# Patient Record
Sex: MALE | Race: WHITE | NOT HISPANIC OR LATINO | Employment: FULL TIME | URBAN - METROPOLITAN AREA
[De-identification: names, ages, dates, MRNs, and addresses within clinical notes are randomized per-mention and may not be internally consistent; named-entity substitution may affect disease eponyms.]

---

## 2017-01-30 ENCOUNTER — ALLIED HEALTH/NURSE VISIT (OUTPATIENT)
Dept: CARDIOLOGY | Facility: CLINIC | Age: 62
End: 2017-01-30
Attending: INTERNAL MEDICINE
Payer: COMMERCIAL

## 2017-01-30 DIAGNOSIS — I42.2 HYPERTROPHIC CARDIOMYOPATHY (H): Primary | ICD-10-CM

## 2017-01-30 PROCEDURE — 93296 REM INTERROG EVL PM/IDS: CPT | Mod: ZF

## 2017-01-30 PROCEDURE — 93295 DEV INTERROG REMOTE 1/2/MLT: CPT | Performed by: INTERNAL MEDICINE

## 2017-01-31 NOTE — PROGRESS NOTES
Scheduled Latitude remote ICD transmission received and reviewed. Device transmission sent per MD orders. His presenting rhythm is Ap/ VS @ 60 bpm with occasional PVCs. 16 NSVT episodes recorded over the last 3 months. Normal device function. AP= 67% and = 0%. Battery estimates 7.5 years to CARMEN. Pt notified of transmission results via vm. Plan for pt to RTC in 3 months as scheduled.

## 2017-03-02 DIAGNOSIS — F13.20 SEDATIVE, HYPNOTIC OR ANXIOLYTIC DEPENDENCE (H): ICD-10-CM

## 2017-03-02 DIAGNOSIS — G47.00 INSOMNIA, UNSPECIFIED: ICD-10-CM

## 2017-03-02 NOTE — TELEPHONE ENCOUNTER
Controlled Substance Refill Request for zolpidem,ambien 10mg tablet  Problem List Complete:  No     PROVIDER TO CONSIDER COMPLETION OF PROBLEM LIST AND OVERVIEW/CONTROLLED SUBSTANCE AGREEMENT    Last Written Prescription Date:  09/27/16  Last Fill Quantity: 90,   # refills: 0    Last Office Visit with [facility] primary care provider: 11/08/16    Future Office visit: 0    Controlled substance agreement on file: No.     Processing:  Local Print attach to [employer] fax to be faxed back    checked in past 6 months?  No, route to RN

## 2017-03-03 RX ORDER — ZOLPIDEM TARTRATE 10 MG/1
5-10 TABLET ORAL
Qty: 90 TABLET | Refills: 0 | Status: SHIPPED | OUTPATIENT
Start: 2017-03-03 | End: 2017-06-14

## 2017-03-03 NOTE — TELEPHONE ENCOUNTER
I sent RX to printer on the  [employer] station.   I will sign and fax when back in office Monday 3/6/17

## 2017-03-09 ENCOUNTER — MEDICAL CORRESPONDENCE (OUTPATIENT)
Dept: HEALTH INFORMATION MANAGEMENT | Facility: CLINIC | Age: 62
End: 2017-03-09

## 2017-05-02 ENCOUNTER — PRE VISIT (OUTPATIENT)
Dept: CARDIOLOGY | Facility: CLINIC | Age: 62
End: 2017-05-02

## 2017-05-02 DIAGNOSIS — Z51.81 ENCOUNTER FOR MONITORING SOTALOL THERAPY: ICD-10-CM

## 2017-05-02 DIAGNOSIS — Z79.899 ENCOUNTER FOR MONITORING SOTALOL THERAPY: ICD-10-CM

## 2017-05-02 DIAGNOSIS — I47.20 VENTRICULAR TACHYCARDIA (H): Primary | ICD-10-CM

## 2017-05-02 DIAGNOSIS — I48.91 ATRIAL FIBRILLATION, UNSPECIFIED TYPE (H): ICD-10-CM

## 2017-05-03 ENCOUNTER — OFFICE VISIT (OUTPATIENT)
Dept: CARDIOLOGY | Facility: CLINIC | Age: 62
End: 2017-05-03
Attending: INTERNAL MEDICINE
Payer: COMMERCIAL

## 2017-05-03 VITALS
WEIGHT: 215.8 LBS | HEART RATE: 64 BPM | BODY MASS INDEX: 30.21 KG/M2 | DIASTOLIC BLOOD PRESSURE: 84 MMHG | HEIGHT: 71 IN | SYSTOLIC BLOOD PRESSURE: 143 MMHG | OXYGEN SATURATION: 95 %

## 2017-05-03 DIAGNOSIS — I48.0 PAROXYSMAL ATRIAL FIBRILLATION (H): Primary | ICD-10-CM

## 2017-05-03 DIAGNOSIS — Z79.899 ENCOUNTER FOR MONITORING SOTALOL THERAPY: ICD-10-CM

## 2017-05-03 DIAGNOSIS — I42.2 HYPERTROPHIC CARDIOMYOPATHY (H): Primary | ICD-10-CM

## 2017-05-03 DIAGNOSIS — Z51.81 ENCOUNTER FOR MONITORING SOTALOL THERAPY: ICD-10-CM

## 2017-05-03 PROCEDURE — 99212 OFFICE O/P EST SF 10 MIN: CPT | Mod: 25,ZF

## 2017-05-03 PROCEDURE — 93283 PRGRMG EVAL IMPLANTABLE DFB: CPT | Mod: 26 | Performed by: INTERNAL MEDICINE

## 2017-05-03 PROCEDURE — 93283 PRGRMG EVAL IMPLANTABLE DFB: CPT | Mod: ZF

## 2017-05-03 PROCEDURE — 93010 ELECTROCARDIOGRAM REPORT: CPT | Mod: ZP | Performed by: INTERNAL MEDICINE

## 2017-05-03 PROCEDURE — 40000809 ZZH STATISTIC NO DOCUMENTATION TO SUPPORT CHARGE

## 2017-05-03 PROCEDURE — 99214 OFFICE O/P EST MOD 30 MIN: CPT | Mod: ZP | Performed by: INTERNAL MEDICINE

## 2017-05-03 ASSESSMENT — PAIN SCALES - GENERAL: PAINLEVEL: NO PAIN (0)

## 2017-05-03 NOTE — MR AVS SNAPSHOT
After Visit Summary   5/3/2017    Stu Meredith    MRN: [id number]           Patient Information     Date Of Birth          1955        Visit Information        Provider Department      5/3/2017 4:00 PM 1, Uc Cv Device [facility]        Today's Diagnoses     Hypertrophic cardiomyopathy (H)    -  1      Care Instructions    It was a pleasure to see you in clinic today.  Please do not hesitate to call with any questions or concerns.  You are scheduled for a remote transmission on 8/4/17.  We look forward to seeing you in clinic at your next device check in 6 months.    Tamica Courtney, RN, MS, CCRN  Electrophysiology Nurse Clinician  [facility]    During Business Hours Please Call:  [phone]  After Hours Please Call:  [phone] - select option #4 and ask for job code [phone]                      Follow-ups after your visit        Follow-up notes from your care team     Return in about 6 months (around 11/3/2017) for ICD Check.      Your next 10 appointments already scheduled     May 03, 2017  4:30 PM CDT   (Arrive by 4:15 PM)   RETURN ARRHYTHMIA with Erik Cooper MD   [facility] ([facility])    [address] [place] [id number] [phone]              Who to contact     If you have questions or need follow up information about today's clinic visit or your schedule please contact [facility] directly at [phone].  Normal or non-critical lab and imaging results will be communicated to you by MyChart, letter or phone within 4 business days after the clinic has received the results. If you do not hear from us within 7 days, please contact the clinic through MyChart or phone. If you have a critical or abnormal lab result, we will notify you by phone as soon as possible.  Submit refill requests through Zapnip or call your pharmacy and they will forward the refill request to us. Please allow  3 business days for your refill to be completed.          Additional Information About Your Visit        MyChart Information     iKONVERSE gives you secure access to your electronic health record. If you see a primary care provider, you can also send messages to your care team and make appointments. If you have questions, please call your primary care clinic.  If you do not have a primary care provider, please call [phone] and they will assist you.        Care EveryWhere ID     This is your Care EveryWhere ID. This could be used by other organizations to access your [place] medical records  [id number]         Blood Pressure from Last 3 Encounters:   11/08/16 106/64   10/28/16 120/79   08/04/16 110/72    Weight from Last 3 Encounters:   11/08/16 94.8 kg (209 lb)   10/28/16 94.7 kg (208 lb 11.2 oz)   08/04/16 93.1 kg (205 lb 4.8 oz)              We Performed the Following     ICD DEVICE PROGRAMMING EVAL, DUAL LEAD ICD        Primary Care Provider Office Phone # Fax #    Omar Camacho [phone]       [facility] [address] [facility] [address]        Thank you!     Thank you for choosing [facility]  for your care. Our goal is always to provide you with excellent care. Hearing back from our patients is one way we can continue to improve our services. Please take a few minutes to complete the written survey that you may receive in the mail after your visit with us. Thank you!             Your Updated Medication List - Protect others around you: Learn how to safely use, store and throw away your medicines at www.disposemymeds.org.          This list is accurate as of: 5/3/17  4:22 PM.  Always use your most recent med list.                   Brand Name Dispense Instructions for use    acetaminophen 325 MG tablet    TYLENOL     Take 325-650 mg by mouth every 6 hours as needed       albuterol 108 (90 BASE) MCG/ACT Inhaler    albuterol    1 Inhaler    Inhale 2 puffs  into the lungs every 4 hours as needed for shortness of breath / dyspnea or wheezing       * amoxicillin 500 MG tablet    AMOXIL    4 tablet    Take by mouth 4 tabs 1 hour prior to dental appt.       * amoxicillin 500 MG capsule    AMOXIL    4 capsule    TAKE 4 CAPSULES BY MOUTH 1 HOUR BEFORE DENTAL APPT       atorvastatin 20 MG tablet    LIPITOR    90 tablet    Take 1 tablet (20 mg) by mouth daily       Blood Pressure Cuff Misc     1 each    1 Device daily       metoprolol 50 MG 24 hr tablet    TOPROL-XL    90 tablet    TAKE 1 BY MOUTH DAILY       multivitamin, therapeutic Tabs tablet      Take 1 tablet by mouth daily       sotalol 80 MG tablet    BETAPACE    270 tablet    Take 1.5 tablets (120 mg) by mouth 2 times daily       XARELTO 20 MG Tabs tablet   Generic drug:  rivaroxaban ANTICOAGULANT     90 tablet    TAKE 1 BY MOUTH DAILY       zolpidem 10 MG tablet    AMBIEN    90 tablet    Take 0.5-1 tablets (5-10 mg) by mouth nightly as needed for sleep       * Notice:  This list has 2 medication(s) that are the same as other medications prescribed for you. Read the directions carefully, and ask your doctor or other care provider to review them with you.

## 2017-05-03 NOTE — PROGRESS NOTES
"Patient seen in clinic for evaluation and iterative programming of his [employer] dual lead ICD per MD orders.  Patient has an appointment to see Dr. Cooper today.  Normal ICD function.  10 NSVT episodes recorded since patient's last remote transmission on 1/30/17 - 4 beats - 17 sec, 131-185 bpm.  Intrinsic rhythm = SB @ 46 bpm.  AP = 71%.   = <1%.  Estimated battery longevity to CARMEN = 7 years.  Patient reports that he is feeling \"okay\" today.  Plan for patient to send a remote transmission in 3 months and return to clinic in 6 months.    Dual lead ICD    "

## 2017-05-03 NOTE — MR AVS SNAPSHOT
After Visit Summary   5/3/2017    tSu Meredith    MRN: [id number]           Patient Information     Date Of Birth          1955        Visit Information        Provider Department      5/3/2017 4:30 PM Erik Cooper MD [facility]        Today's Diagnoses     Paroxysmal atrial fibrillation (H)    -  1    Encounter for monitoring sotalol therapy          Care Instructions    Cardiology Provider you saw in clinic today: Dr. Cooper    Medication Changes:     1. Finish your Xarelto then switch to Eliquis 5mg twice a day.     Follow-up Visit:  1 year with device check        Please contact us via GrownOut for questions or concerns.    Chetna Ching, ROD   Electrophysiology Nurse Coordinator.  [phone]    If your question concerns the schedule/appointment times, contact:  [facility]   [phone]    Device Clinic (Pacemakers, ICD, Loop Recorders)   [phone]      You will receive all normal lab and testing results via GrownOut or mail if not reviewed in clinic today.   If you need a medication refill please contact your pharmacy.    As always, thank you for trusting us with your health care needs!          Follow-ups after your visit        Follow-up notes from your care team     Return in about 1 year (around 5/3/2018) for VT mgmt, Afib, Dr. Cooper.      Who to contact     If you have questions or need follow up information about today's clinic visit or your schedule please contact [facility] directly at [phone].  Normal or non-critical lab and imaging results will be communicated to you by MyChart, letter or phone within 4 business days after the clinic has received the results. If you do not hear from us within 7 days, please contact the clinic through [employer]art or phone. If you have a critical or abnormal lab result, we will notify you by phone as soon as possible.  Submit refill requests through GrownOut or call your pharmacy and they will  "forward the refill request to us. Please allow 3 business days for your refill to be completed.          Additional Information About Your Visit        Xu[employer] Social Networking Grid Information     [email or web address] gives you secure access to your electronic health record. If you see a primary care provider, you can also send messages to your care team and make appointments. If you have questions, please call your primary care clinic.  If you do not have a primary care provider, please call [phone] and they will assist you.        Care EveryWhere ID     This is your Care EveryWhere ID. This could be used by other organizations to access your [place] medical records  [id number]        Your Vitals Were     Pulse Height Pulse Oximetry BMI (Body Mass Index)          64 1.803 m (5' 11\") 95% 30.1 kg/m2         Blood Pressure from Last 3 Encounters:   05/03/17 143/84   11/08/16 106/64   10/28/16 120/79    Weight from Last 3 Encounters:   05/03/17 97.9 kg (215 lb 12.8 oz)   11/08/16 94.8 kg (209 lb)   10/28/16 94.7 kg (208 lb 11.2 oz)              We Performed the Following     EKG 12-lead, tracing only (Future)          Today's Medication Changes          These changes are accurate as of: 5/3/17  4:53 PM.  If you have any questions, ask your nurse or doctor.               Start taking these medicines.        Dose/Directions    apixaban ANTICOAGULANT 5 MG tablet   Commonly known as:  ELIQUIS   Used for:  Paroxysmal atrial fibrillation (H)   Started by:  Erik Cooper MD        Dose:  5 mg   Take 1 tablet (5 mg) by mouth 2 times daily   Quantity:  180 tablet   Refills:  3         Stop taking these medicines if you haven't already. Please contact your care team if you have questions.     XARELTO 20 MG Tabs tablet   Generic drug:  rivaroxaban ANTICOAGULANT   Stopped by:  Erik Cooper MD                Where to get your medicines      These medications were sent to [facility]/pharmacy #[id number] - [place], [place] - [address], " [facility] [address]     Phone:  [phone]     apixaban ANTICOAGULANT 5 MG tablet                Primary Care Provider Office Phone # Fax #    Omar Camacho [phone]       [facility] [address]  [facility] [address]        Thank you!     Thank you for choosing [facility]  for your care. Our goal is always to provide you with excellent care. Hearing back from our patients is one way we can continue to improve our services. Please take a few minutes to complete the written survey that you may receive in the mail after your visit with us. Thank you!             Your Updated Medication List - Protect others around you: Learn how to safely use, store and throw away your medicines at www.disposemymeds.org.          This list is accurate as of: 5/3/17  4:53 PM.  Always use your most recent med list.                   Brand Name Dispense Instructions for use    acetaminophen 325 MG tablet    TYLENOL     Take 325-650 mg by mouth every 6 hours as needed       albuterol 108 (90 BASE) MCG/ACT Inhaler    albuterol    1 Inhaler    Inhale 2 puffs into the lungs every 4 hours as needed for shortness of breath / dyspnea or wheezing       * amoxicillin 500 MG tablet    AMOXIL    4 tablet    Take by mouth 4 tabs 1 hour prior to dental appt.       * amoxicillin 500 MG capsule    AMOXIL    4 capsule    TAKE 4 CAPSULES BY MOUTH 1 HOUR BEFORE DENTAL APPT       apixaban ANTICOAGULANT 5 MG tablet    ELIQUIS    180 tablet    Take 1 tablet (5 mg) by mouth 2 times daily       atorvastatin 20 MG tablet    LIPITOR    90 tablet    Take 1 tablet (20 mg) by mouth daily       Blood Pressure Cuff Misc     1 each    1 Device daily       metoprolol 50 MG 24 hr tablet    TOPROL-XL    90 tablet    TAKE 1 BY MOUTH DAILY       multivitamin, therapeutic Tabs tablet      Take 1 tablet by mouth daily       sotalol 80 MG tablet    BETAPACE    270 tablet    Take 1.5 tablets (120 mg) by mouth 2 times daily        zolpidem 10 MG tablet    AMBIEN    90 tablet    Take 0.5-1 tablets (5-10 mg) by mouth nightly as needed for sleep       * Notice:  This list has 2 medication(s) that are the same as other medications prescribed for you. Read the directions carefully, and ask your doctor or other care provider to review them with you.

## 2017-05-03 NOTE — PATIENT INSTRUCTIONS
Cardiology Provider you saw in clinic today: Dr. Cooper    Medication Changes:     1. Finish your Xarelto (3 months remaining) then switch to Eliquis 5mg twice a day.     Follow-up Visit:  1 year with device check        Please contact us via MD Insider for questions or concerns.    Chetna Ching RN   Electrophysiology Nurse Coordinator.  [phone]    If your question concerns the schedule/appointment times, contact:  [facility]   [phone]    Device Clinic (Pacemakers, ICD, Loop Recorders)   [phone]      You will receive all normal lab and testing results via MD Insider or mail if not reviewed in clinic today.   If you need a medication refill please contact your pharmacy.    As always, thank you for trusting us with your health care needs!

## 2017-05-03 NOTE — PATIENT INSTRUCTIONS
It was a pleasure to see you in clinic today.  Please do not hesitate to call with any questions or concerns.  You are scheduled for a remote transmission on 8/4/17.  We look forward to seeing you in clinic at your next device check in 6 months.    Tamica Courtney, RN, MS, CCRN  Electrophysiology Nurse Clinician  [facility]    During Business Hours Please Call:  [phone]  After Hours Please Call:  [phone] - select option #4 and ask for job code [phone]

## 2017-05-03 NOTE — NURSING NOTE
Chief Complaint   Patient presents with     Follow Up For     6 mo f/u VT mgmt, AF. ICD, EKG.

## 2017-05-03 NOTE — PROGRESS NOTES
Electrophysiology Clinic Note  HPI:   Mr. Meredith is a 61 year old male who has a past medical history significant for HCM diagnosed 2006, VT on Holter July 2012 s/p ICD 7/2012, troponin leak Oct 2013 (angiogram with non-significant CAD, LV gram showed EF 25%, recovered to 60% on TTE Dec 2013), HTN, AFib (CHADSVASC 2) with DCCVs then s/p PVI 12/2011 and PVI for persistent AF on 7/28/16. He presents for follow-up.  The patient underwent atrial fibrillation ablation by Dr. Gonzalez at [facility] in 12/2011 and implantation of an ICD in 07/2012 because of ventricular tachycardia. As per patient, he has been in sinus rhythm approximately 1-1/2 years. The patient was found to have recurrent atrial fibrillation during a preoperative exam in 10/2013 and underwent electrical cardioversion in 11/2013. The patient underwent hip surgery in 01/2014. The patient noticed that he was in atrial fibrillation at a clinic visit in 01/2014.   He was seen in consult by Dr Analilia spain on 2/14/14 for consideration of ablation. At the time the patient was reported to be generally unaware of whether or not he is in sinus rhythm or in atrial fibrillation. He reports daytime somnolence but denies significant dyspnea on exertion, palpitations, irregular beat sensation, presyncope or syncope. In terms of risk factors for stroke, the patient has coronary heart disease, hypertension, but denies history of diabetes, previous myocardial cardiac infarction or heart failure. The patient is on rivaroxaban for stroke prophylaxis and is on diltiazem and metoprolol. He was told that there was no consideration of ablation.   He came to EP clinic for second opinion in 3/3/2014. The patient mentioned this time he is not sure whether his afib is causing fatigue. However, he also attributed symptoms like headache and head fullness to his afib. He also had a cold around the same time. He still denied chest pain, syncope or presyncope, ICD shocks, MART or  "SOB. He does however indicate this time that he does feel irregular heart beat at times, along with chronic fatigue, although is not sure if fatigue is linked to afib. We agreed at that time to pursue a cardioversion to see whether he would feel better and we started him on sotalol 80 mg twice a day. He underwent cardioversion on 4/24/2014.  EP visit 5/19/14: EKG shows atrially paced rhythm with a heart rate of 70 bpm. His QTC is 436 ms and GA interval is 243 ms. The patient is still on sotalol, metoprolol, and diltiazem. He mentioned that he does feel slightly better from the standpoint. He denies palpitations at night now. He still denies chest pains and shortness of breath. His device interrogation showed the A. Fib episode with the longest being 4 seconds and one 4 beat episode of nonsustained VT, all asymptomatic.  EP visit 8/8/2014: \"The patient had a remote device transmission on 7/29/2014. His presenting rhythm is AF- ventricular rate ~70 bpm. Normal ICD function. AF burden is 100% since 7/22/14 at 1823. Hence, we asked the patient to see us today.  The device interrogation today shows  That the patient broke out of atrial fibrillation about 4 days ago, and was in A. Fib for about 8 days.  The patient does mention fatigue throughout the last several weeks, and that he was under stress working 70-80 hours a week.  He did not feel particularly more fatigued during the episode he was in atrial fibrillation but he was not sure.  There are no nonsustained VT episodes on a device interrogation.  He denies any chest pain or shortness of breath on exertion or at rest, no syncope or presyncope, no orthopnea.\" we decrease his diltiazem from 240 to 120 mg daiy because of fatigue.  The patient presents for follow-up 11/4/2014. The device interrogation revealed he had one episode of 11 seconds of atrial tachycardia on September 11, 2014.  He also had an episode of nonsustained VT on October 31, felt pressure in his head, " with a record of 10 other nonsustained VT episodes between 140 and 174 bpm, 7-10 seconds in length, asymptomatic.  His fatigue has gotten better when we decrease his diltiazem.  His EKG shows an atrially paced rhythm at 70 bpm,  ms.  He continues to deny any other cardiac symptoms.  EP Visit 7/1/15: Device interrogation shows a 9 day episode of A. Fib starting June 19, 2015, during which the patient felt very tired and no energy.  The heart rate was well controlled.  A. Fib burden was 19% since 5/19/2015, but it is the first episode of A. Fib since at least November 2014.  There was also records 16 nonsustained VT episodes, however, after review of the tracings, the real nonsustained VTs are at most 8 beats long an asymptomatic.  Otherwise, the patient is fairly asymptomatic from cardiovascular standpoint. His EKG shows atrial paced beat, heart rate 70 bpm,  ms.  He continues to take sotalol 80 mg twice a day.  EP Visit 1/13/16 He reports feeling well. Device interrogation shows normal ICD function. 2 NSVT episodes, 4 beats 138-165 bpm. AF burden about 1%. Intrinsic rhythm is SB at 48 bpm. Presenting 12 Lead ECG shows A-paced 70 bpm QRS 82 ms, QTc 429 ms. He denies any dizziness, lightheadedness, shortness of breath, palpitations, or syncopal symptoms.  Last Echo showed LVEF 40-45%. Recent stress echo stable with no significant LVOT obstruction. He is undergoing evaluation with his work to possibly reinstate his  privileges. Current cardiac medications include: Diltiazem  mg daily, metoprolol XL 50 mg daily, Xarelto, and Sotalol 80 mg BID. He reports he stopped pravastatin about 1 week ago due to cramps.   EP Visit 5/3/16: He presents today for follow up. His device interrogation shows his intrinsic rhythm is AF with ventricular rate ~ 70 bpm. AF burden= 100% since 2/28/16. AP= 12% and = 30% since 1/2016. He reports some increased fatgiue and decreased stamina since being back in  AF. He denies any chest pain, palpitations, dizziness, lightheadedness, shortness of breath, or syncopal symptoms. At this visit, he elected to pursue repeat PVI ablation for AF that is refractory to Sotalol.     EP Visit 10/28/16: He presents today for follow up after ablation. He underwent repeat PVI ablation for persistent AF refractory to AATs on 7/28/16. He had successful re-isolation of all 4 pulmonary veins. He was re-hospitalized a couple days post ablation for SOB assocaited with hypervolemia which resolved with diuresis. He reports feeling well after ablation. He states his energy levels improved with restoration of sinus. Groin sites well healed. He denies any chest pain, dizziness, lightheadedness, shortness of breath, leg/ankle swelling, palpitations, or syncopal symptoms. Diltiazem was stopped after ablation.  Device interrogation shows His ICD check shows 1 ATR for 1 sec and NSVT episode for 4 beats. He RV paces <1% of the time, his heart rate histograms show good heart rate variation.  Current cardiac medications include: Sotalol, Metoprolol, and Xarelto.     He presents today for follow up. He reports feeling well. He denies any known recurrences of AF. He denies any chest pain, dizziness, lightheadedness, shortness of breath, palpitations, or syncopal symptoms. Device interrogation shows normal ICD function. 10 NSVT episodes recorded since patient's last remote transmission on 1/30/17 - 4 beats - 17 sec, 131-185 bpm.  Intrinsic rhythm = SB @ 46 bpm. AP = 71%.  = <1%. He states that Xarelto has increasing out of pocket costs and he is wondering if there are alternatives. Presenting 12 lead ECG shows AP Vent Rate 60 bpm,  ms, QRS 84 ms, QTc 418 ms. Current cardiac medications include: Sotalol, Metoprolol, and Xarelto.     PAST MEDICAL HISTORY:  Past Medical History:   Diagnosis Date     Atrial fibrillation (H) 12/9/11    failed medication, multiple DC cardioveresions; s/p Left atrial ablation  to eliminate atrial fibrillation 12/9/11     Chest pain      CHF (congestive heart failure) (H) 7/30/2016     Coronary artery disease      DJD (degenerative joint disease)      Hip arthritis 1/15/2014     Hypertension      Hypertrophic cardiomyopathy (H) 10/09     Other abnormal heart sounds      Pacemaker     ICD     Pneumonia, organism unspecified      Prediabetes 7/10/15    A1C 6.5     Status post implantation of automatic cardioverter/defibrillator (AICD)      Ventricular tachycardia (H)        CURRENT MEDICATIONS:  Current Outpatient Prescriptions   Medication Sig Dispense Refill     zolpidem (AMBIEN) 10 MG tablet Take 0.5-1 tablets (5-10 mg) by mouth nightly as needed for sleep 90 tablet 0     XARELTO 20 MG TABS tablet TAKE 1 BY MOUTH DAILY 90 tablet 3     albuterol (ALBUTEROL) 108 (90 BASE) MCG/ACT inhaler Inhale 2 puffs into the lungs every 4 hours as needed for shortness of breath / dyspnea or wheezing 1 Inhaler 2     metoprolol (TOPROL-XL) 50 MG 24 hr tablet TAKE 1 BY MOUTH DAILY 90 tablet 3     atorvastatin (LIPITOR) 20 MG tablet Take 1 tablet (20 mg) by mouth daily 90 tablet 1     multivitamin, therapeutic (THERA-VIT) TABS Take 1 tablet by mouth daily       sotalol (BETAPACE) 80 MG tablet Take 1.5 tablets (120 mg) by mouth 2 times daily 270 tablet 3     acetaminophen (TYLENOL) 325 MG tablet Take 325-650 mg by mouth every 6 hours as needed       Blood Pressure Monitoring (BLOOD PRESSURE CUFF) MISC 1 Device daily 1 each 0     amoxicillin (AMOXIL) 500 MG capsule TAKE 4 CAPSULES BY MOUTH 1 HOUR BEFORE DENTAL APPT (Patient not taking: Reported on 5/3/2017) 4 capsule 4     amoxicillin (AMOXIL) 500 MG tablet Take by mouth 4 tabs 1 hour prior to dental appt. (Patient not taking: Reported on 5/3/2017) 4 tablet 4       PAST SURGICAL HISTORY:  Past Surgical History:   Procedure Laterality Date     ANESTHESIA CARDIOVERSION  4/24/2014    Procedure: ANESTHESIA CARDIOVERSION;  Surgeon: Generic Anesthesia Provider;   "Location: [facility] OR     ANESTHESIA CARDIOVERSION N/A 5/12/2016    Procedure: ANESTHESIA CARDIOVERSION;  Surgeon: GENERIC ANESTHESIA PROVIDER;  Location: [facility] OR     ARTHROPLASTY HIP  1/15/2014    Procedure: ARTHROPLASTY HIP;  Left Total Hip Arthroplasty;  Surgeon: Nelson Gaspar MD;  Location: [facility] OR     CARDIAC SURGERY       H ABLATION FOCAL AFIB  12/9/11    Left atrial ablation to eliminate atrial fibrillation     IMPLANT AUTOMATIC IMPLANTABLE CARDIOVERTER DEFIBRILLATOR  7/27/12    AICD implantation     TONSILLECTOMY  1964       ALLERGIES:     Allergies   Allergen Reactions     Chantix [Varenicline]      Nigthmares, insomnia, patient states that it is not really an allergy, just a side effect       FAMILY HISTORY:  Family History   Problem Relation Age of Onset     HEART DISEASE Mother      unknown     Obesity Mother      GASTROINTESTINAL DISEASE Mother      diverticulitis     DIABETES Maternal Grandmother      DIABETES Paternal Grandmother      CEREBROVASCULAR DISEASE Paternal Grandmother 94     DIABETES Paternal Grandfather      CEREBROVASCULAR DISEASE Paternal Grandfather 78     DIABETES Son      C.A.D. Father      CABG age 78     HEART DISEASE Father      CABG x5     DIABETES Maternal Grandfather      DIABETES Son        SOCIAL HISTORY:  Social History   Substance Use Topics     Smoking status: Former Smoker     Packs/day: 0.25     Years: 40.00     Types: Cigarettes     Start date: 1/1/1975     Quit date: 12/11/2015     Smokeless tobacco: Never Used     Alcohol use No      Comment: 1 drink per week       ROS:   A comprehensive 10 point review of systems negative other than as mentioned in HPI.  Exam:  /84 (BP Location: Left arm, Patient Position: Chair, Cuff Size: Adult Regular)  Pulse 64  Ht 1.803 m (5' 11\")  Wt 97.9 kg (215 lb 12.8 oz)  SpO2 95%  BMI 30.1 kg/m2  GENERAL APPEARANCE: healthy, alert and no distress  HEENT: no icterus, no xanthelasmas, normal pupil size and reaction, normal palate, " mucosa moist, no central cyanosis  NECK: no adenopathy, no asymmetry, masses, or scars, thyroid normal to palpation and no bruits, JVP not elevated  RESPIRATORY: lungs clear to auscultation - no rales, rhonchi or wheezes, no use of accessory muscles, no retractions, respirations are unlabored, normal respiratory rate  CARDIOVASCULAR: regular rhythm, normal S1 with physiologic split S2, no S3 or S4 and no murmur, click or rub, precordium quiet with normal PMI.  ABDOMEN: soft, non tender, without hepatosplenomegaly, no masses palpable, bowel sounds normal, aorta not enlarged by palpation, no abdominal bruits  EXTREMITIES: peripheral pulses normal, no edema, no bruits  NEURO: alert and oriented to person/place/time, normal speech, gait and affect  VASC: Radial, femoral, dorsalis pedis and posterior tibialis pulses are normal in volumes and symmetric bilaterally. No bruits are heard.  SKIN: no ecchymoses, no rashes    Labs:  Reviewed.     Procedures:  12/15/16 ECHOCARDIOGRAM:   Interpretation Summary  Mildly (EF 40-45%) reduced left ventricular function is present. Mild  asymmetric septal hypertrophy is present (14 mm). No dynamic outflow tract  obstruction is present.  Global right ventricular function is normal.  No significant valvular abnormalities are identified.  No pericardial effusion is present.       Assessment and Plan:   Mr. Meredith is a 61 year old male who has a past medical history significant for HCM diagnosed 2006, VT on Holter July 2012 s/p ICD 7/2012, troponin leak Oct 2013 (angiogram with non-significant CAD, LV gram showed EF 25%, recovered to 60% on TTE Dec 2013), HTN, AFib (CHADSVASC 2) with DCCVs then s/p PVI 12/2011 and PVI for persistent AF on 7/28/16. He presents for follow-up. He reports feeling well. He denies any known recurrences of AF. He states that Xarelto has increasing out of pocket costs and he is wondering if there are alternatives. Current cardiac medications include: Sotalol,  Metoprolol, and Xarelto.   We discussed in detail with the patient management/treatment options for Antonino includin. Stroke Prophylaxis:  CHADSVASC= 2, corresponding to a 2.2% annual stroke / systemic emolism event rate. indicating need for long term oral anticoagulation.  He is on Xarelto. No bleeding issues. He is asking for alternatives to Xarelto due to increasing out-of-pocket costs. We discussed alternatives in detail:   Anticoagulants include warfarin (Coumadin) and the noval oral anticoagulant agents: dabigatran (Pradaxa), rivaroxaban (Xarelto), and apixaban (Eliquis). The benefits of warfarin include: low cost, established track record in reduction of stroke and systemic embolism, the ability to reverse the agents, the ability to titrate the agent, and the ability to provide additional protection in subsets of patients who require anticoagulation for an independent process (i.e. prosthetic valve). The disadvantages of warfarin include: frequent phlebotomy for INRs, difficulty in regulation of INR [typically  60-65% of INRs within therapeutic range], and dietary constraints secondary to bioavailability. The benefits of the novel oral anticoagulants, as a class, include:  no phlebotomy, similar or significantly lower risk of stroke or systemic embolism depending on the agent, and similar or significantly lower risk of bleeding, particularly intracranial bleeding. The disadvantages of the novel agents, as a class, include: higher cost, the inability to reverse the agents (except Pradaxa), and  the need to be cautious in patients with CKD.   He has been on warfarin previously and does not want to go back to warfarin given need for frequent phlebotomy. After screening his insurance coverage, he will switch of Eliquis 5 mg BID. He will use the rest of his Xarelto supply and then make the switch to Eliquis.   2. Rate Control: Continue Metoprolol.   3. Rhythm Control: He has had a PVI ablation in  with  several recurrences requiring DCCV and then repeat PVI in July 2016. He had previously failed multaq and is currently on Sotalol. He is doing well after ablation without recurrences. Ablation voltage mapping showed high scar burden in atrium; therefore, he has higher recurrence rate for AF and may be difficult to maintain NSR. Considering this, we will continue Sotalol at current dosing.     4. Risk Factor Management: maintain tight BP control, and GONZALEZ evaluation as indicated.    He will continue to follow with the device clinic per routine. We will have him follow up 1 year.     The patient states understanding and is agreeable with plan.   This patient was seen and evaluated with Dr. Cooper. The above note reflects our joint assessment and plan.   KARSON Richardson CNP  Pager: [phone]    EP STAFF NOTE  I have seen and examined the patient as part of a shared visit with HOLLY Richardson NP.  I agree with the note above. I reviewed today's vital signs and medications. I have reviewed and discussed with the advanced practice provider their physical examination, assessment, and plan   Briefly, s.p afib ablation, no recurrences  My key history/exam findings are: RRR.   The key management decisions made by me: continue sotalol..    Erik Cooper MD [facility]  Cardiology - Electrophysiology    CC  TYRELL NOGUERA

## 2017-05-03 NOTE — LETTER
5/3/2017      RE: Stu Meredith  [address] [facility] [id number]       Dear Colleague,    Thank you for the opportunity to participate in the care of your patient, Stu Meredith, at the [facility] HEART [facility] at [facility]. Please see a copy of my visit note below.    Electrophysiology Clinic Note  HPI:   Mr. Meredith is a 61 year old male who has a past medical history significant for HCM diagnosed 2006, VT on Holter July 2012 s/p ICD 7/2012, troponin leak Oct 2013 (angiogram with non-significant CAD, LV gram showed EF 25%, recovered to 60% on TTE Dec 2013), HTN, AFib (CHADSVASC 2) with DCCVs then s/p PVI 12/2011 and PVI for persistent AF on 7/28/16. He presents for follow-up.  The patient underwent atrial fibrillation ablation by Dr. Gonzalez at [facility] in 12/2011 and implantation of an ICD in 07/2012 because of ventricular tachycardia. As per patient, he has been in sinus rhythm approximately 1-1/2 years. The patient was found to have recurrent atrial fibrillation during a preoperative exam in 10/2013 and underwent electrical cardioversion in 11/2013. The patient underwent hip surgery in 01/2014. The patient noticed that he was in atrial fibrillation at a clinic visit in 01/2014.   He was seen in consult by Dr Analilia spain on 2/14/14 for consideration of ablation. At the time the patient was reported to be generally unaware of whether or not he is in sinus rhythm or in atrial fibrillation. He reports daytime somnolence but denies significant dyspnea on exertion, palpitations, irregular beat sensation, presyncope or syncope. In terms of risk factors for stroke, the patient has coronary heart disease, hypertension, but denies history of diabetes, previous myocardial cardiac infarction or heart failure. The patient is on rivaroxaban for stroke prophylaxis and is on diltiazem and metoprolol. He was told that there was no consideration of ablation.   He came to  "EP clinic for second opinion in 3/3/2014. The patient mentioned this time he is not sure whether his afib is causing fatigue. However, he also attributed symptoms like headache and head fullness to his afib. He also had a cold around the same time. He still denied chest pain, syncope or presyncope, ICD shocks, MART or SOB. He does however indicate this time that he does feel irregular heart beat at times, along with chronic fatigue, although is not sure if fatigue is linked to afib. We agreed at that time to pursue a cardioversion to see whether he would feel better and we started him on sotalol 80 mg twice a day. He underwent cardioversion on 4/24/2014.  EP visit 5/19/14: EKG shows atrially paced rhythm with a heart rate of 70 bpm. His QTC is 436 ms and OR interval is 243 ms. The patient is still on sotalol, metoprolol, and diltiazem. He mentioned that he does feel slightly better from the standpoint. He denies palpitations at night now. He still denies chest pains and shortness of breath. His device interrogation showed the A. Fib episode with the longest being 4 seconds and one 4 beat episode of nonsustained VT, all asymptomatic.  EP visit 8/8/2014: \"The patient had a remote device transmission on 7/29/2014. His presenting rhythm is AF- ventricular rate ~70 bpm. Normal ICD function. AF burden is 100% since 7/22/14 at 1823. Hence, we asked the patient to see us today.  The device interrogation today shows  That the patient broke out of atrial fibrillation about 4 days ago, and was in A. Fib for about 8 days.  The patient does mention fatigue throughout the last several weeks, and that he was under stress working 70-80 hours a week.  He did not feel particularly more fatigued during the episode he was in atrial fibrillation but he was not sure.  There are no nonsustained VT episodes on a device interrogation.  He denies any chest pain or shortness of breath on exertion or at rest, no syncope or presyncope, no " "orthopnea.\" we decrease his diltiazem from 240 to 120 mg daiy because of fatigue.  The patient presents for follow-up 11/4/2014. The device interrogation revealed he had one episode of 11 seconds of atrial tachycardia on September 11, 2014.  He also had an episode of nonsustained VT on October 31, felt pressure in his head, with a record of 10 other nonsustained VT episodes between 140 and 174 bpm, 7-10 seconds in length, asymptomatic.  His fatigue has gotten better when we decrease his diltiazem.  His EKG shows an atrially paced rhythm at 70 bpm,  ms.  He continues to deny any other cardiac symptoms.  EP Visit 7/1/15: Device interrogation shows a 9 day episode of A. Fib starting June 19, 2015, during which the patient felt very tired and no energy.  The heart rate was well controlled.  A. Fib burden was 19% since 5/19/2015, but it is the first episode of A. Fib since at least November 2014.  There was also records 16 nonsustained VT episodes, however, after review of the tracings, the real nonsustained VTs are at most 8 beats long an asymptomatic.  Otherwise, the patient is fairly asymptomatic from cardiovascular standpoint. His EKG shows atrial paced beat, heart rate 70 bpm,  ms.  He continues to take sotalol 80 mg twice a day.  EP Visit 1/13/16 He reports feeling well. Device interrogation shows normal ICD function. 2 NSVT episodes, 4 beats 138-165 bpm. AF burden about 1%. Intrinsic rhythm is SB at 48 bpm. Presenting 12 Lead ECG shows A-paced 70 bpm QRS 82 ms, QTc 429 ms. He denies any dizziness, lightheadedness, shortness of breath, palpitations, or syncopal symptoms.  Last Echo showed LVEF 40-45%. Recent stress echo stable with no significant LVOT obstruction. He is undergoing evaluation with his work to possibly reinstate his  privileges. Current cardiac medications include: Diltiazem  mg daily, metoprolol XL 50 mg daily, Xarelto, and Sotalol 80 mg BID. He reports he stopped " pravastatin about 1 week ago due to cramps.   EP Visit 5/3/16: He presents today for follow up. His device interrogation shows his intrinsic rhythm is AF with ventricular rate ~ 70 bpm. AF burden= 100% since 2/28/16. AP= 12% and = 30% since 1/2016. He reports some increased fatgiue and decreased stamina since being back in AF. He denies any chest pain, palpitations, dizziness, lightheadedness, shortness of breath, or syncopal symptoms. At this visit, he elected to pursue repeat PVI ablation for AF that is refractory to Sotalol.     EP Visit 10/28/16: He presents today for follow up after ablation. He underwent repeat PVI ablation for persistent AF refractory to AATs on 7/28/16. He had successful re-isolation of all 4 pulmonary veins. He was re-hospitalized a couple days post ablation for SOB assocaited with hypervolemia which resolved with diuresis. He reports feeling well after ablation. He states his energy levels improved with restoration of sinus. Groin sites well healed. He denies any chest pain, dizziness, lightheadedness, shortness of breath, leg/ankle swelling, palpitations, or syncopal symptoms. Diltiazem was stopped after ablation.  Device interrogation shows His ICD check shows 1 ATR for 1 sec and NSVT episode for 4 beats. He RV paces <1% of the time, his heart rate histograms show good heart rate variation.  Current cardiac medications include: Sotalol, Metoprolol, and Xarelto.     He presents today for follow up. He reports feeling well. He denies any known recurrences of AF. He denies any chest pain, dizziness, lightheadedness, shortness of breath, palpitations, or syncopal symptoms. Device interrogation shows normal ICD function. 10 NSVT episodes recorded since patient's last remote transmission on 1/30/17 - 4 beats - 17 sec, 131-185 bpm.  Intrinsic rhythm = SB @ 46 bpm. AP = 71%.  = <1%. He states that Xarelto has increasing out of pocket costs and he is wondering if there are alternatives.  Presenting 12 lead ECG shows AP Vent Rate 60 bpm,  ms, QRS 84 ms, QTc 418 ms. Current cardiac medications include: Sotalol, Metoprolol, and Xarelto.     PAST MEDICAL HISTORY:  Past Medical History:   Diagnosis Date     Atrial fibrillation (H) 12/9/11    failed medication, multiple DC cardioveresions; s/p Left atrial ablation to eliminate atrial fibrillation 12/9/11     Chest pain      CHF (congestive heart failure) (H) 7/30/2016     Coronary artery disease      DJD (degenerative joint disease)      Hip arthritis 1/15/2014     Hypertension      Hypertrophic cardiomyopathy (H) 10/09     Other abnormal heart sounds      Pacemaker     ICD     Pneumonia, organism unspecified      Prediabetes 7/10/15    A1C 6.5     Status post implantation of automatic cardioverter/defibrillator (AICD)      Ventricular tachycardia (H)        CURRENT MEDICATIONS:  Current Outpatient Prescriptions   Medication Sig Dispense Refill     zolpidem (AMBIEN) 10 MG tablet Take 0.5-1 tablets (5-10 mg) by mouth nightly as needed for sleep 90 tablet 0     XARELTO 20 MG TABS tablet TAKE 1 BY MOUTH DAILY 90 tablet 3     albuterol (ALBUTEROL) 108 (90 BASE) MCG/ACT inhaler Inhale 2 puffs into the lungs every 4 hours as needed for shortness of breath / dyspnea or wheezing 1 Inhaler 2     metoprolol (TOPROL-XL) 50 MG 24 hr tablet TAKE 1 BY MOUTH DAILY 90 tablet 3     atorvastatin (LIPITOR) 20 MG tablet Take 1 tablet (20 mg) by mouth daily 90 tablet 1     multivitamin, therapeutic (THERA-VIT) TABS Take 1 tablet by mouth daily       sotalol (BETAPACE) 80 MG tablet Take 1.5 tablets (120 mg) by mouth 2 times daily 270 tablet 3     acetaminophen (TYLENOL) 325 MG tablet Take 325-650 mg by mouth every 6 hours as needed       Blood Pressure Monitoring (BLOOD PRESSURE CUFF) MISC 1 Device daily 1 each 0     amoxicillin (AMOXIL) 500 MG capsule TAKE 4 CAPSULES BY MOUTH 1 HOUR BEFORE DENTAL APPT (Patient not taking: Reported on 5/3/2017) 4 capsule 4      amoxicillin (AMOXIL) 500 MG tablet Take by mouth 4 tabs 1 hour prior to dental appt. (Patient not taking: Reported on 5/3/2017) 4 tablet 4       PAST SURGICAL HISTORY:  Past Surgical History:   Procedure Laterality Date     ANESTHESIA CARDIOVERSION  4/24/2014    Procedure: ANESTHESIA CARDIOVERSION;  Surgeon: Generic Anesthesia Provider;  Location: [facility] OR     ANESTHESIA CARDIOVERSION N/A 5/12/2016    Procedure: ANESTHESIA CARDIOVERSION;  Surgeon: GENERIC ANESTHESIA PROVIDER;  Location: [facility] OR     ARTHROPLASTY HIP  1/15/2014    Procedure: ARTHROPLASTY HIP;  Left Total Hip Arthroplasty;  Surgeon: Nelson Gaspar MD;  Location: [facility] OR     CARDIAC SURGERY       H ABLATION FOCAL AFIB  12/9/11    Left atrial ablation to eliminate atrial fibrillation     IMPLANT AUTOMATIC IMPLANTABLE CARDIOVERTER DEFIBRILLATOR  7/27/12    AICD implantation     TONSILLECTOMY  1964       ALLERGIES:     Allergies   Allergen Reactions     Chantix [Varenicline]      Nigthmares, insomnia, patient states that it is not really an allergy, just a side effect       FAMILY HISTORY:  Family History   Problem Relation Age of Onset     HEART DISEASE Mother      unknown     Obesity Mother      GASTROINTESTINAL DISEASE Mother      diverticulitis     DIABETES Maternal Grandmother      DIABETES Paternal Grandmother      CEREBROVASCULAR DISEASE Paternal Grandmother 94     DIABETES Paternal Grandfather      CEREBROVASCULAR DISEASE Paternal Grandfather 78     DIABETES Son      C.A.D. Father      CABG age 78     HEART DISEASE Father      CABG x5     DIABETES Maternal Grandfather      DIABETES Son        SOCIAL HISTORY:  Social History   Substance Use Topics     Smoking status: Former Smoker     Packs/day: 0.25     Years: 40.00     Types: Cigarettes     Start date: 1/1/1975     Quit date: 12/11/2015     Smokeless tobacco: Never Used     Alcohol use No      Comment: 1 drink per week       ROS:   A comprehensive 10 point review of systems negative other than as  "mentioned in HPI.  Exam:  /84 (BP Location: Left arm, Patient Position: Chair, Cuff Size: Adult Regular)  Pulse 64  Ht 1.803 m (5' 11\")  Wt 97.9 kg (215 lb 12.8 oz)  SpO2 95%  BMI 30.1 kg/m2  GENERAL APPEARANCE: healthy, alert and no distress  HEENT: no icterus, no xanthelasmas, normal pupil size and reaction, normal palate, mucosa moist, no central cyanosis  NECK: no adenopathy, no asymmetry, masses, or scars, thyroid normal to palpation and no bruits, JVP not elevated  RESPIRATORY: lungs clear to auscultation - no rales, rhonchi or wheezes, no use of accessory muscles, no retractions, respirations are unlabored, normal respiratory rate  CARDIOVASCULAR: regular rhythm, normal S1 with physiologic split S2, no S3 or S4 and no murmur, click or rub, precordium quiet with normal PMI.  ABDOMEN: soft, non tender, without hepatosplenomegaly, no masses palpable, bowel sounds normal, aorta not enlarged by palpation, no abdominal bruits  EXTREMITIES: peripheral pulses normal, no edema, no bruits  NEURO: alert and oriented to person/place/time, normal speech, gait and affect  VASC: Radial, femoral, dorsalis pedis and posterior tibialis pulses are normal in volumes and symmetric bilaterally. No bruits are heard.  SKIN: no ecchymoses, no rashes    Labs:  Reviewed.     Procedures:  12/15/16 ECHOCARDIOGRAM:   Interpretation Summary  Mildly (EF 40-45%) reduced left ventricular function is present. Mild  asymmetric septal hypertrophy is present (14 mm). No dynamic outflow tract  obstruction is present.  Global right ventricular function is normal.  No significant valvular abnormalities are identified.  No pericardial effusion is present.       Assessment and Plan:   Mr. Meredith is a 61 year old male who has a past medical history significant for HCM diagnosed 2006, VT on Holter July 2012 s/p ICD 7/2012, troponin leak Oct 2013 (angiogram with non-significant CAD, LV gram showed EF 25%, recovered to 60% on TTE Dec 2013), " HTN, AFib (CHADSVASC 2) with DCCVs then s/p PVI 2011 and PVI for persistent AF on 16. He presents for follow-up. He reports feeling well. He denies any known recurrences of AF. He states that Xarelto has increasing out of pocket costs and he is wondering if there are alternatives. Current cardiac medications include: Sotalol, Metoprolol, and Xarelto.   We discussed in detail with the patient management/treatment options for Antonino includin. Stroke Prophylaxis:  CHADSVASC= 2, corresponding to a 2.2% annual stroke / systemic emolism event rate. indicating need for long term oral anticoagulation.  He is on Xarelto. No bleeding issues. He is asking for alternatives to Xarelto due to increasing out-of-pocket costs. We discussed alternatives in detail:   Anticoagulants include warfarin (Coumadin) and the noval oral anticoagulant agents: dabigatran (Pradaxa), rivaroxaban (Xarelto), and apixaban (Eliquis). The benefits of warfarin include: low cost, established track record in reduction of stroke and systemic embolism, the ability to reverse the agents, the ability to titrate the agent, and the ability to provide additional protection in subsets of patients who require anticoagulation for an independent process (i.e. prosthetic valve). The disadvantages of warfarin include: frequent phlebotomy for INRs, difficulty in regulation of INR [typically  60-65% of INRs within therapeutic range], and dietary constraints secondary to bioavailability. The benefits of the novel oral anticoagulants, as a class, include:  no phlebotomy, similar or significantly lower risk of stroke or systemic embolism depending on the agent, and similar or significantly lower risk of bleeding, particularly intracranial bleeding. The disadvantages of the novel agents, as a class, include: higher cost, the inability to reverse the agents (except Pradaxa), and  the need to be cautious in patients with CKD.   He has been on warfarin previously  and does not want to go back to warfarin given need for frequent phlebotomy. After screening his insurance coverage, he will switch of Eliquis 5 mg BID. He will use the rest of his Xarelto supply and then make the switch to Eliquis.   2. Rate Control: Continue Metoprolol.   3. Rhythm Control: He has had a PVI ablation in 2011 with several recurrences requiring DCCV and then repeat PVI in July 2016. He had previously failed multaq and is currently on Sotalol. He is doing well after ablation without recurrences. Ablation voltage mapping showed high scar burden in atrium; therefore, he has higher recurrence rate for AF and may be difficult to maintain NSR. Considering this, we will continue Sotalol at current dosing.     4. Risk Factor Management: maintain tight BP control, and GONZALEZ evaluation as indicated.    He will continue to follow with the device clinic per routine. We will have him follow up 1 year.     The patient states understanding and is agreeable with plan.   This patient was seen and evaluated with Dr. Cooper. The above note reflects our joint assessment and plan.   KARSON Richardson CNP  Pager: [phone]    EP STAFF NOTE  I have seen and examined the patient as part of a shared visit with HOLLY Richardson NP.  I agree with the note above. I reviewed today's vital signs and medications. I have reviewed and discussed with the advanced practice provider their physical examination, assessment, and plan   Briefly, s.p afib ablation, no recurrences  My key history/exam findings are: RRR.   The key management decisions made by me: continue sotalol..    Erik Cooper MD [facility]  Cardiology - Electrophysiology    CC  TYRELL NOGUERA

## 2017-05-04 LAB — INTERPRETATION ECG - MUSE: NORMAL

## 2017-06-14 DIAGNOSIS — G47.00 INSOMNIA, UNSPECIFIED: ICD-10-CM

## 2017-06-14 DIAGNOSIS — F13.20 SEDATIVE, HYPNOTIC OR ANXIOLYTIC DEPENDENCE (H): ICD-10-CM

## 2017-06-14 RX ORDER — ZOLPIDEM TARTRATE 10 MG/1
TABLET ORAL
Qty: 90 TABLET | Refills: 0 | Status: SHIPPED | OUTPATIENT
Start: 2017-06-14 | End: 2017-09-26

## 2017-06-14 NOTE — TELEPHONE ENCOUNTER
ambien      Last Written Prescription Date:  3/3/17  Last Fill Quantity: 90,   # refills: 0  Last Office Visit with [facility] prescribing provider: 11/8/16  Future Office visit:       Routing refill request to provider for review/approval because:  Drug not on the [facility] refill protocol or controlled substance

## 2017-07-26 ENCOUNTER — ALLIED HEALTH/NURSE VISIT (OUTPATIENT)
Dept: CARDIOLOGY | Facility: CLINIC | Age: 62
End: 2017-07-26
Attending: INTERNAL MEDICINE
Payer: COMMERCIAL

## 2017-07-26 DIAGNOSIS — I42.2 HYPERTROPHIC CARDIOMYOPATHY (H): Primary | ICD-10-CM

## 2017-07-26 PROCEDURE — 93295 DEV INTERROG REMOTE 1/2/MLT: CPT | Performed by: INTERNAL MEDICINE

## 2017-07-26 PROCEDURE — 93296 REM INTERROG EVL PM/IDS: CPT | Mod: ZF

## 2017-07-26 NOTE — MR AVS SNAPSHOT
After Visit Summary   7/26/2017    Stu Meredith    MRN: [id number]           Patient Information     Date Of Birth          1955        Visit Information        Provider Department      7/26/2017 6:00 AM UC ICD REMOTE [facility]        Today's Diagnoses     Hypertrophic cardiomyopathy (H)    -  1       Follow-ups after your visit        Your next 10 appointments already scheduled     Aug 07, 2017  9:00 AM CDT   Procedure 4 hour with U2A ROOM 3   Unit [address]A [facility] ([facility])    [address] [facility] [address]               Aug 07, 2017 10:00 AM CDT   Cardioversion with UUETEER1   [facility],  Echocardiography ([facility])    [address] [facility] [id number] [phone]           1) NPO for 6 hours prior to the procedure except for sips of water with medications.  2) Hold insulin or oral diabetic medications morning of procedure. May take   dose long acting insulin. Follow further instructions of PMD.  3) Continue daily Coumadin. ~ If taking Digoxin, hold AM of procedure. ~  required. ~ A responsible adult must stay with you for 24 hours after the test.             Aug 07, 2017   Procedure with GENERIC ANESTHESIA PROVIDER   [facility], Same Day Surgery (--)    [address] [facility] [address]   [phone]            Nov 06, 2017  4:00 PM CST   (Arrive by 3:45 PM)   Implanted Defibulator with Uc Cv Device 1   [facility] ([facility])    [address] [place] [address]  [address] [facility] [id number] [phone]              Future tests that were ordered for you today     Open Future Orders        Priority Expected Expires Ordered    Cardioversion Routine 8/7/2017 7/28/2018 7/28/2017            Who to contact     If you have questions or need follow up information about today's clinic visit or your schedule please contact [facility] directly at [phone].  Normal or non-critical lab and imaging results will be communicated to you by MyChart, letter or phone within 4 business days after the clinic has received the results. If you do not hear from us within 7 days, please contact the clinic through [employer] or phone. If you have a critical or abnormal lab result, we will notify you by phone as soon as possible.  Submit refill requests through [email or web address] or call your pharmacy and they will forward the refill request to us. Please allow 3 business days for your refill to be completed.          Additional Information About Your Visit        Las traper[employer]art Information     [email or web address] gives you secure access to your electronic health record. If you see a primary care provider, you can also send messages to your care team and make appointments. If you have questions, please call your primary care clinic.  If you do not have a primary care provider, please call [phone] and they will assist you.        Care EveryWhere ID     This is your Care EveryWhere ID. This could be used by other organizations to access your [facility] medical records  [id number]         Blood Pressure from Last 3 Encounters:   05/03/17 143/84   11/08/16 106/64   10/28/16 120/79    Weight from Last 3 Encounters:   05/03/17 97.9 kg (215 lb 12.8 oz)   11/08/16 94.8 kg (209 lb)   10/28/16 94.7 kg (208 lb 11.2 oz)              We Performed the Following     INTERROGATION DEVICE EVAL REMOTE, PACER/ICD        Primary Care Provider Office Phone # Fax #    Omar Chris Camacho [phone]       [facility] [address] [facility] [address]        Equal Access to Services     MARLON DE LOS SANTOS : Hadii aad ku hadasho Soomaali, waaxda luqadaha, qaybta kaalmada adeegyada, jorden short. So [facility] [phone].    ATENCIÓN: Si habla español, tiene a drew disposición servicios gratuitos de asistencia lingüística. Llame al [phone].    We  comply with applicable federal civil rights laws and [place] laws. We do not discriminate on the basis of race, color, national origin, age, disability sex, sexual orientation or gender identity.            Thank you!     Thank you for choosing [facility]  for your care. Our goal is always to provide you with excellent care. Hearing back from our patients is one way we can continue to improve our services. Please take a few minutes to complete the written survey that you may receive in the mail after your visit with us. Thank you!             Your Updated Medication List - Protect others around you: Learn how to safely use, store and throw away your medicines at www.disposemymeds.org.          This list is accurate as of: 7/26/17 11:59 PM.  Always use your most recent med list.                   Brand Name Dispense Instructions for use Diagnosis    acetaminophen 325 MG tablet    TYLENOL     Take 325-650 mg by mouth every 6 hours as needed        albuterol 108 (90 BASE) MCG/ACT Inhaler    albuterol    1 Inhaler    Inhale 2 puffs into the lungs every 4 hours as needed for shortness of breath / dyspnea or wheezing    Bronchitis with bronchospasm       * amoxicillin 500 MG tablet    AMOXIL    4 tablet    Take by mouth 4 tabs 1 hour prior to dental appt.    Prophylactic antibiotic       * amoxicillin 500 MG capsule    AMOXIL    4 capsule    TAKE 4 CAPSULES BY MOUTH 1 HOUR BEFORE DENTAL APPT    Status post total hip replacement, left       apixaban ANTICOAGULANT 5 MG tablet    ELIQUIS    180 tablet    Take 1 tablet (5 mg) by mouth 2 times daily    Paroxysmal atrial fibrillation (H)       atorvastatin 20 MG tablet    LIPITOR    90 tablet    Take 1 tablet (20 mg) by mouth daily    CARDIOVASCULAR SCREENING; LDL GOAL LESS THAN 130       Blood Pressure Cuff Misc     1 each    1 Device daily    Atrial fibrillation (H), Hypertrophic cardiomyopathy (H)       metoprolol 50 MG 24 hr tablet    TOPROL-XL    90 tablet     TAKE 1 BY MOUTH DAILY    HOCM (hypertrophic obstructive cardiomyopathy) (H)       multivitamin, therapeutic Tabs tablet      Take 1 tablet by mouth daily        sotalol 80 MG tablet    BETAPACE    270 tablet    Take 1.5 tablets (120 mg) by mouth 2 times daily    Chronic atrial fibrillation (H), Hypertrophic cardiomyopathy (H)       zolpidem 10 MG tablet    AMBIEN    90 tablet    TAKE 1/2 TO 1 BY MOUTH NIGHTLY AS NEEDED FOR SLEEP    Insomnia, unspecified, Sedative, hypnotic or anxiolytic dependence (H)       * Notice:  This list has 2 medication(s) that are the same as other medications prescribed for you. Read the directions carefully, and ask your doctor or other care provider to review them with you.

## 2017-07-28 ENCOUNTER — HOSPITAL ENCOUNTER (OUTPATIENT)
Facility: CLINIC | Age: 62
End: 2017-07-28
Attending: INTERNAL MEDICINE | Admitting: INTERNAL MEDICINE
Payer: COMMERCIAL

## 2017-07-28 DIAGNOSIS — I48.0 PAROXYSMAL ATRIAL FIBRILLATION (H): Primary | ICD-10-CM

## 2017-07-28 NOTE — PROGRESS NOTES
[facility] remote ICD transmission received and reviewed. Device transmission sent per MD orders. Initial transmission received on 7/23/17 as an alert for atrial arrhythmia >24 hour. Follow-up transmission received and reviewed today. Pt has been in atrial flutter (with short bursts of atrial fibrillation) since 7/21/17. Pt states he is taking xeralto. His presenting rhythm is AFL/ VS ~70 bpm. Normal device function. AP= 62% and = 2%. Battery estimates 6.5 years to CARMEN. Pt notified of transmission results. Pt is requesting a DCCV. He will be out of town for the next week. DCCV has been scheduled for 8/7/17 @ 1000 at [facility]. Pt will send a remote transmission that morning before leaving his house to confirm he is still in AFL. Pt verbalized understanding to all instructions.  Remote ICD transmission

## 2017-08-07 ENCOUNTER — ANESTHESIA (OUTPATIENT)
Dept: SURGERY | Facility: CLINIC | Age: 62
End: 2017-08-07
Payer: COMMERCIAL

## 2017-08-07 ENCOUNTER — ANESTHESIA EVENT (OUTPATIENT)
Dept: SURGERY | Facility: CLINIC | Age: 62
End: 2017-08-07
Payer: COMMERCIAL

## 2017-08-07 ENCOUNTER — APPOINTMENT (OUTPATIENT)
Dept: MEDSURG UNIT | Facility: CLINIC | Age: 62
End: 2017-08-07
Attending: INTERNAL MEDICINE
Payer: COMMERCIAL

## 2017-08-07 ENCOUNTER — SURGERY (OUTPATIENT)
Age: 62
End: 2017-08-07

## 2017-08-07 ENCOUNTER — HOSPITAL ENCOUNTER (OUTPATIENT)
Dept: CARDIOLOGY | Facility: CLINIC | Age: 62
End: 2017-08-07
Attending: NURSE PRACTITIONER | Admitting: INTERNAL MEDICINE
Payer: COMMERCIAL

## 2017-08-07 ENCOUNTER — HOSPITAL ENCOUNTER (OUTPATIENT)
Facility: CLINIC | Age: 62
Discharge: HOME | End: 2017-08-07
Attending: INTERNAL MEDICINE | Admitting: INTERNAL MEDICINE
Payer: COMMERCIAL

## 2017-08-07 VITALS
OXYGEN SATURATION: 98 % | RESPIRATION RATE: 18 BRPM | HEART RATE: 68 BPM | SYSTOLIC BLOOD PRESSURE: 130 MMHG | DIASTOLIC BLOOD PRESSURE: 68 MMHG | TEMPERATURE: 98.1 F

## 2017-08-07 VITALS
DIASTOLIC BLOOD PRESSURE: 85 MMHG | OXYGEN SATURATION: 95 % | SYSTOLIC BLOOD PRESSURE: 109 MMHG | RESPIRATION RATE: 14 BRPM | HEART RATE: 60 BPM

## 2017-08-07 DIAGNOSIS — I48.0 PAROXYSMAL ATRIAL FIBRILLATION (H): ICD-10-CM

## 2017-08-07 DIAGNOSIS — I48.92 ATRIAL FLUTTER, UNSPECIFIED TYPE (H): ICD-10-CM

## 2017-08-07 LAB
ANION GAP SERPL CALCULATED.3IONS-SCNC: 9 MMOL/L (ref 3–14)
BUN SERPL-MCNC: 13 MG/DL (ref 7–30)
CALCIUM SERPL-MCNC: 8 MG/DL (ref 8.5–10.1)
CHLORIDE SERPL-SCNC: 113 MMOL/L (ref 94–109)
CO2 SERPL-SCNC: 22 MMOL/L (ref 20–32)
CREAT SERPL-MCNC: 0.85 MG/DL (ref 0.66–1.25)
GFR SERPL CREATININE-BSD FRML MDRD: ABNORMAL ML/MIN/1.7M2
GLUCOSE SERPL-MCNC: 110 MG/DL (ref 70–99)
MAGNESIUM SERPL-MCNC: 2 MG/DL (ref 1.6–2.3)
POTASSIUM SERPL-SCNC: 3.8 MMOL/L (ref 3.4–5.3)
SODIUM SERPL-SCNC: 144 MMOL/L (ref 133–144)

## 2017-08-07 PROCEDURE — 37000008 ZZH ANESTHESIA TECHNICAL FEE, 1ST 30 MIN

## 2017-08-07 PROCEDURE — 40000166 ZZH STATISTIC PP CARE STAGE 1

## 2017-08-07 PROCEDURE — 80048 BASIC METABOLIC PNL TOTAL CA: CPT | Performed by: INTERNAL MEDICINE

## 2017-08-07 PROCEDURE — 92960 CARDIOVERSION ELECTRIC EXT: CPT | Performed by: NURSE PRACTITIONER

## 2017-08-07 PROCEDURE — 93005 ELECTROCARDIOGRAM TRACING: CPT

## 2017-08-07 PROCEDURE — 25000125 ZZHC RX 250: Performed by: NURSE ANESTHETIST, CERTIFIED REGISTERED

## 2017-08-07 PROCEDURE — 83735 ASSAY OF MAGNESIUM: CPT | Performed by: INTERNAL MEDICINE

## 2017-08-07 PROCEDURE — 92960 CARDIOVERSION ELECTRIC EXT: CPT

## 2017-08-07 RX ADMIN — METHOHEXITAL SODIUM 50 MG: 500 INJECTION, POWDER, LYOPHILIZED, FOR SOLUTION INTRAMUSCULAR; INTRAVENOUS; RECTAL at 10:25

## 2017-08-07 ASSESSMENT — ENCOUNTER SYMPTOMS: DYSRHYTHMIAS: 1

## 2017-08-07 ASSESSMENT — LIFESTYLE VARIABLES: TOBACCO_USE: 1

## 2017-08-07 NOTE — ANESTHESIA CARE TRANSFER NOTE
Patient: Stu Meredith    Procedure(s):  Anesthesia Offsite Coverage Cardioversion @1100    Diagnosis: Paroxysmal Atrial Fibrillation  Diagnosis Additional Information: No value filed.    Anesthesia Type:   General     Note:  Airway :Nasal Cannula  Patient transferred to:PACU  Comments: Spont resp, VSS, report to RN      Vitals: (Last set prior to Anesthesia Care Transfer)    CRNA VITALS  8/7/2017 1005 - 8/7/2017 1035      8/7/2017             Resp Rate (observed): 16                Electronically Signed By: KARSON Moore CRNA  August 7, 2017  10:35 AM

## 2017-08-07 NOTE — PROGRESS NOTES
Patient reports to us today from unit 2A for a cardioversion.  Patient is in an atrial flutter rhythm.  VSS.  Patient is consented for the procedure and is appropriately NPO.  A total of 50 mg of Brevitol was given for sedation prior to the cardioversion.  One shock of 150 joules was delivered converting the patient back into a paced sinus rhythm at 60 bpm.  Patient is monitored until fully alert.  He denies any pain.  VSS.  Patient is transported back to unit 2A after report has been called to unit RN.

## 2017-08-07 NOTE — OP NOTE
CARDIOVERSION    PROCEDURE:  direct current cardioversion  PROCEDURE DATE: 8/7/2017     Pre-procedure diagnosis:  Atrial flutter  Post-procedure diagnosis: s/p direct current cardioversion  Complications:  none    BRIEF CLINICAL HISTORY:  Mr. Meredith is a 61 year old male who has a past medical history significant for HCM diagnosed 2006, VT on Holter July 2012 s/p ICD 7/2012, troponin leak Oct 2013 (angiogram with non-significant CAD, LV gram showed EF 25%, recovered to 60% on TTE Dec 2013), HTN, AFib (CHADSVASC 2) s/p multiple DCCVs and PVI 12/2011 and 7/28/16. A recent device interrogation showed his intrinsic rhythm was AFL. He reports some increased fatgiue and headaches since being back in AF. . Current cardiac medications include: Sotalol, Diltiazem, Metoprolol, and Xarelto. He presents today for DCCV. He is taking Xarelto without interruption.      PROCEDURE:  The patient arrived at the Echo Laboratory in a fasting, non-sedated state.  Informed consent was previously obtained from patient, who understood indications, risks, and benefits of the procedure.  With help from Anesthesia, patient was deeply sedated.  150J of synchronized biphasic shock was delivered through the cardiac monitor, and the patient was successfully converted to normal sinus rhythm.  After sedation wore off, patient awoke and remained neurologically intact.  The patient tolerated the procedure well from a hemodynamic and respiratory standpoint without any complications.  He was observed in the Echo Laboratory and then discharged to home after sedation wore off and he was ambulatory.    IMPRESSION:  1.  Successful direct current cardioversion with 150J biphasic shock.    RECOMMENDATIONS/PLANS:  1.   Follow-up with Dr. Cooper/Maci Dominguez  2.   Continue anticoagulation    KARSON Richardson CNP  Electrophysiology Consult Service  Pager: [phone]

## 2017-08-07 NOTE — PROGRESS NOTES
IV discontinued. DC paperwork reviewed with patient. His wife will be here at 1215 to transport home.

## 2017-08-07 NOTE — ANESTHESIA POSTPROCEDURE EVALUATION
Patient: Stu Meredith    Procedure(s):  Anesthesia Offsite Coverage Cardioversion @1100    Diagnosis:Paroxysmal Atrial Fibrillation  Diagnosis Additional Information: No value filed.    Anesthesia Type:  General    Note:  Anesthesia Post Evaluation    Patient location during evaluation: Echo Lab  Patient participation: Able to fully participate in evaluation  Level of consciousness: awake  Pain management: adequate  Airway patency: patent  Cardiovascular status: acceptable  Respiratory status: acceptable  Hydration status: acceptable  PONV: none     Anesthetic complications: None          Last vitals:  Vitals:    08/07/17 0700   BP: 122/80   Pulse: 73   Resp: 20   Temp: 36.7  C (98.1  F)   SpO2: 97%         Electronically Signed By: Leonid Meza MD  August 7, 2017  10:37 AM

## 2017-08-07 NOTE — ANESTHESIA PREPROCEDURE EVALUATION
Anesthesia Evaluation     . Pt has had prior anesthetic. Type: General    History of anesthetic complications    Tongue lacerated by biting      ROS/MED HX    ENT/Pulmonary:     (+)tobacco use, Current use , . .    Neurologic:  - neg neurologic ROS     Cardiovascular: Comment: AICD is OFF and Pacemaker is ON    (+) hypertension--CAD, --. : . CHF etiology: Hypertrophic Cardiomyopathy Last EF: 50% date: 7/2016 . . pacemaker Reason placed: Atrial Fibrillation/ Flutter and episodes of V-Tach:- Patient is dependent on pacemaker . dysrhythmias a-fib, a-flutter and Other, . pulmonary hypertension, Previous cardiac testing Echodate:7/29/2016results:Hypertrophic Disease with diastolic dysfunction, grade 3. EF= 50%. Moderate Pulmonary HTN with Tricuspid Regurgitation.date: results:ECG reviewed date:5/3/2017 results:Atrial-paced with a rate of 60 date: results:          METS/Exercise Tolerance:     Hematologic:  - neg hematologic  ROS       Musculoskeletal: Comment: S/P Left Hip Arthroplasty  (+) arthritis, , , -       GI/Hepatic:  - neg GI/hepatic ROS       Renal/Genitourinary:  - ROS Renal section negative       Endo:     (+) Obesity, Other Endocrine Disorder Pre-Diabetes.      Psychiatric:  - neg psychiatric ROS       Infectious Disease:  - neg infectious disease ROS       Malignancy:      - no malignancy   Other:                     Physical Exam  Normal systems: pulmonary    Airway   Mallampati: I  TM distance: >3 FB  Neck ROM: full    Dental     Cardiovascular   Rhythm and rate: irregular and abnormal      Pulmonary    breath sounds clear to auscultation                    Anesthesia Plan      History & Physical Review  History and physical reviewed and following examination; no interval change.    ASA Status:  3 .    NPO Status:  > 8 hours    Plan for General with Other (Brevital) induction. Maintenance will be Other.           Postoperative Care      Consents  Anesthetic plan, risks, benefits and alternatives discussed  with:  Patient.  Use of blood products discussed: No .   .

## 2017-08-08 DIAGNOSIS — I42.1 HOCM (HYPERTROPHIC OBSTRUCTIVE CARDIOMYOPATHY) (H): ICD-10-CM

## 2017-08-08 DIAGNOSIS — I42.2 HYPERTROPHIC CARDIOMYOPATHY (H): ICD-10-CM

## 2017-08-08 DIAGNOSIS — I48.20 CHRONIC ATRIAL FIBRILLATION (H): ICD-10-CM

## 2017-08-08 LAB
INTERPRETATION ECG - MUSE: NORMAL
INTERPRETATION ECG - MUSE: NORMAL

## 2017-08-08 RX ORDER — METOPROLOL SUCCINATE 50 MG/1
50 TABLET, EXTENDED RELEASE ORAL DAILY
Qty: 90 TABLET | Refills: 3 | Status: SHIPPED | OUTPATIENT
Start: 2017-08-08 | End: 2018-10-10

## 2017-08-08 RX ORDER — SOTALOL HYDROCHLORIDE 80 MG/1
120 TABLET ORAL 2 TIMES DAILY
Qty: 270 TABLET | Refills: 3 | Status: SHIPPED | OUTPATIENT
Start: 2017-08-08 | End: 2017-10-12

## 2017-09-26 DIAGNOSIS — G47.00 INSOMNIA, UNSPECIFIED: ICD-10-CM

## 2017-09-26 DIAGNOSIS — F13.20 SEDATIVE, HYPNOTIC OR ANXIOLYTIC DEPENDENCE (H): ICD-10-CM

## 2017-09-26 RX ORDER — ZOLPIDEM TARTRATE 10 MG/1
5-10 TABLET ORAL
Qty: 90 TABLET | Refills: 0 | Status: SHIPPED | OUTPATIENT
Start: 2017-09-26 | End: 2017-12-26

## 2017-09-26 NOTE — TELEPHONE ENCOUNTER
Controlled Substance Refill Request for zolpidem ambien  Problem List Complete:  Yes    Last Written Prescription Date:  06/14/17  Last Fill Quantity: 90,   # refills: 0    Last Office Visit with [facility] primary care provider: 11/08/16    Clinic visit frequency required: Q 3 months     Future Office visit: 0    Controlled substance agreement on file: No.     Processing:  [facility]     checked in past 6 months?  No, route to RN

## 2017-10-12 DIAGNOSIS — I48.20 CHRONIC ATRIAL FIBRILLATION (H): ICD-10-CM

## 2017-10-12 DIAGNOSIS — I42.2 HYPERTROPHIC CARDIOMYOPATHY (H): ICD-10-CM

## 2017-10-12 RX ORDER — SOTALOL HYDROCHLORIDE 80 MG/1
120 TABLET ORAL 2 TIMES DAILY
Qty: 270 TABLET | Refills: 3 | Status: SHIPPED | OUTPATIENT
Start: 2017-10-12 | End: 2018-06-01

## 2017-10-12 NOTE — TELEPHONE ENCOUNTER
"Pharmacy wrote \"pt reported now talking 1 1/2 po bid. Please send new Rx\"      [address]  "

## 2017-11-03 ENCOUNTER — ALLIED HEALTH/NURSE VISIT (OUTPATIENT)
Dept: CARDIOLOGY | Facility: CLINIC | Age: 62
End: 2017-11-03
Attending: INTERNAL MEDICINE
Payer: COMMERCIAL

## 2017-11-03 DIAGNOSIS — I42.2 HYPERTROPHIC CARDIOMYOPATHY (H): Primary | ICD-10-CM

## 2017-11-03 PROCEDURE — 93296 REM INTERROG EVL PM/IDS: CPT | Mod: ZF

## 2017-11-03 PROCEDURE — 93295 DEV INTERROG REMOTE 1/2/MLT: CPT | Performed by: INTERNAL MEDICINE

## 2017-11-03 NOTE — MR AVS SNAPSHOT
After Visit Summary   11/3/2017    Stu Meredith    MRN: [id number]           Patient Information     Date Of Birth          1955        Visit Information        Provider Department      11/3/2017 6:00 AM  ICD REMOTE [facility]        Today's Diagnoses     Hypertrophic cardiomyopathy (H)    -  1       Follow-ups after your visit        Who to contact     If you have questions or need follow up information about today's clinic visit or your schedule please contact [facility] directly at [phone].  Normal or non-critical lab and imaging results will be communicated to you by Arnic[employer]art, letter or phone within 4 business days after the clinic has received the results. If you do not hear from us within 7 days, please contact the clinic through [employer] or phone. If you have a critical or abnormal lab result, we will notify you by phone as soon as possible.  Submit refill requests through [employer] or call your pharmacy and they will forward the refill request to us. Please allow 3 business days for your refill to be completed.          Additional Information About Your Visit        MyChart Information     [employer] gives you secure access to your electronic health record. If you see a primary care provider, you can also send messages to your care team and make appointments. If you have questions, please call your primary care clinic.  If you do not have a primary care provider, please call [phone] and they will assist you.        Care EveryWhere ID     This is your Care EveryWhere ID. This could be used by other organizations to access your [facility] medical records  [id number]         Blood Pressure from Last 3 Encounters:   08/07/17 130/68   08/07/17 109/85   05/03/17 143/84    Weight from Last 3 Encounters:   05/03/17 97.9 kg (215 lb 12.8 oz)   11/08/16 94.8 kg (209 lb)   10/28/16 94.7 kg (208 lb 11.2 oz)              We Performed the Following     INTERROGATION DEVICE  BRAULIO BANUELOS, PACER/ICD        Primary Care Provider Office Phone # Fax #    Omar Camacho [phone]       [address] [place] [address]        Equal Access to Services     MARLON DE LOS SANTOS : Hadii aad ku roelo Sosaeali, waaxda luqadaha, qaybta kaalmada adeegyada, jorden linaresn thomas meyers laJerodzaria short. So [facility] [phone].    ATENCIÓN: Si habla español, tiene a drew disposición servicios gratuitos de asistencia lingüística. Llame al [phone].    We comply with applicable federal civil rights laws and [place] laws. We do not discriminate on the basis of race, color, national origin, age, disability, sex, sexual orientation, or gender identity.            Thank you!     Thank you for choosing [facility]  for your care. Our goal is always to provide you with excellent care. Hearing back from our patients is one way we can continue to improve our services. Please take a few minutes to complete the written survey that you may receive in the mail after your visit with us. Thank you!             Your Updated Medication List - Protect others around you: Learn how to safely use, store and throw away your medicines at www.disposemymeds.org.          This list is accurate as of: 11/3/17 11:59 PM.  Always use your most recent med list.                   Brand Name Dispense Instructions for use Diagnosis    acetaminophen 325 MG tablet    TYLENOL     Take 325-650 mg by mouth every 6 hours as needed        albuterol 108 (90 BASE) MCG/ACT Inhaler    PROAIR HFA    1 Inhaler    Inhale 2 puffs into the lungs every 4 hours as needed for shortness of breath / dyspnea or wheezing    Bronchitis with bronchospasm       * amoxicillin 500 MG tablet    AMOXIL    4 tablet    Take by mouth 4 tabs 1 hour prior to dental appt.    Prophylactic antibiotic       * amoxicillin 500 MG capsule    AMOXIL    4 capsule    TAKE 4 CAPSULES BY MOUTH 1 HOUR BEFORE DENTAL APPT    Status post total hip  replacement, left       atorvastatin 20 MG tablet    LIPITOR    90 tablet    Take 1 tablet (20 mg) by mouth daily    CARDIOVASCULAR SCREENING; LDL GOAL LESS THAN 130       Blood Pressure Cuff Misc     1 each    1 Device daily    Atrial fibrillation (H), Hypertrophic cardiomyopathy (H)       metoprolol 50 MG 24 hr tablet    TOPROL-XL    90 tablet    Take 1 tablet (50 mg) by mouth daily    HOCM (hypertrophic obstructive cardiomyopathy) (H)       multivitamin, therapeutic Tabs tablet      Take 1 tablet by mouth daily        rivaroxaban ANTICOAGULANT 20 MG Tabs tablet    XARELTO    90 tablet    Take 1 tablet (20 mg) by mouth daily (with dinner)    Chronic atrial fibrillation (H)       sotalol 80 MG tablet    BETAPACE    270 tablet    Take 1.5 tablets (120 mg) by mouth 2 times daily    Chronic atrial fibrillation (H), Hypertrophic cardiomyopathy (H)       zolpidem 10 MG tablet    AMBIEN    90 tablet    Take 0.5-1 tablets (5-10 mg) by mouth nightly as needed for sleep    Insomnia, unspecified, Sedative, hypnotic or anxiolytic dependence (H)       * Notice:  This list has 2 medication(s) that are the same as other medications prescribed for you. Read the directions carefully, and ask your doctor or other care provider to review them with you.

## 2017-11-06 NOTE — PROGRESS NOTES
Remote ICD transmission received and reviewed.  Device transmission sent per MD orders.  Patient has a [employer] dual lead ICD.  Normal ICD function.  27 NSVT episodes recorded - 128-166, 4 beats - 13 sec.  83 ATR episodes recorded - 6 sec - 30 hrs 27 min in duration.  Patient is taking Xarelto.  Presenting EGM = AP-VS @ 60 bpm.  AP = 59%.   = 2%.  Estimated battery longevity to CARMEN = 6.5 years.  Patient notified of interrogation results via voicemail.  Requested that patient call device RN with update.  Plan for patient to send a remote transmission in 3 months as scheduled.    Remote ICD transmission

## 2017-12-04 ENCOUNTER — OFFICE VISIT (OUTPATIENT)
Dept: INTERNAL MEDICINE | Facility: CLINIC | Age: 62
End: 2017-12-04
Payer: COMMERCIAL

## 2017-12-04 ENCOUNTER — RADIANT APPOINTMENT (OUTPATIENT)
Dept: GENERAL RADIOLOGY | Facility: CLINIC | Age: 62
End: 2017-12-04
Attending: PHYSICIAN ASSISTANT
Payer: COMMERCIAL

## 2017-12-04 VITALS
TEMPERATURE: 99 F | OXYGEN SATURATION: 95 % | HEART RATE: 61 BPM | HEIGHT: 71 IN | WEIGHT: 217.4 LBS | BODY MASS INDEX: 30.44 KG/M2 | DIASTOLIC BLOOD PRESSURE: 82 MMHG | SYSTOLIC BLOOD PRESSURE: 130 MMHG

## 2017-12-04 DIAGNOSIS — J20.9 BRONCHITIS WITH BRONCHOSPASM: ICD-10-CM

## 2017-12-04 DIAGNOSIS — J20.9 ACUTE BRONCHITIS WITH COEXISTING CONDITION REQUIRING PROPHYLACTIC TREATMENT: Primary | ICD-10-CM

## 2017-12-04 PROCEDURE — 71020 XR CHEST 2 VW: CPT

## 2017-12-04 PROCEDURE — 99214 OFFICE O/P EST MOD 30 MIN: CPT | Performed by: PHYSICIAN ASSISTANT

## 2017-12-04 RX ORDER — PREDNISONE 20 MG/1
40 TABLET ORAL DAILY
Qty: 10 TABLET | Refills: 0 | Status: SHIPPED | OUTPATIENT
Start: 2017-12-04 | End: 2017-12-09

## 2017-12-04 RX ORDER — ALBUTEROL SULFATE 90 UG/1
2 AEROSOL, METERED RESPIRATORY (INHALATION) EVERY 4 HOURS PRN
Qty: 1 INHALER | Refills: 2 | Status: SHIPPED | OUTPATIENT
Start: 2017-12-04 | End: 2018-06-07

## 2017-12-04 RX ORDER — DOXYCYCLINE 100 MG/1
100 CAPSULE ORAL 2 TIMES DAILY
Qty: 20 CAPSULE | Refills: 0 | Status: SHIPPED | OUTPATIENT
Start: 2017-12-04 | End: 2017-12-14

## 2017-12-04 NOTE — NURSING NOTE
"Chief Complaint   Patient presents with     Cough     x2 days wet        Initial /82 (BP Location: Left arm, Patient Position: Chair, Cuff Size: Adult Regular)  Pulse 61  Temp 99  F (37.2  C) (Oral)  Ht 5' 11\" (1.803 m)  Wt 217 lb 6.4 oz (98.6 kg)  SpO2 95%  BMI 30.32 kg/m2 Estimated body mass index is 30.32 kg/(m^2) as calculated from the following:    Height as of this encounter: 5' 11\" (1.803 m).    Weight as of this encounter: 217 lb 6.4 oz (98.6 kg).  Medication Reconciliation: complete    "

## 2017-12-04 NOTE — MR AVS SNAPSHOT
"              After Visit Summary   12/4/2017    Stu Meredith    MRN: [id number]           Patient Information     Date Of Birth          1955        Visit Information        Provider Department      12/4/2017 2:20 PM Delma Thomas PA-C [facility]        Today's Diagnoses     Acute bronchitis with coexisting condition requiring prophylactic treatment    -  1    Bronchitis with bronchospasm           Follow-ups after your visit        Who to contact     If you have questions or need follow up information about today's clinic visit or your schedule please contact [facility] directly at [phone].  Normal or non-critical lab and imaging results will be communicated to you by [employer], letter or phone within 4 business days after the clinic has received the results. If you do not hear from us within 7 days, please contact the clinic through [employer] or phone. If you have a critical or abnormal lab result, we will notify you by phone as soon as possible.  Submit refill requests through [employer] or call your pharmacy and they will forward the refill request to us. Please allow 3 business days for your refill to be completed.          Additional Information About Your Visit        MyChart Information     [employer] gives you secure access to your electronic health record. If you see a primary care provider, you can also send messages to your care team and make appointments. If you have questions, please call your primary care clinic.  If you do not have a primary care provider, please call [phone] and they will assist you.        Care EveryWhere ID     This is your Care EveryWhere ID. This could be used by other organizations to access your [facility] medical records  [id number]        Your Vitals Were     Pulse Temperature Height Pulse Oximetry BMI (Body Mass Index)       61 99  F (37.2  C) (Oral) 5' 11\" (1.803 m) 95% 30.32 kg/m2        Blood Pressure " from Last 3 Encounters:   12/04/17 130/82   08/07/17 130/68   08/07/17 109/85    Weight from Last 3 Encounters:   12/04/17 217 lb 6.4 oz (98.6 kg)   05/03/17 215 lb 12.8 oz (97.9 kg)   11/08/16 209 lb (94.8 kg)              We Performed the Following     XR Chest 2 Views          Today's Medication Changes          These changes are accurate as of: 12/4/17  2:48 PM.  If you have any questions, ask your nurse or doctor.               Start taking these medicines.        Dose/Directions    doxycycline 100 MG capsule   Commonly known as:  VIBRAMYCIN   Used for:  Bronchitis with bronchospasm   Started by:  Delma Thomas PA-C        Dose:  100 mg   Take 1 capsule (100 mg) by mouth 2 times daily for 10 days   Quantity:  20 capsule   Refills:  0       predniSONE 20 MG tablet   Commonly known as:  DELTASONE   Used for:  Bronchitis with bronchospasm   Started by:  Delma Thomas PA-C        Dose:  40 mg   Take 2 tablets (40 mg) by mouth daily for 5 days   Quantity:  10 tablet   Refills:  0         Stop taking these medicines if you haven't already. Please contact your care team if you have questions.     amoxicillin 500 MG tablet   Commonly known as:  AMOXIL   Stopped by:  Delma Thomas PA-C                Where to get your medicines      These medications were sent to [facility]/pharmacy #[id number] [address] [address]     Phone:  [phone]     albuterol 108 (90 BASE) MCG/ACT Inhaler    doxycycline 100 MG capsule    predniSONE 20 MG tablet                Primary Care Provider Office Phone # Fax #    Omar Camacho [phone]       [address] [place] [id number]        Equal Access to Services     MARLON DE LOS SANTOS AH: Jakob Ward, fabiola bueno, holger cabrera, jorden short. So [facility] [phone].    ATENCIÓN: Si habla español, tiene a drew disposición servicios  wallace de asistencia lingüística. Inga engle [phone].    We comply with applicable federal civil rights laws and [place] laws. We do not discriminate on the basis of race, color, national origin, age, disability, sex, sexual orientation, or gender identity.            Thank you!     Thank you for choosing [facility]  for your care. Our goal is always to provide you with excellent care. Hearing back from our patients is one way we can continue to improve our services. Please take a few minutes to complete the written survey that you may receive in the mail after your visit with us. Thank you!             Your Updated Medication List - Protect others around you: Learn how to safely use, store and throw away your medicines at www.disposemymeds.org.          This list is accurate as of: 12/4/17  2:48 PM.  Always use your most recent med list.                   Brand Name Dispense Instructions for use Diagnosis    acetaminophen 325 MG tablet    TYLENOL     Take 325-650 mg by mouth every 6 hours as needed        albuterol 108 (90 BASE) MCG/ACT Inhaler    PROAIR HFA    1 Inhaler    Inhale 2 puffs into the lungs every 4 hours as needed for shortness of breath / dyspnea or wheezing    Bronchitis with bronchospasm       atorvastatin 20 MG tablet    LIPITOR    90 tablet    Take 1 tablet (20 mg) by mouth daily    CARDIOVASCULAR SCREENING; LDL GOAL LESS THAN 130       Blood Pressure Cuff Misc     1 each    1 Device daily    Atrial fibrillation (H), Hypertrophic cardiomyopathy (H)       doxycycline 100 MG capsule    VIBRAMYCIN    20 capsule    Take 1 capsule (100 mg) by mouth 2 times daily for 10 days    Bronchitis with bronchospasm       metoprolol 50 MG 24 hr tablet    TOPROL-XL    90 tablet    Take 1 tablet (50 mg) by mouth daily    HOCM (hypertrophic obstructive cardiomyopathy) (H)       multivitamin, therapeutic Tabs tablet      Take 1 tablet by mouth daily        predniSONE 20 MG tablet     DELTASONE    10 tablet    Take 2 tablets (40 mg) by mouth daily for 5 days    Bronchitis with bronchospasm       rivaroxaban ANTICOAGULANT 20 MG Tabs tablet    XARELTO    90 tablet    Take 1 tablet (20 mg) by mouth daily (with dinner)    Chronic atrial fibrillation (H)       sotalol 80 MG tablet    BETAPACE    270 tablet    Take 1.5 tablets (120 mg) by mouth 2 times daily    Chronic atrial fibrillation (H), Hypertrophic cardiomyopathy (H)       zolpidem 10 MG tablet    AMBIEN    90 tablet    Take 0.5-1 tablets (5-10 mg) by mouth nightly as needed for sleep    Insomnia, unspecified, Sedative, hypnotic or anxiolytic dependence (H)

## 2017-12-04 NOTE — PROGRESS NOTES
"  SUBJECTIVE:   Stu Meredith is a 61 year old male who presents to clinic today for the following health issues:      Concern - Cough   Onset: x2 days     Description:   Pt states he has had a wet cough x2 days   Slight elevated temp  States ill with coughing x 2 months and thought he was getting better until this weekend.     Intensity: moderate    Progression of Symptoms:  worsening    Accompanying Signs & Symptoms:  Na     Previous history of similar problem:   Yes     Precipitating factors:   Worsened by: nothing     Alleviating factors:  Improved by: dayquil and nyquil     Therapies Tried and outcome: dayquil and nyquil with relief   Concerned as cough is worsening again with slight temp.     -------------------------------------    Problem list and histories reviewed & adjusted, as indicated.  Additional history: as documented    Labs reviewed in EPIC    Reviewed and updated as needed this visit by clinical staffTobacco  Allergies       Reviewed and updated as needed this visit by Provider  Allergies  Meds         ROS:  Constitutional, HEENT, cardiovascular, pulmonary, gi and gu systems are negative, except as otherwise noted.      OBJECTIVE:   /82 (BP Location: Left arm, Patient Position: Chair, Cuff Size: Adult Regular)  Pulse 61  Temp 99  F (37.2  C) (Oral)  Ht 5' 11\" (1.803 m)  Wt 217 lb 6.4 oz (98.6 kg)  SpO2 95%  BMI 30.32 kg/m2  Body mass index is 30.32 kg/(m^2).  GENERAL: healthy, alert and no distress  HENT: normal cephalic/atraumatic, ear canals and TM's normal and nose and mouth without ulcers or lesions  NECK: no adenopathy, no asymmetry, masses, or scars and thyroid normal to palpation  RESP: lungs clear to auscultation - no rales, rhonchi or wheezes  CV: regular rates and rhythm and normal S1 S2, no S3 or S4  SKIN: no suspicious lesions or rashes    Diagnostic Test Results:  Xray- prelim- no infiltrate     ASSESSMENT/PLAN:             1. Acute bronchitis with coexisting " condition requiring prophylactic treatment      2. Bronchitis with bronchospasm    - XR Chest 2 Views  - albuterol (PROAIR HFA) 108 (90 BASE) MCG/ACT Inhaler; Inhale 2 puffs into the lungs every 4 hours as needed for shortness of breath / dyspnea or wheezing  Dispense: 1 Inhaler; Refill: 2  - doxycycline (VIBRAMYCIN) 100 MG capsule; Take 1 capsule (100 mg) by mouth 2 times daily for 10 days  Dispense: 20 capsule; Refill: 0  - predniSONE (DELTASONE) 20 MG tablet; Take 2 tablets (40 mg) by mouth daily for 5 days  Dispense: 10 tablet; Refill: 0    Fluids rest and monitor  If not improving recheck in clinic    25 minutes spent with patient > 50% of time on counseling and plan of care.       Delma Thomas PA-C  [facility]

## 2017-12-06 ENCOUNTER — OFFICE VISIT (OUTPATIENT)
Dept: INTERNAL MEDICINE | Facility: CLINIC | Age: 62
End: 2017-12-06
Payer: COMMERCIAL

## 2017-12-06 VITALS
DIASTOLIC BLOOD PRESSURE: 80 MMHG | BODY MASS INDEX: 29.68 KG/M2 | TEMPERATURE: 98.8 F | WEIGHT: 212.8 LBS | SYSTOLIC BLOOD PRESSURE: 128 MMHG | HEART RATE: 62 BPM | OXYGEN SATURATION: 94 %

## 2017-12-06 DIAGNOSIS — Z11.59 NEED FOR HEPATITIS C SCREENING TEST: ICD-10-CM

## 2017-12-06 DIAGNOSIS — R73.03 PREDIABETES: ICD-10-CM

## 2017-12-06 DIAGNOSIS — I42.2 HYPERTROPHIC CARDIOMYOPATHY (H): ICD-10-CM

## 2017-12-06 DIAGNOSIS — Z13.6 CARDIOVASCULAR SCREENING; LDL GOAL LESS THAN 130: ICD-10-CM

## 2017-12-06 DIAGNOSIS — J98.01 POST-INFECTION BRONCHOSPASM: Primary | ICD-10-CM

## 2017-12-06 DIAGNOSIS — Z12.5 SPECIAL SCREENING FOR MALIGNANT NEOPLASM OF PROSTATE: ICD-10-CM

## 2017-12-06 DIAGNOSIS — I48.0 PAROXYSMAL ATRIAL FIBRILLATION (H): ICD-10-CM

## 2017-12-06 PROCEDURE — 99214 OFFICE O/P EST MOD 30 MIN: CPT | Performed by: INTERNAL MEDICINE

## 2017-12-06 RX ORDER — ALBUTEROL SULFATE 0.83 MG/ML
1 SOLUTION RESPIRATORY (INHALATION) EVERY 6 HOURS PRN
Qty: 25 VIAL | Refills: 2 | Status: SHIPPED | OUTPATIENT
Start: 2017-12-06 | End: 2018-06-07

## 2017-12-06 RX ORDER — FLUTICASONE PROPIONATE AND SALMETEROL 113; 14 UG/1; UG/1
1 POWDER, METERED RESPIRATORY (INHALATION) 2 TIMES DAILY
Qty: 1 EACH | Refills: 5 | Status: SHIPPED | OUTPATIENT
Start: 2017-12-06 | End: 2018-06-07

## 2017-12-06 RX ORDER — CODEINE PHOSPHATE AND GUAIFENESIN 10; 100 MG/5ML; MG/5ML
1 SOLUTION ORAL EVERY 4 HOURS PRN
Qty: 120 ML | Refills: 0 | Status: SHIPPED | OUTPATIENT
Start: 2017-12-06 | End: 2017-12-26

## 2017-12-06 NOTE — PROGRESS NOTES
SUBJECTIVE:   Stu Meredith is a 61 year old male who presents to clinic today for the following health issues:      RESPIRATORY SYMPTOMS      Duration: 3 days recently but had bad cough X 8 weeks    Description  Cough, wheezing and hoarse voice    Severity: moderate    Accompanying signs and symptoms: very dry cough    History (predisposing factors):  none    Precipitating or alleviating factors: pt started taking meds yesterday but has been off work for 3 days and needs forms filled out to go back. He would like something for his cough    Therapies tried and outcome:  Is taking doxycycline and prednisone      2.    Blood presure remains well controlled at home  Readings outside clinic are within normal limits.  Reviewed last 6 BP readings in chart:  BP Readings from Last 6 Encounters:   12/06/17 128/80   12/04/17 130/82   08/07/17 130/68   08/07/17 109/85   05/03/17 143/84   11/08/16 106/64     He has not experienced any significant side effects from medicaiotns for hypertension.    NO active cardiac complaints or symptoms with exercise.     3.  The patient has a history of impaired glucose tolerance with regularly elevated blood sugars.    They have not been diagnosed with type II DM or placed on medications for diabetes before.   They deny polyuria, polydipsia.     The patient is not obese with a BMI of Body mass index is 29.68 kg/(m^2)..    Review of current labs show:    Lab Results   Component Value Date    A1C 6.6 05/12/2016    A1C 6.6 12/10/2015    A1C 6.5 07/06/2015    A1C 6.1 08/05/2014    A1C 5.8 12/04/2009    A1C 5.7 11/10/2008     @bgl@       Problem list and histories reviewed & adjusted, as indicated.  Additional history: as documented        Reviewed and updated as needed this visit by clinical staffTobacco  Allergies       Reviewed and updated as needed this visit by Provider           Past Medical History:  ---------------------------  Past Medical History:   Diagnosis Date     Atrial  fibrillation (H) 12/9/11    failed medication, multiple DC cardioveresions; s/p Left atrial ablation to eliminate atrial fibrillation 12/9/11     Chest pain      CHF (congestive heart failure) (H) 7/30/2016     Coronary artery disease      DJD (degenerative joint disease)      Hip arthritis 1/15/2014     Hypertension      Hypertrophic cardiomyopathy (H) 10/09     Other abnormal heart sounds      Pacemaker     ICD     Pneumonia, organism unspecified(486)      Prediabetes 7/10/15    A1C 6.5     Status post implantation of automatic cardioverter/defibrillator (AICD)      Ventricular tachycardia (H)        Past Surgical History:  ---------------------------  Past Surgical History:   Procedure Laterality Date     ANESTHESIA CARDIOVERSION  4/24/2014    Procedure: ANESTHESIA CARDIOVERSION;  Surgeon: Generic Anesthesia Provider;  Location: [facility] OR     ANESTHESIA CARDIOVERSION N/A 5/12/2016    Procedure: ANESTHESIA CARDIOVERSION;  Surgeon: GENERIC ANESTHESIA PROVIDER;  Location: [facility] OR     ANESTHESIA CARDIOVERSION N/A 8/7/2017    Procedure: ANESTHESIA CARDIOVERSION;  Anesthesia Offsite Coverage Cardioversion @1100;  Surgeon: GENERIC ANESTHESIA PROVIDER;  Location: [facility] OR     ARTHROPLASTY HIP  1/15/2014    Procedure: ARTHROPLASTY HIP;  Left Total Hip Arthroplasty;  Surgeon: Nelson Gaspar MD;  Location: [facility] OR     CARDIAC SURGERY       H ABLATION FOCAL AFIB  12/9/11    Left atrial ablation to eliminate atrial fibrillation     IMPLANT AUTOMATIC IMPLANTABLE CARDIOVERTER DEFIBRILLATOR  7/27/12    AICD implantation     TONSILLECTOMY  1964       Current Medications:  ---------------------------  Current Outpatient Prescriptions   Medication Sig Dispense Refill     albuterol (2.5 MG/3ML) 0.083% neb solution Take 1 vial (2.5 mg) by nebulization every 6 hours as needed for shortness of breath / dyspnea or wheezing 25 vial 2     order for DME Equipment being ordered: Nebulizer 1 each 0     albuterol (PROAIR HFA) 108 (90 BASE) MCG/ACT  Inhaler Inhale 2 puffs into the lungs every 4 hours as needed for shortness of breath / dyspnea or wheezing 1 Inhaler 2     doxycycline (VIBRAMYCIN) 100 MG capsule Take 1 capsule (100 mg) by mouth 2 times daily for 10 days 20 capsule 0     predniSONE (DELTASONE) 20 MG tablet Take 2 tablets (40 mg) by mouth daily for 5 days 10 tablet 0     sotalol (BETAPACE) 80 MG tablet Take 1.5 tablets (120 mg) by mouth 2 times daily 270 tablet 3     zolpidem (AMBIEN) 10 MG tablet Take 0.5-1 tablets (5-10 mg) by mouth nightly as needed for sleep 90 tablet 0     metoprolol (TOPROL-XL) 50 MG 24 hr tablet Take 1 tablet (50 mg) by mouth daily 90 tablet 3     rivaroxaban ANTICOAGULANT (XARELTO) 20 MG TABS tablet Take 1 tablet (20 mg) by mouth daily (with dinner) 90 tablet 5     atorvastatin (LIPITOR) 20 MG tablet Take 1 tablet (20 mg) by mouth daily 90 tablet 1     multivitamin, therapeutic (THERA-VIT) TABS Take 1 tablet by mouth daily       acetaminophen (TYLENOL) 325 MG tablet Take 325-650 mg by mouth every 6 hours as needed       Blood Pressure Monitoring (BLOOD PRESSURE CUFF) MISC 1 Device daily 1 each 0       Allergies:  -------------  Allergies   Allergen Reactions     Chantix [Varenicline]      Nigthmares, insomnia, patient states that it is not really an allergy, just a side effect       Social History:  -------------------  Social History     Social History     Marital status:      Spouse name: N/A     Number of children: N/A     Years of education: N/A     Occupational History      [employer]     Social History Main Topics     Smoking status: Former Smoker     Packs/day: 0.25     Years: 40.00     Types: Cigarettes     Start date: 1/1/1975     Quit date: 12/11/2015     Smokeless tobacco: Never Used     Alcohol use No      Comment: 1 drink per week     Drug use: No     Sexual activity: Yes     Partners: Female     Other Topics Concern     Exercise No     Seat Belt Yes     Parent/Sibling W/ Cabg, Mi  Or Angioplasty Before 65f 55m? No     Social History Narrative       Family Medical History:  ------------------------------  Family History   Problem Relation Age of Onset     HEART DISEASE Mother      unknown     Obesity Mother      GASTROINTESTINAL DISEASE Mother      diverticulitis     DIABETES Maternal Grandmother      DIABETES Paternal Grandmother      CEREBROVASCULAR DISEASE Paternal Grandmother 94     DIABETES Paternal Grandfather      CEREBROVASCULAR DISEASE Paternal Grandfather 78     DIABETES Son      C.A.D. Father      CABG age 78     HEART DISEASE Father      CABG x5     DIABETES Maternal Grandfather          ROS:  REVIEW OF SYSTEMS:    RESP: positive for cough, dyspnea, wheezing; negative for hemoptysis   CV: negative for chest pain, palpitations, PND, orthopnea;  GI: negative for dysphagia, N/V, pain, melena, diarrhea and constipation  NEURO: negative for numbness/tingling, paralysis, incoordination, or focal weakness     OBJECTIVE:                                                    /80  Pulse 62  Temp 98.8  F (37.1  C) (Oral)  Wt 212 lb 12.8 oz (96.5 kg)  SpO2 94%  BMI 29.68 kg/m2     GENERAL alert and no distress  EYES:  Normal sclera,conjunctiva, EOMI  HENT: oral and posterior pharynx without lesions or erythema, facies symmetric  NECK: Neck supple. No LAD, without thyroidmegaly or JVD., Carotids without bruits.  RESP: Clear to ausculation bilaterally without wheezes or crackles. Normal BS in all fields.  CV: RRR normal S1S2 without murmurs, rubs or gallops. PMI normal  LYMPH: no cervical lymph adenopathy appreciated  MS: extremities- no gross deformities of the visible extremities noted, no edema  PSYCH: Alert and oriented times 3; speech- coherent  SKIN:  No obvious significant skin lesions on visible portions of face          ASSESSMENT/PLAN:                                                      (J98.01) Post-infection bronchospasm  (primary encounter diagnosis)  Comment: Pt appears to  "be having bronchospasm. Discussed issues of bronchial disease/bronchospasm.    Recommended symptomatic treatment with bronchodilator (albuterol) as needed.  Already on prednisone.   Finish the antibiotics given few days ago since he has already started them.   Continue to treat any congestion as needed with decongestants, any allergic components with nonsedating antihistamine.  I would strongly recommend sugin Advair or AirDuo every day for the next few weeks given his hsitory of prologed coughin after URIs  Patient was instructed to contact us if there is any worsening, changes in symptoms, or no improvement.     Plan: albuterol (2.5 MG/3ML) 0.083% neb solution,         order for DME, Fluticasone-Salmeterol 113-14         MCG/ACT AEPB, guaiFENesin-codeine (ROBITUSSIN         AC) 100-10 MG/5ML SOLN solution            (I48.0) Paroxysmal atrial fibrillation (H)  Comment: This condition is currently controlled on the current medical regimen.  Continue current therapy.   Plan:     (I42.2) Hypertrophic cardiomyopathy (H)  Comment: This condition is currently controlled on the current medical regimen.  Continue current therapy.   No signs and symptoms of CHF at this time.   Plan:     (R73.03) Prediabetes  Comment: Reviewed the labs showing elevated glucose levels.    Discussed \"pre-diabetes\", impaired glucose tolerance, and its part in the dysmetabolic syndrome.    Discussed the inevitable progression of impaired glucose tolerance toward worsening diabetes mellitus and the need for agressive interventions now to delay and prevent this inevitable progression.  Discussed the overall risks that dysmetabolic syndromes/impaired glucose tolerance/syndrome X pose toward increased risks of vascular disease as the main reason for agressive intervention now.  Will add medications for glucose control (i.e. metformin, glitazones, etc), lipid (e.g. statins), and for blood pressure (preferably ARBs and ACE) as indicated.  Will start " "these as early as needed based other proven ability to delay and modify these risk factors.   Discussed the need for aggressive diet control as the cornerstone of pre-diabetes and diabetes management, emphasizing the reduction of \"simple carbohydrates\" (e.g. Any kind of wheat products (e.g. any bread, any pasta), white rice, noodles, potatoes, snack foods, regular soda, juices (except fresh squeezed), cakes, cookies, candy, etc.) along with regular exercise.       Plan: Lipid panel reflex to direct LDL Fasting, CBC         with platelets differential, Comprehensive         metabolic panel            (Z12.5) Special screening for malignant neoplasm of prostate  Comment: Discussed recent controversies in prostate cancer screening, including the utility and limitations of the PSA blood test (many false positives).  The patient understands this and wishes to have this checked.   Plan: PSA, screen            (Z13.6) CARDIOVASCULAR SCREENING; LDL GOAL LESS THAN 130  Comment: Discussed cardiac disease risk factors and cardiac disease risk factor modification.   Plan: Lipid panel reflex to direct LDL Fasting            (Z11.59) Need for hepatitis C screening test  Comment:   Plan: Hepatitis C antibody                 See Patient Instructions    TYRELL NOGUERA M.D., MD  [facility]   "

## 2017-12-06 NOTE — NURSING NOTE
"Chief Complaint   Patient presents with     URI     X 3 days       Initial /80  Pulse 62  Temp 98.8  F (37.1  C) (Oral)  Wt 212 lb 12.8 oz (96.5 kg)  SpO2 94%  BMI 29.68 kg/m2 Estimated body mass index is 29.68 kg/(m^2) as calculated from the following:    Height as of 12/4/17: 5' 11\" (1.803 m).    Weight as of this encounter: 212 lb 12.8 oz (96.5 kg).  Medication Reconciliation: complete   Cynthia Dominguez CMA      "

## 2017-12-06 NOTE — PATIENT INSTRUCTIONS
"Post infectious Bronchospasm (wheezing):  ===============================================      *  Bronchospasm is irritation of the airways that causes them to spasm and temporarily \"narrow\" which causes coughing (usually minimal sputum production), shortness of breath, dyspnea on exertion, wheezing.  Your airways will be more reactive to things such as temperature changes (too cold, too hot, too humid, etc.), activity, pollutants such as dander, smoke, air pollution, etc.  This will get better with time, but will produce lingering symptoms as described above for a couple weeks to a couple of months.     *  Antibiotics possibly not indicated     --Finish the antibiotics (Doxycycline) until gone.     *  Due to the degree of inflammation inside the airways, complete the Prednisone 40 mg per day until gone in 3 days    *  AIR DUO INHALER:    1 inhalation twice per day, every day to help control and prevent the inflammation in the airways.  AirDuo is not a \"rescue inhaler\", you should not use this inhaler for urgent breathing problem, use the Albuterol inhaler.          *  Albuterol nebulizer 1 vial every 6 hours as needed.      OR     *  Albuterol inhaler, use 2 puffs, 3-5 times per day as needed for any coughing, wheezing, tightness in the breathing, or shortness of breath.  This inhaled medication helps reduce the inflammation and spasm of the airways which will help the breathing become easier.  This is a similar medication we use to treat asthma, but you do not have asthma.      Consider using this inhaler before any known triggers for our coughing or wheezing (usually these include cold or hot temperatures, humidity, dust, air pollutants, smoke).      *  Expect that your airways may remain more easily irritated for a few weeks after upper respiratory infections.     If you require the albuterol rescue inhaler on a regular basis more than a few times per week, you may need additional medications to help control the " breathing better.    These are the available forms of Albuterol, your insurance formulary will determine which one is preferred.  They all work the same.               *  *  For severe coughing, OK to use Rpbitussin with codeine cough syrup, take 1 or 2 teaspoons (5-10 ml) every 4-6 hours as needed for severe coughing.  This is a powerful cough suppressant.  Beware of drowsiness, nausea, vomiting, when taking this medication.  Do not drive, or operate dangerous equipment after taking this.       *  Cough suppressant Delsym, follow directions on bottle    *  Mucinex extended release, one or two tablets twice per day for the next 5-7 days.  This helps loosen the secretions to they can be passed more easily out of the body.     *  Be sure to drink lots of fluids.  If the appetite and intake of food is way down, then at leat try to eat soup.  Dehydration is the thing that causes people to end up in the hospital with viral infections and the flu.     *  For sore throat:  Try lozenges (Cepostat, Cepacol, hard candies, Zinc lozenges, etc)    *  If you have thick secretions:  Mucinex extended release twice per day on a regular basis for the next few days, then twice per day as needed.  OK to take the regular Mucinex, you may just have to take it 2-3 times per day.      *  If you have dry nasal passages or frequent bloody noses during the winter time:  Saline nasal spray as often as needed for dry nose.     *  If you have a lot of congestion and/or runny noses:  OK to try decongestants as needed (e.g. pseudoephedrine or phenylephrine).  Be sure to take the lowest dose needed.  Take decongestants from only ONE source.  It is possible to inadvertantly take more than the recommended amounts if you take decongestants from multiple products (i.e. Do NOT take Mucinex-D and Claritin-D together)    *  If you have been told to NOT take decongestants or if you cannot tolerate decongestants due to effect on blood pressure, sleep or  heart rate:  Try Coricidin HBP or Chlortrimeton (chlorpheniramine).  These can have a similar effect as some decongestants but will not affect your heart rate or blood pressure.      *  If you have SEVERE nasal congestion:  Affrin nasal spray as needed for severe nasal congestion (especially before the airplane trip), but do not use for more than one week.      *  Tylenol as needed for any headaches of low grade temperatures.     *  Ibuprofen as needed for body aches, fevers, headaches    *  Call the clinic if any changes in the symptoms such as worsening fevers, or the amount and quality of the sputum changes, or if you have any major difficulties breathing, or the symptoms fail to clear for a few weeks.    *  Most upper respiratory infection will clear 1-2 weeks, but it is not uncommon for post viral coughs to last for several weeks, even rarely the entire winter.          BRONCHOSPASM (AIRWAY SPASM):

## 2017-12-06 NOTE — MR AVS SNAPSHOT
"              After Visit Summary   12/6/2017    Stu Meredith    MRN: [id number]           Patient Information     Date Of Birth          1955        Visit Information        Provider Department      12/6/2017 11:00 AM Omar Camacho MD [facility]        Today's Diagnoses     Post-infection bronchospasm    -  1    Paroxysmal atrial fibrillation (H)        Hypertrophic cardiomyopathy (H)        Prediabetes        Special screening for malignant neoplasm of prostate        CARDIOVASCULAR SCREENING; LDL GOAL LESS THAN 130        Need for hepatitis C screening test          Care Instructions    Post infectious Bronchospasm (wheezing):  ===============================================      *  Bronchospasm is irritation of the airways that causes them to spasm and temporarily \"narrow\" which causes coughing (usually minimal sputum production), shortness of breath, dyspnea on exertion, wheezing.  Your airways will be more reactive to things such as temperature changes (too cold, too hot, too humid, etc.), activity, pollutants such as dander, smoke, air pollution, etc.  This will get better with time, but will produce lingering symptoms as described above for a couple weeks to a couple of months.     *  Antibiotics possibly not indicated     --Finish the antibiotics (Doxycycline) until gone.     *  Due to the degree of inflammation inside the airways, complete the Prednisone 40 mg per day until gone in 3 days    *  AIR DUO INHALER:    1 inhalation twice per day, every day to help control and prevent the inflammation in the airways.  AirDuo is not a \"rescue inhaler\", you should not use this inhaler for urgent breathing problem, use the Albuterol inhaler.          *  Albuterol nebulizer 1 vial every 6 hours as needed.      OR     *  Albuterol inhaler, use 2 puffs, 3-5 times per day as needed for any coughing, wheezing, tightness in the breathing, or shortness of breath.  This inhaled " medication helps reduce the inflammation and spasm of the airways which will help the breathing become easier.  This is a similar medication we use to treat asthma, but you do not have asthma.      Consider using this inhaler before any known triggers for our coughing or wheezing (usually these include cold or hot temperatures, humidity, dust, air pollutants, smoke).      *  Expect that your airways may remain more easily irritated for a few weeks after upper respiratory infections.     If you require the albuterol rescue inhaler on a regular basis more than a few times per week, you may need additional medications to help control the breathing better.    These are the available forms of Albuterol, your insurance formulary will determine which one is preferred.  They all work the same.               *  *  For severe coughing, OK to use Rpbitussin with codeine cough syrup, take 1 or 2 teaspoons (5-10 ml) every 4-6 hours as needed for severe coughing.  This is a powerful cough suppressant.  Beware of drowsiness, nausea, vomiting, when taking this medication.  Do not drive, or operate dangerous equipment after taking this.       *  Cough suppressant Delsym, follow directions on bottle    *  Mucinex extended release, one or two tablets twice per day for the next 5-7 days.  This helps loosen the secretions to they can be passed more easily out of the body.     *  Be sure to drink lots of fluids.  If the appetite and intake of food is way down, then at leat try to eat soup.  Dehydration is the thing that causes people to end up in the hospital with viral infections and the flu.     *  For sore throat:  Try lozenges (Cepostat, Cepacol, hard candies, Zinc lozenges, etc)    *  If you have thick secretions:  Mucinex extended release twice per day on a regular basis for the next few days, then twice per day as needed.  OK to take the regular Mucinex, you may just have to take it 2-3 times per day.      *  If you have dry nasal  passages or frequent bloody noses during the winter time:  Saline nasal spray as often as needed for dry nose.     *  If you have a lot of congestion and/or runny noses:  OK to try decongestants as needed (e.g. pseudoephedrine or phenylephrine).  Be sure to take the lowest dose needed.  Take decongestants from only ONE source.  It is possible to inadvertantly take more than the recommended amounts if you take decongestants from multiple products (i.e. Do NOT take Mucinex-D and Claritin-D together)    *  If you have been told to NOT take decongestants or if you cannot tolerate decongestants due to effect on blood pressure, sleep or heart rate:  Try Coricidin HBP or Chlortrimeton (chlorpheniramine).  These can have a similar effect as some decongestants but will not affect your heart rate or blood pressure.      *  If you have SEVERE nasal congestion:  Affrin nasal spray as needed for severe nasal congestion (especially before the airplane trip), but do not use for more than one week.      *  Tylenol as needed for any headaches of low grade temperatures.     *  Ibuprofen as needed for body aches, fevers, headaches    *  Call the clinic if any changes in the symptoms such as worsening fevers, or the amount and quality of the sputum changes, or if you have any major difficulties breathing, or the symptoms fail to clear for a few weeks.    *  Most upper respiratory infection will clear 1-2 weeks, but it is not uncommon for post viral coughs to last for several weeks, even rarely the entire winter.          BRONCHOSPASM (AIRWAY SPASM):               Follow-ups after your visit        Future tests that were ordered for you today     Open Future Orders        Priority Expected Expires Ordered    CBC with platelets differential ASAP 12/20/2017 6/6/2018 12/6/2017    Lipid panel reflex to direct LDL Fasting Routine 12/20/2017 6/6/2018 12/6/2017    Comprehensive metabolic panel Routine 12/20/2017 6/6/2018 12/6/2017    PSA,  screen Routine 12/20/2017 6/6/2018 12/6/2017    Hepatitis C antibody Routine 12/20/2017 6/6/2018 12/6/2017            Who to contact     If you have questions or need follow up information about today's clinic visit or your schedule please contact [facility] directly at [phone].  Normal or non-critical lab and imaging results will be communicated to you by MyChart, letter or phone within 4 business days after the clinic has received the results. If you do not hear from us within 7 days, please contact the clinic through [employer] or phone. If you have a critical or abnormal lab result, we will notify you by phone as soon as possible.  Submit refill requests through [employer] or call your pharmacy and they will forward the refill request to us. Please allow 3 business days for your refill to be completed.          Additional Information About Your Visit        [employer] Information     [employer] gives you secure access to your electronic health record. If you see a primary care provider, you can also send messages to your care team and make appointments. If you have questions, please call your primary care clinic.  If you do not have a primary care provider, please call [phone] and they will assist you.        Care EveryWhere ID     This is your Care EveryWhere ID. This could be used by other organizations to access your [facility] medical records  [id number]        Your Vitals Were     Pulse Temperature Pulse Oximetry BMI (Body Mass Index)          62 98.8  F (37.1  C) (Oral) 94% 29.68 kg/m2         Blood Pressure from Last 3 Encounters:   12/06/17 128/80   12/04/17 130/82   08/07/17 130/68    Weight from Last 3 Encounters:   12/06/17 212 lb 12.8 oz (96.5 kg)   12/04/17 217 lb 6.4 oz (98.6 kg)   05/03/17 215 lb 12.8 oz (97.9 kg)                 Today's Medication Changes          These changes are accurate as of: 12/6/17 12:39 PM.  If you have any questions, ask your nurse or doctor.                Start taking these medicines.        Dose/Directions    Fluticasone-Salmeterol 113-14 MCG/ACT Aepb   Used for:  Post-infection bronchospasm   Started by:  Omar Camacho MD        Dose:  1 puff   Inhale 1 puff into the lungs 2 times daily   Quantity:  1 each   Refills:  5       guaiFENesin-codeine 100-10 MG/5ML Soln solution   Commonly known as:  ROBITUSSIN AC   Used for:  Post-infection bronchospasm   Started by:  Omar Camacho MD        Dose:  1 tsp.   Take 5 mLs by mouth every 4 hours as needed for cough   Quantity:  120 mL   Refills:  0       order for DME   Used for:  Post-infection bronchospasm   Started by:  Omar Camacho MD        Equipment being ordered: Nebulizer   Quantity:  1 each   Refills:  0         These medicines have changed or have updated prescriptions.        Dose/Directions    * albuterol 108 (90 BASE) MCG/ACT Inhaler   Commonly known as:  PROAIR HFA   This may have changed:  Another medication with the same name was added. Make sure you understand how and when to take each.   Used for:  Bronchitis with bronchospasm   Changed by:  Delma Thomas PA-C        Dose:  2 puff   Inhale 2 puffs into the lungs every 4 hours as needed for shortness of breath / dyspnea or wheezing   Quantity:  1 Inhaler   Refills:  2       * albuterol (2.5 MG/3ML) 0.083% neb solution   This may have changed:  You were already taking a medication with the same name, and this prescription was added. Make sure you understand how and when to take each.   Used for:  Post-infection bronchospasm   Changed by:  Omar Camacho MD        Dose:  1 vial   Take 1 vial (2.5 mg) by nebulization every 6 hours as needed for shortness of breath / dyspnea or wheezing   Quantity:  25 vial   Refills:  2       * Notice:  This list has 2 medication(s) that are the same as other medications prescribed for you. Read the directions carefully, and ask your doctor or other care provider to  review them with you.         Where to get your medicines      These medications were sent to [id number] - [place], [place] - [address]  [address] [place] [id number]     Phone:  [phone]     albuterol (2.5 MG/3ML) 0.083% neb solution    Fluticasone-Salmeterol 113-14 MCG/ACT Aepb         Some of these will need a paper prescription and others can be bought over the counter.  Ask your nurse if you have questions.     Bring a paper prescription for each of these medications     guaiFENesin-codeine 100-10 MG/5ML Soln solution    order for DME                Primary Care Provider Office Phone # Fax #    Omar Camacho [phone]       [address] [facility] [address]        Equal Access to Services     MARLON DE LOS SANTOS : Hadii reina singh hadasho Soomaali, waaxda luqadaha, qaybta kaalmada aderadhayada, jorden graham . So [facility] [phone].    ATENCIÓN: Si habla español, tiene a drew disposición servicios gratuitos de asistencia lingüística. [facility] [phone].    We comply with applicable federal civil rights laws and [place] laws. We do not discriminate on the basis of race, color, national origin, age, disability, sex, sexual orientation, or gender identity.            Thank you!     Thank you for choosing [facility]  for your care. Our goal is always to provide you with excellent care. Hearing back from our patients is one way we can continue to improve our services. Please take a few minutes to complete the written survey that you may receive in the mail after your visit with us. Thank you!             Your Updated Medication List - Protect others around you: Learn how to safely use, store and throw away your medicines at www.disposemymeds.org.          This list is accurate as of: 12/6/17 12:39 PM.  Always use your most recent med list.                   Brand Name Dispense Instructions for use Diagnosis     acetaminophen 325 MG tablet    TYLENOL     Take 325-650 mg by mouth every 6 hours as needed        * albuterol 108 (90 BASE) MCG/ACT Inhaler    PROAIR HFA    1 Inhaler    Inhale 2 puffs into the lungs every 4 hours as needed for shortness of breath / dyspnea or wheezing    Bronchitis with bronchospasm       * albuterol (2.5 MG/3ML) 0.083% neb solution     25 vial    Take 1 vial (2.5 mg) by nebulization every 6 hours as needed for shortness of breath / dyspnea or wheezing    Post-infection bronchospasm       atorvastatin 20 MG tablet    LIPITOR    90 tablet    Take 1 tablet (20 mg) by mouth daily    CARDIOVASCULAR SCREENING; LDL GOAL LESS THAN 130       Blood Pressure Cuff Misc     1 each    1 Device daily    Atrial fibrillation (H), Hypertrophic cardiomyopathy (H)       doxycycline 100 MG capsule    VIBRAMYCIN    20 capsule    Take 1 capsule (100 mg) by mouth 2 times daily for 10 days    Bronchitis with bronchospasm       Fluticasone-Salmeterol 113-14 MCG/ACT Aepb     1 each    Inhale 1 puff into the lungs 2 times daily    Post-infection bronchospasm       guaiFENesin-codeine 100-10 MG/5ML Soln solution    ROBITUSSIN AC    120 mL    Take 5 mLs by mouth every 4 hours as needed for cough    Post-infection bronchospasm       metoprolol 50 MG 24 hr tablet    TOPROL-XL    90 tablet    Take 1 tablet (50 mg) by mouth daily    HOCM (hypertrophic obstructive cardiomyopathy) (H)       multivitamin, therapeutic Tabs tablet      Take 1 tablet by mouth daily        order for DME     1 each    Equipment being ordered: Nebulizer    Post-infection bronchospasm       predniSONE 20 MG tablet    DELTASONE    10 tablet    Take 2 tablets (40 mg) by mouth daily for 5 days    Bronchitis with bronchospasm       rivaroxaban ANTICOAGULANT 20 MG Tabs tablet    XARELTO    90 tablet    Take 1 tablet (20 mg) by mouth daily (with dinner)    Chronic atrial fibrillation (H)       sotalol 80 MG tablet    BETAPACE    270 tablet    Take 1.5 tablets  (120 mg) by mouth 2 times daily    Chronic atrial fibrillation (H), Hypertrophic cardiomyopathy (H)       zolpidem 10 MG tablet    AMBIEN    90 tablet    Take 0.5-1 tablets (5-10 mg) by mouth nightly as needed for sleep    Insomnia, unspecified, Sedative, hypnotic or anxiolytic dependence (H)       * Notice:  This list has 2 medication(s) that are the same as other medications prescribed for you. Read the directions carefully, and ask your doctor or other care provider to review them with you.

## 2017-12-15 DIAGNOSIS — Z96.642 HISTORY OF TOTAL LEFT HIP ARTHROPLASTY: Primary | ICD-10-CM

## 2017-12-15 RX ORDER — AMOXICILLIN 500 MG/1
TABLET, FILM COATED ORAL
Qty: 4 TABLET | Refills: 3 | Status: SHIPPED | OUTPATIENT
Start: 2017-12-15 | End: 2018-12-26

## 2017-12-15 NOTE — TELEPHONE ENCOUNTER
DOS: 01/15/2014 Left total hip arthroplasty    Patient contacting clinic today to request refill on pre-dental procedure prophylactic antibiotics. Refill sent to patient's pharmacy [facility]. Prescription e-prescribed.

## 2017-12-19 ENCOUNTER — ALLIED HEALTH/NURSE VISIT (OUTPATIENT)
Dept: CARDIOLOGY | Facility: CLINIC | Age: 62
End: 2017-12-19
Attending: INTERNAL MEDICINE
Payer: COMMERCIAL

## 2017-12-19 DIAGNOSIS — I48.91 ATRIAL FIBRILLATION (H): ICD-10-CM

## 2017-12-19 DIAGNOSIS — I42.2 HYPERTROPHIC CARDIOMYOPATHY (H): Primary | ICD-10-CM

## 2017-12-19 PROCEDURE — 93295 DEV INTERROG REMOTE 1/2/MLT: CPT | Performed by: INTERNAL MEDICINE

## 2017-12-19 PROCEDURE — 93296 REM INTERROG EVL PM/IDS: CPT | Mod: ZF

## 2017-12-19 NOTE — PROGRESS NOTES
J[employer] remote ICD transmission received and reviewed. Device transmission sent per MD orders. His presenting rhythm is AF/ VS ~70 bpm. AF has been 100% since 12/10/17. Pt states he is taking xarelto. He also reports having bronchitis last week. Normal device function. AP= 54% and = 3%. Lead trends appear stable. Battery estimates 5.5 years to CARMEN. Transmission results reviewed with Maci Dominguez NP. DCCV ordered, per pt request. Pt notified of transmission results and plan. He states he will not be available for the DCCV until after Jan 1, 2018. He verbalized understanding.  Remote ICD transmission

## 2017-12-19 NOTE — MR AVS SNAPSHOT
After Visit Summary   12/19/2017    Stu Meredith    MRN: [id number]           Patient Information     Date Of Birth          1955        Visit Information        Provider Department      12/19/2017 6:00 AM UC ICD REMOTE [facility]        Today's Diagnoses     Hypertrophic cardiomyopathy (H)    -  1    Atrial fibrillation (H)           Follow-ups after your visit        Your next 10 appointments already scheduled     Dec 26, 2017  1:40 PM CST   PHYSICAL with Omar Camacho MD   [facility]    [address] [address] [phone]              Future tests that were ordered for you today     Open Future Orders        Priority Expected Expires Ordered    Cardioversion Routine  12/19/2018 12/19/2017            Who to contact     If you have questions or need follow up information about today's clinic visit or your schedule please contact [facility] directly at [phone].  Normal or non-critical lab and imaging results will be communicated to you by [email or web address], letter or phone within 4 business days after the clinic has received the results. If you do not hear from us within 7 days, please contact the clinic through [email or web address] or phone. If you have a critical or abnormal lab result, we will notify you by phone as soon as possible.  Submit refill requests through TechForward or call your pharmacy and they will forward the refill request to us. Please allow 3 business days for your refill to be completed.          Additional Information About Your Visit        [email or web address] Information     TechForward gives you secure access to your electronic health record. If you see a primary care provider, you can also send messages to your care team and make appointments. If you have questions, please call your primary care clinic.  If you do not have a primary care provider, please call [phone] and they will  assist you.        Care EveryWhere ID     This is your Care EveryWhere ID. This could be used by other organizations to access your [facility] medical records  [id number]         Blood Pressure from Last 3 Encounters:   12/06/17 128/80   12/04/17 130/82   08/07/17 130/68    Weight from Last 3 Encounters:   12/06/17 96.5 kg (212 lb 12.8 oz)   12/04/17 98.6 kg (217 lb 6.4 oz)   05/03/17 97.9 kg (215 lb 12.8 oz)              We Performed the Following     INTERROGATION DEVICE EVAL REMOTE, PACER/ICD        Primary Care Provider Office Phone # Fax #    Omar Camacho [phone]       [address] [place] [address]        Equal Access to Services     MARLON DE LOS SANTOS : Hadii aad samantha hadasho Soomaali, waaxda luqadaha, qaybta kaalmada adeegyada, waxlencho cornell hayzaria graham . So [facility] [phone].    ATENCIÓN: Si habla español, tiene a drew disposición servicios gratuitos de asistencia lingüística. Llame al [phone].    We comply with applicable federal civil rights laws and [place] laws. We do not discriminate on the basis of race, color, national origin, age, disability, sex, sexual orientation, or gender identity.            Thank you!     Thank you for choosing [facility]  for your care. Our goal is always to provide you with excellent care. Hearing back from our patients is one way we can continue to improve our services. Please take a few minutes to complete the written survey that you may receive in the mail after your visit with us. Thank you!             Your Updated Medication List - Protect others around you: Learn how to safely use, store and throw away your medicines at www.disposemymeds.org.          This list is accurate as of: 12/19/17  2:51 PM.  Always use your most recent med list.                   Brand Name Dispense Instructions for use Diagnosis    acetaminophen 325 MG tablet    TYLENOL     Take 325-650 mg by mouth every 6 hours as needed        *  albuterol 108 (90 BASE) MCG/ACT Inhaler    PROAIR HFA    1 Inhaler    Inhale 2 puffs into the lungs every 4 hours as needed for shortness of breath / dyspnea or wheezing    Bronchitis with bronchospasm       * albuterol (2.5 MG/3ML) 0.083% neb solution     25 vial    Take 1 vial (2.5 mg) by nebulization every 6 hours as needed for shortness of breath / dyspnea or wheezing    Post-infection bronchospasm       amoxicillin 500 MG tablet    AMOXIL    4 tablet    Take 4 tablets (2000 mg) by mouth 1 hour before dental procedures.    History of total left hip arthroplasty       atorvastatin 20 MG tablet    LIPITOR    90 tablet    Take 1 tablet (20 mg) by mouth daily    CARDIOVASCULAR SCREENING; LDL GOAL LESS THAN 130       Blood Pressure Cuff Misc     1 each    1 Device daily    Atrial fibrillation (H), Hypertrophic cardiomyopathy (H)       Fluticasone-Salmeterol 113-14 MCG/ACT Aepb     1 each    Inhale 1 puff into the lungs 2 times daily    Post-infection bronchospasm       guaiFENesin-codeine 100-10 MG/5ML Soln solution    ROBITUSSIN AC    120 mL    Take 5 mLs by mouth every 4 hours as needed for cough    Post-infection bronchospasm       metoprolol 50 MG 24 hr tablet    TOPROL-XL    90 tablet    Take 1 tablet (50 mg) by mouth daily    HOCM (hypertrophic obstructive cardiomyopathy) (H)       multivitamin, therapeutic Tabs tablet      Take 1 tablet by mouth daily        order for DME     1 each    Equipment being ordered: Nebulizer    Post-infection bronchospasm       rivaroxaban ANTICOAGULANT 20 MG Tabs tablet    XARELTO    90 tablet    Take 1 tablet (20 mg) by mouth daily (with dinner)    Chronic atrial fibrillation (H)       sotalol 80 MG tablet    BETAPACE    270 tablet    Take 1.5 tablets (120 mg) by mouth 2 times daily    Chronic atrial fibrillation (H), Hypertrophic cardiomyopathy (H)       zolpidem 10 MG tablet    AMBIEN    90 tablet    Take 0.5-1 tablets (5-10 mg) by mouth nightly as needed for sleep    Insomnia,  unspecified, Sedative, hypnotic or anxiolytic dependence (H)       * Notice:  This list has 2 medication(s) that are the same as other medications prescribed for you. Read the directions carefully, and ask your doctor or other care provider to review them with you.

## 2017-12-26 ENCOUNTER — OFFICE VISIT (OUTPATIENT)
Dept: INTERNAL MEDICINE | Facility: CLINIC | Age: 62
End: 2017-12-26
Payer: COMMERCIAL

## 2017-12-26 VITALS
HEIGHT: 71 IN | TEMPERATURE: 98.2 F | BODY MASS INDEX: 30.21 KG/M2 | SYSTOLIC BLOOD PRESSURE: 129 MMHG | OXYGEN SATURATION: 98 % | HEART RATE: 72 BPM | WEIGHT: 215.8 LBS | DIASTOLIC BLOOD PRESSURE: 89 MMHG

## 2017-12-26 DIAGNOSIS — Z71.89 ADVANCED DIRECTIVES, COUNSELING/DISCUSSION: ICD-10-CM

## 2017-12-26 DIAGNOSIS — Z12.5 SPECIAL SCREENING FOR MALIGNANT NEOPLASM OF PROSTATE: ICD-10-CM

## 2017-12-26 DIAGNOSIS — Z00.00 ROUTINE GENERAL MEDICAL EXAMINATION AT A HEALTH CARE FACILITY: Primary | ICD-10-CM

## 2017-12-26 DIAGNOSIS — R97.20 ELEVATED PROSTATE SPECIFIC ANTIGEN (PSA): ICD-10-CM

## 2017-12-26 DIAGNOSIS — F13.20 SEDATIVE, HYPNOTIC OR ANXIOLYTIC DEPENDENCE (H): ICD-10-CM

## 2017-12-26 DIAGNOSIS — Z13.6 CARDIOVASCULAR SCREENING; LDL GOAL LESS THAN 130: ICD-10-CM

## 2017-12-26 DIAGNOSIS — Z11.59 NEED FOR HEPATITIS C SCREENING TEST: ICD-10-CM

## 2017-12-26 DIAGNOSIS — R73.03 PREDIABETES: ICD-10-CM

## 2017-12-26 LAB
ALBUMIN SERPL-MCNC: 3.5 G/DL (ref 3.4–5)
ALP SERPL-CCNC: 83 U/L (ref 40–150)
ALT SERPL W P-5'-P-CCNC: 38 U/L (ref 0–70)
ANION GAP SERPL CALCULATED.3IONS-SCNC: 7 MMOL/L (ref 3–14)
AST SERPL W P-5'-P-CCNC: 21 U/L (ref 0–45)
BASOPHILS # BLD AUTO: 0.1 10E9/L (ref 0–0.2)
BASOPHILS NFR BLD AUTO: 1.5 %
BILIRUB SERPL-MCNC: 0.6 MG/DL (ref 0.2–1.3)
BUN SERPL-MCNC: 15 MG/DL (ref 7–30)
CALCIUM SERPL-MCNC: 8.9 MG/DL (ref 8.5–10.1)
CHLORIDE SERPL-SCNC: 106 MMOL/L (ref 94–109)
CHOLEST SERPL-MCNC: 154 MG/DL
CO2 SERPL-SCNC: 29 MMOL/L (ref 20–32)
CREAT SERPL-MCNC: 1.01 MG/DL (ref 0.66–1.25)
DIFFERENTIAL METHOD BLD: NORMAL
EOSINOPHIL # BLD AUTO: 0.4 10E9/L (ref 0–0.7)
EOSINOPHIL NFR BLD AUTO: 6 %
ERYTHROCYTE [DISTWIDTH] IN BLOOD BY AUTOMATED COUNT: 13.5 % (ref 10–15)
GFR SERPL CREATININE-BSD FRML MDRD: 75 ML/MIN/1.7M2
GLUCOSE SERPL-MCNC: 131 MG/DL (ref 70–99)
HCT VFR BLD AUTO: 46.1 % (ref 40–53)
HDLC SERPL-MCNC: 60 MG/DL
HGB BLD-MCNC: 15.1 G/DL (ref 13.3–17.7)
LDLC SERPL CALC-MCNC: 67 MG/DL
LYMPHOCYTES # BLD AUTO: 1.9 10E9/L (ref 0.8–5.3)
LYMPHOCYTES NFR BLD AUTO: 28 %
MCH RBC QN AUTO: 30.3 PG (ref 26.5–33)
MCHC RBC AUTO-ENTMCNC: 32.8 G/DL (ref 31.5–36.5)
MCV RBC AUTO: 92 FL (ref 78–100)
MONOCYTES # BLD AUTO: 0.6 10E9/L (ref 0–1.3)
MONOCYTES NFR BLD AUTO: 8.9 %
NEUTROPHILS # BLD AUTO: 3.7 10E9/L (ref 1.6–8.3)
NEUTROPHILS NFR BLD AUTO: 55.6 %
NONHDLC SERPL-MCNC: 94 MG/DL
PLATELET # BLD AUTO: 227 10E9/L (ref 150–450)
POTASSIUM SERPL-SCNC: 4.4 MMOL/L (ref 3.4–5.3)
PROT SERPL-MCNC: 6.9 G/DL (ref 6.8–8.8)
PSA SERPL-ACNC: 6 UG/L (ref 0–4)
RBC # BLD AUTO: 4.99 10E12/L (ref 4.4–5.9)
SODIUM SERPL-SCNC: 142 MMOL/L (ref 133–144)
TRIGL SERPL-MCNC: 137 MG/DL
WBC # BLD AUTO: 6.7 10E9/L (ref 4–11)

## 2017-12-26 PROCEDURE — 86803 HEPATITIS C AB TEST: CPT | Performed by: INTERNAL MEDICINE

## 2017-12-26 PROCEDURE — 36415 COLL VENOUS BLD VENIPUNCTURE: CPT | Performed by: INTERNAL MEDICINE

## 2017-12-26 PROCEDURE — 80053 COMPREHEN METABOLIC PANEL: CPT | Performed by: INTERNAL MEDICINE

## 2017-12-26 PROCEDURE — 85025 COMPLETE CBC W/AUTO DIFF WBC: CPT | Performed by: INTERNAL MEDICINE

## 2017-12-26 PROCEDURE — 99396 PREV VISIT EST AGE 40-64: CPT | Performed by: INTERNAL MEDICINE

## 2017-12-26 PROCEDURE — G0103 PSA SCREENING: HCPCS | Performed by: INTERNAL MEDICINE

## 2017-12-26 PROCEDURE — 80061 LIPID PANEL: CPT | Performed by: INTERNAL MEDICINE

## 2017-12-26 RX ORDER — ZOLPIDEM TARTRATE 10 MG/1
5-10 TABLET ORAL
Qty: 90 TABLET | Refills: 0 | Status: SHIPPED | OUTPATIENT
Start: 2017-12-26 | End: 2018-01-24

## 2017-12-26 RX ORDER — ATORVASTATIN CALCIUM 20 MG/1
20 TABLET, FILM COATED ORAL DAILY
Qty: 90 TABLET | Refills: 3 | Status: SHIPPED | OUTPATIENT
Start: 2017-12-26 | End: 2019-01-18

## 2017-12-26 NOTE — NURSING NOTE
"Chief Complaint   Patient presents with     Physical     currently, not fasting        Initial /89 (BP Location: Left arm, Patient Position: Chair, Cuff Size: Adult Large)  Pulse 72  Temp 98.2  F (36.8  C) (Oral)  Ht 5' 11\" (1.803 m)  Wt 215 lb 12.8 oz (97.9 kg)  SpO2 98%  BMI 30.1 kg/m2 Estimated body mass index is 30.1 kg/(m^2) as calculated from the following:    Height as of this encounter: 5' 11\" (1.803 m).    Weight as of this encounter: 215 lb 12.8 oz (97.9 kg).  Medication Reconciliation: complete     Micki Rodriguez MA     "

## 2017-12-26 NOTE — PROGRESS NOTES
SUBJECTIVE:   CC: Stu Meredith is an 62 year old male who presents for preventative health visit.     Answers for HPI/ROS submitted by the patient on 12/26/2017   Annual Exam:  Getting at least 3 servings of Calcium per day:: Yes  Bi-annual eye exam:: Yes  Dental care twice a year:: Yes  Sleep apnea or symptoms of sleep apnea:: None  Taking medications regularly:: Yes  Medication side effects:: None  Additional concerns today:: No  PHQ-2 Score: 0    1.  resovlig URI with brochospasm, reports he is much better than last week,     2.  Went into atrial fibrillation again vcouple of weeks ago, even before he was ill.   Plans to see Cardiology about this, was told he may need another cardioveresion.       2.  revieed reetn screening PSAs.   Lab Results   Component Value Date    PSA 6.00 12/26/2017    PSA 3.67 12/10/2015    PSA 2.66 07/20/2012    PSA 1.57 11/10/2008           Today's PHQ-2 Score:   PHQ-2 ( 1999 [employer]) 12/26/2017 11/8/2016   Q1: Little interest or pleasure in doing things 0 0   Q2: Feeling down, depressed or hopeless 0 0   PHQ-2 Score 0 0   Q1: Little interest or pleasure in doing things Not at all -   Q2: Feeling down, depressed or hopeless Not at all -   PHQ-2 Score 0 -   Some recent data might be hidden         Abuse: Current or Past(Physical, Sexual or Emotional)- No  Do you feel safe in your environment - Yes  Social History   Substance Use Topics     Smoking status: Former Smoker     Packs/day: 0.25     Years: 40.00     Types: Cigarettes     Start date: 1/1/1975     Quit date: 12/11/2015     Smokeless tobacco: Never Used     Alcohol use No      Comment: 1 drink per week      If you drink alcohol do you typically have >3 drinks per day or >7 drinks per week? No                      Last PSA:   PSA   Date Value Ref Range Status   12/26/2017 6.00 (H) 0 - 4 ug/L Final     Comment:     Assay Method:  Chemiluminescence using Siemens Vista analyzer       Reviewed orders with patient. Reviewed health  maintenance and updated orders accordingly - Yes      Reviewed and updated as needed this visit by clinical staff  Tobacco  Allergies  Meds  Med Hx  Surg Hx  Fam Hx  Soc Hx        Reviewed and updated as needed this visit by Provider          Past Medical History:  ---------------------------  Past Medical History:   Diagnosis Date     Atrial fibrillation (H) 12/9/11    failed medication, multiple DC cardioveresions; s/p Left atrial ablation to eliminate atrial fibrillation 12/9/11     Chest pain      CHF (congestive heart failure) (H) 7/30/2016     Coronary artery disease      DJD (degenerative joint disease)      Hip arthritis 1/15/2014     Hypertension      Hypertrophic cardiomyopathy (H) 10/09     Other abnormal heart sounds      Pacemaker     ICD     Pneumonia, organism unspecified(486)      Prediabetes 7/10/15    A1C 6.5     Status post implantation of automatic cardioverter/defibrillator (AICD)      Ventricular tachycardia (H)        Past Surgical History:  ---------------------------  Past Surgical History:   Procedure Laterality Date     ANESTHESIA CARDIOVERSION  4/24/2014    Procedure: ANESTHESIA CARDIOVERSION;  Surgeon: Generic Anesthesia Provider;  Location: [facility] OR     ANESTHESIA CARDIOVERSION N/A 5/12/2016    Procedure: ANESTHESIA CARDIOVERSION;  Surgeon: GENERIC ANESTHESIA PROVIDER;  Location: [facility] OR     ANESTHESIA CARDIOVERSION N/A 8/7/2017    Procedure: ANESTHESIA CARDIOVERSION;  Anesthesia Offsite Coverage Cardioversion @1100;  Surgeon: GENERIC ANESTHESIA PROVIDER;  Location: [facility] OR     ARTHROPLASTY HIP  1/15/2014    Procedure: ARTHROPLASTY HIP;  Left Total Hip Arthroplasty;  Surgeon: Nelson Gaspar MD;  Location: [facility] OR     CARDIAC SURGERY       H ABLATION FOCAL AFIB  12/9/11    Left atrial ablation to eliminate atrial fibrillation     IMPLANT AUTOMATIC IMPLANTABLE CARDIOVERTER DEFIBRILLATOR  7/27/12    AICD implantation     TONSILLECTOMY  1964       Current  Medications:  ---------------------------  Current Outpatient Prescriptions   Medication Sig Dispense Refill     atorvastatin (LIPITOR) 20 MG tablet Take 1 tablet (20 mg) by mouth daily 90 tablet 3     zolpidem (AMBIEN) 10 MG tablet Take 0.5-1 tablets (5-10 mg) by mouth nightly as needed for sleep 90 tablet 0     sotalol (BETAPACE) 80 MG tablet Take 1.5 tablets (120 mg) by mouth 2 times daily 270 tablet 3     rivaroxaban ANTICOAGULANT (XARELTO) 20 MG TABS tablet Take 1 tablet (20 mg) by mouth daily (with dinner) 90 tablet 5     multivitamin, therapeutic (THERA-VIT) TABS Take 1 tablet by mouth daily       Blood Pressure Monitoring (BLOOD PRESSURE CUFF) MISC 1 Device daily 1 each 0     VENTOLIN  (90 BASE) MCG/ACT Inhaler INHALE 2 PUFFS INTO THE LUNGS EVERY 4 HOURS AS NEEDED FOR SHORTNESS OF BREATH / DYSPNEA OR WHEEZING 1 Inhaler 11     amoxicillin (AMOXIL) 500 MG tablet Take 4 tablets (2000 mg) by mouth 1 hour before dental procedures. 4 tablet 3     albuterol (2.5 MG/3ML) 0.083% neb solution Take 1 vial (2.5 mg) by nebulization every 6 hours as needed for shortness of breath / dyspnea or wheezing 25 vial 2     order for DME Equipment being ordered: Nebulizer 1 each 0     Fluticasone-Salmeterol 113-14 MCG/ACT AEPB Inhale 1 puff into the lungs 2 times daily 1 each 5     albuterol (PROAIR HFA) 108 (90 BASE) MCG/ACT Inhaler Inhale 2 puffs into the lungs every 4 hours as needed for shortness of breath / dyspnea or wheezing 1 Inhaler 2     metoprolol (TOPROL-XL) 50 MG 24 hr tablet Take 1 tablet (50 mg) by mouth daily 90 tablet 3     acetaminophen (TYLENOL) 325 MG tablet Take 325-650 mg by mouth every 6 hours as needed         Allergies:  -------------  Allergies   Allergen Reactions     Chantix [Varenicline]      Nigthmares, insomnia, patient states that it is not really an allergy, just a side effect       Social History:  -------------------  Social History     Social History     Marital status:      Spouse  name: N/A     Number of children: N/A     Years of education: N/A     Occupational History      [employer]     Social History Main Topics     Smoking status: Former Smoker     Packs/day: 0.25     Years: 40.00     Types: Cigarettes     Start date: 1/1/1975     Quit date: 12/11/2015     Smokeless tobacco: Never Used     Alcohol use No      Comment: 1 drink per week     Drug use: No     Sexual activity: Yes     Partners: Female     Other Topics Concern     Exercise No     Seat Belt Yes     Parent/Sibling W/ Cabg, Mi Or Angioplasty Before 65f 55m? No     Social History Narrative       Family Medical History:  ------------------------------  Family History   Problem Relation Age of Onset     HEART DISEASE Mother      unknown     Obesity Mother      GASTROINTESTINAL DISEASE Mother      diverticulitis     DIABETES Maternal Grandmother      DIABETES Paternal Grandmother      CEREBROVASCULAR DISEASE Paternal Grandmother 94     DIABETES Paternal Grandfather      CEREBROVASCULAR DISEASE Paternal Grandfather 78     DIABETES Son      C.A.D. Father      CABG age 78     HEART DISEASE Father      CABG x5     DIABETES Maternal Grandfather         ROS:  C: NEGATIVE for fever, chills, change in weight  I: NEGATIVE for worrisome rashes, moles or lesions  E: NEGATIVE for vision changes or irritation  ENT: NEGATIVE for ear, mouth and throat problems  R: NEGATIVE for significant cough or SOB  CV: NEGATIVE for chest pain, palpitations or peripheral edema  GI: NEGATIVE for nausea, abdominal pain, heartburn, or change in bowel habits   male: negative for dysuria, hematuria, decreased urinary stream, erectile dysfunction, urethral discharge  M: NEGATIVE for significant arthralgias or myalgia  N: NEGATIVE for weakness, dizziness or paresthesias  P: NEGATIVE for changes in mood or affect    OBJECTIVE:   /89 (BP Location: Left arm, Patient Position: Chair, Cuff Size: Adult Large)  Pulse 72  Temp 98.2  F (36.8  C)  "(Oral)  Ht 5' 11\" (1.803 m)  Wt 215 lb 12.8 oz (97.9 kg)  SpO2 98%  BMI 30.1 kg/m2  EXAM:  GENERAL alert and no distress.  EYES conjunctivae/corneas clear. PERRL, EOM's intact  HENT: NCAT,oral and posterior pharynx without lesions or erythema, facies symmetric  NECK: Neck supple. No LAD, without thyroidmegaly or JVD.  RESP: Clear to ausculation bilaterally without wheezes or crackles. Normal BS in all fields.  CV: Irregular rhythm ( consistent with known atrial fibrillation), regular rate; normal S1S2 without murmurs, rubs or gallops   LYMPH: no cervical lymph adenopathy appreciated  GI: NTND, no organomegaly, normal BS in all quadrants, without rebound or guarding  MS: No cyanosis, clubbing or edema noted bilaterally in Upper and/or Lower Extremities  SKIN: no significant ulcers, lesions or rashes on the visualized portions of the skin  NEURO: Alert and Oriented x 3, Gait normal. Reflexes normal and symmetric. Sensation grossly WNL..  PSYCH: Alert and oriented times 3; speech- coherent , normal rate and volume; able to articulate logical thoughts, able to abstract reason, no tangential thoughts, no hallucinations or delusions, affect- normal  RECTAL:  Prostate enlarged (consistent with age), no nodules, nontedner    ASSESSMENT/PLAN:     (Z00.00) Routine general medical examination at a health care facility  (primary encounter diagnosis)  Comment: Discussed cardiac disease risk factor modification including screening for and treating HTN, lipids, DM, and smoking cessation.  Also discussed age appropriate cancer screening recommendations including testicular, prostate, colon and lung cancer as dictated by age group.  Recommended low fat, low salt diet and moderation in any alcohol intake.  Recommended always using seatbelts when in a car.  Recommended never driving after drinking or riding with someone who has been drinking as well.       Plan:     (Z71.89) Advanced directives, counseling/discussion  Comment: " "  Plan:     (Z13.6) CARDIOVASCULAR SCREENING; LDL GOAL LESS THAN 130  Comment: Discussed cardiac disease risk factors and cardiac disease risk factor modification.   Plan: atorvastatin (LIPITOR) 20 MG tablet            (F13.20) Chronic Zolpidem use for insomnia  Comment:   Plan: zolpidem (AMBIEN) 10 MG tablet            (R97.20) Elevated prostate specific antigen (PSA)  Comment: refer to urology   Plan: UROLOGY ADULT REFERRAL                *  Cardiology visit for cardioversion as planned.     *  See Urologist about the elevated PSA     --[facility]: [facility] [place] [phone]   [email or web address]     --[facility] - [place] [phone]   [email or web address]      *  Continue all medications at the same doses.  Contact your usual pharmacy if you need refills.            COUNSELING:  Reviewed preventive health counseling, as reflected in patient instructions       Regular exercise       Healthy diet/nutrition       Vision screening       Hearing screening       Consider Hep C screening for patients born between 1945 and 1965       Colon cancer screening       Prostate cancer screening       reports that he quit smoking about 2 years ago. His smoking use included Cigarettes. He started smoking about 43 years ago. He has a 10.00 pack-year smoking history. He has never used smokeless tobacco.      Estimated body mass index is 30.1 kg/(m^2) as calculated from the following:    Height as of this encounter: 5' 11\" (1.803 m).    Weight as of this encounter: 215 lb 12.8 oz (97.9 kg).         Counseling Resources:  ATP IV Guidelines  Pooled Cohorts Equation Calculator  FRAX Risk Assessment  ICSI Preventive Guidelines  Dietary Guidelines for Americans, 2010  USDA's MyPlate  ASA Prophylaxis  Lung CA Screening    Omar Camacho MD  [facility]  "

## 2017-12-26 NOTE — ASSESSMENT & PLAN NOTE
Advance Care Planning 12/26/2017: ACP Review of Chart / Resources Provided:  Reviewed chart for advance care plan.  Stu JUNI Meredith has an up to date advance care plan on file.  Added by Micki Rodriguez

## 2017-12-26 NOTE — PATIENT INSTRUCTIONS
"  *  Cardiology visit for cardioversion as planned.     *  See Urologist about the elevated PSA     --[facility]: [facility] [place] [phone]   [email or web address]     --[facility] - [place] [phone]   [email or web address]      *  Continue all medications at the same doses.  Contact your usual pharmacy if you need refills.         5 GOALS TO PREVENT VASCULAR DISEASE:     1.  Aggressive blood pressure control, under 130/80 ideally.  Using medications if needed.    Your blood pressure is under good control    BP Readings from Last 4 Encounters:   12/26/17 129/89   12/06/17 128/80   12/04/17 130/82   08/07/17 130/68       2.  Aggressive LDL cholesterol (\"bad cholesterol\") lowering as indicated.    Your goal is an LDL under 130 for sure, preferably under 100.  (If you have diabetes or previous vascular disease, the the LDL goals would be under 100 for sure, preferably under 70.)    New guidelines identify four high-risk groups who could benefit from statins:   *people with pre-existing heart disease, such as those who have had a heart attack;   *people ages 40 to 75 who have diabetes of any type  *patients ages 40 to 75 with at least a 7.5% risk of developing cardiovascular disease over the next decade, according to a formula described in the guidelines  *patients with the sort of super-high cholesterol that sometimes runs in families, as evidenced by an LDL of 190 milligrams per deciliter or higher    Your cholesterol levels are well controlled.    Recent Labs   Lab Test  12/26/17   0852  07/31/16   0655   08/05/14   0719  10/25/13   0525   CHOL  154  110   < >  152  214*   HDL  60  56   < >  54  48   LDL  67  41   < >  75  146*   TRIG  137  67   < >  114  99   CHOLHDLRATIO   --    --    --   2.8  4.4    < > = values in this interval not displayed.       3.  Aggressive diabetic prevention, screening and/or management.      You do not have diabetes as of the most " "recent blood tests.     4.  No smoking    5.  Consider taking low dose aspirin (81 mg) tablet once per day over the age of 50, every day unless there is a specific reason that you cannot take aspirin (such as side effect, allergy, or you are on another \"blood thinner\").        --Based on your current cardiac risk factors, you should take Aspirin 81 mg once per day if you are over 50 years of age.             Preventive Health Recommendations  Male Ages 50 - 64    Yearly exam:             See your health care provider every year in order to  o   Review health changes.   o   Discuss preventive care.    o   Review your medicines if your doctor has prescribed any.     Have a cholesterol test every 5 years, or more frequently if you are at risk for high cholesterol/heart disease.     Have a diabetes test (fasting glucose) every three years. If you are at risk for diabetes, you should have this test more often.     Have a colonoscopy at age 50, or have a yearly FIT test (stool test). These exams will check for colon cancer.      Talk with your health care provider about whether or not a prostate cancer screening test (PSA) is right for you.    You should be tested each year for STDs (sexually transmitted diseases), if you re at risk.     Shots: Get a flu shot each year. Get a tetanus shot every 10 years.     Nutrition:    Eat at least 5 servings of fruits and vegetables daily.     Eat whole-grain bread, whole-wheat pasta and brown rice instead of white grains and rice.     Talk to your provider about Calcium and Vitamin D.        --Good Grains:  Oats, brown rice, Quinoa (these do not raise the blood sugar as much)     --Bad grains:  Anything made from wheat or white rice     (because these raise the blood sugars significantly, and the possible gluten issue from wheat for some people).      --Proteins:  Aim for \"lean proteins\" including chicken, fish, seafood, pork, turkey, and eggs (in moderation); Eat red meat only " occasionally        Lifestyle    Exercise for at least 150 minutes a week (30 minutes a day, 5 days a week). This will help you control your weight and prevent disease.     Limit alcohol to one drink per day.     No smoking.     Wear sunscreen to prevent skin cancer.     See your dentist every six months for an exam and cleaning.     See your eye doctor every 1 to 2 years.

## 2017-12-26 NOTE — MR AVS SNAPSHOT
"              After Visit Summary   12/26/2017    Stu Meredith    MRN: [id number]           Patient Information     Date Of Birth          1955        Visit Information        Provider Department      12/26/2017 1:40 PM Omar Camacho MD [facility]        Today's Diagnoses     Routine general medical examination at a health care facility    -  1    Advanced directives, counseling/discussion        CARDIOVASCULAR SCREENING; LDL GOAL LESS THAN 130        Chronic Zolpidem use for insomnia        Elevated prostate specific antigen (PSA)          Care Instructions      *  Cardiology visit for cardioversion as planned.     *  See Urologist about the elevated PSA     --[facility]: [facility] [place] [phone]   [email or web address]     --[facility] [place] [phone]   [email or web address]      *  Continue all medications at the same doses.  Contact your usual pharmacy if you need refills.         5 GOALS TO PREVENT VASCULAR DISEASE:     1.  Aggressive blood pressure control, under 130/80 ideally.  Using medications if needed.    Your blood pressure is under good control    BP Readings from Last 4 Encounters:   12/26/17 129/89   12/06/17 128/80   12/04/17 130/82   08/07/17 130/68       2.  Aggressive LDL cholesterol (\"bad cholesterol\") lowering as indicated.    Your goal is an LDL under 130 for sure, preferably under 100.  (If you have diabetes or previous vascular disease, the the LDL goals would be under 100 for sure, preferably under 70.)    New guidelines identify four high-risk groups who could benefit from statins:   *people with pre-existing heart disease, such as those who have had a heart attack;   *people ages 40 to 75 who have diabetes of any type  *patients ages 40 to 75 with at least a 7.5% risk of developing cardiovascular disease over the next decade, according to a formula described in the " "guidelines  *patients with the sort of super-high cholesterol that sometimes runs in families, as evidenced by an LDL of 190 milligrams per deciliter or higher    Your cholesterol levels are well controlled.    Recent Labs   Lab Test  12/26/17   0852  07/31/16   0655   08/05/14   0719  10/25/13   0525   CHOL  154  110   < >  152  214*   HDL  60  56   < >  54  48   LDL  67  41   < >  75  146*   TRIG  137  67   < >  114  99   CHOLHDLRATIO   --    --    --   2.8  4.4    < > = values in this interval not displayed.       3.  Aggressive diabetic prevention, screening and/or management.      You do not have diabetes as of the most recent blood tests.     4.  No smoking    5.  Consider taking low dose aspirin (81 mg) tablet once per day over the age of 50, every day unless there is a specific reason that you cannot take aspirin (such as side effect, allergy, or you are on another \"blood thinner\").        --Based on your current cardiac risk factors, you should take Aspirin 81 mg once per day if you are over 50 years of age.             Preventive Health Recommendations  Male Ages 50 - 64    Yearly exam:             See your health care provider every year in order to  o   Review health changes.   o   Discuss preventive care.    o   Review your medicines if your doctor has prescribed any.     Have a cholesterol test every 5 years, or more frequently if you are at risk for high cholesterol/heart disease.     Have a diabetes test (fasting glucose) every three years. If you are at risk for diabetes, you should have this test more often.     Have a colonoscopy at age 50, or have a yearly FIT test (stool test). These exams will check for colon cancer.      Talk with your health care provider about whether or not a prostate cancer screening test (PSA) is right for you.    You should be tested each year for STDs (sexually transmitted diseases), if you re at risk.     Shots: Get a flu shot each year. Get a tetanus shot every 10 " "years.     Nutrition:    Eat at least 5 servings of fruits and vegetables daily.     Eat whole-grain bread, whole-wheat pasta and brown rice instead of white grains and rice.     Talk to your provider about Calcium and Vitamin D.        --Good Grains:  Oats, brown rice, Quinoa (these do not raise the blood sugar as much)     --Bad grains:  Anything made from wheat or white rice     (because these raise the blood sugars significantly, and the possible gluten issue from wheat for some people).      --Proteins:  Aim for \"lean proteins\" including chicken, fish, seafood, pork, turkey, and eggs (in moderation); Eat red meat only occasionally        Lifestyle    Exercise for at least 150 minutes a week (30 minutes a day, 5 days a week). This will help you control your weight and prevent disease.     Limit alcohol to one drink per day.     No smoking.     Wear sunscreen to prevent skin cancer.     See your dentist every six months for an exam and cleaning.     See your eye doctor every 1 to 2 years.            Follow-ups after your visit        Additional Services     UROLOGY ADULT REFERRAL       Your provider has referred you to:     [facility]: [facility] [place] [phone]   [email or web address]    [facility]: [facility] [place] [phone]   [email or web address]    Please be aware that coverage of these services is subject to the terms and limitations of your health insurance plan.  Call member services at your health plan with any benefit or coverage questions.      Please bring the following with you to your appointment:    (1) Any X-Rays, CTs or MRIs which have been performed.  Contact the facility where they were done to arrange for  prior to your scheduled appointment.    (2) List of current medications  (3) This referral request   (4) Any documents/labs given to you for this referral                  Your next 10 appointments already scheduled     Jan 03, 2018  " 6:45 AM CST   LAB with ACUTE CARE LAB UU   [facility], Lab ([facility])    [address] [address]              Please do not eat 10-12 hours before your appointment if you are coming in fasting for labs on lipids, cholesterol, or glucose (sugar). This does not apply to pregnant women. Water, hot tea and black coffee (with nothing added) are okay. Do not drink other fluids, diet soda or chew gum.            Jan 03, 2018  7:30 AM CST   Procedure 4 hour with U2A ROOM 5   [address] [facility] [place] ([facility])    [address] [address]               Jan 03, 2018   Procedure with GENERIC ANESTHESIA PROVIDER   [facility], Same Day Surgery (--)    [address] [address] [phone]            Jan 03, 2018  8:30 AM CST   Cardioversion with UUETEER1   [facility],  Echocardiography ([facility])    [address] [id number] [phone]           1) NPO for 8 hours prior to the procedure except for sips of water with medications. 2) Hold insulin or oral diabetic medications morning of procedure. May take   dose long acting insulin. Follow further instructions of PMD. 3) Continue daily Coumadin. ~ If taking Digoxin, hold AM of procedure. ~ Plan to have a responsible adult take you home after the exam. You may not drive, take a bus or taxi by yourself. ~ A responsible adult must stay with you for 24 hours after the test.            Jan 31, 2018  3:30 PM CST   (Arrive by 3:15 PM)   Implanted Defibulator with Uc Cv Device 1   [facility] ([facility])    [address]  3rd Floor  [facility] [id number] [phone]            Jan 31, 2018  4:00 PM CST   (Arrive by 3:45 PM)   RETURN ARRHYTHMIA with KARSON Presley [facility] ([facility]  "[place])    [address]  [address] [facility] [address] [phone]              Who to contact     If you have questions or need follow up information about today's clinic visit or your schedule please contact [facility] directly at [phone].  Normal or non-critical lab and imaging results will be communicated to you by MyChart, letter or phone within 4 business days after the clinic has received the results. If you do not hear from us within 7 days, please contact the clinic through MyChart or phone. If you have a critical or abnormal lab result, we will notify you by phone as soon as possible.  Submit refill requests through Ed4U or call your pharmacy and they will forward the refill request to us. Please allow 3 business days for your refill to be completed.          Additional Information About Your Visit        MyChart Information     Ed4U gives you secure access to your electronic health record. If you see a primary care provider, you can also send messages to your care team and make appointments. If you have questions, please call your primary care clinic.  If you do not have a primary care provider, please call [phone] and they will assist you.        Care EveryWhere ID     This is your Care EveryWhere ID. This could be used by other organizations to access your [facility] medical records  [id number]        Your Vitals Were     Pulse Temperature Height Pulse Oximetry BMI (Body Mass Index)       72 98.2  F (36.8  C) (Oral) 5' 11\" (1.803 m) 98% 30.1 kg/m2        Blood Pressure from Last 3 Encounters:   12/26/17 129/89   12/06/17 128/80   12/04/17 130/82    Weight from Last 3 Encounters:   12/26/17 215 lb 12.8 oz (97.9 kg)   12/06/17 212 lb 12.8 oz (96.5 kg)   12/04/17 217 lb 6.4 oz (98.6 kg)              We Performed the Following     UROLOGY ADULT REFERRAL          Where to get your medicines      These medications were sent to [facility] - [place], [place] - [address]  [address] [address]     Phone:  [phone]     atorvastatin 20 MG tablet         Some of these will need a paper prescription and others can be bought over the counter.  Ask your nurse if you have questions.     Bring a paper prescription for each of these medications     zolpidem 10 MG tablet          Primary Care Provider Office Phone # Fax #    Omar Camacho [phone]       [address] [facility] [address]        Equal Access to Services     MARLON DE LOS SANTOS : Hadii aad ku hadasho Soomaali, waaxda luqadaha, qaybta kaalmada adeegyada, waxay idiin hayaan adeeg kharaeamon graham . So [facility] [phone].    ATENCIÓN: Si habla español, tiene a drew disposición servicios gratuitos de asistencia lingüística. Llame al [phone].    We comply with applicable federal civil rights laws and [place] laws. We do not discriminate on the basis of race, color, national origin, age, disability, sex, sexual orientation, or gender identity.            Thank you!     Thank you for choosing [facility]  for your care. Our goal is always to provide you with excellent care. Hearing back from our patients is one way we can continue to improve our services. Please take a few minutes to complete the written survey that you may receive in the mail after your visit with us. Thank you!             Your Updated Medication List - Protect others around you: Learn how to safely use, store and throw away your medicines at www.disposemymeds.org.          This list is accurate as of: 12/26/17  2:38 PM.  Always use your most recent med list.                   Brand Name Dispense Instructions for use Diagnosis    acetaminophen 325 MG tablet    TYLENOL     Take 325-650 mg by mouth every 6 hours as needed        * albuterol 108 (90 BASE) MCG/ACT Inhaler    PROAIR HFA    1 Inhaler    Inhale 2 puffs into the lungs every 4 hours as  needed for shortness of breath / dyspnea or wheezing    Bronchitis with bronchospasm       * albuterol (2.5 MG/3ML) 0.083% neb solution     25 vial    Take 1 vial (2.5 mg) by nebulization every 6 hours as needed for shortness of breath / dyspnea or wheezing    Post-infection bronchospasm       amoxicillin 500 MG tablet    AMOXIL    4 tablet    Take 4 tablets (2000 mg) by mouth 1 hour before dental procedures.    History of total left hip arthroplasty       atorvastatin 20 MG tablet    LIPITOR    90 tablet    Take 1 tablet (20 mg) by mouth daily    CARDIOVASCULAR SCREENING; LDL GOAL LESS THAN 130       Blood Pressure Cuff Misc     1 each    1 Device daily    Atrial fibrillation (H), Hypertrophic cardiomyopathy (H)       Fluticasone-Salmeterol 113-14 MCG/ACT Aepb     1 each    Inhale 1 puff into the lungs 2 times daily    Post-infection bronchospasm       metoprolol 50 MG 24 hr tablet    TOPROL-XL    90 tablet    Take 1 tablet (50 mg) by mouth daily    HOCM (hypertrophic obstructive cardiomyopathy) (H)       multivitamin, therapeutic Tabs tablet      Take 1 tablet by mouth daily        order for DME     1 each    Equipment being ordered: Nebulizer    Post-infection bronchospasm       rivaroxaban ANTICOAGULANT 20 MG Tabs tablet    XARELTO    90 tablet    Take 1 tablet (20 mg) by mouth daily (with dinner)    Chronic atrial fibrillation (H)       sotalol 80 MG tablet    BETAPACE    270 tablet    Take 1.5 tablets (120 mg) by mouth 2 times daily    Chronic atrial fibrillation (H), Hypertrophic cardiomyopathy (H)       zolpidem 10 MG tablet    AMBIEN    90 tablet    Take 0.5-1 tablets (5-10 mg) by mouth nightly as needed for sleep    Sedative, hypnotic or anxiolytic dependence (H)       * Notice:  This list has 2 medication(s) that are the same as other medications prescribed for you. Read the directions carefully, and ask your doctor or other care provider to review them with you.

## 2017-12-27 LAB — HCV AB SERPL QL IA: NONREACTIVE

## 2018-01-02 DIAGNOSIS — I48.0 PAROXYSMAL ATRIAL FIBRILLATION (H): Primary | ICD-10-CM

## 2018-01-02 RX ORDER — FLUMAZENIL 0.1 MG/ML
0.2 INJECTION, SOLUTION INTRAVENOUS
Status: DISCONTINUED | OUTPATIENT
Start: 2018-01-02 | End: 2018-01-02 | Stop reason: HOSPADM

## 2018-01-02 RX ORDER — SODIUM CHLORIDE 9 MG/ML
INJECTION, SOLUTION INTRAVENOUS CONTINUOUS
Status: DISCONTINUED | OUTPATIENT
Start: 2018-01-02 | End: 2018-01-02 | Stop reason: HOSPADM

## 2018-01-02 RX ORDER — POTASSIUM CHLORIDE 1500 MG/1
40 TABLET, EXTENDED RELEASE ORAL
Status: DISCONTINUED | OUTPATIENT
Start: 2018-01-02 | End: 2018-01-02 | Stop reason: HOSPADM

## 2018-01-02 RX ORDER — NALOXONE HYDROCHLORIDE 0.4 MG/ML
.1-.4 INJECTION, SOLUTION INTRAMUSCULAR; INTRAVENOUS; SUBCUTANEOUS
Status: DISCONTINUED | OUTPATIENT
Start: 2018-01-02 | End: 2018-01-02 | Stop reason: HOSPADM

## 2018-01-02 RX ORDER — POTASSIUM CHLORIDE 1500 MG/1
20 TABLET, EXTENDED RELEASE ORAL
Status: DISCONTINUED | OUTPATIENT
Start: 2018-01-02 | End: 2018-01-02 | Stop reason: HOSPADM

## 2018-01-02 RX ORDER — ATROPINE SULFATE 0.1 MG/ML
.5-1 INJECTION INTRAVENOUS
Status: DISCONTINUED | OUTPATIENT
Start: 2018-01-02 | End: 2018-01-02 | Stop reason: HOSPADM

## 2018-01-03 ENCOUNTER — ANESTHESIA EVENT (OUTPATIENT)
Dept: SURGERY | Facility: CLINIC | Age: 63
End: 2018-01-03
Payer: COMMERCIAL

## 2018-01-03 ENCOUNTER — APPOINTMENT (OUTPATIENT)
Dept: LAB | Facility: CLINIC | Age: 63
End: 2018-01-03
Attending: INTERNAL MEDICINE
Payer: COMMERCIAL

## 2018-01-03 ENCOUNTER — ALLIED HEALTH/NURSE VISIT (OUTPATIENT)
Dept: CARDIOLOGY | Facility: CLINIC | Age: 63
End: 2018-01-03
Attending: INTERNAL MEDICINE
Payer: COMMERCIAL

## 2018-01-03 ENCOUNTER — SURGERY (OUTPATIENT)
Age: 63
End: 2018-01-03

## 2018-01-03 ENCOUNTER — ANESTHESIA (OUTPATIENT)
Dept: SURGERY | Facility: CLINIC | Age: 63
End: 2018-01-03
Payer: COMMERCIAL

## 2018-01-03 ENCOUNTER — APPOINTMENT (OUTPATIENT)
Dept: MEDSURG UNIT | Facility: CLINIC | Age: 63
End: 2018-01-03
Attending: INTERNAL MEDICINE
Payer: COMMERCIAL

## 2018-01-03 ENCOUNTER — HOSPITAL ENCOUNTER (OUTPATIENT)
Facility: CLINIC | Age: 63
Discharge: HOME | End: 2018-01-03
Attending: INTERNAL MEDICINE | Admitting: INTERNAL MEDICINE
Payer: COMMERCIAL

## 2018-01-03 VITALS
HEIGHT: 71 IN | DIASTOLIC BLOOD PRESSURE: 72 MMHG | WEIGHT: 215 LBS | BODY MASS INDEX: 30.1 KG/M2 | TEMPERATURE: 98.2 F | SYSTOLIC BLOOD PRESSURE: 113 MMHG | RESPIRATION RATE: 16 BRPM | OXYGEN SATURATION: 94 % | HEART RATE: 56 BPM

## 2018-01-03 VITALS
DIASTOLIC BLOOD PRESSURE: 88 MMHG | RESPIRATION RATE: 20 BRPM | OXYGEN SATURATION: 99 % | HEART RATE: 60 BPM | SYSTOLIC BLOOD PRESSURE: 130 MMHG

## 2018-01-03 DIAGNOSIS — I48.0 PAROXYSMAL ATRIAL FIBRILLATION (H): ICD-10-CM

## 2018-01-03 DIAGNOSIS — I48.91 ATRIAL FIBRILLATION (H): ICD-10-CM

## 2018-01-03 DIAGNOSIS — I42.2 HYPERTROPHIC CARDIOMYOPATHY (H): ICD-10-CM

## 2018-01-03 DIAGNOSIS — I42.2 HYPERTROPHIC CARDIOMYOPATHY (H): Primary | ICD-10-CM

## 2018-01-03 LAB
ANION GAP SERPL CALCULATED.3IONS-SCNC: 6 MMOL/L (ref 3–14)
BUN SERPL-MCNC: 16 MG/DL (ref 7–30)
CALCIUM SERPL-MCNC: 9.3 MG/DL (ref 8.5–10.1)
CHLORIDE SERPL-SCNC: 109 MMOL/L (ref 94–109)
CO2 SERPL-SCNC: 28 MMOL/L (ref 20–32)
CREAT SERPL-MCNC: 1.07 MG/DL (ref 0.66–1.25)
GFR SERPL CREATININE-BSD FRML MDRD: 70 ML/MIN/1.7M2
GLUCOSE SERPL-MCNC: 140 MG/DL (ref 70–99)
INTERPRETATION ECG - MUSE: NORMAL
MAGNESIUM SERPL-MCNC: 2.3 MG/DL (ref 1.6–2.3)
POTASSIUM SERPL-SCNC: 4.5 MMOL/L (ref 3.4–5.3)
SODIUM SERPL-SCNC: 142 MMOL/L (ref 133–144)

## 2018-01-03 PROCEDURE — 92960 CARDIOVERSION ELECTRIC EXT: CPT

## 2018-01-03 PROCEDURE — 40000065 ZZH STATISTIC EKG NON-CHARGEABLE

## 2018-01-03 PROCEDURE — 92960 CARDIOVERSION ELECTRIC EXT: CPT | Mod: GC | Performed by: INTERNAL MEDICINE

## 2018-01-03 PROCEDURE — 93287 PERI-PX DEVICE EVAL & PRGR: CPT | Mod: 26 | Performed by: INTERNAL MEDICINE

## 2018-01-03 PROCEDURE — 83735 ASSAY OF MAGNESIUM: CPT | Performed by: NURSE PRACTITIONER

## 2018-01-03 PROCEDURE — 93005 ELECTROCARDIOGRAM TRACING: CPT

## 2018-01-03 PROCEDURE — 25000125 ZZHC RX 250: Performed by: NURSE ANESTHETIST, CERTIFIED REGISTERED

## 2018-01-03 PROCEDURE — 93287 PERI-PX DEVICE EVAL & PRGR: CPT | Mod: ZF

## 2018-01-03 PROCEDURE — 93010 ELECTROCARDIOGRAM REPORT: CPT | Performed by: INTERNAL MEDICINE

## 2018-01-03 PROCEDURE — 36415 COLL VENOUS BLD VENIPUNCTURE: CPT | Performed by: NURSE PRACTITIONER

## 2018-01-03 PROCEDURE — 37000008 ZZH ANESTHESIA TECHNICAL FEE, 1ST 30 MIN

## 2018-01-03 PROCEDURE — 40000166 ZZH STATISTIC PP CARE STAGE 1

## 2018-01-03 PROCEDURE — 80048 BASIC METABOLIC PNL TOTAL CA: CPT | Performed by: NURSE PRACTITIONER

## 2018-01-03 RX ADMIN — METHOHEXITAL SODIUM 40 MG: 500 INJECTION, POWDER, LYOPHILIZED, FOR SOLUTION INTRAMUSCULAR; INTRAVENOUS; RECTAL at 08:33

## 2018-01-03 ASSESSMENT — LIFESTYLE VARIABLES: TOBACCO_USE: 1

## 2018-01-03 ASSESSMENT — ENCOUNTER SYMPTOMS: DYSRHYTHMIAS: 1

## 2018-01-03 NOTE — ANESTHESIA PREPROCEDURE EVALUATION
Anesthesia Evaluation     . Pt has had prior anesthetic. Type: General    History of anesthetic complications    Tongue lacerated by biting      ROS/MED HX    ENT/Pulmonary:     (+)tobacco use, Current use , . .    Neurologic:  - neg neurologic ROS     Cardiovascular: Comment: AICD is OFF and Pacemaker is ON    (+) hypertension--CAD, --. : . CHF etiology: Hypertrophic Cardiomyopathy Last EF: 50% date: 7/2016 . . pacemaker Reason placed: Atrial Fibrillation/ Flutter and episodes of V-Tach:type: ICD for VTach - Patient is dependent on pacemaker . dysrhythmias a-fib, a-flutter and Other, . pulmonary hypertension, Previous cardiac testing Echodate:7/29/2016results:Hypertrophic Disease with diastolic dysfunction, grade 3. EF= 50%. Moderate Pulmonary HTN with Tricuspid Regurgitation.date: results:ECG reviewed date:5/3/2017 results:Atrial-paced with a rate of 60 date: results:          METS/Exercise Tolerance:     Hematologic:  - neg hematologic  ROS       Musculoskeletal: Comment: S/P Left Hip Arthroplasty  (+) arthritis, , , -       GI/Hepatic:  - neg GI/hepatic ROS       Renal/Genitourinary:  - ROS Renal section negative       Endo:     (+) Obesity, Other Endocrine Disorder Pre-Diabetes.      Psychiatric:  - neg psychiatric ROS       Infectious Disease:  - neg infectious disease ROS       Malignancy:      - no malignancy   Other:                     Physical Exam  Normal systems: pulmonary    Airway   Mallampati: II  TM distance: >3 FB  Neck ROM: full    Dental     Cardiovascular   Rhythm and rate: irregular and normal      Pulmonary                     Anesthesia Plan      History & Physical Review  History and physical reviewed and following examination; no interval change.    ASA Status:  3 .        Plan for General with Intravenous and Propofol induction. Maintenance will be TIVA.           Postoperative Care  Postoperative pain management:  IV analgesics.      Consents  Anesthetic plan, risks, benefits and  alternatives discussed with:  Patient.  Use of blood products discussed: No .   .

## 2018-01-03 NOTE — PROGRESS NOTES
Prep and teaching complete for Cardioversion; Wife, Shelly will  after, [phone]. Awaiting lab results and consent.

## 2018-01-03 NOTE — ANESTHESIA POSTPROCEDURE EVALUATION
Patient: Stu Meredith    Procedure(s):  Anesthesia Cardioverion    Diagnosis:Atrial Fibrillation   Diagnosis Additional Information: No value filed.    Anesthesia Type:  MAC    Note:  Anesthesia Post Evaluation    Patient location during evaluation: Phase 2  Patient participation: Able to fully participate in evaluation  Level of consciousness: awake and alert  Pain management: adequate  Airway patency: patent  Cardiovascular status: acceptable  Respiratory status: acceptable  Hydration status: acceptable  PONV: none     Anesthetic complications: None          Last vitals:  Vitals:    01/03/18 0725 01/03/18 0855 01/03/18 0915   BP: 117/73 122/81 125/76   Pulse: 93 60 68   Resp: 20 16 16   Temp: 36.8  C (98.2  F)     SpO2: 96% 98% 95%         Electronically Signed By: Nydia Howard MD  January 3, 2018  9:36 AM

## 2018-01-03 NOTE — MR AVS SNAPSHOT
After Visit Summary   1/3/2018    Stu Meredith    MRN: [id number]           Patient Information     Date Of Birth          1955        Visit Information        Provider Department      1/3/2018 5:30 AM 1, Uc Cv Device [facility]        Today's Diagnoses     Hypertrophic cardiomyopathy (H)    -  1       Follow-ups after your visit        Your next 10 appointments already scheduled     Jan 31, 2018  3:30 PM CST   (Arrive by 3:15 PM)   Implanted Defibulator with Uc Cv Device 1   [facility] ([facility])    [address] [place] [address]  [address] [place] [address] [phone]            Jan 31, 2018  4:00 PM CST   (Arrive by 3:45 PM)   RETURN ARRHYTHMIA with KARSON Presley CNP   [facility] ([facility])    [address] [place] [address]  [address] [place] [address] [phone]              Who to contact     If you have questions or need follow up information about today's clinic visit or your schedule please contact [facility] directly at [phone].  Normal or non-critical lab and imaging results will be communicated to you by Codingpeoplehart, letter or phone within 4 business days after the clinic has received the results. If you do not hear from us within 7 days, please contact the clinic through Codingpeoplehart or phone. If you have a critical or abnormal lab result, we will notify you by phone as soon as possible.  Submit refill requests through AdultSpace or call your pharmacy and they will forward the refill request to us. Please allow 3 business days for your refill to be completed.          Additional Information About Your Visit        Codingpeoplehart Information     AdultSpace gives you secure access to your electronic health record. If you see a primary care provider, you can also send messages to your care team and make appointments. If you have questions, please call your primary care clinic.  If you do not  have a primary care provider, please call [phone] and they will assist you.        Care EveryWhere ID     This is your Care EveryWhere ID. This could be used by other organizations to access your [place] medical records  [id number]         Blood Pressure from Last 3 Encounters:   01/03/18 113/72   01/03/18 130/88   12/26/17 129/89    Weight from Last 3 Encounters:   01/03/18 97.5 kg (215 lb)   12/26/17 97.9 kg (215 lb 12.8 oz)   12/06/17 96.5 kg (212 lb 12.8 oz)              We Performed the Following     (58784) JOSE-PROC DEVICE EVAL/PROGRAMMING, ICD        Primary Care Provider Office Phone # Fax #    Omar Camacho [phone]       [address] [facility] [address]        Equal Access to Services     [facility]: Hadii aad ku hadasho Soomaali, waaxda luqadaha, qaybta kaalmada adeegyada, waxay idiin hayaan thomas graham . So [facility] [phone].    ATENCIÓN: Si habla español, tiene a drew disposición servicios gratuitos de asistencia lingüística. Mollyame al [phone].    We comply with applicable federal civil rights laws and [place] laws. We do not discriminate on the basis of race, color, national origin, age, disability, sex, sexual orientation, or gender identity.            Thank you!     Thank you for choosing [facility]  for your care. Our goal is always to provide you with excellent care. Hearing back from our patients is one way we can continue to improve our services. Please take a few minutes to complete the written survey that you may receive in the mail after your visit with us. Thank you!             Your Updated Medication List - Protect others around you: Learn how to safely use, store and throw away your medicines at www.disposemymeds.org.          This list is accurate as of: 1/3/18  1:25 PM.  Always use your most recent med list.                   Brand Name Dispense Instructions for use Diagnosis    acetaminophen 325 MG tablet    TYLENOL      Take 325-650 mg by mouth every 6 hours as needed        * albuterol 108 (90 BASE) MCG/ACT Inhaler    PROAIR HFA    1 Inhaler    Inhale 2 puffs into the lungs every 4 hours as needed for shortness of breath / dyspnea or wheezing    Bronchitis with bronchospasm       * albuterol (2.5 MG/3ML) 0.083% neb solution     25 vial    Take 1 vial (2.5 mg) by nebulization every 6 hours as needed for shortness of breath / dyspnea or wheezing    Post-infection bronchospasm       amoxicillin 500 MG tablet    AMOXIL    4 tablet    Take 4 tablets (2000 mg) by mouth 1 hour before dental procedures.    History of total left hip arthroplasty       atorvastatin 20 MG tablet    LIPITOR    90 tablet    Take 1 tablet (20 mg) by mouth daily    CARDIOVASCULAR SCREENING; LDL GOAL LESS THAN 130       Blood Pressure Cuff Misc     1 each    1 Device daily    Atrial fibrillation (H), Hypertrophic cardiomyopathy (H)       Fluticasone-Salmeterol 113-14 MCG/ACT Aepb     1 each    Inhale 1 puff into the lungs 2 times daily    Post-infection bronchospasm       metoprolol 50 MG 24 hr tablet    TOPROL-XL    90 tablet    Take 1 tablet (50 mg) by mouth daily    HOCM (hypertrophic obstructive cardiomyopathy) (H)       multivitamin, therapeutic Tabs tablet      Take 1 tablet by mouth daily        order for DME     1 each    Equipment being ordered: Nebulizer    Post-infection bronchospasm       rivaroxaban ANTICOAGULANT 20 MG Tabs tablet    XARELTO    90 tablet    Take 1 tablet (20 mg) by mouth daily (with dinner)    Chronic atrial fibrillation (H)       sotalol 80 MG tablet    BETAPACE    270 tablet    Take 1.5 tablets (120 mg) by mouth 2 times daily    Chronic atrial fibrillation (H), Hypertrophic cardiomyopathy (H)       zolpidem 10 MG tablet    AMBIEN    90 tablet    Take 0.5-1 tablets (5-10 mg) by mouth nightly as needed for sleep    Sedative, hypnotic or anxiolytic dependence (H)       * Notice:  This list has 2 medication(s) that are the same as  other medications prescribed for you. Read the directions carefully, and ask your doctor or other care provider to review them with you.

## 2018-01-03 NOTE — PROGRESS NOTES
Pt seen in the Echo Lab for evaluation and iterative programming of a [employer], dual lead ICD, per MD orders. He is s/p DCCV for atrial fibrillation. His presenting rhythm is AP/ VS @ 60 bpm. Battery estimates 5 years to CARMEN. Plan for pt to RTC in 3 weeks as scheduled.  Dual lead ICD

## 2018-01-03 NOTE — PROCEDURES
CARDIOVERSION     PROCEDURE:  direct current cardioversion  PROCEDURE DATE: 1/3/18     Pre-procedure diagnosis:  Paroxysmal atrial fibrillation  Post-procedure diagnosis: s/p direct current cardioversion  Complications:  none     BRIEF CLINICAL HISTORY:  Mr. Meredith is a 62 year old male who has a past medical history significant for HCM diagnosed 2006, VT on Holter July 2012 s/p ICD 7/2012, troponin leak Oct 2013 (angiogram with non-significant CAD, LV gram showed EF 25%, recovered to 60% on TTE Dec 2013), HTN, AFib (CHADSVASC 2) s/p multiple DCCVs and PVI 12/2011 and 7/28/16. A recent device interrogation showed his intrinsic rhythm was atrial fibrillation. Current cardiac medications include: Sotalol, Metoprolol, and Xarelto. He presents today for DCCV. He is taking Xarelto without interruption.       PROCEDURE:  The patient arrived at the [facility] in a fasting, non-sedated state.  Informed consent was previously obtained from patient, who understood indications, risks, and benefits of the procedure.  With help from Anesthesia, patient was deeply sedated.  200J of synchronized biphasic shock was delivered through the cardiac monitor, and the patient was successfully converted to normal sinus rhythm.  The patient tolerated the procedure well from a hemodynamic and respiratory standpoint without any complications. Patient was sent to recovery 2A for observation prior to discharge.     IMPRESSION:  1.  Successful direct current cardioversion with 200J biphasic shock.     RECOMMENDATIONS/PLANS:  1.   Discharge per protocol  2.   Follow-up with Dr. Cooper/Maci Dominguez  3.   Continue anticoagulation     Markel Hercules  Cardiovascular fellow  Electrophysiology Consult Service  Pager: [phone]    I was present during the entire procedure.    Karly Billingsley MD, MS  EP/Cardiology Staff

## 2018-01-03 NOTE — PROGRESS NOTES
Pt received post cardioversion with RN, pt awake and alert, denies pain. Taking PO. Writer spoke to wife on phone, wife will come meet pt at 0930 and plan for discharge at 1000.

## 2018-01-03 NOTE — PROGRESS NOTES
Pt here for cardioversion. Procedure was explained to the pt and the consent was signed. EKG shows atrial fibrillation. Pt was given Brevital 40 mg IV per anesthesia. Pt was shocked with 200 Joules back to NSR. Pt denies any pain or discomfort post cardioversion. Post EKG was done verifying NSR. Discharge instructions were reviewed and a copy was given to the pt. Report called to 2A RN. Pt transported back to 2A on a stretcher.

## 2018-01-03 NOTE — ANESTHESIA CARE TRANSFER NOTE
Patient: Stu Meredith    Procedure(s):  Anesthesia Cardioverion    Diagnosis: Atrial Fibrillation   Diagnosis Additional Information: No value filed.    Anesthesia Type:   General     Note:  Airway :Nasal Cannula    Comments: Anesthesia Care Transfer Note    Patient: Stu Meredith    Patient vital signs: stable    Airway: none    Monitors on, VSS, pt. Stable, Report given to cardiac RN.     Brain Casper CRNA  1/3/2018 8:44 AM            Vitals: (Last set prior to Anesthesia Care Transfer)    CRNA VITALS  1/3/2018 0814 - 1/3/2018 0844      1/3/2018             NIBP: 121/88    Pulse: 60    SpO2: 100 %    EKG: Sinus rhythm                Electronically Signed By: KARSON Edwards CRNA  January 3, 2018  8:44 AM

## 2018-01-03 NOTE — PROGRESS NOTES
Pt up walking, steady on his feet; Tolerated PO; Denies pain. Declined to use restroom, reports he is comfortable. Wife at bedside, reviewed discharge instructions, stated understanding. DC to home with wife.

## 2018-01-04 LAB — INTERPRETATION ECG - MUSE: NORMAL

## 2018-01-24 DIAGNOSIS — F13.20 SEDATIVE, HYPNOTIC OR ANXIOLYTIC DEPENDENCE (H): ICD-10-CM

## 2018-01-24 RX ORDER — ZOLPIDEM TARTRATE 10 MG/1
5-10 TABLET ORAL
Qty: 90 TABLET | Refills: 0 | Status: SHIPPED | OUTPATIENT
Start: 2018-01-24 | End: 2018-04-03

## 2018-01-30 ENCOUNTER — PRE VISIT (OUTPATIENT)
Dept: CARDIOLOGY | Facility: CLINIC | Age: 63
End: 2018-01-30

## 2018-01-30 DIAGNOSIS — I48.91 ATRIAL FIBRILLATION (H): Primary | ICD-10-CM

## 2018-01-31 ENCOUNTER — ALLIED HEALTH/NURSE VISIT (OUTPATIENT)
Dept: CARDIOLOGY | Facility: CLINIC | Age: 63
End: 2018-01-31
Attending: INTERNAL MEDICINE
Payer: COMMERCIAL

## 2018-01-31 DIAGNOSIS — I42.2 HYPERTROPHIC CARDIOMYOPATHY (H): Primary | ICD-10-CM

## 2018-01-31 PROCEDURE — 93296 REM INTERROG EVL PM/IDS: CPT | Mod: ZF

## 2018-01-31 PROCEDURE — 93295 DEV INTERROG REMOTE 1/2/MLT: CPT | Performed by: INTERNAL MEDICINE

## 2018-01-31 NOTE — MR AVS SNAPSHOT
After Visit Summary   1/31/2018    Stu Meredith    MRN: [id number]           Patient Information     Date Of Birth          1955        Visit Information        Provider Department      1/31/2018 6:00 AM  ICD REMOTE [facility]        Today's Diagnoses     Hypertrophic cardiomyopathy (H)    -  1       Follow-ups after your visit        Who to contact     If you have questions or need follow up information about today's clinic visit or your schedule please contact [facility] directly at [phone].  Normal or non-critical lab and imaging results will be communicated to you by Expensifyhart, letter or phone within 4 business days after the clinic has received the results. If you do not hear from us within 7 days, please contact the clinic through Laura Sapienst or phone. If you have a critical or abnormal lab result, we will notify you by phone as soon as possible.  Submit refill requests through WhoSay or call your pharmacy and they will forward the refill request to us. Please allow 3 business days for your refill to be completed.          Additional Information About Your Visit        MyChart Information     WhoSay gives you secure access to your electronic health record. If you see a primary care provider, you can also send messages to your care team and make appointments. If you have questions, please call your primary care clinic.  If you do not have a primary care provider, please call [phone] and they will assist you.        Care EveryWhere ID     This is your Care EveryWhere ID. This could be used by other organizations to access your [facility] medical records  [id number]         Blood Pressure from Last 3 Encounters:   01/03/18 113/72   01/03/18 130/88   12/26/17 129/89    Weight from Last 3 Encounters:   01/03/18 97.5 kg (215 lb)   12/26/17 97.9 kg (215 lb 12.8 oz)   12/06/17 96.5 kg (212 lb 12.8 oz)              We Performed the Following     (89536) INTERROGATION  DEVICE EVAL REMOTE, PACER/ICD        Primary Care Provider Office Phone # Fax #    Omar Camacho [phone]       [address] [place] [address]        Equal Access to Services     MARLON DE LOS SANTOS : Hadtrixie aad ku roelo Sosaeali, waaxda luqadaha, qaybta kaalmada adeegyada, jorden linaresn thomas murphyzaria short. So [facility] [phone].    ATENCIÓN: Si habla español, tiene a drew disposición servicios gratuitos de asistencia lingüística. Llame al [phone].    We comply with applicable federal civil rights laws and [place] laws. We do not discriminate on the basis of race, color, national origin, age, disability, sex, sexual orientation, or gender identity.            Thank you!     Thank you for choosing [facility]  for your care. Our goal is always to provide you with excellent care. Hearing back from our patients is one way we can continue to improve our services. Please take a few minutes to complete the written survey that you may receive in the mail after your visit with us. Thank you!             Your Updated Medication List - Protect others around you: Learn how to safely use, store and throw away your medicines at www.disposemymeds.org.          This list is accurate as of 1/31/18  9:41 AM.  Always use your most recent med list.                   Brand Name Dispense Instructions for use Diagnosis    acetaminophen 325 MG tablet    TYLENOL     Take 325-650 mg by mouth every 6 hours as needed        * albuterol 108 (90 BASE) MCG/ACT Inhaler    PROAIR HFA    1 Inhaler    Inhale 2 puffs into the lungs every 4 hours as needed for shortness of breath / dyspnea or wheezing    Bronchitis with bronchospasm       * albuterol (2.5 MG/3ML) 0.083% neb solution     25 vial    Take 1 vial (2.5 mg) by nebulization every 6 hours as needed for shortness of breath / dyspnea or wheezing    Post-infection bronchospasm       amoxicillin 500 MG tablet    AMOXIL    4 tablet    Take 4  tablets (2000 mg) by mouth 1 hour before dental procedures.    History of total left hip arthroplasty       atorvastatin 20 MG tablet    LIPITOR    90 tablet    Take 1 tablet (20 mg) by mouth daily    CARDIOVASCULAR SCREENING; LDL GOAL LESS THAN 130       Blood Pressure Cuff Misc     1 each    1 Device daily    Atrial fibrillation (H), Hypertrophic cardiomyopathy (H)       Fluticasone-Salmeterol 113-14 MCG/ACT Aepb     1 each    Inhale 1 puff into the lungs 2 times daily    Post-infection bronchospasm       metoprolol succinate 50 MG 24 hr tablet    TOPROL-XL    90 tablet    Take 1 tablet (50 mg) by mouth daily    HOCM (hypertrophic obstructive cardiomyopathy) (H)       multivitamin, therapeutic Tabs tablet      Take 1 tablet by mouth daily        order for DME     1 each    Equipment being ordered: Nebulizer    Post-infection bronchospasm       rivaroxaban ANTICOAGULANT 20 MG Tabs tablet    XARELTO    90 tablet    Take 1 tablet (20 mg) by mouth daily (with dinner)    Chronic atrial fibrillation (H)       sotalol 80 MG tablet    BETAPACE    270 tablet    Take 1.5 tablets (120 mg) by mouth 2 times daily    Chronic atrial fibrillation (H), Hypertrophic cardiomyopathy (H)       zolpidem 10 MG tablet    AMBIEN    90 tablet    Take 0.5-1 tablets (5-10 mg) by mouth nightly as needed for sleep    Sedative, hypnotic or anxiolytic dependence (H)       * Notice:  This list has 2 medication(s) that are the same as other medications prescribed for you. Read the directions carefully, and ask your doctor or other care provider to review them with you.

## 2018-01-31 NOTE — PROGRESS NOTES
MetaStat remote ICD transmission received and reviewed. Device transmission sent per MD orders. Alert received for atrial arrhythmia >24 hours. His presenting rhythm is AT/ VS ~70 bpm. AT started 1/28/18 at 2252. He is s/p DCCV 1/3/18. 2 NSVT episodes recorded lasting 4-5 beats. Normal device function. AP= 56% and = 2%. Lead trends appear stable. Battery estimates 6 years to CARMEN. Pt notified of transmission results. Pt was scheduled for in clinic today for a device check and with EP NP. He had to cancel the appointments due to his work schedule. He was encouraged to reschedule the appointments to discuss a future plan, such as ablation. He verbalized understanding. Maci Dominguez NP also notified of transmission results.  Remote ICD transmission

## 2018-02-19 ENCOUNTER — PRE VISIT (OUTPATIENT)
Dept: CARDIOLOGY | Facility: CLINIC | Age: 63
End: 2018-02-19

## 2018-02-21 ENCOUNTER — OFFICE VISIT (OUTPATIENT)
Dept: CARDIOLOGY | Facility: CLINIC | Age: 63
End: 2018-02-21
Attending: INTERNAL MEDICINE
Payer: COMMERCIAL

## 2018-02-21 VITALS
BODY MASS INDEX: 29.61 KG/M2 | SYSTOLIC BLOOD PRESSURE: 129 MMHG | WEIGHT: 211.5 LBS | DIASTOLIC BLOOD PRESSURE: 80 MMHG | HEIGHT: 71 IN | HEART RATE: 60 BPM | OXYGEN SATURATION: 95 %

## 2018-02-21 DIAGNOSIS — I47.20 VENTRICULAR TACHYCARDIA (H): Primary | ICD-10-CM

## 2018-02-21 DIAGNOSIS — I48.91 ATRIAL FIBRILLATION, UNSPECIFIED TYPE (H): ICD-10-CM

## 2018-02-21 DIAGNOSIS — I42.2 HYPERTROPHIC CARDIOMYOPATHY (H): Primary | ICD-10-CM

## 2018-02-21 DIAGNOSIS — I42.2 HYPERTROPHIC CARDIOMYOPATHY (H): ICD-10-CM

## 2018-02-21 PROCEDURE — G0463 HOSPITAL OUTPT CLINIC VISIT: HCPCS | Mod: ZF

## 2018-02-21 PROCEDURE — 99214 OFFICE O/P EST MOD 30 MIN: CPT | Mod: 25 | Performed by: INTERNAL MEDICINE

## 2018-02-21 PROCEDURE — 93283 PRGRMG EVAL IMPLANTABLE DFB: CPT | Mod: ZF

## 2018-02-21 PROCEDURE — 93283 PRGRMG EVAL IMPLANTABLE DFB: CPT | Mod: 26 | Performed by: INTERNAL MEDICINE

## 2018-02-21 ASSESSMENT — PAIN SCALES - GENERAL: PAINLEVEL: NO PAIN (0)

## 2018-02-21 NOTE — LETTER
2/21/2018      RE: Stu Meredith  [address] [facility] [address]       Dear Colleague,    Thank you for the opportunity to participate in the care of your patient, Stu Meredith, at the [facility] HEART [facility]. Please see a copy of my visit note below.    Electrophysiology Clinic Note  HPI:   Mr. Meredith is a 62 year old male who has a past medical history significant for HCM diagnosed 2006, VT on Holter July 2012 s/p ICD 7/2012, troponin leak Oct 2013 (angiogram with non-significant CAD, LV gram showed EF 25%, recovered to 60% on TTE Dec 2013), HTN, AFib (CHADSVASC 2) with DCCVs then s/p PVI 12/2011 and PVI for persistent AF on 7/28/16 s/p DCCV 8/7/17, and 1/3/18. He presents for follow-up.  The patient underwent atrial fibrillation ablation by Dr. Gonzalez at [facility] in 12/2011 and implantation of an ICD in 07/2012 because of ventricular tachycardia. As per patient, he has been in sinus rhythm approximately 1-1/2 years after ablation. The patient was found to have recurrent atrial fibrillation during a preoperative exam in 10/2013 and underwent electrical cardioversion in 11/2013. The patient underwent hip surgery in 01/2014. The patient noticed that he was in atrial fibrillation at a clinic visit in 01/2014.   He was seen in consult by Dr Analilia spain on 2/14/14 for consideration of ablation. At the time the patient was reported to be generally unaware of whether or not he is in sinus rhythm or in atrial fibrillation. He reports daytime somnolence but denies significant dyspnea on exertion, palpitations, irregular beat sensation, presyncope or syncope. In terms of risk factors for stroke, the patient has coronary heart disease, hypertension, but denies history of diabetes, previous myocardial cardiac infarction or heart failure. The patient is on rivaroxaban for stroke prophylaxis and is on diltiazem and metoprolol. He was told that there was  no consideration of ablation.   He came to EP clinic for second opinion in 3/3/2014. The patient mentioned this time he is not sure whether his afib is causing fatigue. However, he also attributed symptoms like headache and head fullness to his afib. He also had a cold around the same time. He still denied chest pain, syncope or presyncope, ICD shocks, MART or SOB. He does however indicate this time that he does feel irregular heart beat at times, along with chronic fatigue, although is not sure if fatigue is linked to afib. We agreed at that time to pursue a cardioversion to see whether he would feel better and we started him on sotalol 80 mg twice a day. He underwent cardioversion on 4/24/2014.  He then had a device transmission showed recurrent AF in 7/2014 which spontaneously converted. We decrease his diltiazem from 240 to 120 mg daily because of fatigue. His fatigue did get better when we decrease his diltiazem.  He did have a 9 day episode of A. Fib starting June 19, 2015, during which the patient felt very tired and no energy.  The heart rate was well controlled.  A. Fib burden was 19% since 5/19/2015, but it is the first episode of A. Fib since at least November 2014.  However at follow up in 2016 AF burden 1%. At follow up in 5/2016 his AF burden= 100% since 2/28/16. He reported some increased fatgiue and decreased stamina since being back in AF. At that visit, he elected to pursue repeat PVI ablation for AF that is refractory to Sotalol. He underwent repeat PVI ablation for persistent AF refractory to AATs on 7/28/16. He had successful re-isolation of all 4 pulmonary veins. He was re-hospitalized a couple days post ablation for SOB assocaited with hypervolemia which resolved with diuresis. He reports feeling well after ablation. He states his energy levels improved with restoration of sinus. Diltiazem was stopped after ablation.  He has had two recurrent episodes of AT/AF requiring DCCV on 8/7/17 and  1/3/18.      He presents today for follow up. His last device check showed AT episode that started 1/28/18 and lasted 25 days and self terminated. He reports feeling well today. He denies any chest pain/pressures, dizziness, lightheadedness, worsening shortness of breath, leg/ankle swelling, PND, orthopnea, palpitations, or syncopal symptoms. Current cardiac medications include: Lipitor, Sotalol, Toprol XL, and Xarelto.     PAST MEDICAL HISTORY:  Past Medical History:   Diagnosis Date     Atrial fibrillation (H) 12/9/11    failed medication, multiple DC cardioveresions; s/p Left atrial ablation to eliminate atrial fibrillation 12/9/11     Chest pain      CHF (congestive heart failure) (H) 7/30/2016     Coronary artery disease      DJD (degenerative joint disease)      Hip arthritis 1/15/2014     Hypertension      Hypertrophic cardiomyopathy (H) 10/09     Other abnormal heart sounds      Pacemaker     ICD     Pneumonia, organism unspecified(486)      Prediabetes 7/10/15    A1C 6.5     Status post implantation of automatic cardioverter/defibrillator (AICD)      Ventricular tachycardia (H)        CURRENT MEDICATIONS:  Current Outpatient Prescriptions   Medication Sig Dispense Refill     zolpidem (AMBIEN) 10 MG tablet Take 0.5-1 tablets (5-10 mg) by mouth nightly as needed for sleep 90 tablet 0     atorvastatin (LIPITOR) 20 MG tablet Take 1 tablet (20 mg) by mouth daily 90 tablet 3     amoxicillin (AMOXIL) 500 MG tablet Take 4 tablets (2000 mg) by mouth 1 hour before dental procedures. 4 tablet 3     albuterol (2.5 MG/3ML) 0.083% neb solution Take 1 vial (2.5 mg) by nebulization every 6 hours as needed for shortness of breath / dyspnea or wheezing 25 vial 2     order for DME Equipment being ordered: Nebulizer 1 each 0     Fluticasone-Salmeterol 113-14 MCG/ACT AEPB Inhale 1 puff into the lungs 2 times daily 1 each 5     albuterol (PROAIR HFA) 108 (90 BASE) MCG/ACT Inhaler Inhale 2 puffs into the lungs every 4 hours as  needed for shortness of breath / dyspnea or wheezing 1 Inhaler 2     sotalol (BETAPACE) 80 MG tablet Take 1.5 tablets (120 mg) by mouth 2 times daily 270 tablet 3     metoprolol (TOPROL-XL) 50 MG 24 hr tablet Take 1 tablet (50 mg) by mouth daily 90 tablet 3     rivaroxaban ANTICOAGULANT (XARELTO) 20 MG TABS tablet Take 1 tablet (20 mg) by mouth daily (with dinner) 90 tablet 5     multivitamin, therapeutic (THERA-VIT) TABS Take 1 tablet by mouth daily       acetaminophen (TYLENOL) 325 MG tablet Take 325-650 mg by mouth every 6 hours as needed       Blood Pressure Monitoring (BLOOD PRESSURE CUFF) MISC 1 Device daily 1 each 0       PAST SURGICAL HISTORY:  Past Surgical History:   Procedure Laterality Date     ANESTHESIA CARDIOVERSION  4/24/2014    Procedure: ANESTHESIA CARDIOVERSION;  Surgeon: Generic Anesthesia Provider;  Location: [facility] OR     ANESTHESIA CARDIOVERSION N/A 5/12/2016    Procedure: ANESTHESIA CARDIOVERSION;  Surgeon: GENERIC ANESTHESIA PROVIDER;  Location: [facility] OR     ANESTHESIA CARDIOVERSION N/A 8/7/2017    Procedure: ANESTHESIA CARDIOVERSION;  Anesthesia Offsite Coverage Cardioversion @1100;  Surgeon: GENERIC ANESTHESIA PROVIDER;  Location: [facility] OR     ANESTHESIA CARDIOVERSION N/A 1/3/2018    Procedure: ANESTHESIA CARDIOVERSION;  Anesthesia Cardioverion;  Surgeon: GENERIC ANESTHESIA PROVIDER;  Location: [facility] OR     ARTHROPLASTY HIP  1/15/2014    Procedure: ARTHROPLASTY HIP;  Left Total Hip Arthroplasty;  Surgeon: Nelson Gaspar MD;  Location: [facility] OR     CARDIAC SURGERY       H ABLATION FOCAL AFIB  12/9/11    Left atrial ablation to eliminate atrial fibrillation     IMPLANT AUTOMATIC IMPLANTABLE CARDIOVERTER DEFIBRILLATOR  7/27/12    AICD implantation     TONSILLECTOMY  1964       ALLERGIES:     Allergies   Allergen Reactions     Chantix [Varenicline]      Nigthmares, insomnia, patient states that it is not really an allergy, just a side effect       FAMILY HISTORY:  Family History   Problem Relation Age of  "Onset     HEART DISEASE Mother      unknown     Obesity Mother      GASTROINTESTINAL DISEASE Mother      diverticulitis     DIABETES Maternal Grandmother      DIABETES Paternal Grandmother      CEREBROVASCULAR DISEASE Paternal Grandmother 94     DIABETES Paternal Grandfather      CEREBROVASCULAR DISEASE Paternal Grandfather 78     DIABETES Son      C.A.D. Father      CABG age 78     HEART DISEASE Father      CABG x5     DIABETES Maternal Grandfather        SOCIAL HISTORY:  Social History   Substance Use Topics     Smoking status: Former Smoker     Packs/day: 0.25     Years: 40.00     Types: Cigarettes     Start date: 1/1/1975     Quit date: 12/11/2015     Smokeless tobacco: Never Used     Alcohol use No      Comment: 1 drink per week       ROS:   A comprehensive 10 point review of systems negative other than as mentioned in HPI.  Exam:  /80 (BP Location: Left arm, Cuff Size: Adult Regular)  Pulse 60  Ht 1.803 m (5' 11\")  Wt 95.9 kg (211 lb 8 oz)  SpO2 95%  BMI 29.5 kg/m2  GENERAL APPEARANCE: healthy, alert and no distress  HEENT: no icterus, no xanthelasmas, normal pupil size and reaction, normal palate, mucosa moist, no central cyanosis  NECK: no adenopathy, no asymmetry, masses, or scars, thyroid normal to palpation and no bruits, JVP not elevated  RESPIRATORY: lungs clear to auscultation - no rales, rhonchi or wheezes, no use of accessory muscles, no retractions, respirations are unlabored, normal respiratory rate  CARDIOVASCULAR: regular rhythm, normal S1 with physiologic split S2, no S3 or S4 and no murmur, click or rub, precordium quiet with normal PMI.  ABDOMEN: soft, non tender, without hepatosplenomegaly, no masses palpable, bowel sounds normal, aorta not enlarged by palpation, no abdominal bruits  EXTREMITIES: peripheral pulses normal, no edema, no bruits  NEURO: alert and oriented to person/place/time, normal speech, gait and affect  VASC: Radial, femoral, dorsalis pedis and posterior tibialis " pulses are normal in volumes and symmetric bilaterally. No bruits are heard.  SKIN: no ecchymoses, no rashes    Labs:  Reviewed.     Procedures:  12/15/16 ECHOCARDIOGRAM:   Interpretation Summary  Mildly (EF 40-45%) reduced left ventricular function is present. Mild  asymmetric septal hypertrophy is present (14 mm). No dynamic outflow tract  obstruction is present.  Global right ventricular function is normal.  No significant valvular abnormalities are identified.  No pericardial effusion is present.       Assessment and Plan:   Mr. Meredith is a 62 year old male who has a past medical history significant for HCM diagnosed , VT on Holter 2012 s/p ICD 2012, troponin leak Oct 2013 (angiogram with non-significant CAD, LV gram showed EF 25%, recovered to 60% on TTE Dec 2013), HTN, AFib (CHADSVASC 2) with DCCVs then s/p PVI 2011 and PVI for persistent AF on 16 s/p DCCV 17, and 1/3/18. He presents for follow-up. His last device check showed AT episode that started 18 and lasted 25 days and self terminated. He reports feeling well today.   We discussed in detail with the patient management/treatment options for Antonino includin. Stroke Prophylaxis:  CHADSVASC= 2, corresponding to a 2.2% annual stroke / systemic emolism event rate. indicating need for long term oral anticoagulation.  He is on Xarelto. No bleeding issues.   2. Rate Control: Continue Metoprolol.   3. Rhythm Control: He has had a PVI ablation in  with several recurrences requiring DCCV and then repeat PVI in 2016. He had previously failed multaq and is currently on Sotalol. He is doing well after ablation and has had a couple recurrences with DCCV on 17, 1/3/18 and another 28 day episode in 2018 that self termianted. Ablation voltage mapping showed high scar burden in atrium; therefore, he has higher recurrence rate for AF and may be difficult to maintain NSR longerterm. Considering this, we will continue Sotalol at  current dosing.     4. Risk Factor Management: maintain tight BP control, and GONZALEZ evaluation as indicated.    He will continue to follow with the device clinic per routine. He will follow up with Dr. Ware for HCM in 6 months. We will have him follow up 1 year.     The patient states understanding and is agreeable with plan.   This patient was seen and evaluated with Dr. Cooper. The above note reflects our joint assessment and plan.   KARSON Richardson CNP  Pager: [phone]      EP STAFF NOTE  I have seen and examined the patient as part of a shared visit with Maci Dominguez, HO[facility] NP.  I agree with the note above. I reviewed today's vital signs and medications. I have reviewed and discussed with the advanced practice provider their physical examination, assessment, and plan   Briefly, still doing well after ablation in 2016, very low burden, feeling well.  My key history/exam findings are: RRR.   The key management decisions made by me: f/u yearly, Dr Ware f/u for HCM..    Erik Cooper MD [facility]  Cardiology - Electrophysiology    CC  TYRELL NOGUERA

## 2018-02-21 NOTE — PATIENT INSTRUCTIONS
Cardiology Provider you saw in clinic today: Dr. Cooper      Follow-up Visit:  In Congenital/Genetics clinic: 1 year with Dr. Cooper, 6 months with Dr. Ware with device check prior        Please contact us via Mirimus for questions or concerns.    Chetna Ching RN   Electrophysiology Nurse Coordinator.  [phone]    If your question concerns the schedule/appointment times, contact:  [facility]   [phone]    Device Clinic (Pacemakers, ICD, Loop Recorders)   [phone]      You will receive all normal lab and testing results via Mirimus or mail if not reviewed in clinic today.   If you need a medication refill please contact your pharmacy.    As always, thank you for trusting us with your health care needs!

## 2018-02-21 NOTE — PROGRESS NOTES
Electrophysiology Clinic Note  HPI:   Mr. Meredith is a 62 year old male who has a past medical history significant for HCM diagnosed 2006, VT on Holter July 2012 s/p ICD 7/2012, troponin leak Oct 2013 (angiogram with non-significant CAD, LV gram showed EF 25%, recovered to 60% on TTE Dec 2013), HTN, AFib (CHADSVASC 2) with DCCVs then s/p PVI 12/2011 and PVI for persistent AF on 7/28/16 s/p DCCV 8/7/17, and 1/3/18. He presents for follow-up.  The patient underwent atrial fibrillation ablation by Dr. Gonzalez at [facility] in 12/2011 and implantation of an ICD in 07/2012 because of ventricular tachycardia. As per patient, he has been in sinus rhythm approximately 1-1/2 years after ablation. The patient was found to have recurrent atrial fibrillation during a preoperative exam in 10/2013 and underwent electrical cardioversion in 11/2013. The patient underwent hip surgery in 01/2014. The patient noticed that he was in atrial fibrillation at a clinic visit in 01/2014.   He was seen in consult by Dr Analilia spain on 2/14/14 for consideration of ablation. At the time the patient was reported to be generally unaware of whether or not he is in sinus rhythm or in atrial fibrillation. He reports daytime somnolence but denies significant dyspnea on exertion, palpitations, irregular beat sensation, presyncope or syncope. In terms of risk factors for stroke, the patient has coronary heart disease, hypertension, but denies history of diabetes, previous myocardial cardiac infarction or heart failure. The patient is on rivaroxaban for stroke prophylaxis and is on diltiazem and metoprolol. He was told that there was no consideration of ablation.   He came to EP clinic for second opinion in 3/3/2014. The patient mentioned this time he is not sure whether his afib is causing fatigue. However, he also attributed symptoms like headache and head fullness to his afib. He also had a cold around the same time. He still denied chest pain,  syncope or presyncope, ICD shocks, MART or SOB. He does however indicate this time that he does feel irregular heart beat at times, along with chronic fatigue, although is not sure if fatigue is linked to afib. We agreed at that time to pursue a cardioversion to see whether he would feel better and we started him on sotalol 80 mg twice a day. He underwent cardioversion on 4/24/2014.  He then had a device transmission showed recurrent AF in 7/2014 which spontaneously converted. We decrease his diltiazem from 240 to 120 mg daily because of fatigue. His fatigue did get better when we decrease his diltiazem.  He did have a 9 day episode of A. Fib starting June 19, 2015, during which the patient felt very tired and no energy.  The heart rate was well controlled.  A. Fib burden was 19% since 5/19/2015, but it is the first episode of A. Fib since at least November 2014.  However at follow up in 2016 AF burden 1%. At follow up in 5/2016 his AF burden= 100% since 2/28/16. He reported some increased fatgiue and decreased stamina since being back in AF. At that visit, he elected to pursue repeat PVI ablation for AF that is refractory to Sotalol. He underwent repeat PVI ablation for persistent AF refractory to AATs on 7/28/16. He had successful re-isolation of all 4 pulmonary veins. He was re-hospitalized a couple days post ablation for SOB assocaited with hypervolemia which resolved with diuresis. He reports feeling well after ablation. He states his energy levels improved with restoration of sinus. Diltiazem was stopped after ablation.  He has had two recurrent episodes of AT/AF requiring DCCV on 8/7/17 and 1/3/18.      He presents today for follow up. His last device check showed AT episode that started 1/28/18 and lasted 25 days and self terminated. He reports feeling well today. He denies any chest pain/pressures, dizziness, lightheadedness, worsening shortness of breath, leg/ankle swelling, PND, orthopnea, palpitations, or  syncopal symptoms. Current cardiac medications include: Lipitor, Sotalol, Toprol XL, and Xarelto.     PAST MEDICAL HISTORY:  Past Medical History:   Diagnosis Date     Atrial fibrillation (H) 12/9/11    failed medication, multiple DC cardioveresions; s/p Left atrial ablation to eliminate atrial fibrillation 12/9/11     Chest pain      CHF (congestive heart failure) (H) 7/30/2016     Coronary artery disease      DJD (degenerative joint disease)      Hip arthritis 1/15/2014     Hypertension      Hypertrophic cardiomyopathy (H) 10/09     Other abnormal heart sounds      Pacemaker     ICD     Pneumonia, organism unspecified(486)      Prediabetes 7/10/15    A1C 6.5     Status post implantation of automatic cardioverter/defibrillator (AICD)      Ventricular tachycardia (H)        CURRENT MEDICATIONS:  Current Outpatient Prescriptions   Medication Sig Dispense Refill     zolpidem (AMBIEN) 10 MG tablet Take 0.5-1 tablets (5-10 mg) by mouth nightly as needed for sleep 90 tablet 0     atorvastatin (LIPITOR) 20 MG tablet Take 1 tablet (20 mg) by mouth daily 90 tablet 3     amoxicillin (AMOXIL) 500 MG tablet Take 4 tablets (2000 mg) by mouth 1 hour before dental procedures. 4 tablet 3     albuterol (2.5 MG/3ML) 0.083% neb solution Take 1 vial (2.5 mg) by nebulization every 6 hours as needed for shortness of breath / dyspnea or wheezing 25 vial 2     order for DME Equipment being ordered: Nebulizer 1 each 0     Fluticasone-Salmeterol 113-14 MCG/ACT AEPB Inhale 1 puff into the lungs 2 times daily 1 each 5     albuterol (PROAIR HFA) 108 (90 BASE) MCG/ACT Inhaler Inhale 2 puffs into the lungs every 4 hours as needed for shortness of breath / dyspnea or wheezing 1 Inhaler 2     sotalol (BETAPACE) 80 MG tablet Take 1.5 tablets (120 mg) by mouth 2 times daily 270 tablet 3     metoprolol (TOPROL-XL) 50 MG 24 hr tablet Take 1 tablet (50 mg) by mouth daily 90 tablet 3     rivaroxaban ANTICOAGULANT (XARELTO) 20 MG TABS tablet Take 1  tablet (20 mg) by mouth daily (with dinner) 90 tablet 5     multivitamin, therapeutic (THERA-VIT) TABS Take 1 tablet by mouth daily       acetaminophen (TYLENOL) 325 MG tablet Take 325-650 mg by mouth every 6 hours as needed       Blood Pressure Monitoring (BLOOD PRESSURE CUFF) MISC 1 Device daily 1 each 0       PAST SURGICAL HISTORY:  Past Surgical History:   Procedure Laterality Date     ANESTHESIA CARDIOVERSION  4/24/2014    Procedure: ANESTHESIA CARDIOVERSION;  Surgeon: Generic Anesthesia Provider;  Location: [facility] OR     ANESTHESIA CARDIOVERSION N/A 5/12/2016    Procedure: ANESTHESIA CARDIOVERSION;  Surgeon: GENERIC ANESTHESIA PROVIDER;  Location: [facility] OR     ANESTHESIA CARDIOVERSION N/A 8/7/2017    Procedure: ANESTHESIA CARDIOVERSION;  Anesthesia Offsite Coverage Cardioversion @1100;  Surgeon: GENERIC ANESTHESIA PROVIDER;  Location: [facility] OR     ANESTHESIA CARDIOVERSION N/A 1/3/2018    Procedure: ANESTHESIA CARDIOVERSION;  Anesthesia Cardioverion;  Surgeon: GENERIC ANESTHESIA PROVIDER;  Location: [facility] OR     ARTHROPLASTY HIP  1/15/2014    Procedure: ARTHROPLASTY HIP;  Left Total Hip Arthroplasty;  Surgeon: Nelson Gaspar MD;  Location: [facility] OR     CARDIAC SURGERY       H ABLATION FOCAL AFIB  12/9/11    Left atrial ablation to eliminate atrial fibrillation     IMPLANT AUTOMATIC IMPLANTABLE CARDIOVERTER DEFIBRILLATOR  7/27/12    AICD implantation     TONSILLECTOMY  1964       ALLERGIES:     Allergies   Allergen Reactions     Chantix [Varenicline]      Nigthmares, insomnia, patient states that it is not really an allergy, just a side effect       FAMILY HISTORY:  Family History   Problem Relation Age of Onset     HEART DISEASE Mother      unknown     Obesity Mother      GASTROINTESTINAL DISEASE Mother      diverticulitis     DIABETES Maternal Grandmother      DIABETES Paternal Grandmother      CEREBROVASCULAR DISEASE Paternal Grandmother 94     DIABETES Paternal Grandfather      CEREBROVASCULAR DISEASE Paternal  "Grandfather 78     DIABETES Son      KWAN. Father      CABG age 78     HEART DISEASE Father      CABG x5     DIABETES Maternal Grandfather        SOCIAL HISTORY:  Social History   Substance Use Topics     Smoking status: Former Smoker     Packs/day: 0.25     Years: 40.00     Types: Cigarettes     Start date: 1/1/1975     Quit date: 12/11/2015     Smokeless tobacco: Never Used     Alcohol use No      Comment: 1 drink per week       ROS:   A comprehensive 10 point review of systems negative other than as mentioned in HPI.  Exam:  /80 (BP Location: Left arm, Cuff Size: Adult Regular)  Pulse 60  Ht 1.803 m (5' 11\")  Wt 95.9 kg (211 lb 8 oz)  SpO2 95%  BMI 29.5 kg/m2  GENERAL APPEARANCE: healthy, alert and no distress  HEENT: no icterus, no xanthelasmas, normal pupil size and reaction, normal palate, mucosa moist, no central cyanosis  NECK: no adenopathy, no asymmetry, masses, or scars, thyroid normal to palpation and no bruits, JVP not elevated  RESPIRATORY: lungs clear to auscultation - no rales, rhonchi or wheezes, no use of accessory muscles, no retractions, respirations are unlabored, normal respiratory rate  CARDIOVASCULAR: regular rhythm, normal S1 with physiologic split S2, no S3 or S4 and no murmur, click or rub, precordium quiet with normal PMI.  ABDOMEN: soft, non tender, without hepatosplenomegaly, no masses palpable, bowel sounds normal, aorta not enlarged by palpation, no abdominal bruits  EXTREMITIES: peripheral pulses normal, no edema, no bruits  NEURO: alert and oriented to person/place/time, normal speech, gait and affect  VASC: Radial, femoral, dorsalis pedis and posterior tibialis pulses are normal in volumes and symmetric bilaterally. No bruits are heard.  SKIN: no ecchymoses, no rashes    Labs:  Reviewed.     Procedures:  12/15/16 ECHOCARDIOGRAM:   Interpretation Summary  Mildly (EF 40-45%) reduced left ventricular function is present. Mild  asymmetric septal hypertrophy is present (14 " mm). No dynamic outflow tract  obstruction is present.  Global right ventricular function is normal.  No significant valvular abnormalities are identified.  No pericardial effusion is present.       Assessment and Plan:   Mr. Meredith is a 62 year old male who has a past medical history significant for HCM diagnosed , VT on Holter 2012 s/p ICD 2012, troponin leak Oct 2013 (angiogram with non-significant CAD, LV gram showed EF 25%, recovered to 60% on TTE Dec 2013), HTN, AFib (CHADSVASC 2) with DCCVs then s/p PVI 2011 and PVI for persistent AF on 16 s/p DCCV 17, and 1/3/18. He presents for follow-up. His last device check showed AT episode that started 18 and lasted 25 days and self terminated. He reports feeling well today.   We discussed in detail with the patient management/treatment options for Antonino includin. Stroke Prophylaxis:  CHADSVASC= 2, corresponding to a 2.2% annual stroke / systemic emolism event rate. indicating need for long term oral anticoagulation.  He is on Xarelto. No bleeding issues.   2. Rate Control: Continue Metoprolol.   3. Rhythm Control: He has had a PVI ablation in  with several recurrences requiring DCCV and then repeat PVI in 2016. He had previously failed multaq and is currently on Sotalol. He is doing well after ablation and has had a couple recurrences with DCCV on 17, 1/3/18 and another 28 day episode in 2018 that self termianted. Ablation voltage mapping showed high scar burden in atrium; therefore, he has higher recurrence rate for AF and may be difficult to maintain NSR longerterm. Considering this, we will continue Sotalol at current dosing.     4. Risk Factor Management: maintain tight BP control, and GONZALEZ evaluation as indicated.    He will continue to follow with the device clinic per routine. He will follow up with Dr. Ware for HCM in 6 months. We will have him follow up 1 year.     The patient states understanding and is  agreeable with plan.   This patient was seen and evaluated with Dr. Cooper. The above note reflects our joint assessment and plan.   KARSON Richardson CNP  Pager: [phone]      EP STAFF NOTE  I have seen and examined the patient as part of a shared visit with HOLLY Richardson NP.  I agree with the note above. I reviewed today's vital signs and medications. I have reviewed and discussed with the advanced practice provider their physical examination, assessment, and plan   Briefly, still doing well after ablation in 2016, very low burden, feeling well.  My key history/exam findings are: RRR.   The key management decisions made by me: f/u yearly, Dr Ware f/u for HCM..    Erik Cooper MD [facility]  Cardiology - Electrophysiology    CC  TYRELL NOGUERA

## 2018-02-21 NOTE — PATIENT INSTRUCTIONS
It was a pleasure to see you in clinic today.  Please do not hesitate to call us if you have any questions or concerns.  Please return to clinic in 6 mos for a ICD check.    Next remote 5/24/18    Maci Urena R.N.  Electrophysiology nurse specialist  [facility]  [address] [place] [id number]     Louann Lemons  [phone] business hours    After business hours please call [phone], option #4, and ask for Job code [id number].

## 2018-02-21 NOTE — MR AVS SNAPSHOT
After Visit Summary   2/21/2018    Stu Meredith    MRN: [id number]           Patient Information     Date Of Birth          1955        Visit Information        Provider Department      2/21/2018 4:00 PM 1,  Cv Device [facility]        Today's Diagnoses     Ventricular tachycardia (H)    -  1    Hypertrophic cardiomyopathy (H)          Care Instructions    It was a pleasure to see you in clinic today.  Please do not hesitate to call us if you have any questions or concerns.  Please return to clinic in 6 mos for a ICD check.    Next remote 5/24/18    Maci Urena R.N.  Electrophysiology nurse specialist  [facility]  [address] [facility] [address]     Louann Lemons  [phone] business hours    After business hours please call [phone], option #4, and ask for Job code [id number].                  Follow-ups after your visit        Follow-up notes from your care team     Discussed this visit Return in about 6 months (around 8/21/2018).      Who to contact     If you have questions or need follow up information about today's clinic visit or your schedule please contact [facility] directly at [phone].  Normal or non-critical lab and imaging results will be communicated to you by [employer], letter or phone within 4 business days after the clinic has received the results. If you do not hear from us within 7 days, please contact the clinic through [employer] or phone. If you have a critical or abnormal lab result, we will notify you by phone as soon as possible.  Submit refill requests through [employer] or call your pharmacy and they will forward the refill request to us. Please allow 3 business days for your refill to be completed.          Additional Information About Your Visit        MyChart Information     [employer] gives you secure access to your electronic health record. If you see a primary care provider, you can also send  messages to your care team and make appointments. If you have questions, please call your primary care clinic.  If you do not have a primary care provider, please call [phone] and they will assist you.        Care EveryWhere ID     This is your Care EveryWhere ID. This could be used by other organizations to access your [place] medical records  [id number]         Blood Pressure from Last 3 Encounters:   02/21/18 129/80   01/03/18 113/72   01/03/18 130/88    Weight from Last 3 Encounters:   02/21/18 95.9 kg (211 lb 8 oz)   01/03/18 97.5 kg (215 lb)   12/26/17 97.9 kg (215 lb 12.8 oz)              We Performed the Following     ICD DEVICE PROGRAMMING EVAL, DUAL LEAD ICD        Primary Care Provider Office Phone # Fax #    Omar Camacho [phone]       [address] [facility] [address]        Equal Access to Services     MARLON DE LOS SANTOS : Hadii aad ku hadasho Soomaali, waaxda luqadaha, qaybta kaalmada adeegyada, waxay liin haydarryln thomas graham . So [facility] [phone].    ATENCIÓN: Si habla español, tiene a drew disposición servicios gratuitos de asistencia lingüística. Llame al [phone].    We comply with applicable federal civil rights laws and [place] laws. We do not discriminate on the basis of race, color, national origin, age, disability, sex, sexual orientation, or gender identity.            Thank you!     Thank you for choosing [facility]  for your care. Our goal is always to provide you with excellent care. Hearing back from our patients is one way we can continue to improve our services. Please take a few minutes to complete the written survey that you may receive in the mail after your visit with us. Thank you!             Your Updated Medication List - Protect others around you: Learn how to safely use, store and throw away your medicines at www.disposemymeds.org.          This list is accurate as of 2/21/18  4:49 PM.  Always use your most recent med  list.                   Brand Name Dispense Instructions for use Diagnosis    acetaminophen 325 MG tablet    TYLENOL     Take 325-650 mg by mouth every 6 hours as needed        * albuterol 108 (90 BASE) MCG/ACT Inhaler    PROAIR HFA    1 Inhaler    Inhale 2 puffs into the lungs every 4 hours as needed for shortness of breath / dyspnea or wheezing    Bronchitis with bronchospasm       * albuterol (2.5 MG/3ML) 0.083% neb solution     25 vial    Take 1 vial (2.5 mg) by nebulization every 6 hours as needed for shortness of breath / dyspnea or wheezing    Post-infection bronchospasm       amoxicillin 500 MG tablet    AMOXIL    4 tablet    Take 4 tablets (2000 mg) by mouth 1 hour before dental procedures.    History of total left hip arthroplasty       atorvastatin 20 MG tablet    LIPITOR    90 tablet    Take 1 tablet (20 mg) by mouth daily    CARDIOVASCULAR SCREENING; LDL GOAL LESS THAN 130       Blood Pressure Cuff Misc     1 each    1 Device daily    Atrial fibrillation (H), Hypertrophic cardiomyopathy (H)       Fluticasone-Salmeterol 113-14 MCG/ACT Aepb inhaler    AIRDUO RESPICLICK    1 each    Inhale 1 puff into the lungs 2 times daily    Post-infection bronchospasm       metoprolol succinate 50 MG 24 hr tablet    TOPROL-XL    90 tablet    Take 1 tablet (50 mg) by mouth daily    HOCM (hypertrophic obstructive cardiomyopathy) (H)       multivitamin, therapeutic Tabs tablet      Take 1 tablet by mouth daily        order for DME     1 each    Equipment being ordered: Nebulizer    Post-infection bronchospasm       rivaroxaban ANTICOAGULANT 20 MG Tabs tablet    XARELTO    90 tablet    Take 1 tablet (20 mg) by mouth daily (with dinner)    Chronic atrial fibrillation (H)       sotalol 80 MG tablet    BETAPACE    270 tablet    Take 1.5 tablets (120 mg) by mouth 2 times daily    Chronic atrial fibrillation (H), Hypertrophic cardiomyopathy (H)       zolpidem 10 MG tablet    AMBIEN    90 tablet    Take 0.5-1 tablets (5-10 mg)  by mouth nightly as needed for sleep    Sedative, hypnotic or anxiolytic dependence (H)       * Notice:  This list has 2 medication(s) that are the same as other medications prescribed for you. Read the directions carefully, and ask your doctor or other care provider to review them with you.

## 2018-02-21 NOTE — NURSING NOTE
Chief Complaint   Patient presents with     Follow Up For     AF, possible ablation consult. ICD check.     Vitals were taken and medications were reconciled.     Blank Godoy, CELIA  4:35 PM

## 2018-02-21 NOTE — MR AVS SNAPSHOT
After Visit Summary   2/21/2018    Stu Meredith    MRN: [id number]           Patient Information     Date Of Birth          1955        Visit Information        Provider Department      2/21/2018 4:30 PM Erik Cooper MD [facility]        Today's Diagnoses     Hypertrophic cardiomyopathy (H)    -  1    Atrial fibrillation, unspecified type (H)          Care Instructions    Cardiology Provider you saw in clinic today: Dr. Cooper      Follow-up Visit:  In Congenital/Genetics clinic: 1 year with Dr. Cooper, 6 months with Dr. Ware with device check prior        Please contact us via MedaNext for questions or concerns.    Chetna Ching, ROD   Electrophysiology Nurse Coordinator.  [phone]    If your question concerns the schedule/appointment times, contact:  [facility]   [phone]    Device Clinic (Pacemakers, ICD, Loop Recorders)   [phone]      You will receive all normal lab and testing results via MedaNext or mail if not reviewed in clinic today.   If you need a medication refill please contact your pharmacy.    As always, thank you for trusting us with your health care needs!            Follow-ups after your visit        Additional Services     Follow-Up with Congenital Heart Clinic       Mona Ware  Device check prior            Follow-Up with Electrophysiologist - 1 Year       In Congenital clinic - Kenneth  Device check prior                  Your next 10 appointments already scheduled     Aug 24, 2018  1:30 PM CDT   (Arrive by 1:15 PM)   Implanted Defibulator with Uc Cv Device 1   [facility])    [address] [place] [address]  [address] [place] [address] [phone]            Aug 24, 2018  2:00 PM CDT   (Arrive by 1:45 PM)   RETURN ARRHYTHMIA with Erik Cooper MD   [facility])    [address] [place] [address]  [address] [place] [address]  "  [phone]              Who to contact     If you have questions or need follow up information about today's clinic visit or your schedule please contact [facility] directly at [phone].  Normal or non-critical lab and imaging results will be communicated to you by MyChart, letter or phone within 4 business days after the clinic has received the results. If you do not hear from us within 7 days, please contact the clinic through MyChart or phone. If you have a critical or abnormal lab result, we will notify you by phone as soon as possible.  Submit refill requests through [employer] or call your pharmacy and they will forward the refill request to us. Please allow 3 business days for your refill to be completed.          Additional Information About Your Visit        [employer] Information     [employer] gives you secure access to your electronic health record. If you see a primary care provider, you can also send messages to your care team and make appointments. If you have questions, please call your primary care clinic.  If you do not have a primary care provider, please call [phone] and they will assist you.        Care EveryWhere ID     This is your Care EveryWhere ID. This could be used by other organizations to access your [place] medical records  [id number]        Your Vitals Were     Pulse Height Pulse Oximetry BMI (Body Mass Index)          60 1.803 m (5' 11\") 95% 29.5 kg/m2         Blood Pressure from Last 3 Encounters:   02/21/18 129/80   01/03/18 113/72   01/03/18 130/88    Weight from Last 3 Encounters:   02/21/18 95.9 kg (211 lb 8 oz)   01/03/18 97.5 kg (215 lb)   12/26/17 97.9 kg (215 lb 12.8 oz)               Primary Care Provider Office Phone # Fax #    Omar Camacho [phone]       [address] [place] [address]        Equal Access to Services     MARLON DE LOS SANTOS AH: Jakob Ward, fabiola bueno, qaybta kajorden canales " jose kimmieradha lorettacoleen graham ah. So [facility] [phone].    ATENCIÓN: Si habla micah, tiene a drew disposición servicios gratuitos de asistencia lingüística. Inga al [phone].    We comply with applicable federal civil rights laws and [place] laws. We do not discriminate on the basis of race, color, national origin, age, disability, sex, sexual orientation, or gender identity.            Thank you!     Thank you for choosing [facility]  for your care. Our goal is always to provide you with excellent care. Hearing back from our patients is one way we can continue to improve our services. Please take a few minutes to complete the written survey that you may receive in the mail after your visit with us. Thank you!             Your Updated Medication List - Protect others around you: Learn how to safely use, store and throw away your medicines at www.disposemymeds.org.          This list is accurate as of 2/21/18 11:59 PM.  Always use your most recent med list.                   Brand Name Dispense Instructions for use Diagnosis    acetaminophen 325 MG tablet    TYLENOL     Take 325-650 mg by mouth every 6 hours as needed        * albuterol 108 (90 BASE) MCG/ACT Inhaler    PROAIR HFA    1 Inhaler    Inhale 2 puffs into the lungs every 4 hours as needed for shortness of breath / dyspnea or wheezing    Bronchitis with bronchospasm       * albuterol (2.5 MG/3ML) 0.083% neb solution     25 vial    Take 1 vial (2.5 mg) by nebulization every 6 hours as needed for shortness of breath / dyspnea or wheezing    Post-infection bronchospasm       amoxicillin 500 MG tablet    AMOXIL    4 tablet    Take 4 tablets (2000 mg) by mouth 1 hour before dental procedures.    History of total left hip arthroplasty       atorvastatin 20 MG tablet    LIPITOR    90 tablet    Take 1 tablet (20 mg) by mouth daily    CARDIOVASCULAR SCREENING; LDL GOAL LESS THAN 130       Blood Pressure Cuff Misc     1 each    1 Device daily    Atrial  fibrillation (H), Hypertrophic cardiomyopathy (H)       Fluticasone-Salmeterol 113-14 MCG/ACT Aepb inhaler    AIRDUO RESPICLICK    1 each    Inhale 1 puff into the lungs 2 times daily    Post-infection bronchospasm       metoprolol succinate 50 MG 24 hr tablet    TOPROL-XL    90 tablet    Take 1 tablet (50 mg) by mouth daily    HOCM (hypertrophic obstructive cardiomyopathy) (H)       multivitamin, therapeutic Tabs tablet      Take 1 tablet by mouth daily        order for DME     1 each    Equipment being ordered: Nebulizer    Post-infection bronchospasm       rivaroxaban ANTICOAGULANT 20 MG Tabs tablet    XARELTO    90 tablet    Take 1 tablet (20 mg) by mouth daily (with dinner)    Chronic atrial fibrillation (H)       sotalol 80 MG tablet    BETAPACE    270 tablet    Take 1.5 tablets (120 mg) by mouth 2 times daily    Chronic atrial fibrillation (H), Hypertrophic cardiomyopathy (H)       zolpidem 10 MG tablet    AMBIEN    90 tablet    Take 0.5-1 tablets (5-10 mg) by mouth nightly as needed for sleep    Sedative, hypnotic or anxiolytic dependence (H)       * Notice:  This list has 2 medication(s) that are the same as other medications prescribed for you. Read the directions carefully, and ask your doctor or other care provider to review them with you.

## 2018-02-21 NOTE — PROGRESS NOTES
Preliminary Device Interrogation Results.  Final physician signed paceart report to be scanned and attached.    Pt seen in clinic for evaluation and iterative programming of his [employer] dual chamber ICD per MD order.  He looks well and he states that he is feeling well and he does not offer any complaints.  He has a follow up with Wan today.  He states that he had a cardioversion on Jan 3rd.  Since then he had 2.5 days worth of AFlutter on 1/28/18 and 1 30 sec episode on Feb 18th.  He also had 5 NSVT episodes 1-2 sec each.  He is RV pacing 2 % of the time, his heart rate histograms show good heart rate variation and the ICD battery estimates 6 years left.  We will plan for a remote check in 3 mos and RTC in 6 mos.      Dual ICD

## 2018-03-29 DIAGNOSIS — F13.20 SEDATIVE, HYPNOTIC OR ANXIOLYTIC DEPENDENCE (H): ICD-10-CM

## 2018-03-29 RX ORDER — ZOLPIDEM TARTRATE 10 MG/1
5-10 TABLET ORAL
Qty: 90 TABLET | Refills: 0 | OUTPATIENT
Start: 2018-03-29

## 2018-03-29 NOTE — TELEPHONE ENCOUNTER
ambien      Last Written Prescription Date:  1/24/18  Last Fill Quantity: 90,   # refills: 0  Last Office Visit: 12/26/17  Future Office visit:       Routing refill request to provider for review/approval because:  Drug not on the [facility], P or [facility] refill protocol or controlled substance

## 2018-04-03 RX ORDER — ZOLPIDEM TARTRATE 10 MG/1
5-10 TABLET ORAL
Qty: 90 TABLET | Refills: 0 | Status: SHIPPED | OUTPATIENT
Start: 2018-04-03 | End: 2018-07-09

## 2018-04-03 NOTE — TELEPHONE ENCOUNTER
Controlled Substance Refill Request for ambien 10mg  Problem List Complete:  Yes    Last Written Prescription Date:  01/24/18  Last Fill Quantity: 90,   # refills: 0    [facility] Office Visit with [facility] [place] provider: 12/26/17    Clinic visit frequency required: Q 3 months     Future Office visit: 0    Controlled substance agreement on file: No.     Processing:  Fax Rx to [facility] [address] [facility] pharmacy   checked in past 6 months?  No, route to RN

## 2018-04-23 ENCOUNTER — ALLIED HEALTH/NURSE VISIT (OUTPATIENT)
Dept: CARDIOLOGY | Facility: CLINIC | Age: 63
End: 2018-04-23
Attending: INTERNAL MEDICINE
Payer: COMMERCIAL

## 2018-04-23 DIAGNOSIS — I42.2 HYPERTROPHIC CARDIOMYOPATHY (H): Primary | ICD-10-CM

## 2018-04-23 PROCEDURE — 93295 DEV INTERROG REMOTE 1/2/MLT: CPT | Performed by: INTERNAL MEDICINE

## 2018-04-23 PROCEDURE — 93296 REM INTERROG EVL PM/IDS: CPT | Mod: ZF

## 2018-04-23 NOTE — MR AVS SNAPSHOT
After Visit Summary   4/23/2018    Stu Meredith    MRN: [id number]           Patient Information     Date Of Birth          1955        Visit Information        Provider Department      4/23/2018 6:00 AM [facility]        Today's Diagnoses     Hypertrophic cardiomyopathy (H)    -  1       Follow-ups after your visit        Your next 10 appointments already scheduled     Jun 01, 2018  1:00 PM CDT   Card Cardpul Stress Tst Adult with UUEKGS   [facility] ([facility])    [address] [address]               Jun 01, 2018  2:00 PM CDT   Ech Stress Test with [id number]   [facility] [place],  Echocardiography ([facility])    [address] [address]   [phone]           1. Please bring or wear a comfortable two-piece outfit and walking shoes. 2. Stop eating 3 hours before the test. You may drink water or juice. 3. Stop all caffeine 12 hours before the test. This includes coffee, tea, soda pop, chocolate and certain medicines (such as Anacin and Excederin). Also avoid decaf coffee and tea, as these contain small amounts of caffeine. 4. No alcohol, smoking or use of other tobacco products for 12 hours before the test. 5. Refer to your provider instructions to see if you need to stop any medications (such as beta-blockers or nitrates) for this test. 6. For patients with diabetes: - If you take insulin, call your diabetes care team. Ask if you should take a   dose the morning of your test. - If you take diabetes medicine by mouth, dont take it on the morning of your test. Bring it with you to take after the test. (If you have questions, call your diabetes care team) 7. When you arrive, please tell us if: - You have diabetes. - You have taken Viagra, Cialis or Levitra in the past 48 hours. 8. For any questions that cannot be answered, please contact the  ordering physician            Jun 01, 2018  3:30 PM CDT   (Arrive by 3:15 PM)   Implanted Defibulator with Uc Cv Device 1   [facility]    [address]  [address] [place] [address] [phone]            Jun 01, 2018  4:00 PM CDT   (Arrive by 3:45 PM)   Return Genetic with Mona Ware MD   [facility] ([facility])    [address] [place] [address]  [address] [place] [address] [phone]              Who to contact     If you have questions or need follow up information about today's clinic visit or your schedule please contact [facility] directly at [phone].  Normal or non-critical lab and imaging results will be communicated to you by MyChart, letter or phone within 4 business days after the clinic has received the results. If you do not hear from us within 7 days, please contact the clinic through [employer] or phone. If you have a critical or abnormal lab result, we will notify you by phone as soon as possible.  Submit refill requests through [employer] or call your pharmacy and they will forward the refill request to us. Please allow 3 business days for your refill to be completed.          Additional Information About Your Visit        [employer] Information     [employer] gives you secure access to your electronic health record. If you see a primary care provider, you can also send messages to your care team and make appointments. If you have questions, please call your primary care clinic.  If you do not have a primary care provider, please call [phone] and they will assist you.        Care EveryWhere ID     This is your Care EveryWhere ID. This could be used by other organizations to access your [place] medical records  [id number]         Blood Pressure from Last 3 Encounters:   02/21/18 129/80   01/03/18 113/72   01/03/18 130/88    Weight from Last 3 Encounters:   02/21/18 95.9 kg (211 lb  8 oz)   01/03/18 97.5 kg (215 lb)   12/26/17 97.9 kg (215 lb 12.8 oz)              We Performed the Following     INTERROGATION DEVICE EVAL REMOTE, PACER/ICD        Primary Care Provider Office Phone # Fax #    Omar Camacho [phone]       [address]  [facility] [address]        Equal Access to Services     [facility] : Hadii aad ku hadasho Soomaali, waaxda luqadaha, qaybta kaalmada adeegyada, waxay idiin hayaan adeeg kharash la'aan ah. So [facility] [phone].    ATENCIÓN: Si habla esplennox, tiene a drew disposición servicios gratuitos de asistencia lingüística. Llame al [phone].    We comply with applicable federal civil rights laws and [place] laws. We do not discriminate on the basis of race, color, national origin, age, disability, sex, sexual orientation, or gender identity.            Thank you!     Thank you for choosing [facility]  for your care. Our goal is always to provide you with excellent care. Hearing back from our patients is one way we can continue to improve our services. Please take a few minutes to complete the written survey that you may receive in the mail after your visit with us. Thank you!             Your Updated Medication List - Protect others around you: Learn how to safely use, store and throw away your medicines at www.disposemymeds.org.          This list is accurate as of 4/23/18 11:59 PM.  Always use your most recent med list.                   Brand Name Dispense Instructions for use Diagnosis    acetaminophen 325 MG tablet    TYLENOL     Take 325-650 mg by mouth every 6 hours as needed        * albuterol 108 (90 Base) MCG/ACT Inhaler    PROAIR HFA    1 Inhaler    Inhale 2 puffs into the lungs every 4 hours as needed for shortness of breath / dyspnea or wheezing    Bronchitis with bronchospasm       * albuterol (2.5 MG/3ML) 0.083% neb solution     25 vial    Take 1 vial (2.5 mg) by nebulization every 6 hours as needed for shortness of  breath / dyspnea or wheezing    Post-infection bronchospasm       amoxicillin 500 MG tablet    AMOXIL    4 tablet    Take 4 tablets (2000 mg) by mouth 1 hour before dental procedures.    History of total left hip arthroplasty       atorvastatin 20 MG tablet    LIPITOR    90 tablet    Take 1 tablet (20 mg) by mouth daily    CARDIOVASCULAR SCREENING; LDL GOAL LESS THAN 130       Blood Pressure Cuff Misc     1 each    1 Device daily    Atrial fibrillation (H), Hypertrophic cardiomyopathy (H)       Fluticasone-Salmeterol 113-14 MCG/ACT Aepb inhaler    AIRDUO RESPICLICK    1 each    Inhale 1 puff into the lungs 2 times daily    Post-infection bronchospasm       metoprolol succinate 50 MG 24 hr tablet    TOPROL-XL    90 tablet    Take 1 tablet (50 mg) by mouth daily    HOCM (hypertrophic obstructive cardiomyopathy) (H)       multivitamin, therapeutic Tabs tablet      Take 1 tablet by mouth daily        order for DME     1 each    Equipment being ordered: Nebulizer    Post-infection bronchospasm       rivaroxaban ANTICOAGULANT 20 MG Tabs tablet    XARELTO    90 tablet    Take 1 tablet (20 mg) by mouth daily (with dinner)    Chronic atrial fibrillation (H)       sotalol 80 MG tablet    BETAPACE    270 tablet    Take 1.5 tablets (120 mg) by mouth 2 times daily    Chronic atrial fibrillation (H), Hypertrophic cardiomyopathy (H)       zolpidem 10 MG tablet    AMBIEN    90 tablet    Take 0.5-1 tablets (5-10 mg) by mouth nightly as needed for sleep    Sedative, hypnotic or anxiolytic dependence (H)       * Notice:  This list has 2 medication(s) that are the same as other medications prescribed for you. Read the directions carefully, and ask your doctor or other care provider to review them with you.

## 2018-04-26 DIAGNOSIS — I48.0 PAROXYSMAL ATRIAL FIBRILLATION (H): Primary | ICD-10-CM

## 2018-04-26 RX ORDER — ATROPINE SULFATE 0.1 MG/ML
.5-1 INJECTION INTRAVENOUS
Status: DISCONTINUED | OUTPATIENT
Start: 2018-04-26 | End: 2018-04-26 | Stop reason: HOSPADM

## 2018-04-26 RX ORDER — POTASSIUM CHLORIDE 1500 MG/1
40 TABLET, EXTENDED RELEASE ORAL
Status: DISCONTINUED | OUTPATIENT
Start: 2018-04-26 | End: 2018-04-26 | Stop reason: HOSPADM

## 2018-04-26 RX ORDER — SODIUM CHLORIDE 9 MG/ML
INJECTION, SOLUTION INTRAVENOUS CONTINUOUS
Status: DISCONTINUED | OUTPATIENT
Start: 2018-04-26 | End: 2018-04-26 | Stop reason: HOSPADM

## 2018-04-26 RX ORDER — FLUMAZENIL 0.1 MG/ML
0.2 INJECTION, SOLUTION INTRAVENOUS
Status: DISCONTINUED | OUTPATIENT
Start: 2018-04-26 | End: 2018-04-26 | Stop reason: HOSPADM

## 2018-04-26 RX ORDER — NALOXONE HYDROCHLORIDE 0.4 MG/ML
.1-.4 INJECTION, SOLUTION INTRAMUSCULAR; INTRAVENOUS; SUBCUTANEOUS
Status: DISCONTINUED | OUTPATIENT
Start: 2018-04-26 | End: 2018-04-26 | Stop reason: HOSPADM

## 2018-04-26 RX ORDER — POTASSIUM CHLORIDE 1500 MG/1
20 TABLET, EXTENDED RELEASE ORAL
Status: DISCONTINUED | OUTPATIENT
Start: 2018-04-26 | End: 2018-04-26 | Stop reason: HOSPADM

## 2018-04-29 NOTE — PROGRESS NOTES
Preliminary Device Interrogation Results.  Final physician signed paceart report to be scanned and attached.    Remote ICD transmission received and reviewed.  Device transmission sent per MD orders.  Patient has a [employer] dual lead ICD.  Normal ICD function.  27 episodes recorded as NSVT which appear to be AF with RVR - 149-168 bpm, 5-24 sec.  4 ATR episodes recorded - 50 min - > 19 days.  It appears patient has been in AFlutter since 3/31/18.  Patient called at home.  Patient reports that he is not feeling very well.  Patient states he is more fatigued and SOB.  Patient is taking Xarelto.  Maci Dominguez NP notified of interrogation results.  Orders received to have patient scheduled for a cardioversion.  Per VINAY Richardson EP  will call patient to schedule cardioversion.  Presenting EGM = AFlutter with ventricular rate @ ~ 80's bpm.  AP = 21%.   = 12%.  Estimated battery longevity to CARMEN = 5.5 years.  Patient notified of interrogation results and plan of care.  Patient is agreeable to cardioversion.      Remote ICD transmission

## 2018-04-30 ENCOUNTER — HOSPITAL ENCOUNTER (OUTPATIENT)
Facility: CLINIC | Age: 63
End: 2018-04-30
Payer: COMMERCIAL

## 2018-04-30 DIAGNOSIS — I48.0 PAF (PAROXYSMAL ATRIAL FIBRILLATION) (H): Primary | ICD-10-CM

## 2018-05-04 ENCOUNTER — APPOINTMENT (OUTPATIENT)
Dept: LAB | Facility: CLINIC | Age: 63
End: 2018-05-04
Attending: INTERNAL MEDICINE
Payer: COMMERCIAL

## 2018-05-04 ENCOUNTER — HOSPITAL ENCOUNTER (OUTPATIENT)
Facility: CLINIC | Age: 63
Discharge: HOME | End: 2018-05-04
Attending: INTERNAL MEDICINE | Admitting: INTERNAL MEDICINE
Payer: COMMERCIAL

## 2018-05-04 ENCOUNTER — HOSPITAL ENCOUNTER (OUTPATIENT)
Dept: CARDIOLOGY | Facility: CLINIC | Age: 63
End: 2018-05-04
Attending: NURSE PRACTITIONER
Payer: COMMERCIAL

## 2018-05-04 ENCOUNTER — ANESTHESIA (OUTPATIENT)
Dept: SURGERY | Facility: CLINIC | Age: 63
End: 2018-05-04
Payer: COMMERCIAL

## 2018-05-04 ENCOUNTER — ANESTHESIA EVENT (OUTPATIENT)
Dept: SURGERY | Facility: CLINIC | Age: 63
End: 2018-05-04
Payer: COMMERCIAL

## 2018-05-04 ENCOUNTER — APPOINTMENT (OUTPATIENT)
Dept: MEDSURG UNIT | Facility: CLINIC | Age: 63
End: 2018-05-04
Payer: COMMERCIAL

## 2018-05-04 ENCOUNTER — ALLIED HEALTH/NURSE VISIT (OUTPATIENT)
Dept: CARDIOLOGY | Facility: CLINIC | Age: 63
End: 2018-05-04
Attending: INTERNAL MEDICINE
Payer: COMMERCIAL

## 2018-05-04 VITALS
DIASTOLIC BLOOD PRESSURE: 84 MMHG | OXYGEN SATURATION: 97 % | RESPIRATION RATE: 16 BRPM | HEART RATE: 57 BPM | TEMPERATURE: 98.3 F | SYSTOLIC BLOOD PRESSURE: 132 MMHG

## 2018-05-04 VITALS
RESPIRATION RATE: 16 BRPM | OXYGEN SATURATION: 100 % | DIASTOLIC BLOOD PRESSURE: 72 MMHG | SYSTOLIC BLOOD PRESSURE: 114 MMHG | HEART RATE: 75 BPM

## 2018-05-04 DIAGNOSIS — I48.0 PAROXYSMAL ATRIAL FIBRILLATION (H): ICD-10-CM

## 2018-05-04 DIAGNOSIS — I48.0 PAF (PAROXYSMAL ATRIAL FIBRILLATION) (H): Primary | ICD-10-CM

## 2018-05-04 DIAGNOSIS — I47.20 VENTRICULAR TACHYCARDIA (H): Primary | ICD-10-CM

## 2018-05-04 DIAGNOSIS — I48.19 PERSISTENT ATRIAL FIBRILLATION (H): ICD-10-CM

## 2018-05-04 LAB
MAGNESIUM SERPL-MCNC: 2 MG/DL (ref 1.6–2.3)
POTASSIUM SERPL-SCNC: 4.8 MMOL/L (ref 3.4–5.3)

## 2018-05-04 PROCEDURE — 93005 ELECTROCARDIOGRAM TRACING: CPT

## 2018-05-04 PROCEDURE — 40000166 ZZH STATISTIC PP CARE STAGE 1

## 2018-05-04 PROCEDURE — 93296 REM INTERROG EVL PM/IDS: CPT | Mod: ZF

## 2018-05-04 PROCEDURE — 25000125 ZZHC RX 250: Performed by: NURSE ANESTHETIST, CERTIFIED REGISTERED

## 2018-05-04 PROCEDURE — 83735 ASSAY OF MAGNESIUM: CPT | Performed by: NURSE PRACTITIONER

## 2018-05-04 PROCEDURE — 92960 CARDIOVERSION ELECTRIC EXT: CPT

## 2018-05-04 PROCEDURE — 92960 CARDIOVERSION ELECTRIC EXT: CPT | Performed by: NURSE PRACTITIONER

## 2018-05-04 PROCEDURE — 36415 COLL VENOUS BLD VENIPUNCTURE: CPT | Performed by: NURSE PRACTITIONER

## 2018-05-04 PROCEDURE — 37000008 ZZH ANESTHESIA TECHNICAL FEE, 1ST 30 MIN

## 2018-05-04 PROCEDURE — 93010 ELECTROCARDIOGRAM REPORT: CPT | Performed by: INTERNAL MEDICINE

## 2018-05-04 PROCEDURE — 93295 DEV INTERROG REMOTE 1/2/MLT: CPT | Performed by: INTERNAL MEDICINE

## 2018-05-04 PROCEDURE — 84132 ASSAY OF SERUM POTASSIUM: CPT | Performed by: NURSE PRACTITIONER

## 2018-05-04 RX ORDER — LIDOCAINE 40 MG/G
CREAM TOPICAL
Status: DISCONTINUED | OUTPATIENT
Start: 2018-05-04 | End: 2018-05-04 | Stop reason: HOSPADM

## 2018-05-04 RX ORDER — POTASSIUM CHLORIDE 1500 MG/1
40 TABLET, EXTENDED RELEASE ORAL
Status: CANCELLED | OUTPATIENT
Start: 2018-05-04

## 2018-05-04 RX ORDER — POTASSIUM CHLORIDE 1500 MG/1
20 TABLET, EXTENDED RELEASE ORAL
Status: CANCELLED | OUTPATIENT
Start: 2018-05-04

## 2018-05-04 RX ORDER — POTASSIUM CHLORIDE 750 MG/1
20 TABLET, EXTENDED RELEASE ORAL
Status: DISCONTINUED | OUTPATIENT
Start: 2018-05-04 | End: 2018-05-04 | Stop reason: HOSPADM

## 2018-05-04 RX ORDER — POTASSIUM CHLORIDE 750 MG/1
40 TABLET, EXTENDED RELEASE ORAL
Status: DISCONTINUED | OUTPATIENT
Start: 2018-05-04 | End: 2018-05-04 | Stop reason: HOSPADM

## 2018-05-04 RX ORDER — LIDOCAINE 40 MG/G
CREAM TOPICAL
Status: CANCELLED | OUTPATIENT
Start: 2018-05-04

## 2018-05-04 RX ADMIN — METHOHEXITAL SODIUM 50 MG: 500 INJECTION, POWDER, LYOPHILIZED, FOR SOLUTION INTRAMUSCULAR; INTRAVENOUS; RECTAL at 13:47

## 2018-05-04 ASSESSMENT — ENCOUNTER SYMPTOMS: DYSRHYTHMIAS: 1

## 2018-05-04 ASSESSMENT — LIFESTYLE VARIABLES: TOBACCO_USE: 1

## 2018-05-04 NOTE — PROGRESS NOTES
Pt tolerated PO, voided and ambulated without difficulty. VSS, pt denies pain. Pt verbalized understanding of discharge instructions. IV removed. Pt discharged to home with wife

## 2018-05-04 NOTE — ANESTHESIA POSTPROCEDURE EVALUATION
Patient: Stu Meredith    Procedure(s):  Anesthesia Coverage Cardioversion @1400    Diagnosis:Atrial Fibrillation   Diagnosis Additional Information: No value filed.    Anesthesia Type:  No value filed.    Note:  Anesthesia Post Evaluation    Patient location during evaluation: PACU  Patient participation: Able to fully participate in evaluation  Level of consciousness: awake and alert  Pain management: satisfactory to patient  Airway patency: patent  Cardiovascular status: acceptable and stable  Respiratory status: acceptable and spontaneous ventilation  Hydration status: euvolemic  PONV: controlled     Anesthetic complications: None          Last vitals:  Vitals:    05/04/18 1240   BP: 127/90   Pulse: 123   Resp: 16   Temp: 36.8  C (98.3  F)   SpO2: 96%         Electronically Signed By: Steffen Bowden MD  May 4, 2018  2:06 PM

## 2018-05-04 NOTE — ANESTHESIA PREPROCEDURE EVALUATION
Anesthesia Evaluation     . Pt has had prior anesthetic. Type: General    History of anesthetic complications    Tongue lacerated by biting      ROS/MED HX    ENT/Pulmonary:     (+)tobacco use, Current use , . .    Neurologic:  - neg neurologic ROS     Cardiovascular: Comment: AICD is OFF and Pacemaker is ON    (+) hypertension--CAD, --. : . CHF etiology: Hypertrophic Cardiomyopathy Last EF: 50% date: 7/2016 . . pacemaker Reason placed: Atrial Fibrillation/ Flutter and episodes of V-Tach:type: ICD for VTach - Patient is dependent on pacemaker . dysrhythmias a-fib, a-flutter and Other, . pulmonary hypertension, Previous cardiac testing Echodate:7/29/2016results:Hypertrophic Disease with diastolic dysfunction, grade 3. EF= 50%. Moderate Pulmonary HTN with Tricuspid Regurgitation.date: results:ECG reviewed date:5/3/2017 results:Atrial-paced with a rate of 60 date: results:          METS/Exercise Tolerance:     Hematologic:  - neg hematologic  ROS       Musculoskeletal: Comment: S/P Left Hip Arthroplasty  (+) arthritis, , , -       GI/Hepatic:  - neg GI/hepatic ROS       Renal/Genitourinary:  - ROS Renal section negative       Endo:     (+) Obesity, Other Endocrine Disorder Pre-Diabetes.      Psychiatric:  - neg psychiatric ROS       Infectious Disease:  - neg infectious disease ROS       Malignancy:      - no malignancy   Other:                     Physical Exam  Normal systems: pulmonary    Airway   Mallampati: II  TM distance: >3 FB  Neck ROM: full    Dental     Cardiovascular   Rhythm and rate: irregular and normal      Pulmonary                         Anesthesia Plan      History & Physical Review  History and physical reviewed and following examination; no interval change.    ASA Status:  3 .    NPO Status:  > 8 hours    Plan for General with Intravenous induction. Maintenance will be TIVA.    PONV prophylaxis:  Ondansetron (or other 5HT-3) and Dexamethasone or Solumedrol       Postoperative Care  Postoperative  pain management:  IV analgesics and Multi-modal analgesia.      Consents  Anesthetic plan, risks, benefits and alternatives discussed with:  Patient..

## 2018-05-04 NOTE — PROGRESS NOTES
Pt here for cardioversion.  Consent signed.  Anesthesia present for sedation, 50 mg brevitol given per them.  Pt shocked x 1 at 150 J into paced rhythm.  Remote transmission of ICD sent to pacemaker RN.  Pt monitored and then returned to 2A for post sedation monitoring when awake and VSS.  Report called to 2A RN.

## 2018-05-04 NOTE — DISCHARGE INSTRUCTIONS
Going Home after Sedation      For 24 hours:    An adult should stay with you.    Relax and take it easy.    DO NOT make any important legal decisions.    DO NOT drive or operate machines at home or at work.    Resume your regular diet and drink plenty of fluids.    If you have any redness/skin sorness where the patches were placed, you may use aloe vera gel as needed to help relieve local pain.     CALL THE PHYSICIAN IF:    You develop nausea or vomiting    You develop hives or a rash or any unexplained itching    ADDITIONAL INFORMATION:  Cardiovascular Clinic:   [address] [place] [address]. [place] [address]  Your Care Team:  EP Cardiology   Telephone Number     Maci Cooper [phone]   Chetna Ching RN  Estella Monsivais RN  [phone]     For scheduling appts or procedures:    Radha Willson   [phone]   For the Device Clinic (Pacemakers and ICD's)   RN's :   Neetu Davey  During business hours: [phone]     After business hours:   [phone]- select option 4 and ask for job code [id number].       As always, Thank you for trusting us with your health care needs!

## 2018-05-04 NOTE — MR AVS SNAPSHOT
After Visit Summary   5/4/2018    Stu Meredith    MRN: [id number]           Patient Information     Date Of Birth          1955        Visit Information        Provider Department      5/4/2018 6:00 AM [facility]        Today's Diagnoses     Ventricular tachycardia (H)    -  1       Follow-ups after your visit        Your next 10 appointments already scheduled     Jun 01, 2018  1:00 PM CDT   Card Cardpul Stress Tst Adult with UUEKGS   [facility] ([facility])    [address] [address]               Jun 01, 2018  2:00 PM CDT   Ech Stress Test with UUECHSR1   [facility], [place],  Echocardiography ([facility])    [address] [address]   [phone]           1. Please bring or wear a comfortable two-piece outfit and walking shoes. 2. Stop eating 3 hours before the test. You may drink water or juice. 3. Stop all caffeine 12 hours before the test. This includes coffee, tea, soda pop, chocolate and certain medicines (such as Anacin and Excederin). Also avoid decaf coffee and tea, as these contain small amounts of caffeine. 4. No alcohol, smoking or use of other tobacco products for 12 hours before the test. 5. Refer to your provider instructions to see if you need to stop any medications (such as beta-blockers or nitrates) for this test. 6. For patients with diabetes: - If you take insulin, call your diabetes care team. Ask if you should take a   dose the morning of your test. - If you take diabetes medicine by mouth, dont take it on the morning of your test. Bring it with you to take after the test. (If you have questions, call your diabetes care team) 7. When you arrive, please tell us if: - You have diabetes. - You have taken Viagra, Cialis or Levitra in the past 48 hours. 8. For any questions that cannot be answered, please contact the ordering  physician            Jun 01, 2018  3:30 PM CDT   (Arrive by 3:15 PM)   Implanted Defibulator with Uc Cv Device 1   [facility] ([facility])    [address] [place] [address]  [address] [place] [id number] [phone]            Jun 01, 2018  4:00 PM CDT   (Arrive by 3:45 PM)   Return Genetic with Mona Ware MD   [facility] ([facility])    [address] [place] [address]  [address] [place] [address] [phone]            Jun 22, 2018  1:30 PM CDT   (Arrive by 1:15 PM)   RETURN ARRHYTHMIA with Erik Cooper MD   [facility] ([facility])    [address] [place] [address]  [address] [place] [address] [phone]              Future tests that were ordered for you today     Open Future Orders        Priority Expected Expires Ordered    Cardioversion Routine  5/4/2019 5/4/2018            Who to contact     If you have questions or need follow up information about today's clinic visit or your schedule please contact [facility] directly at [phone].  Normal or non-critical lab and imaging results will be communicated to you by MyChart, letter or phone within 4 business days after the clinic has received the results. If you do not hear from us within 7 days, please contact the clinic through Agolohart or phone. If you have a critical or abnormal lab result, we will notify you by phone as soon as possible.  Submit refill requests through [employer] or call your pharmacy and they will forward the refill request to us. Please allow 3 business days for your refill to be completed.          Additional Information About Your Visit        MyChart Information     [employer] gives you secure access to your electronic health record. If you see a primary care provider, you can also send messages to your care team and make appointments. If you have questions, please call your primary care clinic.  If you do not have a  primary care provider, please call [phone] and they will assist you.        Care EveryWhere ID     This is your Care EveryWhere ID. This could be used by other organizations to access your [place] medical records  [id number]         Blood Pressure from Last 3 Encounters:   05/04/18 132/84   05/04/18 114/72   02/21/18 129/80    Weight from Last 3 Encounters:   02/21/18 95.9 kg (211 lb 8 oz)   01/03/18 97.5 kg (215 lb)   12/26/17 97.9 kg (215 lb 12.8 oz)              We Performed the Following     (77930) INTERROGATION DEVICE EVAL REMOTE, PACER/ICD          Today's Medication Changes      Notice     This visit is during an admission. Changes to the med list made in this visit will be reflected in the After Visit Summary of the admission.             Primary Care Provider Office Phone # Fax #    Omar Camacho [phone]       [address] [place] [address]        Equal Access to Services     BENNETT DE LOS SANTOS : Hadii aad ku hadasho Soomaali, waaxda luqadaha, qaybta kaalmada adeegyada, waxay idiin hayzaria graham . So [facility] [phone].    ATENCIÓN: Si habla español, tiene a drew disposición servicios gratuitos de asistencia lingüística. [facility] [phone].    We comply with applicable federal civil rights laws and [place] laws. We do not discriminate on the basis of race, color, national origin, age, disability, sex, sexual orientation, or gender identity.            Thank you!     Thank you for choosing [facility]  for your care. Our goal is always to provide you with excellent care. Hearing back from our patients is one way we can continue to improve our services. Please take a few minutes to complete the written survey that you may receive in the mail after your visit with us. Thank you!             Your Updated Medication List - Protect others around you: Learn how to safely use, store and throw away your medicines at www.disposemymeds.org.      Notice      This visit is during an admission. Changes to the med list made in this visit will be reflected in the After Visit Summary of the admission.

## 2018-05-04 NOTE — PROGRESS NOTES
Pt admitted to 2A for a cardioversion.  PIV placed. No labs needed.  Pt needs consent.  H and P up to date.  Pt has an ICD.  Wife at bedside.

## 2018-05-04 NOTE — PROGRESS NOTES
Preliminary Device Interrogation Results.  Final physician signed paceart report to be scanned and attached.    [employer] remote ICD transmission received and reviewed. Device transmission sent per MD orders from the [facility] Echo Lab to verify heart rhythm s/p DCCV. His presenting rhythm is AP/ VS @ 60 bpm. Normal device function. Echo RN notified of transmission results. Plan for pt to RTC in 1 month as scheduled.  Remote ICD transmission

## 2018-05-04 NOTE — ANESTHESIA CARE TRANSFER NOTE
Patient: Stu Meredith    Procedure(s):  Anesthesia Coverage Cardioversion @1400    Diagnosis: Atrial Fibrillation   Diagnosis Additional Information: No value filed.    Anesthesia Type:   No value filed.     Note:  Airway :Nasal Cannula  Destination: Echo Lab.        Vitals: (Last set prior to Anesthesia Care Transfer)    CRNA VITALS  5/4/2018 1327 - 5/4/2018 1357      5/4/2018             NIBP: 110/84    Pulse: 75    SpO2: 96 %    EKG: Sinus rhythm                Electronically Signed By: KARSON Yoon CRNA  May 4, 2018  1:57 PM

## 2018-05-05 NOTE — OP NOTE
CARDIOVERSION    PROCEDURE:  direct current cardioversion  PROCEDURE DATE: 5/5/2018     Pre-procedure diagnosis:  Atrial fibrillation  Post-procedure diagnosis: s/p direct current cardioversion  Complications:  none    BRIEF CLINICAL HISTORY:  Mr. Meredith is a 62 year old male who has a past medical history significant for HCM diagnosed 2006, VT on Holter July 2012 s/p ICD 7/2012, troponin leak Oct 2013 (angiogram with non-significant CAD, LV gram showed EF 25%, recovered to 60% on TTE Dec 2013), HTN, AFib (CHADSVASC 2) with DCCVs then s/p PVI 12/2011 and PVI for persistent AF on 7/28/16 s/p DCCV 8/7/17, and 1/3/18. He recurred back into symptomatic AF and now presents for DCCV.      PROCEDURE:  The patient arrived at the Echo Laboratory in a fasting, non-sedated state.  Informed consent was previously obtained from patient, who understood indications, risks, and benefits of the procedure.  Patient on uninterrupted Xarelto for >1 mo. With help from Anesthesia, patient was deeply sedated.  150J of synchronized biphasic shock was delivered through the cardiac monitor, and the patient was successfully converted to normal sinus rhythm.  After sedation wore off, patient awoke and remained neurologically intact.  The patient tolerated the procedure well from a hemodynamic and respiratory standpoint without any complications.  He was observed in the Echo Laboratory and then discharged to home after sedation wore off and he was ambulatory.    IMPRESSION:  1.  Successful direct current cardioversion with 150J biphasic shock.    RECOMMENDATIONS/PLANS:  1.   Follow-up with Dr. Cooper  2.   Continue anticoagulation    KARSON Richardson CNP  Electrophysiology Consult Service  Pager: [phone]

## 2018-05-07 LAB
INTERPRETATION ECG - MUSE: NORMAL
INTERPRETATION ECG - MUSE: NORMAL

## 2018-05-09 ENCOUNTER — ALLIED HEALTH/NURSE VISIT (OUTPATIENT)
Dept: CARDIOLOGY | Facility: CLINIC | Age: 63
End: 2018-05-09
Attending: INTERNAL MEDICINE
Payer: COMMERCIAL

## 2018-05-09 DIAGNOSIS — I42.2 HYPERTROPHIC CARDIOMYOPATHY (H): Primary | ICD-10-CM

## 2018-05-09 PROCEDURE — 93295 DEV INTERROG REMOTE 1/2/MLT: CPT | Performed by: INTERNAL MEDICINE

## 2018-05-09 PROCEDURE — 93296 REM INTERROG EVL PM/IDS: CPT | Mod: ZF

## 2018-05-09 NOTE — MR AVS SNAPSHOT
After Visit Summary   5/9/2018    Stu Meredith    MRN: [id number]           Patient Information     Date Of Birth          1955        Visit Information        Provider Department      5/9/2018 6:00 AM [facility]        Today's Diagnoses     Hypertrophic cardiomyopathy (H)    -  1       Follow-ups after your visit        Your next 10 appointments already scheduled     May 18, 2018   Procedure with Mike Nickerson MD   [facility] ([facility])    [address] [place] [address]   [phone]           [facility] is located at [address] [place]            Jun 01, 2018  1:00 PM CDT   Card Cardpul Stress Tst Adult with UUEKGS   [facility] ([facility])    [address] [address]               Jun 01, 2018  2:00 PM CDT   Ech Stress Test with UUECHSR1   [facility],  Echocardiography ([facility])    [address] [facility] [address]   [phone]           1. Please bring or wear a comfortable two-piece outfit and walking shoes. 2. Stop eating 3 hours before the test. You may drink water or juice. 3. Stop all caffeine 12 hours before the test. This includes coffee, tea, soda pop, chocolate and certain medicines (such as Anacin and Excederin). Also avoid decaf coffee and tea, as these contain small amounts of caffeine. 4. No alcohol, smoking or use of other tobacco products for 12 hours before the test. 5. Refer to your provider instructions to see if you need to stop any medications (such as beta-blockers or nitrates) for this test. 6. For patients with diabetes: - If you take insulin, call your diabetes care team. Ask if you should take a   dose the morning of your test. - If you take diabetes medicine by mouth, dont take it on the morning of your test. Bring it with you to take after the  test. (If you have questions, call your diabetes care team) 7. When you arrive, please tell us if: - You have diabetes. - You have taken Viagra, Cialis or Levitra in the past 48 hours. 8. For any questions that cannot be answered, please contact the ordering physician            Jun 01, 2018  3:30 PM CDT   (Arrive by 3:15 PM)   Implanted Defibulator with Uc Cv Device 1   [facility] ([facility])    [address] [place] [address]  [address] [place] [id number] [phone]            Jun 01, 2018  4:00 PM CDT   (Arrive by 3:45 PM)   Return Genetic with Mona Ware MD   [facility] ([facility])    [address] [place] [address]  [address] [place] [id number] [phone]            Jun 07, 2018  4:00 PM CDT   New Patient Visit with Boaz Miller MD   [facility] ([facility])    [address]  [address]  [facility] [id number] [phone]            Jun 22, 2018  1:30 PM CDT   (Arrive by 1:15 PM)   RETURN ARRHYTHMIA with Erik Cooper MD   [facility] ([facility])    [address] [place] [address]  [address] [place] [id number] [phone]              Who to contact     If you have questions or need follow up information about today's clinic visit or your schedule please contact [facility] directly at [phone].  Normal or non-critical lab and imaging results will be communicated to you by MyChart, letter or phone within 4 business days after the clinic has received the results. If you do not hear from us within 7 days, please contact the clinic through MyChart or phone. If you have a critical or abnormal lab result, we will notify you by phone as soon as possible.  Submit refill requests through [employer] or call your pharmacy and they will forward the refill request to us. Please allow 3 business days for your refill to be completed.           Additional Information About Your Visit        Bright!Taxhart Information     [employer] gives you secure access to your electronic health record. If you see a primary care provider, you can also send messages to your care team and make appointments. If you have questions, please call your primary care clinic.  If you do not have a primary care provider, please call [phone] and they will assist you.        Care EveryWhere ID     This is your Care EveryWhere ID. This could be used by other organizations to access your [place] medical records  [id number]         Blood Pressure from Last 3 Encounters:   05/04/18 132/84   05/04/18 114/72   02/21/18 129/80    Weight from Last 3 Encounters:   02/21/18 95.9 kg (211 lb 8 oz)   01/03/18 97.5 kg (215 lb)   12/26/17 97.9 kg (215 lb 12.8 oz)              We Performed the Following     (48529) INTERROGATION DEVICE EVAL REMOTE, PACER/ICD        Primary Care Provider Office Phone # Fax #    Omar Camacho [phone]       [address] [place] [address]        Equal Access to Services     [facility]: Hadii aad ku hadasho Soomaali, waaxda luqadaha, qaybta kaalmada adeegyada, jorden cornell haydarryln thomas graham . So [facility] [phone].    ATENCIÓN: Si habla español, tiene a drew disposición servicios gratuitos de asistencia lingüística. [facility] [phone].    We comply with applicable federal civil rights laws and [place] laws. We do not discriminate on the basis of race, color, national origin, age, disability, sex, sexual orientation, or gender identity.            Thank you!     Thank you for choosing [facility]  for your care. Our goal is always to provide you with excellent care. Hearing back from our patients is one way we can continue to improve our services. Please take a few minutes to complete the written survey that you may receive in the mail after your visit with us. Thank you!             Your Updated Medication  List - Protect others around you: Learn how to safely use, store and throw away your medicines at www.disposemymeds.org.          This list is accurate as of 5/9/18 11:59 PM.  Always use your most recent med list.                   Brand Name Dispense Instructions for use Diagnosis    acetaminophen 325 MG tablet    TYLENOL     Take 325-650 mg by mouth every 6 hours as needed        * albuterol 108 (90 Base) MCG/ACT Inhaler    PROAIR HFA    1 Inhaler    Inhale 2 puffs into the lungs every 4 hours as needed for shortness of breath / dyspnea or wheezing    Bronchitis with bronchospasm       * albuterol (2.5 MG/3ML) 0.083% neb solution     25 vial    Take 1 vial (2.5 mg) by nebulization every 6 hours as needed for shortness of breath / dyspnea or wheezing    Post-infection bronchospasm       amoxicillin 500 MG tablet    AMOXIL    4 tablet    Take 4 tablets (2000 mg) by mouth 1 hour before dental procedures.    History of total left hip arthroplasty       atorvastatin 20 MG tablet    LIPITOR    90 tablet    Take 1 tablet (20 mg) by mouth daily    CARDIOVASCULAR SCREENING; LDL GOAL LESS THAN 130       Blood Pressure Cuff Misc     1 each    1 Device daily    Atrial fibrillation (H), Hypertrophic cardiomyopathy (H)       Fluticasone-Salmeterol 113-14 MCG/ACT Aepb inhaler    AIRDUO RESPICLICK    1 each    Inhale 1 puff into the lungs 2 times daily    Post-infection bronchospasm       metoprolol succinate 50 MG 24 hr tablet    TOPROL-XL    90 tablet    Take 1 tablet (50 mg) by mouth daily    HOCM (hypertrophic obstructive cardiomyopathy) (H)       multivitamin, therapeutic Tabs tablet      Take 1 tablet by mouth daily        order for DME     1 each    Equipment being ordered: Nebulizer    Post-infection bronchospasm       rivaroxaban ANTICOAGULANT 20 MG Tabs tablet    XARELTO    90 tablet    Take 1 tablet (20 mg) by mouth daily (with dinner)    Chronic atrial fibrillation (H)       sotalol 80 MG tablet    BETAPACE    270  tablet    Take 1.5 tablets (120 mg) by mouth 2 times daily    Chronic atrial fibrillation (H), Hypertrophic cardiomyopathy (H)       zolpidem 10 MG tablet    AMBIEN    90 tablet    Take 0.5-1 tablets (5-10 mg) by mouth nightly as needed for sleep    Sedative, hypnotic or anxiolytic dependence (H)       * Notice:  This list has 2 medication(s) that are the same as other medications prescribed for you. Read the directions carefully, and ask your doctor or other care provider to review them with you.

## 2018-05-09 NOTE — H&P
Electrophysiology Pre-Procedure History and Physical    Stu Meredith MRN# [id number]   Age: 62 year old YOB: 1955      Date of Procedure: .5/4/18 Location [facility]      Date of Exam 5/4/18 Facility (Same day)       Home clinic: [facility]  Primary care provider: Omar Camacho         Active problem list:   Patient Active Problem List    Diagnosis Date Noted     Advanced directives, counseling/discussion 12/26/2017     Priority: Medium     Advance Directive Problem List Overview:   Name Relationship Phone    Primary Health Care Agent            Alternative Health Care Agent          Discussed advance care planning with patient; information given to patient to review. 12/2/2011          Chronic Zolpidem use for insomnia 09/27/2016     Priority: Medium     CHF (congestive heart failure) (H) 07/30/2016     Priority: Medium     S/P ablation of atrial flutter 07/28/2016     Priority: Medium     Insomnia, unspecified 01/14/2016     Priority: Medium     Obesity (BMI 30-39.9) 12/14/2015     Priority: Medium     Prediabetes 08/08/2014     Priority: Medium     Hip arthritis 01/15/2014     Priority: Medium     Automatic implantable cardioverter-defibrillator in situ- Corimmun, dual chamber- NOT dependent 12/03/2013     Priority: Medium     Implanted at [facility] 7/27/12, by Dr. Deleon  Problem list name updated by automated process. Provider to review       Chest pain      Priority: Medium     Ventricular tachycardia (H)      Priority: Medium     Hypertrophic cardiomyopathy (H)      Priority: Medium     CARDIOVASCULAR SCREENING; LDL GOAL LESS THAN 130 10/31/2010     Priority: Medium     Atrial fibrillation (H)      Priority: Medium     Tobacco use disorder 09/11/2007     Priority: Medium     Other abnormal heart sounds 09/11/2007     Priority: Medium            Medications (include herbals and vitamins):   Any Plavix use in the last 7 days? No      No current facility-administered medications for this encounter.      Current Outpatient Prescriptions   Medication Sig     atorvastatin (LIPITOR) 20 MG tablet Take 1 tablet (20 mg) by mouth daily     metoprolol (TOPROL-XL) 50 MG 24 hr tablet Take 1 tablet (50 mg) by mouth daily     multivitamin, therapeutic (THERA-VIT) TABS Take 1 tablet by mouth daily     rivaroxaban ANTICOAGULANT (XARELTO) 20 MG TABS tablet Take 1 tablet (20 mg) by mouth daily (with dinner)     sotalol (BETAPACE) 80 MG tablet Take 1.5 tablets (120 mg) by mouth 2 times daily     zolpidem (AMBIEN) 10 MG tablet Take 0.5-1 tablets (5-10 mg) by mouth nightly as needed for sleep     acetaminophen (TYLENOL) 325 MG tablet Take 325-650 mg by mouth every 6 hours as needed     albuterol (2.5 MG/3ML) 0.083% neb solution Take 1 vial (2.5 mg) by nebulization every 6 hours as needed for shortness of breath / dyspnea or wheezing     albuterol (PROAIR HFA) 108 (90 BASE) MCG/ACT Inhaler Inhale 2 puffs into the lungs every 4 hours as needed for shortness of breath / dyspnea or wheezing     amoxicillin (AMOXIL) 500 MG tablet Take 4 tablets (2000 mg) by mouth 1 hour before dental procedures.     Blood Pressure Monitoring (BLOOD PRESSURE CUFF) MISC 1 Device daily     Fluticasone-Salmeterol 113-14 MCG/ACT AEPB Inhale 1 puff into the lungs 2 times daily     order for DME Equipment being ordered: Nebulizer     Facility-Administered Medications Ordered in Other Encounters   Medication     methohexital (BREVITAL SODIUM) injection             Allergies:      Allergies   Allergen Reactions     Chantix [Varenicline]      Nigthmares, insomnia, patient states that it is not really an allergy, just a side effect     Allergy to Latex? No  Allergy to tape?   No  Intolerances: NA          Social History:     Social History   Substance Use Topics     Smoking status: Former Smoker     Packs/day: 0.25     Years: 40.00     Types: Cigarettes     Start date: 1/1/1975     Quit date:  12/11/2015     Smokeless tobacco: Never Used     Alcohol use No      Comment: 1 drink per week            Physical Exam:   All vitals have been reviewed  No data found.       Airway assessment:   Patient is able to open mouth wide  Patient is able to stick out tongue}      ENT:   Normocephalic, without obvious abnormality, atraumatic, sinuses nontender on palpation, external ears without lesions, oral pharynx with moist mucous membranes, tonsils without erythema or exudates, gums normal and good dentition.     Neck:   Supple, symmetrical, trachea midline, no adenopathy, thyroid symmetric, not enlarged and no tenderness, skin normal     Lungs:   No increased work of breathing, good air exchange, clear to auscultation bilaterally, no crackles or wheezing     Cardiovascular:   Irregular             Lab / Radiology Results:     Lab Results   Component Value Date    WBC 6.7 12/26/2017    RBC 4.99 12/26/2017    HGB 15.1 12/26/2017    HCT 46.1 12/26/2017    MCV 92 12/26/2017    RDW 13.5 12/26/2017     12/26/2017      Lab Results   Component Value Date    WBC 6.7 12/26/2017     Lab Results   Component Value Date     12/26/2017     Lab Results   Component Value Date    HGB 15.1 12/26/2017    HCT 46.1 12/26/2017             Plan:   Patient's active problems diagnostically and therapeutically optimized for the planned procedure  Patient here for DCCV. Procedure explained in detail including indications, risks, and benefits. He states understanding and wishes to proceed.     KARSON Richardson CNP  Electrophysiology Consult Service  Pager: [phone]

## 2018-05-09 NOTE — ADDENDUM NOTE
Encounter addended by: Maci Trevino APRN CNP on: 5/9/2018  5:17 PM<BR>     Actions taken: Sign clinical note

## 2018-05-10 NOTE — PROGRESS NOTES
Preliminary Device Interrogation Results.  Final physician signed paceart report to be scanned and attached.    [employer] remote ICD transmission received and reviewed. Device transmission sent per MD orders. He is s/p DCCV 5/4/18. Pt called reporting he isn't feeling well and thinks he is back in AF. His presenting rhythm is AP/ VS @ 60 bpm with frequent PVCs.  Remote ICD transmission

## 2018-05-18 ENCOUNTER — HOSPITAL ENCOUNTER (OUTPATIENT)
Facility: CLINIC | Age: 63
Discharge: HOME | End: 2018-05-18
Attending: COLON & RECTAL SURGERY | Admitting: COLON & RECTAL SURGERY
Payer: COMMERCIAL

## 2018-05-18 ENCOUNTER — SURGERY (OUTPATIENT)
Age: 63
End: 2018-05-18

## 2018-05-18 VITALS
SYSTOLIC BLOOD PRESSURE: 114 MMHG | DIASTOLIC BLOOD PRESSURE: 63 MMHG | WEIGHT: 210 LBS | RESPIRATION RATE: 21 BRPM | BODY MASS INDEX: 29.4 KG/M2 | HEIGHT: 71 IN | OXYGEN SATURATION: 96 %

## 2018-05-18 DIAGNOSIS — I48.20 CHRONIC ATRIAL FIBRILLATION (H): ICD-10-CM

## 2018-05-18 LAB — COLONOSCOPY: NORMAL

## 2018-05-18 PROCEDURE — 25000128 H RX IP 250 OP 636: Performed by: COLON & RECTAL SURGERY

## 2018-05-18 PROCEDURE — 45380 COLONOSCOPY AND BIOPSY: CPT | Performed by: COLON & RECTAL SURGERY

## 2018-05-18 PROCEDURE — 27210582 ZZH DEVICE CLIP RESOLUTION, EACH: Performed by: COLON & RECTAL SURGERY

## 2018-05-18 PROCEDURE — 88305 TISSUE EXAM BY PATHOLOGIST: CPT | Mod: 26 | Performed by: COLON & RECTAL SURGERY

## 2018-05-18 PROCEDURE — 88305 TISSUE EXAM BY PATHOLOGIST: CPT | Performed by: COLON & RECTAL SURGERY

## 2018-05-18 PROCEDURE — G0500 MOD SEDAT ENDO SERVICE >5YRS: HCPCS | Performed by: COLON & RECTAL SURGERY

## 2018-05-18 RX ORDER — ONDANSETRON 4 MG/1
4 TABLET, ORALLY DISINTEGRATING ORAL EVERY 6 HOURS PRN
Status: DISCONTINUED | OUTPATIENT
Start: 2018-05-18 | End: 2018-05-18 | Stop reason: HOSPADM

## 2018-05-18 RX ORDER — NALOXONE HYDROCHLORIDE 0.4 MG/ML
.1-.4 INJECTION, SOLUTION INTRAMUSCULAR; INTRAVENOUS; SUBCUTANEOUS
Status: DISCONTINUED | OUTPATIENT
Start: 2018-05-18 | End: 2018-05-18 | Stop reason: HOSPADM

## 2018-05-18 RX ORDER — FLUMAZENIL 0.1 MG/ML
0.2 INJECTION, SOLUTION INTRAVENOUS
Status: DISCONTINUED | OUTPATIENT
Start: 2018-05-18 | End: 2018-05-18 | Stop reason: HOSPADM

## 2018-05-18 RX ORDER — ONDANSETRON 2 MG/ML
4 INJECTION INTRAMUSCULAR; INTRAVENOUS
Status: DISCONTINUED | OUTPATIENT
Start: 2018-05-18 | End: 2018-05-18 | Stop reason: HOSPADM

## 2018-05-18 RX ORDER — FENTANYL CITRATE 50 UG/ML
INJECTION, SOLUTION INTRAMUSCULAR; INTRAVENOUS PRN
Status: DISCONTINUED | OUTPATIENT
Start: 2018-05-18 | End: 2018-05-18 | Stop reason: HOSPADM

## 2018-05-18 RX ORDER — LIDOCAINE 40 MG/G
CREAM TOPICAL
Status: DISCONTINUED | OUTPATIENT
Start: 2018-05-18 | End: 2018-05-18 | Stop reason: HOSPADM

## 2018-05-18 RX ORDER — ONDANSETRON 2 MG/ML
4 INJECTION INTRAMUSCULAR; INTRAVENOUS EVERY 6 HOURS PRN
Status: DISCONTINUED | OUTPATIENT
Start: 2018-05-18 | End: 2018-05-18 | Stop reason: HOSPADM

## 2018-05-18 RX ADMIN — MIDAZOLAM 2 MG: 1 INJECTION INTRAMUSCULAR; INTRAVENOUS at 14:09

## 2018-05-18 RX ADMIN — FENTANYL CITRATE 100 MCG: 50 INJECTION, SOLUTION INTRAMUSCULAR; INTRAVENOUS at 14:09

## 2018-05-18 RX ADMIN — MIDAZOLAM 1 MG: 1 INJECTION INTRAMUSCULAR; INTRAVENOUS at 14:12

## 2018-05-18 NOTE — H&P
Pre-Endoscopy History and Physical     Stu Meredith MRN# [id number]   YOB: 1955 Age: 62 year old     Date of Procedure: 5/18/2018  Primary care provider: Omar Camacho  Type of Endoscopy: Colonoscopy  Reason for Procedure: Screening  Type of Anesthesia Anticipated: Moderate Sedation    HPI:    Stu is a 62 year old male who will be undergoing the above procedure.      A history and physical has been performed. The patient's medications and allergies have been reviewed. The risks and benefits of the procedure and the sedation options and risks were discussed with the patient.  All questions were answered and informed consent was obtained.      He denies a personal or family history of anesthesia complications or bleeding disorders.     Allergies   Allergen Reactions     Chantix [Varenicline]      Nigthmares, insomnia, patient states that it is not really an allergy, just a side effect          Current Facility-Administered Medications on File Prior to Encounter:  methohexital (BREVITAL SODIUM) injection     Current Outpatient Prescriptions on File Prior to Encounter:  acetaminophen (TYLENOL) 325 MG tablet Take 325-650 mg by mouth every 6 hours as needed   albuterol (2.5 MG/3ML) 0.083% neb solution Take 1 vial (2.5 mg) by nebulization every 6 hours as needed for shortness of breath / dyspnea or wheezing   albuterol (PROAIR HFA) 108 (90 BASE) MCG/ACT Inhaler Inhale 2 puffs into the lungs every 4 hours as needed for shortness of breath / dyspnea or wheezing   amoxicillin (AMOXIL) 500 MG tablet Take 4 tablets (2000 mg) by mouth 1 hour before dental procedures.   atorvastatin (LIPITOR) 20 MG tablet Take 1 tablet (20 mg) by mouth daily   Blood Pressure Monitoring (BLOOD PRESSURE CUFF) MISC 1 Device daily   Fluticasone-Salmeterol 113-14 MCG/ACT AEPB Inhale 1 puff into the lungs 2 times daily   metoprolol (TOPROL-XL) 50 MG 24 hr tablet Take 1 tablet (50 mg) by mouth daily   multivitamin,  therapeutic (THERA-VIT) TABS Take 1 tablet by mouth daily   order for DME Equipment being ordered: Nebulizer   rivaroxaban ANTICOAGULANT (XARELTO) 20 MG TABS tablet Take 1 tablet (20 mg) by mouth daily (with dinner)   sotalol (BETAPACE) 80 MG tablet Take 1.5 tablets (120 mg) by mouth 2 times daily   zolpidem (AMBIEN) 10 MG tablet Take 0.5-1 tablets (5-10 mg) by mouth nightly as needed for sleep       Patient Active Problem List   Diagnosis     Tobacco use disorder     Other abnormal heart sounds     Atrial fibrillation (H)     CARDIOVASCULAR SCREENING; LDL GOAL LESS THAN 130     Hypertrophic cardiomyopathy (H)     Advanced directives, counseling/discussion     Chest pain     Ventricular tachycardia (H)     Automatic implantable cardioverter-defibrillator in situ- [employer], dual chamber- NOT dependent     Hip arthritis     Prediabetes     Obesity (BMI 30-39.9)     Insomnia, unspecified     S/P ablation of atrial flutter     CHF (congestive heart failure) (H)     Chronic Zolpidem use for insomnia        Past Medical History:   Diagnosis Date     Atrial fibrillation (H) 12/9/11    failed medication, multiple DC cardioveresions; s/p Left atrial ablation to eliminate atrial fibrillation 12/9/11     Chest pain      CHF (congestive heart failure) (H) 7/30/2016     Coronary artery disease      DJD (degenerative joint disease)      Hip arthritis 1/15/2014     Hypertension      Hypertrophic cardiomyopathy (H) 10/09     Other abnormal heart sounds      Pacemaker     ICD     Pneumonia, organism unspecified(486)      Prediabetes 7/10/15    A1C 6.5     Status post implantation of automatic cardioverter/defibrillator (AICD)      Ventricular tachycardia (H)         Past Surgical History:   Procedure Laterality Date     ANESTHESIA CARDIOVERSION  4/24/2014    Procedure: ANESTHESIA CARDIOVERSION;  Surgeon: Generic Anesthesia Provider;  Location: [facility] OR     ANESTHESIA CARDIOVERSION N/A 5/12/2016    Procedure: ANESTHESIA  CARDIOVERSION;  Surgeon: GENERIC ANESTHESIA PROVIDER;  Location: [facility] OR     ANESTHESIA CARDIOVERSION N/A 8/7/2017    Procedure: ANESTHESIA CARDIOVERSION;  Anesthesia Offsite Coverage Cardioversion @1100;  Surgeon: GENERIC ANESTHESIA PROVIDER;  Location: [facility] OR     ANESTHESIA CARDIOVERSION N/A 1/3/2018    Procedure: ANESTHESIA CARDIOVERSION;  Anesthesia Cardioverion;  Surgeon: GENERIC ANESTHESIA PROVIDER;  Location: [facility] OR     ANESTHESIA CARDIOVERSION N/A 5/4/2018    Procedure: ANESTHESIA CARDIOVERSION;  Anesthesia Coverage Cardioversion @1400;  Surgeon: GENERIC ANESTHESIA PROVIDER;  Location: [facility] OR     ARTHROPLASTY HIP  1/15/2014    Procedure: ARTHROPLASTY HIP;  Left Total Hip Arthroplasty;  Surgeon: Nelson Gaspar MD;  Location: [facility]     CARDIAC SURGERY       H ABLATION FOCAL AFIB  12/9/11    Left atrial ablation to eliminate atrial fibrillation     IMPLANT AUTOMATIC IMPLANTABLE CARDIOVERTER DEFIBRILLATOR  7/27/12    AICD implantation     TONSILLECTOMY  1964       Social History   Substance Use Topics     Smoking status: Former Smoker     Packs/day: 0.25     Years: 40.00     Types: Cigarettes     Start date: 1/1/1975     Quit date: 12/11/2015     Smokeless tobacco: Never Used     Alcohol use No      Comment: 1 drink per week       Family History   Problem Relation Age of Onset     HEART DISEASE Mother      unknown     Obesity Mother      GASTROINTESTINAL DISEASE Mother      diverticulitis     DIABETES Maternal Grandmother      DIABETES Paternal Grandmother      CEREBROVASCULAR DISEASE Paternal Grandmother 94     DIABETES Paternal Grandfather      CEREBROVASCULAR DISEASE Paternal Grandfather 78     DIABETES Son      C.A.D. Father      CABG age 78     HEART DISEASE Father      CABG x5     DIABETES Maternal Grandfather        REVIEW OF SYSTEMS:     5 point ROS negative except as noted above in HPI, including Gen., Resp., CV, GI &  system review.      PHYSICAL EXAM:   There were no vitals taken for this visit.  "Estimated body mass index is 29.5 kg/(m^2) as calculated from the following:    Height as of 2/21/18: 1.803 m (5' 11\").    Weight as of 2/21/18: 95.9 kg (211 lb 8 oz).   GENERAL APPEARANCE: healthy and alert  MENTAL STATUS: alert  RESP: lungs clear to auscultation - no rales, rhonchi or wheezes  CV: regular rates and rhythm and normal S1 S2, no S3 or S4      IMPRESSION   ASA Class 4 - Severe systemic disease that is a constant threat to life        PLAN:     Plan for colonoscopy. We discussed the risks, benefits and alternatives and the patient wished to proceed.    The above has been forwarded to the consulting provider.      Mike Nickerson MD  [facility]  Phone: [phone]  May 18, 2018    "

## 2018-05-21 LAB — COPATH REPORT: NORMAL

## 2018-06-01 ENCOUNTER — HOSPITAL ENCOUNTER (OUTPATIENT)
Dept: CARDIOLOGY | Facility: CLINIC | Age: 63
Discharge: HOME | End: 2018-06-01
Attending: INTERNAL MEDICINE | Admitting: INTERNAL MEDICINE
Payer: COMMERCIAL

## 2018-06-01 ENCOUNTER — OFFICE VISIT (OUTPATIENT)
Dept: CARDIOLOGY | Facility: CLINIC | Age: 63
End: 2018-06-01
Attending: INTERNAL MEDICINE
Payer: COMMERCIAL

## 2018-06-01 VITALS
HEIGHT: 71 IN | WEIGHT: 214.2 LBS | HEART RATE: 71 BPM | BODY MASS INDEX: 29.99 KG/M2 | SYSTOLIC BLOOD PRESSURE: 132 MMHG | OXYGEN SATURATION: 96 % | DIASTOLIC BLOOD PRESSURE: 82 MMHG

## 2018-06-01 DIAGNOSIS — I42.2 HYPERTROPHIC CARDIOMYOPATHY (H): ICD-10-CM

## 2018-06-01 DIAGNOSIS — I48.20 CHRONIC ATRIAL FIBRILLATION (H): ICD-10-CM

## 2018-06-01 DIAGNOSIS — I48.91 ATRIAL FIBRILLATION, UNSPECIFIED TYPE (H): ICD-10-CM

## 2018-06-01 DIAGNOSIS — G47.33 OSA (OBSTRUCTIVE SLEEP APNEA): Primary | ICD-10-CM

## 2018-06-01 DIAGNOSIS — I42.2 HYPERTROPHIC CARDIOMYOPATHY (H): Primary | ICD-10-CM

## 2018-06-01 PROCEDURE — 93017 CV STRESS TEST TRACING ONLY: CPT

## 2018-06-01 PROCEDURE — 93283 PRGRMG EVAL IMPLANTABLE DFB: CPT | Mod: 26 | Performed by: INTERNAL MEDICINE

## 2018-06-01 PROCEDURE — G0463 HOSPITAL OUTPT CLINIC VISIT: HCPCS

## 2018-06-01 PROCEDURE — 93018 CV STRESS TEST I&R ONLY: CPT | Performed by: INTERNAL MEDICINE

## 2018-06-01 PROCEDURE — 93325 DOPPLER ECHO COLOR FLOW MAPG: CPT | Mod: 26 | Performed by: INTERNAL MEDICINE

## 2018-06-01 PROCEDURE — 93283 PRGRMG EVAL IMPLANTABLE DFB: CPT | Mod: ZF

## 2018-06-01 PROCEDURE — 93321 DOPPLER ECHO F-UP/LMTD STD: CPT | Mod: 26 | Performed by: INTERNAL MEDICINE

## 2018-06-01 PROCEDURE — 94621 CARDIOPULM EXERCISE TESTING: CPT | Mod: 26 | Performed by: INTERNAL MEDICINE

## 2018-06-01 PROCEDURE — 94621 CARDIOPULM EXERCISE TESTING: CPT | Performed by: INTERNAL MEDICINE

## 2018-06-01 PROCEDURE — 99214 OFFICE O/P EST MOD 30 MIN: CPT | Mod: GC | Performed by: INTERNAL MEDICINE

## 2018-06-01 PROCEDURE — 93016 CV STRESS TEST SUPVJ ONLY: CPT | Performed by: INTERNAL MEDICINE

## 2018-06-01 PROCEDURE — 93350 STRESS TTE ONLY: CPT | Mod: 26 | Performed by: INTERNAL MEDICINE

## 2018-06-01 PROCEDURE — 2894A VOIDCORRECT: CPT | Performed by: INTERNAL MEDICINE

## 2018-06-01 RX ORDER — SPIRONOLACTONE 25 MG/1
25 TABLET ORAL DAILY
Qty: 90 TABLET | Refills: 3 | Status: SHIPPED | OUTPATIENT
Start: 2018-06-01 | End: 2019-01-18

## 2018-06-01 RX ORDER — SOTALOL HYDROCHLORIDE 120 MG/1
120 TABLET ORAL 2 TIMES DAILY
Qty: 90 TABLET | Refills: 3 | Status: SHIPPED | OUTPATIENT
Start: 2018-06-01 | End: 2018-08-28

## 2018-06-01 ASSESSMENT — PAIN SCALES - GENERAL: PAINLEVEL: NO PAIN (0)

## 2018-06-01 NOTE — PATIENT INSTRUCTIONS
"You were seen today in the Adult Congenital and Cardiovascular Genetics Clinic at the [facility].    Cardiology Providers you saw during your visit:  Dr. Mona Ware     Diagnosis:  Hypertrophic Cardiomyopathy    Results:  The results of your stress testing was discussed with you today.     Recommendations:    1.  Continue to eat a heart healthy diet.  2.  Continue to get 20-30 minutes of aerobic activity, 4-5 days per week. Please follow recommended exercise guidelines as discussed in your appointment.    3.  Continue to observe good oral hygiene, with regular dental visits.  4.  Please start Spironolactone 25 mg daily.    5. Both Spironolactone and Sotal[facility].  6. Please have labs drawn next week when you see Dr. Cooper.      Vitals:    06/01/18 1527   BP: 132/82   BP Location: Left arm   Patient Position: Chair   Cuff Size: Adult Large   Pulse: 71   SpO2: 96%   Weight: 97.2 kg (214 lb 3.2 oz)   Height: 1.803 m (5' 11\")       Exercise restrictions:   Yes__X__  No____         If yes, list restrictions:  Please follow Recommendations for the Acceptability of Recreational Sports Activities and Exercise in Patients Handout      Work restrictions:  Yes____  No__X__         If yes, list restrictions:    FASTING CHOLESTEROL was checked in the last 5 years YES_X__  NO___ 2017  Continue to eat a heart healthy, low salt diet.         ____ Fasting lipid panel order today         ____ No changes in medications          ____ I recommend the following changes in your cholesterol medications.:          ____ Please follow up for cholesterol screening at your primary care physician      Follow-up:  Follow up with Dr. Ware in 6 months    If you have questions or concerns please contact us at:    Conchita Schulte, RN, BSN   Td Vega (Scheduling.)  Nurse Coordinator     Clinic   Adult Congenital and CV Genetics Adult Congenital and CV " Genetic  [facility]  (P)[phone]    (P) [phone]  [email or web address] (F)[phone]        For after hours urgent needs, call [phone] and ask to speak to the Adult Congenital Physician on call.  Mention Job Code 0401.    For emergencies call 911.    [facility]  Mail Code [id number]  [address] [place] [address]

## 2018-06-01 NOTE — NURSING NOTE
Vitals completed successfully and medication reconciled. SMA Andres  Chief Complaint   Patient presents with     Follow Up For     hypertrophic cardiomyopathy

## 2018-06-01 NOTE — PROGRESS NOTES
Preliminary Device Interrogation Results.  Final physician signed paceart report to be scanned and attached.    [place] seen in [place] [facility] for evaluation and iterative programming of a [employer], dual lead ICD, per MD orders. He also has an appointment with Dr. Ware. Today his intrinsic rhythm is AF/ VS ~80 bpm. Normal ICD function. AF episode started 5/26/18. AP= 18% and = 13%. Lead trends appear stable. Pt reports that he feels tired lately and MART. Battery estimates 5.5 years to CARMEN. Plan for pt to RTC in 3 months.  Dual lead ICD

## 2018-06-01 NOTE — PROGRESS NOTES
Heart Failure Clinic Note    HPI  Mr. Meredith is a 62 year old male with history of HCM (dx '06, EF 20% -->? 60%) VT s/p ICD '12, nonobstructive CAD (cath '13), AF s/p PVI X 2 ('11, '16) and DCCV X 8, currently on sotalol and xeralto who presents to clinic for follow up.     Patient's primary complaint today is dyspnea on exertion and overall fatigue. He had his 8th DCCV 5/4/18 and thought that he went back into atrial fibrillation days later as he was feeling so poorly. Per device interrogation today, he has been back in atrial fibrillation since 5/26/17. When prompted, he reports he is unsure if he felt better before the 26th, when he went back into AF. He had an echo CPX today which showed RER: 1.14, VE/VCO2 38, 15 ml/kg/min (4 METS), 48% predicted. Echo showed limited augmentation at peak stress with flat BP response and a suboptimal HR response (74% predicted). He had frequent PVCs during exercise. There was no LVOT obstruction at rest or stress.     Patient reports his overall quality of life is poor but not to the point that he would want to consider advanced therapies at this time. He has dyspnea on exertion at 100-200 feet but denies orthopnea, PND, edema, abdominal bloating, weight gain or syncope. He has occasional pre-syncope/lightheaded episodes that are not brought on by exertion or have any particular pattern. His wife notes that he does snore at night.    Of note, patient is supposed to be on sotalol 120 BID. His prior script was for 80mg tabs, he took 1.5 tabs BID. His most recent script, filled 6-8 weeks ago, was for 120mg BID of which he took 1.5 tabs BID, resulting in a dose of 180 BID. He realized this yesterday and decreased back to 120 BID.       PMH  - HCM diagnosed '06, previously burnt out (EF 20%) now with recovered EF  - h/o VT s/p dual chamber ICD '12  - AF s/p PVI X 2 ('11, '16), h/o DCCV X 8   - nonobstructive CAD    Medications  - xeralto  - metoprolol 50 daily  - sotalol 120 BID (has  "been taking 180 BID for last 2 months on accident)  - atorvastatin 20     SH/FH reviewed in chart    ROS: complete 14 point ROS as per HPI, otherwise negative    /82 (BP Location: Left arm, Patient Position: Chair, Cuff Size: Adult Large)  Pulse 71  Ht 1.803 m (5' 11\")  Wt 97.2 kg (214 lb 3.2 oz)  SpO2 96%  BMI 29.87 kg/m2  General: moderately distressed emotionally, upset about dyspnea  HEENT: MMM  CV: irregularly irregular, normal rate. No murmurs or extra heart sounds. JVD <10  Pulm: CTA-B  Abdomen: soft, obese  Extremities: warm, no edema  Neuro: A&O X3.       Labs: none today    Echo CPX      Interpretation Summary  Submaximal treadmill stress test in a patient with a diagnosis of HCM.     The Ramirez treadmill score is 8 (low risk).  Exercise was stopped due to fatigue.  Normal blood pressure response to exercise.  No ECG evidence of ischemia.  No supraventricular or ventricular arrhythmias. Frequent PVCs noted throughout  the study.     Normal biventricular function with mild asymmetrical septal hypertrophy (12  mm).  No dynamic outflow tract obstruction is present at rest or with exercise.  Normal segmental wall motion at rest and with exercise.  Minimal augmentation in LV function with exercise.     Average functional capacity for age.        _____________________________________________________________________________  __     Stress  Limiting Sympton: fatigue.     Stress Results                                       Maximum Predicted HR:   157 bpm             Target HR: 133 bpm        % Maximum Predicted HR: 73 %                             Stage  DurationHeart Rate  BP                                 (mm:ss)   (bpm)                         Baseline            87    130/73                           Peak    8:33     115    114/64                             Stress Duration:   8:33 mm:ss                       Maximum Stress HR: 115 bpm *                    RER: 1.14, VE/VCO2 38, 15 ml/kg/min (4 " METS), 48% predicted.       ICD interrogation today       [place] seen in the [facility] for [place] and iterative programming of a [employer], dual lead ICD, per MD orders. He also has an appointment with Dr. Ware. Today his intrinsic rhythm is AF/ VS ~80 bpm. Normal ICD function. AF episode started 5/26/18. AP= 18% and = 13%. Lead trends appear stable. Pt reports that he feels tired lately and MATR. Battery estimates 5.5 years to CARMEN. Plan for pt to RTC in 3 months.  Dual lead ICD         Electronically signed by Neetu Epstein RN at 6/1/2018  4:32 PM         Assessment/Plan  Mr. Meredith is a 62 year old male with history of HCM (dx '06, EF 20% -->? 60%) VT s/p ICD '12, nonobstructive CAD (cath '13), AF s/p PVI X 2 ('11, '16) and DCCV X 8, currently on sotalol and xeralto who presents to clinic for follow up.     # AF -- back in AF since 5/24/18 after his 8th cardioversion 5/4/18. AF/VS ~80 bpm on device interrogation today, AP 18%,  13%. It is unclear whether he truly feels worse while he is in AF, as he feels poorly overall even in NSR. Discussed his overall options with Dr. Cooper as well as patient and wife with agreement to decrease sotalol dose back to 120 BID, f/u with Dr. Cooper in clinic next week for potential repeat PVI ablation (previous ablation in '16 with maintenance of NSR for 1 year).  - continue xeralto  - decrease sotalol back to 120 BID (inadvertent increase for last 2 months)  - continue metoprolol 50 XL daily   - GONZALEZ eval     # HCM -- previously burnt out with EF 20% ('13), now recovered (EF 60% but NHYA III with recurrent AF. CPX today with significantly impaired function, VO2 max 15 ml/kg/min (48% predicted), VE/VCO2 slope 38. No evidence of LVOT obstruction on rest or stress imaging. We did discuss long term options including advanced therapies (OHT) should his diastolic dysfunction worsen to the point that his symptoms are unmanageable. For now, we will add aldactone 25 daily,  decrease his dose of sotalol back down to 120 BID (above, may help his energy level) and try to maintain NSR (Above).  - continue metoprolol 50XL daily   - add aldactone 25 daily, repeat BMP in 1 week  - patient to call back if symptoms are worse/does not tolerate aldactone    # Nonobstructive CAD -- 10-30% stenoses on cath '13  - continue atorvastatin 20   - on xeralto for AF, no indication for ASA    # HTN -- controlled  - continue metoprolol    Patient was seen and staffed with Dr. Wrae who agrees with the plan as outlined above.     Sabrina Motta MD  Cardiology Fellow  Pager [phone]       I have reviewed today's vital signs, notes, medications, labs and imaging. I have also seen and examined the patient and agree with the findings and plan as outlined above.    Mona Ware MD  Section Head - Advanced Heart Failure, Transplantation and Mechanical Circulatory Support  Co-Director - Adult Congenital and Cardiovascular Genetics Center  Associate Professor of Medicine, [facility]

## 2018-06-01 NOTE — NURSING NOTE
Chief Complaint   Patient presents with     Follow Up For     hypertrophic cardiomyopathy        Med Reconcile: Reviewed and verified all current medications with the patient. The updated medication list was printed and given to the patient.  New Medication: Patient was educated regarding newly prescribed medication, including discussion of  the indication, administration, side effects, and when to report to MD or RN. Patient demonstrated understanding of this information and agreed to call with further questions or concerns.  Return Appointment: Patient given instructions regarding scheduling next clinic visit. Patient demonstrated understanding of this information and agreed to call with further questions or concerns.  Patient stated he understood all health information given and agreed to call with further questions or concerns.     Conchita Schulte RN, BSN  Cardiology Care Coordinator  [facility]  [email or web address]  [phone]

## 2018-06-01 NOTE — MR AVS SNAPSHOT
"              After Visit Summary   6/1/2018    Stu Meredith    MRN: [id number]           Patient Information     Date Of Birth          1955        Visit Information        Provider Department      6/1/2018 4:00 PM Mona Ware MD [facility]        Today's Diagnoses     Hypertrophic cardiomyopathy (H)        Atrial fibrillation, unspecified type (H)          Care Instructions    You were seen today in the Adult Congenital and Cardiovascular Genetics Clinic at the [facility].    Cardiology Providers you saw during your visit:  Dr. Mona Ware     Diagnosis:  Hypertrophic Cardiomyopathy    Results:  The results of your stress testing was discussed with you today.     Recommendations:    1.  Continue to eat a heart healthy diet.  2.  Continue to get 20-30 minutes of aerobic activity, 4-5 days per week. Please follow recommended exercise guidelines as discussed in your appointment.    3.  Continue to observe good oral hygiene, with regular dental visits.  4.  Please start Spironolactone 25 mg daily.    5. Both Spironolactone and Sotalol have been sent to the pharmacy downstairs.  6. Please have labs drawn next week when you see Dr. Cooper.      Vitals:    06/01/18 1527   BP: 132/82   BP Location: Left arm   Patient Position: Chair   Cuff Size: Adult Large   Pulse: 71   SpO2: 96%   Weight: 97.2 kg (214 lb 3.2 oz)   Height: 1.803 m (5' 11\")       Exercise restrictions:   Yes__X__  No____         If yes, list restrictions:  Please follow Recommendations for the Acceptability of Recreational Sports Activities and Exercise in Patients Handout      Work restrictions:  Yes____  No__X__         If yes, list restrictions:    FASTING CHOLESTEROL was checked in the last 5 years YES_X__  NO___ 2017  Continue to eat a heart healthy, low salt diet.         ____ Fasting lipid panel order today         ____ No changes in medications          ____ I recommend the following changes in your " cholesterol medications.:          ____ Please follow up for cholesterol screening at your primary care physician      Follow-up:  Follow up with Dr. Ware in 6 months    If you have questions or concerns please contact us at:    Conchita Schulte RN, BSN   Td Vega (Scheduling.)  Nurse Coordinator     Clinic   Adult Congenital and CV Genetics Adult Congenital and CV Genetic  [facility]  [phone])[phone]    (P) [phone]  [email or web address] (F)[phone]        For after hours urgent needs, call [phone] and ask to speak to the Adult Congenital Physician on call.  Mention Job Code 0401.    For emergencies call 911.    [facility]  Mail Code [id number]   [place]  [address]           Follow-ups after your visit        Additional Services     Follow-Up with Congenital Heart Clinic                 Your next 10 appointments already scheduled     Jun 07, 2018  4:10 PM CDT   New Patient Visit with Boaz Miller MD   [facility] [place] ([facility] [place])    [address]  [address]  [facility] [address]   [phone]            Jun 08, 2018 10:30 AM CDT   Lab with  LAB   [facility])    [address]  1st Floor  [facility] [id number] [phone]            Jun 08, 2018 11:00 AM CDT   (Arrive by 10:45 AM)   Return Electrophysiology Genetics with Erik Cooper MD   [facility] ([facility])    [address] [place] [address]  [address]  [facility] [id number] [phone]              Future tests that were ordered for you today     Open Future Orders        Priority Expected Expires Ordered    Follow-Up with Congenital Heart Clinic Routine 12/7/2018 6/1/2019 6/1/2018     "Follow-Up with Device Clinic - 3 months Routine 8/30/2018 11/28/2018 6/1/2018            Who to contact     If you have questions or need follow up information about today's clinic visit or your schedule please contact [facility] directly at [phone].  Normal or non-critical lab and imaging results will be communicated to you by Suliahart, letter or phone within 4 business days after the clinic has received the results. If you do not hear from us within 7 days, please contact the clinic through [email or web address] or phone. If you have a critical or abnormal lab result, we will notify you by phone as soon as possible.  Submit refill requests through [employer] or call your pharmacy and they will forward the refill request to us. Please allow 3 business days for your refill to be completed.          Additional Information About Your Visit        Suliahart Information     [employer] gives you secure access to your electronic health record. If you see a primary care provider, you can also send messages to your care team and make appointments. If you have questions, please call your primary care clinic.  If you do not have a primary care provider, please call [phone] and they will assist you.        Care EveryWhere ID     This is your Care EveryWhere ID. This could be used by other organizations to access your [place] medical records  [id number]        Your Vitals Were     Pulse Height Pulse Oximetry BMI (Body Mass Index)          71 1.803 m (5' 11\") 96% 29.87 kg/m2         Blood Pressure from Last 3 Encounters:   06/01/18 132/82   05/18/18 114/63   05/04/18 132/84    Weight from Last 3 Encounters:   06/01/18 97.2 kg (214 lb 3.2 oz)   05/18/18 95.3 kg (210 lb)   02/21/18 95.9 kg (211 lb 8 oz)              We Performed the Following     Follow-Up with Cardiologist     Follow-Up with Congenital Heart Clinic          Today's Medication Changes          These changes are accurate as of 6/1/18  5:23 PM.  If you have any " questions, ask your nurse or doctor.               Start taking these medicines.        Dose/Directions    spironolactone 25 MG tablet   Commonly known as:  ALDACTONE   Used for:  Hypertrophic cardiomyopathy (H), Atrial fibrillation, unspecified type (H)   Started by:  Mona Ware MD        Dose:  25 mg   Take 1 tablet (25 mg) by mouth daily   Quantity:  90 tablet   Refills:  3            Where to get your medicines      These medications were sent to [place], [place] - [address]  [address], [facility] [address]    Hours:  TRANSPLANT PHONE NUMBER [phone] Phone:  [phone]     spironolactone 25 MG tablet                Primary Care Provider Office Phone # Fax #    Omar Camacho [phone]       [address] [facility] [address]        Equal Access to Services     [facility] : Hadii reina singh hadasho Soanjelica, waaxda luqadaha, qaybta kaalmada adeegyada, jorden cornell hayzaria graham . So [facility] [phone].    ATENCIÓN: Si habla español, tiene a drew disposición servicios gratuitos de asistencia lingüística. Llame al [phone].    We comply with applicable federal civil rights laws and [place] laws. We do not discriminate on the basis of race, color, national origin, age, disability, sex, sexual orientation, or gender identity.            Thank you!     Thank you for choosing [facility]  for your care. Our goal is always to provide you with excellent care. Hearing back from our patients is one way we can continue to improve our services. Please take a few minutes to complete the written survey that you may receive in the mail after your visit with us. Thank you!             Your Updated Medication List - Protect others around you: Learn how to safely use, store and throw away your medicines at www.disposemymeds.org.          This list is accurate as of 6/1/18  5:23 PM.  Always use  your most recent med list.                   Brand Name Dispense Instructions for use Diagnosis    acetaminophen 325 MG tablet    TYLENOL     Take 325-650 mg by mouth every 6 hours as needed        * albuterol 108 (90 Base) MCG/ACT Inhaler    PROAIR HFA    1 Inhaler    Inhale 2 puffs into the lungs every 4 hours as needed for shortness of breath / dyspnea or wheezing    Bronchitis with bronchospasm       * albuterol (2.5 MG/3ML) 0.083% neb solution     25 vial    Take 1 vial (2.5 mg) by nebulization every 6 hours as needed for shortness of breath / dyspnea or wheezing    Post-infection bronchospasm       amoxicillin 500 MG tablet    AMOXIL    4 tablet    Take 4 tablets (2000 mg) by mouth 1 hour before dental procedures.    History of total left hip arthroplasty       atorvastatin 20 MG tablet    LIPITOR    90 tablet    Take 1 tablet (20 mg) by mouth daily    CARDIOVASCULAR SCREENING; LDL GOAL LESS THAN 130       Blood Pressure Cuff Misc     1 each    1 Device daily    Atrial fibrillation (H), Hypertrophic cardiomyopathy (H)       Fluticasone-Salmeterol 113-14 MCG/ACT Aepb inhaler    AIRDUO RESPICLICK    1 each    Inhale 1 puff into the lungs 2 times daily    Post-infection bronchospasm       metoprolol succinate 50 MG 24 hr tablet    TOPROL-XL    90 tablet    Take 1 tablet (50 mg) by mouth daily    HOCM (hypertrophic obstructive cardiomyopathy) (H)       multivitamin, therapeutic Tabs tablet      Take 1 tablet by mouth daily        order for DME     1 each    Equipment being ordered: Nebulizer    Post-infection bronchospasm       rivaroxaban ANTICOAGULANT 20 MG Tabs tablet    XARELTO    90 tablet    Take 1 tablet (20 mg) by mouth daily (with dinner)    Chronic atrial fibrillation (H)       sotalol 80 MG tablet    BETAPACE    270 tablet    Take 1.5 tablets (120 mg) by mouth 2 times daily    Chronic atrial fibrillation (H), Hypertrophic cardiomyopathy (H)       spironolactone 25 MG tablet    ALDACTONE    90 tablet     Take 1 tablet (25 mg) by mouth daily    Hypertrophic cardiomyopathy (H), Atrial fibrillation, unspecified type (H)       zolpidem 10 MG tablet    AMBIEN    90 tablet    Take 0.5-1 tablets (5-10 mg) by mouth nightly as needed for sleep    Sedative, hypnotic or anxiolytic dependence (H)       * Notice:  This list has 2 medication(s) that are the same as other medications prescribed for you. Read the directions carefully, and ask your doctor or other care provider to review them with you.

## 2018-06-01 NOTE — MR AVS SNAPSHOT
After Visit Summary   6/1/2018    Stu Meredith    MRN: [id number]           Patient Information     Date Of Birth          1955        Visit Information        Provider Department      6/1/2018 3:30 PM 1, Uc Cv Device [facility]        Today's Diagnoses     Hypertrophic cardiomyopathy (H)    -  1       Follow-ups after your visit        Additional Services     Follow-Up with Device Clinic - 3 months                 Your next 10 appointments already scheduled     Jun 07, 2018  4:10 PM CDT   New Patient Visit with Boaz Miller MD   [facility] [place] ([facility] [place])    [address]  [address]  [facility] [address]   [phone]            Jun 08, 2018 11:00 AM CDT   (Arrive by 10:45 AM)   Return Electrophysiology Genetics with Erik Cooper MD   [facility] ([facility])    [address]  [address]  [facility] [address] [phone]              Future tests that were ordered for you today     Open Future Orders        Priority Expected Expires Ordered    Follow-Up with Device Clinic - 3 months Routine 8/30/2018 11/28/2018 6/1/2018            Who to contact     If you have questions or need follow up information about today's clinic visit or your schedule please contact [facility] directly at [phone].  Normal or non-critical lab and imaging results will be communicated to you by MyChart, letter or phone within 4 business days after the clinic has received the results. If you do not hear from us within 7 days, please contact the clinic through MyChart or phone. If you have a critical or abnormal lab result, we will notify you by phone as soon as possible.  Submit refill requests through [employer] or call your pharmacy and they will forward the refill request to us. Please allow 3 business days for your refill to be completed.          Additional Information About Your  Visit        [facility] [facility] Information     [employer] gives you secure access to your electronic health record. If you see a primary care provider, you can also send messages to your care team and make appointments. If you have questions, please call your primary care clinic.  If you do not have a primary care provider, please call [phone] and they will assist you.        Care EveryWhere ID     This is your Care EveryWhere ID. This could be used by other organizations to access your [place] medical records  [id number]         Blood Pressure from Last 3 Encounters:   06/01/18 132/82   05/18/18 114/63   05/04/18 132/84    Weight from Last 3 Encounters:   06/01/18 97.2 kg (214 lb 3.2 oz)   05/18/18 95.3 kg (210 lb)   02/21/18 95.9 kg (211 lb 8 oz)              We Performed the Following     ICD DEVICE PROGRAMMING EVAL, DUAL LEAD ICD        Primary Care Provider Office Phone # Fax #    Omar Camacho [phone]       [address] [place] [address]        Equal Access to Services     [facility]: Hadii aad ku hadasho Soomaali, waaxda luqadaha, qaybta kaalmada adeegyajorge l, jorden graham . So [facility] [phone].    ATENCIÓN: Si habla español, tiene a drew disposición servicios gratuitos de asistencia lingüística. [facility] [phone].    We comply with applicable federal civil rights laws and [place] laws. We do not discriminate on the basis of race, color, national origin, age, disability, sex, sexual orientation, or gender identity.            Thank you!     Thank you for choosing [facility]  for your care. Our goal is always to provide you with excellent care. Hearing back from our patients is one way we can continue to improve our services. Please take a few minutes to complete the written survey that you may receive in the mail after your visit with us. Thank you!             Your Updated Medication List - Protect others around you: Learn how to safely  use, store and throw away your medicines at www.disposemymeds.org.          This list is accurate as of 6/1/18  4:32 PM.  Always use your most recent med list.                   Brand Name Dispense Instructions for use Diagnosis    acetaminophen 325 MG tablet    TYLENOL     Take 325-650 mg by mouth every 6 hours as needed        * albuterol 108 (90 Base) MCG/ACT Inhaler    PROAIR HFA    1 Inhaler    Inhale 2 puffs into the lungs every 4 hours as needed for shortness of breath / dyspnea or wheezing    Bronchitis with bronchospasm       * albuterol (2.5 MG/3ML) 0.083% neb solution     25 vial    Take 1 vial (2.5 mg) by nebulization every 6 hours as needed for shortness of breath / dyspnea or wheezing    Post-infection bronchospasm       amoxicillin 500 MG tablet    AMOXIL    4 tablet    Take 4 tablets (2000 mg) by mouth 1 hour before dental procedures.    History of total left hip arthroplasty       atorvastatin 20 MG tablet    LIPITOR    90 tablet    Take 1 tablet (20 mg) by mouth daily    CARDIOVASCULAR SCREENING; LDL GOAL LESS THAN 130       Blood Pressure Cuff Misc     1 each    1 Device daily    Atrial fibrillation (H), Hypertrophic cardiomyopathy (H)       Fluticasone-Salmeterol 113-14 MCG/ACT Aepb inhaler    AIRDUO RESPICLICK    1 each    Inhale 1 puff into the lungs 2 times daily    Post-infection bronchospasm       metoprolol succinate 50 MG 24 hr tablet    TOPROL-XL    90 tablet    Take 1 tablet (50 mg) by mouth daily    HOCM (hypertrophic obstructive cardiomyopathy) (H)       multivitamin, therapeutic Tabs tablet      Take 1 tablet by mouth daily        order for DME     1 each    Equipment being ordered: Nebulizer    Post-infection bronchospasm       rivaroxaban ANTICOAGULANT 20 MG Tabs tablet    XARELTO    90 tablet    Take 1 tablet (20 mg) by mouth daily (with dinner)    Chronic atrial fibrillation (H)       sotalol 80 MG tablet    BETAPACE    270 tablet    Take 1.5 tablets (120 mg) by mouth 2 times  daily    Chronic atrial fibrillation (H), Hypertrophic cardiomyopathy (H)       zolpidem 10 MG tablet    AMBIEN    90 tablet    Take 0.5-1 tablets (5-10 mg) by mouth nightly as needed for sleep    Sedative, hypnotic or anxiolytic dependence (H)       * Notice:  This list has 2 medication(s) that are the same as other medications prescribed for you. Read the directions carefully, and ask your doctor or other care provider to review them with you.

## 2018-06-01 NOTE — LETTER
6/1/2018      RE: Stu Meredith  [address] [address]       Dear Colleague,    Thank you for the opportunity to participate in the care of your patient, Stu Meredith, at the [facility]. Please see a copy of my visit note below.    Heart Failure Clinic Note    HPI  Mr. Meredith is a 62 year old male with history of HCM (dx '06, EF 20% -->? 60%) VT s/p ICD '12, nonobstructive CAD (cath '13), AF s/p PVI X 2 ('11, '16) and DCCV X 8, currently on sotalol and xeralto who presents to clinic for follow up.     Patient's primary complaint today is dyspnea on exertion and overall fatigue. He had his 8th DCCV 5/4/18 and thought that he went back into atrial fibrillation days later as he was feeling so poorly. Per device interrogation today, he has been back in atrial fibrillation since 5/26/17. When prompted, he reports he is unsure if he felt better before the 26th, when he went back into AF. He had an echo CPX today which showed RER: 1.14, VE/VCO2 38, 15 ml/kg/min (4 METS), 48% predicted. Echo showed limited augmentation at peak stress with flat BP response and a suboptimal HR response (74% predicted). He had frequent PVCs during exercise. There was no LVOT obstruction at rest or stress.     Patient reports his overall quality of life is poor but not to the point that he would want to consider advanced therapies at this time. He has dyspnea on exertion at 100-200 feet but denies orthopnea, PND, edema, abdominal bloating, weight gain or syncope. He has occasional pre-syncope/lightheaded episodes that are not brought on by exertion or have any particular pattern. His wife notes that he does snore at night.    Of note, patient is supposed to be on sotalol 120 BID. His prior script was for 80mg tabs, he took 1.5 tabs BID. His most recent script, filled 6-8 weeks ago, was for 120mg BID of which he took 1.5 tabs BID, resulting in a dose of 180  "BID. He realized this yesterday and decreased back to 120 BID.       PMH  - HCM diagnosed '06, previously burnt out (EF 20%) now with recovered EF  - h/o VT s/p dual chamber ICD '12  - AF s/p PVI X 2 ('11, '16), h/o DCCV X 8   - nonobstructive CAD    Medications  - xeralto  - metoprolol 50 daily  - sotalol 120 BID (has been taking 180 BID for last 2 months on accident)  - atorvastatin 20     SH/FH reviewed in chart    ROS: complete 14 point ROS as per HPI, otherwise negative    /82 (BP Location: Left arm, Patient Position: Chair, Cuff Size: Adult Large)  Pulse 71  Ht 1.803 m (5' 11\")  Wt 97.2 kg (214 lb 3.2 oz)  SpO2 96%  BMI 29.87 kg/m2  General: moderately distressed emotionally, upset about dyspnea  HEENT: MMM  CV: irregularly irregular, normal rate. No murmurs or extra heart sounds. JVD <10  Pulm: CTA-B  Abdomen: soft, obese  Extremities: warm, no edema  Neuro: A&O X3.       Labs: none today    Echo CPX      Interpretation Summary  Submaximal treadmill stress test in a patient with a diagnosis of HCM.     The Ramirez treadmill score is 8 (low risk).  Exercise was stopped due to fatigue.  Normal blood pressure response to exercise.  No ECG evidence of ischemia.  No supraventricular or ventricular arrhythmias. Frequent PVCs noted throughout  the study.     Normal biventricular function with mild asymmetrical septal hypertrophy (12  mm).  No dynamic outflow tract obstruction is present at rest or with exercise.  Normal segmental wall motion at rest and with exercise.  Minimal augmentation in LV function with exercise.     Average functional capacity for age.        _____________________________________________________________________________  __     Stress  Limiting Sympton: fatigue.     Stress Results                                       Maximum Predicted HR:   157 bpm             Target HR: 133 bpm        % Maximum Predicted HR: 73 %                             Stage  DurationHeart Rate  BP               "                   (mm:ss)   (bpm)                         Baseline            87    130/73                           Peak    8:33     115    114/64                             Stress Duration:   8:33 mm:ss                       Maximum Stress HR: 115 bpm *                    RER: 1.14, VE/VCO2 38, 15 ml/kg/min (4 METS), 48% predicted.       ICD interrogation today       Pt seen in the [facility] for evaluation and iterative programming of a [employer], dual lead ICD, per MD orders. He also has an appointment with Dr. Ware. Today his intrinsic rhythm is AF/ VS ~80 bpm. Normal ICD function. AF episode started 5/26/18. AP= 18% and = 13%. Lead trends appear stable. Pt reports that he feels tired lately and MART. Battery estimates 5.5 years to CARMEN. Plan for pt to RTC in 3 months.  Dual lead ICD         Electronically signed by Neetu Epstein RN at 6/1/2018  4:32 PM         Assessment/Plan  Mr. Meredith is a 62 year old male with history of HCM (dx '06, EF 20% -->? 60%) VT s/p ICD '12, nonobstructive CAD (cath '13), AF s/p PVI X 2 ('11, '16) and DCCV X 8, currently on sotalol and xeralto who presents to clinic for follow up.     # AF -- back in AF since 5/24/18 after his 8th cardioversion 5/4/18. AF/VS ~80 bpm on device interrogation today, AP 18%,  13%. It is unclear whether he truly feels worse while he is in AF, as he feels poorly overall even in NSR. Discussed his overall options with Dr. Cooper as well as patient and wife with agreement to decrease sotalol dose back to 120 BID, f/u with Dr. Cooper in clinic next week for potential repeat PVI ablation (previous ablation in '16 with maintenance of NSR for 1 year).  - continue xeralto  - decrease sotalol back to 120 BID (inadvertent increase for last 2 months)  - continue metoprolol 50 XL daily   - GONZALEZ eval     # HCM -- previously burnt out with EF 20% ('13), now recovered (EF 60% but NHYA III with recurrent AF. CPX today with significantly impaired function, VO2  max 15 ml/kg/min (48% predicted), VE/VCO2 slope 38. No evidence of LVOT obstruction on rest or stress imaging. We did discuss long term options including advanced therapies (OHT) should his diastolic dysfunction worsen to the point that his symptoms are unmanageable. For now, we will add aldactone 25 daily, decrease his dose of sotalol back down to 120 BID (above, may help his energy level) and try to maintain NSR (Above).  - continue metoprolol 50XL daily   - add aldactone 25 daily, repeat BMP in 1 week  - patient to call back if symptoms are worse/does not tolerate aldactone    # Nonobstructive CAD -- 10-30% stenoses on cath '13  - continue atorvastatin 20   - on xeralto for AF, no indication for ASA    # HTN -- controlled  - continue metoprolol    Patient was seen and staffed with Dr. Ware who agrees with the plan as outlined above.     Sabrina Motta MD  Cardiology Fellow  Pager [phone]       I have reviewed today's vital signs, notes, medications, labs and imaging. I have also seen and examined the patient and agree with the findings and plan as outlined above.    Mona Ware MD  Section Head - Advanced Heart Failure, Transplantation and Mechanical Circulatory Support  Co-Director - Adult Congenital and Cardiovascular Genetics Center  Associate Professor of Medicine, [facility]

## 2018-06-06 DIAGNOSIS — R97.20 ELEVATED PROSTATE SPECIFIC ANTIGEN (PSA): Primary | ICD-10-CM

## 2018-06-07 ENCOUNTER — OFFICE VISIT (OUTPATIENT)
Dept: UROLOGY | Facility: CLINIC | Age: 63
End: 2018-06-07
Payer: COMMERCIAL

## 2018-06-07 VITALS
BODY MASS INDEX: 29.96 KG/M2 | DIASTOLIC BLOOD PRESSURE: 80 MMHG | HEIGHT: 71 IN | SYSTOLIC BLOOD PRESSURE: 122 MMHG | WEIGHT: 214 LBS

## 2018-06-07 DIAGNOSIS — R97.20 ELEVATED PROSTATE SPECIFIC ANTIGEN (PSA): ICD-10-CM

## 2018-06-07 PROCEDURE — 99203 OFFICE O/P NEW LOW 30 MIN: CPT | Performed by: UROLOGY

## 2018-06-07 PROCEDURE — 81003 URINALYSIS AUTO W/O SCOPE: CPT | Performed by: UROLOGY

## 2018-06-07 ASSESSMENT — PAIN SCALES - GENERAL: PAINLEVEL: NO PAIN (0)

## 2018-06-07 NOTE — PROGRESS NOTES
History: It is a great pleasure to see this very pleasant 62-year-old gentleman in initial consultation today at the request of Dr. Abhinav Camacho.  He had a recent PSA of 6.0 on routine screening.  Results for MANUEL WASHINGTON (MRN [id number]) as of 6/7/2018 16:49   Ref. Range 9/13/2007 08:51 11/10/2008 10:10 7/20/2012 07:35 12/10/2015 08:22 12/26/2017 08:52   PSA Latest Ref Range: 0 - 4 ug/L 1.76 1.57 2.66 3.67 6.00 (H)   We noticed that the trend has significantly risen over the last 3 years.  The PSA velocity is still however relatively slow.  He is voiding well with no other major voiding complaints other than some degree of slowing of the urine stream although this seems to be troubling him on a little.  He has not observed gross hematuria.  He does have significant cardiac issues as outlined below including atrial fibrillation which is requiring a number of atrial ablation procedures and cardioversions.  There is no apparent family history of prostate cancer    Past Medical History:   Diagnosis Date     Atrial fibrillation (H) 12/9/11    failed medication, multiple DC cardioveresions; s/p Left atrial ablation to eliminate atrial fibrillation 12/9/11     Chest pain      CHF (congestive heart failure) (H) 7/30/2016     Coronary artery disease      DJD (degenerative joint disease)      Hip arthritis 1/15/2014     Hypertension      Hypertrophic cardiomyopathy (H) 10/09     Other abnormal heart sounds      Pacemaker     ICD     Pneumonia, organism unspecified(486)      Prediabetes 7/10/15    A1C 6.5     Status post implantation of automatic cardioverter/defibrillator (AICD)      Ventricular tachycardia (H)        Past Surgical History:   Procedure Laterality Date     ANESTHESIA CARDIOVERSION  4/24/2014    Procedure: ANESTHESIA CARDIOVERSION;  Surgeon: Generic Anesthesia Provider;  Location: [facility] OR     ANESTHESIA CARDIOVERSION N/A 5/12/2016    Procedure: ANESTHESIA CARDIOVERSION;  Surgeon: GENERIC ANESTHESIA  PROVIDER;  Location: [facility] OR     ANESTHESIA CARDIOVERSION N/A 8/7/2017    Procedure: ANESTHESIA CARDIOVERSION;  Anesthesia Offsite Coverage Cardioversion @1100;  Surgeon: GENERIC ANESTHESIA PROVIDER;  Location: [facility] OR     ANESTHESIA CARDIOVERSION N/A 1/3/2018    Procedure: ANESTHESIA CARDIOVERSION;  Anesthesia Cardioverion;  Surgeon: GENERIC ANESTHESIA PROVIDER;  Location: [facility] OR     ANESTHESIA CARDIOVERSION N/A 5/4/2018    Procedure: ANESTHESIA CARDIOVERSION;  Anesthesia Coverage Cardioversion @1400;  Surgeon: GENERIC ANESTHESIA PROVIDER;  Location: [facility] OR     ARTHROPLASTY HIP  1/15/2014    Procedure: ARTHROPLASTY HIP;  Left Total Hip Arthroplasty;  Surgeon: Nelson Gaspar MD;  Location: [facility]     CARDIAC SURGERY       COLONOSCOPY N/A 5/18/2018    Procedure: COMBINED COLONOSCOPY, SINGLE OR MULTIPLE BIOPSY/POLYPECTOMY BY BIOPSY;  COLONOSCOPY (PT HAS DEFIBRILLATOR) ;  Surgeon: Mike Nickerson MD;  Location:  GI     H ABLATION FOCAL AFIB  12/9/11    Left atrial ablation to eliminate atrial fibrillation     IMPLANT AUTOMATIC IMPLANTABLE CARDIOVERTER DEFIBRILLATOR  7/27/12    AICD implantation     TONSILLECTOMY  1964       Family History   Problem Relation Age of Onset     HEART DISEASE Mother      unknown     Obesity Mother      GASTROINTESTINAL DISEASE Mother      diverticulitis     DIABETES Maternal Grandmother      DIABETES Paternal Grandmother      CEREBROVASCULAR DISEASE Paternal Grandmother 94     DIABETES Paternal Grandfather      CEREBROVASCULAR DISEASE Paternal Grandfather 78     DIABETES Son      C.A.D. Father      CABG age 78     HEART DISEASE Father      CABG x5     DIABETES Maternal Grandfather        Social History     Social History     Marital status:      Spouse name: N/A     Number of children: N/A     Years of education: N/A     Occupational History      [employer]     Social History Main Topics     Smoking status: Former Smoker     Packs/day: 0.25     Years: 40.00      Types: Cigarettes     Start date: 1/1/1975     Quit date: 12/11/2015     Smokeless tobacco: Never Used     Alcohol use 0.0 oz/week     0 Standard drinks or equivalent per week      Comment: 1 drink per week     Drug use: No     Sexual activity: Yes     Partners: Female     Other Topics Concern     Exercise No     Seat Belt Yes     Parent/Sibling W/ Cabg, Mi Or Angioplasty Before 65f 55m? No     Social History Narrative       Current Outpatient Prescriptions   Medication Sig Dispense Refill     acetaminophen (TYLENOL) 325 MG tablet Take 325-650 mg by mouth every 6 hours as needed       amoxicillin (AMOXIL) 500 MG tablet Take 4 tablets (2000 mg) by mouth 1 hour before dental procedures. 4 tablet 3     atorvastatin (LIPITOR) 20 MG tablet Take 1 tablet (20 mg) by mouth daily 90 tablet 3     Blood Pressure Monitoring (BLOOD PRESSURE CUFF) MISC 1 Device daily 1 each 0     metoprolol (TOPROL-XL) 50 MG 24 hr tablet Take 1 tablet (50 mg) by mouth daily 90 tablet 3     multivitamin, therapeutic (THERA-VIT) TABS Take 1 tablet by mouth daily       rivaroxaban ANTICOAGULANT (XARELTO) 20 MG TABS tablet Take 1 tablet (20 mg) by mouth daily (with dinner) 90 tablet 5     sotalol (BETAPACE) 120 MG tablet Take 1 tablet (120 mg) by mouth 2 times daily 90 tablet 3     spironolactone (ALDACTONE) 25 MG tablet Take 1 tablet (25 mg) by mouth daily 90 tablet 3     zolpidem (AMBIEN) 10 MG tablet Take 0.5-1 tablets (5-10 mg) by mouth nightly as needed for sleep 90 tablet 0       Review Of Systems:  Skin: negative  Eyes: negative  Ears/Nose/Throat: negative  Respiratory: No shortness of breath, dyspnea on exertion, cough, or hemoptysis  Cardiovascular: Significant history of atrial fibrillation, congestive failure, ventricular hypertrophy  Gastrointestinal: negative  Genitourinary: negative  Musculoskeletal: negative  Neurologic: negative  Psychiatric: negative  Hematologic/Lymphatic/Immunologic: negative  Endocrine: negative    Exam:  BP  "122/80  Ht 1.803 m (5' 11\")  Wt 97.1 kg (214 lb)  BMI 29.85 kg/m2    General Impression: Very pleasant gentleman who is not in distress, is quite conversational and is well oriented in time place and person    Mental status.  Normal    HEENT: There is no clinical evidence of jaundice and the mucous membranes are normal    Skin: The skin is otherwise normal to examination    Lymph Nodes: Not examined    Respiratory System: The respiratory cycle is normal    Cardiovascular: Not examined    Abdominal: Not examined    Extremities: No significant peripheral edema    Back and Flank: Not examined    Genital: Not examined    Rectal: Good sphincter tone, normal perianal sensation.  Smooth rectal mucosa without hemorrhoids or fissures.  Smooth soft, slightly enlarged prostate without evidence of tenderness, bogginess or nodules.  Seminal vesicles.  Not palpable.  Perineum is otherwise normal to examination    Neurologic: There are no focal abnormal clinical neurological signs in the central, or peripheral nervous systems    Impression: The PSA has risen from 3.47 in 2015 up to 6.0 in December 2017.  He has had no significant urinary symptoms there appears to be no significant family history of prostate cancer, and digital rectal examination today feels quite benign with only slight enlargement of the prostate.  We did have a careful discussion about elevation of PSA to discuss the nonspecificity of PSA but also a careful discussion about prostate cancer, the relationship between the incidence of prostate cancer and his mortality, and some of the controversial issues currently associated with the management of prostate cancer.  He also right now is undergoing fairly careful treatment because of problems with atrial fibrillation, is on Xarelto and other medications and has hardware within him which would preclude, most likely doing a T3 MRI of the prostate.  In addition clearly, we would have to have a very strong reason to " "go ahead with a biopsy of the prostate because that would involve temporarily discontinuing anticoagulants.  At the present time therefore I would merely observe the PSA but repeated in 3 months time.  If the trend then is still relentlessly upward I would have discussion with the patient's cardiologist to see if we could do an MRI of the prostate without interfering with any of his therapeutic options for his cardiac status.    I did have a careful discussion with the patient in detail about all of these issues.  I answered all the questions    Plan: 3 months for PSA and examination    Time: 30 minutes with greater than 50% in discussion consultation    \"This dictation was performed with voice recognition software and may contain errors,  omissions and inadvertent word substitution.\"    "

## 2018-06-07 NOTE — MR AVS SNAPSHOT
After Visit Summary   6/7/2018    Stu Meredith    MRN: [id number]           Patient Information     Date Of Birth          1955        Visit Information        Provider Department      6/7/2018 4:10 PM Boaz Miller MD [facility] [place]        Today's Diagnoses     Elevated prostate specific antigen (PSA)           Follow-ups after your visit        Follow-up notes from your care team     Return in about 3 months (around 9/7/2018) for PSA, Physical Exam.      Your next 10 appointments already scheduled     Jun 08, 2018 10:30 AM CDT   Lab with  LAB   [facility] ([facility])    [address]  1st Floor  [place] [address]   [phone]            Jun 08, 2018 11:00 AM CDT   (Arrive by 10:45 AM)   Return Electrophysiology Genetics with Erik Cooper MD   [facility] ([facility])    [address]  [address]  [place] [address]   [phone]            Sep 13, 2018  3:50 PM CDT   Return Visit with Boaz Miller MD   [facility] ([facility])    [address]  [address]  [place] [id number] [phone]              Who to contact     If you have questions or need follow up information about today's clinic visit or your schedule please contact [facility] [place] directly at [phone].  Normal or non-critical lab and imaging results will be communicated to you by MyChart, letter or phone within 4 business days after the clinic has received the results. If you do not hear from us within 7 days, please contact the clinic through MyChart or phone. If you have a critical or abnormal lab result, we will notify you by phone as soon as possible.  Submit refill requests through [employer] or call your pharmacy and they will forward the refill request to us. Please allow 3  "business days for your refill to be completed.          Additional Information About Your Visit        MyChart Information     [employer] gives you secure access to your electronic health record. If you see a primary care provider, you can also send messages to your care team and make appointments. If you have questions, please call your primary care clinic.  If you do not have a primary care provider, please call [phone] and they will assist you.        Care EveryWhere ID     This is your Care EveryWhere ID. This could be used by other organizations to access your [place] medical records  [id number]        Your Vitals Were     Height BMI (Body Mass Index)                1.803 m (5' 11\") 29.85 kg/m2           Blood Pressure from Last 3 Encounters:   06/07/18 122/80   06/01/18 132/82   05/18/18 114/63    Weight from Last 3 Encounters:   06/07/18 97.1 kg (214 lb)   06/01/18 97.2 kg (214 lb 3.2 oz)   05/18/18 95.3 kg (210 lb)              We Performed the Following     UA without Microscopic          Today's Medication Changes          These changes are accurate as of 6/7/18  4:50 PM.  If you have any questions, ask your nurse or doctor.               Stop taking these medicines if you haven't already. Please contact your care team if you have questions.     albuterol (2.5 MG/3ML) 0.083% neb solution   Stopped by:  Boaz Miller MD           albuterol 108 (90 Base) MCG/ACT Inhaler   Commonly known as:  PROAIR HFA   Stopped by:  Boaz Miller MD           Fluticasone-Salmeterol 113-14 MCG/ACT Aepb inhaler   Commonly known as:  AIRDUO RESPICLICK   Stopped by:  Boaz Miller MD           order for DME   Stopped by:  Boaz Miller MD                    Primary Care Provider Office Phone # Fax #    Omar Camacho [phone]       [address] [facility] [address]        Equal Access to Services     MARLON DE LOS SANTOS AH: mejia Oharaaxjorge l " holger buenoshelley kimmiejorden hennessy ah. So [facility] [phone].    ATENCIÓN: Si blas obrien, tiene a drew disposición servicios gratuitos de asistencia lingüística. Inga al [phone].    We comply with applicable federal civil rights laws and [place] laws. We do not discriminate on the basis of race, color, national origin, age, disability, sex, sexual orientation, or gender identity.            Thank you!     Thank you for choosing [facility] [place]  for your care. Our goal is always to provide you with excellent care. Hearing back from our patients is one way we can continue to improve our services. Please take a few minutes to complete the written survey that you may receive in the mail after your visit with us. Thank you!             Your Updated Medication List - Protect others around you: Learn how to safely use, store and throw away your medicines at www.disposemymeds.org.          This list is accurate as of 6/7/18  4:50 PM.  Always use your most recent med list.                   Brand Name Dispense Instructions for use Diagnosis    acetaminophen 325 MG tablet    TYLENOL     Take 325-650 mg by mouth every 6 hours as needed        amoxicillin 500 MG tablet    AMOXIL    4 tablet    Take 4 tablets (2000 mg) by mouth 1 hour before dental procedures.    History of total left hip arthroplasty       atorvastatin 20 MG tablet    LIPITOR    90 tablet    Take 1 tablet (20 mg) by mouth daily    CARDIOVASCULAR SCREENING; LDL GOAL LESS THAN 130       Blood Pressure Cuff Misc     1 each    1 Device daily    Atrial fibrillation (H), Hypertrophic cardiomyopathy (H)       metoprolol succinate 50 MG 24 hr tablet    TOPROL-XL    90 tablet    Take 1 tablet (50 mg) by mouth daily    HOCM (hypertrophic obstructive cardiomyopathy) (H)       multivitamin, therapeutic Tabs tablet      Take 1 tablet by mouth daily        rivaroxaban ANTICOAGULANT 20 MG  Tabs tablet    XARELTO    90 tablet    Take 1 tablet (20 mg) by mouth daily (with dinner)    Chronic atrial fibrillation (H)       sotalol 120 MG tablet    BETAPACE    90 tablet    Take 1 tablet (120 mg) by mouth 2 times daily    Chronic atrial fibrillation (H), Hypertrophic cardiomyopathy (H)       spironolactone 25 MG tablet    ALDACTONE    90 tablet    Take 1 tablet (25 mg) by mouth daily    Hypertrophic cardiomyopathy (H), Atrial fibrillation, unspecified type (H)       zolpidem 10 MG tablet    AMBIEN    90 tablet    Take 0.5-1 tablets (5-10 mg) by mouth nightly as needed for sleep    Sedative, hypnotic or anxiolytic dependence (H)

## 2018-06-07 NOTE — LETTER
6/7/2018       RE: Stu Washington  [address] [facility] [address]     Dear Colleague,    Thank you for referring your patient, Stu Washington, to the [facility] UROLOGY [facility] [place] at [facility]. Please see a copy of my visit note below.    History: It is a great pleasure to see this very pleasant 62-year-old gentleman in initial consultation today at the request of Dr. Abhinav Camacho.  He had a recent PSA of 6.0 on routine screening.  Results for STU WASHINGTON (MRN [id number]) as of 6/7/2018 16:49   Ref. Range 9/13/2007 08:51 11/10/2008 10:10 7/20/2012 07:35 12/10/2015 08:22 12/26/2017 08:52   PSA Latest Ref Range: 0 - 4 ug/L 1.76 1.57 2.66 3.67 6.00 (H)   We noticed that the trend has significantly risen over the last 3 years.  The PSA velocity is still however relatively slow.  He is voiding well with no other major voiding complaints other than some degree of slowing of the urine stream although this seems to be troubling him on a little.  He has not observed gross hematuria.  He does have significant cardiac issues as outlined below including atrial fibrillation which is requiring a number of atrial ablation procedures and cardioversions.  There is no apparent family history of prostate cancer    Past Medical History:   Diagnosis Date     Atrial fibrillation (H) 12/9/11    failed medication, multiple DC cardioveresions; s/p Left atrial ablation to eliminate atrial fibrillation 12/9/11     Chest pain      CHF (congestive heart failure) (H) 7/30/2016     Coronary artery disease      DJD (degenerative joint disease)      Hip arthritis 1/15/2014     Hypertension      Hypertrophic cardiomyopathy (H) 10/09     Other abnormal heart sounds      Pacemaker     ICD     Pneumonia, organism unspecified(486)      Prediabetes 7/10/15    A1C 6.5     Status post implantation of automatic cardioverter/defibrillator (AICD)      Ventricular  tachycardia (H)        Past Surgical History:   Procedure Laterality Date     ANESTHESIA CARDIOVERSION  4/24/2014    Procedure: ANESTHESIA CARDIOVERSION;  Surgeon: Generic Anesthesia Provider;  Location: [facility] OR     ANESTHESIA CARDIOVERSION N/A 5/12/2016    Procedure: ANESTHESIA CARDIOVERSION;  Surgeon: GENERIC ANESTHESIA PROVIDER;  Location: [facility] OR     ANESTHESIA CARDIOVERSION N/A 8/7/2017    Procedure: ANESTHESIA CARDIOVERSION;  Anesthesia Offsite Coverage Cardioversion @1100;  Surgeon: GENERIC ANESTHESIA PROVIDER;  Location: [facility] OR     ANESTHESIA CARDIOVERSION N/A 1/3/2018    Procedure: ANESTHESIA CARDIOVERSION;  Anesthesia Cardioverion;  Surgeon: GENERIC ANESTHESIA PROVIDER;  Location: [facility] OR     ANESTHESIA CARDIOVERSION N/A 5/4/2018    Procedure: ANESTHESIA CARDIOVERSION;  Anesthesia Coverage Cardioversion @1400;  Surgeon: GENERIC ANESTHESIA PROVIDER;  Location: [facility] OR     ARTHROPLASTY HIP  1/15/2014    Procedure: ARTHROPLASTY HIP;  Left Total Hip Arthroplasty;  Surgeon: Nelson Gasapr MD;  Location: [facility] OR     CARDIAC SURGERY       COLONOSCOPY N/A 5/18/2018    Procedure: COMBINED COLONOSCOPY, SINGLE OR MULTIPLE BIOPSY/POLYPECTOMY BY BIOPSY;  COLONOSCOPY (PT HAS DEFIBRILLATOR) ;  Surgeon: Mike Nickerson MD;  Location:  GI     H ABLATION FOCAL AFIB  12/9/11    Left atrial ablation to eliminate atrial fibrillation     IMPLANT AUTOMATIC IMPLANTABLE CARDIOVERTER DEFIBRILLATOR  7/27/12    AICD implantation     TONSILLECTOMY  1964       Family History   Problem Relation Age of Onset     HEART DISEASE Mother      unknown     Obesity Mother      GASTROINTESTINAL DISEASE Mother      diverticulitis     DIABETES Maternal Grandmother      DIABETES Paternal Grandmother      CEREBROVASCULAR DISEASE Paternal Grandmother 94     DIABETES Paternal Grandfather      CEREBROVASCULAR DISEASE Paternal Grandfather 78     DIABETES Son      C.A.D. Father      CABG age 78     HEART DISEASE Father      CABG x5     DIABETES Maternal  Grandfather        Social History     Social History     Marital status:      Spouse name: N/A     Number of children: N/A     Years of education: N/A     Occupational History      [employer]     Social History Main Topics     Smoking status: Former Smoker     Packs/day: 0.25     Years: 40.00     Types: Cigarettes     Start date: 1/1/1975     Quit date: 12/11/2015     Smokeless tobacco: Never Used     Alcohol use 0.0 oz/week     0 Standard drinks or equivalent per week      Comment: 1 drink per week     Drug use: No     Sexual activity: Yes     Partners: Female     Other Topics Concern     Exercise No     Seat Belt Yes     Parent/Sibling W/ Cabg, Mi Or Angioplasty Before 65f 55m? No     Social History Narrative       Current Outpatient Prescriptions   Medication Sig Dispense Refill     acetaminophen (TYLENOL) 325 MG tablet Take 325-650 mg by mouth every 6 hours as needed       amoxicillin (AMOXIL) 500 MG tablet Take 4 tablets (2000 mg) by mouth 1 hour before dental procedures. 4 tablet 3     atorvastatin (LIPITOR) 20 MG tablet Take 1 tablet (20 mg) by mouth daily 90 tablet 3     Blood Pressure Monitoring (BLOOD PRESSURE CUFF) MISC 1 Device daily 1 each 0     metoprolol (TOPROL-XL) 50 MG 24 hr tablet Take 1 tablet (50 mg) by mouth daily 90 tablet 3     multivitamin, therapeutic (THERA-VIT) TABS Take 1 tablet by mouth daily       rivaroxaban ANTICOAGULANT (XARELTO) 20 MG TABS tablet Take 1 tablet (20 mg) by mouth daily (with dinner) 90 tablet 5     sotalol (BETAPACE) 120 MG tablet Take 1 tablet (120 mg) by mouth 2 times daily 90 tablet 3     spironolactone (ALDACTONE) 25 MG tablet Take 1 tablet (25 mg) by mouth daily 90 tablet 3     zolpidem (AMBIEN) 10 MG tablet Take 0.5-1 tablets (5-10 mg) by mouth nightly as needed for sleep 90 tablet 0       Review Of Systems:  Skin: negative  Eyes: negative  Ears/Nose/Throat: negative  Respiratory: No shortness of breath, dyspnea on exertion, cough,  "or hemoptysis  Cardiovascular: Significant history of atrial fibrillation, congestive failure, ventricular hypertrophy  Gastrointestinal: negative  Genitourinary: negative  Musculoskeletal: negative  Neurologic: negative  Psychiatric: negative  Hematologic/Lymphatic/Immunologic: negative  Endocrine: negative    Exam:  /80  Ht 1.803 m (5' 11\")  Wt 97.1 kg (214 lb)  BMI 29.85 kg/m2    General Impression: Very pleasant gentleman who is not in distress, is quite conversational and is well oriented in time place and person    Mental status.  Normal    HEENT: There is no clinical evidence of jaundice and the mucous membranes are normal    Skin: The skin is otherwise normal to examination    Lymph Nodes: Not examined    Respiratory System: The respiratory cycle is normal    Cardiovascular: Not examined    Abdominal: Not examined    Extremities: No significant peripheral edema    Back and Flank: Not examined    Genital: Not examined    Rectal: Good sphincter tone, normal perianal sensation.  Smooth rectal mucosa without hemorrhoids or fissures.  Smooth soft, slightly enlarged prostate without evidence of tenderness, bogginess or nodules.  Seminal vesicles.  Not palpable.  Perineum is otherwise normal to examination    Neurologic: There are no focal abnormal clinical neurological signs in the central, or peripheral nervous systems    Impression: The PSA has risen from 3.47 in 2015 up to 6.0 in December 2017.  He has had no significant urinary symptoms there appears to be no significant family history of prostate cancer, and digital rectal examination today feels quite benign with only slight enlargement of the prostate.  We did have a careful discussion about elevation of PSA to discuss the nonspecificity of PSA but also a careful discussion about prostate cancer, the relationship between the incidence of prostate cancer and his mortality, and some of the controversial issues currently associated with the management " "of prostate cancer.  He also right now is undergoing fairly careful treatment because of problems with atrial fibrillation, is on Xarelto and other medications and has hardware within him which would preclude, most likely doing a T3 MRI of the prostate.  In addition clearly, we would have to have a very strong reason to go ahead with a biopsy of the prostate because that would involve temporarily discontinuing anticoagulants.  At the present time therefore I would merely observe the PSA but repeated in 3 months time.  If the trend then is still relentlessly upward I would have discussion with the patient's cardiologist to see if we could do an MRI of the prostate without interfering with any of his therapeutic options for his cardiac status.    I did have a careful discussion with the patient in detail about all of these issues.  I answered all the questions    Plan: 3 months for PSA and examination    Time: 30 minutes with greater than 50% in discussion consultation    \"This dictation was performed with voice recognition software and may contain errors,  omissions and inadvertent word substitution.\"      Again, thank you for allowing me to participate in the care of your patient.      Sincerely,    Boaz Miller MD      "

## 2018-06-08 ENCOUNTER — OFFICE VISIT (OUTPATIENT)
Dept: CARDIOLOGY | Facility: CLINIC | Age: 63
End: 2018-06-08
Attending: INTERNAL MEDICINE
Payer: COMMERCIAL

## 2018-06-08 VITALS
HEART RATE: 111 BPM | SYSTOLIC BLOOD PRESSURE: 138 MMHG | OXYGEN SATURATION: 97 % | WEIGHT: 212 LBS | BODY MASS INDEX: 29.68 KG/M2 | HEIGHT: 71 IN | DIASTOLIC BLOOD PRESSURE: 87 MMHG

## 2018-06-08 DIAGNOSIS — I42.1 HOCM (HYPERTROPHIC OBSTRUCTIVE CARDIOMYOPATHY) (H): ICD-10-CM

## 2018-06-08 DIAGNOSIS — I48.4 ATYPICAL ATRIAL FLUTTER (H): Primary | ICD-10-CM

## 2018-06-08 DIAGNOSIS — I42.2 HYPERTROPHIC CARDIOMYOPATHY (H): ICD-10-CM

## 2018-06-08 DIAGNOSIS — J81.0 ACUTE PULMONARY EDEMA (H): ICD-10-CM

## 2018-06-08 DIAGNOSIS — I48.91 ATRIAL FIBRILLATION, UNSPECIFIED TYPE (H): ICD-10-CM

## 2018-06-08 LAB
ALBUMIN SERPL-MCNC: 3.8 G/DL (ref 3.4–5)
ALBUMIN UR-MCNC: NEGATIVE MG/DL
ALP SERPL-CCNC: 94 U/L (ref 40–150)
ALT SERPL W P-5'-P-CCNC: 32 U/L (ref 0–70)
ANION GAP SERPL CALCULATED.3IONS-SCNC: 7 MMOL/L (ref 3–14)
APPEARANCE UR: CLEAR
AST SERPL W P-5'-P-CCNC: 20 U/L (ref 0–45)
BILIRUB SERPL-MCNC: 0.9 MG/DL (ref 0.2–1.3)
BILIRUB UR QL STRIP: NEGATIVE
BUN SERPL-MCNC: 17 MG/DL (ref 7–30)
CALCIUM SERPL-MCNC: 8.5 MG/DL (ref 8.5–10.1)
CHLORIDE SERPL-SCNC: 108 MMOL/L (ref 94–109)
CO2 SERPL-SCNC: 25 MMOL/L (ref 20–32)
COLOR UR AUTO: YELLOW
CREAT SERPL-MCNC: 1.03 MG/DL (ref 0.66–1.25)
GFR SERPL CREATININE-BSD FRML MDRD: 73 ML/MIN/1.7M2
GLUCOSE SERPL-MCNC: 130 MG/DL (ref 70–99)
GLUCOSE UR STRIP-MCNC: NEGATIVE MG/DL
HGB UR QL STRIP: ABNORMAL
KETONES UR STRIP-MCNC: NEGATIVE MG/DL
LEUKOCYTE ESTERASE UR QL STRIP: ABNORMAL
NITRATE UR QL: NEGATIVE
PH UR STRIP: 5.5 PH (ref 5–7)
POTASSIUM SERPL-SCNC: 4.4 MMOL/L (ref 3.4–5.3)
PROT SERPL-MCNC: 6.9 G/DL (ref 6.8–8.8)
SODIUM SERPL-SCNC: 139 MMOL/L (ref 133–144)
SOURCE: ABNORMAL
SP GR UR STRIP: 1.02 (ref 1–1.03)
UROBILINOGEN UR STRIP-ACNC: 0.2 EU/DL (ref 0.2–1)

## 2018-06-08 PROCEDURE — 36415 COLL VENOUS BLD VENIPUNCTURE: CPT | Performed by: INTERNAL MEDICINE

## 2018-06-08 PROCEDURE — 93005 ELECTROCARDIOGRAM TRACING: CPT | Mod: ZF

## 2018-06-08 PROCEDURE — G0463 HOSPITAL OUTPT CLINIC VISIT: HCPCS

## 2018-06-08 PROCEDURE — 93010 ELECTROCARDIOGRAM REPORT: CPT | Mod: ZP | Performed by: INTERNAL MEDICINE

## 2018-06-08 PROCEDURE — 80053 COMPREHEN METABOLIC PANEL: CPT | Performed by: INTERNAL MEDICINE

## 2018-06-08 PROCEDURE — 99215 OFFICE O/P EST HI 40 MIN: CPT | Mod: ZP | Performed by: INTERNAL MEDICINE

## 2018-06-08 RX ORDER — LIDOCAINE 40 MG/G
CREAM TOPICAL
Status: CANCELLED | OUTPATIENT
Start: 2018-06-08

## 2018-06-08 ASSESSMENT — PAIN SCALES - GENERAL: PAINLEVEL: NO PAIN (0)

## 2018-06-08 NOTE — PROGRESS NOTES
Electrophysiology Clinic Note  HPI:   Mr. Meredith is a 62 year old male who has a past medical history significant for HCM diagnosed 2006, VT on Holter July 2012 s/p ICD 7/2012, troponin leak Oct 2013 (angiogram with non-significant CAD, LV gram showed EF 25%, recovered to 60% on TTE Dec 2013), HTN, AFib (CHADSVASC 2) with DCCVs then s/p PVI 12/2011 and PVI for persistent AF on 7/28/16 s/p DCCV 8/7/17, 1/3/18 and 5/5/18. He presents for follow-up.  The patient underwent atrial fibrillation ablation by Dr. Gonzalez at [facility] in 12/2011 and implantation of an ICD in 07/2012 because of ventricular tachycardia. As per patient, he has been in sinus rhythm approximately 1-1/2 years after ablation. The patient was found to have recurrent atrial fibrillation during a preoperative exam in 10/2013 and underwent electrical cardioversion in 11/2013. The patient underwent hip surgery in 01/2014. The patient noticed that he was in atrial fibrillation at a clinic visit in 01/2014.   He was seen in consult by Dr Analilia spain on 2/14/14 for consideration of ablation. At the time the patient was reported to be generally unaware of whether or not he is in sinus rhythm or in atrial fibrillation. He reports daytime somnolence but denies significant dyspnea on exertion, palpitations, irregular beat sensation, presyncope or syncope. In terms of risk factors for stroke, the patient has coronary heart disease, hypertension, but denies history of diabetes, previous myocardial cardiac infarction or heart failure. The patient is on rivaroxaban for stroke prophylaxis and is on diltiazem and metoprolol. He was told that there was no consideration of ablation.   He came to EP clinic for second opinion in 3/3/2014. The patient mentioned this time he is not sure whether his afib is causing fatigue. However, he also attributed symptoms like headache and head fullness to his afib. He also had a cold around the same time. He still denied chest  pain, syncope or presyncope, ICD shocks, MART or SOB. He does however indicate this time that he does feel irregular heart beat at times, along with chronic fatigue, although is not sure if fatigue is linked to afib. We agreed at that time to pursue a cardioversion to see whether he would feel better and we started him on sotalol 80 mg twice a day. He underwent cardioversion on 4/24/2014.  He then had a device transmission showed recurrent AF in 7/2014 which spontaneously converted. We decrease his diltiazem from 240 to 120 mg daily because of fatigue. His fatigue did get better when we decrease his diltiazem.  He did have a 9 day episode of A. Fib starting June 19, 2015, during which the patient felt very tired and no energy.  The heart rate was well controlled.  A. Fib burden was 19% since 5/19/2015, but it is the first episode of A. Fib since at least November 2014.  However at follow up in 2016 AF burden 1%. At follow up in 5/2016 his AF burden= 100% since 2/28/16. He reported some increased fatgiue and decreased stamina since being back in AF. At that visit, he elected to pursue repeat PVI ablation for AF that is refractory to Sotalol. He underwent repeat PVI ablation for persistent AF refractory to AATs on 7/28/16. He had successful re-isolation of all 4 pulmonary veins. He was re-hospitalized a couple days post ablation for SOB assocaited with hypervolemia which resolved with diuresis. He reports feeling well after ablation. He states his energy levels improved with restoration of sinus. Diltiazem was stopped after ablation.  He has now had some recurrent episodes of AT/AF requiring DCCV on 8/7/17 and 1/3/18.  Device check showed AT episode that started 1/28/18 and lasted 25 days and self terminated. He then had recurrent AT/AF and had DCCV on 5/5/18. He followed up with Dr. Ware recently in clinic and reported having fatigue, MART, and decreased stamina. Device interrogation showed he had recurred back into  AT/AF since 5/26/18. He was arranged for EP follow up.     He continues to have fatigue, MART, and decreased stamina. Last stress echo from 6/1/18 showed normal biventricular function with asymmetrical septal hypertrophy and no LVOT obstruction with rest or exercise. He denies any chest pain/pressures, dizziness, lightheadedness, leg/ankle swelling, PND, orthopnea, palpitations, or syncopal symptoms.Presenting 12 lead ECG shows AFL Vent Rate 111 bpm, QRS 80 ms, QTc 489 ms. Current cardiac medications include: Spironolactone, Lipitor, Sotalol, Toprol XL, and Xarelto.     PAST MEDICAL HISTORY:  Past Medical History:   Diagnosis Date     Atrial fibrillation (H) 12/9/11    failed medication, multiple DC cardioveresions; s/p Left atrial ablation to eliminate atrial fibrillation 12/9/11     Chest pain      CHF (congestive heart failure) (H) 7/30/2016     Coronary artery disease      DJD (degenerative joint disease)      Hip arthritis 1/15/2014     Hypertension      Hypertrophic cardiomyopathy (H) 10/09     Other abnormal heart sounds      Pacemaker     ICD     Pneumonia, organism unspecified(486)      Prediabetes 7/10/15    A1C 6.5     Status post implantation of automatic cardioverter/defibrillator (AICD)      Ventricular tachycardia (H)        CURRENT MEDICATIONS:  Current Outpatient Prescriptions   Medication Sig Dispense Refill     acetaminophen (TYLENOL) 325 MG tablet Take 325-650 mg by mouth every 6 hours as needed       amoxicillin (AMOXIL) 500 MG tablet Take 4 tablets (2000 mg) by mouth 1 hour before dental procedures. 4 tablet 3     atorvastatin (LIPITOR) 20 MG tablet Take 1 tablet (20 mg) by mouth daily 90 tablet 3     Blood Pressure Monitoring (BLOOD PRESSURE CUFF) MISC 1 Device daily 1 each 0     metoprolol (TOPROL-XL) 50 MG 24 hr tablet Take 1 tablet (50 mg) by mouth daily 90 tablet 3     multivitamin, therapeutic (THERA-VIT) TABS Take 1 tablet by mouth daily       rivaroxaban ANTICOAGULANT (XARELTO) 20 MG  TABS tablet Take 1 tablet (20 mg) by mouth daily (with dinner) 90 tablet 5     sotalol (BETAPACE) 120 MG tablet Take 1 tablet (120 mg) by mouth 2 times daily 90 tablet 3     spironolactone (ALDACTONE) 25 MG tablet Take 1 tablet (25 mg) by mouth daily 90 tablet 3     zolpidem (AMBIEN) 10 MG tablet Take 0.5-1 tablets (5-10 mg) by mouth nightly as needed for sleep 90 tablet 0       PAST SURGICAL HISTORY:  Past Surgical History:   Procedure Laterality Date     ANESTHESIA CARDIOVERSION  4/24/2014    Procedure: ANESTHESIA CARDIOVERSION;  Surgeon: Generic Anesthesia Provider;  Location: [facility] OR     ANESTHESIA CARDIOVERSION N/A 5/12/2016    Procedure: ANESTHESIA CARDIOVERSION;  Surgeon: GENERIC ANESTHESIA PROVIDER;  Location: [facility] OR     ANESTHESIA CARDIOVERSION N/A 8/7/2017    Procedure: ANESTHESIA CARDIOVERSION;  Anesthesia Offsite Coverage Cardioversion @1100;  Surgeon: GENERIC ANESTHESIA PROVIDER;  Location: [facility] OR     ANESTHESIA CARDIOVERSION N/A 1/3/2018    Procedure: ANESTHESIA CARDIOVERSION;  Anesthesia Cardioverion;  Surgeon: GENERIC ANESTHESIA PROVIDER;  Location: [facility] OR     ANESTHESIA CARDIOVERSION N/A 5/4/2018    Procedure: ANESTHESIA CARDIOVERSION;  Anesthesia Coverage Cardioversion @1400;  Surgeon: GENERIC ANESTHESIA PROVIDER;  Location: [facility] OR     ARTHROPLASTY HIP  1/15/2014    Procedure: ARTHROPLASTY HIP;  Left Total Hip Arthroplasty;  Surgeon: Nelson Gaspar MD;  Location: [facility] OR     CARDIAC SURGERY       COLONOSCOPY N/A 5/18/2018    Procedure: COMBINED COLONOSCOPY, SINGLE OR MULTIPLE BIOPSY/POLYPECTOMY BY BIOPSY;  COLONOSCOPY (PT HAS DEFIBRILLATOR) ;  Surgeon: Mike Nickerson MD;  Location:  GI     H ABLATION FOCAL AFIB  12/9/11    Left atrial ablation to eliminate atrial fibrillation     IMPLANT AUTOMATIC IMPLANTABLE CARDIOVERTER DEFIBRILLATOR  7/27/12    AICD implantation     TONSILLECTOMY  1964       ALLERGIES:     No Active Allergies    FAMILY HISTORY:  Family History   Problem Relation Age of Onset      "HEART DISEASE Mother      unknown     Obesity Mother      GASTROINTESTINAL DISEASE Mother      diverticulitis     DIABETES Maternal Grandmother      DIABETES Paternal Grandmother      CEREBROVASCULAR DISEASE Paternal Grandmother 94     DIABETES Paternal Grandfather      CEREBROVASCULAR DISEASE Paternal Grandfather 78     DIABETES Son      C.A.D. Father      CABG age 78     HEART DISEASE Father      CABG x5     DIABETES Maternal Grandfather        SOCIAL HISTORY:  Social History   Substance Use Topics     Smoking status: Former Smoker     Packs/day: 0.25     Years: 40.00     Types: Cigarettes     Start date: 1/1/1975     Quit date: 12/11/2015     Smokeless tobacco: Never Used     Alcohol use 0.0 oz/week     0 Standard drinks or equivalent per week      Comment: 1 drink per week       ROS:   A comprehensive 10 point review of systems negative other than as mentioned in HPI.  Exam:  /87 (BP Location: Left arm, Patient Position: Chair, Cuff Size: Adult Regular)  Pulse 111  Ht 1.803 m (5' 11\")  Wt 96.2 kg (212 lb)  SpO2 97%  BMI 29.57 kg/m2  GENERAL APPEARANCE: healthy, alert and no distress  HEENT: no icterus, no xanthelasmas, normal pupil size and reaction, normal palate, mucosa moist, no central cyanosis  NECK: no adenopathy, no asymmetry, masses, or scars, thyroid normal to palpation and no bruits, JVP not elevated  RESPIRATORY: lungs clear to auscultation - no rales, rhonchi or wheezes, no use of accessory muscles, no retractions, respirations are unlabored, normal respiratory rate  CARDIOVASCULAR: regular rhythm, normal S1 with physiologic split S2, no S3 or S4 and no murmur, click or rub, precordium quiet with normal PMI.  ABDOMEN: soft, non tender, without hepatosplenomegaly, no masses palpable, bowel sounds normal, aorta not enlarged by palpation, no abdominal bruits  EXTREMITIES: peripheral pulses normal, no edema, no bruits  NEURO: alert and oriented to person/place/time, normal speech, gait and " affect  VASC: Radial, femoral, dorsalis pedis and posterior tibialis pulses are normal in volumes and symmetric bilaterally. No bruits are heard.  SKIN: no ecchymoses, no rashes    Labs:  Reviewed.     Procedures:  12/15/16 ECHOCARDIOGRAM:   Interpretation Summary  Mildly (EF 40-45%) reduced left ventricular function is present. Mild  asymmetric septal hypertrophy is present (14 mm). No dynamic outflow tract  obstruction is present.  Global right ventricular function is normal.  No significant valvular abnormalities are identified.  No pericardial effusion is present.       Assessment and Plan:   Mr. Meredith is a 62 year old male who has a past medical history significant for HCM diagnosed , VT on Holter 2012 s/p ICD 2012, troponin leak Oct 2013 (angiogram with non-significant CAD, LV gram showed EF 25%, recovered to 60% on TTE Dec 2013), HTN, AFib (CHADSVASC 2) with DCCVs then s/p PVI 2011 and PVI for persistent AF on 16 s/p DCCV 17, 1/3/18 and 18. He presents for follow-up. . He has now had some recurrent episodes of AT/AF requiring DCCV on 17 and 1/3/18.  Device check showed AT episode that started 18 and lasted 25 days and self terminated. He then had recurrent AT/AF and had DCCV on 18. He followed up with Dr. Ware recently in clinic and reported having fatigue, MART, and decreased stamina. Device interrogation showed he had recurred back into AT/AF since 18. He was arranged for EP follow up. He continues to have fatigue, MART, and decreased stamina. Last stress echo from 18 showed normal biventricular function with asymmetrical septal hypertrophy and no LVOT obstruction with rest or exercise.   We discussed in detail with the patient management/treatment options for AF/AFL includin. Stroke Prophylaxis:  CHADSVASC= 2, corresponding to a 2.2% annual stroke / systemic emolism event rate. indicating need for long term oral anticoagulation.  He is on Xarelto. No  bleeding issues.   2. Rate Control: Continue Metoprolol.   3. Rhythm Control: He has had a PVI ablation in 2011 with several recurrences requiring DCCV and then repeat PVI in July 2016. He had previously failed multaq and is currently on Sotalol.  He has now had some recurrent episodes of AT/AF requiring DCCV on 8/7/17 and 1/3/18.  Device check showed AT episode that started 1/28/18 and lasted 25 days and self terminated. He then had recurrent AT/AF and had DCCV on 5/5/18. He followed up with Dr. Ware recently in clinic and reported having fatigue, MART, and decreased stamina. Device interrogation showed he had recurred back into AT/AF since 5/26/18. We discussed management options including amiodarone vs repeat ablation. We discussed previous Ablation voltage mapping showed high scar burden in atrium; therefore, he has higher recurrence rate for AF and may be difficult to maintain NSR longerterm. He recurrences have been an organized AFL which could be more amenable to ablation. He would like to try another ablation in efforts of avoiding amiodarone. The procedural risk of EP study and ablation were reviewed in detail. Risks discussed include: vascular complications, CVA, AVB, pericardial effusion and tamponade, esophageal fistula, PV stenosis. AF ablation has 70% success at 1 year, 50% success at 5 years, and 30% need another ablation.  Even if ablation is successful anticoagulation may be needed lifelong. He states understanding and wishes to pursue ablation. We will work to schedule this with him. We will plan for PVI/CTI ablation. He will need aggressive diuresis during procedure/post procedure as he was readmitted after last ablation with some hypervolemia/pulmonary edema.   4. Risk Factor Management: maintain tight BP control, and GONZALEZ evaluation as indicated.    He will continue to follow with the device clinic per routine. He will follow up with Dr. Ware for HCM. Follow up with EP 3 months after ablation.      The patient states understanding and is agreeable with plan.   This patient was seen and evaluated with Dr. Cooper. The above note reflects our joint assessment and plan.   KARSON Richardson CNP  Pager: [phone]    EP STAFF NOTE  I have seen and examined the patient as part of a shared visit with HOLLY Richardson NP.  I agree with the note above. I reviewed today's vital signs and medications. I have reviewed and discussed with the advanced practice provider their physical examination, assessment, and plan   Briefly, PAF s/p ablation in 2011 with recurrence and repeat in 2016, now with recurrences again, symptomatic, most recurrences are AFL which looks typical, but one was AF  My key history/exam findings are: AFL.   The key management decisions made by me: repeat ablation of AF and AFL..    Erik Cooper MD [facility]  Cardiology - Electrophysiology    CC  TYRELL NOGUERA

## 2018-06-08 NOTE — LETTER
6/8/2018      RE: Stu Meredith  [address] [facility] [id number]       Dear Colleague,    Thank you for the opportunity to participate in the care of your patient, Stu Meredith, at the [facility] HEART [facility]. Please see a copy of my visit note below.    Electrophysiology Clinic Note  HPI:   Mr. Meredith is a 62 year old male who has a past medical history significant for HCM diagnosed 2006, VT on Holter July 2012 s/p ICD 7/2012, troponin leak Oct 2013 (angiogram with non-significant CAD, LV gram showed EF 25%, recovered to 60% on TTE Dec 2013), HTN, AFib (CHADSVASC 2) with DCCVs then s/p PVI 12/2011 and PVI for persistent AF on 7/28/16 s/p DCCV 8/7/17, 1/3/18 and 5/5/18. He presents for follow-up.  The patient underwent atrial fibrillation ablation by Dr. Gonzalez at [facility] in 12/2011 and implantation of an ICD in 07/2012 because of ventricular tachycardia. As per patient, he has been in sinus rhythm approximately 1-1/2 years after ablation. The patient was found to have recurrent atrial fibrillation during a preoperative exam in 10/2013 and underwent electrical cardioversion in 11/2013. The patient underwent hip surgery in 01/2014. The patient noticed that he was in atrial fibrillation at a clinic visit in 01/2014.   He was seen in consult by Dr Analilia spain on 2/14/14 for consideration of ablation. At the time the patient was reported to be generally unaware of whether or not he is in sinus rhythm or in atrial fibrillation. He reports daytime somnolence but denies significant dyspnea on exertion, palpitations, irregular beat sensation, presyncope or syncope. In terms of risk factors for stroke, the patient has coronary heart disease, hypertension, but denies history of diabetes, previous myocardial cardiac infarction or heart failure. The patient is on rivaroxaban for stroke prophylaxis and is on diltiazem and metoprolol. He was told that  there was no consideration of ablation.   He came to EP clinic for second opinion in 3/3/2014. The patient mentioned this time he is not sure whether his afib is causing fatigue. However, he also attributed symptoms like headache and head fullness to his afib. He also had a cold around the same time. He still denied chest pain, syncope or presyncope, ICD shocks, MART or SOB. He does however indicate this time that he does feel irregular heart beat at times, along with chronic fatigue, although is not sure if fatigue is linked to afib. We agreed at that time to pursue a cardioversion to see whether he would feel better and we started him on sotalol 80 mg twice a day. He underwent cardioversion on 4/24/2014.  He then had a device transmission showed recurrent AF in 7/2014 which spontaneously converted. We decrease his diltiazem from 240 to 120 mg daily because of fatigue. His fatigue did get better when we decrease his diltiazem.  He did have a 9 day episode of A. Fib starting June 19, 2015, during which the patient felt very tired and no energy.  The heart rate was well controlled.  A. Fib burden was 19% since 5/19/2015, but it is the first episode of A. Fib since at least November 2014.  However at follow up in 2016 AF burden 1%. At follow up in 5/2016 his AF burden= 100% since 2/28/16. He reported some increased fatgiue and decreased stamina since being back in AF. At that visit, he elected to pursue repeat PVI ablation for AF that is refractory to Sotalol. He underwent repeat PVI ablation for persistent AF refractory to AATs on 7/28/16. He had successful re-isolation of all 4 pulmonary veins. He was re-hospitalized a couple days post ablation for SOB assocaited with hypervolemia which resolved with diuresis. He reports feeling well after ablation. He states his energy levels improved with restoration of sinus. Diltiazem was stopped after ablation.  He has now had some recurrent episodes of AT/AF requiring DCCV on  8/7/17 and 1/3/18.  Device check showed AT episode that started 1/28/18 and lasted 25 days and self terminated. He then had recurrent AT/AF and had DCCV on 5/5/18. He followed up with Dr. Ware recently in clinic and reported having fatigue, MART, and decreased stamina. Device interrogation showed he had recurred back into AT/AF since 5/26/18. He was arranged for EP follow up.     He continues to have fatigue, MART, and decreased stamina. Last stress echo from 6/1/18 showed normal biventricular function with asymmetrical septal hypertrophy and no LVOT obstruction with rest or exercise. He denies any chest pain/pressures, dizziness, lightheadedness, leg/ankle swelling, PND, orthopnea, palpitations, or syncopal symptoms.Presenting 12 lead ECG shows AFL Vent Rate 111 bpm, QRS 80 ms, QTc 489 ms. Current cardiac medications include: Spironolactone, Lipitor, Sotalol, Toprol XL, and Xarelto.     PAST MEDICAL HISTORY:  Past Medical History:   Diagnosis Date     Atrial fibrillation (H) 12/9/11    failed medication, multiple DC cardioveresions; s/p Left atrial ablation to eliminate atrial fibrillation 12/9/11     Chest pain      CHF (congestive heart failure) (H) 7/30/2016     Coronary artery disease      DJD (degenerative joint disease)      Hip arthritis 1/15/2014     Hypertension      Hypertrophic cardiomyopathy (H) 10/09     Other abnormal heart sounds      Pacemaker     ICD     Pneumonia, organism unspecified(486)      Prediabetes 7/10/15    A1C 6.5     Status post implantation of automatic cardioverter/defibrillator (AICD)      Ventricular tachycardia (H)        CURRENT MEDICATIONS:  Current Outpatient Prescriptions   Medication Sig Dispense Refill     acetaminophen (TYLENOL) 325 MG tablet Take 325-650 mg by mouth every 6 hours as needed       amoxicillin (AMOXIL) 500 MG tablet Take 4 tablets (2000 mg) by mouth 1 hour before dental procedures. 4 tablet 3     atorvastatin (LIPITOR) 20 MG tablet Take 1 tablet (20 mg) by  mouth daily 90 tablet 3     Blood Pressure Monitoring (BLOOD PRESSURE CUFF) MISC 1 Device daily 1 each 0     metoprolol (TOPROL-XL) 50 MG 24 hr tablet Take 1 tablet (50 mg) by mouth daily 90 tablet 3     multivitamin, therapeutic (THERA-VIT) TABS Take 1 tablet by mouth daily       rivaroxaban ANTICOAGULANT (XARELTO) 20 MG TABS tablet Take 1 tablet (20 mg) by mouth daily (with dinner) 90 tablet 5     sotalol (BETAPACE) 120 MG tablet Take 1 tablet (120 mg) by mouth 2 times daily 90 tablet 3     spironolactone (ALDACTONE) 25 MG tablet Take 1 tablet (25 mg) by mouth daily 90 tablet 3     zolpidem (AMBIEN) 10 MG tablet Take 0.5-1 tablets (5-10 mg) by mouth nightly as needed for sleep 90 tablet 0       PAST SURGICAL HISTORY:  Past Surgical History:   Procedure Laterality Date     ANESTHESIA CARDIOVERSION  4/24/2014    Procedure: ANESTHESIA CARDIOVERSION;  Surgeon: Generic Anesthesia Provider;  Location:  OR     ANESTHESIA CARDIOVERSION N/A 5/12/2016    Procedure: ANESTHESIA CARDIOVERSION;  Surgeon: GENERIC ANESTHESIA PROVIDER;  Location: [facility] OR     ANESTHESIA CARDIOVERSION N/A 8/7/2017    Procedure: ANESTHESIA CARDIOVERSION;  Anesthesia Offsite Coverage Cardioversion @1100;  Surgeon: GENERIC ANESTHESIA PROVIDER;  [facility]     ANESTHESIA CARDIOVERSION N/A 1/3/2018    Procedure: ANESTHESIA CARDIOVERSION;  Anesthesia Cardioverion;  Surgeon: GENERIC ANESTHESIA PROVIDER;  [facility]: [facility]     ANESTHESIA CARDIOVERSION N/A 5/4/2018    Procedure: ANESTHESIA CARDIOVERSION;  Anesthesia Coverage Cardioversion @1400;  Surgeon: GENERIC ANESTHESIA PROVIDER;  Location:  OR     ARTHROPLASTY HIP  1/15/2014    Procedure: ARTHROPLASTY HIP;  Left Total Hip Arthroplasty;  Surgeon: Nelson Gaspar MD;  Location: [facility] OR     CARDIAC SURGERY       COLONOSCOPY N/A 5/18/2018    Procedure: COMBINED COLONOSCOPY, SINGLE OR MULTIPLE BIOPSY/POLYPECTOMY BY BIOPSY;  COLONOSCOPY (PT HAS DEFIBRILLATOR) ;  Surgeon: Mike Nickerson MD;  Location:  "[facility] GI     H ABLATION FOCAL AFIB  12/9/11    Left atrial ablation to eliminate atrial fibrillation     IMPLANT AUTOMATIC IMPLANTABLE CARDIOVERTER DEFIBRILLATOR  7/27/12    AICD implantation     TONSILLECTOMY  1964       ALLERGIES:     No Active Allergies    FAMILY HISTORY:  Family History   Problem Relation Age of Onset     HEART DISEASE Mother      unknown     Obesity Mother      GASTROINTESTINAL DISEASE Mother      diverticulitis     DIABETES Maternal Grandmother      DIABETES Paternal Grandmother      CEREBROVASCULAR DISEASE Paternal Grandmother 94     DIABETES Paternal Grandfather      CEREBROVASCULAR DISEASE Paternal Grandfather 78     DIABETES Son      C.A.D. Father      CABG age 78     HEART DISEASE Father      CABG x5     DIABETES Maternal Grandfather        SOCIAL HISTORY:  Social History   Substance Use Topics     Smoking status: Former Smoker     Packs/day: 0.25     Years: 40.00     Types: Cigarettes     Start date: 1/1/1975     Quit date: 12/11/2015     Smokeless tobacco: Never Used     Alcohol use 0.0 oz/week     0 Standard drinks or equivalent per week      Comment: 1 drink per week       ROS:   A comprehensive 10 point review of systems negative other than as mentioned in HPI.  Exam:  /87 (BP Location: Left arm, Patient Position: Chair, Cuff Size: Adult Regular)  Pulse 111  Ht 1.803 m (5' 11\")  Wt 96.2 kg (212 lb)  SpO2 97%  BMI 29.57 kg/m2  GENERAL APPEARANCE: healthy, alert and no distress  HEENT: no icterus, no xanthelasmas, normal pupil size and reaction, normal palate, mucosa moist, no central cyanosis  NECK: no adenopathy, no asymmetry, masses, or scars, thyroid normal to palpation and no bruits, JVP not elevated  RESPIRATORY: lungs clear to auscultation - no rales, rhonchi or wheezes, no use of accessory muscles, no retractions, respirations are unlabored, normal respiratory rate  CARDIOVASCULAR: regular rhythm, normal S1 with physiologic split S2, no S3 or S4 and no murmur, click or " rub, precordium quiet with normal PMI.  ABDOMEN: soft, non tender, without hepatosplenomegaly, no masses palpable, bowel sounds normal, aorta not enlarged by palpation, no abdominal bruits  EXTREMITIES: peripheral pulses normal, no edema, no bruits  NEURO: alert and oriented to person/place/time, normal speech, gait and affect  VASC: Radial, femoral, dorsalis pedis and posterior tibialis pulses are normal in volumes and symmetric bilaterally. No bruits are heard.  SKIN: no ecchymoses, no rashes    Labs:  Reviewed.     Procedures:  12/15/16 ECHOCARDIOGRAM:   Interpretation Summary  Mildly (EF 40-45%) reduced left ventricular function is present. Mild  asymmetric septal hypertrophy is present (14 mm). No dynamic outflow tract  obstruction is present.  Global right ventricular function is normal.  No significant valvular abnormalities are identified.  No pericardial effusion is present.       Assessment and Plan:   Mr. Meredith is a 62 year old male who has a past medical history significant for HCM diagnosed 2006, VT on Holter July 2012 s/p ICD 7/2012, troponin leak Oct 2013 (angiogram with non-significant CAD, LV gram showed EF 25%, recovered to 60% on TTE Dec 2013), HTN, AFib (CHADSVASC 2) with DCCVs then s/p PVI 12/2011 and PVI for persistent AF on 7/28/16 s/p DCCV 8/7/17, 1/3/18 and 5/5/18. He presents for follow-up. . He has now had some recurrent episodes of AT/AF requiring DCCV on 8/7/17 and 1/3/18.  Device check showed AT episode that started 1/28/18 and lasted 25 days and self terminated. He then had recurrent AT/AF and had DCCV on 5/5/18. He followed up with Dr. Ware recently in clinic and reported having fatigue, MART, and decreased stamina. Device interrogation showed he had recurred back into AT/AF since 5/26/18. He was arranged for EP follow up. He continues to have fatigue, MART, and decreased stamina. Last stress echo from 6/1/18 showed normal biventricular function with asymmetrical septal hypertrophy  and no LVOT obstruction with rest or exercise.   We discussed in detail with the patient management/treatment options for AF/AFL includin. Stroke Prophylaxis:  CHADSVASC= 2, corresponding to a 2.2% annual stroke / systemic emolism event rate. indicating need for long term oral anticoagulation.  He is on Xarelto. No bleeding issues.   2. Rate Control: Continue Metoprolol.   3. Rhythm Control: He has had a PVI ablation in  with several recurrences requiring DCCV and then repeat PVI in 2016. He had previously failed multaq and is currently on Sotalol.  He has now had some recurrent episodes of AT/AF requiring DCCV on 17 and 1/3/18.  Device check showed AT episode that started 18 and lasted 25 days and self terminated. He then had recurrent AT/AF and had DCCV on 18. He followed up with Dr. Ware recently in clinic and reported having fatigue, MART, and decreased stamina. Device interrogation showed he had recurred back into AT/AF since 18. We discussed management options including amiodarone vs repeat ablation. We discussed previous Ablation voltage mapping showed high scar burden in atrium; therefore, he has higher recurrence rate for AF and may be difficult to maintain NSR longerterm. He recurrences have been an organized AFL which could be more amenable to ablation. He would like to try another ablation in efforts of avoiding amiodarone. The procedural risk of EP study and ablation were reviewed in detail. Risks discussed include: vascular complications, CVA, AVB, pericardial effusion and tamponade, esophageal fistula, PV stenosis. AF ablation has 70% success at 1 year, 50% success at 5 years, and 30% need another ablation.  Even if ablation is successful anticoagulation may be needed lifelong. He states understanding and wishes to pursue ablation. We will work to schedule this with him. We will plan for PVI/CTI ablation. He will need aggressive diuresis during procedure/post procedure  as he was readmitted after last ablation with some hypervolemia/pulmonary edema.   4. Risk Factor Management: maintain tight BP control, and GONZALEZ evaluation as indicated.    He will continue to follow with the device clinic per routine. He will follow up with Dr. Ware for HCM. Follow up with EP 3 months after ablation.     The patient states understanding and is agreeable with plan.   This patient was seen and evaluated with Dr. Cooper. The above note reflects our joint assessment and plan.   KARSON Richardson CNP  Pager: [phone]    EP STAFF NOTE  I have seen and examined the patient as part of a shared visit with HOLLY Richardson NP.  I agree with the note above. I reviewed today's vital signs and medications. I have reviewed and discussed with the advanced practice provider their physical examination, assessment, and plan   Briefly, PAF s/p ablation in 2011 with recurrence and repeat in 2016, now with recurrences again, symptomatic, most recurrences are AFL which looks typical, but one was AF  My key history/exam findings are: AFL.   The key management decisions made by me: repeat ablation of AF and AFL..    Erik Cooper MD [facility]  Cardiology - Electrophysiology    CC  TYRELL NOGUERA

## 2018-06-08 NOTE — PATIENT INSTRUCTIONS
"You were seen today in the Adult Congenital and Cardiovascular Genetics Clinic at the [facility].    Cardiology Providers you saw during your visit:  Dr. Erik Cooper     Diagnosis:  HCM    Results:  No testing today    Recommendations:    1.  Continue to eat a heart healthy, low salt diet.  2.  Continue to get 20-30 minutes of aerobic activity, 4-5 days per week.  Examples of aerobic activity include walking, running, swimming, cycling, etc.  3.  Continue to observe good oral hygiene, with regular dental visits.  4.  Radha Willson will call to schedule you for the ablation [phone].      Vitals:    06/08/18 1104   BP: 138/87   BP Location: Left arm   Patient Position: Chair   Cuff Size: Adult Regular   Pulse: 111   SpO2: 97%   Weight: 96.2 kg (212 lb)   Height: 1.803 m (5' 11\")       SBE prophylaxis:   Yes____  No____    Lifelong Bacterial Endocarditis Prophylaxis:  YES____  NO____    If YES is checked, follow the recommendations outlined below:  1. Take antibiotic(s) prior to recommended dental procedures and procedures on the respiratory tract or with infected skin, muscle or bones. SBE prophylaxis is not needed for routine GI and  procedures (ie. Colonoscopy or vaginal delivery)  2. Observe good oral hygiene daily, as advised by your dentist. Get regular professional dental care.  3. Keep cuts clean.  4. Infections should be treated promptly.  5. Symptoms of Infective Endocarditis could include: fever lasting more than 4-5 days or a recurrent fever that initially resolves but returns within 1-2 days)      Exercise restrictions:   Yes__X__  No____         If yes, list restrictions:  Must be allowed to rest if fatigued or SOB      Work restrictions:  Yes____  No____         If yes, list restrictions:    FASTING CHOLESTEROL was checked in the last 5 years YES___  NO___   Continue to eat a heart healthy, low salt diet.         ____ Fasting lipid panel order today         ____ No changes in " medications          ____ I recommend the following changes in your cholesterol medications.:          ____ Please follow up for cholesterol screening at your primary care physician      Follow-up:  Follow up with Dr. Cooper after your ablation.    If you have questions or concerns please contact us at:    Conchita Schulte, RN, BSN   Td Vega (Scheduling.)  Nurse Coordinator     Clinic   Adult Congenital and CV Genetics Adult Congenital and CV Genetic  [facility]  (P)[phone]    (P) [phone]  [email or web address] (F)[phone]        For after hours urgent needs, call [phone] and ask to speak to the Adult Congenital Physician on call.  Mention Job Code 0401.    For emergencies call 911.    [facility]  [facility]  Mail Code [id number]  [address] [place]  [address]

## 2018-06-08 NOTE — MR AVS SNAPSHOT
"              After Visit Summary   6/8/2018    Stu Meredith    MRN: [id number]           Patient Information     Date Of Birth          1955        Visit Information        Provider Department      6/8/2018 11:00 AM Erik Cooper MD [facility]        Today's Diagnoses     Hypertrophic cardiomyopathy (H)        Atrial fibrillation, unspecified type (H)          Care Instructions    You were seen today in the Adult Congenital and Cardiovascular Genetics Clinic at the [facility].    Cardiology Providers you saw during your visit:  Dr. Erik Cooper     Diagnosis:  HCM    Results:  No testing today    Recommendations:    1.  Continue to eat a heart healthy, low salt diet.  2.  Continue to get 20-30 minutes of aerobic activity, 4-5 days per week.  Examples of aerobic activity include walking, running, swimming, cycling, etc.  3.  Continue to observe good oral hygiene, with regular dental visits.  4.  Radha Willson will call to schedule you for the ablation [phone].      Vitals:    06/08/18 1104   BP: 138/87   BP Location: Left arm   Patient Position: Chair   Cuff Size: Adult Regular   Pulse: 111   SpO2: 97%   Weight: 96.2 kg (212 lb)   Height: 1.803 m (5' 11\")       SBE prophylaxis:   Yes____  No____    Lifelong Bacterial Endocarditis Prophylaxis:  YES____  NO____    If YES is checked, follow the recommendations outlined below:  1. Take antibiotic(s) prior to recommended dental procedures and procedures on the respiratory tract or with infected skin, muscle or bones. SBE prophylaxis is not needed for routine GI and  procedures (ie. Colonoscopy or vaginal delivery)  2. Observe good oral hygiene daily, as advised by your dentist. Get regular professional dental care.  3. Keep cuts clean.  4. Infections should be treated promptly.  5. Symptoms of Infective Endocarditis could include: fever lasting more than 4-5 days or a recurrent fever that initially resolves but returns within 1-2 " days)      Exercise restrictions:   Yes__X__  No____         If yes, list restrictions:  Must be allowed to rest if fatigued or SOB      Work restrictions:  Yes____  No____         If yes, list restrictions:    FASTING CHOLESTEROL was checked in the last 5 years YES___  NO___   Continue to eat a heart healthy, low salt diet.         ____ Fasting lipid panel order today         ____ No changes in medications          ____ I recommend the following changes in your cholesterol medications.:          ____ Please follow up for cholesterol screening at your primary care physician      Follow-up:  Follow up with Dr. Cooper after your ablation.    If you have questions or concerns please contact us at:    Conchita Schulte, RN, BSN   Td Vega (Scheduling.)  Nurse Coordinator     Clinic   Adult Congenital and CV Genetics Adult Congenital and CV Genetic  [facility]  [phone])[phone]    (P) [phone]  [email or web address] (F)[phone]        For after hours urgent needs, call [phone] and ask to speak to the Adult Congenital Physician on call.  Mention Job Code 0401.    For emergencies call [phone].    [facility]  Mail Code [id number]  [address] [place] [address]  [id number]           Follow-ups after your visit        Your next 10 appointments already scheduled     Sep 13, 2018  3:50 PM CDT   Return Visit with Boaz Miller MD   [facility] [place] ([facility])    [address]  [address]  [facility] [address] [phone]              Who to contact     If you have questions or need follow up information about today's clinic visit or your schedule please contact [facility] directly at [phone].  Normal or non-critical lab and imaging results will be communicated  "to you by MyChart, letter or phone within 4 business days after the clinic has received the results. If you do not hear from us within 7 days, please contact the clinic through [employer] or phone. If you have a critical or abnormal lab result, we will notify you by phone as soon as possible.  Submit refill requests through [employer] or call your pharmacy and they will forward the refill request to us. Please allow 3 business days for your refill to be completed.          Additional Information About Your Visit        [employer] Information     [employer] gives you secure access to your electronic health record. If you see a primary care provider, you can also send messages to your care team and make appointments. If you have questions, please call your primary care clinic.  If you do not have a primary care provider, please call [phone] and they will assist you.        Care EveryWhere ID     This is your Care EveryWhere ID. This could be used by other organizations to access your [facility] medical records  [id number]        Your Vitals Were     Pulse Height Pulse Oximetry BMI (Body Mass Index)          111 1.803 m (5' 11\") 97% 29.57 kg/m2         Blood Pressure from Last 3 Encounters:   06/08/18 138/87   06/07/18 122/80   06/01/18 132/82    Weight from Last 3 Encounters:   06/08/18 96.2 kg (212 lb)   06/07/18 97.1 kg (214 lb)   06/01/18 97.2 kg (214 lb 3.2 oz)              We Performed the Following     Follow-Up with Electrophysiologist - 1 Year        Primary Care Provider Office Phone # Fax #    Omar Camacho [phone]       [address] [place] [address]        Equal Access to Services     MARLON DE LOS SANTOS : Hadii reina Ward, jonyda ximena, qajorden velazquez. So [facility] [phone].    ATENCIÓN: Si habla español, tiene a drew disposición servicios gratuitos de asistencia lingüística. Llame al [phone].    We comply with " applicable federal civil rights laws and [place] laws. We do not discriminate on the basis of race, color, national origin, age, disability, sex, sexual orientation, or gender identity.            Thank you!     Thank you for choosing [facility]  for your care. Our goal is always to provide you with excellent care. Hearing back from our patients is one way we can continue to improve our services. Please take a few minutes to complete the written survey that you may receive in the mail after your visit with us. Thank you!             Your Updated Medication List - Protect others around you: Learn how to safely use, store and throw away your medicines at www.disposemymeds.org.          This list is accurate as of 6/8/18 11:46 AM.  Always use your most recent med list.                   Brand Name Dispense Instructions for use Diagnosis    acetaminophen 325 MG tablet    TYLENOL     Take 325-650 mg by mouth every 6 hours as needed        amoxicillin 500 MG tablet    AMOXIL    4 tablet    Take 4 tablets (2000 mg) by mouth 1 hour before dental procedures.    History of total left hip arthroplasty       atorvastatin 20 MG tablet    LIPITOR    90 tablet    Take 1 tablet (20 mg) by mouth daily    CARDIOVASCULAR SCREENING; LDL GOAL LESS THAN 130       Blood Pressure Cuff Misc     1 each    1 Device daily    Atrial fibrillation (H), Hypertrophic cardiomyopathy (H)       metoprolol succinate 50 MG 24 hr tablet    TOPROL-XL    90 tablet    Take 1 tablet (50 mg) by mouth daily    HOCM (hypertrophic obstructive cardiomyopathy) (H)       multivitamin, therapeutic Tabs tablet      Take 1 tablet by mouth daily        rivaroxaban ANTICOAGULANT 20 MG Tabs tablet    XARELTO    90 tablet    Take 1 tablet (20 mg) by mouth daily (with dinner)    Chronic atrial fibrillation (H)       sotalol 120 MG tablet    BETAPACE    90 tablet    Take 1 tablet (120 mg) by mouth 2 times daily    Chronic atrial fibrillation (H), Hypertrophic  cardiomyopathy (H)       spironolactone 25 MG tablet    ALDACTONE    90 tablet    Take 1 tablet (25 mg) by mouth daily    Hypertrophic cardiomyopathy (H), Atrial fibrillation, unspecified type (H)       zolpidem 10 MG tablet    AMBIEN    90 tablet    Take 0.5-1 tablets (5-10 mg) by mouth nightly as needed for sleep    Sedative, hypnotic or anxiolytic dependence (H)

## 2018-06-08 NOTE — NURSING NOTE
Vitals completed successfully and medication reconciled. EKG preformed. SMA Andres  Chief Complaint   Patient presents with     Follow Up For     f/u post DCCV and to discuss poss ablation

## 2018-07-09 DIAGNOSIS — F13.20 SEDATIVE, HYPNOTIC OR ANXIOLYTIC DEPENDENCE (H): ICD-10-CM

## 2018-07-10 RX ORDER — ZOLPIDEM TARTRATE 10 MG/1
5-10 TABLET ORAL
Qty: 90 TABLET | Refills: 0 | Status: SHIPPED | OUTPATIENT
Start: 2018-07-10 | End: 2018-10-08

## 2018-07-10 NOTE — TELEPHONE ENCOUNTER
zolpidem (AMBIEN) 10 MG tablet      Last Written Prescription Date:  4/3/18  Last Fill Quantity: 90,   # refills: 0  Last Office Visit: 6/8/18  Future Office visit:       Routing refill request to provider for review/approval because:  Drug not on the [facility], [facility] or [facility] refill protocol or controlled substance

## 2018-07-16 DIAGNOSIS — M25.551 HIP PAIN, RIGHT: Primary | ICD-10-CM

## 2018-07-16 ASSESSMENT — ACTIVITIES OF DAILY LIVING (ADL)
ADL_MEAN: 2.35
ADL_SUM: 40
ADL_SUBSCALE_SCORE: 41.17

## 2018-07-16 ASSESSMENT — HOOS S4: HOW SEVERE IS YOUR HIP JOINT STIFFNESS AFTER FIRST WAKENING IN THE MORNING?: SEVERE

## 2018-07-19 ENCOUNTER — RADIANT APPOINTMENT (OUTPATIENT)
Dept: GENERAL RADIOLOGY | Facility: CLINIC | Age: 63
End: 2018-07-19
Attending: ORTHOPAEDIC SURGERY
Payer: COMMERCIAL

## 2018-07-19 ENCOUNTER — OFFICE VISIT (OUTPATIENT)
Dept: ORTHOPEDICS | Facility: CLINIC | Age: 63
End: 2018-07-19
Payer: COMMERCIAL

## 2018-07-19 VITALS — HEIGHT: 71 IN | BODY MASS INDEX: 29.69 KG/M2 | WEIGHT: 212.08 LBS

## 2018-07-19 DIAGNOSIS — M25.551 HIP PAIN, RIGHT: ICD-10-CM

## 2018-07-19 DIAGNOSIS — M70.61 TROCHANTERIC BURSITIS OF RIGHT HIP: Primary | ICD-10-CM

## 2018-07-19 RX ORDER — LIDOCAINE HYDROCHLORIDE 10 MG/ML
5 INJECTION, SOLUTION INFILTRATION; PERINEURAL ONCE
Qty: 5 ML | Refills: 0 | OUTPATIENT
Start: 2018-07-19 | End: 2018-07-19

## 2018-07-19 RX ORDER — TRIAMCINOLONE ACETONIDE 40 MG/ML
40 INJECTION, SUSPENSION INTRA-ARTICULAR; INTRAMUSCULAR ONCE
Qty: 1 ML | Refills: 0 | OUTPATIENT
Start: 2018-07-19 | End: 2018-07-19

## 2018-07-19 NOTE — NURSING NOTE
"Reason For Visit:   Chief Complaint   Patient presents with     Consult     New issue right Hip pain       Ht 1.803 m (5' 10.98\")  Wt 96.2 kg (212 lb 1.3 oz)  BMI 29.59 kg/m2    Pain Assessment  Patient Currently in Pain: Yes  0-10 Pain Scale: 2  Primary Pain Location: Hip  Pain Orientation: Right  Pain Descriptors: Sharp  Aggravating Factors: Lying    Ramonitajarad Hilton ATC    "

## 2018-07-19 NOTE — LETTER
7/19/2018       RE: Stu Meredith  [address]  [facility] [address]     Dear Colleague,    Thank you for referring your patient, Stu Meredith, to the [facility] at [facility]. Please see a copy of my visit note below.    Chief Complaint: Consult (New issue right Hip pain)      Physician:  Referred Self    HPI: Stu Meredith is a 62 year old male who presents today for evaluation of lateral right hip pain of 3-4 mo duration.  He notes this while trying to lie on his side.  It improves with repositioning.  He notes it is sharp and can be up to 8/10 in severity.    Symptom Profile  Location of symptoms:  Right GT  Onset: insidious  Duration of symptoms: 4 mo  Quality of symptoms: aching  Severity: up to 8/10  Alleviate: reposition  Exacerbating: lying on the side  Previous Treatments: Previous treatments include activity modification, oral pain medication     Current Status:  Results of the patient s Hip Disability and Osteoarthritis Outcome Score (HOOS)  are as follows (0-100 scales with 100 being the theoretical best):  Pain: 42.5  Symptoms: 45  ADLs: 41.7  Sports/Recreation: 43.75  Quality of Life: 43.75  (http://koos.nu/)  [facility] Activity Score: 5    MEDICAL HISTORY:   Past Medical History:   Diagnosis Date     Atrial fibrillation (H) 12/9/11    failed medication, multiple DC cardioveresions; s/p Left atrial ablation to eliminate atrial fibrillation 12/9/11     Chest pain      CHF (congestive heart failure) (H) 7/30/2016     Coronary artery disease      DJD (degenerative joint disease)      Hip arthritis 1/15/2014     Hypertension      Hypertrophic cardiomyopathy (H) 10/09     Other abnormal heart sounds      Pacemaker     ICD     Pneumonia, organism unspecified(486)      Prediabetes 7/10/15    A1C 6.5     Status post implantation of automatic cardioverter/defibrillator (AICD)      Ventricular tachycardia (H)        Pertinent negatives:  Patient  has no history of DVT or PE. Discussed risk factors.    Medications:     Current Outpatient Prescriptions:      acetaminophen (TYLENOL) 325 MG tablet, Take 325-650 mg by mouth every 6 hours as needed, Disp: , Rfl:      amoxicillin (AMOXIL) 500 MG tablet, Take 4 tablets (2000 mg) by mouth 1 hour before dental procedures., Disp: 4 tablet, Rfl: 3     atorvastatin (LIPITOR) 20 MG tablet, Take 1 tablet (20 mg) by mouth daily, Disp: 90 tablet, Rfl: 3     Blood Pressure Monitoring (BLOOD PRESSURE CUFF) MISC, 1 Device daily, Disp: 1 each, Rfl: 0     metoprolol (TOPROL-XL) 50 MG 24 hr tablet, Take 1 tablet (50 mg) by mouth daily, Disp: 90 tablet, Rfl: 3     multivitamin, therapeutic (THERA-VIT) TABS, Take 1 tablet by mouth daily, Disp: , Rfl:      rivaroxaban ANTICOAGULANT (XARELTO) 20 MG TABS tablet, Take 1 tablet (20 mg) by mouth daily (with dinner), Disp: 90 tablet, Rfl: 5     sotalol (BETAPACE) 120 MG tablet, Take 1 tablet (120 mg) by mouth 2 times daily, Disp: 90 tablet, Rfl: 3     spironolactone (ALDACTONE) 25 MG tablet, Take 1 tablet (25 mg) by mouth daily, Disp: 90 tablet, Rfl: 3     zolpidem (AMBIEN) 10 MG tablet, Take 0.5-1 tablets (5-10 mg) by mouth nightly as needed for sleep, Disp: 90 tablet, Rfl: 0  No current facility-administered medications for this visit.     Facility-Administered Medications Ordered in Other Visits:      methohexital (BREVITAL SODIUM) injection, , , CRISTALN, Martha Ricci, APRN CRNA, 50 mg at 05/04/18 1347    Allergies: Review of patient's allergies indicates no known allergies.    SURGICAL HISTORY:   Past Surgical History:   Procedure Laterality Date     ANESTHESIA CARDIOVERSION  4/24/2014    Procedure: ANESTHESIA CARDIOVERSION;  Surgeon: Generic Anesthesia Provider;  Location: [facility] OR     ANESTHESIA CARDIOVERSION N/A 5/12/2016    Procedure: ANESTHESIA CARDIOVERSION;  Surgeon: GENERIC ANESTHESIA PROVIDER;  Location: [facility] OR     ANESTHESIA CARDIOVERSION N/A 8/7/2017    Procedure: ANESTHESIA  CARDIOVERSION;  Anesthesia Offsite Coverage Cardioversion @1100;  Surgeon: GENERIC ANESTHESIA PROVIDER;  Location: [facility] OR     ANESTHESIA CARDIOVERSION N/A 1/3/2018    Procedure: ANESTHESIA CARDIOVERSION;  Anesthesia Cardioverion;  Surgeon: GENERIC ANESTHESIA PROVIDER;  Location: [facility] OR     ANESTHESIA CARDIOVERSION N/A 5/4/2018    Procedure: ANESTHESIA CARDIOVERSION;  Anesthesia Coverage Cardioversion @1400;  Surgeon: GENERIC ANESTHESIA PROVIDER;  Location: [facility] OR     ARTHROPLASTY HIP  1/15/2014    Procedure: ARTHROPLASTY HIP;  Left Total Hip Arthroplasty;  Surgeon: Nelson Gaspar MD;  Location: [facility] OR     CARDIAC SURGERY       COLONOSCOPY N/A 5/18/2018    Procedure: COMBINED COLONOSCOPY, SINGLE OR MULTIPLE BIOPSY/POLYPECTOMY BY BIOPSY;  COLONOSCOPY (PT HAS DEFIBRILLATOR) ;  Surgeon: Mike Nickerson MD;  Location:  GI     H ABLATION FOCAL AFIB  12/9/11    Left atrial ablation to eliminate atrial fibrillation     IMPLANT AUTOMATIC IMPLANTABLE CARDIOVERTER DEFIBRILLATOR  7/27/12    AICD implantation     TONSILLECTOMY  1964       FAMILY HISTORY:   Family History   Problem Relation Age of Onset     HEART DISEASE Mother      unknown     Obesity Mother      GASTROINTESTINAL DISEASE Mother      diverticulitis     Diabetes Maternal Grandmother      Diabetes Paternal Grandmother      Cerebrovascular Disease Paternal Grandmother 94     Diabetes Paternal Grandfather      Cerebrovascular Disease Paternal Grandfather 78     Diabetes Son      C.A.D. Father      CABG age 78     HEART DISEASE Father      CABG x5     Diabetes Maternal Grandfather        SOCIAL HISTORY:   Social History   Substance Use Topics     Smoking status: Former Smoker     Packs/day: 0.25     Years: 40.00     Types: Cigarettes     Start date: 1/1/1975     Quit date: 12/11/2015     Smokeless tobacco: Never Used     Alcohol use 0.0 oz/week     0 Standard drinks or equivalent per week      Comment: 1 drink per week       REVIEW OF SYSTEMS:  The comprehensive  "review of systems from the intake form was reviewed with the patient.  No fever, weight change or fatigue. No dry eyes. No oral ulcers, sore throat or voice change. No palpitations, syncope, angina or edema.  No chest pain, excessive sleepiness, shortness of breath or hemoptysis.   No abdominal pain, nausea, vomiting, diarrhea or heartburn.  No skin rash. No focal weakness or numbness. No bleeding or lymphadenopathy. No rhinitis or hives.     Exam:  On physical examination the patient appears the stated age, is in no acute distress, affectThe is appropriate, and breathing is non-labored.  Vitals are documented in the EMR and have been reviewed:    Ht 1.803 m (5' 10.98\")  Wt 96.2 kg (212 lb 1.3 oz)  BMI 29.59 kg/m2  5' 10.984\"  Body mass index is 29.59 kg/(m^2).    Rises from chair: with ease  Gait: reciprocal, non-antalgic  Trendelenburg test: neg  Gains the exam table: with ease    RIGHT hip subjective:  Abd: 30  Add: midline  PFC:   Flexion: 90  IRF: 10  ERF:  Impingement test: +  Resisted straight leg raise: -  JOSE G:    Distally, the circulatory, motor, and sensation exam is intact with 5/5 EHL, gastroc-soleus, and tibialis anterior.  Sensation to light touch is intact.  Dorsalis pedis and posterior tibialis pulses are palpable.  There are no sores on the feet, no bruising, and no lymphedema.    X-rays:   XR pelvis -   Left IRISH, well positioned with no apparent complications    Right hip, cam deformity and tonnis 1/2 changes noted, with a small inferior osteophyte    Assessment: This is a 62 year old with right greater trochanteric pain syndrome    Plan:    Discussed with the patient the diagnosis of greater trochanteric pain syndrome, and its treatments.  An injection of steroid was offered, along with PT.      Right hip injection:      Procedure Note:    After informed consent was obtained the patient's right greater trochanter bursae was injected with 5 cc of lidocaine and 40 mg of kenolog using a " superior-medial approach.  Sterile technique was used.  The patient tolerated the procedure well.      I have personally examined this patient and have reviewed the clinical presentation and progress note with the resident. I agree with the treatment plan as outlined. The plan was formulated with the resident on the day of the resident's dictation.        Again, thank you for allowing me to participate in the care of your patient.      Sincerely,    Nelson Gaspar MD

## 2018-07-19 NOTE — MR AVS SNAPSHOT
After Visit Summary   7/19/2018    Stu Meredith    MRN: [id number]           Patient Information     Date Of Birth          1955        Visit Information        Provider Department      7/19/2018 6:30 PM Nelson Gaspar MD [facility]        Today's Diagnoses     Trochanteric bursitis of right hip    -  1       Follow-ups after your visit        Your next 10 appointments already scheduled     Aug 16, 2018   Procedure with GENERIC ANESTHESIA PROVIDER   [facility] [place], Same Day Surgery (--)    [address] [address]   [phone]            Aug 16, 2018  8:00 AM CDT   Ep 90 Minute with [email or web address]   [facility],  Heart Cath Lab ([facility])    [address] [address]   [phone]            Aug 16, 2018  8:30 AM CDT   Ech Eugenio with [email or web address]   [facility],  Echocardiography ([facility])    [address] [address]   [phone]           1.  Please bring or wear a comfortable two-piece outfit. 2.  Arrival time: -   [facility]:  arrive 75 minutes prior to examination time. -   [facility]:  arrive 90 minutes prior to examination time. -   [facility]:   arrive 15 minutes prior to examination time. 3.  Plan to have a responsible adult take you home after the test. After the exam you may not drive, take a bus or taxi by yourself. -   Someone should stay with you for 6 hours after your test. 4.  No food or drink: -   6 hours before the test 5.  If you take antacids or water pills (diuretics): Do not take them until after your test. You may take blood pressure medicine with a few sips of water. 6.  If you have diabetes: -   Morning slots preferred -   If you take insulin, call your diabetes care team. Ask if you should take a   dose the morning of your test. -   If you take diabetes medicine by mouth, don't take it on the morning of your test. Bring  it with you to take after the test. (If you have questions, call your diabetes care team.) 7.  Bring a list of any medicines you are taking. 8.  Do not drive for 24 hours after the test. 9.  For any questions that cannot be answered, please contact the ordering physician 10. Please do not wear perfumes or scented lotions on the day of your exam.            Sep 13, 2018  3:50 PM CDT   Return Visit with Boaz Miller MD   [facility] ([facility])    [address]  [address]  [facility] [id number] [phone]            Nov 23, 2018  1:30 PM CST   (Arrive by 1:15 PM)   Implanted Defibulator with Uc Cv Device 1   [facility] ([facility])    [address] [place] [address]  [address] [place] [address] [phone]            Nov 23, 2018  2:00 PM CST   (Arrive by 1:45 PM)   Return Electrophysiology Genetics with Erik Cooper MD   [facility]    [address] [place] [address]  [address] [place] [address] [phone]              Who to contact     Please call your clinic at [phone] to:    Ask questions about your health    Make or cancel appointments    Discuss your medicines    Learn about your test results    Speak to your doctor            Additional Information About Your Visit        General Atomicshart Information     Hymite gives you secure access to your electronic health record. If you see a primary care provider, you can also send messages to your care team and make appointments. If you have questions, please call your primary care clinic.  If you do not have a primary care provider, please call [phone] and they will assist you.      Hymite is an electronic gateway that provides easy, online access to your medical records. With [employer]ite, you can request a clinic appointment, read your test results, renew a prescription or communicate with your care team.     To  "access your existing account, please contact your [facility] or call [phone] for assistance.        Care EveryWhere ID     This is your Care EveryWhere ID. This could be used by other organizations to access your [facility] medical records  [id number]        Your Vitals Were     Height BMI (Body Mass Index)                1.803 m (5' 10.98\") 29.59 kg/m2           Blood Pressure from Last 3 Encounters:   06/08/18 138/87   06/07/18 122/80   06/01/18 132/82    Weight from Last 3 Encounters:   07/19/18 96.2 kg (212 lb 1.3 oz)   06/08/18 96.2 kg (212 lb)   06/07/18 97.1 kg (214 lb)              We Performed the Following     Large Joint/Bursa injection and/or drainage - Unilateral (Shoulder, Knee) [20610]          Today's Medication Changes          These changes are accurate as of 7/19/18  7:47 PM.  If you have any questions, ask your nurse or doctor.               Start taking these medicines.        Dose/Directions    lidocaine 1 % injection   Used for:  Trochanteric bursitis of right hip   Started by:  Nelson Gaspar MD        Dose:  5 mL   5 mLs by INTRA-ARTICULAR route once for 1 dose   Quantity:  5 mL   Refills:  0       triamcinolone acetonide 40 MG/ML injection   Commonly known as:  KENALOG   Used for:  Trochanteric bursitis of right hip   Started by:  Nelson Gaspar MD        Dose:  40 mg   1 mL (40 mg) by INTRA-ARTICULAR route once for 1 dose   Quantity:  1 mL   Refills:  0            Where to get your medicines      Some of these will need a paper prescription and others can be bought over the counter.  Ask your nurse if you have questions.     You don't need a prescription for these medications     lidocaine 1 % injection    triamcinolone acetonide 40 MG/ML injection                Primary Care Provider Office Phone # Fax #    Omar Camacho [phone]       [address] [place] [id number]        Equal Access to Services     [facility] " GAAR : Hadii aad ku hadfly Ward, waaxda luqadaha, qaybta kafaisalda deborah, jorden liin hayaaujan guzman luigi gladys . So [facility] [phone].    ATENCIÓN: Si habla español, tiene a drew disposición servicios gratuitos de asistencia lingüística. Llame al [phone].    We comply with applicable federal civil rights laws and [place] laws. We do not discriminate on the basis of race, color, national origin, age, disability, sex, sexual orientation, or gender identity.            Thank you!     Thank you for choosing [facility]  for your care. Our goal is always to provide you with excellent care. Hearing back from our patients is one way we can continue to improve our services. Please take a few minutes to complete the written survey that you may receive in the mail after your visit with us. Thank you!             Your Updated Medication List - Protect others around you: Learn how to safely use, store and throw away your medicines at www.disposemymeds.org.          This list is accurate as of 7/19/18  7:47 PM.  Always use your most recent med list.                   Brand Name Dispense Instructions for use Diagnosis    acetaminophen 325 MG tablet    TYLENOL     Take 325-650 mg by mouth every 6 hours as needed        amoxicillin 500 MG tablet    AMOXIL    4 tablet    Take 4 tablets (2000 mg) by mouth 1 hour before dental procedures.    History of total left hip arthroplasty       atorvastatin 20 MG tablet    LIPITOR    90 tablet    Take 1 tablet (20 mg) by mouth daily    CARDIOVASCULAR SCREENING; LDL GOAL LESS THAN 130       Blood Pressure Cuff Misc     1 each    1 Device daily    Atrial fibrillation (H), Hypertrophic cardiomyopathy (H)       lidocaine 1 % injection     5 mL    5 mLs by INTRA-ARTICULAR route once for 1 dose    Trochanteric bursitis of right hip       metoprolol succinate 50 MG 24 hr tablet    TOPROL-XL    90 tablet    Take 1 tablet (50 mg) by mouth daily    HOCM (hypertrophic  obstructive cardiomyopathy) (H)       multivitamin, therapeutic Tabs tablet      Take 1 tablet by mouth daily        rivaroxaban ANTICOAGULANT 20 MG Tabs tablet    XARELTO    90 tablet    Take 1 tablet (20 mg) by mouth daily (with dinner)    Chronic atrial fibrillation (H)       sotalol 120 MG tablet    BETAPACE    90 tablet    Take 1 tablet (120 mg) by mouth 2 times daily    Chronic atrial fibrillation (H), Hypertrophic cardiomyopathy (H)       spironolactone 25 MG tablet    ALDACTONE    90 tablet    Take 1 tablet (25 mg) by mouth daily    Hypertrophic cardiomyopathy (H), Atrial fibrillation, unspecified type (H)       triamcinolone acetonide 40 MG/ML injection    KENALOG    1 mL    1 mL (40 mg) by INTRA-ARTICULAR route once for 1 dose    Trochanteric bursitis of right hip       zolpidem 10 MG tablet    AMBIEN    90 tablet    Take 0.5-1 tablets (5-10 mg) by mouth nightly as needed for sleep    Sedative, hypnotic or anxiolytic dependence (H)

## 2018-07-20 NOTE — NURSING NOTE
[facility]  [address] [place] [address]  Dept: [phone]  ______________________________________________________________________________    Patient: Stu Meredith   : 1955   MRN: [id number]   2018    INVASIVE PROCEDURE SAFETY CHECKLIST    Date: 2018   Procedure:Right hip trochanteric steroid injection  Patient Name: Stu Meredith  MRN: [id number]  YOB: 1955    Action: Complete sections as appropriate. Any discrepancy results in a HARD COPY until resolved.     PRE PROCEDURE:  Patient ID verified with 2 identifiers (name and  or MRN): Yes  Procedure and site verified with patient/designee (when able): Yes  Accurate consent documentation in medical record: Yes  H&P (or appropriate assessment) documented in medical record: NA  H&P must be up to 20 days prior to procedure and updates within 24 hours of procedure as applicable: NA  Relevant diagnostic and radiology test results appropriately labeled and displayed as applicable: Yes  Procedure site(s) marked with provider initials: Yes    TIMEOUT:  Time-Out performed immediately prior to starting procedure, including verbal and active participation of all team members addressing the following:Yes  * Correct patient identify  * Confirmed that the correct side and site are marked  * An accurate procedure consent form  * Agreement on the procedure to be done  * Correct patient position  * Relevant images and results are properly labeled and appropriately displayed  * The need to administer antibiotics or fluids for irrigation purposes during the procedure as applicable   * Safety precautions based on patient history or medication use    DURING PROCEDURE: Verification of correct person, site, and procedures any time the responsibility for care of the patient is transferred to another member of the care team.       The following medications were given:     MEDICATION:  Kenalog 40  mg  ROUTE: fauzia articular  SITE: Right greater trochanter  DOSE: 1mL  LOT #: [id number]  : ReferBright  EXPIRATION DATE: 01/2020  NDC#: [address]-1   Was there drug waste? No    MEDICATION:  Lidocaine without epinephrine  ROUTE: fauzia articular  SITE: right greater trochanter  DOSE: 5 mL  LOT #: [id number]  : Kwanji  EXPIRATION DATE: 05/22  NDC#: 24376-218-40   Was there drug waste? No      Ramonita Hilton ATC  July 19, 2018

## 2018-07-20 NOTE — PROGRESS NOTES
Procedure Note:    After informed consent was obtained the patient's right greater trochanter bursae was injected with 5 cc of lidocaine and 40 mg of kenolog using a superior-medial approach.  Sterile technique was used.  The patient tolerated the procedure well.      I have personally examined this patient and have reviewed the clinical presentation and progress note with the resident. I agree with the treatment plan as outlined. The plan was formulated with the resident on the day of the resident's dictation.

## 2018-07-20 NOTE — PROGRESS NOTES
Chief Complaint: Consult (New issue right Hip pain)      Physician:  Referred Self    HPI: Stu Meredith is a 62 year old male who presents today for evaluation of lateral right hip pain of 3-4 mo duration.  He notes this while trying to lie on his side.  It improves with repositioning.  He notes it is sharp and can be up to 8/10 in severity.    Symptom Profile  Location of symptoms:  Right GT  Onset: insidious  Duration of symptoms: 4 mo  Quality of symptoms: aching  Severity: up to 8/10  Alleviate: reposition  Exacerbating: lying on the side  Previous Treatments: Previous treatments include activity modification, oral pain medication     Current Status:  Results of the patient s Hip Disability and Osteoarthritis Outcome Score (HOOS)  are as follows (0-100 scales with 100 being the theoretical best):  Pain: 42.5  Symptoms: 45  ADLs: 41.7  Sports/Recreation: 43.75  Quality of Life: 43.75  (http://koos.nu/)  [facility] Activity Score: 5    MEDICAL HISTORY:   Past Medical History:   Diagnosis Date     Atrial fibrillation (H) 12/9/11    failed medication, multiple DC cardioveresions; s/p Left atrial ablation to eliminate atrial fibrillation 12/9/11     Chest pain      CHF (congestive heart failure) (H) 7/30/2016     Coronary artery disease      DJD (degenerative joint disease)      Hip arthritis 1/15/2014     Hypertension      Hypertrophic cardiomyopathy (H) 10/09     Other abnormal heart sounds      Pacemaker     ICD     Pneumonia, organism unspecified(486)      Prediabetes 7/10/15    A1C 6.5     Status post implantation of automatic cardioverter/defibrillator (AICD)      Ventricular tachycardia (H)        Pertinent negatives:  Patient has no history of DVT or PE. Discussed risk factors.    Medications:     Current Outpatient Prescriptions:      acetaminophen (TYLENOL) 325 MG tablet, Take 325-650 mg by mouth every 6 hours as needed, Disp: , Rfl:      amoxicillin (AMOXIL) 500 MG tablet, Take 4 tablets (2000 mg) by mouth 1  hour before dental procedures., Disp: 4 tablet, Rfl: 3     atorvastatin (LIPITOR) 20 MG tablet, Take 1 tablet (20 mg) by mouth daily, Disp: 90 tablet, Rfl: 3     Blood Pressure Monitoring (BLOOD PRESSURE CUFF) MISC, 1 Device daily, Disp: 1 each, Rfl: 0     metoprolol (TOPROL-XL) 50 MG 24 hr tablet, Take 1 tablet (50 mg) by mouth daily, Disp: 90 tablet, Rfl: 3     multivitamin, therapeutic (THERA-VIT) TABS, Take 1 tablet by mouth daily, Disp: , Rfl:      rivaroxaban ANTICOAGULANT (XARELTO) 20 MG TABS tablet, Take 1 tablet (20 mg) by mouth daily (with dinner), Disp: 90 tablet, Rfl: 5     sotalol (BETAPACE) 120 MG tablet, Take 1 tablet (120 mg) by mouth 2 times daily, Disp: 90 tablet, Rfl: 3     spironolactone (ALDACTONE) 25 MG tablet, Take 1 tablet (25 mg) by mouth daily, Disp: 90 tablet, Rfl: 3     zolpidem (AMBIEN) 10 MG tablet, Take 0.5-1 tablets (5-10 mg) by mouth nightly as needed for sleep, Disp: 90 tablet, Rfl: 0  No current facility-administered medications for this visit.     Facility-Administered Medications Ordered in Other Visits:      methohexital (BREVITAL SODIUM) injection, , , CRISTALN, Martha Ricci, APRN CRNA, 50 mg at 05/04/18 1347    Allergies: Review of patient's allergies indicates no known allergies.    SURGICAL HISTORY:   Past Surgical History:   Procedure Laterality Date     ANESTHESIA CARDIOVERSION  4/24/2014    Procedure: ANESTHESIA CARDIOVERSION;  Surgeon: Generic Anesthesia Provider;  Location: [facility]     ANESTHESIA CARDIOVERSION N/A 5/12/2016    Procedure: ANESTHESIA CARDIOVERSION;  Surgeon: GENERIC ANESTHESIA PROVIDER;  Location:  OR     ANESTHESIA CARDIOVERSION N/A 8/7/2017    Procedure: ANESTHESIA CARDIOVERSION;  Anesthesia Offsite Coverage Cardioversion @1100;  Surgeon: GENERIC ANESTHESIA PROVIDER;  Location:  OR     ANESTHESIA CARDIOVERSION N/A 1/3/2018    Procedure: ANESTHESIA CARDIOVERSION;  Anesthesia Cardioverion;  Surgeon: GENERIC ANESTHESIA PROVIDER;  Location:  OR      ANESTHESIA CARDIOVERSION N/A 5/4/2018    Procedure: ANESTHESIA CARDIOVERSION;  Anesthesia Coverage Cardioversion @1400;  Surgeon: GENERIC ANESTHESIA PROVIDER;  Location: [facility] OR     ARTHROPLASTY HIP  1/15/2014    Procedure: ARTHROPLASTY HIP;  Left Total Hip Arthroplasty;  Surgeon: Nelson Gaspar MD;  Location: [facility] OR     CARDIAC SURGERY       COLONOSCOPY N/A 5/18/2018    Procedure: COMBINED COLONOSCOPY, SINGLE OR MULTIPLE BIOPSY/POLYPECTOMY BY BIOPSY;  COLONOSCOPY (PT HAS DEFIBRILLATOR) ;  Surgeon: Mike Nickerson MD;  Location:  GI     H ABLATION FOCAL AFIB  12/9/11    Left atrial ablation to eliminate atrial fibrillation     IMPLANT AUTOMATIC IMPLANTABLE CARDIOVERTER DEFIBRILLATOR  7/27/12    AICD implantation     TONSILLECTOMY  1964       FAMILY HISTORY:   Family History   Problem Relation Age of Onset     HEART DISEASE Mother      unknown     Obesity Mother      GASTROINTESTINAL DISEASE Mother      diverticulitis     Diabetes Maternal Grandmother      Diabetes Paternal Grandmother      Cerebrovascular Disease Paternal Grandmother 94     Diabetes Paternal Grandfather      Cerebrovascular Disease Paternal Grandfather 78     Diabetes Son      C.A.D. Father      CABG age 78     HEART DISEASE Father      CABG x5     Diabetes Maternal Grandfather        SOCIAL HISTORY:   Social History   Substance Use Topics     Smoking status: Former Smoker     Packs/day: 0.25     Years: 40.00     Types: Cigarettes     Start date: 1/1/1975     Quit date: 12/11/2015     Smokeless tobacco: Never Used     Alcohol use 0.0 oz/week     0 Standard drinks or equivalent per week      Comment: 1 drink per week       REVIEW OF SYSTEMS:  The comprehensive review of systems from the intake form was reviewed with the patient.  No fever, weight change or fatigue. No dry eyes. No oral ulcers, sore throat or voice change. No palpitations, syncope, angina or edema.  No chest pain, excessive sleepiness, shortness of breath or hemoptysis.   No  "abdominal pain, nausea, vomiting, diarrhea or heartburn.  No skin rash. No focal weakness or numbness. No bleeding or lymphadenopathy. No rhinitis or hives.     Exam:  On physical examination the patient appears the stated age, is in no acute distress, affectThe is appropriate, and breathing is non-labored.  Vitals are documented in the EMR and have been reviewed:    Ht 1.803 m (5' 10.98\")  Wt 96.2 kg (212 lb 1.3 oz)  BMI 29.59 kg/m2  5' 10.984\"  Body mass index is 29.59 kg/(m^2).    Rises from chair: with ease  Gait: reciprocal, non-antalgic  Trendelenburg test: neg  Gains the exam table: with ease    RIGHT hip subjective:  Abd: 30  Add: midline  PFC:   Flexion: 90  IRF: 10  ERF:  Impingement test: +  Resisted straight leg raise: -  JOSE G:    Distally, the circulatory, motor, and sensation exam is intact with 5/5 EHL, gastroc-soleus, and tibialis anterior.  Sensation to light touch is intact.  Dorsalis pedis and posterior tibialis pulses are palpable.  There are no sores on the feet, no bruising, and no lymphedema.    X-rays:   XR pelvis -   Left IRISH, well positioned with no apparent complications    Right hip, cam deformity and tonnis 1/2 changes noted, with a small inferior osteophyte    Assessment: This is a 62 year old with right greater trochanteric pain syndrome    Plan:    Discussed with the patient the diagnosis of greater trochanteric pain syndrome, and its treatments.  An injection of steroid was offered, along with PT.      Right hip injection:    "

## 2018-08-07 ENCOUNTER — MYC MEDICAL ADVICE (OUTPATIENT)
Dept: CARDIOLOGY | Facility: CLINIC | Age: 63
End: 2018-08-07

## 2018-08-07 DIAGNOSIS — I48.91 ATRIAL FIBRILLATION (H): Primary | ICD-10-CM

## 2018-08-07 RX ORDER — DABIGATRAN ETEXILATE 150 MG/1
150 CAPSULE ORAL 2 TIMES DAILY
Qty: 60 CAPSULE | Refills: 0 | Status: SHIPPED | OUTPATIENT
Start: 2018-08-07 | End: 2018-09-18

## 2018-08-09 ENCOUNTER — TELEPHONE (OUTPATIENT)
Dept: CARDIOLOGY | Facility: CLINIC | Age: 63
End: 2018-08-09

## 2018-08-09 NOTE — TELEPHONE ENCOUNTER
Patient called looking to bring some paperwork to [place] and have filled out for his temporary leave/benefits.

## 2018-08-16 ENCOUNTER — ALLIED HEALTH/NURSE VISIT (OUTPATIENT)
Dept: CARDIOLOGY | Facility: CLINIC | Age: 63
End: 2018-08-16
Attending: INTERNAL MEDICINE
Payer: COMMERCIAL

## 2018-08-16 ENCOUNTER — HOSPITAL ENCOUNTER (OUTPATIENT)
Facility: CLINIC | Age: 63
Discharge: HOME | End: 2018-08-17
Attending: INTERNAL MEDICINE | Admitting: INTERNAL MEDICINE
Payer: COMMERCIAL

## 2018-08-16 ENCOUNTER — ANESTHESIA EVENT (OUTPATIENT)
Dept: SURGERY | Facility: CLINIC | Age: 63
End: 2018-08-16
Payer: COMMERCIAL

## 2018-08-16 ENCOUNTER — ANESTHESIA (OUTPATIENT)
Dept: SURGERY | Facility: CLINIC | Age: 63
End: 2018-08-16
Payer: COMMERCIAL

## 2018-08-16 ENCOUNTER — HOSPITAL ENCOUNTER (OUTPATIENT)
Dept: CARDIOLOGY | Facility: CLINIC | Age: 63
End: 2018-08-16
Attending: NURSE PRACTITIONER | Admitting: INTERNAL MEDICINE
Payer: COMMERCIAL

## 2018-08-16 ENCOUNTER — SURGERY (OUTPATIENT)
Age: 63
End: 2018-08-16

## 2018-08-16 ENCOUNTER — APPOINTMENT (OUTPATIENT)
Dept: CARDIOLOGY | Facility: CLINIC | Age: 63
End: 2018-08-16
Attending: NURSE PRACTITIONER
Payer: COMMERCIAL

## 2018-08-16 DIAGNOSIS — I42.2 HYPERTROPHIC CARDIOMYOPATHY (H): Primary | ICD-10-CM

## 2018-08-16 DIAGNOSIS — Z98.890 S/P ABLATION OF ATRIAL FIBRILLATION: Primary | ICD-10-CM

## 2018-08-16 DIAGNOSIS — I42.2 HYPERTROPHIC CARDIOMYOPATHY (H): ICD-10-CM

## 2018-08-16 DIAGNOSIS — I48.4 ATYPICAL ATRIAL FLUTTER (H): ICD-10-CM

## 2018-08-16 DIAGNOSIS — I48.91 ATRIAL FIBRILLATION, UNSPECIFIED TYPE (H): ICD-10-CM

## 2018-08-16 DIAGNOSIS — Z86.79 S/P ABLATION OF ATRIAL FIBRILLATION: Primary | ICD-10-CM

## 2018-08-16 LAB
ABO + RH BLD: NORMAL
ABO + RH BLD: NORMAL
ANION GAP SERPL CALCULATED.3IONS-SCNC: 6 MMOL/L (ref 3–14)
BLD GP AB SCN SERPL QL: NORMAL
BLOOD BANK CMNT PATIENT-IMP: NORMAL
BUN SERPL-MCNC: 16 MG/DL (ref 7–30)
CALCIUM SERPL-MCNC: 8.6 MG/DL (ref 8.5–10.1)
CHLORIDE SERPL-SCNC: 106 MMOL/L (ref 94–109)
CO2 SERPL-SCNC: 27 MMOL/L (ref 20–32)
CREAT SERPL-MCNC: 1.09 MG/DL (ref 0.66–1.25)
ERYTHROCYTE [DISTWIDTH] IN BLOOD BY AUTOMATED COUNT: 13.6 % (ref 10–15)
GFR SERPL CREATININE-BSD FRML MDRD: 68 ML/MIN/1.7M2
GLUCOSE BLDC GLUCOMTR-MCNC: 128 MG/DL (ref 70–99)
GLUCOSE SERPL-MCNC: 124 MG/DL (ref 70–99)
HCT VFR BLD AUTO: 46.7 % (ref 40–53)
HGB BLD-MCNC: 15.8 G/DL (ref 13.3–17.7)
INR PPP: 1.53 (ref 0.86–1.14)
INTERPRETATION ECG - MUSE: NORMAL
KCT BLD-ACNC: 172 SEC (ref 75–150)
KCT BLD-ACNC: 176 SEC (ref 75–150)
KCT BLD-ACNC: 274 SEC (ref 75–150)
KCT BLD-ACNC: 290 SEC (ref 75–150)
KCT BLD-ACNC: 302 SEC (ref 75–150)
KCT BLD-ACNC: 335 SEC (ref 75–150)
MCH RBC QN AUTO: 31.2 PG (ref 26.5–33)
MCHC RBC AUTO-ENTMCNC: 33.8 G/DL (ref 31.5–36.5)
MCV RBC AUTO: 92 FL (ref 78–100)
PLATELET # BLD AUTO: 210 10E9/L (ref 150–450)
POTASSIUM SERPL-SCNC: 4.5 MMOL/L (ref 3.4–5.3)
RBC # BLD AUTO: 5.07 10E12/L (ref 4.4–5.9)
SODIUM SERPL-SCNC: 139 MMOL/L (ref 133–144)
SPECIMEN EXP DATE BLD: NORMAL
WBC # BLD AUTO: 7.4 10E9/L (ref 4–11)

## 2018-08-16 PROCEDURE — 93656 COMPRE EP EVAL ABLTJ ATR FIB: CPT | Mod: GC | Performed by: INTERNAL MEDICINE

## 2018-08-16 PROCEDURE — C1731 CATH, EP, 20 OR MORE ELEC: HCPCS

## 2018-08-16 PROCEDURE — 93325 DOPPLER ECHO COLOR FLOW MAPG: CPT | Mod: 26 | Performed by: INTERNAL MEDICINE

## 2018-08-16 PROCEDURE — 40000065 ZZH STATISTIC EKG NON-CHARGEABLE

## 2018-08-16 PROCEDURE — 93287 PERI-PX DEVICE EVAL & PRGR: CPT | Mod: 26 | Performed by: INTERNAL MEDICINE

## 2018-08-16 PROCEDURE — 80048 BASIC METABOLIC PNL TOTAL CA: CPT | Performed by: NURSE PRACTITIONER

## 2018-08-16 PROCEDURE — 27210789 ZZH NEEDLE BRK TRANSEPTAL CR12

## 2018-08-16 PROCEDURE — 27210795 ZZH PAD DEFIB QUICK CR4

## 2018-08-16 PROCEDURE — C1893 INTRO/SHEATH, FIXED,NON-PEEL: HCPCS

## 2018-08-16 PROCEDURE — 85347 COAGULATION TIME ACTIVATED: CPT

## 2018-08-16 PROCEDURE — 71000015 ZZH RECOVERY PHASE 1 LEVEL 2 EA ADDTL HR

## 2018-08-16 PROCEDURE — 93613 INTRACARDIAC EPHYS 3D MAPG: CPT | Mod: GC | Performed by: INTERNAL MEDICINE

## 2018-08-16 PROCEDURE — 27210901 ZZH ACCESS EP PROC CR7

## 2018-08-16 PROCEDURE — 27210856 ZZH ACCESS HEART CATH CR2

## 2018-08-16 PROCEDURE — 27210807 ZZH SHEATH CR6

## 2018-08-16 PROCEDURE — 93655 ICAR CATH ABLTJ DSCRT ARRHYT: CPT | Mod: GC | Performed by: INTERNAL MEDICINE

## 2018-08-16 PROCEDURE — 36415 COLL VENOUS BLD VENIPUNCTURE: CPT | Performed by: NURSE PRACTITIONER

## 2018-08-16 PROCEDURE — 93010 ELECTROCARDIOGRAM REPORT: CPT | Performed by: INTERNAL MEDICINE

## 2018-08-16 PROCEDURE — 93010 ELECTROCARDIOGRAM REPORT: CPT | Mod: 77 | Performed by: INTERNAL MEDICINE

## 2018-08-16 PROCEDURE — 93320 DOPPLER ECHO COMPLETE: CPT | Mod: 26 | Performed by: INTERNAL MEDICINE

## 2018-08-16 PROCEDURE — 93005 ELECTROCARDIOGRAM TRACING: CPT

## 2018-08-16 PROCEDURE — 93662 INTRACARDIAC ECG (ICE): CPT | Mod: 26 | Performed by: INTERNAL MEDICINE

## 2018-08-16 PROCEDURE — 25000128 H RX IP 250 OP 636: Performed by: INTERNAL MEDICINE

## 2018-08-16 PROCEDURE — 25000125 ZZHC RX 250: Performed by: NURSE ANESTHETIST, CERTIFIED REGISTERED

## 2018-08-16 PROCEDURE — 27210841 ZZH MANIFOLD CR5

## 2018-08-16 PROCEDURE — 93320 DOPPLER ECHO COMPLETE: CPT

## 2018-08-16 PROCEDURE — C9399 UNCLASSIFIED DRUGS OR BIOLOG: HCPCS | Performed by: NURSE ANESTHETIST, CERTIFIED REGISTERED

## 2018-08-16 PROCEDURE — 25000128 H RX IP 250 OP 636: Performed by: NURSE ANESTHETIST, CERTIFIED REGISTERED

## 2018-08-16 PROCEDURE — 27210796 ZZH PAD EXTRNAL REFRENCE CARDIAC MAPPING CR14

## 2018-08-16 PROCEDURE — C1894 INTRO/SHEATH, NON-LASER: HCPCS

## 2018-08-16 PROCEDURE — 93623 PRGRMD STIMJ&PACG IV RX NFS: CPT | Mod: 26 | Performed by: INTERNAL MEDICINE

## 2018-08-16 PROCEDURE — 93287 PERI-PX DEVICE EVAL & PRGR: CPT | Mod: ZF

## 2018-08-16 PROCEDURE — 37000009 ZZH ANESTHESIA TECHNICAL FEE, EACH ADDTL 15 MIN

## 2018-08-16 PROCEDURE — 85610 PROTHROMBIN TIME: CPT | Performed by: NURSE PRACTITIONER

## 2018-08-16 PROCEDURE — C1730 CATH, EP, 19 OR FEW ELECT: HCPCS

## 2018-08-16 PROCEDURE — C1759 CATH, INTRA ECHOCARDIOGRAPHY: HCPCS

## 2018-08-16 PROCEDURE — C1732 CATH, EP, DIAG/ABL, 3D/VECT: HCPCS

## 2018-08-16 PROCEDURE — 37000008 ZZH ANESTHESIA TECHNICAL FEE, 1ST 30 MIN

## 2018-08-16 PROCEDURE — 25000565 ZZH ISOFLURANE, EA 15 MIN

## 2018-08-16 PROCEDURE — 82962 GLUCOSE BLOOD TEST: CPT

## 2018-08-16 PROCEDURE — 40000170 ZZH STATISTIC PRE-PROCEDURE ASSESSMENT II

## 2018-08-16 PROCEDURE — 25000128 H RX IP 250 OP 636: Performed by: NURSE PRACTITIONER

## 2018-08-16 PROCEDURE — 93656 COMPRE EP EVAL ABLTJ ATR FIB: CPT

## 2018-08-16 PROCEDURE — 25000132 ZZH RX MED GY IP 250 OP 250 PS 637: Performed by: NURSE PRACTITIONER

## 2018-08-16 PROCEDURE — 86850 RBC ANTIBODY SCREEN: CPT | Performed by: NURSE PRACTITIONER

## 2018-08-16 PROCEDURE — 86900 BLOOD TYPING SEROLOGIC ABO: CPT | Performed by: NURSE PRACTITIONER

## 2018-08-16 PROCEDURE — 86901 BLOOD TYPING SEROLOGIC RH(D): CPT | Performed by: NURSE PRACTITIONER

## 2018-08-16 PROCEDURE — 93655 ICAR CATH ABLTJ DSCRT ARRHYT: CPT

## 2018-08-16 PROCEDURE — 85027 COMPLETE CBC AUTOMATED: CPT | Performed by: NURSE PRACTITIONER

## 2018-08-16 PROCEDURE — 93662 INTRACARDIAC ECG (ICE): CPT

## 2018-08-16 PROCEDURE — 27210946 ZZH KIT HC TOTES DISP CR8

## 2018-08-16 PROCEDURE — 93312 ECHO TRANSESOPHAGEAL: CPT | Mod: 26 | Performed by: INTERNAL MEDICINE

## 2018-08-16 PROCEDURE — 93613 INTRACARDIAC EPHYS 3D MAPG: CPT

## 2018-08-16 PROCEDURE — 71000014 ZZH RECOVERY PHASE 1 LEVEL 2 FIRST HR

## 2018-08-16 RX ORDER — LIDOCAINE 40 MG/G
CREAM TOPICAL
Status: DISCONTINUED | OUTPATIENT
Start: 2018-08-16 | End: 2018-08-16 | Stop reason: HOSPADM

## 2018-08-16 RX ORDER — CALCIUM CHLORIDE 100 MG/ML
INJECTION INTRAVENOUS; INTRAVENTRICULAR PRN
Status: DISCONTINUED | OUTPATIENT
Start: 2018-08-16 | End: 2018-08-16

## 2018-08-16 RX ORDER — FENTANYL CITRATE 50 UG/ML
25-50 INJECTION, SOLUTION INTRAMUSCULAR; INTRAVENOUS EVERY 5 MIN PRN
Status: DISCONTINUED | OUTPATIENT
Start: 2018-08-16 | End: 2018-08-16 | Stop reason: HOSPADM

## 2018-08-16 RX ORDER — DABIGATRAN ETEXILATE 150 MG/1
150 CAPSULE ORAL 2 TIMES DAILY
Status: DISCONTINUED | OUTPATIENT
Start: 2018-08-16 | End: 2018-08-17 | Stop reason: HOSPADM

## 2018-08-16 RX ORDER — HYDROMORPHONE HYDROCHLORIDE 1 MG/ML
.3-.5 INJECTION, SOLUTION INTRAMUSCULAR; INTRAVENOUS; SUBCUTANEOUS EVERY 10 MIN PRN
Status: DISCONTINUED | OUTPATIENT
Start: 2018-08-16 | End: 2018-08-16 | Stop reason: HOSPADM

## 2018-08-16 RX ORDER — ONDANSETRON 2 MG/ML
4 INJECTION INTRAMUSCULAR; INTRAVENOUS EVERY 30 MIN PRN
Status: DISCONTINUED | OUTPATIENT
Start: 2018-08-16 | End: 2018-08-16 | Stop reason: HOSPADM

## 2018-08-16 RX ORDER — NALOXONE HYDROCHLORIDE 0.4 MG/ML
.1-.4 INJECTION, SOLUTION INTRAMUSCULAR; INTRAVENOUS; SUBCUTANEOUS
Status: DISCONTINUED | OUTPATIENT
Start: 2018-08-16 | End: 2018-08-16 | Stop reason: HOSPADM

## 2018-08-16 RX ORDER — ONDANSETRON 2 MG/ML
INJECTION INTRAMUSCULAR; INTRAVENOUS PRN
Status: DISCONTINUED | OUTPATIENT
Start: 2018-08-16 | End: 2018-08-16

## 2018-08-16 RX ORDER — SODIUM CHLORIDE, SODIUM LACTATE, POTASSIUM CHLORIDE, CALCIUM CHLORIDE 600; 310; 30; 20 MG/100ML; MG/100ML; MG/100ML; MG/100ML
INJECTION, SOLUTION INTRAVENOUS CONTINUOUS PRN
Status: DISCONTINUED | OUTPATIENT
Start: 2018-08-16 | End: 2018-08-16

## 2018-08-16 RX ORDER — SOTALOL HYDROCHLORIDE 120 MG/1
120 TABLET ORAL 2 TIMES DAILY
Status: DISCONTINUED | OUTPATIENT
Start: 2018-08-16 | End: 2018-08-17 | Stop reason: HOSPADM

## 2018-08-16 RX ORDER — PROTAMINE SULFATE 10 MG/ML
INJECTION, SOLUTION INTRAVENOUS PRN
Status: DISCONTINUED | OUTPATIENT
Start: 2018-08-16 | End: 2018-08-16

## 2018-08-16 RX ORDER — SODIUM CHLORIDE, SODIUM LACTATE, POTASSIUM CHLORIDE, CALCIUM CHLORIDE 600; 310; 30; 20 MG/100ML; MG/100ML; MG/100ML; MG/100ML
INJECTION, SOLUTION INTRAVENOUS CONTINUOUS
Status: DISCONTINUED | OUTPATIENT
Start: 2018-08-16 | End: 2018-08-16 | Stop reason: HOSPADM

## 2018-08-16 RX ORDER — LABETALOL HYDROCHLORIDE 5 MG/ML
5 INJECTION, SOLUTION INTRAVENOUS EVERY 10 MIN PRN
Status: DISCONTINUED | OUTPATIENT
Start: 2018-08-16 | End: 2018-08-16 | Stop reason: HOSPADM

## 2018-08-16 RX ORDER — NALOXONE HYDROCHLORIDE 0.4 MG/ML
.1-.4 INJECTION, SOLUTION INTRAMUSCULAR; INTRAVENOUS; SUBCUTANEOUS
Status: DISCONTINUED | OUTPATIENT
Start: 2018-08-16 | End: 2018-08-17 | Stop reason: HOSPADM

## 2018-08-16 RX ORDER — FUROSEMIDE 10 MG/ML
10 INJECTION INTRAMUSCULAR; INTRAVENOUS ONCE
Status: COMPLETED | OUTPATIENT
Start: 2018-08-16 | End: 2018-08-16

## 2018-08-16 RX ORDER — METOPROLOL SUCCINATE 50 MG/1
50 TABLET, EXTENDED RELEASE ORAL DAILY
Status: DISCONTINUED | OUTPATIENT
Start: 2018-08-17 | End: 2018-08-17 | Stop reason: HOSPADM

## 2018-08-16 RX ORDER — HYDRALAZINE HYDROCHLORIDE 20 MG/ML
2.5-5 INJECTION INTRAMUSCULAR; INTRAVENOUS EVERY 10 MIN PRN
Status: DISCONTINUED | OUTPATIENT
Start: 2018-08-16 | End: 2018-08-16 | Stop reason: HOSPADM

## 2018-08-16 RX ORDER — HEPARIN SODIUM 1000 [USP'U]/ML
INJECTION, SOLUTION INTRAVENOUS; SUBCUTANEOUS PRN
Status: DISCONTINUED | OUTPATIENT
Start: 2018-08-16 | End: 2018-08-16

## 2018-08-16 RX ORDER — SPIRONOLACTONE 25 MG/1
25 TABLET ORAL DAILY
Status: DISCONTINUED | OUTPATIENT
Start: 2018-08-17 | End: 2018-08-17 | Stop reason: HOSPADM

## 2018-08-16 RX ORDER — LIDOCAINE 40 MG/G
CREAM TOPICAL
Status: DISCONTINUED | OUTPATIENT
Start: 2018-08-16 | End: 2018-08-17 | Stop reason: HOSPADM

## 2018-08-16 RX ORDER — ATORVASTATIN CALCIUM 20 MG/1
20 TABLET, FILM COATED ORAL DAILY
Status: DISCONTINUED | OUTPATIENT
Start: 2018-08-16 | End: 2018-08-17 | Stop reason: HOSPADM

## 2018-08-16 RX ORDER — ZOLPIDEM TARTRATE 5 MG/1
5-10 TABLET ORAL
Status: DISCONTINUED | OUTPATIENT
Start: 2018-08-16 | End: 2018-08-17 | Stop reason: HOSPADM

## 2018-08-16 RX ORDER — PROPOFOL 10 MG/ML
INJECTION, EMULSION INTRAVENOUS PRN
Status: DISCONTINUED | OUTPATIENT
Start: 2018-08-16 | End: 2018-08-16

## 2018-08-16 RX ORDER — FUROSEMIDE 10 MG/ML
INJECTION INTRAMUSCULAR; INTRAVENOUS PRN
Status: DISCONTINUED | OUTPATIENT
Start: 2018-08-16 | End: 2018-08-16

## 2018-08-16 RX ORDER — OXYCODONE AND ACETAMINOPHEN 5; 325 MG/1; MG/1
1-2 TABLET ORAL EVERY 4 HOURS PRN
Status: DISCONTINUED | OUTPATIENT
Start: 2018-08-16 | End: 2018-08-17 | Stop reason: HOSPADM

## 2018-08-16 RX ORDER — FENTANYL CITRATE 50 UG/ML
25-50 INJECTION, SOLUTION INTRAMUSCULAR; INTRAVENOUS
Status: DISCONTINUED | OUTPATIENT
Start: 2018-08-16 | End: 2018-08-16 | Stop reason: HOSPADM

## 2018-08-16 RX ORDER — ONDANSETRON 4 MG/1
4 TABLET, ORALLY DISINTEGRATING ORAL EVERY 30 MIN PRN
Status: DISCONTINUED | OUTPATIENT
Start: 2018-08-16 | End: 2018-08-16 | Stop reason: HOSPADM

## 2018-08-16 RX ORDER — FENTANYL CITRATE 50 UG/ML
INJECTION, SOLUTION INTRAMUSCULAR; INTRAVENOUS PRN
Status: DISCONTINUED | OUTPATIENT
Start: 2018-08-16 | End: 2018-08-16

## 2018-08-16 RX ORDER — CEFAZOLIN SODIUM 1 G/3ML
INJECTION, POWDER, FOR SOLUTION INTRAMUSCULAR; INTRAVENOUS PRN
Status: DISCONTINUED | OUTPATIENT
Start: 2018-08-16 | End: 2018-08-16

## 2018-08-16 RX ADMIN — ATORVASTATIN CALCIUM 20 MG: 20 TABLET, FILM COATED ORAL at 21:10

## 2018-08-16 RX ADMIN — PROTAMINE SULFATE 50 MG: 10 INJECTION, SOLUTION INTRAVENOUS at 13:27

## 2018-08-16 RX ADMIN — SOTALOL HYDROCHLORIDE 120 MG: 120 TABLET ORAL at 19:21

## 2018-08-16 RX ADMIN — HEPARIN SODIUM 3000 UNITS: 1000 INJECTION, SOLUTION INTRAVENOUS; SUBCUTANEOUS at 11:35

## 2018-08-16 RX ADMIN — SUGAMMADEX 200 MG: 100 INJECTION, SOLUTION INTRAVENOUS at 13:35

## 2018-08-16 RX ADMIN — DABIGATRAN ETEXILATE MESYLATE 150 MG: 150 CAPSULE ORAL at 19:21

## 2018-08-16 RX ADMIN — SODIUM CHLORIDE, POTASSIUM CHLORIDE, SODIUM LACTATE AND CALCIUM CHLORIDE: 600; 310; 30; 20 INJECTION, SOLUTION INTRAVENOUS at 13:00

## 2018-08-16 RX ADMIN — CEFAZOLIN 2 G: 1 INJECTION, POWDER, FOR SOLUTION INTRAMUSCULAR; INTRAVENOUS at 09:24

## 2018-08-16 RX ADMIN — ONDANSETRON 4 MG: 2 INJECTION INTRAMUSCULAR; INTRAVENOUS at 13:31

## 2018-08-16 RX ADMIN — FUROSEMIDE 10 MG: 10 INJECTION, SOLUTION INTRAVENOUS at 08:42

## 2018-08-16 RX ADMIN — PHENYLEPHRINE HYDROCHLORIDE 100 MCG: 10 INJECTION, SOLUTION INTRAMUSCULAR; INTRAVENOUS; SUBCUTANEOUS at 09:05

## 2018-08-16 RX ADMIN — PHENYLEPHRINE HYDROCHLORIDE 100 MCG: 10 INJECTION, SOLUTION INTRAMUSCULAR; INTRAVENOUS; SUBCUTANEOUS at 08:55

## 2018-08-16 RX ADMIN — ROCURONIUM BROMIDE 20 MG: 10 INJECTION INTRAVENOUS at 10:28

## 2018-08-16 RX ADMIN — ROCURONIUM BROMIDE 60 MG: 10 INJECTION INTRAVENOUS at 08:32

## 2018-08-16 RX ADMIN — CEFAZOLIN 1 G: 1 INJECTION, POWDER, FOR SOLUTION INTRAMUSCULAR; INTRAVENOUS at 11:29

## 2018-08-16 RX ADMIN — PHENYLEPHRINE HYDROCHLORIDE 200 MCG: 10 INJECTION, SOLUTION INTRAMUSCULAR; INTRAVENOUS; SUBCUTANEOUS at 08:41

## 2018-08-16 RX ADMIN — ROCURONIUM BROMIDE 40 MG: 10 INJECTION INTRAVENOUS at 09:20

## 2018-08-16 RX ADMIN — FUROSEMIDE 10 MG: 10 INJECTION, SOLUTION INTRAVENOUS at 23:22

## 2018-08-16 RX ADMIN — PHENYLEPHRINE HYDROCHLORIDE 0.3 MCG/KG/MIN: 10 INJECTION, SOLUTION INTRAMUSCULAR; INTRAVENOUS; SUBCUTANEOUS at 10:38

## 2018-08-16 RX ADMIN — PHENYLEPHRINE HYDROCHLORIDE 100 MCG: 10 INJECTION, SOLUTION INTRAMUSCULAR; INTRAVENOUS; SUBCUTANEOUS at 10:46

## 2018-08-16 RX ADMIN — ROCURONIUM BROMIDE 10 MG: 10 INJECTION INTRAVENOUS at 12:37

## 2018-08-16 RX ADMIN — CALCIUM CHLORIDE 500 MG: 100 INJECTION, SOLUTION INTRAVENOUS at 10:08

## 2018-08-16 RX ADMIN — PHENYLEPHRINE HYDROCHLORIDE 200 MCG: 10 INJECTION, SOLUTION INTRAMUSCULAR; INTRAVENOUS; SUBCUTANEOUS at 09:23

## 2018-08-16 RX ADMIN — FUROSEMIDE 10 MG: 10 INJECTION, SOLUTION INTRAVENOUS at 13:31

## 2018-08-16 RX ADMIN — HEPARIN SODIUM 10000 UNITS: 1000 INJECTION, SOLUTION INTRAVENOUS; SUBCUTANEOUS at 10:45

## 2018-08-16 RX ADMIN — MIDAZOLAM 2 MG: 1 INJECTION INTRAMUSCULAR; INTRAVENOUS at 08:05

## 2018-08-16 RX ADMIN — ROCURONIUM BROMIDE 10 MG: 10 INJECTION INTRAVENOUS at 11:09

## 2018-08-16 RX ADMIN — SODIUM CHLORIDE, POTASSIUM CHLORIDE, SODIUM LACTATE AND CALCIUM CHLORIDE: 600; 310; 30; 20 INJECTION, SOLUTION INTRAVENOUS at 08:05

## 2018-08-16 RX ADMIN — HEPARIN SODIUM 1500 UNITS/HR: 10000 INJECTION, SOLUTION INTRAVENOUS at 11:37

## 2018-08-16 RX ADMIN — ZOLPIDEM TARTRATE 10 MG: 5 TABLET, FILM COATED ORAL at 23:00

## 2018-08-16 RX ADMIN — PHENYLEPHRINE HYDROCHLORIDE 200 MCG: 10 INJECTION, SOLUTION INTRAMUSCULAR; INTRAVENOUS; SUBCUTANEOUS at 09:16

## 2018-08-16 RX ADMIN — FENTANYL CITRATE 300 MCG: 50 INJECTION, SOLUTION INTRAMUSCULAR; INTRAVENOUS at 08:23

## 2018-08-16 RX ADMIN — CALCIUM CHLORIDE 300 MG: 100 INJECTION, SOLUTION INTRAVENOUS at 09:44

## 2018-08-16 RX ADMIN — PROPOFOL 100 MG: 10 INJECTION, EMULSION INTRAVENOUS at 08:31

## 2018-08-16 RX ADMIN — OXYCODONE HYDROCHLORIDE AND ACETAMINOPHEN 1 TABLET: 5; 325 TABLET ORAL at 15:46

## 2018-08-16 RX ADMIN — OXYCODONE HYDROCHLORIDE AND ACETAMINOPHEN 1 TABLET: 5; 325 TABLET ORAL at 22:45

## 2018-08-16 ASSESSMENT — LIFESTYLE VARIABLES: TOBACCO_USE: 1

## 2018-08-16 ASSESSMENT — ENCOUNTER SYMPTOMS: DYSRHYTHMIAS: 1

## 2018-08-16 NOTE — H&P
Electrophysiology Pre-Procedure History and Physical    Stu Meredith MRN# [id number]   Age: 62 year old YOB: 1955      Date of Procedure: 8/16/2018  Location [facility]      Date of Exam 8/16/2018 Facility (Same day)       Home clinic: [facility]  Primary care provider: Omar Camacho  HPI:  Mr. Meredith is a 62 year old male who has a past medical history significant for HCM diagnosed 2006, VT on Holter July 2012 s/p ICD 7/2012, troponin leak Oct 2013 (angiogram with non-significant CAD, LV gram showed EF 25%, recovered to 60% on TTE Dec 2013), HTN, AFib (CHADSVASC 2) with DCCVs then s/p PVI 12/2011 and PVI for persistent AF on 7/28/16 s/p DCCV 8/7/17, 1/3/18 and 5/5/18. He has now had some recurrent episodes of AT/AF requiring DCCV on 8/7/17 and 1/3/18.  Device check showed AT episode that started 1/28/18 and lasted 25 days and self terminated. He then had recurrent AT/AF and had DCCV on 5/5/18. He followed up with Dr. Ware recently in clinic and reported having fatigue, MART, and decreased stamina. Device interrogation showed he had recurred back into AT/AF since 5/26/18. He was arranged for EP follow up. He continued to have fatigue, MART, and decreased stamina. Last stress echo from 6/1/18 showed normal biventricular function with asymmetrical septal hypertrophy and no LVOT obstruction with rest or exercise. We discussed management options for his AF/AFL including amiodarone vs repeat ablation. We discussed previous Ablation voltage mapping showed high scar burden in atrium; therefore, he has higher recurrence rate for AF and may be difficult to maintain NSR longerterm. His recurrences have been an organized AFL which could be more amenable to ablation. He would like to try another ablation in efforts of avoiding amiodarone. He was changed to pradaxa in preporation for ablation.        Active problem list:   Patient Active Problem List     Diagnosis Date Noted     Advanced directives, counseling/discussion 12/26/2017     Priority: Medium     Advance Directive Problem List Overview:   Name Relationship Phone    Primary Health Care Agent            Alternative Health Care Agent          Discussed advance care planning with patient; information given to patient to review. 12/2/2011          Chronic Zolpidem use for insomnia 09/27/2016     Priority: Medium     CHF (congestive heart failure) (H) 07/30/2016     Priority: Medium     S/P ablation of atrial flutter 07/28/2016     Priority: Medium     Insomnia, unspecified 01/14/2016     Priority: Medium     Obesity (BMI 30-39.9) 12/14/2015     Priority: Medium     Prediabetes 08/08/2014     Priority: Medium     Hip arthritis 01/15/2014     Priority: Medium     Automatic implantable cardioverter-defibrillator in situ- Mobile2Me, dual chamber- NOT dependent 12/03/2013     Priority: Medium     Implanted at [facility] 7/27/12, by Dr. Deleon  Problem list name updated by automated process. Provider to review       Chest pain      Priority: Medium     Ventricular tachycardia (H)      Priority: Medium     Hypertrophic cardiomyopathy (H)      Priority: Medium     CARDIOVASCULAR SCREENING; LDL GOAL LESS THAN 130 10/31/2010     Priority: Medium     Atrial fibrillation (H)      Priority: Medium     Tobacco use disorder 09/11/2007     Priority: Medium     Other abnormal heart sounds 09/11/2007     Priority: Medium            Medications (include herbals and vitamins):      No current facility-administered medications for this encounter.      Facility-Administered Medications Ordered in Other Encounters   Medication     methohexital (BREVITAL SODIUM) injection         Stu Meredith   Candia Medication Instructions JOSE:[id number]    Printed on:08/16/18 0635   Medication Information                      acetaminophen (TYLENOL) 325 MG tablet  Take 325-650 mg by mouth every 6 hours as needed             amoxicillin  (AMOXIL) 500 MG tablet  Take 4 tablets (2000 mg) by mouth 1 hour before dental procedures.             atorvastatin (LIPITOR) 20 MG tablet  Take 1 tablet (20 mg) by mouth daily             Blood Pressure Monitoring (BLOOD PRESSURE CUFF) MISC  1 Device daily             dabigatran ANTICOAGULANT (PRADAXA ANTICOAGULANT) 150 MG capsule  Take 1 capsule (150 mg) by mouth 2 times daily Store in original 's bottle or blister pack; use within 120 days of opening.             metoprolol (TOPROL-XL) 50 MG 24 hr tablet  Take 1 tablet (50 mg) by mouth daily             multivitamin, therapeutic (THERA-VIT) TABS  Take 1 tablet by mouth daily             sotalol (BETAPACE) 120 MG tablet  Take 1 tablet (120 mg) by mouth 2 times daily             spironolactone (ALDACTONE) 25 MG tablet  Take 1 tablet (25 mg) by mouth daily             zolpidem (AMBIEN) 10 MG tablet  Take 0.5-1 tablets (5-10 mg) by mouth nightly as needed for sleep                      Allergies:    No Known Allergies  Allergy to Latex? No  Allergy to tape?   No  Intolerances: NA          Social History:     Social History   Substance Use Topics     Smoking status: Former Smoker     Packs/day: 0.25     Years: 40.00     Types: Cigarettes     Start date: 1/1/1975     Quit date: 12/11/2015     Smokeless tobacco: Never Used     Alcohol use 0.0 oz/week     0 Standard drinks or equivalent per week      Comment: 1 drink per week            Physical Exam:   All vitals have been reviewed  No data found.       Airway assessment:   Patient is able to open mouth wide  Patient is able to stick out tongue}      ENT:   Normocephalic, without obvious abnormality, atraumatic, sinuses nontender on palpation, external ears without lesions, oral pharynx with moist mucous membranes, tonsils without erythema or exudates, gums normal and good dentition.     Neck:   Supple, symmetrical, trachea midline, no adenopathy, thyroid symmetric, not enlarged and no tenderness, skin  normal     Lungs:   No increased work of breathing, good air exchange, clear to auscultation bilaterally, no crackles or wheezing     Cardiovascular:   Normal apical impulse, regular rate and rhythm, normal S1 and S2, no S3 or S4, and no murmur noted             Lab / Radiology Results:     Lab Results   Component Value Date    WBC 6.7 12/26/2017    RBC 4.99 12/26/2017    HGB 15.1 12/26/2017    HCT 46.1 12/26/2017    MCV 92 12/26/2017    RDW 13.5 12/26/2017     12/26/2017      Lab Results   Component Value Date    WBC 6.7 12/26/2017     Lab Results   Component Value Date     12/26/2017     Lab Results   Component Value Date    HGB 15.1 12/26/2017    HCT 46.1 12/26/2017     Lab Results   Component Value Date     06/08/2018    CO2 25 06/08/2018    BUN 17 06/08/2018     Lab Results   Component Value Date     06/08/2018    CO2 25 06/08/2018    BUN 17 06/08/2018     Lab Results   Component Value Date     06/08/2018     Lab Results   Component Value Date    BUN 17 06/08/2018     Lab Results   Component Value Date    TSH 1.47 07/01/2015             Plan:   Patient's active problems diagnostically and therapeutically optimized for the planned procedure. Patient here for ablation for AF/AFL. Procedure explained in detail to patient including indications, risks, and benefits. The procedural risk of EP study and ablation were discussed in detail. Risks discussed include: vascular complications, CVA, AVB, pericardial effusion and tamponade, esophageal fistula, PV stenosis. AF ablation has 70% success at 1 year, 50% success at 5 years, and 30% need another ablation.  Even if ablation is successful anticoagulation may be needed lifelong. He states understanding and wishes to procced.     KARSON Richardson CNP  Electrophysiology Consult Service  Pager: [phone]

## 2018-08-16 NOTE — ANESTHESIA CARE TRANSFER NOTE
Patient: Stu Meredith    Procedure(s):  Anesthesia out of OR Atrial Ablation     Diagnosis: Atrial Fibrillation   Diagnosis Additional Information: No value filed.    Anesthesia Type:   General     Note:  Airway :Face Mask  Patient transferred to:PACU  Comments: Transferred to: PACU with supplemental O2 6L FM, monitored.    Patient vital signs: stable    Airway: none    Monitors applied. Alert and oriented. Report to RN.        Handoff Report: Identifed the Patient, Identified the Reponsible Provider, Reviewed the pertinent medical history, Discussed the surgical course, Reviewed Intra-OP anesthesia mangement and issues during anesthesia, Set expectations for post-procedure period and Allowed opportunity for questions and acknowledgement of understanding      Vitals: (Last set prior to Anesthesia Care Transfer)    CRNA VITALS  8/16/2018 1326 - 8/16/2018 1414      8/16/2018             Resp Rate (set): 10                Electronically Signed By: KARSON Maria CRNA  August 16, 2018  2:14 PM

## 2018-08-16 NOTE — IP AVS SNAPSHOT
[address] [address]    Phone:  [phone]                                       After Visit Summary   8/16/2018    Stu Meredith    MRN: [id number]           After Visit Summary Signature Page     I have received my discharge instructions, and my questions have been answered. I have discussed any challenges I see with this plan with the nurse or doctor.    ..........................................................................................................................................  Patient/Patient Representative Signature      ..........................................................................................................................................  Patient Representative Print Name and Relationship to Patient    ..................................................               ................................................  Date                                            Time    ..........................................................................................................................................  Reviewed by Signature/Title    ...................................................              ..............................................  Date                                                            Time

## 2018-08-16 NOTE — OP NOTE
EP PROCEDURE NOTE    Procedures:  1. Left Atrial Wide Area Circumferential radiofrequency Ablation (Posterior wall box line, and CFEA), Cavotricuspid Isthmus Line  2. Trans-septal Puncture x2  3. Intracardiac Echocardiography.  4. Invasive Hemodynamic Monitoring.  5. 3D electro-anatomic Mapping using Carto3.  6. Esophageal temperature monitoring.  7. Cardioversion.    Attending: Erik Cooper MD  Fellow: Jus Hamm MD  Procedure Date: 8/16/2018    Pre-operative Diagnosis:  Persistent Atrial Fibrillation and Atrial Flutter resistant to AAT (Sotalol), Harvey symptom score III.  Post-operative diagnosis:  Persistent Atrial Fibrillation/Atrial Flutter  Complications:   None.  Fluoroscopy time/dose: 12 minutes, 1285 cGycm2.    Clinical Profile:  60M Hx HCM diagnosed 2006, VT on Holter July 2012 s/p dual chamber ICD 7/2012, troponin leak Oct 2013 (angiogram with non-significant CAD), HTN, persistent AF s/p ablation 12/2011 (PVI, CFEA, roof line) at OSH and redo ablation 07/28/2016 at [facility] (WACA, CFEA), multiple DCCVs since then, now in persistent typical appearing atrial flutter and atrial fibrillation refractory sotalol 120 bid, pt is symptomatic and at last clinic visit decision was made for repeat PVI and CTI line.    PROCEDURE  The risks and benefits of the procedure were explained to the patient in full.  The risks include, but are not limited to: pain, bleeding, blood transfusion reactions, arrhythmia, dissection of vessels, cardiac perforation, pericardial effusion, stroke, and death. Informed Consent was obtained. The patient was brought to the EP lab in a fasting and hemodynamically stable condition. The patient was prepped and draped in a sterile fashion. This procedure was done under general anesthesia.  Sheaths and Catheters:  After local anesthesia with 2% lidocaine, vascular access was obtained using the modified Seldinger technique for the following access points:    Right Femoral Vein:   - 7Fr Locking  Sheath: A decapolar catheter was positioned into the coronary sinus.    - 8.5Fr Sheath: exchanged for SL1 trans-septal sheath sheath through which a Pentarray catheter was placed into the LA and later a Biosense Grant Thermacool Smartouch 3.5 mm tip DF curve mapping and ablation catheter was placed into the LA. This sheath was later exchanged for a RAMP sheath to facilitate CTI ablation using the Biosense ablation catheter.  - 8.5Fr Sheath: used for Pentarray mapping in RA as well.    Left Femoral Vein:  - 9 Fr Sheath - an ICE catheter was placed into the RA / RV.    Left Femoral Artery:   - 4Fr Sheath was placed for hemodynamic monitoring.    EP study results (in milliseconds):  Pre-ablation: In Afib, VV= 1000, QRS= 76, QT= 306.  Post-ablation: AA=VV= 991, DC= 280, QRS= 84, QT= 444.  Post-ablation TICT: 165    Procedure Description:  After the above sheaths were in place, the catheters were positioned under fluoroscopic guidance. The ICE catheter was placed into the RA.  Baseline survey of the heart with the ICE did not reveal the presence of any significant pericardial effusion.  A temperature probe was placed in the esophagus. CS catheter was positioned in the CS.  We then performed the trans-septal access:  Taking care not to disturbe the RA and RV chronic leads throughout, the guide wire was advanced into the SVC.  The SL1 trans-septal sheath and dilator were advanced over the guide wire into the SVC and directed medially.  The guide wire was removed, the dilator was aspirated and the BRK-1 needle was advanced. Using both the ARANGO and [place] projections, the trans-septal system was then dragged down and the Fossa Ovalis was engaged. Tenting of the interatrial septum was observed with ICE.  The needle was advanced into the LA and the location confirmed using ICE.  While fixing the needle, the dilator was slightly advanced across the septum. The dilator and then the sheath were advanced though the septum into the  LA. The trans-septal needle was then withdrawn from the dilator. The dilator was removed and the sheath was then flushed. A Heparin bolus and drip were started, with serial monitoring of ACTs to keep ACT around 350.  We used the Pentarray and ablation catheters to build the LA, pulmonary vein and LA appendage geometry with FAM on Carto3. This was matched with the cardiac CT 3D reconstruction of the LA and pulmonary veins.   Using the pentarray catheter we performed an extensive voltage map that showed extensive scar in all 4 PVs and posterior wall of LA without evident leak. All veins and their respective antrum were isolated. There was significant scar in the body of the LA mostly located in the posterior wall between the WACA lines.  We performed a roof line lesion and an inferior posterior line connection the 2 previous WACA line to box and isolate the posterior wall. We also performed CFAE ablation throughout the body of the LA to homogenize scar.    We then performed a Cavotricuspid Isthmus ablation line. We mapped the RA to create a voltage map. We noted a long CTI length and prominent eustachian ridge but found a suitable line more laterally. Radiofrequency Ablation was applied and the catheter dragged from tricuspid annulus to IVC. The line was performed under Carto guidance. Post ablation, the TICT was 165 msec.    After the procedure, ICE survey revealed no evidence of pericardial effusion.    The sheaths were pulled out of the left atrium into the RA.  Heparin was stopped and protamine was given, after a test dose had revealed no reaction.  The catheters were withdrawn, and the sheaths were pulled.  Manual pressure was applied to both groins. The patient was then transported to PACU for extubation then to a monitored bed. ICD therapies were reprogrammed ON and pacing left at DDDR 60/120.    Medications given: Lasix 10 IV x2     ASSESSMENT:  1. Symptomatic Persistent Atrial Fibrillation and typical atrial  flutter, refractory to AAT.   2. S/p redo afib ablation with posterior wall isolation, CFAE ablation and CTI ablation.    PLAN:  1. Cont dabigatran  2. Disposition: to PACU then home tonight or tomorrow after outpatient stay on 6D.    Jus Hamm MD  Cardiology Fellow  [phone]    EP STAFF NOTE  I was present throughout the procedure. I agree with the note above by the EP fellow.  Erik Cooper MD [facility]  Cardiology - Electrophysiology

## 2018-08-16 NOTE — ANESTHESIA POSTPROCEDURE EVALUATION
Patient: Stu Meredith    Procedure(s):  Anesthesia out of OR Atrial Ablation     Diagnosis:Atrial Fibrillation   Diagnosis Additional Information: No value filed.    Anesthesia Type:  General    Note:  Anesthesia Post Evaluation    Patient location during evaluation: PACU  Patient participation: Able to fully participate in evaluation  Level of consciousness: awake and alert  Pain management: satisfactory to patient  Airway patency: patent  Cardiovascular status: acceptable and stable  Respiratory status: acceptable and spontaneous ventilation  Hydration status: euvolemic  PONV: controlled     Anesthetic complications: None          Last vitals:  Vitals:    08/16/18 1445 08/16/18 1500 08/16/18 1515   BP: 119/73 115/72 119/77   Resp: 19 20 19   Temp:   37.6  C (99.6  F)   SpO2: 97% 96% 95%         Electronically Signed By: Steffen Bowden MD  August 16, 2018  3:26 PM

## 2018-08-16 NOTE — IP AVS SNAPSHOT
MRN:[id number]                      After Visit Summary   8/16/2018    Stu Meredith    MRN: [id number]           Thank you!     Thank you for choosing [facility] for your care. Our goal is always to provide you with excellent care. Hearing back from our patients is one way we can continue to improve our services. Please take a few minutes to complete the written survey that you may receive in the mail after you visit with us. Thank you!        Patient Information     Date Of Birth          1955        Designated Caregiver       Most Recent Value    Caregiver    Will someone help with your care after discharge? yes    Name of designated caregiver -- [Shelly-spouse]    Phone number of caregiver [phone]      About your hospital stay     You were admitted on:  August 16, 2018 You last received care in the:  [address] [facility]    You were discharged on:  August 17, 2018       Who to Call     For medical emergencies, please call 911.  For non-urgent questions about your medical care, please call your primary care provider or clinic, [phone]  For questions related to your surgery, please call your surgery clinic        Attending Provider     Provider Specialty    Erik Cooper MD Clinical Cardiac Electrophysiology       Primary Care Provider Office Phone # Fax #    Omar Camacho [phone]      After Care Instructions     Regular Diet Adult                 Your next 10 appointments already scheduled     Sep 13, 2018  3:50 PM CDT   Return Visit with Boaz Miller MD   [facility] ([facility])    [address]  [address]  [facility] [id number] [phone]            Nov 23, 2018  1:30 PM CST   (Arrive by 1:15 PM)   Implanted Defibulator with Uc Cv Device 1   [facility] ([facility])    [address]  [address]  [facility] [address] [phone]             Nov 23, 2018  2:00 PM CST   (Arrive by 1:45 PM)   Return Electrophysiology Genetics with Erik Cooper MD   [facility] ([facility])    [address] [place] [address]  [address] [place] [address] [phone]              Further instructions from your care team           Care of groin site:         Remove the Band-Aid after 24 hours. If there is minor oozing, apply another Band-aid and remove it after 12 hours.          Do NOT take a bath, use a hot tub, pool, or submerse in water for at least 3 days You may shower.          It is normal to have a small bruise or lump at the site.         Do not scrub the site.         Do not use lotion or powder near the puncture site for 3 days.         For the first 2 days: Do not stoop or squat. When you cough, sneeze or move your bowels, hold your hand over the puncture site and press gently.         Do not lift more than 10 pounds or exertional activity for 10 days.      If you start bleeding from the site in your groin:  Lie down flat and press firmly on the site.  Call your physician immediately, or, come to the emergency room.    Call 911 right away if you have bleeding that is heavy or does not stop.     Call your doctor/provider if:         You have a large or growing hard lump around the site.         The site is red, swollen, hot or tender.         Blood or fluid is draining from the site.         You have chills or a fever greater than 101 F (38 C).         Your leg or arm turns bluish, feels numb or cool.         You have hives, a rash or unusual itching.     Cardiovascular Clinic:   [address] [place] [address]. [place] [address]  Your Care Team:  EP Cardiology   Telephone Number     Maci Cooper [phone]   Chetna Monsivais RN  [phone]     For scheduling appts or procedures:    Radha Willson   [phone]   For the Device Clinic (Pacemakers and ICD's)   RN's :   Neetu Lux    [place]  During business hours: [phone]     After business hours:   [phone]- select option 4 and ask for job code [id number].       As always, Thank you for trusting us with your health care needs          Pending Results     Date and Time Order Name Status Description    8/16/2018 1412 EKG 12-lead, tracing only Preliminary             Statement of Approval     Ordered          08/17/18 0927  I have reviewed and agree with all the recommendations and orders detailed in this document.  EFFECTIVE NOW     Approved and electronically signed by:  Maci Trevino APRN CNP             Admission Information     Date & Time Provider Department Dept. Phone    8/16/2018 Erik Cooper MD [address] [facility] [phone]      Your Vitals Were     Blood Pressure Temperature Respirations Weight Pulse Oximetry BMI (Body Mass Index)    131/78 (BP Location: Right arm) 97.9  F (36.6  C) (Oral) 16 92.9 kg (204 lb 11.2 oz) 97% 28.56 kg/m2      MyChart Information     [employer] gives you secure access to your electronic health record. If you see a primary care provider, you can also send messages to your care team and make appointments. If you have questions, please call your primary care clinic.  If you do not have a primary care provider, please call [phone] and they will assist you.        Care EveryWhere ID     This is your Care EveryWhere ID. This could be used by other organizations to access your [place] medical records  [id number]        Equal Access to Services     MARLON DE LOS SANTOS : Hadii reina sevillao Sosaeali, waaxda luqadaha, qaybta kaalmada jorden cabrera. So [facility] [phone].    ATENCIÓN: Si habla español, tiene a drew disposición servicios gratuitos de asistencia lingüística. Llame al [phone].    We comply with applicable federal civil rights laws and [place] laws. We do not discriminate on the basis of race, color, national origin, age, disability, sex,  sexual orientation, or gender identity.               Review of your medicines      CONTINUE these medicines which have NOT CHANGED        Dose / Directions    acetaminophen 325 MG tablet   Commonly known as:  TYLENOL        Dose:  325-650 mg   Take 325-650 mg by mouth every 6 hours as needed   Refills:  0       amoxicillin 500 MG tablet   Commonly known as:  AMOXIL   Used for:  History of total left hip arthroplasty        Take 4 tablets (2000 mg) by mouth 1 hour before dental procedures.   Quantity:  4 tablet   Refills:  3       atorvastatin 20 MG tablet   Commonly known as:  LIPITOR   Used for:  CARDIOVASCULAR SCREENING; LDL GOAL LESS THAN 130        Dose:  20 mg   Take 1 tablet (20 mg) by mouth daily   Quantity:  90 tablet   Refills:  3       Blood Pressure Cuff Misc   Used for:  Atrial fibrillation (H), Hypertrophic cardiomyopathy (H)        Dose:  1 Device   1 Device daily   Quantity:  1 each   Refills:  0       dabigatran ANTICOAGULANT 150 MG capsule   Commonly known as:  PRADAXA ANTICOAGULANT   Used for:  Atrial fibrillation (H)        Dose:  150 mg   Take 1 capsule (150 mg) by mouth 2 times daily Store in original 's bottle or blister pack; use within 120 days of opening.   Quantity:  60 capsule   Refills:  0       metoprolol succinate 50 MG 24 hr tablet   Commonly known as:  TOPROL-XL   Used for:  HOCM (hypertrophic obstructive cardiomyopathy) (H)        Dose:  50 mg   Take 1 tablet (50 mg) by mouth daily   Quantity:  90 tablet   Refills:  3       multivitamin, therapeutic Tabs tablet        Dose:  1 tablet   Take 1 tablet by mouth daily   Refills:  0       sotalol 120 MG tablet   Commonly known as:  BETAPACE   Used for:  Chronic atrial fibrillation (H), Hypertrophic cardiomyopathy (H)        Dose:  120 mg   Take 1 tablet (120 mg) by mouth 2 times daily   Quantity:  90 tablet   Refills:  3       spironolactone 25 MG tablet   Commonly known as:  ALDACTONE   Used for:  Hypertrophic cardiomyopathy  (H), Atrial fibrillation, unspecified type (H)        Dose:  25 mg   Take 1 tablet (25 mg) by mouth daily   Quantity:  90 tablet   Refills:  3       zolpidem 10 MG tablet   Commonly known as:  AMBIEN   Used for:  Sedative, hypnotic or anxiolytic dependence (H)        Dose:  5-10 mg   Take 0.5-1 tablets (5-10 mg) by mouth nightly as needed for sleep   Quantity:  90 tablet   Refills:  0                Protect others around you: Learn how to safely use, store and throw away your medicines at www.disposemymeds.org.             Medication List: This is a list of all your medications and when to take them. Check marks below indicate your daily home schedule. Keep this list as a reference.      Medications           Morning Afternoon Evening Bedtime As Needed    acetaminophen 325 MG tablet   Commonly known as:  TYLENOL   Take 325-650 mg by mouth every 6 hours as needed                                amoxicillin 500 MG tablet   Commonly known as:  AMOXIL   Take 4 tablets (2000 mg) by mouth 1 hour before dental procedures.                                atorvastatin 20 MG tablet   Commonly known as:  LIPITOR   Take 1 tablet (20 mg) by mouth daily   Last time this was given:  20 mg on 8/16/2018  9:10 PM                                Blood Pressure Cuff Misc   1 Device daily                                dabigatran ANTICOAGULANT 150 MG capsule   Commonly known as:  PRADAXA ANTICOAGULANT   Take 1 capsule (150 mg) by mouth 2 times daily Store in original 's bottle or blister pack; use within 120 days of opening.   Last time this was given:  150 mg on 8/17/2018  8:24 AM                                metoprolol succinate 50 MG 24 hr tablet   Commonly known as:  TOPROL-XL   Take 1 tablet (50 mg) by mouth daily   Last time this was given:  50 mg on 8/17/2018  8:24 AM                                multivitamin, therapeutic Tabs tablet   Take 1 tablet by mouth daily                                sotalol 120 MG tablet    Commonly known as:  BETAPACE   Take 1 tablet (120 mg) by mouth 2 times daily   Last time this was given:  120 mg on 8/17/2018  8:24 AM                                spironolactone 25 MG tablet   Commonly known as:  ALDACTONE   Take 1 tablet (25 mg) by mouth daily   Last time this was given:  25 mg on 8/17/2018  8:24 AM                                zolpidem 10 MG tablet   Commonly known as:  AMBIEN   Take 0.5-1 tablets (5-10 mg) by mouth nightly as needed for sleep   Last time this was given:  10 mg on 8/16/2018 11:00 PM

## 2018-08-16 NOTE — PROGRESS NOTES
Preliminary Device Interrogation Results.  Final physician signed paceart report to be scanned and attached.    Pt seen in the Cardiac Cath lab #1 for evaluation and iterative programming of a [employer], dual lead ICD, per MD orders, post-op AF ablation. Currently his intrinsic rhythm is SR 60 bpm. Normal ICD function.Per Dr. Cooper's orders, the ICD tachy therapies were turned ON.  Dual lead ICD

## 2018-08-16 NOTE — MR AVS SNAPSHOT
After Visit Summary   8/16/2018    Stu Meredith    MRN: [id number]           Patient Information     Date Of Birth          1955        Visit Information        Provider Department      8/16/2018 6:00 AM 1, Uc Cv Device [facility]        Today's Diagnoses     Hypertrophic cardiomyopathy (H)    -  1       Follow-ups after your visit        Your next 10 appointments already scheduled     Sep 13, 2018  3:50 PM CDT   Return Visit with Boaz Miller MD   [facility] [place] ([facility])    [address]  [address]  [facility] [address]   [phone]            Nov 23, 2018  1:30 PM CST   (Arrive by 1:15 PM)   Implanted Defibulator with Uc Cv Device 1   [facility] ([facility])    [address] [place] [address]  [address] [place] [address] [phone]            Nov 23, 2018  2:00 PM CST   (Arrive by 1:45 PM)   Return Electrophysiology Genetics with Erik Cooper MD   [facility])    [address] [place] [address]  [address] [place] [address] [phone]              Who to contact     If you have questions or need follow up information about today's clinic visit or your schedule please contact [facility] directly at [phone].  Normal or non-critical lab and imaging results will be communicated to you by MyChart, letter or phone within 4 business days after the clinic has received the results. If you do not hear from us within 7 days, please contact the clinic through MyChart or phone. If you have a critical or abnormal lab result, we will notify you by phone as soon as possible.  Submit refill requests through [employer] or call your pharmacy and they will forward the refill request to us. Please allow 3 business days for your refill to be completed.          Additional Information About Your Visit        MyChart Information     [employer]  gives you secure access to your electronic health record. If you see a primary care provider, you can also send messages to your care team and make appointments. If you have questions, please call your primary care clinic.  If you do not have a primary care provider, please call [phone] and they will assist you.        Care EveryWhere ID     This is your Care EveryWhere ID. This could be used by other organizations to access your [place] medical records  [id number]         Blood Pressure from Last 3 Encounters:   06/08/18 138/87   06/07/18 122/80   06/01/18 132/82    Weight from Last 3 Encounters:   07/19/18 96.2 kg (212 lb 1.3 oz)   06/08/18 96.2 kg (212 lb)   06/07/18 97.1 kg (214 lb)              We Performed the Following     JOSE-PROC DEVICE EVAL/PROGRAMMING, ICD          Today's Medication Changes      Notice     This visit is during an admission. Changes to the med list made in this visit will be reflected in the After Visit Summary of the admission.             Primary Care Provider Office Phone # Fax #    Omar Camacho [phone]       [address] [facility] [address]        Equal Access to Services     MARLON DE LOS SANTOS : Hadii reina sevillao Soanjelica, waaxda luqadaha, qaybta kaalmada adeegyada, jorden short. So [facility] [phone].    ATENCIÓN: Si habla español, tiene a drew disposición servicios gratuitos de asistencia lingüística. LlACMC Healthcare System Glenbeigh [phone].    We comply with applicable federal civil rights laws and [place] laws. We do not discriminate on the basis of race, color, national origin, age, disability, sex, sexual orientation, or gender identity.            Thank you!     Thank you for choosing [facility]  for your care. Our goal is always to provide you with excellent care. Hearing back from our patients is one way we can continue to improve our services. Please take a few minutes to complete the written survey that you may  receive in the mail after your visit with us. Thank you!             Your Updated Medication List - Protect others around you: Learn how to safely use, store and throw away your medicines at www.disposemymeds.org.      Notice     This visit is during an admission. Changes to the med list made in this visit will be reflected in the After Visit Summary of the admission.

## 2018-08-16 NOTE — PROGRESS NOTES
Preliminary Device Interrogation Results.  Final physician signed paceart report to be scanned and attached.    Pt seen in the Cardiac Cath Lab #1 for evaluation and iterative programming of a [employer], dual lead ICD, per MD orders. Currently his intrinsic rhythm is AF/VS ~50 bpm. Normal ICD function. 23 NSVT episodes recorded lasting 6-10 seconds (per device log book). The available EGMs show up to 7 beats. AP= 0% and = 32%. Lead trends appear stable. Battery estimates 4.5 years to CARMEN. Per Dr. Cooper's orders the ICD tachy therapies were turned off. Pacing mode remains DDDR @ 60 bpm. Anesthesia and cath lab staff notified.  Dual lead ICD

## 2018-08-16 NOTE — DISCHARGE INSTRUCTIONS
Care of groin site:         Remove the Band-Aid after 24 hours. If there is minor oozing, apply another Band-aid and remove it after 12 hours.          Do NOT take a bath, use a hot tub, pool, or submerse in water for at least 3 days You may shower.          It is normal to have a small bruise or lump at the site.         Do not scrub the site.         Do not use lotion or powder near the puncture site for 3 days.         For the first 2 days: Do not stoop or squat. When you cough, sneeze or move your bowels, hold your hand over the puncture site and press gently.         Do not lift more than 10 pounds or exertional activity for 10 days.      If you start bleeding from the site in your groin:  Lie down flat and press firmly on the site.  Call your physician immediately, or, come to the emergency room.    Call 911 right away if you have bleeding that is heavy or does not stop.     Call your doctor/provider if:         You have a large or growing hard lump around the site.         The site is red, swollen, hot or tender.         Blood or fluid is draining from the site.         You have chills or a fever greater than 101 F (38 C).         Your leg or arm turns bluish, feels numb or cool.         You have hives, a rash or unusual itching.     Cardiovascular Clinic:   [address] [place] [address]. [place] [address]  Your Care Team:  EP Cardiology   Telephone Number     Maci Cooper [phone]   Chetna Monsivais RN  [phone]     For scheduling appts or procedures:    Radha Willson   [phone]   For the Device Clinic (Pacemakers and ICD's)   RN's :   Neetu Davey  During business hours: [phone]     After business hours:   [phone]- select option 4 and ask for job code [id number].       As always, Thank you for trusting us with your health care needs

## 2018-08-16 NOTE — BRIEF OP NOTE
Brief Operative Note    Pre-operative diagnosis: Atrial fibrillation/flutter   Post-operative diagnosis: s/p PVI/CTI ablation  Procedure: PVI/CTI ablation  Electrophysiologist: Erik Cooper MD  Anesthesia:  General  Estimated blood loss:    Minimal  Sheaths:    4F RFA  8F, 8F, 7F RFV  9F LFV  All sheaths pulled in the cath lab, manual pressure held, hemostasis achieved.    I&Os: +700cc received a total of 20 mg IV lasix throughout case  Complications:  None.  Implants: None.    Plan:    Continue anticoagulation    After PACU transfer to 6D outpatient overnight stay    Full operative note to follow.     KARSON Richardson CNP  Electrophysiology Consult Service  Pager: [phone]

## 2018-08-16 NOTE — MR AVS SNAPSHOT
After Visit Summary   8/16/2018    Stu Meredith    MRN: [id number]           Patient Information     Date Of Birth          1955        Visit Information        Provider Department      8/16/2018 6:30 AM 1, Uc Cv Device [facility]        Today's Diagnoses     Hypertrophic cardiomyopathy (H)    -  1       Follow-ups after your visit        Your next 10 appointments already scheduled     Sep 13, 2018  3:50 PM CDT   Return Visit with Boaz Miller MD   [facility] [place] ([facility] [place])    [address]  [address]  [facility] [address]   [phone]            Nov 23, 2018  1:30 PM CST   (Arrive by 1:15 PM)   Implanted Defibulator with Uc Cv Device 1   [facility] ([facility])    [address] [place] [address]  [address] [place] [address] [phone]            Nov 23, 2018  2:00 PM CST   (Arrive by 1:45 PM)   Return Electrophysiology Genetics with Erik Cooper MD   [facility])    [address] [place] [address]  [address] [place] [address] [phone]              Who to contact     If you have questions or need follow up information about today's clinic visit or your schedule please contact [facility] directly at [phone].  Normal or non-critical lab and imaging results will be communicated to you by MyChart, letter or phone within 4 business days after the clinic has received the results. If you do not hear from us within 7 days, please contact the clinic through MyChart or phone. If you have a critical or abnormal lab result, we will notify you by phone as soon as possible.  Submit refill requests through SCM-GL or call your pharmacy and they will forward the refill request to us. Please allow 3 business days for your refill to be completed.          Additional Information About Your Visit        MyChart Information     [employer]  gives you secure access to your electronic health record. If you see a primary care provider, you can also send messages to your care team and make appointments. If you have questions, please call your primary care clinic.  If you do not have a primary care provider, please call [phone] and they will assist you.        Care EveryWhere ID     This is your Care EveryWhere ID. This could be used by other organizations to access your [place] medical records  [id number]         Blood Pressure from Last 3 Encounters:   08/16/18 115/72   06/08/18 138/87   06/07/18 122/80    Weight from Last 3 Encounters:   07/19/18 96.2 kg (212 lb 1.3 oz)   06/08/18 96.2 kg (212 lb)   06/07/18 97.1 kg (214 lb)              We Performed the Following     JOSE-PROC DEVICE EVAL/PROGRAMMING, ICD          Today's Medication Changes      Notice     This visit is during an admission. Changes to the med list made in this visit will be reflected in the After Visit Summary of the admission.             Primary Care Provider Office Phone # Fax #    Omar Camacho [phone]       [address] [facility] [address]        Equal Access to Services     MARLON DE LOS SANTOS : Hadii reina sevillao Soanjelica, waaxda luqadaha, qaybta kaalmada adeegyada, jorden short. So [facility] [place] [facility] [phone].    ATENCIÓN: Si habla español, tiene a drew disposición servicios gratuitos de asistencia lingüística. LlParkview Health [phone].    We comply with applicable federal civil rights laws and [place] laws. We do not discriminate on the basis of race, color, national origin, age, disability, sex, sexual orientation, or gender identity.            Thank you!     Thank you for choosing [facility]  for your care. Our goal is always to provide you with excellent care. Hearing back from our patients is one way we can continue to improve our services. Please take a few minutes to complete the written survey that you may  receive in the mail after your visit with us. Thank you!             Your Updated Medication List - Protect others around you: Learn how to safely use, store and throw away your medicines at www.disposemymeds.org.      Notice     This visit is during an admission. Changes to the med list made in this visit will be reflected in the After Visit Summary of the admission.

## 2018-08-17 ENCOUNTER — ALLIED HEALTH/NURSE VISIT (OUTPATIENT)
Dept: CARDIOLOGY | Facility: CLINIC | Age: 63
End: 2018-08-17
Attending: INTERNAL MEDICINE
Payer: COMMERCIAL

## 2018-08-17 VITALS
BODY MASS INDEX: 28.56 KG/M2 | WEIGHT: 204.7 LBS | SYSTOLIC BLOOD PRESSURE: 131 MMHG | RESPIRATION RATE: 16 BRPM | DIASTOLIC BLOOD PRESSURE: 78 MMHG | OXYGEN SATURATION: 97 % | TEMPERATURE: 97.9 F

## 2018-08-17 DIAGNOSIS — I42.2 HYPERTROPHIC CARDIOMYOPATHY (H): Primary | ICD-10-CM

## 2018-08-17 LAB — INTERPRETATION ECG - MUSE: NORMAL

## 2018-08-17 PROCEDURE — 93283 PRGRMG EVAL IMPLANTABLE DFB: CPT | Mod: ZF

## 2018-08-17 PROCEDURE — 99214 OFFICE O/P EST MOD 30 MIN: CPT | Performed by: INTERNAL MEDICINE

## 2018-08-17 PROCEDURE — 25000132 ZZH RX MED GY IP 250 OP 250 PS 637: Performed by: NURSE PRACTITIONER

## 2018-08-17 PROCEDURE — 93289 INTERROG DEVICE EVAL HEART: CPT | Mod: 26 | Performed by: INTERNAL MEDICINE

## 2018-08-17 RX ADMIN — METOPROLOL SUCCINATE 50 MG: 50 TABLET, EXTENDED RELEASE ORAL at 08:24

## 2018-08-17 RX ADMIN — DABIGATRAN ETEXILATE MESYLATE 150 MG: 150 CAPSULE ORAL at 08:24

## 2018-08-17 RX ADMIN — SPIRONOLACTONE 25 MG: 25 TABLET, FILM COATED ORAL at 08:24

## 2018-08-17 RX ADMIN — SOTALOL HYDROCHLORIDE 120 MG: 120 TABLET ORAL at 08:24

## 2018-08-17 RX ADMIN — OXYCODONE HYDROCHLORIDE AND ACETAMINOPHEN 1 TABLET: 5; 325 TABLET ORAL at 06:56

## 2018-08-17 NOTE — PLAN OF CARE
Problem: Patient Care Overview  Goal: Plan of Care/Patient Progress Review  Outcome: Adequate for Discharge Date Met: 08/17/18  DISCHARGE   Discharged to: Home  Via: Automobile  Accompanied by: Family  Discharge Instructions: diet, activity, medications, follow up appointments, when to call the MD, and what to watchout for (i.e. s/s of infection, increasing SOB, palpitations, chest pain,)  Prescriptions: No new medications  Follow Up Appointments: arranged; information given  Belongings: All sent with pt  IV: out  Telemetry: off  Pt exhibits understanding of above discharge instructions; all questions answered.  Discharge Paperwork: faxed

## 2018-08-17 NOTE — MR AVS SNAPSHOT
After Visit Summary   8/17/2018    Stu Meredith    MRN: [id number]           Patient Information     Date Of Birth          1955        Visit Information        Provider Department      8/17/2018 6:00 AM 1, Uc Cv Device [facility]        Today's Diagnoses     Hypertrophic cardiomyopathy (H)    -  1       Follow-ups after your visit        Your next 10 appointments already scheduled     Sep 13, 2018  3:50 PM CDT   Return Visit with Boaz Miller MD   [facility] [place] ([facility] [place])    [address]  [address]  [facility] [address]   [phone]            Nov 23, 2018  1:30 PM CST   (Arrive by 1:15 PM)   Implanted Defibulator with Uc Cv Device 1   [facility] ([facility])    [address] [place] [address]  [address] [place] [address] [phone]            Nov 23, 2018  2:00 PM CST   (Arrive by 1:45 PM)   Return Electrophysiology Genetics with Erik Cooper MD   [facility])    [address] [place] [address]  [address] [place] [address] [phone]              Who to contact     If you have questions or need follow up information about today's clinic visit or your schedule please contact [facility] directly at [phone].  Normal or non-critical lab and imaging results will be communicated to you by MyChart, letter or phone within 4 business days after the clinic has received the results. If you do not hear from us within 7 days, please contact the clinic through MyChart or phone. If you have a critical or abnormal lab result, we will notify you by phone as soon as possible.  Submit refill requests through [employer] or call your pharmacy and they will forward the refill request to us. Please allow 3 business days for your refill to be completed.          Additional Information About Your Visit        MyChart Information     [employer]  gives you secure access to your electronic health record. If you see a primary care provider, you can also send messages to your care team and make appointments. If you have questions, please call your primary care clinic.  If you do not have a primary care provider, please call [phone] and they will assist you.        Care EveryWhere ID     This is your Care EveryWhere ID. This could be used by other organizations to access your [place] medical records  [id number]         Blood Pressure from Last 3 Encounters:   08/17/18 131/78   06/08/18 138/87   06/07/18 122/80    Weight from Last 3 Encounters:   08/17/18 92.9 kg (204 lb 11.2 oz)   07/19/18 96.2 kg (212 lb 1.3 oz)   06/08/18 96.2 kg (212 lb)              We Performed the Following     ICD DEVICE PROGRAMMING EVAL, DUAL LEAD ICD          Today's Medication Changes      Notice     This visit is during an admission. Changes to the med list made in this visit will be reflected in the After Visit Summary of the admission.             Primary Care Provider Office Phone # Fax #    Omar Camacho [phone]       [address] [place] [address]        Equal Access to Services     MARLON DE LOS SANTOS : Hadii reina sevillao Soanjelica, waaxda luqadaha, qaybta kaalmada adeegyada, jorden short. So [facility] [phone].    ATENCIÓN: Si habla español, tiene a drew disposición servicios gratuitos de asistencia lingüística. Llame al [phone].    We comply with applicable federal civil rights laws and [place] laws. We do not discriminate on the basis of race, color, national origin, age, disability, sex, sexual orientation, or gender identity.            Thank you!     Thank you for choosing [facility]  for your care. Our goal is always to provide you with excellent care. Hearing back from our patients is one way we can continue to improve our services. Please take a few minutes to complete the written survey  that you may receive in the mail after your visit with us. Thank you!             Your Updated Medication List - Protect others around you: Learn how to safely use, store and throw away your medicines at www.disposemymeds.org.      Notice     This visit is during an admission. Changes to the med list made in this visit will be reflected in the After Visit Summary of the admission.

## 2018-08-17 NOTE — DISCHARGE SUMMARY
Electrophysiology Discharge Summary  Date of Procedure: 8/16/18  DOS: 8/17/2018   Pre-operative Diagnosis:  Persistent Atrial Fibrillation and Atrial Flutter resistant to AAT (Sotalol), Harvey symptom score III.  Post-operative diagnosis:  Persistent Atrial Fibrillation/Atrial Flutter  Hospital Course:  Mr. Meredith is a 62 year old male who has a past medical history significant for HCM diagnosed 2006, VT on Holter July 2012 s/p ICD 7/2012, troponin leak Oct 2013 (angiogram with non-significant CAD, LV gram showed EF 25%, recovered to 60% on TTE Dec 2013), HTN, AFib (CHADSVASC 2) with DCCVs then s/p PVI 12/2011 and PVI for persistent AF on 7/28/16 s/p DCCV 8/7/17, 1/3/18 and 5/5/18. He has now had some recurrent episodes of AT/AF requiring DCCV on 8/7/17 and 1/3/18.  Device check showed AT episode that started 1/28/18 and lasted 25 days and self terminated. He then had recurrent AT/AF and had DCCV on 5/5/18. He followed up with Dr. Ware recently in clinic and reported having fatigue, MART, and decreased stamina. Device interrogation showed he had recurred back into AT/AF since 5/26/18. He was arranged for EP follow up. He continued to have fatigue, MART, and decreased stamina. Last stress echo from 6/1/18 showed normal biventricular function with asymmetrical septal hypertrophy and no LVOT obstruction with rest or exercise. We discussed management options for his AF/AFL including amiodarone vs repeat ablation. We discussed previous Ablation voltage mapping showed high scar burden in atrium; therefore, he has higher recurrence rate for AF and may be difficult to maintain NSR longerterm. His recurrences have been an organized AFL which could be more amenable to ablation. He wanted to try another ablation in efforts of avoiding amiodarone.     Patient underwent a successful PVI/CTI ablation yesterday. He had an uneventful overnight stay. Telemetry reviewed showing paced with rare ectopy. Groin sites are soft, CDI, no  bruit, no bleeding, and no hematoma. Patient is tolerating oral intake, ambulating at baseline, and voiding without difficulties. Patient remains hemodynamically stable.     ROS:   10 point ROS neg other than the symptoms noted above.   Exam:  /77 (BP Location: Right arm)  Temp 97.8  F (36.6  C) (Oral)  Resp 16  SpO2 92%    Constitutional: alert and no distress  Head: Normocephalic. No masses, lesions, tenderness or abnormalities  Neck: Neck supple. No adenopathy. Thyroid symmetric, normal size,, Carotids without bruits.  ENT: ENT exam normal, no neck nodes or sinus tenderness  Cardiovascular: negative, PMI normal. No lifts, heaves, or thrills. RRR. No murmurs, clicks gallops or rub  Respiratory: negative, Percussion normal. Good diaphragmatic excursion. Lungs clear  Gastrointestinal: Abdomen soft, non-tender. BS normal. No masses, organomegaly  : Deferred  Musculoskeletal: extremities normal- no gross deformities noted, gait normal and normal muscle tone  Skin: no suspicious lesions or rashes  Neurologic: Gait normal. Reflexes normal and symmetric. Sensation grossly WNL.  Psychiatric: mentation appears normal and affect normal/bright  Hematologic/Lymphatic/Immunologic: Normal cervical lymph nodes    Discharge Medications:   Stu Meredith   Home Medication Instructions JOSE:[id number]    Printed on:08/17/18 7378   Medication Information                      acetaminophen (TYLENOL) 325 MG tablet  Take 325-650 mg by mouth every 6 hours as needed             amoxicillin (AMOXIL) 500 MG tablet  Take 4 tablets (2000 mg) by mouth 1 hour before dental procedures.             atorvastatin (LIPITOR) 20 MG tablet  Take 1 tablet (20 mg) by mouth daily             Blood Pressure Monitoring (BLOOD PRESSURE CUFF) MISC  1 Device daily             dabigatran ANTICOAGULANT (PRADAXA ANTICOAGULANT) 150 MG capsule  Take 1 capsule (150 mg) by mouth 2 times daily Store in original 's bottle or blister pack; use  within 120 days of opening.             metoprolol (TOPROL-XL) 50 MG 24 hr tablet  Take 1 tablet (50 mg) by mouth daily             multivitamin, therapeutic (THERA-VIT) TABS  Take 1 tablet by mouth daily             sotalol (BETAPACE) 120 MG tablet  Take 1 tablet (120 mg) by mouth 2 times daily             spironolactone (ALDACTONE) 25 MG tablet  Take 1 tablet (25 mg) by mouth daily             zolpidem (AMBIEN) 10 MG tablet  Take 0.5-1 tablets (5-10 mg) by mouth nightly as needed for sleep                 Patient Instructions:    Care of groin site:         Remove the Band-Aid after 24 hours. If there is minor oozing, apply another Band-aid and remove it after 12 hours.          Do NOT take a bath, use a hot tub, pool, or submerse in water for at least 3 days You may shower.          It is normal to have a small bruise or lump at the site.         Do not scrub the site.         Do not use lotion or powder near the puncture site for 3 days.         For the first 2 days: Do not stoop or squat. When you cough, sneeze or move your bowels, hold your hand over the puncture site and press gently.         Do not lift more than 10 pounds or exertional activity for 10 days.      If you start bleeding from the site in your groin:  Lie down flat and press firmly on the site.  Call your physician immediately, or, come to the emergency room.    Call 911 right away if you have bleeding that is heavy or does not stop.     Call your doctor/provider if:         You have a large or growing hard lump around the site.         The site is red, swollen, hot or tender.         Blood or fluid is draining from the site.         You have chills or a fever greater than 101 F (38 C).         Your leg or arm turns bluish, feels numb or cool.         You have hives, a rash or unusual itching.     Plan & Follow up:    Continue home medications without changes    Follow up in 3 months    Discharge to home    The patient states understanding and  is agreeable with the plan.   This patient was seen and examined with Dr. Cooper. The above note reflects our joint assessment and plan.   KARSON Richardson CNP  Electrophysiology Consult Service  Pager: [phone]    EP STAFF NOTE  I have seen and examined the patient as part of a shared visit with HOLLY Richardson NP.  I agree with the note above. I reviewed today's vital signs and medications. I have reviewed and discussed with the advanced practice provider their physical examination, assessment, and plan   Briefly, s/p Afib ablation, no complications  My key history/exam findings are: RRR.   The key management decisions made by me: f/u as outpatient.    Erik Cooper MD [facility]  Cardiology - Electrophysiology

## 2018-08-17 NOTE — PLAN OF CARE
"Problem: Patient Care Overview  Goal: Plan of Care/Patient Progress Review  Outcome: No Change  D-pt arrived via wheelchair in no acute distress, accompanied by PACU RN. Pt able to ambulate to his bed. Pt was placed on tele monitoring. Pt denied discomfort on arrival. Pt was oriented to the room and call light. Pt encourage to \"Call don't Fall.\"  Pt instructed to hold pressure on the groin sites if bleeding occurs and to call for assistance. Pt informed groin site assessments would continue through the night.Pt declined capnography, remains on continuous oxygen saturation assessments, oxygen sats remain in the mid 90's on room air. Pt's is in atrial paced. Pt voided on arrival, NST noted blood in urine. Urine output dk vik in color. PACU RN stated urine output noted at time of irby removal was uncharted.  I-monitored, assessed and addressed pt's status and comfort for changes.   A-stable status post Ablation. Bloody urine r/t irby removal.  P-Continue to monitor, assess and address pt's status and comfort for changes. Continue to monitor groin sites and urine for signs of bleeding. Pt plans to be discharge in the am.      "

## 2018-08-17 NOTE — PLAN OF CARE
Problem: Patient Care Overview  Goal: Individualization & Mutuality  D-Pt slept intermittently between cares, and stated he did not sleep well. Pt eager to discharge this am. Bilat groin sites remained C/D/I, no new bleeding noted, during the night. pt states the (R) groin site feels tender.  Pt given 1 Percocet for his discomfort . CMS within pt's norm. Patient remained A-paced 60's, oxygen sats low to mid 90's on room air. Pt given 10 mg Lasix iv x1 r/t dec urine volumes and scant amount of bloody drainage in his urine post irby removal in the PACU. Pt voided 1650 ml during the night after lasix dose  I-monitored, assessed and addressed pt's status and comfort for changes.  A-uneventful night.  Good response to lasix.  P-Continue to monitor, assess and address pt's status and comfort for changes. Discharge today.

## 2018-08-17 NOTE — PROGRESS NOTES
Preliminary Device Interrogation Results.  Final physician signed paceart report to be scanned and attached.    Pt seen on 6C for evaluation and iterative programming of a [employer], dual lead ICD, per MD orders. Today his intrinsic rhythm is SB 40 bpm. Normal ICD function. No arrhythmias recorded post AF ablation. AP= 84% and = 4%. Lead trends appear stable. Pt reports that he is feeling well. Battery estimates 3.5 years to CARMEN. Plan for pt to RTC in 3 months.  Dual lead ICD

## 2018-08-27 ENCOUNTER — TELEPHONE (OUTPATIENT)
Dept: PEDIATRIC CARDIOLOGY | Facility: CLINIC | Age: 63
End: 2018-08-27

## 2018-08-28 ENCOUNTER — TELEPHONE (OUTPATIENT)
Dept: CARDIOLOGY | Facility: CLINIC | Age: 63
End: 2018-08-28

## 2018-08-28 DIAGNOSIS — I48.20 CHRONIC ATRIAL FIBRILLATION (H): ICD-10-CM

## 2018-08-28 DIAGNOSIS — I42.2 HYPERTROPHIC CARDIOMYOPATHY (H): ICD-10-CM

## 2018-08-28 RX ORDER — SOTALOL HYDROCHLORIDE 120 MG/1
120 TABLET ORAL 2 TIMES DAILY
Qty: 180 TABLET | Refills: 3 | Status: ON HOLD | OUTPATIENT
Start: 2018-08-28 | End: 2019-08-22

## 2018-08-28 NOTE — TELEPHONE ENCOUNTER
Patient called about Sotalol. The prescription was for 90 days at 1 tablet BID, but only received 90 tablets.

## 2018-09-04 ENCOUNTER — TRANSFERRED RECORDS (OUTPATIENT)
Dept: HEALTH INFORMATION MANAGEMENT | Facility: CLINIC | Age: 63
End: 2018-09-04

## 2018-09-07 ENCOUNTER — TELEPHONE (OUTPATIENT)
Dept: CARDIOLOGY | Facility: CLINIC | Age: 63
End: 2018-09-07

## 2018-09-07 DIAGNOSIS — R97.20 ELEVATED PROSTATE SPECIFIC ANTIGEN (PSA): Primary | ICD-10-CM

## 2018-09-07 NOTE — TELEPHONE ENCOUNTER
"Not scheduled remote transmission. Patient asked if he was \"in rhythm 09/05/18\" ? He was in APVS rhythm and relayed that to him.  NO charge for remote but wanted to document findings.  Remote ICD transmission received and reviewed.  Device transmission sent per MD orders.  Patient has a [employer] Dual lead ICD.  Normal ICD function. Since Aug 17 2018 (post ablation), 6 NSVT with more Ventricular complexes than Atrial episodes recorded lasting 2-4 seconds @ rates 160-170 bpm. The device recorded 2 AT/AF episodes recorded lasting 2-3 seconds.  Presenting EGM = APVS @ 62 bpm.  AP = 81%.   = 0%.  Estimated battery longevity to CARMEN = 5.5 years. Patient notified of interrogation results.  Patient reports that he is not feeling well today, fatigued and headache.  Plan for patient to return to clinic in 3 months as scheduled.    Remote ICD transmission  "

## 2018-09-10 ENCOUNTER — CARE COORDINATION (OUTPATIENT)
Dept: CARDIOLOGY | Facility: CLINIC | Age: 63
End: 2018-09-10

## 2018-09-10 NOTE — PROGRESS NOTES
Please see telephone encounter from 9/7/2018.  Called patient to go over this with him and discuss symptoms.  Patient states he had the worst headache he has ever had last Wednesday, he has also felt fatigued the last several days.  Advised him his transmission looked good and was reviewed by his EP team.  Advised him to stay well hydrated and this may have just been an acute illness but to call us back if he is not feeling better in a few days. Patient agreed with plan.    Conchita Schulte RN, BSN  Cardiology Care Coordinator  [facility]  [email or web address]  [phone]

## 2018-09-13 ENCOUNTER — OFFICE VISIT (OUTPATIENT)
Dept: UROLOGY | Facility: CLINIC | Age: 63
End: 2018-09-13
Payer: COMMERCIAL

## 2018-09-13 VITALS
HEIGHT: 71 IN | SYSTOLIC BLOOD PRESSURE: 122 MMHG | OXYGEN SATURATION: 97 % | HEART RATE: 60 BPM | WEIGHT: 212 LBS | DIASTOLIC BLOOD PRESSURE: 72 MMHG | BODY MASS INDEX: 29.68 KG/M2

## 2018-09-13 DIAGNOSIS — R97.20 ELEVATED PROSTATE SPECIFIC ANTIGEN (PSA): ICD-10-CM

## 2018-09-13 LAB — PSA SERPL-MCNC: 3.7 NG/ML (ref 0–4)

## 2018-09-13 PROCEDURE — 84153 ASSAY OF PSA TOTAL: CPT | Performed by: UROLOGY

## 2018-09-13 PROCEDURE — 36415 COLL VENOUS BLD VENIPUNCTURE: CPT | Performed by: UROLOGY

## 2018-09-13 PROCEDURE — 99213 OFFICE O/P EST LOW 20 MIN: CPT | Performed by: UROLOGY

## 2018-09-13 RX ORDER — ALBUTEROL SULFATE 90 UG/1
AEROSOL, METERED RESPIRATORY (INHALATION)
Refills: 1 | COMMUNITY
Start: 2017-12-26 | End: 2019-03-18

## 2018-09-13 RX ORDER — RIVAROXABAN 20 MG/1
TABLET, FILM COATED ORAL
Refills: 4 | COMMUNITY
Start: 2018-07-20 | End: 2018-10-08

## 2018-09-13 RX ORDER — FLUTICASONE PROPIONATE AND SALMETEROL 113; 14 UG/1; UG/1
POWDER, METERED RESPIRATORY (INHALATION)
Refills: 4 | COMMUNITY
Start: 2017-12-07 | End: 2019-03-18

## 2018-09-13 ASSESSMENT — PAIN SCALES - GENERAL: PAINLEVEL: NO PAIN (0)

## 2018-09-13 NOTE — MR AVS SNAPSHOT
After Visit Summary   9/13/2018    Stu Meredith    MRN: [id number]           Patient Information     Date Of Birth          1955        Visit Information        Provider Department      9/13/2018 3:50 PM Boaz Miller MD [facility] [place]        Today's Diagnoses     Elevated prostate specific antigen (PSA)           Follow-ups after your visit        Follow-up notes from your care team     Return if symptoms worsen or fail to improve.      Your next 10 appointments already scheduled     Dec 28, 2018  2:00 PM CST   (Arrive by 1:45 PM)   Implanted Defibulator with Uc Cv Device 1   [facility])    [address]  3rd Floor  [address]               Dec 28, 2018  2:30 PM CST   (Arrive by 2:15 PM)   Return Electrophysiology Genetics with Erik Cooper MD   [facility] [place])    [address]  [address] [place] [address] [phone]              Who to contact     If you have questions or need follow up information about today's clinic visit or your schedule please contact [facility] [place] directly at [phone].  Normal or non-critical lab and imaging results will be communicated to you by MyChart, letter or phone within 4 business days after the clinic has received the results. If you do not hear from us within 7 days, please contact the clinic through MyChart or phone. If you have a critical or abnormal lab result, we will notify you by phone as soon as possible.  Submit refill requests through [employer] or call your pharmacy and they will forward the refill request to us. Please allow 3 business days for your refill to be completed.          Additional Information About Your Visit        MyChart Information     [employer] gives you secure access to your electronic health record. If you see a primary care  "provider, you can also send messages to your care team and make appointments. If you have questions, please call your primary care clinic.  If you do not have a primary care provider, please call [phone] and they will assist you.        Care EveryWhere ID     This is your Care EveryWhere ID. This could be used by other organizations to access your [place] medical records  [id number]        Your Vitals Were     Pulse Height Pulse Oximetry BMI (Body Mass Index)          60 1.803 m (5' 11\") 97% 29.57 kg/m2         Blood Pressure from Last 3 Encounters:   09/13/18 122/72   08/17/18 131/78   06/08/18 138/87    Weight from Last 3 Encounters:   09/13/18 96.2 kg (212 lb)   08/17/18 92.9 kg (204 lb 11.2 oz)   07/19/18 96.2 kg (212 lb 1.3 oz)              We Performed the Following     PSA Diag Urologic Phys        Primary Care Provider Office Phone # Fax #    Omar Camacho [phone]       [address] [place] [address]        Equal Access to Services     [facility] : Hadii aad ku hadasho Soomaali, waaxda luqadaha, qaybta kaalmada aderadhayada, jorden graham . So [facility] [phone].    ATENCIÓN: Si habla español, tiene a drew disposición servicios gratuitos de asistencia lingüística. [facility] [phone].    We comply with applicable federal civil rights laws and [place] laws. We do not discriminate on the basis of race, color, national origin, age, disability, sex, sexual orientation, or gender identity.            Thank you!     Thank you for choosing [facility] [place]  for your care. Our goal is always to provide you with excellent care. Hearing back from our patients is one way we can continue to improve our services. Please take a few minutes to complete the written survey that you may receive in the mail after your visit with us. Thank you!             Your Updated Medication List - Protect others around you: Learn how " to safely use, store and throw away your medicines at www.disposemymeds.org.          This list is accurate as of 9/13/18  4:13 PM.  Always use your most recent med list.                   Brand Name Dispense Instructions for use Diagnosis    acetaminophen 325 MG tablet    TYLENOL     Take 325-650 mg by mouth every 6 hours as needed        amoxicillin 500 MG tablet    AMOXIL    4 tablet    Take 4 tablets (2000 mg) by mouth 1 hour before dental procedures.    History of total left hip arthroplasty       atorvastatin 20 MG tablet    LIPITOR    90 tablet    Take 1 tablet (20 mg) by mouth daily    CARDIOVASCULAR SCREENING; LDL GOAL LESS THAN 130       Blood Pressure Cuff Misc     1 each    1 Device daily    Atrial fibrillation (H), Hypertrophic cardiomyopathy (H)       dabigatran ANTICOAGULANT 150 MG capsule    PRADAXA ANTICOAGULANT    60 capsule    Take 1 capsule (150 mg) by mouth 2 times daily Store in original 's bottle or blister pack; use within 120 days of opening.    Atrial fibrillation (H)       fluticasone-salmeterol 113-14 MCG/ACT inhaler    AIRDUO RESPICLICK          metoprolol succinate 50 MG 24 hr tablet    TOPROL-XL    90 tablet    Take 1 tablet (50 mg) by mouth daily    HOCM (hypertrophic obstructive cardiomyopathy) (H)       multivitamin, therapeutic Tabs tablet      Take 1 tablet by mouth daily        PROAIR  (90 Base) MCG/ACT inhaler   Generic drug:  albuterol           sotalol 120 MG tablet    BETAPACE    180 tablet    Take 1 tablet (120 mg) by mouth 2 times daily    Chronic atrial fibrillation (H), Hypertrophic cardiomyopathy (H)       spironolactone 25 MG tablet    ALDACTONE    90 tablet    Take 1 tablet (25 mg) by mouth daily    Hypertrophic cardiomyopathy (H), Atrial fibrillation, unspecified type (H)       XARELTO 20 MG Tabs tablet   Generic drug:  rivaroxaban ANTICOAGULANT           zolpidem 10 MG tablet    AMBIEN    90 tablet    Take 0.5-1 tablets (5-10 mg) by mouth nightly  as needed for sleep    Sedative, hypnotic or anxiolytic dependence (H)

## 2018-09-13 NOTE — NURSING NOTE
Chief Complaint   Patient presents with     Clinic Care Coordination - Follow-up     Pt here for same day PSA     Nataly Hunt, TATY

## 2018-09-13 NOTE — PROGRESS NOTES
"This very pleasant 62-year-old gentleman returns today first to check his PSA.  We recall we had seen in in June of this year as they have been a rise in his PSA up to 6.0 over clinical examination of the prostate at that time have been unremarkable.  There is no family history of prostate cancer.  In addition he was also on anticoagulants because of a history of atrial fibrillation.    Clinical examination indicated only slight enlargement of the prostate which otherwise felt quite benign.  We decided in view of the factors listed above, that we would wait 3 months and repeat the PSA before considering any further measures.  I am very glad to say that today the PSA has declined to 3.7 which is very similar to what it was in 2015.    I did discuss this carefully with the patient in detail today, it would be my recommendation therefore that we do not need to do further investigations at the present time.  I have however recommended that he have his PSA checked on an annual basis by his personal physician at routine physical examination.    I would wish to see him promptly again however if any of the following situations arise.  1.  Gross hematuria.  2.  Deterioration in his urinary symptoms   3.  Elevation of the PSA above 7.0.    I did carefully discuss the entire situation with him.  I answered many questions.    Plan.  I will see him on a as needed basis.    Time.  50 minutes with greater than 50% spent in discussion consultation.    \"This dictation was performed with voice recognition software and may contain errors,  omissions and inadvertent word substitution.\"      "

## 2018-09-13 NOTE — LETTER
"9/13/2018       RE: Stu Meredith  [address] [facility] [address]     Dear Colleague,    Thank you for referring your patient, Stu Meredith, to the [facility] [place] at [facility]. Please see a copy of my visit note below.    This very pleasant 62-year-old gentleman returns today first to check his PSA.  We recall we had seen in in June of this year as they have been a rise in his PSA up to 6.0 over clinical examination of the prostate at that time have been unremarkable.  There is no family history of prostate cancer.  In addition he was also on anticoagulants because of a history of atrial fibrillation.    Clinical examination indicated only slight enlargement of the prostate which otherwise felt quite benign.  We decided in view of the factors listed above, that we would wait 3 months and repeat the PSA before considering any further measures.  I am very glad to say that today the PSA has declined to 3.7 which is very similar to what it was in 2015.    I did discuss this carefully with the patient in detail today, it would be my recommendation therefore that we do not need to do further investigations at the present time.  I have however recommended that he have his PSA checked on an annual basis by his personal physician at routine physical examination.    I would wish to see him promptly again however if any of the following situations arise.  1.  Gross hematuria.  2.  Deterioration in his urinary symptoms   3.  Elevation of the PSA above 7.0.    I did carefully discuss the entire situation with him.  I answered many questions.    Plan.  I will see him on a as needed basis.    Time.  50 minutes with greater than 50% spent in discussion consultation.    \"This dictation was performed with voice recognition software and may contain errors,  omissions and inadvertent word substitution.\"        Again, thank you for allowing me " to participate in the care of your patient.      Sincerely,    Boaz Miller MD

## 2018-09-18 ENCOUNTER — OFFICE VISIT (OUTPATIENT)
Dept: INTERNAL MEDICINE | Facility: CLINIC | Age: 63
End: 2018-09-18
Payer: COMMERCIAL

## 2018-09-18 VITALS
SYSTOLIC BLOOD PRESSURE: 110 MMHG | OXYGEN SATURATION: 96 % | DIASTOLIC BLOOD PRESSURE: 58 MMHG | BODY MASS INDEX: 28.98 KG/M2 | WEIGHT: 207 LBS | RESPIRATION RATE: 14 BRPM | HEART RATE: 60 BPM | TEMPERATURE: 98.7 F | HEIGHT: 71 IN

## 2018-09-18 DIAGNOSIS — Z98.890 S/P ABLATION OF ATRIAL FIBRILLATION: ICD-10-CM

## 2018-09-18 DIAGNOSIS — I42.2 HYPERTROPHIC CARDIOMYOPATHY (H): ICD-10-CM

## 2018-09-18 DIAGNOSIS — F17.200 TOBACCO USE DISORDER: ICD-10-CM

## 2018-09-18 DIAGNOSIS — J20.9 ACUTE BRONCHITIS WITH COEXISTING CONDITION REQUIRING PROPHYLACTIC TREATMENT: Primary | ICD-10-CM

## 2018-09-18 DIAGNOSIS — Z86.79 S/P ABLATION OF ATRIAL FIBRILLATION: ICD-10-CM

## 2018-09-18 PROCEDURE — 99214 OFFICE O/P EST MOD 30 MIN: CPT | Performed by: PHYSICIAN ASSISTANT

## 2018-09-18 RX ORDER — DOXYCYCLINE 100 MG/1
100 CAPSULE ORAL 2 TIMES DAILY
Qty: 20 CAPSULE | Refills: 0 | Status: SHIPPED | OUTPATIENT
Start: 2018-09-18 | End: 2019-03-01

## 2018-09-18 RX ORDER — PREDNISONE 20 MG/1
40 TABLET ORAL DAILY
Qty: 10 TABLET | Refills: 0 | Status: SHIPPED | OUTPATIENT
Start: 2018-09-18 | End: 2019-03-01

## 2018-09-18 NOTE — MR AVS SNAPSHOT
After Visit Summary   9/18/2018    Stu Meredith    MRN: [id number]           Patient Information     Date Of Birth          1955        Visit Information        Provider Department      9/18/2018 1:00 PM Delma Thomas PA-C [facility]        Today's Diagnoses     Acute bronchitis with coexisting condition requiring prophylactic treatment    -  1    Tobacco use disorder        Hypertrophic cardiomyopathy (H)        S/P ablation of atrial fibrillation           Follow-ups after your visit        Your next 10 appointments already scheduled     Dec 28, 2018  2:00 PM CST   (Arrive by 1:45 PM)   Implanted Defibulator with Uc Cv Device 1   [facility] ([facility])    [address]  3rd Floor  [address]               Dec 28, 2018  2:30 PM CST   (Arrive by 2:15 PM)   Return Electrophysiology Genetics with Erik Cooper MD   [facility])    [address] [place] [address]  [address] [place] [address] [phone]              Who to contact     If you have questions or need follow up information about today's clinic visit or your schedule please contact [facility] directly at [phone].  Normal or non-critical lab and imaging results will be communicated to you by MyChart, letter or phone within 4 business days after the clinic has received the results. If you do not hear from us within 7 days, please contact the clinic through MyChart or phone. If you have a critical or abnormal lab result, we will notify you by phone as soon as possible.  Submit refill requests through Tangler or call your pharmacy and they will forward the refill request to us. Please allow 3 business days for your refill to be completed.          Additional Information About Your Visit        MyChart Information     Tangler gives you secure access to your electronic health record. If  "you see a primary care provider, you can also send messages to your care team and make appointments. If you have questions, please call your primary care clinic.  If you do not have a primary care provider, please call [phone] and they will assist you.        Care EveryWhere ID     This is your Care EveryWhere ID. This could be used by other organizations to access your [place] medical records  [id number]        Your Vitals Were     Pulse Temperature Respirations Height Pulse Oximetry BMI (Body Mass Index)    60 98.7  F (37.1  C) (Oral) 14 5' 11\" (1.803 m) 96% 28.87 kg/m2       Blood Pressure from Last 3 Encounters:   09/18/18 110/58   09/13/18 122/72   08/17/18 131/78    Weight from Last 3 Encounters:   09/18/18 207 lb (93.9 kg)   09/13/18 212 lb (96.2 kg)   08/17/18 204 lb 11.2 oz (92.9 kg)              Today, you had the following     No orders found for display         Today's Medication Changes          These changes are accurate as of 9/18/18  1:22 PM.  If you have any questions, ask your nurse or doctor.               Start taking these medicines.        Dose/Directions    doxycycline 100 MG capsule   Commonly known as:  VIBRAMYCIN   Used for:  Acute bronchitis with coexisting condition requiring prophylactic treatment   Started by:  Delma Thomas PA-C        Dose:  100 mg   Take 1 capsule (100 mg) by mouth 2 times daily for 10 days   Quantity:  20 capsule   Refills:  0       predniSONE 20 MG tablet   Commonly known as:  DELTASONE   Used for:  Acute bronchitis with coexisting condition requiring prophylactic treatment   Started by:  Delma Thomas PA-C        Dose:  40 mg   Take 2 tablets (40 mg) by mouth daily for 5 days   Quantity:  10 tablet   Refills:  0            Where to get your medicines      These medications were sent to [facility] [facility] [address] [facility] [address] [facility] [address]     Phone:  [phone] "     doxycycline 100 MG capsule    predniSONE 20 MG tablet                Primary Care Provider Office Phone # Fax #    Omar Camacho [phone]       [address] [facility] [address]        Equal Access to Services     MARLON DE LOS SANTOS : Hadtrixie huang ku roelo Sosaeali, waaxda luqadaha, qaybta kaalmada adeegyada, waxlencho linaresn thomas meyers laJerodzaria short. So [facility] [phone].    ATENCIÓN: Si habla español, tiene a drew disposición servicios gratuitos de asistencia lingüística. Llame al [phone].    We comply with applicable federal civil rights laws and [place] laws. We do not discriminate on the basis of race, color, national origin, age, disability, sex, sexual orientation, or gender identity.            Thank you!     Thank you for choosing [facility]  for your care. Our goal is always to provide you with excellent care. Hearing back from our patients is one way we can continue to improve our services. Please take a few minutes to complete the written survey that you may receive in the mail after your visit with us. Thank you!             Your Updated Medication List - Protect others around you: Learn how to safely use, store and throw away your medicines at www.disposemymeds.org.          This list is accurate as of 9/18/18  1:22 PM.  Always use your most recent med list.                   Brand Name Dispense Instructions for use Diagnosis    acetaminophen 325 MG tablet    TYLENOL     Take 325-650 mg by mouth every 6 hours as needed        amoxicillin 500 MG tablet    AMOXIL    4 tablet    Take 4 tablets (2000 mg) by mouth 1 hour before dental procedures.    History of total left hip arthroplasty       atorvastatin 20 MG tablet    LIPITOR    90 tablet    Take 1 tablet (20 mg) by mouth daily    CARDIOVASCULAR SCREENING; LDL GOAL LESS THAN 130       Blood Pressure Cuff Misc     1 each    1 Device daily    Atrial fibrillation (H), Hypertrophic cardiomyopathy  (H)       doxycycline 100 MG capsule    VIBRAMYCIN    20 capsule    Take 1 capsule (100 mg) by mouth 2 times daily for 10 days    Acute bronchitis with coexisting condition requiring prophylactic treatment       fluticasone-salmeterol 113-14 MCG/ACT inhaler    AIRDUO RESPICLICK          metoprolol succinate 50 MG 24 hr tablet    TOPROL-XL    90 tablet    Take 1 tablet (50 mg) by mouth daily    HOCM (hypertrophic obstructive cardiomyopathy) (H)       multivitamin, therapeutic Tabs tablet      Take 1 tablet by mouth daily        predniSONE 20 MG tablet    DELTASONE    10 tablet    Take 2 tablets (40 mg) by mouth daily for 5 days    Acute bronchitis with coexisting condition requiring prophylactic treatment       PROAIR  (90 Base) MCG/ACT inhaler   Generic drug:  albuterol           sotalol 120 MG tablet    BETAPACE    180 tablet    Take 1 tablet (120 mg) by mouth 2 times daily    Chronic atrial fibrillation (H), Hypertrophic cardiomyopathy (H)       spironolactone 25 MG tablet    ALDACTONE    90 tablet    Take 1 tablet (25 mg) by mouth daily    Hypertrophic cardiomyopathy (H), Atrial fibrillation, unspecified type (H)       XARELTO 20 MG Tabs tablet   Generic drug:  rivaroxaban ANTICOAGULANT           zolpidem 10 MG tablet    AMBIEN    90 tablet    Take 0.5-1 tablets (5-10 mg) by mouth nightly as needed for sleep    Sedative, hypnotic or anxiolytic dependence (H)

## 2018-09-18 NOTE — PROGRESS NOTES
"  SUBJECTIVE:   Stu Meredith is a 62 year old male who presents to clinic today for the following health issues:    RESPIRATORY SYMPTOMS      Duration: 5 days    Description  nasal congestion mild, cough, wheezing, felt warm and chills and sweats headache and nausea  Fatigue  Coughing yellow, - headache    Severity: moderate    Accompanying signs and symptoms: None    History (predisposing factors):  none    Precipitating or alleviating factors: None    Therapies tried and outcome:  oral decongestant antihistamine OTC NSAID    Patient using inhalers as directed.    HCOM    Tobacco abuse hx.    Hx of wheezing with coughing/ in the past, hx of bronchitis.       -------------------------------------    Problem list and histories reviewed & adjusted, as indicated.  Additional history: as documented    Labs reviewed in EPIC    Reviewed and updated as needed this visit by clinical staff  Tobacco  Allergies  Meds  Med Hx  Surg Hx  Fam Hx  Soc Hx      Reviewed and updated as needed this visit by Provider  Allergies  Meds         ROS:  Constitutional, HEENT, cardiovascular, pulmonary, gi and gu systems are negative, except as otherwise noted.    OBJECTIVE:     /58 (BP Location: Left arm, Patient Position: Sitting)  Pulse 60  Temp 98.7  F (37.1  C) (Oral)  Resp 14  Ht 5' 11\" (1.803 m)  Wt 207 lb (93.9 kg)  SpO2 96%  BMI 28.87 kg/m2  Body mass index is 28.87 kg/(m^2).  GENERAL: healthy, alert and no distress  HENT: normal cephalic/atraumatic, ear canals and TM's normal, nose and mouth without ulcers or lesions, rhinorrhea clear and yellow, oropharynx clear and oral mucous membranes moist  NECK: no adenopathy, no asymmetry, masses, or scars and thyroid normal to palpation  RESP: no rales  and expiratory wheezes bilateral and throughout  CV: regular rates and rhythm  MS: no gross musculoskeletal defects noted, no edema  SKIN: no suspicious lesions or rashes    Diagnostic Test Results:  none "     ASSESSMENT/PLAN:             1. Acute bronchitis with coexisting condition requiring prophylactic treatment    - doxycycline (VIBRAMYCIN) 100 MG capsule; Take 1 capsule (100 mg) by mouth 2 times daily for 10 days  Dispense: 20 capsule; Refill: 0  - predniSONE (DELTASONE) 20 MG tablet; Take 2 tablets (40 mg) by mouth daily for 5 days  Dispense: 10 tablet; Refill: 0    2. Tobacco use disorder      3. Hypertrophic cardiomyopathy (H)      4. S/P ablation of atrial fibrillation      Reviewed treatment as above  See PCP later this year for PE/routine follow up      25 minutes spent with patient > 50% of time on counseling and plan of care.            Delma Thomas PA-C  [facility]

## 2018-09-19 ENCOUNTER — TELEPHONE (OUTPATIENT)
Dept: INTERNAL MEDICINE | Facility: CLINIC | Age: 63
End: 2018-09-19

## 2018-09-19 DIAGNOSIS — J20.9 ACUTE BRONCHITIS WITH COEXISTING CONDITION REQUIRING PROPHYLACTIC TREATMENT: Primary | ICD-10-CM

## 2018-09-19 RX ORDER — CODEINE PHOSPHATE AND GUAIFENESIN 10; 100 MG/5ML; MG/5ML
1-2 SOLUTION ORAL
Qty: 180 ML | Refills: 0
Start: 2018-09-19 | End: 2019-03-01

## 2018-09-19 NOTE — TELEPHONE ENCOUNTER
Reason for Call:  Medication or medication refill:? Seen on 9/18/18 for bronchitis cough pt changed mind and would like med for cough offer in ov yesterday    Do you use a [facility]?  Name of the pharmacy and phone number for the current request:  [facility] on [address] in [place], [place]    Name of the medication requested: ?    Other request:  Needs function ability formsvihel    Can we leave a detailed message on this number? YES    Phone number patient can be reached at: Cell number on file:    Telephone Information:   Mobile [phone]       Best Time: asap    Call taken on 9/19/2018 at 10:54 AM by Grace Cruz

## 2018-09-19 NOTE — TELEPHONE ENCOUNTER
Forms completed and ready for . Patient informed copy scanned into chart and faxed to [phone], original given to patient.

## 2018-09-25 ENCOUNTER — HOSPITAL ENCOUNTER (OUTPATIENT)
Facility: CLINIC | Age: 63
End: 2018-09-25
Attending: INTERNAL MEDICINE | Admitting: INTERNAL MEDICINE
Payer: COMMERCIAL

## 2018-09-25 ENCOUNTER — ALLIED HEALTH/NURSE VISIT (OUTPATIENT)
Dept: CARDIOLOGY | Facility: CLINIC | Age: 63
End: 2018-09-25
Attending: INTERNAL MEDICINE
Payer: COMMERCIAL

## 2018-09-25 DIAGNOSIS — I42.2 HYPERTROPHIC CARDIOMYOPATHY (H): ICD-10-CM

## 2018-09-25 DIAGNOSIS — I42.2 HYPERTROPHIC CARDIOMYOPATHY (H): Primary | ICD-10-CM

## 2018-09-25 DIAGNOSIS — I48.91 ATRIAL FIBRILLATION (H): Primary | ICD-10-CM

## 2018-09-25 PROCEDURE — 93295 DEV INTERROG REMOTE 1/2/MLT: CPT | Performed by: INTERNAL MEDICINE

## 2018-09-25 PROCEDURE — 93296 REM INTERROG EVL PM/IDS: CPT | Mod: ZF

## 2018-09-25 RX ORDER — LIDOCAINE 40 MG/G
CREAM TOPICAL
Status: CANCELLED | OUTPATIENT
Start: 2018-09-25

## 2018-09-25 RX ORDER — MAGNESIUM SULFATE HEPTAHYDRATE 40 MG/ML
2 INJECTION, SOLUTION INTRAVENOUS
Status: CANCELLED | OUTPATIENT
Start: 2018-09-25

## 2018-09-25 RX ORDER — POTASSIUM CHLORIDE 1500 MG/1
20 TABLET, EXTENDED RELEASE ORAL
Status: CANCELLED | OUTPATIENT
Start: 2018-09-25

## 2018-09-25 RX ORDER — POTASSIUM CHLORIDE 1500 MG/1
40 TABLET, EXTENDED RELEASE ORAL
Status: CANCELLED | OUTPATIENT
Start: 2018-09-25

## 2018-09-25 NOTE — PROGRESS NOTES
"Preliminary Device Interrogation Results.  Final physician signed paceart report to be scanned and attached.      [employer], dual chamber ICD, remote transmission received and reviewed. Device transmission sent per MD orders. His presenting rhythm is AT/VS ~80 bpm. Pt went back into AT 9/22/18. He is taking xarelto. Over the last month he has had 10 NSVT episodes. Normal device function. AP= 62% and = 0%. Lead trends appear stable. Battery estimates 5.5 years to CARMEN. Pt notified of transmission results. He reports he has been \"feeling terrible for the last few days\" and didn't go into work today. Transmission reviewed with Dr. Cooper. [facility]  Remote ICD transmission    "

## 2018-09-25 NOTE — MR AVS SNAPSHOT
After Visit Summary   9/25/2018    Stu Meredith    MRN: [id number]           Patient Information     Date Of Birth          1955        Visit Information        Provider Department      9/25/2018 6:00 AM [facility]        Today's Diagnoses     Hypertrophic cardiomyopathy (H)    -  1       Follow-ups after your visit        Your next 10 appointments already scheduled     Sep 27, 2018  7:00 AM CDT   LAB with UU LAB GOLD WAITING   [facility], Lab ([facility])    [address] [address]              Please do not eat 10-12 hours before your appointment if you are coming in fasting for labs on lipids, cholesterol, or glucose (sugar). This does not apply to pregnant women. Water, hot tea and black coffee (with nothing added) are okay. Do not drink other fluids, diet soda or chew gum.            Sep 27, 2018  7:30 AM CDT   Procedure 4 hour with U2A ROOM 16   [address] [facility] ([facility])    [address] [address]               Sep 27, 2018  8:30 AM CDT   Cardioversion no [employer] with UUETEER1   [facility],  Echocardiography ([facility])    [address] [address]   [phone]           1) NPO for 8 hours prior to the procedure except for sips of water with medications. 2) Hold insulin or oral diabetic medications morning of procedure. May take   dose long acting insulin. Follow further instructions of PMD. 3) Continue daily Coumadin. ~ If taking Digoxin, hold AM of procedure. ~Plan to have a responsible adult take you home after the exam. You may not drive, take a bus or taxi by yourself. A responsible adult must stay with you for 24 hours after the test.            Sep 27, 2018   Procedure with GENERIC ANESTHESIA PROVIDER   [facility], Same Day Surgery (--)    [address] [address]   [phone]            Nov 09, 2018  1:30 PM CST   (Arrive by 1:15 PM)   Implanted Defibulator with Uc Cv Device 1   [facility])    [address] [place] [address]  [address]               Nov 09, 2018  2:00 PM CST   (Arrive by 1:45 PM)   Return Electrophysiology Genetics with KARSON Presley CNP   [facility] ([facility])    [address] [place] [address]  [address] [place] [id number] [phone]            Dec 28, 2018  2:00 PM CST   (Arrive by 1:45 PM)   Implanted Defibulator with Uc Cv Device 1   [facility])    [address] [place] [address]  [phone]               Dec 28, 2018  2:30 PM CST   (Arrive by 2:15 PM)   Return Electrophysiology Genetics with Erik Cooper MD   [facility])    [address] [place] [address]  [address] [place] [id number] [phone]              Future tests that were ordered for you today     Open Future Orders        Priority Expected Expires Ordered    Cardioversion Routine  9/25/2019 9/25/2018            Who to contact     If you have questions or need follow up information about today's clinic visit or your schedule please contact [facility] directly at [phone].  Normal or non-critical lab and imaging results will be communicated to you by MyChart, letter or phone within 4 business days after the clinic has received the results. If you do not hear from us within 7 days, please contact the clinic through ImpulseFlyerhart or phone. If you have a critical or abnormal lab result, we will notify you by phone as soon as possible.  Submit refill requests through KineMed or call your pharmacy and they will forward the refill request to us. Please allow 3 business days for your refill to be completed.          Additional Information About Your Visit        [facility]  Information     Dovet[employer]rett[employer]ics gives you secure access to your electronic health record. If you see a primary care provider, you can also send messages to your care team and make appointments. If you have questions, please call your primary care clinic.  If you do not have a primary care provider, please call [phone] and they will assist you.        Care EveryWhere ID     This is your Care EveryWhere ID. This could be used by other organizations to access your [facility] medical records  [id number]         Blood Pressure from Last 3 Encounters:   09/18/18 110/58   09/13/18 122/72   08/17/18 131/78    Weight from Last 3 Encounters:   09/18/18 93.9 kg (207 lb)   09/13/18 96.2 kg (212 lb)   08/17/18 92.9 kg (204 lb 11.2 oz)              We Performed the Following     EP REPORT - HIM SCAN     INTERROGATION DEVICE EVAL REMOTE, PACER/ICD        Primary Care Provider Office Phone # Fax #    mOar Camacho [phone]       [address] [place] [address]        Equal Access to Services     [facility]: Hadii aad ku hadasho Soomaali, waaxda luqadaha, qaybta kaalmada adeegyajorge l, waxay kraig graham . So [facility] [phone].    ATENCIÓN: Si habla español, tiene a drew disposición servicios gratuitos de asistencia lingüística. [facility] [phone].    We comply with applicable federal civil rights laws and [place] laws. We do not discriminate on the basis of race, color, national origin, age, disability, sex, sexual orientation, or gender identity.            Thank you!     Thank you for choosing [facility]  for your care. Our goal is always to provide you with excellent care. Hearing back from our patients is one way we can continue to improve our services. Please take a few minutes to complete the written survey that you may receive in the mail after your visit with us. Thank you!             Your Updated Medication List - Protect others around you: Learn how to  safely use, store and throw away your medicines at www.disposemymeds.org.          This list is accurate as of 9/25/18  1:35 PM.  Always use your most recent med list.                   Brand Name Dispense Instructions for use Diagnosis    acetaminophen 325 MG tablet    TYLENOL     Take 325-650 mg by mouth every 6 hours as needed        amoxicillin 500 MG tablet    AMOXIL    4 tablet    Take 4 tablets (2000 mg) by mouth 1 hour before dental procedures.    History of total left hip arthroplasty       atorvastatin 20 MG tablet    LIPITOR    90 tablet    Take 1 tablet (20 mg) by mouth daily    CARDIOVASCULAR SCREENING; LDL GOAL LESS THAN 130       Blood Pressure Cuff Misc     1 each    1 Device daily    Atrial fibrillation (H), Hypertrophic cardiomyopathy (H)       doxycycline 100 MG capsule    VIBRAMYCIN    20 capsule    Take 1 capsule (100 mg) by mouth 2 times daily for 10 days    Acute bronchitis with coexisting condition requiring prophylactic treatment       fluticasone-salmeterol 113-14 MCG/ACT inhaler    AIRDUO RESPICLICK          guaiFENesin-codeine 100-10 MG/5ML Soln solution    ROBITUSSIN AC    180 mL    Take 5-10 mLs by mouth nightly as needed    Acute bronchitis with coexisting condition requiring prophylactic treatment       metoprolol succinate 50 MG 24 hr tablet    TOPROL-XL    90 tablet    Take 1 tablet (50 mg) by mouth daily    HOCM (hypertrophic obstructive cardiomyopathy) (H)       multivitamin, therapeutic Tabs tablet      Take 1 tablet by mouth daily        PROAIR  (90 Base) MCG/ACT inhaler   Generic drug:  albuterol           sotalol 120 MG tablet    BETAPACE    180 tablet    Take 1 tablet (120 mg) by mouth 2 times daily    Chronic atrial fibrillation (H), Hypertrophic cardiomyopathy (H)       spironolactone 25 MG tablet    ALDACTONE    90 tablet    Take 1 tablet (25 mg) by mouth daily    Hypertrophic cardiomyopathy (H), Atrial fibrillation, unspecified type (H)       XARELTO 20 MG Tabs  tablet   Generic drug:  rivaroxaban ANTICOAGULANT           zolpidem 10 MG tablet    AMBIEN    90 tablet    Take 0.5-1 tablets (5-10 mg) by mouth nightly as needed for sleep    Sedative, hypnotic or anxiolytic dependence (H)

## 2018-09-26 ENCOUNTER — TELEPHONE (OUTPATIENT)
Dept: INTERNAL MEDICINE | Facility: CLINIC | Age: 63
End: 2018-09-26

## 2018-09-26 NOTE — TELEPHONE ENCOUNTER
Reason for Call:  Other call back    Detailed comments: Pt saw Delma 9/18/18 for bronchitis.  She excused him from work through 9/20/18.  He didn't go back to work.  He is filing for short term disability. Forms are being faxed to Chun from [employer], the pt's disability company through the [employer].  Pt would like a call back.    Phone Number Patient can be reached at: Cell number on file:    Telephone Information:   Mobile [phone]       Best Time: anytime    Can we leave a detailed message on this number? YES    Call taken on 9/26/2018 at 1:42 PM by CATHERINE DÍAZ

## 2018-09-26 NOTE — TELEPHONE ENCOUNTER
Please get more information.  Bronchitis should not have kept him out of work for several days.   If he is feeling worse or not better then should see PCP to recheck and discuss time out of work .     Also, I see that cardiology is seeing him too for cardioversion due to his heart rhythm. If he is feeling poorly due to that then out of work disability should come from their office.

## 2018-09-26 NOTE — TELEPHONE ENCOUNTER
Spoke with Delma and she wants to know if this note is just for the bronchitis or if this will be also for the heart problems. If it is for more than just the bronchitis than hey should get a note from cardiologist. Left message to return call to clinic

## 2018-09-27 ENCOUNTER — ANESTHESIA (OUTPATIENT)
Dept: SURGERY | Facility: CLINIC | Age: 63
End: 2018-09-27
Payer: COMMERCIAL

## 2018-09-27 ENCOUNTER — SURGERY (OUTPATIENT)
Age: 63
End: 2018-09-27

## 2018-09-27 ENCOUNTER — HOSPITAL ENCOUNTER (OUTPATIENT)
Facility: CLINIC | Age: 63
Discharge: HOME | End: 2018-09-27
Attending: INTERNAL MEDICINE | Admitting: INTERNAL MEDICINE
Payer: COMMERCIAL

## 2018-09-27 ENCOUNTER — ANESTHESIA EVENT (OUTPATIENT)
Dept: SURGERY | Facility: CLINIC | Age: 63
End: 2018-09-27
Payer: COMMERCIAL

## 2018-09-27 ENCOUNTER — HOSPITAL ENCOUNTER (OUTPATIENT)
Dept: CARDIOLOGY | Facility: CLINIC | Age: 63
End: 2018-09-27
Attending: INTERNAL MEDICINE | Admitting: INTERNAL MEDICINE
Payer: COMMERCIAL

## 2018-09-27 ENCOUNTER — APPOINTMENT (OUTPATIENT)
Dept: MEDSURG UNIT | Facility: CLINIC | Age: 63
End: 2018-09-27
Attending: INTERNAL MEDICINE
Payer: COMMERCIAL

## 2018-09-27 ENCOUNTER — APPOINTMENT (OUTPATIENT)
Dept: LAB | Facility: CLINIC | Age: 63
End: 2018-09-27
Attending: INTERNAL MEDICINE
Payer: COMMERCIAL

## 2018-09-27 VITALS
SYSTOLIC BLOOD PRESSURE: 128 MMHG | RESPIRATION RATE: 16 BRPM | TEMPERATURE: 98.2 F | HEART RATE: 80 BPM | DIASTOLIC BLOOD PRESSURE: 78 MMHG | OXYGEN SATURATION: 98 %

## 2018-09-27 VITALS
SYSTOLIC BLOOD PRESSURE: 104 MMHG | DIASTOLIC BLOOD PRESSURE: 69 MMHG | OXYGEN SATURATION: 95 % | RESPIRATION RATE: 20 BRPM | HEART RATE: 60 BPM

## 2018-09-27 DIAGNOSIS — I48.91 ATRIAL FIBRILLATION (H): ICD-10-CM

## 2018-09-27 DIAGNOSIS — I42.2 HYPERTROPHIC CARDIOMYOPATHY (H): ICD-10-CM

## 2018-09-27 LAB
INR PPP: 1.79 (ref 0.86–1.14)
INTERPRETATION ECG - MUSE: NORMAL
MAGNESIUM SERPL-MCNC: 2.2 MG/DL (ref 1.6–2.3)
POTASSIUM SERPL-SCNC: 4.4 MMOL/L (ref 3.4–5.3)

## 2018-09-27 PROCEDURE — 93005 ELECTROCARDIOGRAM TRACING: CPT

## 2018-09-27 PROCEDURE — 37000008 ZZH ANESTHESIA TECHNICAL FEE, 1ST 30 MIN

## 2018-09-27 PROCEDURE — 92960 CARDIOVERSION ELECTRIC EXT: CPT

## 2018-09-27 PROCEDURE — 84132 ASSAY OF SERUM POTASSIUM: CPT | Performed by: INTERNAL MEDICINE

## 2018-09-27 PROCEDURE — 36415 COLL VENOUS BLD VENIPUNCTURE: CPT | Performed by: INTERNAL MEDICINE

## 2018-09-27 PROCEDURE — 40000065 ZZH STATISTIC EKG NON-CHARGEABLE

## 2018-09-27 PROCEDURE — 85610 PROTHROMBIN TIME: CPT | Performed by: INTERNAL MEDICINE

## 2018-09-27 PROCEDURE — 83735 ASSAY OF MAGNESIUM: CPT | Performed by: INTERNAL MEDICINE

## 2018-09-27 PROCEDURE — 40000166 ZZH STATISTIC PP CARE STAGE 1

## 2018-09-27 PROCEDURE — 25000125 ZZHC RX 250: Performed by: NURSE ANESTHETIST, CERTIFIED REGISTERED

## 2018-09-27 RX ORDER — LIDOCAINE 40 MG/G
CREAM TOPICAL
Status: CANCELLED | OUTPATIENT
Start: 2018-09-27

## 2018-09-27 RX ORDER — ATROPINE SULFATE 0.1 MG/ML
0.5 INJECTION INTRAVENOUS EVERY 5 MIN PRN
Status: CANCELLED | OUTPATIENT
Start: 2018-09-27 | End: 2018-09-27

## 2018-09-27 RX ORDER — NALOXONE HYDROCHLORIDE 0.4 MG/ML
.2-.4 INJECTION, SOLUTION INTRAMUSCULAR; INTRAVENOUS; SUBCUTANEOUS
Status: CANCELLED | OUTPATIENT
Start: 2018-09-27 | End: 2018-09-27

## 2018-09-27 RX ORDER — POTASSIUM CHLORIDE 750 MG/1
20 TABLET, EXTENDED RELEASE ORAL
Status: DISCONTINUED | OUTPATIENT
Start: 2018-09-27 | End: 2018-09-27 | Stop reason: HOSPADM

## 2018-09-27 RX ORDER — POTASSIUM CHLORIDE 750 MG/1
40 TABLET, EXTENDED RELEASE ORAL
Status: DISCONTINUED | OUTPATIENT
Start: 2018-09-27 | End: 2018-09-27 | Stop reason: HOSPADM

## 2018-09-27 RX ORDER — NALOXONE HYDROCHLORIDE 0.4 MG/ML
.1-.4 INJECTION, SOLUTION INTRAMUSCULAR; INTRAVENOUS; SUBCUTANEOUS
Status: CANCELLED | OUTPATIENT
Start: 2018-09-27

## 2018-09-27 RX ORDER — OXYCODONE AND ACETAMINOPHEN 5; 325 MG/1; MG/1
1 TABLET ORAL EVERY 4 HOURS PRN
Status: CANCELLED | OUTPATIENT
Start: 2018-09-27

## 2018-09-27 RX ORDER — FLUMAZENIL 0.1 MG/ML
0.2 INJECTION, SOLUTION INTRAVENOUS
Status: CANCELLED | OUTPATIENT
Start: 2018-09-27 | End: 2018-09-27

## 2018-09-27 RX ORDER — LIDOCAINE 40 MG/G
CREAM TOPICAL
Status: DISCONTINUED | OUTPATIENT
Start: 2018-09-27 | End: 2018-09-27 | Stop reason: HOSPADM

## 2018-09-27 RX ORDER — FENTANYL CITRATE 50 UG/ML
25-50 INJECTION, SOLUTION INTRAMUSCULAR; INTRAVENOUS
Status: CANCELLED | OUTPATIENT
Start: 2018-09-27 | End: 2018-09-27

## 2018-09-27 RX ADMIN — METHOHEXITAL SODIUM 50 MG: 500 INJECTION, POWDER, LYOPHILIZED, FOR SOLUTION INTRAMUSCULAR; INTRAVENOUS; RECTAL at 09:37

## 2018-09-27 ASSESSMENT — ENCOUNTER SYMPTOMS: DYSRHYTHMIAS: 1

## 2018-09-27 ASSESSMENT — LIFESTYLE VARIABLES: TOBACCO_USE: 1

## 2018-09-27 NOTE — TELEPHONE ENCOUNTER
Pt states since he was out of work for 4 days from 9/18-9/21 for bronchitis, he wants a short term disability form filled out by Delma. He states that the heart problems did not start until Friday 8/20 so he is having his cardiologist fill out a form to cover the remanding time he has been out of work.     Please advise.    Brandi Berry, CMA

## 2018-09-27 NOTE — PROGRESS NOTES
Pt back from ECHO s/p cardioversion.  VSS.  Pt alert and oriented x4.  Pt c/o slight discomfort on chest-where cardioversion pads were placed.  Skin on chest and back where cardioversion pads placed D/I.   Pt's wife is not at the bedside at this time, she went to her office.

## 2018-09-27 NOTE — ANESTHESIA CARE TRANSFER NOTE
Patient: Stu Meredith    Procedure(s):  Anesthesia Cardioversion @0930    Diagnosis: Atrial Fibrillation, Hypertrophic Cardiomyopathy   Diagnosis Additional Information: No value filed.    Anesthesia Type:   MAC     Note:  Airway :Nasal Cannula  Patient transferred to:Telemetry/Step Down Unit  Comments: Anesthesia Care Transfer Note      Transfer to:  Echo lab    Patient Vital Signs:  Stable    Airway:  None    Patient alert and breathing comfortably.  VSS.  Care transferred with report to Echo RN.    Sergio Georges CRNAHandoff Report: Identifed the Patient, Identified the Reponsible Provider, Reviewed the pertinent medical history, Discussed the surgical course, Reviewed Intra-OP anesthesia mangement and issues during anesthesia, Set expectations for post-procedure period and Allowed opportunity for questions and acknowledgement of understanding      Vitals: (Last set prior to Anesthesia Care Transfer)    CRNA VITALS  9/27/2018 0917 - 9/27/2018 0947      9/27/2018             NIBP: 112/82    Ht Rate: 60    SpO2: 98 %    EKG: Sinus rhythm                Electronically Signed By: KARSON Harris CRNA  September 27, 2018  9:47 AM

## 2018-09-27 NOTE — PROGRESS NOTES
Pt arrived in ECHO department  for scheduled Cardioversion.   Procedure explained, questions answered and consent signed. Discharge instructions discussed with patient.  Anesthesia gave pt 50 mg IV brevitol for sedation and pt was DCCV at 200 Joules to an A-paced rhythm.  Pt transferred back to unit 2A after awake and VSS. ECG released, will be done on 2A.   Report given to Denia VELASCO.

## 2018-09-27 NOTE — OP NOTE
CARDIOVERSION    PROCEDURE:  direct current cardioversion  PROCEDURE DATE: 9/27/2018     Pre-procedure diagnosis:  Atrial flutter  Post-procedure diagnosis: Sinus bradycardia with atrial pacing  Complications:  none    BRIEF CLINICAL HISTORY:  History of multiple cardioversions and ablations.      PROCEDURE:  The patient arrived at the Echo Laboratory in a fasting, non-sedated state.  Informed consent was previously obtained from patient, who understood indications, risks, and benefits of the procedure.    With help from Anesthesia, patient was deeply sedated.  200J of synchronized biphasic shock was delivered through the cardiac monitor, and the patient was successfully converted to normal sinus rhythm.    After sedation wore off, patient awoke and remained neurologically intact.  The patient tolerated the procedure well from a hemodynamic and respiratory standpoint without any complications.    He was observed in the Echo Laboratory and then transferred to unit.    IMPRESSION:  1.  Successful direct current cardioversion with 200J biphasic shock.    RECOMMENDATIONS/PLANS:  1.   Follow-up per team  2.   Continue anticoagulation    Manjeet Strickland MD    Division of Cardiology  [facility] Surgery [address] [address]    [phone] Appointments  [phone] Fax  [phone] After hours    Clinic nurse:  Marium Greene LPN   Nurse Care Coordinator- Heart Care   [phone] option 1, than option 3

## 2018-09-27 NOTE — ANESTHESIA POSTPROCEDURE EVALUATION
Patient: Stu Meredith    Procedure(s):  Anesthesia Cardioversion @0970    Diagnosis:Atrial Fibrillation, Hypertrophic Cardiomyopathy   Diagnosis Additional Information: No value filed.    Anesthesia Type:  MAC    Note:  Anesthesia Post Evaluation    Patient location during evaluation: Echo Lab  Patient participation: Able to fully participate in evaluation  Level of consciousness: awake  Pain management: adequate  Airway patency: patent  Cardiovascular status: acceptable  Respiratory status: acceptable  Hydration status: acceptable  PONV: none     Anesthetic complications: None    Comments: No noted anesthetic complications.  Patient satisfied with anesthetic.          Last vitals:  Vitals:    09/27/18 0700   BP: 116/85   Pulse: 91   Resp: 16   Temp: 36.8  C (98.2  F)   SpO2: 97%         Electronically Signed By: Td Boucher MD  September 27, 2018  10:02 AM

## 2018-09-27 NOTE — ANESTHESIA PREPROCEDURE EVALUATION
Anesthesia Evaluation     . Pt has had prior anesthetic. Type: General    History of anesthetic complications    Tongue lacerated by biting      ROS/MED HX    ENT/Pulmonary:     (+)tobacco use, Current use , . .    Neurologic:  - neg neurologic ROS     Cardiovascular: Comment: AICD is OFF and Pacemaker is ON    (+) hypertension--CAD, --. : . CHF etiology: Hypertrophic Cardiomyopathy Last EF: 50% date: 7/2016 . . pacemaker Reason placed: Atrial Fibrillation/ Flutter and episodes of V-Tach:type: ICD for VTach - Patient is dependent on pacemaker . dysrhythmias a-fib, a-flutter and Other, . pulmonary hypertension, Previous cardiac testing Echodate:7/29/2016results:Hypertrophic Disease with diastolic dysfunction, grade 3. EF= 50%. Moderate Pulmonary HTN with Tricuspid Regurgitation.date: results:ECG reviewed date:5/3/2017 results:Atrial-paced with a rate of 60 date: results:          METS/Exercise Tolerance:     Hematologic:  - neg hematologic  ROS       Musculoskeletal: Comment: S/P Left Hip Arthroplasty  (+) arthritis, , , -       GI/Hepatic:  - neg GI/hepatic ROS       Renal/Genitourinary:  - ROS Renal section negative       Endo:     (+) Obesity, Other Endocrine Disorder Pre-Diabetes.      Psychiatric:  - neg psychiatric ROS       Infectious Disease:  - neg infectious disease ROS       Malignancy:      - no malignancy   Other:                                    Anesthesia Plan      History & Physical Review  History and physical reviewed and following examination; no interval change.    ASA Status:  3 .    NPO Status:  > 8 hours    Plan for MAC Reason for MAC:  Deep or markedly invasive procedure (G8)    ______________________________________________________________________  I discussed the risks and benefits of MAC with deep sedation with the patient.  Questions were sought and answered.      Td Boucher MD  Attending Anesthesiologist        Postoperative Care  Postoperative pain management:  IV analgesics.       Consents                          .

## 2018-09-28 LAB — INTERPRETATION ECG - MUSE: NORMAL

## 2018-10-01 ENCOUNTER — TELEPHONE (OUTPATIENT)
Dept: CARDIOLOGY | Facility: CLINIC | Age: 63
End: 2018-10-01

## 2018-10-02 RX ORDER — RIVAROXABAN 20 MG/1
TABLET, FILM COATED ORAL
Refills: 4 | Status: CANCELLED | OUTPATIENT
Start: 2018-10-02

## 2018-10-08 DIAGNOSIS — I48.20 CHRONIC ATRIAL FIBRILLATION (H): Primary | ICD-10-CM

## 2018-10-09 RX ORDER — RIVAROXABAN 20 MG/1
20 TABLET, FILM COATED ORAL
Qty: 90 TABLET | Refills: 3 | Status: SHIPPED | OUTPATIENT
Start: 2018-10-09 | End: 2019-12-23

## 2018-10-10 DIAGNOSIS — I42.1 HOCM (HYPERTROPHIC OBSTRUCTIVE CARDIOMYOPATHY) (H): ICD-10-CM

## 2018-10-15 RX ORDER — METOPROLOL SUCCINATE 50 MG/1
50 TABLET, EXTENDED RELEASE ORAL DAILY
Qty: 90 TABLET | Refills: 3 | Status: SHIPPED | OUTPATIENT
Start: 2018-10-15 | End: 2019-09-30

## 2018-10-18 ENCOUNTER — TELEPHONE (OUTPATIENT)
Dept: CARDIOLOGY | Facility: CLINIC | Age: 63
End: 2018-10-18

## 2018-10-18 NOTE — TELEPHONE ENCOUNTER
Justen called on behalf of the [employer] called looking for confirmation for patient's Sickness Benefits. Please call back at [phone].

## 2018-10-27 ASSESSMENT — ACTIVITIES OF DAILY LIVING (ADL)
ADL_MEAN: 2.94
ADL_SUBSCALE_SCORE: 26.47
ADL_SUM: 50

## 2018-10-27 ASSESSMENT — HOOS S4: HOW SEVERE IS YOUR HIP JOINT STIFFNESS AFTER FIRST WAKENING IN THE MORNING?: MODERATE

## 2018-11-01 ENCOUNTER — OFFICE VISIT (OUTPATIENT)
Dept: ORTHOPEDICS | Facility: CLINIC | Age: 63
End: 2018-11-01
Payer: COMMERCIAL

## 2018-11-01 ENCOUNTER — RADIANT APPOINTMENT (OUTPATIENT)
Dept: GENERAL RADIOLOGY | Facility: CLINIC | Age: 63
End: 2018-11-01
Attending: ORTHOPAEDIC SURGERY
Payer: COMMERCIAL

## 2018-11-01 VITALS — WEIGHT: 214.6 LBS | HEIGHT: 71 IN | BODY MASS INDEX: 30.04 KG/M2

## 2018-11-01 DIAGNOSIS — M25.551 HIP PAIN, RIGHT: ICD-10-CM

## 2018-11-01 DIAGNOSIS — M25.551 HIP PAIN, RIGHT: Primary | ICD-10-CM

## 2018-11-01 NOTE — LETTER
11/1/2018       RE: Stu Meredith  [address] [facility] [address]     Dear Colleague,    Thank you for referring your patient, Stu Meredith, to the [facility]. Please see a copy of my visit note below.    Chief Complaint: RECHECK (right hip pain possibly discuss surgery fo replacement.)    HPI: Stu Meredith returns today in follow-up for his right hip. He has had known right hip pain with early OA for the past several years. He reports that over the last year that the symptoms have worsened. He is now limited in his ADLs including walking and sitting. The symptoms are severe. He reports the symptoms are identical in quality to those on the left prior to the left total hip. The acceleration in symptoms is also the same as he experience on the left when his hip when frmo Tonnis 1 to 3 over the course of about 6 months. He presents today for a radiograph and to discuss options. His wife is to have back surgery soon and he would like to delay a hip replacement.     Medications and allergies are documented in the EMR and have been reviewed.    Current Outpatient Prescriptions:      acetaminophen (TYLENOL) 325 MG tablet, Take 325-650 mg by mouth every 6 hours as needed, Disp: , Rfl:      amoxicillin (AMOXIL) 500 MG tablet, Take 4 tablets (2000 mg) by mouth 1 hour before dental procedures., Disp: 4 tablet, Rfl: 3     atorvastatin (LIPITOR) 20 MG tablet, Take 1 tablet (20 mg) by mouth daily, Disp: 90 tablet, Rfl: 3     Blood Pressure Monitoring (BLOOD PRESSURE CUFF) MISC, 1 Device daily, Disp: 1 each, Rfl: 0     fluticasone-salmeterol (AIRDUO RESPICLICK) 113-14 MCG/ACT inhaler, , Disp: , Rfl: 4     guaiFENesin-codeine (ROBITUSSIN AC) 100-10 MG/5ML SOLN solution, Take 5-10 mLs by mouth nightly as needed, Disp: 180 mL, Rfl: 0     metoprolol succinate (TOPROL-XL) 50 MG 24 hr tablet, Take 1 tablet (50 mg) by mouth daily, Disp: 90 tablet,  "Rfl: 3     multivitamin, therapeutic (THERA-VIT) TABS, Take 1 tablet by mouth daily, Disp: , Rfl:      PROAIR  (90 Base) MCG/ACT inhaler, , Disp: , Rfl: 1     sotalol (BETAPACE) 120 MG tablet, Take 1 tablet (120 mg) by mouth 2 times daily, Disp: 180 tablet, Rfl: 3     spironolactone (ALDACTONE) 25 MG tablet, Take 1 tablet (25 mg) by mouth daily, Disp: 90 tablet, Rfl: 3     XARELTO 20 MG TABS tablet, Take 1 tablet (20 mg) by mouth daily (with dinner), Disp: 90 tablet, Rfl: 3     zolpidem (AMBIEN) 10 MG tablet, Take 0.5-1 tablets (5-10 mg) by mouth nightly as needed for sleep, Disp: 90 tablet, Rfl: 0  No current facility-administered medications for this visit.     Facility-Administered Medications Ordered in Other Visits:      methohexital (BREVITAL SODIUM) injection, , , CRISTALN, Martha Ricci, APRN CRNA, 50 mg at 05/04/18 1347  Allergies: Review of patient's allergies indicates no known allergies.    Physical Exam:  On physical examination the patient appears the stated age, is in no acute distress, affect is appropriate, and breathing is non-labored.  Ht 1.797 m (5' 10.75\")  Wt 97.3 kg (214 lb 9.6 oz)  BMI 30.14 kg/m2  Body mass index is 30.14 kg/(m^2).  Gait: antalgic on the right   ROM: right hip is irritated and FADIR reproduces his groin pain     X-rays:    I reviewed the x-rays dated today.  Previous films reviewed.    Findings:  Normal progression for a left total hip arthroplasty without evidence of loosening or subsidence. Some progression of right hip degenerative changes.     Assessment and Plan: right hip pain-- pretty severe-- but not at a time in his life that a total hip is reasonable at least in the short to mid term. We discussed total hip and the non-operative management options and he would like to try a steroid injection. If this is insufficient in improving his symptoms enough to improve his function he is going to call and schedule a right total hip. Orders for both of these have been " entered.     Referred Self      Again, thank you for allowing me to participate in the care of your patient.      Sincerely,    Nelson Gaspar MD

## 2018-11-01 NOTE — PROGRESS NOTES
Chief Complaint: RECHECK (right hip pain possibly discuss surgery fo replacement.)    HPI: Stu Meredith returns today in follow-up for his right hip. He has had known right hip pain with early OA for the past several years. He reports that over the last year that the symptoms have worsened. He is now limited in his ADLs including walking and sitting. The symptoms are severe. He reports the symptoms are identical in quality to those on the left prior to the left total hip. The acceleration in symptoms is also the same as he experience on the left when his hip when frmo Tonnis 1 to 3 over the course of about 6 months. He presents today for a radiograph and to discuss options. His wife is to have back surgery soon and he would like to delay a hip replacement.     Medications and allergies are documented in the EMR and have been reviewed.    Current Outpatient Prescriptions:      acetaminophen (TYLENOL) 325 MG tablet, Take 325-650 mg by mouth every 6 hours as needed, Disp: , Rfl:      amoxicillin (AMOXIL) 500 MG tablet, Take 4 tablets (2000 mg) by mouth 1 hour before dental procedures., Disp: 4 tablet, Rfl: 3     atorvastatin (LIPITOR) 20 MG tablet, Take 1 tablet (20 mg) by mouth daily, Disp: 90 tablet, Rfl: 3     Blood Pressure Monitoring (BLOOD PRESSURE CUFF) MISC, 1 Device daily, Disp: 1 each, Rfl: 0     fluticasone-salmeterol (AIRDUO RESPICLICK) 113-14 MCG/ACT inhaler, , Disp: , Rfl: 4     guaiFENesin-codeine (ROBITUSSIN AC) 100-10 MG/5ML SOLN solution, Take 5-10 mLs by mouth nightly as needed, Disp: 180 mL, Rfl: 0     metoprolol succinate (TOPROL-XL) 50 MG 24 hr tablet, Take 1 tablet (50 mg) by mouth daily, Disp: 90 tablet, Rfl: 3     multivitamin, therapeutic (THERA-VIT) TABS, Take 1 tablet by mouth daily, Disp: , Rfl:      PROAIR  (90 Base) MCG/ACT inhaler, , Disp: , Rfl: 1     sotalol (BETAPACE) 120 MG tablet, Take 1 tablet (120 mg) by mouth 2 times daily, Disp: 180 tablet, Rfl: 3     spironolactone  "(ALDACTONE) 25 MG tablet, Take 1 tablet (25 mg) by mouth daily, Disp: 90 tablet, Rfl: 3     XARELTO 20 MG TABS tablet, Take 1 tablet (20 mg) by mouth daily (with dinner), Disp: 90 tablet, Rfl: 3     zolpidem (AMBIEN) 10 MG tablet, Take 0.5-1 tablets (5-10 mg) by mouth nightly as needed for sleep, Disp: 90 tablet, Rfl: 0  No current facility-administered medications for this visit.     Facility-Administered Medications Ordered in Other Visits:      methohexital (BREVITAL SODIUM) injection, , , PRN, Martha Ricci, APRN CRNA, 50 mg at 05/04/18 4417  Allergies: Review of patient's allergies indicates no known allergies.    Physical Exam:  On physical examination the patient appears the stated age, is in no acute distress, affect is appropriate, and breathing is non-labored.  Ht 1.797 m (5' 10.75\")  Wt 97.3 kg (214 lb 9.6 oz)  BMI 30.14 kg/m2  Body mass index is 30.14 kg/(m^2).  Gait: antalgic on the right   ROM: right hip is irritated and FADIR reproduces his groin pain     X-rays:    I reviewed the x-rays dated today.  Previous films reviewed.    Findings:  Normal progression for a left total hip arthroplasty without evidence of loosening or subsidence. Some progression of right hip degenerative changes.     Assessment and Plan: right hip pain-- pretty severe-- but not at a time in his life that a total hip is reasonable at least in the short to mid term. We discussed total hip and the non-operative management options and he would like to try a steroid injection. If this is insufficient in improving his symptoms enough to improve his function he is going to call and schedule a right total hip. Orders for both of these have been entered.     Referred Self    "

## 2018-11-01 NOTE — MR AVS SNAPSHOT
After Visit Summary   11/1/2018    Stu Meredith    MRN: [id number]           Patient Information     Date Of Birth          1955        Visit Information        Provider Department      11/1/2018 3:30 PM Nelson Gaspar MD [facility]        Today's Diagnoses     Hip pain, right    -  1       Follow-ups after your visit        Your next 10 appointments already scheduled     Dec 14, 2018  8:00 AM CST   Implanted Defibulator with Uc Cv Device 1   [facility] ([facility])    [address] [place] [address]  3rd Floor  [address]               Dec 14, 2018 11:00 AM CST   (Arrive by 10:45 AM)   Return Electrophysiology Genetics with Erik Cooper MD   [facility] ([facility])    [address] [place] [address]  [address] [place] [address] [phone]              Future tests that were ordered for you today     Open Future Orders        Priority Expected Expires Ordered    XR Joint Injection Major Right Routine 11/1/2018 11/1/2019 11/1/2018            Who to contact     Please call your clinic at [phone] to:    Ask questions about your health    Make or cancel appointments    Discuss your medicines    Learn about your test results    Speak to your doctor            Additional Information About Your Visit        [employer] Information     [employer] gives you secure access to your electronic health record. If you see a primary care provider, you can also send messages to your care team and make appointments. If you have questions, please call your primary care clinic.  If you do not have a primary care provider, please call [phone] and they will assist you.      [employer] is an electronic gateway that provides easy, online access to your medical records. With [employer], you can request a clinic appointment, read your test results, renew a prescription or communicate with your care team.     To access your existing account,  "please contact your [facility] or call [phone] for assistance.        Care EveryWhere ID     This is your Care EveryWhere ID. This could be used by other organizations to access your [facility] medical records  [id number]        Your Vitals Were     Height BMI (Body Mass Index)                1.797 m (5' 10.75\") 30.14 kg/m2           Blood Pressure from Last 3 Encounters:   09/27/18 128/78   09/27/18 104/69   09/18/18 110/58    Weight from Last 3 Encounters:   11/01/18 97.3 kg (214 lb 9.6 oz)   09/18/18 93.9 kg (207 lb)   09/13/18 96.2 kg (212 lb)              We Performed the Following     Jennie-Operative Worksheet        Primary Care Provider Office Phone # Fax #    Omar Camacho [phone]       [address] [place] [address]        Equal Access to Services     MARLON DE LOS SANTOS : Hadii reina ku hadasho Soomaali, waaxda luqadaha, qaybta kaalmada adeegyada, waxay liin hayzaria graham . So [facility] [phone].    ATENCIÓN: Si habla español, tiene a drew disposición servicios gratuitos de asistencia lingüística. Llame al [phone].    We comply with applicable federal civil rights laws and [place] laws. We do not discriminate on the basis of race, color, national origin, age, disability, sex, sexual orientation, or gender identity.            Thank you!     Thank you for choosing [facility]  for your care. Our goal is always to provide you with excellent care. Hearing back from our patients is one way we can continue to improve our services. Please take a few minutes to complete the written survey that you may receive in the mail after your visit with us. Thank you!             Your Updated Medication List - Protect others around you: Learn how to safely use, store and throw away your medicines at www.disposemymeds.org.          This list is accurate as of 11/1/18  4:21 PM.  Always use your most recent med list.                "    Brand Name Dispense Instructions for use Diagnosis    acetaminophen 325 MG tablet    TYLENOL     Take 325-650 mg by mouth every 6 hours as needed        amoxicillin 500 MG tablet    AMOXIL    4 tablet    Take 4 tablets (2000 mg) by mouth 1 hour before dental procedures.    History of total left hip arthroplasty       atorvastatin 20 MG tablet    LIPITOR    90 tablet    Take 1 tablet (20 mg) by mouth daily    CARDIOVASCULAR SCREENING; LDL GOAL LESS THAN 130       Blood Pressure Cuff Misc     1 each    1 Device daily    Atrial fibrillation (H), Hypertrophic cardiomyopathy (H)       fluticasone-salmeterol 113-14 MCG/ACT inhaler    AIRDUO RESPICLICK          guaiFENesin-codeine 100-10 MG/5ML Soln solution    ROBITUSSIN AC    180 mL    Take 5-10 mLs by mouth nightly as needed    Acute bronchitis with coexisting condition requiring prophylactic treatment       metoprolol succinate 50 MG 24 hr tablet    TOPROL-XL    90 tablet    Take 1 tablet (50 mg) by mouth daily    HOCM (hypertrophic obstructive cardiomyopathy) (H)       multivitamin, therapeutic Tabs tablet      Take 1 tablet by mouth daily        PROAIR  (90 Base) MCG/ACT inhaler   Generic drug:  albuterol           sotalol 120 MG tablet    BETAPACE    180 tablet    Take 1 tablet (120 mg) by mouth 2 times daily    Chronic atrial fibrillation (H), Hypertrophic cardiomyopathy (H)       spironolactone 25 MG tablet    ALDACTONE    90 tablet    Take 1 tablet (25 mg) by mouth daily    Hypertrophic cardiomyopathy (H), Atrial fibrillation, unspecified type (H)       XARELTO 20 MG Tabs tablet   Generic drug:  rivaroxaban ANTICOAGULANT     90 tablet    Take 1 tablet (20 mg) by mouth daily (with dinner)    Chronic atrial fibrillation (H)       zolpidem 10 MG tablet    AMBIEN    90 tablet    Take 0.5-1 tablets (5-10 mg) by mouth nightly as needed for sleep    Sedative, hypnotic or anxiolytic dependence (H)

## 2018-11-01 NOTE — NURSING NOTE
"Reason For Visit:   Chief Complaint   Patient presents with     RECHECK     right hip pain possibly discuss surgery fo replacement.       Ht 1.797 m (5' 10.75\")  Wt 97.3 kg (214 lb 9.6 oz)  BMI 30.14 kg/m2    Pain Assessment  Patient Currently in Pain: Yes (pain is keeping him up at night )  0-10 Pain Scale: 6  Primary Pain Location: Hip  Pain Orientation: Right  Pain Descriptors: Aching, Stabbing  Alleviating Factors: Rest (gets worse as the day goes on but rest helps)  Aggravating Factors: Movement    Sae Bloom ATC  "

## 2018-11-02 ENCOUNTER — HOSPITAL ENCOUNTER (OUTPATIENT)
Age: 63
End: 2018-11-02
Attending: ORTHOPAEDIC SURGERY
Payer: COMMERCIAL

## 2018-11-05 ENCOUNTER — TELEPHONE (OUTPATIENT)
Dept: CARDIOLOGY | Facility: CLINIC | Age: 63
End: 2018-11-05

## 2018-11-05 ENCOUNTER — TELEPHONE (OUTPATIENT)
Dept: ORTHOPEDICS | Facility: CLINIC | Age: 63
End: 2018-11-05

## 2018-11-05 NOTE — TELEPHONE ENCOUNTER
[facility] Call Center    Phone Message    May a detailed message be left on voicemail: yes    Reason for Call: Other: Pt would like a call to discuss what he can do for pain     Action Taken: Message routed to:  [facility] Clinic

## 2018-11-05 NOTE — TELEPHONE ENCOUNTER
Discussed with patient to use Tylenol and ice. He is wondering about stronger pain medications. Discussed that as a surgery clinic we will manage postop pain with narcotics but defer preoperative pain management to patient's PCP. Patient expressed understanding. He is scheduled for an intra-articular steroid injection on 11/8 for pain management as well.

## 2018-11-05 NOTE — TELEPHONE ENCOUNTER
Date: 11/5/2018    Time of Call: 12:31 PM     Diagnosis:  Patient called to see if he could stop Xarelto for 24 hours to have a cortisone injection.     [ TORB ] Ordering provider: Dr. Erik Cooper  Order: We prefer anticoagulation to be uninterrupted for a full 3 months post ablation.     Order received by: Conchita Schulte      Follow-up/additional notes: Called patient and left message updating him that he needs to wait until 11/16 to interrupt anticoagulation.    Conchita Schulte RN, BSN  Cardiology Care Coordinator  [facility]  [email or web address]  [phone]

## 2018-11-08 ENCOUNTER — HOSPITAL ENCOUNTER (OUTPATIENT)
Dept: GENERAL RADIOLOGY | Facility: CLINIC | Age: 63
Discharge: HOME | End: 2018-11-08
Attending: ORTHOPAEDIC SURGERY | Admitting: ORTHOPAEDIC SURGERY
Payer: COMMERCIAL

## 2018-11-08 VITALS — SYSTOLIC BLOOD PRESSURE: 130 MMHG | HEART RATE: 133 BPM | DIASTOLIC BLOOD PRESSURE: 89 MMHG | OXYGEN SATURATION: 100 %

## 2018-11-08 DIAGNOSIS — M25.551 HIP PAIN, RIGHT: ICD-10-CM

## 2018-11-08 PROCEDURE — 27211111 XR JOINT INJECTION MAJOR RIGHT

## 2018-11-08 PROCEDURE — 25000128 H RX IP 250 OP 636: Performed by: PHYSICIAN ASSISTANT

## 2018-11-08 PROCEDURE — 25500064 ZZH RX 255 OP 636: Performed by: PHYSICIAN ASSISTANT

## 2018-11-08 PROCEDURE — 25000125 ZZHC RX 250: Performed by: PHYSICIAN ASSISTANT

## 2018-11-08 RX ORDER — IOPAMIDOL 408 MG/ML
20 INJECTION, SOLUTION INTRATHECAL ONCE
Status: COMPLETED | OUTPATIENT
Start: 2018-11-08 | End: 2018-11-08

## 2018-11-08 RX ORDER — TRIAMCINOLONE ACETONIDE 40 MG/ML
40 INJECTION, SUSPENSION INTRA-ARTICULAR; INTRAMUSCULAR ONCE
Status: COMPLETED | OUTPATIENT
Start: 2018-11-08 | End: 2018-11-08

## 2018-11-08 RX ORDER — BUPIVACAINE HYDROCHLORIDE 5 MG/ML
5 INJECTION, SOLUTION PERINEURAL ONCE
Status: COMPLETED | OUTPATIENT
Start: 2018-11-08 | End: 2018-11-08

## 2018-11-08 RX ADMIN — LIDOCAINE HYDROCHLORIDE 1 ML: 10 INJECTION, SOLUTION EPIDURAL; INFILTRATION; INTRACAUDAL; PERINEURAL at 16:07

## 2018-11-08 RX ADMIN — BUPIVACAINE HYDROCHLORIDE 3 ML: 5 INJECTION, SOLUTION EPIDURAL; INTRACAUDAL; PERINEURAL at 16:06

## 2018-11-08 RX ADMIN — IOPAMIDOL 2 ML: 408 INJECTION, SOLUTION INTRATHECAL at 16:05

## 2018-11-08 RX ADMIN — TRIAMCINOLONE ACETONIDE 40 MG: 40 INJECTION, SUSPENSION INTRA-ARTICULAR; INTRAMUSCULAR at 16:04

## 2018-11-08 NOTE — IP AVS SNAPSHOT
[facility] Radiology    [facility] [facility] [address]    Phone:  [phone]                                       After Visit Summary   11/8/2018    Stu Meredith    MRN: [id number]           After Visit Summary Signature Page     I have received my discharge instructions, and my questions have been answered. I have discussed any challenges I see with this plan with the nurse or doctor.    ..........................................................................................................................................  Patient/Patient Representative Signature      ..........................................................................................................................................  Patient Representative Print Name and Relationship to Patient    ..................................................               ................................................  Date                                   Time    ..........................................................................................................................................  Reviewed by Signature/Title    ...................................................              ..............................................  Date                                               Time          22EPIC Rev 08/18

## 2018-11-08 NOTE — DISCHARGE INSTRUCTIONS
Orthopedic Discharge Instructions:   After Your Injection or Aspiration  ________________________________________    Patient Name:  Stu Meredith  Today's Date:  November 8, 2018  The doctor who performed your Right Hip Steroid Injection was Geoff Matthew at [facility] in the  Radiology Department  Care of needle site    If you have new numbness down your leg, this may last up to 6 hours, but it should go away. You may need help with walking until your leg feels normal.     Over the next 24 to 48 hours, pain at the needle site may increase before it gets better.     For the next 48 hours, use ice packs for 15 minutes, three to four times a day for pain.    If you have a bandage, you may remove it the next morning.     No tub baths, hot tubs or swimming for 48 hours. You may shower the next day.   Activity    Do not drive until morning.     Limit your activity based on your pain level. Follow your doctor s orders for activity.     You may eat a normal diet.     If you had sedation,   - You may feel sleepy, forgetful or unsteady.   - Do not drink alcohol for 24 hours.  Medicines    If you take aspirin or platelet inhibitors, you can restart them tomorrow.     Restart all other medicines today at your regular dose, including Coumadin (warfarin).    If you are restarting Coumadin, talk to your doctor about having your INR checked.   If you had a steroid shot     The medicine should help reduce swelling and pain. This may take from 7 to 10 days.     Side effects from the shot will be mild and go away in 2 to 3 days. Common side effects may include:  -  Insomnia (trouble sleeping).  -  Heartburn.  -  Flushed face.  -  Water retention (bloating or fluid build-up).  -  Increased appetite (feeling more hungry than usual).  -  Increased blood sugar.  If you have diabetes, watch your blood sugar closely. If needed, call your doctor to help you control your blood sugar.  Some patients will get lasting relief  from a single shot. Others may require up to three shots to get results. If you have more than one steroid shot, they should be given two weeks apart.  Some patients do not have relief of symptoms.      Call your doctor or go to the Emergency Room if you have severe pain, fever or problems with bowel or bladder control.

## 2018-11-08 NOTE — IP AVS SNAPSHOT
MRN:[id number]                      After Visit Summary   11/8/2018    Stu Meredith    MRN: [id number]           Visit Information        Provider Department      11/8/2018  3:00 PM [facility] RAD; [facility] Radiology           Review of your medicines      UNREVIEWED medicines. Ask your doctor about these medicines        Dose / Directions    acetaminophen 325 MG tablet   Commonly known as:  TYLENOL        Dose:  325-650 mg   Take 325-650 mg by mouth every 6 hours as needed   Refills:  0       amoxicillin 500 MG tablet   Commonly known as:  AMOXIL   Used for:  History of total left hip arthroplasty        Take 4 tablets (2000 mg) by mouth 1 hour before dental procedures.   Quantity:  4 tablet   Refills:  3       atorvastatin 20 MG tablet   Commonly known as:  LIPITOR   Used for:  CARDIOVASCULAR SCREENING; LDL GOAL LESS THAN 130        Dose:  20 mg   Take 1 tablet (20 mg) by mouth daily   Quantity:  90 tablet   Refills:  3       fluticasone-salmeterol 113-14 MCG/ACT inhaler   Commonly known as:  AIRDUO RESPICLICK        Refills:  4       guaiFENesin-codeine 100-10 MG/5ML Soln solution   Commonly known as:  ROBITUSSIN AC   Used for:  Acute bronchitis with coexisting condition requiring prophylactic treatment        Dose:  1-2 tsp.   Take 5-10 mLs by mouth nightly as needed   Quantity:  180 mL   Refills:  0       metoprolol succinate 50 MG 24 hr tablet   Commonly known as:  TOPROL-XL   Used for:  HOCM (hypertrophic obstructive cardiomyopathy) (H)        Dose:  50 mg   Take 1 tablet (50 mg) by mouth daily   Quantity:  90 tablet   Refills:  3       multivitamin, therapeutic Tabs tablet        Dose:  1 tablet   Take 1 tablet by mouth daily   Refills:  0       PROAIR  (90 Base) MCG/ACT inhaler   Generic drug:  albuterol        Refills:  1       sotalol 120 MG tablet   Commonly known as:  BETAPACE   Used for:  Chronic atrial fibrillation (H), Hypertrophic cardiomyopathy (H)         Dose:  120 mg   Take 1 tablet (120 mg) by mouth 2 times daily   Quantity:  180 tablet   Refills:  3       spironolactone 25 MG tablet   Commonly known as:  ALDACTONE   Used for:  Hypertrophic cardiomyopathy (H), Atrial fibrillation, unspecified type (H)        Dose:  25 mg   Take 1 tablet (25 mg) by mouth daily   Quantity:  90 tablet   Refills:  3       XARELTO 20 MG Tabs tablet   Used for:  Chronic atrial fibrillation (H)   Generic drug:  rivaroxaban ANTICOAGULANT        Dose:  20 mg   Take 1 tablet (20 mg) by mouth daily (with dinner)   Quantity:  90 tablet   Refills:  3       zolpidem 10 MG tablet   Commonly known as:  AMBIEN   Used for:  Sedative, hypnotic or anxiolytic dependence (H)        Dose:  5-10 mg   Take 0.5-1 tablets (5-10 mg) by mouth nightly as needed for sleep   Quantity:  90 tablet   Refills:  0                Protect others around you: Learn how to safely use, store and throw away your medicines at www.disposemymeds.org.         Follow-ups after your visit        Your next 10 appointments already scheduled     Dec 14, 2018  8:00 AM CST   Implanted Defibulator with Uc Cv Device 1   [facility] [place])    [address] [place] [address]  [id number]               Dec 14, 2018 11:00 AM CST   (Arrive by 10:45 AM)   Return Electrophysiology Genetics with Erik Cooper MD   [facility] 3[facility]    [address] [place] [address]  [address] [place] [address] [phone]               Care Instructions        Further instructions from your care team         Orthopedic Discharge Instructions:   After Your Injection or Aspiration  ________________________________________    Patient Name:  Stu Meredith  Today's Date:  November 8, 2018  The doctor who performed your Right Hip Steroid Injection was Geoff Matthew at [facility] in the  Radiology Department  Care of needle site    If you have new numbness down your leg,  this may last up to 6 hours, but it should go away. You may need help with walking until your leg feels normal.     Over the next 24 to 48 hours, pain at the needle site may increase before it gets better.     For the next 48 hours, use ice packs for 15 minutes, three to four times a day for pain.    If you have a bandage, you may remove it the next morning.     No tub baths, hot tubs or swimming for 48 hours. You may shower the next day.   Activity    Do not drive until morning.     Limit your activity based on your pain level. Follow your doctor s orders for activity.     You may eat a normal diet.     If you had sedation,   - You may feel sleepy, forgetful or unsteady.   - Do not drink alcohol for 24 hours.  Medicines    If you take aspirin or platelet inhibitors, you can restart them tomorrow.     Restart all other medicines today at your regular dose, including Coumadin (warfarin).    If you are restarting Coumadin, talk to your doctor about having your INR checked.   If you had a steroid shot     The medicine should help reduce swelling and pain. This may take from 7 to 10 days.     Side effects from the shot will be mild and go away in 2 to 3 days. Common side effects may include:  -  Insomnia (trouble sleeping).  -  Heartburn.  -  Flushed face.  -  Water retention (bloating or fluid build-up).  -  Increased appetite (feeling more hungry than usual).  -  Increased blood sugar.  If you have diabetes, watch your blood sugar closely. If needed, call your doctor to help you control your blood sugar.  Some patients will get lasting relief from a single shot. Others may require up to three shots to get results. If you have more than one steroid shot, they should be given two weeks apart.  Some patients do not have relief of symptoms.      Call your doctor or go to the Emergency Room if you have severe pain, fever or problems with bowel or bladder control.      Additional Information About Your Visit        Derian  Information     Derian gives you secure access to your electronic health record. If you see a primary care provider, you can also send messages to your care team and make appointments. If you have questions, please call your primary care clinic.  If you do not have a primary care provider, please call [phone] and they will assist you.        Care EveryWhere ID     This is your Care EveryWhere ID. This could be used by other organizations to access your [facility] medical records  [id number]         Primary Care Provider Office Phone # Fax #    Omar Camacho [phone]      Equal Access to Services     [facility]: Hadii reina singh hadfly Soanjelica, waaxda ximena, qaybta kaalmajorge l cabrera, jorden graham . So [facility] [phone].    ATENCIÓN: Si habla español, tiene a drew disposición servicios gratuitos de asistencia lingüística. [facility] [phone].    We comply with applicable federal civil rights laws and [place] laws. We do not discriminate on the basis of race, color, national origin, age, disability, sex, sexual orientation, or gender identity.            Thank you!     Thank you for choosing [facility] for your care. Our goal is always to provide you with excellent care. Hearing back from our patients is one way we can continue to improve our services. Please take a few minutes to complete the written survey that you may receive in the mail after you visit with us. Thank you!             Medication List: This is a list of all your medications and when to take them. Check marks below indicate your daily home schedule. Keep this list as a reference.      Medications           Morning Afternoon Evening Bedtime As Needed    acetaminophen 325 MG tablet   Commonly known as:  TYLENOL   Take 325-650 mg by mouth every 6 hours as needed                                amoxicillin 500 MG tablet   Commonly known as:  AMOXIL   Take 4 tablets (2000 mg) by mouth 1  hour before dental procedures.                                atorvastatin 20 MG tablet   Commonly known as:  LIPITOR   Take 1 tablet (20 mg) by mouth daily                                fluticasone-salmeterol 113-14 MCG/ACT inhaler   Commonly known as:  AIRDUO RESPICLICK                                guaiFENesin-codeine 100-10 MG/5ML Soln solution   Commonly known as:  ROBITUSSIN AC   Take 5-10 mLs by mouth nightly as needed                                metoprolol succinate 50 MG 24 hr tablet   Commonly known as:  TOPROL-XL   Take 1 tablet (50 mg) by mouth daily                                multivitamin, therapeutic Tabs tablet   Take 1 tablet by mouth daily                                PROAIR  (90 Base) MCG/ACT inhaler   Generic drug:  albuterol                                sotalol 120 MG tablet   Commonly known as:  BETAPACE   Take 1 tablet (120 mg) by mouth 2 times daily                                spironolactone 25 MG tablet   Commonly known as:  ALDACTONE   Take 1 tablet (25 mg) by mouth daily                                XARELTO 20 MG Tabs tablet   Take 1 tablet (20 mg) by mouth daily (with dinner)   Generic drug:  rivaroxaban ANTICOAGULANT                                zolpidem 10 MG tablet   Commonly known as:  AMBIEN   Take 0.5-1 tablets (5-10 mg) by mouth nightly as needed for sleep

## 2018-11-12 ENCOUNTER — ALLIED HEALTH/NURSE VISIT (OUTPATIENT)
Dept: CARDIOLOGY | Facility: CLINIC | Age: 63
End: 2018-11-12
Attending: INTERNAL MEDICINE
Payer: COMMERCIAL

## 2018-11-12 DIAGNOSIS — I42.2 HYPERTROPHIC CARDIOMYOPATHY (H): Primary | ICD-10-CM

## 2018-11-12 PROCEDURE — 93295 DEV INTERROG REMOTE 1/2/MLT: CPT | Performed by: INTERNAL MEDICINE

## 2018-11-12 PROCEDURE — 93296 REM INTERROG EVL PM/IDS: CPT | Mod: ZF

## 2018-11-12 NOTE — MR AVS SNAPSHOT
After Visit Summary   11/12/2018    Stu Meredith    MRN: [id number]           Patient Information     Date Of Birth          1955        Visit Information        Provider Department      11/12/2018 6:00 AM UC ICD REMOTE [facility]        Today's Diagnoses     Hypertrophic cardiomyopathy (H)    -  1       Follow-ups after your visit        Your next 10 appointments already scheduled     Dec 14, 2018  8:00 AM CST   Implanted Defibulator with  Cv Device 1   [facility] ([facility])    [address] [place] [address]  3rd Floor  [id number]               Dec 14, 2018 11:00 AM CST   (Arrive by 10:45 AM)   Return Electrophysiology Genetics with Erik Cooper MD   [facility] ([facility])    [address] [facility]  [address] [place] [address] [phone]              Who to contact     If you have questions or need follow up information about today's clinic visit or your schedule please contact [facility] directly at [phone].  Normal or non-critical lab and imaging results will be communicated to you by Frediohart, letter or phone within 4 business days after the clinic has received the results. If you do not hear from us within 7 days, please contact the clinic through ElephantDrivet or phone. If you have a critical or abnormal lab result, we will notify you by phone as soon as possible.  Submit refill requests through Muzeek or call your pharmacy and they will forward the refill request to us. Please allow 3 business days for your refill to be completed.          Additional Information About Your Visit        FredioharVisualnest Information     Muzeek gives you secure access to your electronic health record. If you see a primary care provider, you can also send messages to your care team and make appointments. If you have questions, please call your primary care clinic.  If you do not have a primary care provider, please call  [phone] and they will assist you.        Care EveryWhere ID     This is your Care EveryWhere ID. This could be used by other organizations to access your [facility] medical records  [id number]         Blood Pressure from Last 3 Encounters:   11/08/18 130/89   09/27/18 128/78   09/27/18 104/69    Weight from Last 3 Encounters:   11/01/18 97.3 kg (214 lb 9.6 oz)   09/18/18 93.9 kg (207 lb)   09/13/18 96.2 kg (212 lb)              We Performed the Following     INTERROGATION DEVICE EVAL REMOTE, PACER/ICD        Primary Care Provider Office Phone # Fax #    Omar Camacho [phone]       [address] [place] [address]        Equal Access to Services     [facility] : Hadii aad ku hadasho Soomaali, waaxda luqadaha, qaybta kaalmada adeegyada, waxlencho cornell hayzaria graham . So [facility] [phone].    ATENCIÓN: Si habla español, tiene a drew disposición servicios gratuitos de asistencia lingüística. [facility] [phone].    We comply with applicable federal civil rights laws and [place] laws. We do not discriminate on the basis of race, color, national origin, age, disability, sex, sexual orientation, or gender identity.            Thank you!     Thank you for choosing [facility]  for your care. Our goal is always to provide you with excellent care. Hearing back from our patients is one way we can continue to improve our services. Please take a few minutes to complete the written survey that you may receive in the mail after your visit with us. Thank you!             Your Updated Medication List - Protect others around you: Learn how to safely use, store and throw away your medicines at www.disposemymeds.org.          This list is accurate as of 11/12/18 11:59 PM.  Always use your most recent med list.                   Brand Name Dispense Instructions for use Diagnosis    acetaminophen 325 MG tablet    TYLENOL     Take 325-650 mg by mouth every 6 hours as needed         amoxicillin 500 MG tablet    AMOXIL    4 tablet    Take 4 tablets (2000 mg) by mouth 1 hour before dental procedures.    History of total left hip arthroplasty       atorvastatin 20 MG tablet    LIPITOR    90 tablet    Take 1 tablet (20 mg) by mouth daily    CARDIOVASCULAR SCREENING; LDL GOAL LESS THAN 130       fluticasone-salmeterol 113-14 MCG/ACT inhaler    AIRDUO RESPICLICK          guaiFENesin-codeine 100-10 MG/5ML Soln solution    ROBITUSSIN AC    180 mL    Take 5-10 mLs by mouth nightly as needed    Acute bronchitis with coexisting condition requiring prophylactic treatment       metoprolol succinate 50 MG 24 hr tablet    TOPROL-XL    90 tablet    Take 1 tablet (50 mg) by mouth daily    HOCM (hypertrophic obstructive cardiomyopathy) (H)       multivitamin, therapeutic Tabs tablet      Take 1 tablet by mouth daily        PROAIR  (90 Base) MCG/ACT inhaler   Generic drug:  albuterol           sotalol 120 MG tablet    BETAPACE    180 tablet    Take 1 tablet (120 mg) by mouth 2 times daily    Chronic atrial fibrillation (H), Hypertrophic cardiomyopathy (H)       spironolactone 25 MG tablet    ALDACTONE    90 tablet    Take 1 tablet (25 mg) by mouth daily    Hypertrophic cardiomyopathy (H), Atrial fibrillation, unspecified type (H)       XARELTO 20 MG Tabs tablet   Generic drug:  rivaroxaban ANTICOAGULANT     90 tablet    Take 1 tablet (20 mg) by mouth daily (with dinner)    Chronic atrial fibrillation (H)       zolpidem 10 MG tablet    AMBIEN    90 tablet    Take 0.5-1 tablets (5-10 mg) by mouth nightly as needed for sleep    Sedative, hypnotic or anxiolytic dependence (H)

## 2018-11-20 DIAGNOSIS — I48.91 ATRIAL FIBRILLATION (H): Primary | ICD-10-CM

## 2018-11-20 RX ORDER — POTASSIUM CHLORIDE 1500 MG/1
20 TABLET, EXTENDED RELEASE ORAL
Status: CANCELLED | OUTPATIENT
Start: 2018-11-20

## 2018-11-20 RX ORDER — MAGNESIUM SULFATE HEPTAHYDRATE 40 MG/ML
2 INJECTION, SOLUTION INTRAVENOUS
Status: CANCELLED | OUTPATIENT
Start: 2018-11-20

## 2018-11-20 RX ORDER — LIDOCAINE 40 MG/G
CREAM TOPICAL
Status: CANCELLED | OUTPATIENT
Start: 2018-11-20

## 2018-11-20 RX ORDER — POTASSIUM CHLORIDE 1500 MG/1
40 TABLET, EXTENDED RELEASE ORAL
Status: CANCELLED | OUTPATIENT
Start: 2018-11-20

## 2018-11-21 ENCOUNTER — HOSPITAL ENCOUNTER (OUTPATIENT)
Facility: CLINIC | Age: 63
End: 2018-11-21
Attending: INTERNAL MEDICINE | Admitting: INTERNAL MEDICINE
Payer: COMMERCIAL

## 2018-11-21 DIAGNOSIS — I48.0 PAF (PAROXYSMAL ATRIAL FIBRILLATION) (H): Primary | ICD-10-CM

## 2018-11-21 DIAGNOSIS — I48.0 PAF (PAROXYSMAL ATRIAL FIBRILLATION) (H): ICD-10-CM

## 2018-11-21 RX ORDER — POTASSIUM CHLORIDE 1500 MG/1
40 TABLET, EXTENDED RELEASE ORAL
Status: CANCELLED | OUTPATIENT
Start: 2018-11-21

## 2018-11-21 RX ORDER — POTASSIUM CHLORIDE 1500 MG/1
20 TABLET, EXTENDED RELEASE ORAL
Status: CANCELLED | OUTPATIENT
Start: 2018-11-21

## 2018-11-21 RX ORDER — LIDOCAINE 40 MG/G
CREAM TOPICAL
Status: CANCELLED | OUTPATIENT
Start: 2018-11-21

## 2018-12-20 ENCOUNTER — APPOINTMENT (OUTPATIENT)
Dept: LAB | Facility: CLINIC | Age: 63
End: 2018-12-20
Attending: INTERNAL MEDICINE
Payer: COMMERCIAL

## 2018-12-20 ENCOUNTER — HOSPITAL ENCOUNTER (OUTPATIENT)
Dept: CARDIOLOGY | Facility: CLINIC | Age: 63
End: 2018-12-20
Attending: INTERNAL MEDICINE | Admitting: INTERNAL MEDICINE
Payer: COMMERCIAL

## 2018-12-20 ENCOUNTER — ANESTHESIA (OUTPATIENT)
Dept: SURGERY | Facility: CLINIC | Age: 63
End: 2018-12-20
Payer: COMMERCIAL

## 2018-12-20 ENCOUNTER — APPOINTMENT (OUTPATIENT)
Dept: MEDSURG UNIT | Facility: CLINIC | Age: 63
End: 2018-12-20
Attending: INTERNAL MEDICINE
Payer: COMMERCIAL

## 2018-12-20 ENCOUNTER — ANESTHESIA EVENT (OUTPATIENT)
Dept: SURGERY | Facility: CLINIC | Age: 63
End: 2018-12-20
Payer: COMMERCIAL

## 2018-12-20 ENCOUNTER — HOSPITAL ENCOUNTER (OUTPATIENT)
Facility: CLINIC | Age: 63
Discharge: HOME | End: 2018-12-20
Attending: INTERNAL MEDICINE | Admitting: NURSE PRACTITIONER
Payer: COMMERCIAL

## 2018-12-20 VITALS
SYSTOLIC BLOOD PRESSURE: 105 MMHG | HEART RATE: 60 BPM | OXYGEN SATURATION: 96 % | RESPIRATION RATE: 14 BRPM | DIASTOLIC BLOOD PRESSURE: 73 MMHG

## 2018-12-20 VITALS
SYSTOLIC BLOOD PRESSURE: 102 MMHG | RESPIRATION RATE: 16 BRPM | HEART RATE: 68 BPM | TEMPERATURE: 97.9 F | OXYGEN SATURATION: 98 % | DIASTOLIC BLOOD PRESSURE: 70 MMHG

## 2018-12-20 DIAGNOSIS — I48.91 ATRIAL FIBRILLATION (H): ICD-10-CM

## 2018-12-20 LAB
DIGOXIN SERPL-MCNC: 0.1 UG/L (ref 0.5–2)
INR PPP: 1.24 (ref 0.86–1.14)
MAGNESIUM SERPL-MCNC: 2.3 MG/DL (ref 1.6–2.3)
POTASSIUM SERPL-SCNC: 4.4 MMOL/L (ref 3.4–5.3)

## 2018-12-20 PROCEDURE — 25000128 H RX IP 250 OP 636: Performed by: NURSE PRACTITIONER

## 2018-12-20 PROCEDURE — 25000125 ZZHC RX 250: Performed by: NURSE ANESTHETIST, CERTIFIED REGISTERED

## 2018-12-20 PROCEDURE — 92960 CARDIOVERSION ELECTRIC EXT: CPT

## 2018-12-20 PROCEDURE — 93010 ELECTROCARDIOGRAM REPORT: CPT | Performed by: INTERNAL MEDICINE

## 2018-12-20 PROCEDURE — 40000065 ZZH STATISTIC EKG NON-CHARGEABLE

## 2018-12-20 PROCEDURE — 93005 ELECTROCARDIOGRAM TRACING: CPT

## 2018-12-20 PROCEDURE — 83735 ASSAY OF MAGNESIUM: CPT | Performed by: NURSE PRACTITIONER

## 2018-12-20 PROCEDURE — 37000008 ZZH ANESTHESIA TECHNICAL FEE, 1ST 30 MIN

## 2018-12-20 PROCEDURE — 80162 ASSAY OF DIGOXIN TOTAL: CPT | Performed by: NURSE PRACTITIONER

## 2018-12-20 PROCEDURE — 40000166 ZZH STATISTIC PP CARE STAGE 1

## 2018-12-20 PROCEDURE — 85610 PROTHROMBIN TIME: CPT | Performed by: NURSE PRACTITIONER

## 2018-12-20 PROCEDURE — 84132 ASSAY OF SERUM POTASSIUM: CPT | Performed by: NURSE PRACTITIONER

## 2018-12-20 RX ORDER — MAGNESIUM SULFATE HEPTAHYDRATE 40 MG/ML
2 INJECTION, SOLUTION INTRAVENOUS
Status: DISCONTINUED | OUTPATIENT
Start: 2018-12-20 | End: 2019-08-21 | Stop reason: CLARIF

## 2018-12-20 RX ORDER — LIDOCAINE 40 MG/G
CREAM TOPICAL
Status: DISCONTINUED | OUTPATIENT
Start: 2018-12-20 | End: 2019-08-21 | Stop reason: CLARIF

## 2018-12-20 RX ORDER — POTASSIUM CHLORIDE 750 MG/1
40 TABLET, EXTENDED RELEASE ORAL
Status: DISCONTINUED | OUTPATIENT
Start: 2018-12-20 | End: 2019-08-21 | Stop reason: CLARIF

## 2018-12-20 RX ORDER — POTASSIUM CHLORIDE 750 MG/1
20 TABLET, EXTENDED RELEASE ORAL
Status: DISCONTINUED | OUTPATIENT
Start: 2018-12-20 | End: 2019-08-21 | Stop reason: CLARIF

## 2018-12-20 RX ORDER — SODIUM CHLORIDE 9 MG/ML
INJECTION, SOLUTION INTRAVENOUS CONTINUOUS PRN
Status: DISCONTINUED | OUTPATIENT
Start: 2018-12-20 | End: 2018-12-21 | Stop reason: HOSPADM

## 2018-12-20 RX ADMIN — METHOHEXITAL SODIUM 50 MG: 500 INJECTION, POWDER, LYOPHILIZED, FOR SOLUTION INTRAMUSCULAR; INTRAVENOUS; RECTAL at 09:12

## 2018-12-20 RX ADMIN — SODIUM CHLORIDE 150 ML: 9 INJECTION, SOLUTION INTRAVENOUS at 09:30

## 2018-12-20 RX ADMIN — METHOHEXITAL SODIUM 50 MG: 500 INJECTION, POWDER, LYOPHILIZED, FOR SOLUTION INTRAMUSCULAR; INTRAVENOUS; RECTAL at 09:08

## 2018-12-20 ASSESSMENT — LIFESTYLE VARIABLES: TOBACCO_USE: 1

## 2018-12-20 ASSESSMENT — ENCOUNTER SYMPTOMS: DYSRHYTHMIAS: 1

## 2018-12-20 NOTE — ANESTHESIA POSTPROCEDURE EVALUATION
Anesthesia POST Procedure Evaluation    Patient: Stu Meredith   MRN:     [id number] Gender:   male   Age:    63 year old :      1955        Preoperative Diagnosis: Atrial Fibrillation    Procedure(s):  Anesthesia Coverage Cardioversion @0830   Postop Comments: No value filed.       Anesthesia Type:  Not documented    Reportable Event: NO     PAIN: Uncomplicated   Sign Out status: Comfortable, Well controlled pain     PONV: No PONV   Sign Out status:  No Nausea or Vomiting     Neuro/Psych: Uneventful perioperative course   Sign Out Status: Preoperative baseline; Age appropriate mentation     Airway/Resp.: Uneventful perioperative course   Sign Out Status: Non labored breathing, age appropriate RR; Resp. Status within EXPECTED Parameters     CV: Uneventful perioperative course   Sign Out status: Appropriate BP and perfusion indices; Appropriate HR/Rhythm     Disposition:   Sign Out in:  PACU  Disposition:  Phase II; Home  Recovery Course: Uneventful  Follow-Up: Not required           Last Anesthesia Record Vitals:  CRNA VITALS  2018 0858 - 2018 0928      2018             NIBP:  103/75    Ht Rate:  60    SpO2:  99 %    EKG:  A Paced          Last PACU/Preop Vitals:  Vitals:    18 0820   BP: 132/88   Resp: 20   Temp: 36.6  C (97.9  F)   SpO2: 96%         Electronically Signed By: Juanito Lilly MD, 2018, 9:28 AM

## 2018-12-20 NOTE — ANESTHESIA PREPROCEDURE EVALUATION
Anesthesia Evaluation     . Pt has had prior anesthetic. Type: General    History of anesthetic complications    Tongue lacerated by biting      ROS/MED HX    ENT/Pulmonary:     (+)tobacco use, Current use , . .    Neurologic:  - neg neurologic ROS     Cardiovascular: Comment: AICD is OFF and Pacemaker is ON    (+) hypertension--CAD, --. : . CHF etiology: Hypertrophic Cardiomyopathy Last EF: 50% date: 7/2016 . . pacemaker Reason placed: Atrial Fibrillation/ Flutter and episodes of V-Tach:type: ICD for VTach - Patient is dependent on pacemaker . dysrhythmias a-fib, a-flutter and Other, . pulmonary hypertension, Previous cardiac testing Echodate:7/29/2016results:Hypertrophic Disease with diastolic dysfunction, grade 3. EF= 50%. Moderate Pulmonary HTN with Tricuspid Regurgitation.date: results:ECG reviewed date:5/3/2017 results:Atrial-paced with a rate of 60 date: results:          METS/Exercise Tolerance:     Hematologic:  - neg hematologic  ROS       Musculoskeletal: Comment: S/P Left Hip Arthroplasty  (+) arthritis, , , -       GI/Hepatic:  - neg GI/hepatic ROS       Renal/Genitourinary:  - ROS Renal section negative       Endo:     (+) Obesity, Other Endocrine Disorder Pre-Diabetes.      Psychiatric:  - neg psychiatric ROS       Infectious Disease:  - neg infectious disease ROS       Malignancy:      - no malignancy   Other:                                    Anesthesia Plan      History & Physical Review  History and physical reviewed and following examination; no interval change.    ASA Status:  3 .    NPO Status:  > 8 hours    Plan for MAC Reason for MAC:  Deep or markedly invasive procedure (G8)    ______________________________________________________________________  I discussed the risks and benefits of MAC with deep sedation with the patient.  Questions were sought and answered.      Td Boucher MD  Attending Anesthesiologist        Postoperative Care  Postoperative pain management:  IV analgesics.       Consents                          .    RAVIN FV AN PHYSICAL EXAM    Assessment:   ASA SCORE: 3

## 2018-12-20 NOTE — ANESTHESIA CARE TRANSFER NOTE
Patient: Stu Meredith    Procedure(s):  Anesthesia Coverage Cardioversion @0830    Diagnosis: Atrial Fibrillation   Diagnosis Additional Information: No value filed.    Anesthesia Type:   No value filed.     Note:  Airway :Nasal Cannula  Patient transferred to: Recovery  Comments: VSS.  Report given to RN.Handoff Report: Identifed the Patient, Identified the Reponsible Provider, Reviewed the pertinent medical history, Discussed the surgical course, Reviewed Intra-OP anesthesia mangement and issues during anesthesia, Set expectations for post-procedure period and Allowed opportunity for questions and acknowledgement of understanding      Vitals: (Last set prior to Anesthesia Care Transfer)    CRNA VITALS  12/20/2018 0844 - 12/20/2018 0914      12/20/2018             NIBP:  103/75    Ht Rate:  60    SpO2:  99 %    EKG:  A Paced                Electronically Signed By: KARSON Nuñez CRNA  December 20, 2018  9:14 AM

## 2018-12-20 NOTE — PROGRESS NOTES
Pt arrived via cart with RN from ECHO s/p Cardioversion. Post EKG completed. VSS. Pt denies pain. Wife Shelly at bedside. Discharge instructions reviewed with patient in EKG post procedure. Discharge planned for 1100.

## 2018-12-20 NOTE — OR NURSING
Prep complete for Cardioversion. Wife Shelly is in [facility] waiting room [phone].

## 2018-12-20 NOTE — PROGRESS NOTES
Pt arrived in ECHO department for scheduled Cardioversion.   Procedure explained, questions answered and consent signed. Discharge instructions discussed with patient and given written copy.  Anesthesia gave pt a total of 100 mg IV brevitol for sedation and pt was DCCV X2 at 150 and 200 Joules to a SR.  Pt denied chest pain or any other discomfort after procedure and was monitored until after awake and VSS.  Escorted back to 2A for further recovery.   Report given to ROD Cobos.

## 2018-12-21 LAB
INTERPRETATION ECG - MUSE: NORMAL
INTERPRETATION ECG - MUSE: NORMAL

## 2018-12-26 DIAGNOSIS — Z96.642 HISTORY OF TOTAL LEFT HIP ARTHROPLASTY: ICD-10-CM

## 2018-12-26 RX ORDER — AMOXICILLIN 500 MG/1
TABLET, FILM COATED ORAL
Qty: 4 TABLET | Refills: 3 | Status: SHIPPED | OUTPATIENT
Start: 2018-12-26 | End: 2019-03-18

## 2019-01-07 ENCOUNTER — TELEPHONE (OUTPATIENT)
Dept: CARDIOLOGY | Facility: CLINIC | Age: 64
End: 2019-01-07

## 2019-01-07 NOTE — TELEPHONE ENCOUNTER
Patient requested PCP referral from Dr. Ware. [place] like [place] be [place] [facility] and was wondering who her recommendations would be.

## 2019-01-10 DIAGNOSIS — M16.10 HIP ARTHRITIS: Primary | ICD-10-CM

## 2019-01-10 NOTE — PROGRESS NOTES
Patient request repeat intra-articular steroid injection to manage arthritis pain; patient is trying to manage non-operatively at this point due to inability to undergo surgery at this time. Patient's last injection was 11/8; informed patient he may have another one on or after 2/8/19. Patient expressed understanding and order placed for him to schedule after 2/8/19.

## 2019-01-18 ENCOUNTER — ANCILLARY PROCEDURE (OUTPATIENT)
Dept: CARDIOLOGY | Facility: CLINIC | Age: 64
End: 2019-01-18
Payer: COMMERCIAL

## 2019-01-18 ENCOUNTER — OFFICE VISIT (OUTPATIENT)
Dept: CARDIOLOGY | Facility: CLINIC | Age: 64
End: 2019-01-18
Attending: INTERNAL MEDICINE
Payer: COMMERCIAL

## 2019-01-18 VITALS
OXYGEN SATURATION: 97 % | SYSTOLIC BLOOD PRESSURE: 132 MMHG | BODY MASS INDEX: 30.66 KG/M2 | HEART RATE: 63 BPM | WEIGHT: 219 LBS | HEIGHT: 71 IN | DIASTOLIC BLOOD PRESSURE: 84 MMHG

## 2019-01-18 DIAGNOSIS — I47.20 VENTRICULAR TACHYCARDIA (H): ICD-10-CM

## 2019-01-18 DIAGNOSIS — I48.91 ATRIAL FIBRILLATION, UNSPECIFIED TYPE (H): ICD-10-CM

## 2019-01-18 DIAGNOSIS — Z13.6 CARDIOVASCULAR SCREENING; LDL GOAL LESS THAN 130: ICD-10-CM

## 2019-01-18 DIAGNOSIS — I42.2 HYPERTROPHIC CARDIOMYOPATHY (H): ICD-10-CM

## 2019-01-18 PROCEDURE — 99214 OFFICE O/P EST MOD 30 MIN: CPT | Mod: GC | Performed by: INTERNAL MEDICINE

## 2019-01-18 RX ORDER — SPIRONOLACTONE 50 MG/1
50 TABLET, FILM COATED ORAL DAILY
Qty: 90 TABLET | Refills: 3 | Status: SHIPPED | OUTPATIENT
Start: 2019-01-18 | End: 2019-08-04

## 2019-01-18 RX ORDER — ATORVASTATIN CALCIUM 20 MG/1
20 TABLET, FILM COATED ORAL DAILY
Qty: 90 TABLET | Refills: 3 | Status: SHIPPED | OUTPATIENT
Start: 2019-01-18 | End: 2019-12-17

## 2019-01-18 ASSESSMENT — PAIN SCALES - GENERAL: PAINLEVEL: NO PAIN (0)

## 2019-01-18 ASSESSMENT — MIFFLIN-ST. JEOR: SCORE: 1805.51

## 2019-01-18 NOTE — LETTER
"1/18/2019      RE: Stu Meredith  [address]  [facility] [address]       Dear Colleague,    Thank you for the opportunity to participate in the care of your patient, Stu Meredith, at the [facility] HEART [facility] at [facility]. Please see a copy of my visit note below.    Heart Failure Clinic Note    HPI  Mr. Meredith is a 63 year old male with history of HCM (dx '06, EF 20% -->? 60%) VT s/p ICD '12, nonobstructive CAD (cath '13), AF s/p PVI X 3 ('11, '16, '18) and DCCV X 10, currently on sotalol and xeralto who presents to clinic for follow up.     Since his last visit in June 2018 he has had several more recurrences of atrial fibrillation.  He ultimately underwent his third ablation in August followed by another DC cardioversion in September.  He had another recurrence of atrial fibrillation in December and had another cardioversion approximately 1 month ago.  Today in clinic he reports overall feeling well.  He now recognizes that he mainly feels bad when he is in atrial fibrillation.  When in A. fib he feels significantly fatigued and become short of breath easily.  Now that he has been out of A. fib for the past month he is feeling well again with no significant dyspnea.  He also denies any chest pain, dizziness, syncope, or lower extremity swelling.  He is following with EP for his A. fib and is currently on sotalol 120 mg twice daily and metoprolol succinate 50 mg daily as well as Xarelto for stroke prevention.      PMH  - HCM diagnosed '06, previously burnt out (EF 20%) now with recovered EF  - h/o VT s/p dual chamber ICD '12  - AF s/p PVI X 2 ('11, '16, '18), h/o DCCV X 10  - nonobstructive CAD    Meds:  reviewed    SH/FH reviewed in chart    ROS: complete 14 point ROS as per HPI, otherwise negative      /84 (BP Location: Right arm, Patient Position: Chair, Cuff Size: Adult Large)   Pulse 63   Ht 1.803 m (5' 11\")   Wt 99.3 kg (219 lb)   SpO2 97%   " BMI 30.54 kg/m     General: comfortable, no distress  HEENT: MMM, PERRL  CV: normal rate, regular rhythm, no murmurs, normal S1/S2, no JVD  Pulm: clear bilaterally, no r/r/w  Abdomen: soft, nontender, nondistended  Extremities: warm, no edema, normal pulses  Neuro: A&O X3.       Echo CPX 6/1/2018:  Done while in atrial fibrillation. RER 1.14, VE/VCO2 38, 15 ml/kg/min (4 METS), 48% predicted. Echo showed limited augmentation at peak stress with flat BP response and a suboptimal HR response (74% predicted). There was no LVOT obstruction at rest or stress.         Assessment/Plan  Mr. Meredith is a 62 year old male with history of HCM (dx '06, EF 20% -->? 60%) VT s/p ICD '12, nonobstructive CAD (cath '13), AF s/p PVI X 2 ('11, '16, '18) and DCCV X 10, currently on sotalol and xeralto who presents to clinic for follow up.     # AF --multiple recurrences over the last 6 months requiring a third ablation in August followed by 2 more cardioversions, the last in December.  Currently normal sinus rhythm and feeling well.  His symptoms do appear to directly correlate with times that he is in atrial fibrillation.  He continues to follow closely with EP.  - continue xeralto  - continue sotalol 120 BID   - continue metoprolol 50 XL daily   - will perform overnight oximetry testing to evaluate for GONZALEZ, formal sleep study if positive    # HCM -- previously burnt out with EF 20% ('13), now recovered (EF 60%).  Previous CPX with significant only impaired function with a VO2 max of 15, however done in the setting of patient in atrial fibrillation.  No evidence of LVOT obstruction.  Currently feeling well when not in atrial fibrillation.  Suspect he does not tolerate A. fib well given his underlying essentially restrictive heart disease.  We will continue to manage with control of atrial fibrillation and continued spironolactone, which we will increase to 50 mg daily today.  - continue metoprolol 50XL daily   - increase aldactone to 505  daily  -We will plan to repeat a cardiopulmonary stress test in the next few weeks assuming he remains in normal sinus rhythm.  If he goes back into A. fib we will cancel the test.    # Nonobstructive CAD -- 10-30% stenoses on cath '13  - continue atorvastatin 20   - on xeralto for AF, no indication for ASA    # HTN -- controlled  - continue metoprolol, aldactone      Return to clinic in 6 months.    This patient was seen and examined with the attending physician Dr. Chaz Mccormick MD  Advanced Heart Failure Fellow      I have reviewed today's vital signs, notes, medications, labs and imaging. I have also seen and examined the patient and agree with the findings and plan as outlined above.      Mona Ware MD

## 2019-01-18 NOTE — PATIENT INSTRUCTIONS
"You were seen today in the Adult Congenital and Cardiovascular Genetics Clinic at the [facility].    Cardiology Providers you saw during your visit:  Dr. Mona Ware     Diagnosis:  Hypertrophic Cardiomyopathy    Results:  No testing today.     Recommendations:    1.  Continue to eat a heart healthy diet.  2.  Continue to get 20-30 minutes of aerobic activity, 4-5 days per week. Please follow recommended exercise guidelines as discussed in your appointment.    3.  Continue to observe good oral hygiene, with regular dental visits.  4.  Please increase your Aldactone (Spironolactone) to 50 mg daily.   5. Please get labs drawn 2-3 weeks after the increase.  6. Please have an overnight oximetry test done.   7. Please have a stress test done.    CardioPulmonary Stress Test (CPX)  CPX is a maximal (meaning you exercise to exhaustion, not to achieve a heart rate) exercise test where we measure how well your heart/body uses oxygen or energy. It is the gold standard for measuring functional capacity and helps us differentiate limitations due to lungs, heart, or fitness.   A CPX is NOT a typical stress test. You will NOT be asked to hold your Beta Blocker medication.   You will be scheduled for a CardioPulmonary Stress Test at the [facility] ([address], [facility] [address], [phone]).     Follow these instructions:  1. Report to the GOLD waiting room in the main hospital.  2. Nothing to eat for 3 hours prior to your test. You may have clear liquids up to the time of your test  3. Please wear loose, two-piece clothing and comfortable, rubber soled shoes for walking       Vitals:    01/18/19 1444   BP: 132/84   BP Location: Right arm   Patient Position: Chair   Cuff Size: Adult Large   Pulse: 63   SpO2: 97%   Weight: 99.3 kg (219 lb)   Height: 1.803 m (5' 11\")       Exercise restrictions:   Yes__X__  No____         If yes, list restrictions:  Please follow Recommendations for the " Acceptability of Recreational Sports Activities and Exercise in Patients Handout      Work restrictions:  Yes____  No_X___         If yes, list restrictions:    FASTING CHOLESTEROL was checked in the last 5 years YES__X_  NO___ 2017  Continue to eat a heart healthy, low salt diet.         ____ Fasting lipid panel order today         ____ No changes in medications          ____ I recommend the following changes in your cholesterol medications.:          ____ Please follow up for cholesterol screening at your primary care physician      Follow-up:  Follow up with Dr. Ware and Dr. Cooper in 6 months with labs and device check. (And Dimple Prieto)  If you have questions or concerns please contact us at:    Conchita Schulte RN, BSN   Flex Molina (Scheduling)  Nurse Coordinator     Clinic   Adult Congenital and CV Genetics Adult Congenital and CV Genetic  [facility]  (P)[phone]    (P) [phone]  [email or web address] (F)[phone]        For after hours urgent needs, call [phone] and ask to speak to the Adult Congenital Physician on call.  Mention Job Code [id number].    For emergencies call 504.    [facility]   Clinics and Surgery Center  Mail Code [id number]  [address] [place]  [address]

## 2019-01-18 NOTE — PATIENT INSTRUCTIONS
It was a pleasure to see you in clinic today. Please do not hesitate to call with any questions or concerns. You are scheduled for a remote ICD transmission on 4/22/19. This will happen automatically in the night. We will call in 1-2 business days to discuss the results with you. We look forward to seeing you in clinic at your next device check in 6 months    Lindy Avalos, RN  Electrophysiology Nurse Clinician  [facility]  During business hours call:  [phone]  After business hours please call: [phone]- select option #4 and ask for job code [phone].

## 2019-01-19 NOTE — PROGRESS NOTES
"Heart Failure Clinic Note    HPI  Mr. Meredith is a 63 year old male with history of HCM (dx '06, EF 20% -->? 60%) VT s/p ICD '12, nonobstructive CAD (cath '13), AF s/p PVI X 3 ('11, '16, '18) and DCCV X 10, currently on sotalol and xeralto who presents to clinic for follow up.     Since his last visit in June 2018 he has had several more recurrences of atrial fibrillation.  He ultimately underwent his third ablation in August followed by another DC cardioversion in September.  He had another recurrence of atrial fibrillation in December and had another cardioversion approximately 1 month ago.  Today in clinic he reports overall feeling well.  He now recognizes that he mainly feels bad when he is in atrial fibrillation.  When in A. fib he feels significantly fatigued and become short of breath easily.  Now that he has been out of A. fib for the past month he is feeling well again with no significant dyspnea.  He also denies any chest pain, dizziness, syncope, or lower extremity swelling.  He is following with EP for his A. fib and is currently on sotalol 120 mg twice daily and metoprolol succinate 50 mg daily as well as Xarelto for stroke prevention.      PMH  - HCM diagnosed '06, previously burnt out (EF 20%) now with recovered EF  - h/o VT s/p dual chamber ICD '12  - AF s/p PVI X 2 ('11, '16, '18), h/o DCCV X 10  - nonobstructive CAD    Meds:  reviewed    SH/FH reviewed in chart    ROS: complete 14 point ROS as per HPI, otherwise negative      /84 (BP Location: Right arm, Patient Position: Chair, Cuff Size: Adult Large)   Pulse 63   Ht 1.803 m (5' 11\")   Wt 99.3 kg (219 lb)   SpO2 97%   BMI 30.54 kg/m    General: comfortable, no distress  HEENT: MMM, PERRL  CV: normal rate, regular rhythm, no murmurs, normal S1/S2, no JVD  Pulm: clear bilaterally, no r/r/w  Abdomen: soft, nontender, nondistended  Extremities: warm, no edema, normal pulses  Neuro: A&O X3.       Echo CPX 6/1/2018:  Done while in atrial " fibrillation. RER 1.14, VE/VCO2 38, 15 ml/kg/min (4 METS), 48% predicted. Echo showed limited augmentation at peak stress with flat BP response and a suboptimal HR response (74% predicted). There was no LVOT obstruction at rest or stress.         Assessment/Plan  Mr. Meredith is a 62 year old male with history of HCM (dx '06, EF 20% -->? 60%) VT s/p ICD '12, nonobstructive CAD (cath '13), AF s/p PVI X 2 ('11, '16, '18) and DCCV X 10, currently on sotalol and xeralto who presents to clinic for follow up.     # AF --multiple recurrences over the last 6 months requiring a third ablation in August followed by 2 more cardioversions, the last in December.  Currently normal sinus rhythm and feeling well.  His symptoms do appear to directly correlate with times that he is in atrial fibrillation.  He continues to follow closely with EP.  - continue xeralto  - continue sotalol 120 BID   - continue metoprolol 50 XL daily   - will perform overnight oximetry testing to evaluate for GONZALEZ, formal sleep study if positive    # HCM -- previously burnt out with EF 20% ('13), now recovered (EF 60%).  Previous CPX with significant only impaired function with a VO2 max of 15, however done in the setting of patient in atrial fibrillation.  No evidence of LVOT obstruction.  Currently feeling well when not in atrial fibrillation.  Suspect he does not tolerate A. fib well given his underlying essentially restrictive heart disease.  We will continue to manage with control of atrial fibrillation and continued spironolactone, which we will increase to 50 mg daily today.  - continue metoprolol 50XL daily   - increase aldactone to 505 daily  -We will plan to repeat a cardiopulmonary stress test in the next few weeks assuming he remains in normal sinus rhythm.  If he goes back into A. fib we will cancel the test.    # Nonobstructive CAD -- 10-30% stenoses on cath '13  - continue atorvastatin 20   - on xeralto for AF, no indication for ASA    # HTN --  controlled  - continue metoprolol, aldactone      Return to clinic in 6 months.    This patient was seen and examined with the attending physician Dr. Chaz Mccormick MD  Advanced Heart Failure Fellow        I have reviewed today's vital signs, notes, medications, labs and imaging. I have also seen and examined the patient and agree with the findings and plan as outlined above.    Mona Ware MD  Section Head - Advanced Heart Failure, Transplantation and Mechanical Circulatory Support  Director - Adult Congenital and Cardiovascular Genetics Center  Associate Professor of Medicine, [facility]

## 2019-01-28 DIAGNOSIS — I42.2 HYPERTROPHIC CARDIOMYOPATHY (H): Primary | ICD-10-CM

## 2019-02-15 ENCOUNTER — HOSPITAL ENCOUNTER (OUTPATIENT)
Dept: CARDIOLOGY | Facility: CLINIC | Age: 64
Discharge: HOME | End: 2019-02-15
Attending: INTERNAL MEDICINE | Admitting: INTERNAL MEDICINE
Payer: COMMERCIAL

## 2019-02-15 DIAGNOSIS — I48.91 ATRIAL FIBRILLATION, UNSPECIFIED TYPE (H): ICD-10-CM

## 2019-02-15 DIAGNOSIS — I42.2 HYPERTROPHIC CARDIOMYOPATHY (H): ICD-10-CM

## 2019-02-15 LAB
ALBUMIN SERPL-MCNC: 3.7 G/DL (ref 3.4–5)
ALP SERPL-CCNC: 102 U/L (ref 40–150)
ALT SERPL W P-5'-P-CCNC: 38 U/L (ref 0–70)
ANION GAP SERPL CALCULATED.3IONS-SCNC: 8 MMOL/L (ref 3–14)
AST SERPL W P-5'-P-CCNC: 26 U/L (ref 0–45)
BASOPHILS # BLD AUTO: 0.1 10E9/L (ref 0–0.2)
BASOPHILS NFR BLD AUTO: 1.7 %
BILIRUB SERPL-MCNC: 0.5 MG/DL (ref 0.2–1.3)
BUN SERPL-MCNC: 20 MG/DL (ref 7–30)
CALCIUM SERPL-MCNC: 8.8 MG/DL (ref 8.5–10.1)
CHLORIDE SERPL-SCNC: 106 MMOL/L (ref 94–109)
CO2 SERPL-SCNC: 26 MMOL/L (ref 20–32)
CREAT SERPL-MCNC: 0.87 MG/DL (ref 0.66–1.25)
DIFFERENTIAL METHOD BLD: NORMAL
EOSINOPHIL # BLD AUTO: 0.3 10E9/L (ref 0–0.7)
EOSINOPHIL NFR BLD AUTO: 4.6 %
ERYTHROCYTE [DISTWIDTH] IN BLOOD BY AUTOMATED COUNT: 12.8 % (ref 10–15)
GFR SERPL CREATININE-BSD FRML MDRD: >90 ML/MIN/{1.73_M2}
GLUCOSE SERPL-MCNC: 158 MG/DL (ref 70–99)
HBA1C MFR BLD: 7 % (ref 0–5.6)
HCT VFR BLD AUTO: 52 % (ref 40–53)
HGB BLD-MCNC: 16.6 G/DL (ref 13.3–17.7)
IMM GRANULOCYTES # BLD: 0 10E9/L (ref 0–0.4)
IMM GRANULOCYTES NFR BLD: 0.3 %
LYMPHOCYTES # BLD AUTO: 2.1 10E9/L (ref 0.8–5.3)
LYMPHOCYTES NFR BLD AUTO: 35.3 %
MCH RBC QN AUTO: 29.2 PG (ref 26.5–33)
MCHC RBC AUTO-ENTMCNC: 31.9 G/DL (ref 31.5–36.5)
MCV RBC AUTO: 92 FL (ref 78–100)
MONOCYTES # BLD AUTO: 0.5 10E9/L (ref 0–1.3)
MONOCYTES NFR BLD AUTO: 8.9 %
NEUTROPHILS # BLD AUTO: 3 10E9/L (ref 1.6–8.3)
NEUTROPHILS NFR BLD AUTO: 49.2 %
NRBC # BLD AUTO: 0 10*3/UL
NRBC BLD AUTO-RTO: 0 /100
PLATELET # BLD AUTO: 246 10E9/L (ref 150–450)
POTASSIUM SERPL-SCNC: 4.5 MMOL/L (ref 3.4–5.3)
PROT SERPL-MCNC: 7.2 G/DL (ref 6.8–8.8)
RBC # BLD AUTO: 5.68 10E12/L (ref 4.4–5.9)
SODIUM SERPL-SCNC: 140 MMOL/L (ref 133–144)
TSH SERPL DL<=0.005 MIU/L-ACNC: 1.58 MU/L (ref 0.4–4)
WBC # BLD AUTO: 6 10E9/L (ref 4–11)

## 2019-02-15 PROCEDURE — 94621 CARDIOPULM EXERCISE TESTING: CPT | Mod: 26 | Performed by: INTERNAL MEDICINE

## 2019-02-15 PROCEDURE — 94621 CARDIOPULM EXERCISE TESTING: CPT

## 2019-02-24 ENCOUNTER — TRANSFERRED RECORDS (OUTPATIENT)
Dept: HEALTH INFORMATION MANAGEMENT | Facility: CLINIC | Age: 64
End: 2019-02-24

## 2019-02-26 ENCOUNTER — TELEPHONE (OUTPATIENT)
Dept: CARDIOLOGY | Facility: CLINIC | Age: 64
End: 2019-02-26

## 2019-02-26 NOTE — TELEPHONE ENCOUNTER
Spoke with Haroon briefly regarding his 1/18/19 appointment questions. Left a voicemail for his wife to call the device clinic back with any further questions. I forwarded a message to Shon our  to reach out to patient and his wife to answer any billing questions.

## 2019-02-26 NOTE — TELEPHONE ENCOUNTER
[facility] Call Center    Phone Message    May a detailed message be left on voicemail: yes    Reason for Call: Other: Per call from PT's wife has questions re: 1/18 Device APPT. PT's wife is requesting that someone from the Device clinic call her back.       Action Taken: Message routed to:  [facility] ([facility]): Cardiology

## 2019-03-01 ENCOUNTER — OFFICE VISIT (OUTPATIENT)
Dept: INTERNAL MEDICINE | Facility: CLINIC | Age: 64
End: 2019-03-01
Payer: COMMERCIAL

## 2019-03-01 VITALS
TEMPERATURE: 98.7 F | DIASTOLIC BLOOD PRESSURE: 78 MMHG | RESPIRATION RATE: 10 BRPM | WEIGHT: 217 LBS | HEART RATE: 53 BPM | BODY MASS INDEX: 30.27 KG/M2 | OXYGEN SATURATION: 97 % | SYSTOLIC BLOOD PRESSURE: 126 MMHG

## 2019-03-01 DIAGNOSIS — J06.9 UPPER RESPIRATORY TRACT INFECTION, UNSPECIFIED TYPE: Primary | ICD-10-CM

## 2019-03-01 DIAGNOSIS — Z23 NEED FOR PROPHYLACTIC VACCINATION WITH TETANUS-DIPHTHERIA (TD): ICD-10-CM

## 2019-03-01 PROCEDURE — 99213 OFFICE O/P EST LOW 20 MIN: CPT | Performed by: PHYSICIAN ASSISTANT

## 2019-03-01 NOTE — PROGRESS NOTES
SUBJECTIVE:   Stu Meredith is a 63 year old male who presents to clinic today for the following health issues:      Patient here to get clearance to return to work after a recent illness.     RESPIRATORY SYMPTOMS      Duration: 2- 3 days     Description  rhinorrhea, cough and fatigue/malaise    Severity: mild    Accompanying signs and symptoms: None    History (predisposing factors):  tobacco abuse and hx of bronchitis     Precipitating or alleviating factors: None    Therapies tried and outcome:  rest and fluids    Patient is feeling better  He stayed home from work 2/27, 2/28 and today 3/1 needs short term disability paperwork done has he was out for more than 2 days.     Problem list and histories reviewed & adjusted, as indicated.  Additional history: as documented    Labs reviewed in EPIC    Reviewed and updated as needed this visit by clinical staff       Reviewed and updated as needed this visit by Provider  Allergies  Meds         ROS:  Constitutional, HEENT, cardiovascular, pulmonary, gi and gu systems are negative, except as otherwise noted.    OBJECTIVE:     /78 (BP Location: Left arm, Patient Position: Chair, Cuff Size: Adult Large)   Pulse 53   Temp 98.7  F (37.1  C) (Oral)   Resp 10   Wt 98.4 kg (217 lb)   SpO2 97%   BMI 30.27 kg/m    Body mass index is 30.27 kg/m .  GENERAL: healthy, alert and no distress  NECK: no adenopathy, no asymmetry, masses, or scars and thyroid normal to palpation  RESP: lungs clear to auscultation - no rales, rhonchi or wheezes  CV: regular rates and rhythm and normal S1 S2, no S3 or S4  SKIN: no suspicious lesions or rashes    Diagnostic Test Results:  none     ASSESSMENT/PLAN:             1. Upper respiratory tract infection, unspecified type      2. Need for prophylactic vaccination with tetanus-diphtheria (Td)  Reviewed need to update  Declined Flu vaccine     Short term disability paperwork done - ok to return to work             Delma Pineda  COBY Thomas  [facility]

## 2019-03-04 ASSESSMENT — ENCOUNTER SYMPTOMS
EYE PAIN: 0
EYE WATERING: 0
CHILLS: 0
HALLUCINATIONS: 0
MUSCLE CRAMPS: 0
ALTERED TEMPERATURE REGULATION: 0
RECTAL PAIN: 0
FATIGUE: 1
NECK PAIN: 0
HEARTBURN: 0
NAUSEA: 0
DIARRHEA: 0
ARTHRALGIAS: 1
CONSTIPATION: 1
WEIGHT LOSS: 0
INCREASED ENERGY: 0
JOINT SWELLING: 0
BOWEL INCONTINENCE: 0
EYE REDNESS: 0
MUSCLE WEAKNESS: 0
POLYPHAGIA: 0
BACK PAIN: 0
DOUBLE VISION: 0
ABDOMINAL PAIN: 0
POLYDIPSIA: 0
DECREASED APPETITE: 0
VOMITING: 0
STIFFNESS: 1
JAUNDICE: 0
BLOATING: 1
FEVER: 0
BLOOD IN STOOL: 0
MYALGIAS: 0
EYE IRRITATION: 0
NIGHT SWEATS: 1
WEIGHT GAIN: 1

## 2019-03-07 ENCOUNTER — OFFICE VISIT (OUTPATIENT)
Dept: INTERNAL MEDICINE | Facility: CLINIC | Age: 64
End: 2019-03-07
Payer: COMMERCIAL

## 2019-03-07 VITALS
BODY MASS INDEX: 30.52 KG/M2 | HEIGHT: 71 IN | SYSTOLIC BLOOD PRESSURE: 121 MMHG | HEART RATE: 80 BPM | WEIGHT: 218 LBS | OXYGEN SATURATION: 96 % | DIASTOLIC BLOOD PRESSURE: 75 MMHG

## 2019-03-07 DIAGNOSIS — E11.9 TYPE 2 DIABETES MELLITUS WITHOUT COMPLICATION, WITHOUT LONG-TERM CURRENT USE OF INSULIN (H): Primary | ICD-10-CM

## 2019-03-07 DIAGNOSIS — R61 NIGHT SWEATS: ICD-10-CM

## 2019-03-07 DIAGNOSIS — Z23 NEED FOR DIPHTHERIA-TETANUS-PERTUSSIS (TDAP) VACCINE: ICD-10-CM

## 2019-03-07 DIAGNOSIS — Z23 NEEDS FLU SHOT: ICD-10-CM

## 2019-03-07 DIAGNOSIS — Z23 NEED FOR PROPHYLACTIC VACCINATION AGAINST STREPTOCOCCUS PNEUMONIAE (PNEUMOCOCCUS): ICD-10-CM

## 2019-03-07 DIAGNOSIS — Z87.891 PERSONAL HISTORY OF TOBACCO USE: ICD-10-CM

## 2019-03-07 DIAGNOSIS — R20.9 COLD FEET: ICD-10-CM

## 2019-03-07 RX ORDER — METFORMIN HCL 500 MG
TABLET, EXTENDED RELEASE 24 HR ORAL
Qty: 90 TABLET | Refills: 3 | Status: SHIPPED | OUTPATIENT
Start: 2019-03-07 | End: 2019-04-09

## 2019-03-07 ASSESSMENT — MIFFLIN-ST. JEOR: SCORE: 1800.97

## 2019-03-07 ASSESSMENT — PAIN SCALES - GENERAL: PAINLEVEL: MODERATE PAIN (5)

## 2019-03-07 NOTE — NURSING NOTE
Stu Meredith    1. Has the patient received the information for the influenza vaccine? YES    2.  Does the patient have any of the following contraindications?  Allergy to to eggs? No  Allergic reaction to previous influenza vaccines?  No  Any other problems to previous influenza vaccines? No  Paralyzed by Guillain-Irvington syndrome? No  Currently pregnant? NO  Current moderate or severe illness?  No  Allergy to contact lens solution?  No    3. The vaccine has been administered in the usual fashion and the patient was instructed to wait 20 minutes before leaving the building in the even of an allergic reaction: YES    Recorded by Dennise Newton

## 2019-03-07 NOTE — PATIENT INSTRUCTIONS
Primary Care Center Phone Number: [phone]   [facility] Medication Refill Request Information:  * Please contact your pharmacy regarding ANY request for medication refills.  ** [facility] Prescription Fax = [phone]  * Please allow 3 business days for routine medication refills.  * Please allow 5 business days for controlled substance medication refills.     Primary Care Center Test Result notification information:  *You will be notified with in 7-10 days of your appointment day regarding the results of your test.  If you are on MyChart you will be notified as soon as the provider has reviewed the results and signed off on them.      Night Sweats: Check a couple labs today    Type 2 Diabetes: Start metformin. Take 500 mg with supper for 1 week, then increase to 1000 mg with supper.       Dear patient,    Your health care provider has recommended that you receive the new shingles vaccine  called Shingrix to prevent shingles. Many private pay insurance and Medicare Part D cover Shingrix. However, not all insurance carriers cover the entire cost of the Shingrix vaccine if the vaccine is administered at your primary care clinic.    Prior to receiving the vaccine, we recommend that you call your insurance carrier and  ask them the following questions:    1. Does my insurance cover the Shingrix vaccine and the administration of the  vaccine?  2. What is my co-pay or deductible for the vaccine?  3. Is there a cost difference if I receive the vaccine at my doctor s office or a  Pharmacy?    The clinic cannot determine your insurance benefits. Please call your insurance provider  prior to scheduling an appointment to receive the Shingrix vaccine. If you decide to  receive your vaccine at the Primary Care Center, please call [phone] and  request a nurse-only appointment for the Shingrix vaccine. The vaccine comes in two doses. Your second dose should be 2-6 months after your first Shingrix injections.  Nurse  visit hours are available Monday, Wednesday, and Friday from 9-11:00 AM and 1:00-3:00PM.    Sincerely,    The Primary Care Team          [facility]    Address: [address] , [place] [address] Phone: [phone]        Lung Cancer Screening   Frequently Asked Questions  If you are at high-risk for lung cancer, getting screened with low-dose computed tomography (LDCT) every year can help save your life. This handout offers answers to some of the most common questions about lung cancer screening. If you have other questions, please call [phone] ([phone]).     What is it?  Lung cancer screening uses special X-ray technology to create an image of your lung tissue. The exam is quick and easy and takes less than 10 seconds. We don t give you any medicine or use any needles. You can eat before and after the exam. You don t need to change your clothes as long as the clothing on your chest doesn t contain metal. But, you do need to be able to hold your breath for at least 6 seconds during the exam.    What is the goal of lung cancer screening?  The goal of lung cancer screening is to save lives. Many times, lung cancer is not found until a person starts having physical symptoms. Lung cancer screening can help detect lung cancer in the earliest stages when it may be easier to treat.    Who should be screened for lung cancer?  We suggest lung cancer screening for anyone who is at high-risk for lung cancer. You are in the high-risk group if you:      are between the ages of 55 and 79, and    have smoked at least 1 pack of cigarettes a day for 30 or more years, and    still smoke or have quit within the past 15 years.    However, if you have a new cough or shortness of breath, you should talk to your doctor before being screened.    Some national lung health advocacy groups also recommend screening for people ages 50 to 79 who have smoked an average of 1 pack of cigarettes a day for 20  years. They must also have at least 1 other risk factor for lung cancer, not including exposure to secondhand smoke. Other risk factors are having had cancer in the past, emphysema, pulmonary fibrosis, COPD, a family history of lung cancer, or exposure to certain materials such as arsenic, asbestos, beryllium, cadmium, chromium, diesel fumes, nickel, radon or silica. Your care team can help you know if you have one of these risk factors.     Why does it matter if I have symptoms?  Certain symptoms can be a sign that you have a condition in your lungs that should be checked and treated by your doctor. These symptoms include fever, chest pain, a new or changing cough, shortness of breath that you have never felt before, coughing up blood or unexplained weight loss. Having any of these symptoms can greatly affect the results of lung cancer screening.       Should all smokers get an LDCT lung cancer screening exam?  It depends. Lung cancer screening is for a very specific group of men and women who have a history of heavy smoking over a long period of time (see  Who should be screened for lung cancer  above).  I am in the high-risk group, but have been diagnosed with cancer in the past. Is LDCT lung cancer screening right for me?  In some cases, you should not have LDCT lung screening, such as when your doctor is already following your cancer with CT scan studies. Your doctor will help you decide if LDCT lung screening is right for you.  Do I need to have a screening exam every year?  Yes. If you are in the high-risk group described earlier, you should get an LDCT lung cancer screening exam every year until you are 79, or are no longer willing or able to undergo screening and possible procedures to diagnose and treat lung cancer.  How effective is LDCT at preventing death from lung cancer?  Studies have shown that LDCT lung cancer screening can lower the risk of death from lung cancer by 20 percent in people who are at  high-risk.  What are the risks?  There are some risks and limitations of LDCT lung cancer screening. We want to make sure you understand the risks and benefits, so please let us know if you have any questions. Your doctor may want to talk with you more about these risks.    Radiation exposure: As with any exam that uses radiation, there is a very small increased risk of cancer. The amount of radiation in LDCT is small--about the same amount a person would get from a mammogram. Your doctor orders the exam when he or she feels the potential benefits outweigh the risks.    False negatives: No test is perfect, including LDCT. It is possible that you may have a medical condition, including lung cancer, that is not found during your exam. This is called a false negative result.    False positives and more testing: LDCT very often finds something in the lung that could be cancer, but in fact is not. This is called a false positive result. False positive tests often cause anxiety. To make sure these findings are not cancer, you may need to have more tests. These tests will be done only if you give us permission. Sometimes patients need a treatment that can have side effects, such as a biopsy. For more information on false positives, see  What can I expect from the results?     Findings not related to lung cancer: Your LDCT exam also takes pictures of areas of your body next to your lungs. In a very small number of cases, the CT scan will show an abnormal finding in one of these areas, such as your kidneys, adrenal glands, liver or thyroid. This finding may not be serious, but you may need more tests. Your doctor can help you decide what other tests you may need, if any.  What can I expect from the results?  About 1 out of 4 LDCT exams will find something that may need more tests. Most of the time, these findings are lung nodules. Lung nodules are very small collections of tissue in the lung. These nodules are very common,  and the vast majority--more than 97 percent--are not cancer (benign). Most are normal lymph nodes or small areas of scarring from past infections.  But, if a small lung nodule is found to be cancer, the cancer can be cured more than 90 percent of the time. To know if the nodule is cancer, we may need to get more images before your next yearly screening exam. If the nodule has suspicious features (for example, it is large, has an odd shape or grows over time), we will refer you to a specialist for further testing.  Will my doctor also get the results?  Yes. Your doctor will get a copy of your results.  Is it okay to keep smoking now that there s a cancer screening exam?  No. Tobacco is one of the strongest cancer-causing agents. It causes not only lung cancer, but other cancers and cardiovascular (heart) diseases as well. The damage caused by smoking builds over time. This means that the longer you smoke, the higher your risk of disease. While it is never too late to quit, the sooner you quit, the better.

## 2019-03-07 NOTE — PROGRESS NOTES
"                     PRIMARY CARE CENTER    HISTORY OF PRESENT ILLNESS  Stu Meredith is a 63yoM with an extensive cardia history, including hypertrophic cadiomyopathy and Afib s/p multiple ablations and cardioversions, and newly diagnosed DM2 (A1c 7.0 in 2/2019), here to establish care.    Cardiac history: hypertrophic cardiomyopathy (diagnosed in 2006, EF 20%), VTach s/p ICD (2012), nonobstructive CAD (cath in 2013), Afib s/p PVIx3 (2011, 2016, 2018) and DCCVx10. On Xarelto, sotalol, metroprolol. Also on spirinolactone.     Last cardioversion was 12/20/2018 and he hasn't felt symptomatic from his Afib since. Occasionally feels a \"ping of pain\" in his left chest. It occurs spontaneously, last seconds, and resolves spontaneously. He never takes any medication when it happens. Not overly concerned about it. Hasn't had any SOB or LE edema. Has been experiencing cold feet at night for the past few weeks. He notices it most just sitting in his recliner. His feet feel cold to him and are uncomfortable and are also cold to touch. There is no discoloration associated. He does not report pain in the calves/feet with walking distances. He is unable to warm them up with socks and blankets. Never any numbness, tingling, or pain in the feet. Also complains of night sweats for the past three months. These occur 2-4 days per week, are not drenching, and are not associated with fevers, chills, or weight loss. He also has had no nausea/vomiting, new lumps/bumps, rashes, joint/muscle pain beyond baseline (he has hip osteoarthritis), headaches, vision changes, muscle twitching, constipation, diarrhea, heat/cold intolerance.     He sees a urologist for LUTS. Last PSA was normal in 9/2018. Sees an eye doctor annually. Uses Ambien nightly. Doesn't exercise due to busy work schedule but he is on his feet all day. Has quit drinking soda. Quit smoking in 2014; prior to that had a 40 pack-year history. Drinks alcohol once a week, four " drinks at a time.      ROS: 10 point ROS neg other than the symptoms noted above in the HPI.    Past Medical History:   Diagnosis Date     Atrial fibrillation (H) 12/9/11    failed medication, multiple DC cardioveresions; s/p Left atrial ablation to eliminate atrial fibrillation 12/9/11     Chest pain      CHF (congestive heart failure) (H) 7/30/2016     Coronary artery disease      DJD (degenerative joint disease)      Hip arthritis 1/15/2014     Hypertension      Hypertrophic cardiomyopathy (H) 10/09     Other abnormal heart sounds      Pacemaker     ICD     Palpitations      Pneumonia, organism unspecified(486)      Prediabetes 7/10/15    A1C 6.5     Status post implantation of automatic cardioverter/defibrillator (AICD)      Ventricular tachycardia (H)        Past Surgical History:   Procedure Laterality Date     ANESTHESIA CARDIOVERSION  4/24/2014    Procedure: ANESTHESIA CARDIOVERSION;  Surgeon: Generic Anesthesia Provider;  Location: [facility] OR     ANESTHESIA CARDIOVERSION N/A 5/12/2016    Procedure: ANESTHESIA CARDIOVERSION;  Surgeon: GENERIC ANESTHESIA PROVIDER;  Location: [facility]     ANESTHESIA CARDIOVERSION N/A 8/7/2017    Procedure: ANESTHESIA CARDIOVERSION;  Anesthesia Offsite Coverage Cardioversion @1100;  Surgeon: GENERIC ANESTHESIA PROVIDER;  Location: [facility] OR     ANESTHESIA CARDIOVERSION N/A 1/3/2018    Procedure: ANESTHESIA CARDIOVERSION;  Anesthesia Cardioverion;  Surgeon: GENERIC ANESTHESIA PROVIDER;  Location: [facility] OR     ANESTHESIA CARDIOVERSION N/A 5/4/2018    Procedure: ANESTHESIA CARDIOVERSION;  Anesthesia Coverage Cardioversion @1400;  Surgeon: GENERIC ANESTHESIA PROVIDER;  Location: [facility] OR     ANESTHESIA CARDIOVERSION N/A 9/27/2018    Procedure: ANESTHESIA CARDIOVERSION;  Anesthesia Cardioversion @0930;  Surgeon: GENERIC ANESTHESIA PROVIDER;  Location: [facility]     ANESTHESIA CARDIOVERSION N/A 12/20/2018    Procedure: Anesthesia Coverage Cardioversion @0830;  Surgeon: GENERIC ANESTHESIA PROVIDER;   "Location: [facility] OR     ARTHROPLASTY HIP  1/15/2014    Procedure: ARTHROPLASTY HIP;  Left Total Hip Arthroplasty;  Surgeon: Nelson Gaspar MD;  Location: [facility]     CARDIAC SURGERY       COLONOSCOPY N/A 5/18/2018    Procedure: COMBINED COLONOSCOPY, SINGLE OR MULTIPLE BIOPSY/POLYPECTOMY BY BIOPSY;  COLONOSCOPY (PT HAS DEFIBRILLATOR) ;  Surgeon: Mike Nickerson MD;  Location: [facility]     H ABLATION FOCAL AFIB  12/9/11    Left atrial ablation to eliminate atrial fibrillation     IMPLANT AUTOMATIC IMPLANTABLE CARDIOVERTER DEFIBRILLATOR  7/27/12    AICD implantation     TONSILLECTOMY  1964       Family History   Problem Relation Age of Onset     Heart Disease Mother         unknown     Obesity Mother      Gastrointestinal Disease Mother         diverticulitis     Diabetes Maternal Grandmother      Diabetes Paternal Grandmother      Cerebrovascular Disease Paternal Grandmother 94     Diabetes Paternal Grandfather      Cerebrovascular Disease Paternal Grandfather 78     Diabetes Son      C.A.D. Father         CABG age 78     Heart Disease Father         CABG x5     Diabetes Maternal Grandfather        Medications reviewed.      No Known Allergies    PHYSICAL EXAM  /75   Pulse 80   Ht 1.803 m (5' 11\")   Wt 98.9 kg (218 lb)   SpO2 96%   BMI 30.40 kg/m      Constitutional: Healthy, alert, no distress and cooperative  Head: Normocephalic. No masses, lesions, tenderness or abnormalities  Eyes: PERRL, no conjunctival injection  Cardiovascular: RRR. Normal S1,S2. No murmurs, clicks, rubs, or gallops. No pretibial edema. No JVD. No carotid bruits. PT and DP pulses strong bilaterally. Feet warm to touch bilaterally.  Respiratory: Clear to auscultation bilaterally  Gastrointestinal: Soft, nontender, no hepatosplenomegaly or mass.  Extremities: Joints are normal. No gross deformities noted, gait normal and normal muscle tone.    Skin: No suspicious lesions or rashes  Neurologic: Gait normal. Strength is intact bilaterally in " upper and lower extremities. Sensation to light touch WNL bilateral feet.  Cranial nerves II through XII are intact.  Psychiatric: Mentation appears normal and affect normal  Hematologic/Lymphatic/Immunologic: Normal cervical, anterior, posterior, submandibular, submental, and supraclavicular lymph nodes    RESULTS  Component      Latest Ref Rng & Units 9/13/2018 12/20/2018 2/15/2019   WBC      4.0 - 11.0 10e9/L   6.0   RBC Count      4.4 - 5.9 10e12/L   5.68   Hemoglobin      13.3 - 17.7 g/dL   16.6   Hematocrit      40.0 - 53.0 %   52.0   MCV      78 - 100 fl   92   MCH      26.5 - 33.0 pg   29.2   MCHC      31.5 - 36.5 g/dL   31.9   RDW      10.0 - 15.0 %   12.8   Platelet Count      150 - 450 10e9/L   246   Diff Method         Automated Method   % Neutrophils      %   49.2   % Lymphocytes      %   35.3   % Monocytes      %   8.9   % Eosinophils      %   4.6   % Basophils      %   1.7   % Immature Granulocytes      %   0.3   Nucleated RBCs      0 /100   0   Absolute Neutrophil      1.6 - 8.3 10e9/L   3.0   Absolute Lymphocytes      0.8 - 5.3 10e9/L   2.1   Absolute Monocytes      0.0 - 1.3 10e9/L   0.5   Absolute Eosinophils      0.0 - 0.7 10e9/L   0.3   Absolute Basophils      0.0 - 0.2 10e9/L   0.1   Abs Immature Granulocytes      0 - 0.4 10e9/L   0.0   Absolute Nucleated RBC         0.0   Sodium      133 - 144 mmol/L   140   Potassium      3.4 - 5.3 mmol/L  4.4 4.5   Chloride      94 - 109 mmol/L   106   Carbon Dioxide      20 - 32 mmol/L   26   Anion Gap      3 - 14 mmol/L   8   Glucose      70 - 99 mg/dL   158 (H)   Urea Nitrogen      7 - 30 mg/dL   20   Creatinine      0.66 - 1.25 mg/dL   0.87   GFR Estimate      >60 mL/min/1.73:m2   >90   GFR Estimate If Black      >60 mL/min/1.73:m2   >90   Calcium      8.5 - 10.1 mg/dL   8.8   Bilirubin Total      0.2 - 1.3 mg/dL   0.5   Albumin      3.4 - 5.0 g/dL   3.7   Protein Total      6.8 - 8.8 g/dL   7.2   Alkaline Phosphatase      40 - 150 U/L   102   ALT      0  - 70 U/L   38   AST      0 - 45 U/L   26   PSA Diag Urologic Phys      0.00 - 4.00 ng/mL 3.70     INR      0.86 - 1.14  1.24 (H)    Magnesium      1.6 - 2.3 mg/dL  2.3    Hemoglobin A1C      0 - 5.6 %   7.0 (H)   TSH      0.40 - 4.00 mU/L   1.58     Colonoscopy 5/18/2018:  Impression:               - One 3 mm polyp in the transverse colon, removed                             with a jumbo cold forceps. Resected and retrieved.                             Clip was placed.                             - One 3 mm polyp in the mid transverse colon,                             removed with a jumbo cold forceps. Resected and                             retrieved.                             - Two 3 to 4 mm polyps in the sigmoid colon,                             removed with a jumbo cold forceps. Resected and                             retrieved.                             - The examined portion of the ileum was normal.   Recommendation:           - Await pathology results.                             - Repeat colonoscopy in 5 years for surveillance.                             - Restart Xarelto on Mon 5/21/18.     Surgical Path:  FINAL DIAGNOSIS:   A: Colon, transverse, polypectomy   - Tubular adenomas, no evidence of high-grade dysplasia or invasive   malignancy   B: Colon, sigmoid, polypectomy   - Hyperplastic polyp, nonneoplastic colonic mucosa consistent with mucosal    prolapse        ASSESSMENT/PLAN  63yoM with an extensive cardia history, including hypertrophic cadiomyopathy and Afib s/p multiple ablations and cardioversions, and DM2.    #DM2, new  A1c in 2/2019 was 7.0. Already on atorvastatin. History and exam did not reveal any signs of complications from diabetes. Kidney function normal and will check urine albumin today. Will start on metformin and follow-up in 3 months for repeat A1c.   -Urine albumin  -Metformin 500mg with supper x1 week then 1000mg    #HCM  #Afib  #CAD  #HTN  Follows closely with Drs.  Georgette. Third ablation in 8/2018 and most recent cardioversion in 12/2018. Currently NSR and feeling well. EF 60% and continues to have no evidence of LVOT by echo. No complaints of SOB or LE edema. CAD is nonobstructive. His reported chest pain lasting only seconds and completely self-resolving is not concerning for ACS. BP today in clinic good. Lipids from 12/2017 were normal but will check lipid panel today. Will defer management to Cards.    -Lipid panel    #Night sweats  Reports non-drenching night sweats 2-4 days per week for the last three months. Not associated with fevers, chills, weight loss. Seems unlikely to be malignancy-related, rheumatologic, or infectious given absence of associated signs/symptoms and normal basic lab parameters at this point. Will check CRP and ESR to avoid missing any inflammatory process.  -CRP, ESR    #Bilateral cold feet  Feet have felt cold at night for the past few weeks. Not associated with discoloration or claudication symptoms. Feet were warm to touch bilaterally and PT and DP pulses were equal and strong. While likelihood of peripheral vascular disease is low, he does have a strong smoking history and this may be a presenting symptom. Will check SAURABH by U/S w/ exercise.  -Exercise dopper U/S SAURABH     #Health Maintenance  Given smoking history and new diagnosis of diabetes, will get Pneumovax today. Also needs tetanus booster and flu shot. Has also quit smoking within the past 15 years and has greater than 30 pack-year history, so he qualifies for chest CT screening for lung cancer. Does not need colonoscopy at this time. PSA was normal 9/2018.    Follow-up: Three months      SHADE Sampson, MS4    Provider Disclosure:  I agree with above History, Review of Systems, Physical exam and Plan. I have reviewed the content of the documentation and have edited it as needed. I have personally performed the services documented here and the documentation accurately represents  those services and the decisions I have made.     Lung Cancer Screening Shared Decision Making Visit     Stu Meredith is eligible for lung cancer screening on the basis of the information provided in my signed lung cancer screening order.     I have discussed with patient the risks and benefits of screening for lung cancer with low-dose CT.     The risks include:  radiation exposure: one low dose chest CT has as much ionizing radiation as about 15 chest x-rays or 6 months of background radiation living in [place]    false positives: 96% of positive findings/nodules are NOT cancer, but some might still require additional diagnostic evaluation, including biopsy  over-diagnosis: some slow growing cancers that might never have been clinically significant will be detected and treated unnecessarily     The benefit of early detection of lung cancer is contingent upon adherence to annual screening or more frequent follow up if indicated.     Furthermore, reaping the benefits of screening requires Stu Meredith to be willing and physically able to undergo diagnostic procedures, if indicated. Although no specific guide is available for determining severity of comorbidities, it is reasonable to withhold screening in patients who have greater mortality risk from other diseases.     We did discuss that the only way to prevent lung cancer is to not smoke. Smoking cessation assistance was not offered as pt is a former smoker.    I did not offer risk estimation using a calculator such as this one:    Mela Harkins MD

## 2019-03-07 NOTE — NURSING NOTE
Chief Complaint   Patient presents with     Establish Care     Pt is here to establish care with a new PCP     Diabetes     Would like to talk about the A1C     Imm/Inj     Pt would like the tdap vaccine        Dennise Newton, Clinic EMT at 1:50 PM on 3/7/2019

## 2019-03-07 NOTE — NURSING NOTE
Patient received influenza, TDAP, and Pneumococcal 23 vaccines.  See immunization list for administration details.  Patient tolerated injections well.           Dennise Newton at 3:36 PM on 3/7/2019.

## 2019-03-18 ENCOUNTER — OFFICE VISIT (OUTPATIENT)
Dept: INTERNAL MEDICINE | Facility: CLINIC | Age: 64
End: 2019-03-18
Payer: COMMERCIAL

## 2019-03-18 VITALS
HEART RATE: 73 BPM | TEMPERATURE: 98 F | DIASTOLIC BLOOD PRESSURE: 72 MMHG | WEIGHT: 212 LBS | RESPIRATION RATE: 18 BRPM | BODY MASS INDEX: 29.57 KG/M2 | OXYGEN SATURATION: 98 % | SYSTOLIC BLOOD PRESSURE: 108 MMHG

## 2019-03-18 DIAGNOSIS — J20.9 ACUTE BRONCHITIS WITH SYMPTOMS > 10 DAYS: Primary | ICD-10-CM

## 2019-03-18 PROCEDURE — 99214 OFFICE O/P EST MOD 30 MIN: CPT | Performed by: PHYSICIAN ASSISTANT

## 2019-03-18 RX ORDER — AZITHROMYCIN 250 MG/1
TABLET, FILM COATED ORAL
Refills: 0 | COMMUNITY
Start: 2019-03-12 | End: 2019-03-18

## 2019-03-18 RX ORDER — ALBUTEROL SULFATE 90 UG/1
2 AEROSOL, METERED RESPIRATORY (INHALATION) EVERY 4 HOURS PRN
Qty: 8.5 G | Refills: 1 | Status: SHIPPED | OUTPATIENT
Start: 2019-03-18 | End: 2019-10-02

## 2019-03-18 RX ORDER — BENZONATATE 200 MG/1
CAPSULE ORAL
Refills: 0 | COMMUNITY
Start: 2019-03-12 | End: 2019-03-18

## 2019-03-18 NOTE — PATIENT INSTRUCTIONS
Consider follow up with primary office when you are better.   Consider maintenance inhaler for prevention of coughing/bronchitis.   Lung function testing.

## 2019-03-18 NOTE — PROGRESS NOTES
SUBJECTIVE:   Stu Meredith is a 63 year old male who presents to clinic today for the following health issues:      Acute Illness   Acute illness concerns: Cough  Onset: 7 days    Fever: YES    Chills/Sweats: YES- both    Headache (location?): no    Sinus Pressure:no    Conjunctivitis:  no    Ear Pain: no    Rhinorrhea: YES    Congestion: YES    Sore Throat: no     Cough: YES-productive of white sputum, waxing and waning over time    Wheeze: YES    Decreased Appetite: YES    Nausea: no    Vomiting: no    Diarrhea:  YES    Dysuria/Freq.: no    Fatigue/Achiness: YES- fatigue    Sick/Strep Exposure: no     Therapies Tried and outcome: zpack and bezonitate from ED in [place]. Dayquil, and nyquil  3/19 ED visit in [place] chest xray done - negative per patient   Still with some coughing and congestion.   Needs FMLA type paperwork done as he was out of work again for more than 3 days.   Ok to return to work authorization    Patient has establish care with PCP with the [facility]         -------------------------------------    Problem list and histories reviewed & adjusted, as indicated.  Additional history: as documented    Labs reviewed in EPIC    Reviewed and updated as needed this visit by clinical staff       Reviewed and updated as needed this visit by Provider  Allergies  Meds         ROS:  Constitutional, HEENT, cardiovascular, pulmonary, gi and gu systems are negative, except as otherwise noted.    OBJECTIVE:     /72 (BP Location: Left arm, Patient Position: Chair, Cuff Size: Adult Large)   Pulse 73   Temp 98  F (36.7  C) (Oral)   Resp 18   Wt 96.2 kg (212 lb)   SpO2 98%   BMI 29.57 kg/m    Body mass index is 29.57 kg/m .  GENERAL: healthy, alert and no distress  HENT: ear canals and TM's normal, nose and mouth without ulcers or lesions  NECK: no adenopathy, no asymmetry, masses, or scars and thyroid normal to palpation  RESP: lungs clear to auscultation - no rales, rhonchi or wheezes  CV: regular rates  and rhythm and normal S1 S2, no S3 or S4  SKIN: no suspicious lesions or rashes    Diagnostic Test Results:  none     ASSESSMENT/PLAN:             1. Acute bronchitis with symptoms > 10 days    - albuterol (PROAIR HFA/PROVENTIL HFA/VENTOLIN HFA) 108 (90 Base) MCG/ACT inhaler; Inhale 2 puffs into the lungs every 4 hours as needed for shortness of breath / dyspnea or wheezing  Dispense: 8.5 g; Refill: 1    Patient Instructions   Consider follow up with primary office when you are better.   Consider maintenance inhaler for prevention of coughing/bronchitis.   Lung function testing.           25 minutes spent with patient > 50% of andrea fiorella counseling and plan of care    Delma Thomas PA-C  [facility]

## 2019-03-22 ENCOUNTER — ANCILLARY PROCEDURE (OUTPATIENT)
Dept: ULTRASOUND IMAGING | Facility: CLINIC | Age: 64
End: 2019-03-22
Attending: INTERNAL MEDICINE
Payer: COMMERCIAL

## 2019-03-22 ENCOUNTER — ANCILLARY PROCEDURE (OUTPATIENT)
Dept: CT IMAGING | Facility: CLINIC | Age: 64
End: 2019-03-22
Attending: INTERNAL MEDICINE
Payer: COMMERCIAL

## 2019-03-22 DIAGNOSIS — Z87.891 PERSONAL HISTORY OF TOBACCO USE: ICD-10-CM

## 2019-03-22 DIAGNOSIS — R20.9 COLD FEET: ICD-10-CM

## 2019-03-25 ENCOUNTER — TELEPHONE (OUTPATIENT)
Dept: PULMONOLOGY | Facility: CLINIC | Age: 64
End: 2019-03-25

## 2019-03-25 ENCOUNTER — TELEPHONE (OUTPATIENT)
Dept: INTERNAL MEDICINE | Facility: CLINIC | Age: 64
End: 2019-03-25

## 2019-03-25 DIAGNOSIS — R91.8 LUNG NODULES: ICD-10-CM

## 2019-03-25 NOTE — TELEPHONE ENCOUNTER
"[facility] Radiology Lung Cancer Screening CT result notification:     LDCT/Lung Cancer Screening CT Exam date: 3/22/19  Radiologist Impression AND Recommendations:   Multiple solid pulmonary nodules, the largest of which measures up to 9 mm in the right upper lobe (the location of this nodule was above the field-of-view on comparison CT from 7/27/2016). ACR Assessment Category:  Lung-RADS Category 4A. Suspicious.      Recommendation:  Lung-RADS Category 4A. Suspicious. Additional diagnostic testing and/or tissue sampling is recommended. 3 month follow up with low dose CT Chest without contrast (please use exam code [id number]); PET/CT may be used when there is a >/= 8 mm solid  component. .    Pertinent Findings:  Nodules:  The largest and/or fastest four of these nodule(s) are as follows:   - 11 x 7 mm solid nodule in the right upper lobe on series:  2 image: 109.   - 6 x 4 mm solid mass in the right lower lobe superior segment on series:  2 image:  134.   - 5 x 4 mm solid nodule in the left lower lobe on series:  2 image: 288. Unchanged since 7/27/2016.   - 8 x 5 mm ground glass nodule in the left lower lobe on series:  2 image:  340. Unchanged since 7/27/2016.     Ordering Provider: Ngoc Tolbert MD  Stu Meredith did not receive the remaining radiology results from her provider.   RN communicated the lung nodule finding to the patient (Yes/No):  Yes  RN ordered Lung nodule program referral (Yes/NA): Yes  The patient had the following questions: NO questions  Correct letter sent as per Lung nodule protocol (Yes/No):  Yes  Miscellaneous information:  NA      For Lung RADS category 4A, Staff message Routed to \"ONC ADULT NEW PATIENT SCHEDULING\" P [id number],  (YES/NO/NA):  Yes    Lung Nodule Clinics    Pulomonary (Lung Care) Clinic at the [facility]   Floor 2, Check-In D, [address], [place], [place]   Hours: M-F 7AM to 5PM    [facility]    Floor 2, [address], [place]  [phone]    Hours: M - F 7AM - 7PM;  Sat 8A to noon     [facility] at [facility]   [address], [place], [place], [phone]    Hours: M - F 7AM - 7PM;  Sat 8A to noon  If RADS 4A, 4B, 4X, and inc nodule >1 cm,  RN routed telephone encounter to CNS team (Yes/No): No, await appt scheduled and then will route    [facility] RN  Lung Nodule and ED Lab Result F/u RN  Ph# [phone]

## 2019-03-25 NOTE — TELEPHONE ENCOUNTER
ONCOLOGY INTAKE: Records Information      APPT INFORMATION:04/03  Referring provider:  Ngoc Tolbert MD  Referring provider s clinic:  [facility] MEDICINE  Reason for visit/diagnosis:  Lung nodules [R91.8] Caller intake: Ref by:Ngoc Tolbert MD-[facility] MEDICINE: Records In Epic    Were the records received with the referral (via Rightfax)? Complete    Has patient been seen for any external appt for this diagnosis (enter clinic/location)? No    ADDITIONAL INFORMATION:  Lung nodules [R91.8] Caller intake: Ref by:Ngoc Tolbert MD-[facility] MEDICINE: [facility]

## 2019-03-26 ENCOUNTER — TELEPHONE (OUTPATIENT)
Dept: CARDIOLOGY | Facility: CLINIC | Age: 64
End: 2019-03-26

## 2019-03-26 ENCOUNTER — HOSPITAL ENCOUNTER (OUTPATIENT)
Age: 64
Discharge: HOME | End: 2019-03-26
Attending: ORTHOPAEDIC SURGERY | Admitting: ORTHOPAEDIC SURGERY
Payer: COMMERCIAL

## 2019-03-26 DIAGNOSIS — F13.20 SEDATIVE, HYPNOTIC OR ANXIOLYTIC DEPENDENCE (H): ICD-10-CM

## 2019-03-26 RX ORDER — ZOLPIDEM TARTRATE 10 MG/1
5-10 TABLET ORAL
Qty: 90 TABLET | Refills: 0 | Status: SHIPPED | OUTPATIENT
Start: 2019-03-26 | End: 2019-07-01

## 2019-03-26 NOTE — TELEPHONE ENCOUNTER
zolpidem (AMBIEN) 10 MG tablet      Last Written Prescription Date:  12-18-18  Last Fill Quantity: 90,   # refills: 0    Who should be refilling Cardiology, PCP or FP at [facility]?

## 2019-03-26 NOTE — TELEPHONE ENCOUNTER
Prescription for Ambien was approved and faxed to [facility] - fax # [phone].     Estella Mercado RN on 3/26/2019 at 3:20 PM

## 2019-03-27 ENCOUNTER — TELEPHONE (OUTPATIENT)
Dept: ORTHOPEDICS | Facility: CLINIC | Age: 64
End: 2019-03-27

## 2019-03-27 NOTE — TELEPHONE ENCOUNTER
Received call from patient requesting to know how far Dr. Gaspar is booking for hip replacements. Patient stated he is not ready to schedule surgery today, as he needs to check with work about possible dates. I informed patient that if he wishes to schedule, Dr. Gaspar may require him to come back to clinic first. Patient understood and agreed. He was provided our direct number to call when he is ready to proceed. [phone].

## 2019-03-29 ENCOUNTER — HOSPITAL ENCOUNTER (OUTPATIENT)
Dept: GENERAL RADIOLOGY | Facility: CLINIC | Age: 64
End: 2019-03-29
Attending: ORTHOPAEDIC SURGERY
Payer: COMMERCIAL

## 2019-03-29 VITALS
DIASTOLIC BLOOD PRESSURE: 99 MMHG | RESPIRATION RATE: 78 BRPM | OXYGEN SATURATION: 97 % | SYSTOLIC BLOOD PRESSURE: 145 MMHG

## 2019-03-29 DIAGNOSIS — M16.10 HIP ARTHRITIS: ICD-10-CM

## 2019-03-29 PROCEDURE — 25000128 H RX IP 250 OP 636: Performed by: ORTHOPAEDIC SURGERY

## 2019-03-29 PROCEDURE — 25000125 ZZHC RX 250: Performed by: ORTHOPAEDIC SURGERY

## 2019-03-29 PROCEDURE — 20610 DRAIN/INJ JOINT/BURSA W/O US: CPT | Mod: RT

## 2019-03-29 PROCEDURE — 25500064 ZZH RX 255 OP 636: Performed by: ORTHOPAEDIC SURGERY

## 2019-03-29 RX ORDER — TRIAMCINOLONE ACETONIDE 40 MG/ML
40 INJECTION, SUSPENSION INTRA-ARTICULAR; INTRAMUSCULAR ONCE
Status: COMPLETED | OUTPATIENT
Start: 2019-03-29 | End: 2019-03-29

## 2019-03-29 RX ORDER — LIDOCAINE HYDROCHLORIDE 10 MG/ML
30 INJECTION, SOLUTION EPIDURAL; INFILTRATION; INTRACAUDAL; PERINEURAL ONCE
Status: COMPLETED | OUTPATIENT
Start: 2019-03-29 | End: 2019-03-29

## 2019-03-29 RX ORDER — IOPAMIDOL 408 MG/ML
10 INJECTION, SOLUTION INTRATHECAL ONCE
Status: COMPLETED | OUTPATIENT
Start: 2019-03-29 | End: 2019-03-29

## 2019-03-29 RX ORDER — BUPIVACAINE HYDROCHLORIDE 5 MG/ML
30 INJECTION, SOLUTION EPIDURAL; INTRACAUDAL ONCE
Status: COMPLETED | OUTPATIENT
Start: 2019-03-29 | End: 2019-03-29

## 2019-03-29 RX ORDER — IOPAMIDOL 408 MG/ML
20 INJECTION, SOLUTION INTRATHECAL ONCE
Status: DISCONTINUED | OUTPATIENT
Start: 2019-03-29 | End: 2019-03-29 | Stop reason: CLARIF

## 2019-03-29 RX ADMIN — BUPIVACAINE HYDROCHLORIDE 3 MG: 5 INJECTION, SOLUTION EPIDURAL; INTRACAUDAL; PERINEURAL at 15:14

## 2019-03-29 RX ADMIN — TRIAMCINOLONE ACETONIDE 40 MG: 40 INJECTION, SUSPENSION INTRA-ARTICULAR; INTRAMUSCULAR at 15:14

## 2019-03-29 RX ADMIN — LIDOCAINE HYDROCHLORIDE 3 ML: 10 INJECTION, SOLUTION EPIDURAL; INFILTRATION; INTRACAUDAL; PERINEURAL at 15:07

## 2019-03-29 RX ADMIN — IOPAMIDOL 2 ML: 408 INJECTION, SOLUTION INTRATHECAL at 15:15

## 2019-03-29 NOTE — TELEPHONE ENCOUNTER
RECORDS STATUS - ALL OTHER DIAGNOSIS      RECORDS RECEIVED FROM: [facility]   DATE RECEIVED: 3/29/2019   NOTES STATUS DETAILS   OFFICE NOTE from referring provider Complete Epic Dr. Ngoc Harkins   OFFICE NOTE from medical oncologist NA    DISCHARGE SUMMARY from hospital NA    DISCHARGE REPORT from the ER NA    OPERATIVE REPORT NA    MEDICATION LIST Complete [facility]   CLINICAL TRIAL TREATMENTS TO DATE NA    LABS     PATHOLOGY REPORTS NA    ANYTHING RELATED TO DIAGNOSIS Complete [facility] Labs   GENONOMIC TESTING     TYPE: NA    IMAGING (NEED IMAGES & REPORT)     CT SCANS Complete [facility]   MRI NA    MAMMO NA    ULTRASOUND NA    XR Complete Epic   PET NA

## 2019-03-29 NOTE — IP AVS SNAPSHOT
MRN:[id number]                      After Visit Summary   3/29/2019    Stu Meredith    MRN: [id number]           Visit Information        Provider Department      3/29/2019  3:00 PM  [facility]; SH[facility] Radiology           Review of your medicines      UNREVIEWED medicines. Ask your doctor about these medicines       Dose / Directions   acetaminophen 325 MG tablet  Commonly known as:  TYLENOL      Dose:  325-650 mg  Take 325-650 mg by mouth every 6 hours as needed  Refills:  0     albuterol 108 (90 Base) MCG/ACT inhaler  Commonly known as:  PROAIR HFA/PROVENTIL HFA/VENTOLIN HFA  Used for:  Acute bronchitis with symptoms > 10 days      Dose:  2 puff  Inhale 2 puffs into the lungs every 4 hours as needed for shortness of breath / dyspnea or wheezing  Quantity:  8.5 g  Refills:  1     atorvastatin 20 MG tablet  Commonly known as:  LIPITOR  Used for:  CARDIOVASCULAR SCREENING; LDL GOAL LESS THAN 130      Dose:  20 mg  Take 1 tablet (20 mg) by mouth daily  Quantity:  90 tablet  Refills:  3     metFORMIN 500 MG 24 hr tablet  Commonly known as:  GLUCOPHAGE-XR  Used for:  Type 2 diabetes mellitus without complication, without long-term current use of insulin (H)      Start taking on:  3/7/2019  Take 1 tablet (500 mg) by mouth daily (with dinner) for 7 days, THEN 2 tablets (1,000 mg) daily (with dinner).  Quantity:  90 tablet  Refills:  3     metoprolol succinate ER 50 MG 24 hr tablet  Commonly known as:  TOPROL-XL  Used for:  HOCM (hypertrophic obstructive cardiomyopathy) (H)      Dose:  50 mg  Take 1 tablet (50 mg) by mouth daily  Quantity:  90 tablet  Refills:  3     multivitamin, therapeutic Tabs tablet      Dose:  1 tablet  Take 1 tablet by mouth daily  Refills:  0     sotalol 120 MG tablet  Commonly known as:  BETAPACE  Used for:  Chronic atrial fibrillation (H), Hypertrophic cardiomyopathy (H)      Dose:  120 mg  Take 1 tablet (120 mg) by mouth 2 times daily  Quantity:  180  tablet  Refills:  3     spironolactone 50 MG tablet  Commonly known as:  ALDACTONE  Used for:  Hypertrophic cardiomyopathy (H), Atrial fibrillation, unspecified type (H)      Dose:  50 mg  Take 1 tablet (50 mg) by mouth daily  Quantity:  90 tablet  Refills:  3     XARELTO 20 MG Tabs tablet  Used for:  Chronic atrial fibrillation (H)  Generic drug:  rivaroxaban ANTICOAGULANT      Dose:  20 mg  Take 1 tablet (20 mg) by mouth daily (with dinner)  Quantity:  90 tablet  Refills:  3     zolpidem 10 MG tablet  Commonly known as:  AMBIEN  Used for:  Chronic Zolpidem use for insomnia      Dose:  5-10 mg  Take 0.5-1 tablets (5-10 mg) by mouth nightly as needed for sleep  Quantity:  90 tablet  Refills:  0              Protect others around you: Learn how to safely use, store and throw away your medicines at www.disposemymeds.org.       Follow-ups after your visit       Your next 10 appointments already scheduled    Mar 29, 2019  3:00 PM CDT  (Arrive by 2:45 PM)  XR JOINT INJECTION ASPIRATION MAJOR RT with [facility], [facility]  [facility] Radiology ([facility]) [address] [place] [address] [phone]   How do I prepare for my exam? (Food and drink instructions) No Food and Drink Restrictions.  How do I prepare for my exam? (Other instructions) * If you take Coumadin (or any other blood thinners) you may need to stop taking them up to 3 days prior to the exam. Talk to your doctor before stopping. * If you take Plavix, Ticlid, Pletal or Persantine, please ask your doctor if you should stop these medicines. You may need extra tests on the morning of your scan.  What should I wear: Wear comfortable clothes.  What should I bring: Please bring any scans or X-rays taken at other hospitals, if similar tests were done. Also bring a list of your medicines, including vitamins, minerals and over-the-counter drugs. It is safest to leave personal items at home. Do I need a :  No  is  needed.  What do I need to tell my doctor: Tell your doctor in advance: * If you are allergic to X-ray dye (contrast fluid). * If you may be pregnant.  What is this test: An arthrogram is an X-ray exam of a joint. We will take a set of X-ray pictures. We will then inject dye (contrast fluid) into the area around the joint. After that, we will take another set of X-ray pictures. The dye helps your doctor see the joint better on the X-rays.  Who should I call with questions: If you have any questions, please call the Imaging Department where you will have your exam. Directions, parking instructions, and other information is available on our website, [email or web address].   Apr 03, 2019  5:45 PM CDT  (Arrive by 5:30 PM)  NEW LUNG NODULE with Andreia Julien MD  [facility] ([facility]) [address] [place] [address]  [address]  [facility] [address]  [phone]   Apr 22, 2019 12:00 AM CDT  CARDIAC DEVICE CHECK - REMOTE with  ICD REMOTE  [facility] ([facility]) [address]  [address]  [facility] [address] [phone]   Jun 06, 2019  3:30 PM CDT  (Arrive by 3:15 PM)  Return Visit with Ngoc Harkins MD  [facility] ([facility]) [address]  4th Floor  [facility] [address] [phone]   If you are coming in for evaluation of pain: Please note that you may not be prescribed a controlled substance such as pain medication on your first visit.  Your provider will ask for previous medical records, and determine if medications are appropriate.   Jul 12, 2019  9:00 AM CDT  Lab with  LAB  [facility] Lab ([facility]) [address]  1st Floor  [facility] [address]  [phone]   Jul 12, 2019  9:30 AM CDT  (Arrive by 9:15 AM)  CARDIAC DEVICE CHECK - IN CLINIC with  CV DEVICE 1  [facility] ([facility] [place]) [address]  3rd Floor  [facility] [address]  [phone]   Jul 12, 2019 10:00 AM CDT  (Arrive by 9:45 AM)  Return Genetic with Mona Ware MD  [facility] ([facility] [place]) [address]  [address]  [facility] [address]  [phone]   Jul 12, 2019 10:30 AM CDT  (Arrive by 10:15 AM)  Return Electrophysiology Genetics with Erik Cooper MD  [facility] ([facility]) [address]  [address]  [facility] [address] [phone]      Care Instructions       Further instructions from your care team         Orthopedic Discharge Instructions:   After Your Injection or Aspiration  ________________________________________    Patient Name:  Stu Meredith  Today's Date:  March 29, 2019  The provider who performed your INJECTION PROCEDURE was Rene Duncan PA-C at [facility] in the  RADIOLOGY Department  Care of needle site    If you have new numbness down your leg, this may last up to 6 hours, but it should go away. You may need help with walking until your leg feels normal.     Over the next 24 to 48 hours, pain at the needle site may increase before it gets better.     For the next 48 hours, use ice packs for 15 minutes, three to four times a day for pain.    If you have a bandage, you may remove it the next morning.     No tub baths, hot tubs or swimming for 48 hours. You may shower the next day.   Activity    Do not drive until morning.     Limit your activity based on your pain level. Follow your doctor s orders for activity.     You may eat a normal diet.     If you had sedation,   - You may feel sleepy, forgetful or unsteady.   - Do not drink alcohol for 24 hours.  Medicines    If you take aspirin or platelet inhibitors, you can restart them tomorrow.     Restart all other medicines today at your regular dose, including Coumadin (warfarin).    If you are restarting Coumadin,  talk to your doctor about having your INR checked.   If you had a steroid shot     The medicine should help reduce swelling and pain. This may take from 7 to 10 days.     Side effects from the shot will be mild and go away in 2 to 3 days. Common side effects may include:  -  Insomnia (trouble sleeping).  -  Heartburn.  -  Flushed face.  -  Water retention (bloating or fluid build-up).  -  Increased appetite (feeling more hungry than usual).  -  Increased blood sugar.  If you have diabetes, watch your blood sugar closely. If needed, call your doctor to help you control your blood sugar.  Some patients will get lasting relief from a single shot. Others may require up to three shots to get results. If you have more than one steroid shot, they should be given two weeks apart.  Some patients do not have relief of symptoms.    Follow-up:   NO FOLLOW-UP NEEDED     Call your doctor or go to the Emergency Room if you have severe pain, fever or problems with bowel or bladder control.     Additional Information About Your Visit       S[facility] Information    [facility] gives you secure access to your electronic health record. If you see a primary care provider, you can also send messages to your care team and make appointments. If you have questions, please call your primary care clinic.  If you do not have a primary care provider, please call [phone] and they will assist you.       Care EveryWhere ID    This is your Care EveryWhere ID. This could be used by other organizations to access your [facility] medical records  [id number]        Primary Care Provider Office Phone # Fax #    Ngoc Miriam Harkins [phone]      Equal Access to Services    [facility] : Jakob vergara Soanjelica, waaxda luqadaha, qaybta kaalmada jorden cabrera. So [facility] [phone].    ATENCIÓN: Si habla español, tiene a drew disposición servicios gratuitos de asistencia lingüística.  Inga al [phone].    We comply with applicable federal civil rights laws and [place] laws. We do not discriminate on the basis of race, color, national origin, age, disability, sex, sexual orientation, or gender identity.           Thank you!    Thank you for choosing [facility] for your care. Our goal is always to provide you with excellent care. Hearing back from our patients is one way we can continue to improve our services. Please take a few minutes to complete the written survey that you may receive in the mail after you visit with us. Thank you!            Medication List      ASK your doctor about these medications          Morning Afternoon Evening Bedtime As Needed    acetaminophen 325 MG tablet  Also known as:  TYLENOL  INSTRUCTIONS:  Take 325-650 mg by mouth every 6 hours as needed                     albuterol 108 (90 Base) MCG/ACT inhaler  Also known as:  PROAIR HFA/PROVENTIL HFA/VENTOLIN HFA  INSTRUCTIONS:  Inhale 2 puffs into the lungs every 4 hours as needed for shortness of breath / dyspnea or wheezing                     atorvastatin 20 MG tablet  Also known as:  LIPITOR  INSTRUCTIONS:  Take 1 tablet (20 mg) by mouth daily                     metFORMIN 500 MG 24 hr tablet  Also known as:  GLUCOPHAGE-XR  Start taking on:  3/7/2019  INSTRUCTIONS:  Take 1 tablet (500 mg) by mouth daily (with dinner) for 7 days, THEN 2 tablets (1,000 mg) daily (with dinner).                     metoprolol succinate ER 50 MG 24 hr tablet  Also known as:  TOPROL-XL  INSTRUCTIONS:  Take 1 tablet (50 mg) by mouth daily                     multivitamin, therapeutic Tabs tablet  INSTRUCTIONS:  Take 1 tablet by mouth daily                     sotalol 120 MG tablet  Also known as:  BETAPACE  INSTRUCTIONS:  Take 1 tablet (120 mg) by mouth 2 times daily                     spironolactone 50 MG tablet  Also known as:  ALDACTONE  INSTRUCTIONS:  Take 1 tablet (50 mg) by mouth daily  Doctor's comments:  Please do not refill  until patient request.                     XARELTO 20 MG Tabs tablet  INSTRUCTIONS:  Take 1 tablet (20 mg) by mouth daily (with dinner)  Generic drug:  rivaroxaban ANTICOAGULANT                     zolpidem 10 MG tablet  Also known as:  AMBIEN  INSTRUCTIONS:  Take 0.5-1 tablets (5-10 mg) by mouth nightly as needed for sleep

## 2019-03-29 NOTE — IP AVS SNAPSHOT
[facility] Radiology  [facility] [facility] [address]  Phone:  [phone]                                    After Visit Summary   3/29/2019    Stu Meredith    MRN: [id number]           After Visit Summary Signature Page    I have received my discharge instructions, and my questions have been answered. I have discussed any challenges I see with this plan with the nurse or doctor.    ..........................................................................................................................................  Patient/Patient Representative Signature      ..........................................................................................................................................  Patient Representative Print Name and Relationship to Patient    ..................................................               ................................................  Date                                   Time    ..........................................................................................................................................  Reviewed by Signature/Title    ...................................................              ..............................................  Date                                               Time          22EPIC Rev 08/18

## 2019-03-29 NOTE — DISCHARGE INSTRUCTIONS
Orthopedic Discharge Instructions:   After Your Injection or Aspiration  ________________________________________    Patient Name:  Stu Meredith  Today's Date:  March 29, 2019  The provider who performed your INJECTION PROCEDURE was Rene Duncan PA-C at [facility] in the  RADIOLOGY Department  Care of needle site    If you have new numbness down your leg, this may last up to 6 hours, but it should go away. You may need help with walking until your leg feels normal.     Over the next 24 to 48 hours, pain at the needle site may increase before it gets better.     For the next 48 hours, use ice packs for 15 minutes, three to four times a day for pain.    If you have a bandage, you may remove it the next morning.     No tub baths, hot tubs or swimming for 48 hours. You may shower the next day.   Activity    Do not drive until morning.     Limit your activity based on your pain level. Follow your doctor s orders for activity.     You may eat a normal diet.     If you had sedation,   - You may feel sleepy, forgetful or unsteady.   - Do not drink alcohol for 24 hours.  Medicines    If you take aspirin or platelet inhibitors, you can restart them tomorrow.     Restart all other medicines today at your regular dose, including Coumadin (warfarin).    If you are restarting Coumadin, talk to your doctor about having your INR checked.   If you had a steroid shot     The medicine should help reduce swelling and pain. This may take from 7 to 10 days.     Side effects from the shot will be mild and go away in 2 to 3 days. Common side effects may include:  -  Insomnia (trouble sleeping).  -  Heartburn.  -  Flushed face.  -  Water retention (bloating or fluid build-up).  -  Increased appetite (feeling more hungry than usual).  -  Increased blood sugar.  If you have diabetes, watch your blood sugar closely. If needed, call your doctor to help you control your blood sugar.  Some patients will get lasting relief  from a single shot. Others may require up to three shots to get results. If you have more than one steroid shot, they should be given two weeks apart.  Some patients do not have relief of symptoms.    Follow-up:   NO FOLLOW-UP NEEDED     Call your doctor or go to the Emergency Room if you have severe pain, fever or problems with bowel or bladder control.

## 2019-03-29 NOTE — PROGRESS NOTES
RADIOLOGY PROCEDURE NOTE  Patient name: Stu Meredith  MRN: [id number]  : 1955    Pre-procedure diagnosis: Right hip pain  Post-procedure diagnosis: Same    Procedure Date/Time: 2019  3:13 PM  Procedure: Right hip steroid injection  Estimated blood loss: None  Specimen(s) collected with description: none  The patient tolerated the procedure well with no immediate complications.  Significant findings:none    See imaging dictation for procedural details.    Provider name: Rene Duncan  Assistant(s):None

## 2019-04-03 ENCOUNTER — PRE VISIT (OUTPATIENT)
Dept: PULMONOLOGY | Facility: CLINIC | Age: 64
End: 2019-04-03

## 2019-04-03 ENCOUNTER — OFFICE VISIT (OUTPATIENT)
Dept: PULMONOLOGY | Facility: CLINIC | Age: 64
End: 2019-04-03
Attending: INTERNAL MEDICINE
Payer: COMMERCIAL

## 2019-04-03 VITALS
WEIGHT: 215.7 LBS | SYSTOLIC BLOOD PRESSURE: 128 MMHG | OXYGEN SATURATION: 97 % | TEMPERATURE: 97.4 F | HEART RATE: 60 BPM | RESPIRATION RATE: 16 BRPM | HEIGHT: 71 IN | BODY MASS INDEX: 30.2 KG/M2 | DIASTOLIC BLOOD PRESSURE: 71 MMHG

## 2019-04-03 DIAGNOSIS — R91.8 LUNG NODULES: ICD-10-CM

## 2019-04-03 PROCEDURE — G0463 HOSPITAL OUTPT CLINIC VISIT: HCPCS | Mod: ZF

## 2019-04-03 ASSESSMENT — PAIN SCALES - GENERAL: PAINLEVEL: NO PAIN (0)

## 2019-04-03 ASSESSMENT — MIFFLIN-ST. JEOR: SCORE: 1790.28

## 2019-04-03 NOTE — PROGRESS NOTES
[facility] Initial Visit    Reason for Visit  Stu Meredith is a 63 year old male who is referred by Dr Zambrano for abnormal lung cancer screening exam  Pulmonary HPI    - recent episode of bronchitis, just getting over it, still coughing a bit. Treated with azithromycin at an ED in [place].   - albuterol inhaler once a week for shortness of breath, wheezing coughing    Other active medical problems include:   - atrial fibrillation s/p ablations    - hypertrophic cardiomyopathy  - hoping to have a right hip replacement soon    Exposure history: Denies asbestos or radon exposure   TB risk factors: No  Prior Imaging:Yes  Constitutional Symptoms: No  Personal history of cancer:No  Up to date on age-appropriate cancer screening:Yes    ROS Pulmonary  Dyspnea: No, Cough: Yes, Chest pain: No, Wheezing: No, Sputum Production: No, Hemoptysis: No  A complete ROS was otherwise negative except as noted in the HPI.  The patient was seen and examined by Andreia Julien MD   Current Outpatient Medications   Medication     acetaminophen (TYLENOL) 325 MG tablet     albuterol (PROAIR HFA/PROVENTIL HFA/VENTOLIN HFA) 108 (90 Base) MCG/ACT inhaler     atorvastatin (LIPITOR) 20 MG tablet     metFORMIN (GLUCOPHAGE-XR) 500 MG 24 hr tablet     metoprolol succinate (TOPROL-XL) 50 MG 24 hr tablet     multivitamin, therapeutic (THERA-VIT) TABS     sotalol (BETAPACE) 120 MG tablet     spironolactone (ALDACTONE) 50 MG tablet     XARELTO 20 MG TABS tablet     zolpidem (AMBIEN) 10 MG tablet       No Known Allergies  Social History     Socioeconomic History     Marital status:      Spouse name: Not on file     Number of children: Not on file     Years of education: Not on file     Highest education level: Not on file   Occupational History     Occupation:      Employer: [employer]eds     Financial resource strain: Not on file     Food insecurity:     Worry: Not on  file     Inability: Not on file     Transportation needs:     Medical: Not on file     Non-medical: Not on file   Tobacco Use     Smoking status: Former Smoker     Packs/day: 1.00     Years: 40.00     Pack years: 40.00     Types: Cigarettes     Start date: 1/1/1975     Last attempt to quit: 12/11/2015     Years since quitting: 3.3     Smokeless tobacco: Never Used   Substance and Sexual Activity     Alcohol use: Yes     Alcohol/week: 0.0 oz     Comment: 1 drink per week     Drug use: No     Sexual activity: Yes     Partners: Female   Lifestyle     Physical activity:     Days per week: Not on file     Minutes per session: Not on file     Stress: Not on file   Relationships     Social connections:     Talks on phone: Not on file     Gets together: Not on file     Attends Hoahaoism service: Not on file     Active member of club or organization: Not on file     Attends meetings of clubs or organizations: Not on file     Relationship status: Not on file     Intimate partner violence:     Fear of current or ex partner: Not on file     Emotionally abused: Not on file     Physically abused: Not on file     Forced sexual activity: Not on file   Other Topics Concern      Service Not Asked     Blood Transfusions Not Asked     Caffeine Concern Not Asked     Occupational Exposure Not Asked     Hobby Hazards Not Asked     Sleep Concern Not Asked     Stress Concern Not Asked     Weight Concern Not Asked     Special Diet Not Asked     Back Care Not Asked     Exercise No     Bike Helmet Not Asked     Seat Belt Yes     Self-Exams Not Asked     Parent/sibling w/ CABG, MI or angioplasty before 65F 55M? No   Social History Narrative     Not on file     Past Medical History:   Diagnosis Date     Atrial fibrillation (H) 12/9/11    failed medication, multiple DC cardioveresions; s/p Left atrial ablation to eliminate atrial fibrillation 12/9/11     Chest pain      CHF (congestive heart failure) (H) 7/30/2016     Coronary artery  disease      DJD (degenerative joint disease)      Hip arthritis 1/15/2014     Hypertension      Hypertrophic cardiomyopathy (H) 10/09     Other abnormal heart sounds      Pacemaker     ICD     Palpitations      Pneumonia, organism unspecified(486)      Prediabetes 7/10/15    A1C 6.5     Status post implantation of automatic cardioverter/defibrillator (AICD)      Ventricular tachycardia (H)      Past Surgical History:   Procedure Laterality Date     ANESTHESIA CARDIOVERSION  4/24/2014    Procedure: ANESTHESIA CARDIOVERSION;  Surgeon: Generic Anesthesia Provider;  [facility]     ANESTHESIA CARDIOVERSION N/A 5/12/2016    Procedure: ANESTHESIA CARDIOVERSION;  Surgeon: GENERIC ANESTHESIA PROVIDER;  [facility]     ANESTHESIA CARDIOVERSION N/A 8/7/2017    Procedure: ANESTHESIA CARDIOVERSION;  Anesthesia Offsite Coverage Cardioversion @1100;  Surgeon: GENERIC ANESTHESIA PROVIDER;  [facility]     ANESTHESIA CARDIOVERSION N/A 1/3/2018    Procedure: ANESTHESIA CARDIOVERSION;  Anesthesia Cardioverion;  Surgeon: GENERIC ANESTHESIA PROVIDER;  [facility]     ANESTHESIA CARDIOVERSION N/A 5/4/2018    Procedure: ANESTHESIA CARDIOVERSION;  Anesthesia Coverage Cardioversion @1400;  Surgeon: GENERIC ANESTHESIA PROVIDER;  [facility]     ANESTHESIA CARDIOVERSION N/A 9/27/2018    Procedure: ANESTHESIA CARDIOVERSION;  Anesthesia Cardioversion @0930;  Surgeon: GENERIC ANESTHESIA PROVIDER;  [facility]     ANESTHESIA CARDIOVERSION N/A 12/20/2018    Procedure: Anesthesia Coverage Cardioversion @0830;  Surgeon: GENERIC ANESTHESIA PROVIDER;  Location:  OR     ARTHROPLASTY HIP  1/15/2014    Procedure: ARTHROPLASTY HIP;  Left Total Hip Arthroplasty;  Surgeon: Nelson Gaspar MD;  Location: [facility]     CARDIAC SURGERY       COLONOSCOPY N/A 5/18/2018    Procedure: COMBINED COLONOSCOPY, SINGLE OR MULTIPLE BIOPSY/POLYPECTOMY BY BIOPSY;  COLONOSCOPY (PT HAS DEFIBRILLATOR) ;  Surgeon: Mike Nickerson MD;  Location:   "GI     H ABLATION FOCAL AFIB  12/9/11    Left atrial ablation to eliminate atrial fibrillation     IMPLANT AUTOMATIC IMPLANTABLE CARDIOVERTER DEFIBRILLATOR  7/27/12    AICD implantation     TONSILLECTOMY  1964     Family History   Problem Relation Age of Onset     Heart Disease Mother         unknown     Obesity Mother      Gastrointestinal Disease Mother         diverticulitis     Diabetes Maternal Grandmother      Diabetes Paternal Grandmother      Cerebrovascular Disease Paternal Grandmother 94     Diabetes Paternal Grandfather      Cerebrovascular Disease Paternal Grandfather 78     Diabetes Son      C.A.D. Father         CABG age 78     Heart Disease Father         CABG x5     Diabetes Maternal Grandfather        Exam:   /71 (BP Location: Right arm, Patient Position: Chair, Cuff Size: Adult Regular)   Pulse 60   Temp 97.4  F (36.3  C) (Tympanic)   Resp 16   Ht 1.803 m (5' 10.98\")   Wt 97.8 kg (215 lb 11.2 oz)   SpO2 97%   BMI 30.10 kg/m    GENERAL APPEARANCE: Well developed, well nourished, alert, and in no apparent distress.  EYES: PERRL, EOMI  HENT: Nasal mucosa with no edema and no hyperemia. No nasal polyps.  EARS: Canals clear, TMs normal  MOUTH: Oral mucosa is moist, without any lesions, no tonsillar enlargement, no oropharyngeal exudate.  NECK: supple, no masses, no thyromegaly.  LYMPHATICS: No significant axillary, cervical, or supraclavicular nodes.  RESP: normal percussion, good air flow throughout.  No crackles. No rhonchi. No wheezes.  CV: Normal S1, S2, regular rhythm, normal rate. No murmur.  No rub. No gallop. No LE edema.   ABDOMEN:  Bowel sounds normal, soft, nontender, no HSM or masses.   MS: extremities normal. No clubbing. No cyanosis.  SKIN: no rash on limited exam  NEURO: Mentation intact, speech normal, normal strength and tone, normal gait and stance  PSYCH: mentation appears normal. and affect normal/bright  Results:  - My interpretation of the images relevant for this " visit includes: lung cancer screening CT 3/22/19 2 new nodules, two stable from 2016.    - My interpretation of the PFT's relevant for this visit includes: None     Culprit Nodule(s):   1: Right upper lobe nodule and is 9 mm in size/severity and non-calcified and smooth in morphology/quality. First seen by chest CT on 3/22/19. First observed on this date .  2: Right lower lobe nodule and is 5 mm in size/severity and non-calcified in morphology/quality. First seen by chest CT on 3/22/19. First observed on this date .    Assessment and plan: Haroon is a 63-year-old male being evaluated for abnormal lung cancer screening CT.  Lung nodule - seen on screening CT, unfortunately he was just getting over bronchitis and received antibiotic therapy about 10 days prior to the CT.   Considering patient with high risk for lung cancer, recommend follow up CT in  2 months

## 2019-04-03 NOTE — PATIENT INSTRUCTIONS
There are two lung new nodules, we will keep a close eye on them with repeat CT in a couple of months.

## 2019-04-03 NOTE — NURSING NOTE
"Oncology Rooming Note    April 3, 2019 5:01 PM   Stu Meredith is a 63 year old male who presents for:    Chief Complaint   Patient presents with     Oncology Clinic Visit     [facility] NEW- LUNG NODULE     Initial Vitals: /71 (BP Location: Right arm, Patient Position: Chair, Cuff Size: Adult Regular)   Pulse 60   Temp 97.4  F (36.3  C) (Tympanic)   Resp 16   Ht 1.803 m (5' 10.98\")   Wt 97.8 kg (215 lb 11.2 oz)   SpO2 97%   BMI 30.10 kg/m   Estimated body mass index is 30.1 kg/m  as calculated from the following:    Height as of this encounter: 1.803 m (5' 10.98\").    Weight as of this encounter: 97.8 kg (215 lb 11.2 oz). Body surface area is 2.21 meters squared.  No Pain (0) Comment: Data Unavailable   No LMP for male patient.  Allergies reviewed: Yes  Medications reviewed: Yes    Medications: Medication refills not needed today.  Pharmacy name entered into EPIC:    [facility] - [place], [place] - [address] [facility] [id number] - [facility] [address] [place] [facility] [address]    Clinical concerns: No new concerns. Anaya was notified.      Everardo Hirsch LPN            "

## 2019-04-03 NOTE — LETTER
4/3/2019       RE: Stu Meredith  [address]  [facility] [address]     Dear Colleague,    Thank you for referring your patient, Stu Meredith, to the [facility] at [facility]. Please see a copy of my visit note below.    [facility] Initial Visit    Reason for Visit  Stu Meredith is a 63 year old male who is referred by Dr Zambrano for abnormal lung cancer screening exam  Pulmonary HPI    - recent episode of bronchitis, just getting over it, still coughing a bit. Treated with azithromycin at an ED in [place].   - albuterol inhaler once a week for shortness of breath, wheezing coughing    Other active medical problems include:   - atrial fibrillation s/p ablations    - hypertrophic cardiomyopathy  - hoping to have a right hip replacement soon    Exposure history: Denies asbestos or radon exposure   TB risk factors: No  Prior Imaging:Yes  Constitutional Symptoms: No  Personal history of cancer:No  Up to date on age-appropriate cancer screening:Yes    ROS Pulmonary  Dyspnea: No, Cough: Yes, Chest pain: No, Wheezing: No, Sputum Production: No, Hemoptysis: No  A complete ROS was otherwise negative except as noted in the HPI.  The patient was seen and examined by Andreia Julien MD   Current Outpatient Medications   Medication     acetaminophen (TYLENOL) 325 MG tablet     albuterol (PROAIR HFA/PROVENTIL HFA/VENTOLIN HFA) 108 (90 Base) MCG/ACT inhaler     atorvastatin (LIPITOR) 20 MG tablet     metFORMIN (GLUCOPHAGE-XR) 500 MG 24 hr tablet     metoprolol succinate (TOPROL-XL) 50 MG 24 hr tablet     multivitamin, therapeutic (THERA-VIT) TABS     sotalol (BETAPACE) 120 MG tablet     spironolactone (ALDACTONE) 50 MG tablet     XARELTO 20 MG TABS tablet     zolpidem (AMBIEN) 10 MG tablet       No Known Allergies  Social History     Socioeconomic History     Marital status:      Spouse name: Not on file      Number of children: Not on file     Years of education: Not on file     Highest education level: Not on file   Occupational History     Occupation:      Employer: [employer]   Social Needs     Financial resource strain: Not on file     Food insecurity:     Worry: Not on file     Inability: Not on file     Transportation needs:     Medical: Not on file     Non-medical: Not on file   Tobacco Use     Smoking status: Former Smoker     Packs/day: 1.00     Years: 40.00     Pack years: 40.00     Types: Cigarettes     Start date: 1/1/1975     Last attempt to quit: 12/11/2015     Years since quitting: 3.3     Smokeless tobacco: Never Used   Substance and Sexual Activity     Alcohol use: Yes     Alcohol/week: 0.0 oz     Comment: 1 drink per week     Drug use: No     Sexual activity: Yes     Partners: Female   Lifestyle     Physical activity:     Days per week: Not on file     Minutes per session: Not on file     Stress: Not on file   Relationships     Social connections:     Talks on phone: Not on file     Gets together: Not on file     Attends Protestant service: Not on file     Active member of club or organization: Not on file     Attends meetings of clubs or organizations: Not on file     Relationship status: Not on file     Intimate partner violence:     Fear of current or ex partner: Not on file     Emotionally abused: Not on file     Physically abused: Not on file     Forced sexual activity: Not on file   Other Topics Concern      Service Not Asked     Blood Transfusions Not Asked     Caffeine Concern Not Asked     Occupational Exposure Not Asked     Hobby Hazards Not Asked     Sleep Concern Not Asked     Stress Concern Not Asked     Weight Concern Not Asked     Special Diet Not Asked     Back Care Not Asked     Exercise No     Bike Helmet Not Asked     Seat Belt Yes     Self-Exams Not Asked     Parent/sibling w/ CABG, MI or angioplasty before 65F 55M? No   Social History Narrative     Not  on file     Past Medical History:   Diagnosis Date     Atrial fibrillation (H) 12/9/11    failed medication, multiple DC cardioveresions; s/p Left atrial ablation to eliminate atrial fibrillation 12/9/11     Chest pain      CHF (congestive heart failure) (H) 7/30/2016     Coronary artery disease      DJD (degenerative joint disease)      Hip arthritis 1/15/2014     Hypertension      Hypertrophic cardiomyopathy (H) 10/09     Other abnormal heart sounds      Pacemaker     ICD     Palpitations      Pneumonia, organism unspecified(486)      Prediabetes 7/10/15    A1C 6.5     Status post implantation of automatic cardioverter/defibrillator (AICD)      Ventricular tachycardia (H)      Past Surgical History:   Procedure Laterality Date     ANESTHESIA CARDIOVERSION  4/24/2014    Procedure: ANESTHESIA CARDIOVERSION;  Surgeon: Generic Anesthesia Provider;  [facility]: [facility]     ANESTHESIA CARDIOVERSION N/A 5/12/2016    Procedure: ANESTHESIA CARDIOVERSION;  Surgeon: GENERIC ANESTHESIA PROVIDER;  [facility]     ANESTHESIA CARDIOVERSION N/A 8/7/2017    Procedure: ANESTHESIA CARDIOVERSION;  Anesthesia Offsite Coverage Cardioversion @1100;  Surgeon: GENERIC ANESTHESIA PROVIDER;  [facility]     ANESTHESIA CARDIOVERSION N/A 1/3/2018    Procedure: ANESTHESIA CARDIOVERSION;  Anesthesia Cardioverion;  Surgeon: GENERIC ANESTHESIA PROVIDER;  [facility]     ANESTHESIA CARDIOVERSION N/A 5/4/2018    Procedure: ANESTHESIA CARDIOVERSION;  Anesthesia Coverage Cardioversion @1400;  Surgeon: GENERIC ANESTHESIA PROVIDER;  [facility]     ANESTHESIA CARDIOVERSION N/A 9/27/2018    Procedure: ANESTHESIA CARDIOVERSION;  Anesthesia Cardioversion @0930;  Surgeon: GENERIC ANESTHESIA PROVIDER;  [facility]: [facility]     ANESTHESIA CARDIOVERSION N/A 12/20/2018    Procedure: Anesthesia Coverage Cardioversion @0830;  Surgeon: GENERIC ANESTHESIA PROVIDER;  [facility]: [facility]     ARTHROPLASTY HIP  1/15/2014    Procedure: ARTHROPLASTY HIP;  Left Total  "Hip Arthroplasty;  Surgeon: Nelson Gaspar MD;  [facility]     CARDIAC SURGERY       COLONOSCOPY N/A 5/18/2018    Procedure: COMBINED COLONOSCOPY, SINGLE OR MULTIPLE BIOPSY/POLYPECTOMY BY BIOPSY;  COLONOSCOPY (PT HAS DEFIBRILLATOR) ;  Surgeon: Mike Nickerson MD;  Location: [facility]     H ABLATION FOCAL AFIB  12/9/11    Left atrial ablation to eliminate atrial fibrillation     IMPLANT AUTOMATIC IMPLANTABLE CARDIOVERTER DEFIBRILLATOR  7/27/12    AICD implantation     TONSILLECTOMY  1964     Family History   Problem Relation Age of Onset     Heart Disease Mother         unknown     Obesity Mother      Gastrointestinal Disease Mother         diverticulitis     Diabetes Maternal Grandmother      Diabetes Paternal Grandmother      Cerebrovascular Disease Paternal Grandmother 94     Diabetes Paternal Grandfather      Cerebrovascular Disease Paternal Grandfather 78     Diabetes Son      C.A.D. Father         CABG age 78     Heart Disease Father         CABG x5     Diabetes Maternal Grandfather        Exam:   /71 (BP Location: Right arm, Patient Position: Chair, Cuff Size: Adult Regular)   Pulse 60   Temp 97.4  F (36.3  C) (Tympanic)   Resp 16   Ht 1.803 m (5' 10.98\")   Wt 97.8 kg (215 lb 11.2 oz)   SpO2 97%   BMI 30.10 kg/m     GENERAL APPEARANCE: Well developed, well nourished, alert, and in no apparent distress.  EYES: PERRL, EOMI  HENT: Nasal mucosa with no edema and no hyperemia. No nasal polyps.  EARS: Canals clear, TMs normal  MOUTH: Oral mucosa is moist, without any lesions, no tonsillar enlargement, no oropharyngeal exudate.  NECK: supple, no masses, no thyromegaly.  LYMPHATICS: No significant axillary, cervical, or supraclavicular nodes.  RESP: normal percussion, good air flow throughout.  No crackles. No rhonchi. No wheezes.  CV: Normal S1, S2, regular rhythm, normal rate. No murmur.  No rub. No gallop. No LE edema.   ABDOMEN:  Bowel sounds normal, soft, nontender, no HSM or masses.   MS: " extremities normal. No clubbing. No cyanosis.  SKIN: no rash on limited exam  NEURO: Mentation intact, speech normal, normal strength and tone, normal gait and stance  PSYCH: mentation appears normal. and affect normal/bright  Results:  - My interpretation of the images relevant for this visit includes: lung cancer screening CT 3/22/19 2 new nodules, two stable from 2016.    - My interpretation of the PFT's relevant for this visit includes: None     Culprit Nodule(s):   1: Right upper lobe nodule and is 9 mm in size/severity and non-calcified and smooth in morphology/quality. First seen by chest CT on 3/22/19. First observed on this date .  2: Right lower lobe nodule and is 5 mm in size/severity and non-calcified in morphology/quality. First seen by chest CT on 3/22/19. First observed on this date .    Assessment and plan: Haroon is a 63-year-old male being evaluated for abnormal lung cancer screening CT.  Lung nodule - seen on screening CT, unfortunately he was just getting over bronchitis and received antibiotic therapy about 10 days prior to the CT.   Considering patient with high risk for lung cancer, recommend follow up CT in  2 months     Again, thank you for allowing me to participate in the care of your patient.      Sincerely,    Andreia Julien MD

## 2019-04-08 ENCOUNTER — TELEPHONE (OUTPATIENT)
Dept: INTERNAL MEDICINE | Facility: CLINIC | Age: 64
End: 2019-04-08

## 2019-04-08 NOTE — TELEPHONE ENCOUNTER
Patient was reached by phone and RN relayed that Dr. Zambrano has openings for pre-op scheduling, but patient will have to call for appt to consult with Dr. Gaspar about questions regarding surgery, such as how long the recovery timeframe may be.    Estella Mercado RN on 4/8/2019 at 4:25 PM

## 2019-04-08 NOTE — TELEPHONE ENCOUNTER
[facility] Call Center    Phone Message    May a detailed message be left on voicemail: yes    Reason for Call: Other: the patient wants to speak to care team asap about Hip replacement and recommendations and he also had a few other things he want to go over but patient didnt want to share per patient      Action Taken: Message routed to:  [facility]

## 2019-04-09 ENCOUNTER — MYC REFILL (OUTPATIENT)
Dept: INTERNAL MEDICINE | Facility: CLINIC | Age: 64
End: 2019-04-09

## 2019-04-09 DIAGNOSIS — E11.9 TYPE 2 DIABETES MELLITUS WITHOUT COMPLICATION, WITHOUT LONG-TERM CURRENT USE OF INSULIN (H): ICD-10-CM

## 2019-04-10 RX ORDER — METFORMIN HCL 500 MG
TABLET, EXTENDED RELEASE 24 HR ORAL
Qty: 180 TABLET | Refills: 3 | Status: SHIPPED | OUTPATIENT
Start: 2019-04-10 | End: 2019-12-17

## 2019-04-10 NOTE — TELEPHONE ENCOUNTER
Current order from 3-6-19 changed to appropriate qty for 2 tabs daily and sent to requested pharmacy.

## 2019-04-11 ENCOUNTER — OFFICE VISIT (OUTPATIENT)
Dept: ORTHOPEDICS | Facility: CLINIC | Age: 64
End: 2019-04-11
Payer: COMMERCIAL

## 2019-04-11 VITALS — HEIGHT: 70 IN | WEIGHT: 215 LBS | BODY MASS INDEX: 30.78 KG/M2

## 2019-04-11 DIAGNOSIS — M25.551 HIP PAIN, RIGHT: Primary | ICD-10-CM

## 2019-04-11 DIAGNOSIS — M25.551 RIGHT HIP PAIN: Primary | ICD-10-CM

## 2019-04-11 ASSESSMENT — MIFFLIN-ST. JEOR: SCORE: 1771.48

## 2019-04-11 ASSESSMENT — HOOS S4: HOW SEVERE IS YOUR HIP JOINT STIFFNESS AFTER FIRST WAKENING IN THE MORNING?: MILD

## 2019-04-11 ASSESSMENT — ACTIVITIES OF DAILY LIVING (ADL)
ADL_SUM: 34
ADL_SUBSCALE_SCORE: 50
ADL_MEAN: 2

## 2019-04-11 NOTE — LETTER
"4/11/2019       RE: Stu Meredith  [address] [facility] [address]     Dear Colleague,    Thank you for referring your patient, Stu Meredith, to the [facility]. Please see a copy of my visit note below.    Chief Complaint: RECHECK (follow up right hip to discuss surgery )     HPI: Stu Meredith returns today in follow-up for his right hip. He reports that the left hip, s/p arthroplasty, is doing very well. He has no pain on the left. The right hip, however, is severely painful. Associated with OA. H has had two intra-articular steroid injection with good but short term relief of symptoms. He presents today to discuss right total hip.     Medications and allergies are documented in the EMR and have been reviewed.  Physical Exam:  On physical examination the patient appears the stated age, is in no acute distress, affect is appropriate, and breathing is non-labored.  Ht 1.778 m (5' 10\")   Wt 97.5 kg (215 lb)   BMI 30.85 kg/m     Body mass index is 30.85 kg/m .  Gait: antalgic favoring the right   Right hip is irritated and flexion and FADIR reproduce his symptoms.   Distally, the circulatory, motor, and sensation exam is intact with 5/5 EHL, gastroc-soleus, and tibialis anterior.  Sensation to light touch is intact.  Dorsalis pedis and posterior tibialis pulses are palpable.  There are no sores on the feet, no bruising, and no lymphedema.    X-rays:    I reviewed the x-rays dated today.  Previous films reviewed.    Findings:  Normal progression for a left total hip arthroplasty without evidence of loosening or subsidence. Moderate to advanced right hip osteoarthritis.     Assessment: left hip doing well. Function and ADLs significantly impacted by severe right hip pain that has not responded well to comprehensive non-operative management including activity modification, oral pain medication, and two intra-articular steroid " injections. He would like to proceed with total hip and this is reasonable. We went over the implants, the procedure, the risks and benefits, and the post-operative course.  We discussed blood clots, blood clots to the lungs, injury to blood vessels and nerves, dislocation, infection, and leg length difference.  All the patients questions were answered to the best of my ability.    Plan: right total hip. As his last injectino was last month this should not be done for two months. PAC visit.     Referred Self    Again, thank you for allowing me to participate in the care of your patient.      Sincerely,    Nelson Gaspar MD

## 2019-04-11 NOTE — NURSING NOTE
"Reason For Visit:   Chief Complaint   Patient presents with     RECHECK     follow up right hip to discuss surgery        Ht 1.778 m (5' 10\")   Wt 97.5 kg (215 lb)   BMI 30.85 kg/m      Pain Assessment  Patient Currently in Pain: Yes  0-10 Pain Scale: 6  Primary Pain Location: Hip    Sae Bloom ATC  "

## 2019-04-11 NOTE — PROGRESS NOTES
"Chief Complaint: RECHECK (follow up right hip to discuss surgery )     HPI: Stu Meredith returns today in follow-up for his right hip. He reports that the left hip, s/p arthroplasty, is doing very well. He has no pain on the left. The right hip, however, is severely painful. Associated with OA. H has had two intra-articular steroid injection with good but short term relief of symptoms. He presents today to discuss right total hip.     Medications and allergies are documented in the EMR and have been reviewed.  Physical Exam:  On physical examination the patient appears the stated age, is in no acute distress, affect is appropriate, and breathing is non-labored.  Ht 1.778 m (5' 10\")   Wt 97.5 kg (215 lb)   BMI 30.85 kg/m    Body mass index is 30.85 kg/m .  Gait: antalgic favoring the right   Right hip is irritated and flexion and FADIR reproduce his symptoms.   Distally, the circulatory, motor, and sensation exam is intact with 5/5 EHL, gastroc-soleus, and tibialis anterior.  Sensation to light touch is intact.  Dorsalis pedis and posterior tibialis pulses are palpable.  There are no sores on the feet, no bruising, and no lymphedema.    X-rays:    I reviewed the x-rays dated today.  Previous films reviewed.    Findings:  Normal progression for a left total hip arthroplasty without evidence of loosening or subsidence. Moderate to advanced right hip osteoarthritis.     Assessment: left hip doing well. Function and ADLs significantly impacted by severe right hip pain that has not responded well to comprehensive non-operative management including activity modification, oral pain medication, and two intra-articular steroid injections. He would like to proceed with total hip and this is reasonable. We went over the implants, the procedure, the risks and benefits, and the post-operative course.  We discussed blood clots, blood clots to the lungs, injury to blood vessels and nerves, dislocation, infection, and leg " length difference.  All the patients questions were answered to the best of my ability.    Plan: right total hip. As his last injectino was last month this should not be done for two months. PAC visit.     Referred Self

## 2019-04-12 ENCOUNTER — DOCUMENTATION ONLY (OUTPATIENT)
Dept: ORTHOPEDICS | Facility: CLINIC | Age: 64
End: 2019-04-12

## 2019-04-12 NOTE — NURSING NOTE
Teaching Flowsheet   Relevant Diagnosis: Right hip osteoarthritis  Teaching Topic: Right total hip arthroplasty.    Patient lives with his wife. Health history positive for MI in 2013, atrial fibrillation, last ablation 7/2018, hypertrophic cardiomyopathy, elevated A1C on Metformin, lung nodules on CT, ICD in place. Patient will see PAC.     Person(s) involved in teaching:   Patient     Motivation Level:  Asks Questions: Yes  Eager to Learn: Yes  Cooperative: Yes  Receptive (willing/able to accept information): Yes  Any cultural factors/[place] beliefs that may influence understanding or compliance? No     Patient demonstrates understanding of the following:  Reason for the appointment, diagnosis and treatment plan: Yes  Knowledge of proper use of medications and conditions for which they are ordered (with special attention to potential side effects or drug interactions): Yes  Which situations necessitate calling provider and whom to contact: Yes     Teaching Concerns Addressed: Patient will see the PAC clinic for his preoperative clearance.      Proper use and care of assistive devices. (medical equip, care aids, etc.): Yes  Nutritional needs and diet plan: NA  Pain management techniques: Yes  Wound Care: Yes  How and/when to access community resources: Yes     Instructional Materials Used/Given: Preoperative teaching packet, surgical soap, dental card, total joint class booklet and flyer.

## 2019-04-12 NOTE — PROGRESS NOTES
Patient is scheduled for surgery with Dr. Gaspar    Spoke or left message with: patient via phone call     Date of Surgery: 6/5/19     Location: [place]     Informed patient they will need an adult  yes    Post-ops Made: 2 wks with louise, 6 wks with Dr. Gaspar     Pre-op with surgeon (if applicable): yes     H&P: Scheduled with PAC 5/10/19     Additional imaging/appointments: n/a     Surgery packet: given in clinic      Additional comments: n/a

## 2019-04-22 ENCOUNTER — ANCILLARY PROCEDURE (OUTPATIENT)
Dept: CARDIOLOGY | Facility: CLINIC | Age: 64
End: 2019-04-22
Attending: INTERNAL MEDICINE
Payer: COMMERCIAL

## 2019-04-22 DIAGNOSIS — I47.20 VENTRICULAR TACHYCARDIA (H): ICD-10-CM

## 2019-04-22 PROCEDURE — 93294 REM INTERROG EVL PM/LDLS PM: CPT | Performed by: INTERNAL MEDICINE

## 2019-04-22 PROCEDURE — 93296 REM INTERROG EVL PM/IDS: CPT | Mod: ZF

## 2019-04-24 DIAGNOSIS — I48.91 ATRIAL FIBRILLATION (H): Primary | ICD-10-CM

## 2019-04-24 RX ORDER — LIDOCAINE 40 MG/G
CREAM TOPICAL
Status: CANCELLED | OUTPATIENT
Start: 2019-04-24

## 2019-04-25 LAB
MDC_IDC_EPISODE_DTM: NORMAL
MDC_IDC_EPISODE_DURATION: 115 S
MDC_IDC_EPISODE_DURATION: 115 S
MDC_IDC_EPISODE_DURATION: 17 S
MDC_IDC_EPISODE_DURATION: 21 S
MDC_IDC_EPISODE_DURATION: 21 S
MDC_IDC_EPISODE_DURATION: 222 S
MDC_IDC_EPISODE_DURATION: 260 S
MDC_IDC_EPISODE_DURATION: 267 S
MDC_IDC_EPISODE_DURATION: 41 S
MDC_IDC_EPISODE_DURATION: 5 S
MDC_IDC_EPISODE_DURATION: 6 S
MDC_IDC_EPISODE_DURATION: 7 S
MDC_IDC_EPISODE_DURATION: 90 S
MDC_IDC_EPISODE_DURATION: NORMAL S
MDC_IDC_EPISODE_DURATION: NORMAL S
MDC_IDC_EPISODE_ID: NORMAL
MDC_IDC_EPISODE_TYPE: NORMAL
MDC_IDC_EPISODE_VENDOR_TYPE: NORMAL
MDC_IDC_EPISODE_VENDOR_TYPE: NORMAL
MDC_IDC_LEAD_IMPLANT_DT: NORMAL
MDC_IDC_LEAD_IMPLANT_DT: NORMAL
MDC_IDC_LEAD_LOCATION: NORMAL
MDC_IDC_LEAD_LOCATION: NORMAL
MDC_IDC_LEAD_MFG: NORMAL
MDC_IDC_LEAD_MFG: NORMAL
MDC_IDC_LEAD_MODEL: NORMAL
MDC_IDC_LEAD_MODEL: NORMAL
MDC_IDC_LEAD_POLARITY_TYPE: NORMAL
MDC_IDC_LEAD_POLARITY_TYPE: NORMAL
MDC_IDC_LEAD_SERIAL: NORMAL
MDC_IDC_LEAD_SERIAL: NORMAL
MDC_IDC_MSMT_BATTERY_DTM: NORMAL
MDC_IDC_MSMT_BATTERY_REMAINING_LONGEVITY: 48 MO
MDC_IDC_MSMT_BATTERY_REMAINING_PERCENTAGE: 56 %
MDC_IDC_MSMT_BATTERY_STATUS: NORMAL
MDC_IDC_MSMT_CAP_CHARGE_DTM: NORMAL
MDC_IDC_MSMT_CAP_CHARGE_DTM: NORMAL
MDC_IDC_MSMT_CAP_CHARGE_ENERGY: 11 J
MDC_IDC_MSMT_CAP_CHARGE_TIME: 1.9 S
MDC_IDC_MSMT_CAP_CHARGE_TIME: 10.2 S
MDC_IDC_MSMT_CAP_CHARGE_TYPE: NORMAL
MDC_IDC_MSMT_CAP_CHARGE_TYPE: NORMAL
MDC_IDC_MSMT_LEADCHNL_RA_IMPEDANCE_VALUE: 912 OHM
MDC_IDC_MSMT_LEADCHNL_RA_PACING_THRESHOLD_AMPLITUDE: 0.5 V
MDC_IDC_MSMT_LEADCHNL_RA_PACING_THRESHOLD_PULSEWIDTH: 0.5 MS
MDC_IDC_MSMT_LEADCHNL_RV_IMPEDANCE_VALUE: 499 OHM
MDC_IDC_MSMT_LEADCHNL_RV_PACING_THRESHOLD_AMPLITUDE: 0.8 V
MDC_IDC_MSMT_LEADCHNL_RV_PACING_THRESHOLD_PULSEWIDTH: 0.5 MS
MDC_IDC_PG_IMPLANT_DTM: NORMAL
MDC_IDC_PG_MFG: NORMAL
MDC_IDC_PG_MODEL: NORMAL
MDC_IDC_PG_SERIAL: NORMAL
MDC_IDC_PG_TYPE: NORMAL
MDC_IDC_SESS_CLINIC_NAME: NORMAL
MDC_IDC_SESS_DTM: NORMAL
MDC_IDC_SESS_TYPE: NORMAL
MDC_IDC_SET_BRADY_AT_MODE_SWITCH_MODE: NORMAL
MDC_IDC_SET_BRADY_AT_MODE_SWITCH_RATE: 170 {BEATS}/MIN
MDC_IDC_SET_BRADY_LOWRATE: 60 {BEATS}/MIN
MDC_IDC_SET_BRADY_MAX_SENSOR_RATE: 120 {BEATS}/MIN
MDC_IDC_SET_BRADY_MAX_TRACKING_RATE: 120 {BEATS}/MIN
MDC_IDC_SET_BRADY_MODE: NORMAL
MDC_IDC_SET_BRADY_PAV_DELAY_HIGH: 260 MS
MDC_IDC_SET_BRADY_PAV_DELAY_LOW: 390 MS
MDC_IDC_SET_BRADY_SAV_DELAY_HIGH: 260 MS
MDC_IDC_SET_BRADY_SAV_DELAY_LOW: 390 MS
MDC_IDC_SET_LEADCHNL_RA_PACING_AMPLITUDE: 2.4 V
MDC_IDC_SET_LEADCHNL_RA_PACING_POLARITY: NORMAL
MDC_IDC_SET_LEADCHNL_RA_PACING_PULSEWIDTH: 0.5 MS
MDC_IDC_SET_LEADCHNL_RA_SENSING_ADAPTATION_MODE: NORMAL
MDC_IDC_SET_LEADCHNL_RA_SENSING_POLARITY: NORMAL
MDC_IDC_SET_LEADCHNL_RA_SENSING_SENSITIVITY: 0.25 MV
MDC_IDC_SET_LEADCHNL_RV_PACING_AMPLITUDE: 2.4 V
MDC_IDC_SET_LEADCHNL_RV_PACING_POLARITY: NORMAL
MDC_IDC_SET_LEADCHNL_RV_PACING_PULSEWIDTH: 0.5 MS
MDC_IDC_SET_LEADCHNL_RV_SENSING_ADAPTATION_MODE: NORMAL
MDC_IDC_SET_LEADCHNL_RV_SENSING_POLARITY: NORMAL
MDC_IDC_SET_LEADCHNL_RV_SENSING_SENSITIVITY: 0.6 MV
MDC_IDC_SET_ZONE_DETECTION_INTERVAL: 273 MS
MDC_IDC_SET_ZONE_DETECTION_INTERVAL: 333 MS
MDC_IDC_SET_ZONE_DETECTION_INTERVAL: 375 MS
MDC_IDC_SET_ZONE_TYPE: NORMAL
MDC_IDC_SET_ZONE_VENDOR_TYPE: NORMAL
MDC_IDC_STAT_BRADY_DTM_END: NORMAL
MDC_IDC_STAT_BRADY_DTM_START: NORMAL
MDC_IDC_STAT_BRADY_RA_PERCENT_PACED: 6 %
MDC_IDC_STAT_BRADY_RV_PERCENT_PACED: 3 %
MDC_IDC_STAT_EPISODE_RECENT_COUNT: 0
MDC_IDC_STAT_EPISODE_RECENT_COUNT: 194
MDC_IDC_STAT_EPISODE_RECENT_COUNT: 308
MDC_IDC_STAT_EPISODE_RECENT_COUNT_DTM_END: NORMAL
MDC_IDC_STAT_EPISODE_RECENT_COUNT_DTM_START: NORMAL
MDC_IDC_STAT_EPISODE_TYPE: NORMAL
MDC_IDC_STAT_EPISODE_VENDOR_TYPE: NORMAL
MDC_IDC_STAT_TACHYTHERAPY_ATP_DELIVERED_RECENT: 0
MDC_IDC_STAT_TACHYTHERAPY_ATP_DELIVERED_TOTAL: 1
MDC_IDC_STAT_TACHYTHERAPY_RECENT_DTM_END: NORMAL
MDC_IDC_STAT_TACHYTHERAPY_RECENT_DTM_START: NORMAL
MDC_IDC_STAT_TACHYTHERAPY_SHOCKS_ABORTED_RECENT: 0
MDC_IDC_STAT_TACHYTHERAPY_SHOCKS_ABORTED_TOTAL: 0
MDC_IDC_STAT_TACHYTHERAPY_SHOCKS_DELIVERED_RECENT: 0
MDC_IDC_STAT_TACHYTHERAPY_SHOCKS_DELIVERED_TOTAL: 1
MDC_IDC_STAT_TACHYTHERAPY_TOTAL_DTM_END: NORMAL
MDC_IDC_STAT_TACHYTHERAPY_TOTAL_DTM_START: NORMAL

## 2019-04-25 NOTE — PROGRESS NOTES
Reached out to patient to discuss his questions. Patient states he is back in atrial fibrillation and requests that Dr. Gaspar's team reach out to his cardiologist, Dr. Ware and his electrophysiologist, Dr. Cooper to coordinate periop anticoagulation as patient is on Xarelto as well as assuring clearance from cardiology for the surgery 6/5/19. Also discussed length of short-term disability for his surgery; patient will take 12 weeks beginning on 6/4. Writer will help coordinate with cardiology for surgery clearance.

## 2019-04-29 ENCOUNTER — PRE VISIT (OUTPATIENT)
Dept: SURGERY | Facility: CLINIC | Age: 64
End: 2019-04-29

## 2019-04-29 ENCOUNTER — TELEPHONE (OUTPATIENT)
Dept: CARDIOLOGY | Facility: CLINIC | Age: 64
End: 2019-04-29

## 2019-04-29 NOTE — PROGRESS NOTES
"Preliminary Device Interrogation Results.  Final physician signed paceart report to be scanned and attached.    [employer], dual chamber ICD, remote transmission received and reviewed. Device transmission sent per MD orders. Pt called reporting \"I think I'm in AF\". He is not feeling well. His presenting rhythm is AT/VS ~60 bpm. AT burden appears to be 100% since 10/22/18. He had an AT ablation 8/16/18 and his last DCCV was 9/27/18. Normal device function. AP= 29% and = 1%.  Lead trends appear stable. Battery estimates 4.5 years to CARMEN. Pt notified of transmission results. Transmission will be reviewed with Maci Dominguez NP.  Remote ICD transmission      " Primary Defect Length (In Cm): 0

## 2019-04-29 NOTE — TELEPHONE ENCOUNTER
EP Scheduling called the patient to schedule Cardioversion. The number [phone] was left for the patient to return the call and schedule the procedure.    The patient was busy in class and requested to call back to schedule the cardioversion.     Radha Willson  Periop Electrophysiology   [phone]

## 2019-04-29 NOTE — TELEPHONE ENCOUNTER
FUTURE VISIT INFORMATION      SURGERY INFORMATION:    Date: 19    Location:  [facility] OR    Surgeon:  Nelson Gaspar    Anesthesia Type:  Other    RECORDS REQUESTED FROM:       [facility]    Pertinent Medical History: Lung Nodules, Atrial fibrillation, hypertrophic tachycardia, congestive heart failure    Most recent EKG+ Tracin19    Most recent ECHO: 16    Most recent Cardiac Stress Test: 2/15/19    Most recent Coronary Angiogram: 16    Most recent PFT's: 11/15/13

## 2019-05-10 ENCOUNTER — OFFICE VISIT (OUTPATIENT)
Dept: SURGERY | Facility: CLINIC | Age: 64
End: 2019-05-10
Payer: COMMERCIAL

## 2019-05-10 ENCOUNTER — TELEPHONE (OUTPATIENT)
Dept: SURGERY | Facility: CLINIC | Age: 64
End: 2019-05-10

## 2019-05-10 ENCOUNTER — ANESTHESIA EVENT (OUTPATIENT)
Dept: SURGERY | Facility: CLINIC | Age: 64
End: 2019-05-10

## 2019-05-10 VITALS
RESPIRATION RATE: 18 BRPM | DIASTOLIC BLOOD PRESSURE: 79 MMHG | OXYGEN SATURATION: 97 % | BODY MASS INDEX: 34.84 KG/M2 | TEMPERATURE: 98 F | HEART RATE: 86 BPM | SYSTOLIC BLOOD PRESSURE: 125 MMHG | HEIGHT: 66 IN | WEIGHT: 216.8 LBS

## 2019-05-10 DIAGNOSIS — R61 NIGHT SWEATS: ICD-10-CM

## 2019-05-10 DIAGNOSIS — M25.551 HIP PAIN, CHRONIC, RIGHT: Primary | ICD-10-CM

## 2019-05-10 DIAGNOSIS — Z01.818 PREOP EXAMINATION: ICD-10-CM

## 2019-05-10 DIAGNOSIS — E11.9 TYPE 2 DIABETES MELLITUS WITHOUT COMPLICATION, WITHOUT LONG-TERM CURRENT USE OF INSULIN (H): ICD-10-CM

## 2019-05-10 DIAGNOSIS — G89.29 HIP PAIN, CHRONIC, RIGHT: Primary | ICD-10-CM

## 2019-05-10 DIAGNOSIS — Z00.00 ROUTINE ADULT HEALTH MAINTENANCE: ICD-10-CM

## 2019-05-10 LAB
CHOLEST SERPL-MCNC: 142 MG/DL
CREAT UR-MCNC: 133 MG/DL
CRP SERPL-MCNC: <2.9 MG/L (ref 0–8)
ERYTHROCYTE [SEDIMENTATION RATE] IN BLOOD BY WESTERGREN METHOD: 10 MM/H (ref 0–20)
HBA1C MFR BLD: 6.8 % (ref 0–5.6)
HDLC SERPL-MCNC: 54 MG/DL
LDLC SERPL CALC-MCNC: 68 MG/DL
MICROALBUMIN UR-MCNC: 14 MG/L
MICROALBUMIN/CREAT UR: 10.83 MG/G CR (ref 0–17)
MRSA DNA SPEC QL NAA+PROBE: NEGATIVE
NONHDLC SERPL-MCNC: 88 MG/DL
SPECIMEN SOURCE: NORMAL
TRIGL SERPL-MCNC: 102 MG/DL

## 2019-05-10 ASSESSMENT — MIFFLIN-ST. JEOR: SCORE: 1716.15

## 2019-05-10 NOTE — PATIENT INSTRUCTIONS
Preparing for Your Surgery      Name:  Stu Meredith   MRN:  [id number]   :  1955   Today's Date:  5/10/2019     Arriving for surgery:  Surgery date:  2019  Arrival time:  8:45 am  Please come to:     [facility] [address]  [address] [place]  [address]    - parking is available in front of [facility] from 5:15AM to 8:00PM. If you prefer, park your car in the Green Lot.    -Proceed to the 3rd floor, check in at the Adult Surgery Waiting Lounge. [phone]    If an escort is needed stop at the Information Desk in the lobby. Inform the information person that you are here for surgery. An escort to the Adult Surgery Waiting Lounge will be provided.        What can I eat or drink?  -  You may have solid food or milk products until 8 hours prior to your surgery.( 3 am)  -  You may have water, apple juice or 7up/Sprite until 2 hours prior to your surgery.(until 8:45 am arrival time)    Which medicines can I take?        Stop Aspirin, vitamins and supplements one week prior to surgery.      Hold Ibuprofen for 24 hours and/or Naproxen for 48 hours prior to surgery.         Plan for Xarelto --hold 48 hours before surgery  Last dose on 19    -  Do NOT take these medications in the morning, the day of surgery:      Metformin      Spironolactone       -  Please take these medications the day of surgery:     Inhalers as usual and bring to hospital      Metoprolol      Sotalol            How do I prepare myself?  -  Take two showers: one the night before surgery; and one the morning of surgery.         Use Scrubcare or Hibiclens to wash from neck down.  You may use your own shampoo and conditioner. No other hair products.   -  Do NOT use lotion, powder, deodorant, or antiperspirant the day of your surgery.  -  Do NOT wear any makeup, fingernail polish or jewelry.  - Do not bring your own medications to the hospital, except for inhalers and eye  drops.  -  Bring your ID and insurance card.    Questions or Concerns:  -If you have questions or concerns regarding the day of surgery, please call  The Pre Admission Nursing Office at  [phone].       -For questions after surgery please call your surgeons office.           Enhanced Recovery After Surgery     This is a team effort, including you, to get you back on your feet, eating and drinking normally and out of the hospital as quickly as possible.  The goals are:     1) NO INFECTIONS and   2) RETURN TO NORMAL DIET    How can we achieve these goals?  1) STAY ACTIVE: Walk every day before your surgery; try to increase the amount every day.  Walk after surgery as much as you can-the nurses will help you.  Walking speeds healing and gets you home quicker, you heal better at home and have less risk of infection.     2) INCENTIVE SPIROMETER: Practice your incentive spirometer 4 times per day with 5 repetitions each time.  Using the incentive spirometer can strengthen your muscles between your ribs and help you have a strong cough after surgery.  A more effective cough can help prevent problems with your lungs.    3) STAY HYDRATED: Drink clear liquids up until 2 hours before your surgery. We would like you to purchase a drink such as Gatorade or Ensure Clear (not the milkshake type).  Drink this before bedtime and on the way into the hospital, drink between 8-10 ounces or until you feel hydrated.  Keeping well hydrated leads to your veins being plump, you wake up faster, and you are less likely to be nauseated. Start drinking water as soon as you can after surgery and advance to clear liquids and food as tolerated.  IV fluids contain salt, drinking fluids will minimize the amount of IV fluids you need and decrease the amount of salt you get.    The most common reason for the patient to be readmitted is dehydration. Staying hydrated after you go home from the hospital is very important.  Ensure or Ensure Clear are  good options to keep you hydrated.     4) PAIN MANAGEMENT: If we minimize the amount of opioids and narcotics, and use regional blocks (which numb the area where your surgery is) along with oral pain medications; you will have less side effects of nausea and constipation. Narcotics can slow down your bowels and cause you to stay in the hospital longer.     Our goal is to keep you comfortable; eating and drinking normally and back home safely.     Using an Incentive Spirometer    An incentive spirometer is a device that helps you do deep breathing exercises. These exercises expand your lungs, aid in circulation, and help prevent pneumonia. Deep breathing exercises also help you breathe better and improve the function of your lungs by:    Keeping your lungs clear    Strengthening your breathing muscles    Helping prevent respiratory complications or problems  The incentive spirometer gives you a way to take an active part in your care. A nurse or therapist will teach you breathing exercises. To do these exercises, you will breathe in through your mouth and not your nose. The incentive spirometer only works correctly if you breathe in through your mouth.    Steps to clear lungs  Step 1. Exhale normally. Then, inhale normally.    Relax and breathe out.  Step 2. Place your lips tightly around the mouthpiece.    Make sure the device is upright and not tilted.  Step 3. Inhale as much air as you can through the mouthpiece (don't breath through your nose).    Inhale slowly and deeply.    Hold your breath long enough to keep the balls or disk raised for at least 3 to 5 seconds, or as instructed by your healthcare provider.  Step 4. Repeat the exercise regularly.  Begin using the Incentive Spirometer one week prior to your surgery, 4 times per day-5 times each.

## 2019-05-10 NOTE — PHARMACY - PREOPERATIVE ASSESSMENT CENTER
Anticoagulation Note - Preoperative Assessment Center (PAC) Pharmacist     Patient seen and interviewed during time of PAC Clinic appointment May 10, 2019.  The purpose of this note is to document the perioperative anticoagulation plan outlined by the providers caring for Stu Meredith.     Current Regimen  Anticoagulation Regimen as of May 10, 2019: rivaroxaban 20mg by mouth with supper  Indication: afib  Prescriber:  Dr. Ware  Expected Duration of therapy: Indefinite    Perioperative plan  Stu Meredith is scheduled for R total hip arthroplasty on 6/5/19 with Dr. Gaspar and the perioperative anticoagulation plan outlined by his cardiologist Dr. Ware and EP Dr. Cooper is to hold xarelto 48 hours prior to the procedure .     Resumption of anticoagulation after procedure will be based on surgery team assessment of bleeding risks and complications.  This plan may require re-assessment and modification by his primary team in the perioperative setting depending on patients clinical situation.        Markus Rodgers RPH  May 10, 2019  9:31 AM

## 2019-05-10 NOTE — ANESTHESIA PREPROCEDURE EVALUATION
Anesthesia Pre-Procedure Evaluation    Patient: Stu Meredith   MRN:     [id number] Gender:   male   Age:    63 year old :      1955        Preoperative Diagnosis: * No surgery found *        Past Medical History:   Diagnosis Date     Atrial fibrillation (H) 11    failed medication, multiple DC cardioveresions; s/p Left atrial ablation to eliminate atrial fibrillation 11     Chest pain      CHF (congestive heart failure) (H) 2016     Coronary artery disease      DJD (degenerative joint disease)      Hip arthritis 1/15/2014     Hypertension      Hypertrophic cardiomyopathy (H) 10/09     Other abnormal heart sounds      Pacemaker     ICD     Palpitations      Pneumonia, organism unspecified(486)      Prediabetes 7/10/15    A1C 6.5     Status post implantation of automatic cardioverter/defibrillator (AICD)      Ventricular tachycardia (H)       Past Surgical History:   Procedure Laterality Date     ANESTHESIA CARDIOVERSION  2014    Procedure: ANESTHESIA CARDIOVERSION;  Surgeon: Generic Anesthesia Provider;  Location: [facility]     ANESTHESIA CARDIOVERSION N/A 2016    Procedure: ANESTHESIA CARDIOVERSION;  Surgeon: GENERIC ANESTHESIA PROVIDER;  Location: [facility]     ANESTHESIA CARDIOVERSION N/A 2017    Procedure: ANESTHESIA CARDIOVERSION;  Anesthesia Offsite Coverage Cardioversion @1100;  Surgeon: GENERIC ANESTHESIA PROVIDER;  Location: [facility]     ANESTHESIA CARDIOVERSION N/A 1/3/2018    Procedure: ANESTHESIA CARDIOVERSION;  Anesthesia Cardioverion;  Surgeon: GENERIC ANESTHESIA PROVIDER;  Location: [facility]     ANESTHESIA CARDIOVERSION N/A 2018    Procedure: ANESTHESIA CARDIOVERSION;  Anesthesia Coverage Cardioversion @1400;  Surgeon: GENERIC ANESTHESIA PROVIDER;  Location: [facility]     ANESTHESIA CARDIOVERSION N/A 2018    Procedure: ANESTHESIA CARDIOVERSION;  Anesthesia Cardioversion @0930;  Surgeon: GENERIC ANESTHESIA PROVIDER;  Location: [facility]     ANESTHESIA CARDIOVERSION N/A  12/20/2018    Procedure: Anesthesia Coverage Cardioversion @0830;  Surgeon: GENERIC ANESTHESIA PROVIDER;  Location: [facility]     ARTHROPLASTY HIP  1/15/2014    Procedure: ARTHROPLASTY HIP;  Left Total Hip Arthroplasty;  Surgeon: Nelson Gaspar MD;  Location: [facility]     CARDIAC SURGERY       COLONOSCOPY N/A 5/18/2018    Procedure: COMBINED COLONOSCOPY, SINGLE OR MULTIPLE BIOPSY/POLYPECTOMY BY BIOPSY;  COLONOSCOPY (PT HAS DEFIBRILLATOR) ;  Surgeon: Mike Nickerson MD;  Location:  GI     H ABLATION FOCAL AFIB  12/9/11    Left atrial ablation to eliminate atrial fibrillation     IMPLANT AUTOMATIC IMPLANTABLE CARDIOVERTER DEFIBRILLATOR  7/27/12    AICD implantation     TONSILLECTOMY  1964          Anesthesia Evaluation     . Pt has had prior anesthetic. Type: MAC and Regional           ROS/MED HX    ENT/Pulmonary: Comment: Chest CT 3/22/19 showed RUL & RLL nodules, recommended f/u CT in 2 mos.    PFT 11/15/13: normal, FEV1 3.51, FEV1/FVC 79%    Denies inhaler use    (+)tobacco use, Past use 40 pk yr hx, quit 2015 packs/day  , recent URI resolved Treated for cough/influenza in [place] mid-March: . .    Neurologic:  - neg neurologic ROS     Cardiovascular: Comment: Nonobstructive CAD -- 10-30% stenoses on cath '13.      (+) Dyslipidemia, hypertension--CAD, --. Taking blood thinners Pt has received instructions: Instructions Given to patient: stop Xarelto 2 days prior to surgery. CHF etiology: hypertrophic cardiomyopathy Last EF: 45-50% date: 8/16/18 NYHA classification: II. . :ICD Reason placed:VT, AF  type;[employer] . dysrhythmias a-fib, . Previous cardiac testing Echodate:8/16/18results:Spontaneous echo contrast in left atrial appendage. No thrombus in left atrial  appendage.  Normal left ventricle size. The Ejection Fraction is estimated at 45-50%.  The right ventricle is normal size. Global right ventricular function is  normal.  This study was compared with the study from 6/1/18. There has been no  change  Stress Testdate:2/15/19 results:Severe impairment, class II functional capacityECG reviewed date:4/24/19 results: date: results:          METS/Exercise Tolerance: Comment: Activity limited by hip pain. Denies CP. 3 - Able to walk 1-2 blocks without stopping   Hematologic:  - neg hematologic  ROS       Musculoskeletal: Comment: S/P L hip arthroplasty 2014.  R hip osteoarthritis  (+) arthritis,  -       GI/Hepatic:  - neg GI/hepatic ROS       Renal/Genitourinary:  - ROS Renal section negative       Endo:     (+) type II DM Last HgA1c: 6.8 date: 5/10/19 Not using insulin - not using insulin pump not previously admitted for DM/DKA Obesity, .      Psychiatric:  - neg psychiatric ROS       Infectious Disease:  - neg infectious disease ROS       Malignancy:      - no malignancy   Other:    (+) No chance of pregnancy C-spine cleared: N/A, no H/O Chronic Pain,                       PHYSICAL EXAM:   Mental Status/Neuro: A/A/O; Age Appropriate   Airway: Facies: Feasible  Mallampati: I  Mouth/Opening: Full  TM distance: > 6 cm  Neck ROM: Full   Respiratory: Auscultation: CTAB     Resp. Rate: Normal     Resp. Effort: Normal      CV:    Comments:      Dental: Normal                  Lab Results   Component Value Date    WBC 6.0 02/15/2019    HGB 16.6 02/15/2019    HCT 52.0 02/15/2019     02/15/2019    CRP <2.9 05/10/2019    SED 10 05/10/2019     02/15/2019    POTASSIUM 4.5 02/15/2019    CHLORIDE 106 02/15/2019    CO2 26 02/15/2019    BUN 20 02/15/2019    CR 0.87 02/15/2019     (H) 02/15/2019    NEENA 8.8 02/15/2019    PHOS 2.8 11/10/2008    MAG 2.3 12/20/2018    ALBUMIN 3.7 02/15/2019    PROTTOTAL 7.2 02/15/2019    ALT 38 02/15/2019    AST 26 02/15/2019    ALKPHOS 102 02/15/2019    BILITOTAL 0.5 02/15/2019    INR 1.24 (H) 12/20/2018    TSH 1.58 02/15/2019       Preop Vitals  BP Readings from Last 3 Encounters:   04/03/19 128/71   03/29/19 (!) 145/99   03/18/19 108/72    Pulse Readings from Last 3  "Encounters:   04/03/19 60   03/18/19 73   03/07/19 80      Resp Readings from Last 3 Encounters:   04/03/19 16   03/29/19 (!) 78   03/18/19 18    SpO2 Readings from Last 3 Encounters:   04/03/19 97%   03/29/19 97%   03/18/19 98%      Temp Readings from Last 1 Encounters:   04/03/19 97.4  F (36.3  C) (Tympanic)    Ht Readings from Last 1 Encounters:   04/11/19 1.778 m (5' 10\")      Wt Readings from Last 1 Encounters:   04/11/19 97.5 kg (215 lb)    Estimated body mass index is 30.85 kg/m  as calculated from the following:    Height as of 4/11/19: 1.778 m (5' 10\").    Weight as of 4/11/19: 97.5 kg (215 lb).     LDA:  Left Groin Interventional Procedure Access (Active)   Number of days: 267       Right Groin Interventional Procedure Access (Active)   Number of days: 267            JZG FV AN PLAN NO PONV RULE         PAC Discussion and Assessment    ASA Classification:   Case is suitable for:   Anesthetic techniques and relevant risks discussed:   Invasive monitoring and risk discussed:   Types:   Possibility and Risk of blood transfusion discussed:   NPO instructions given:   Additional anesthetic preparation and risks discussed:   Needs early admission to pre-op area:   Other:     PAC Resident/NP Anesthesia Assessment:  Stu Meredith is a 63 year old male scheduled to undergo Right total hip arthroplasty with Nelson Gaspar MD on 6/5/19 at [facility] for treatment of Right hip pain.     He has the following specific operative considerations:     Pt has had prior anesthetic. Type: MAC and Regional - no complications  - Anesthesia considerations:  Refer to PAC assessment in anesthesia records  - Risk of PONV score = 1.  If > 2, anti-emetic intervention recommended.      CARDIAC: METS 3,Activity limited by hip pain. Denies CP. Able to walk 1-2 blocks without stopping       RCRI :  0.9% risk of major adverse cardiac event.      ** HTN, A-Fib, hypertrophic cardiomyopathy, s/p " ICD in 2012, s/p ablation x3, s/p DCCV x10, hx VT     ** Echo 8/16/18: EF 45-50%     ** Stress 2/15/19: severely impaired, class II functional capacity with a cardiac limitation to exercise.    PULMONARY: GONZALEZ # of risks 4/8 = intermediate risk     Former smoker, 40 pk yr hx, quit 2015     No asthma, denies inhaler use     Chest CT 3/22/19: RUL & RLL nodules, f/u CT in 2 months recommended    GI: no GERD    ENDO: BMI 35     DM2, taking metformin, A1c today is 6.8    HEME: VTE risk: 1.8%      If afib, [id number] score 3.  Risk category: moderate.       Taking Xarelto, will stop 2 days prior to surgery    ORTHO: mildly limited extension neck ROM, no TMJ     S/P left hip arthroplasty 2014     Severe right hip osteoarthritis    Patient was discussed with Dr. Patel. Recommend pt f/u with cardiology for surgical clearance. If deemed appropriate surgical candidate from cardiology standpoint, then pt will be considered optimized for the proposed procedure. Dr. Ware & care coordinator will be contacted regarding a presurgical evaluation.         Reviewed and Signed by PAC Mid-Level Provider/Resident  Mid-Level Provider/Resident: Janae Gonzalez PA-C  Date: 5/13/19  Time: 0635    Attending Anesthesiologist Anesthesia Assessment:  I have examined the patient and reviewed the medical record.  I have discussed the patient with the FELECIA and concur with her assessment  The patient is scheduled for [facility] for severe osteoarthritis  The patient has a very significant cardiac history:  Hypertrophic cardiomyopathy without obstruction or SALVADOR on most recent echo (6/2018)  EF at that time was 45% though as low as 20% has been reported  History of VT by narrative record but no documentation seen,  ICD in place for over 12years without any discharge  History of AFIB, failed ablation x3 .  Has had multiple cardioversions with temporary conversion only.  Currently in Afib on sotalol.   Interrogation of PM reveals atrial pacing 62% of time,  ventricular 0%.  Anticoagulated with xarelto  Had CPET testing 2/2019 that was suggestive of ischemia, maximum mets 4.3, maximum VO2 15  History of IDDM, most recent Hgb A1c 6.8  Normal Creatinine and GFR    PE:  WNWD male in NAD.  MPC 2, six cm TMD, limited neck extension, Lungs clear.  CV  Irr Irr    Will have patient see cardiologist earlier than scheduled given CPET positive for ischemia  If ischemic potential may need to  to [place] for surgery  Final plan per attending anesthesiologist the day of surgery.      Addendum:  Dr Ware was able to see the patient prior to surgery.  A CT angio revealed no obstructive coronary artery disease, his last echo revealed an EF of 50%, he has had no Afib in well over one year on sotalol and metoprolol.  The patient generally feels well.  I have discussed this patient with Hakan Ware and Hakan Corrales and we feel he is candidate for UR with the recommendation for goal directed fluid therapy to avoid fluid overload (he will tolerate this poorly if he goes into atrial fibrillation).  Consider Flotrak to guide fluid administration    Wai        Reviewed and Signed by PAC Anesthesiologist  Anesthesiologist: Wai Patel MD  Date: 5/10/2019  Time:   Pass/Fail:   Disposition:     PAC Pharmacist Assessment:        Pharmacist:   Date:   Time:        Wai Patel MD

## 2019-05-11 ASSESSMENT — NEW YORK HEART ASSOCIATION (NYHA) CLASSIFICATION: NYHA FUNCTIONAL CLASS: II

## 2019-05-11 ASSESSMENT — LIFESTYLE VARIABLES: TOBACCO_USE: 1

## 2019-05-11 ASSESSMENT — ENCOUNTER SYMPTOMS: DYSRHYTHMIAS: 1

## 2019-05-11 NOTE — H&P
Pre-Operative H & P     CC:  Preoperative exam to assess for increased cardiopulmonary risk while undergoing surgery and anesthesia.    Date of Encounter: 5/11/2019  Primary Care Physician:  Ngoc Tolbert  Reason for Visit: Right hip pain [M25.551]  - Primary    HPI  Haroon Meredith is a 62 y/o male who presents for pre-operative H&P in preparation for Right total hip arthroplasty with Nelson Gaspar MD on 6/5/19 at [facility] for treatment of Right hip pain.    Mr. Meredith is s/p left hip arthroplasty and is doing well from this. He is currently experiencing severe pain in the right hip, significantly impact his function & ADLs. This has not responded well to comprehensive non-operative management including activity modification, oral pain medication, and two intra-articular steroid injections.    He has a significant cardiac history with hypertrophic cardiomyopathy, HTN, A-fib, V-tach. He underwent ICD implant in 2012 and DCCV X 10 and is s/p PVI X 3 ('11, '16, '18) for A-fib. He is pharmacologically managed with metoprolol, sotalol & Aldactone, anticoagulated with Xarelto. He last saw Mona Ware MD in cardiology in January, at which time Aldactone was increased and a stress test was recommended which was completed on 2/15/19. Results showed a severely impaired, class II functional capacity with a cardiac limitation to exercise. ICD was last interrogated 4/22/19 which showed 100% A-fib since 3/14 (see full results below).    He was treated for cough/influenza while traveling in [place] this past March. Respiratory symptoms have since resolved. A chest CT on 3/22/19 showed RUL & RLL nodules; f/u CT in 2 months was recommended. He has a 40 yr pk smoking hx, quit 2015.. Hx is also significant for DM2, taking metformin, A1c today is 6.8.     History was obtained from patient & chart review.    Past Medical History  Past Medical History:   Diagnosis Date     Atrial  fibrillation (H) 12/9/11    failed medication, multiple DC cardioveresions; s/p Left atrial ablation to eliminate atrial fibrillation 12/9/11     Chest pain      CHF (congestive heart failure) (H) 7/30/2016     Coronary artery disease      DJD (degenerative joint disease)      Hip arthritis 1/15/2014     Hypertension      Hypertrophic cardiomyopathy (H) 10/09     Other abnormal heart sounds      Pacemaker     ICD     Palpitations      Pneumonia, organism unspecified(486)      Prediabetes 7/10/15    A1C 6.5     Status post implantation of automatic cardioverter/defibrillator (AICD)      Ventricular tachycardia (H)        Past Surgical History  Past Surgical History:   Procedure Laterality Date     ANESTHESIA CARDIOVERSION  4/24/2014    Procedure: ANESTHESIA CARDIOVERSION;  Surgeon: Generic Anesthesia Provider;  Location: [facility] OR     ANESTHESIA CARDIOVERSION N/A 5/12/2016    Procedure: ANESTHESIA CARDIOVERSION;  Surgeon: GENERIC ANESTHESIA PROVIDER;  Location: [facility]     ANESTHESIA CARDIOVERSION N/A 8/7/2017    Procedure: ANESTHESIA CARDIOVERSION;  Anesthesia Offsite Coverage Cardioversion @1100;  Surgeon: GENERIC ANESTHESIA PROVIDER;  Location: [facility]     ANESTHESIA CARDIOVERSION N/A 1/3/2018    Procedure: ANESTHESIA CARDIOVERSION;  Anesthesia Cardioverion;  Surgeon: GENERIC ANESTHESIA PROVIDER;  Location: [facility]     ANESTHESIA CARDIOVERSION N/A 5/4/2018    Procedure: ANESTHESIA CARDIOVERSION;  Anesthesia Coverage Cardioversion @1400;  Surgeon: GENERIC ANESTHESIA PROVIDER;  Location: [facility]     ANESTHESIA CARDIOVERSION N/A 9/27/2018    Procedure: ANESTHESIA CARDIOVERSION;  Anesthesia Cardioversion @0930;  Surgeon: GENERIC ANESTHESIA PROVIDER;  Location: [facility]     ANESTHESIA CARDIOVERSION N/A 12/20/2018    Procedure: Anesthesia Coverage Cardioversion @0830;  Surgeon: GENERIC ANESTHESIA PROVIDER;  Location: [facility]     ARTHROPLASTY HIP  1/15/2014    Procedure: ARTHROPLASTY HIP;  Left Total Hip Arthroplasty;  Surgeon: Hubert  Nelson VERA MD;  Location: [facility]     CARDIAC SURGERY       COLONOSCOPY N/A 5/18/2018    Procedure: COMBINED COLONOSCOPY, SINGLE OR MULTIPLE BIOPSY/POLYPECTOMY BY BIOPSY;  COLONOSCOPY (PT HAS DEFIBRILLATOR) ;  Surgeon: Mike Nickerson MD;  Location: [facility]     H ABLATION FOCAL AFIB  12/9/11    Left atrial ablation to eliminate atrial fibrillation     IMPLANT AUTOMATIC IMPLANTABLE CARDIOVERTER DEFIBRILLATOR  7/27/12    AICD implantation     TONSILLECTOMY  1964       Hx of Blood transfusions/reactions: no     Hx of abnormal bleeding or anti-platelet use: taking Xarelto, will stop 2 days prior to surgery    Menstrual history: No LMP for male patient.: N/A    Steroid use in the last year: no    Personal or FH with difficulty with Anesthesia:  No    Prior to Admission Medications  Current Outpatient Medications   Medication Sig Dispense Refill     acetaminophen (TYLENOL) 325 MG tablet Take 325-650 mg by mouth every 6 hours as needed       albuterol (PROAIR HFA/PROVENTIL HFA/VENTOLIN HFA) 108 (90 Base) MCG/ACT inhaler Inhale 2 puffs into the lungs every 4 hours as needed for shortness of breath / dyspnea or wheezing (Patient not taking: Reported on 5/8/2019) 8.5 g 1     atorvastatin (LIPITOR) 20 MG tablet Take 1 tablet (20 mg) by mouth daily (Patient taking differently: Take 20 mg by mouth every evening ) 90 tablet 3     cyanocobalamin (VITAMIN B-12) 100 MCG tablet Take 100 mcg by mouth daily       metFORMIN (GLUCOPHAGE-XR) 500 MG 24 hr tablet Take 1 tablet (500 mg) by mouth daily (with dinner) for 7 days, THEN 2 tablets (1,000 mg) daily (with dinner). (Patient taking differently: Take 2 tablets (1,000 mg) daily (with dinner).) 180 tablet 3     metoprolol succinate (TOPROL-XL) 50 MG 24 hr tablet Take 1 tablet (50 mg) by mouth daily 90 tablet 3     multivitamin, therapeutic (THERA-VIT) TABS Take 1 tablet by mouth daily       sotalol (BETAPACE) 120 MG tablet Take 1 tablet (120 mg) by mouth 2 times daily 180 tablet 3      spironolactone (ALDACTONE) 50 MG tablet Take 1 tablet (50 mg) by mouth daily 90 tablet 3     XARELTO 20 MG TABS tablet Take 1 tablet (20 mg) by mouth daily (with dinner) 90 tablet 3     zolpidem (AMBIEN) 10 MG tablet Take 0.5-1 tablets (5-10 mg) by mouth nightly as needed for sleep (Patient taking differently: Take 10 mg by mouth nightly as needed for sleep ) 90 tablet 0       Allergies  No Known Allergies    Social History  Social History     Socioeconomic History     Marital status:      Spouse name: Not on file     Number of children: Not on file     Years of education: Not on file     Highest education level: Not on file   Occupational History     Occupation:      Employer: [employer]   Social Needs     Financial resource strain: Not on file     Food insecurity:     Worry: Not on file     Inability: Not on file     Transportation needs:     Medical: Not on file     Non-medical: Not on file   Tobacco Use     Smoking status: Former Smoker     Packs/day: 1.00     Years: 40.00     Pack years: 40.00     Types: Cigarettes     Start date: 1/1/1975     Last attempt to quit: 12/11/2015     Years since quitting: 3.4     Smokeless tobacco: Never Used   Substance and Sexual Activity     Alcohol use: Yes     Alcohol/week: 0.0 oz     Comment: 1 drink per week     Drug use: No     Sexual activity: Yes     Partners: Female   Lifestyle     Physical activity:     Days per week: Not on file     Minutes per session: Not on file     Stress: Not on file   Relationships     Social connections:     Talks on phone: Not on file     Gets together: Not on file     Attends Sabianism service: Not on file     Active member of club or organization: Not on file     Attends meetings of clubs or organizations: Not on file     Relationship status: Not on file     Intimate partner violence:     Fear of current or ex partner: Not on file     Emotionally abused: Not on file     Physically abused: Not on file     Forced  sexual activity: Not on file   Other Topics Concern      Service Not Asked     Blood Transfusions Not Asked     Caffeine Concern Not Asked     Occupational Exposure Not Asked     Hobby Hazards Not Asked     Sleep Concern Not Asked     Stress Concern Not Asked     Weight Concern Not Asked     Special Diet Not Asked     Back Care Not Asked     Exercise No     Bike Helmet Not Asked     Seat Belt Yes     Self-Exams Not Asked     Parent/sibling w/ CABG, MI or angioplasty before 65F 55M? No   Social History Narrative     Not on file       Family History  Family History   Problem Relation Age of Onset     Heart Disease Mother         unknown     Obesity Mother      Gastrointestinal Disease Mother         diverticulitis     Diabetes Maternal Grandmother      Diabetes Paternal Grandmother      Cerebrovascular Disease Paternal Grandmother 94     Diabetes Paternal Grandfather      Cerebrovascular Disease Paternal Grandfather 78     Diabetes Son      C.A.D. Father         CABG age 78     Heart Disease Father         CABG x5     Diabetes Maternal Grandfather      LUNG DISEASE No family hx of      Deep Vein Thrombosis (DVT) No family hx of      Anesthesia Reaction No family hx of        ROS/MED HX  The complete review of systems is negative other than noted in the HPI or here.  Patient was treated for cough/influenza in March of this year. No current symptoms.  Pt denies steroid use during past year.    ENT/Pulmonary: Comment: Chest CT 3/22/19 showed RUL & RLL nodules, recommended f/u CT in 2 mos.    PFT 11/15/13: normal, FEV1 3.51, FEV1/FVC 79%    Denies inhaler use    (+)tobacco use, Past use 10 yr pk hx, quit 2015 packs/day  , recent URI resolved Treated for cough/influenza in [place] mid-March: . .    Neurologic:  - neg neurologic ROS     Cardiovascular: Comment: Nonobstructive CAD -- 10-30% stenoses on cath '13.      (+) Dyslipidemia, hypertension--CAD, --. Taking blood thinners Pt has received instructions:  "Instructions Given to patient: stop Xarelto 2 days prior to surgery. CHF etiology: hypertrophic cardiomyopathy Last EF: 50-55% date: 7/29/16 NYHA classification: II. . :ICD Reason placed:VT, AF  type;NicOx . dysrhythmias a-fib, . Previous cardiac testing (see below)          METS/Exercise Tolerance: Comment: Activity limited by hip pain. Denies CP. 3 - Able to walk 1-2 blocks without stopping   Hematologic:  - neg hematologic  ROS       Musculoskeletal: Comment: S/P L hip arthroplasty 2014.  R hip osteoarthritis  (+) arthritis,  -       GI/Hepatic:  - neg GI/hepatic ROS       Renal/Genitourinary:  - ROS Renal section negative       Endo:     (+) type II DM Last HgA1c: 6.8 date: 5/10/19 Not using insulin - not using insulin pump not previously admitted for DM/DKA Obesity, .      Psychiatric:  - neg psychiatric ROS       Infectious Disease:  - neg infectious disease ROS       Malignancy:      - no malignancy   Other:    (+) No chance of pregnancy C-spine cleared: N/A, no H/O Chronic Pain,               PHYSICAL EXAM:   Mental Status/Neuro: A/A/O; Age Appropriate   Airway: Facies: Feasible  Mallampati: I  Mouth/Opening: Full  TM distance: > 6 cm  Neck ROM: Full   Respiratory: Auscultation: CTAB     Resp. Rate: Normal     Resp. Effort: Normal      CV:    Comments:      Dental: Normal                  Preop Vitals  BP Readings from Last 3 Encounters:   04/03/19 128/71   03/29/19 (!) 145/99   03/18/19 108/72    Pulse Readings from Last 3 Encounters:   04/03/19 60   03/18/19 73   03/07/19 80      Resp Readings from Last 3 Encounters:   04/03/19 16   03/29/19 (!) 78   03/18/19 18    SpO2 Readings from Last 3 Encounters:   04/03/19 97%   03/29/19 97%   03/18/19 98%      Temp Readings from Last 1 Encounters:   04/03/19 97.4  F (36.3  C) (Tympanic)    Ht Readings from Last 1 Encounters:   04/11/19 1.778 m (5' 10\")      Wt Readings from Last 1 Encounters:   04/11/19 97.5 kg (215 lb)    Estimated body mass index is " "30.85 kg/m  as calculated from the following:    Height as of 4/11/19: 1.778 m (5' 10\").    Weight as of 4/11/19: 97.5 kg (215 lb).     Temp: 98  F (36.7  C) Temp src: Oral BP: 125/79 Pulse: 86   Resp: 18 SpO2: 97 %       216 lbs 12.8 oz  5' 6\"   Body mass index is 34.99 kg/m .     Physical Exam  Constitutional: Awake, alert, cooperative, no apparent distress, and appears stated age.  Eyes: Pupils equal, round and reactive to light, extra ocular muscles intact, sclera clear, conjunctiva normal.  HENT: Normocephalic, oral pharynx with moist mucus membranes, good dentition. No goiter appreciated. No removable dental hardware.  Respiratory: Clear to auscultation bilaterally, no crackles or wheezing.  Cardiovascular: Regular rate and rhythm, normal S1 and S2, and no murmur noted.  Carotids +2, no bruits. No edema. Palpable pulses to radial, DP and PT arteries.   GI: Normal bowel sounds, soft, non-distended, non-tender, no masses palpated, no hepatomegaly.    Lymph/Hematologic: No cervical lymphadenopathy and no supraclavicular lymphadenopathy.  Genitourinary:  deferred  Skin: Warm and dry.  No rashes at anticipated surgical site.   Musculoskeletal: Mildly limited extension ROM of neck. There is no redness, warmth, or swelling of the joints. Gross motor strength is normal.    Neurologic: Awake, alert, oriented to name, place and time. Cranial nerves II-XII are grossly intact. Gait is normal. Ambulates from chair to exam table, seats self, lies  supine w/o assistance. No SOB when supine.  Neuropsychiatric: Calm, cooperative. Normal affect. Answers questions appropriately, follows commands w/o difficulty.    PRIOR LABS/DIAGNOSTIC STUDIES:  All labs and imaging personally reviewed    ICD INTERROGATION 4/22/19:  [employer], dual chamber ICD, remote transmission received and reviewed. Device transmission sent per MD orders. Alert received for AF burden. His presenting rhythm is AF/VS- ~70 bpm. AF has been 100% since " 3/14/19. Normal device function. AP= 6% and = 3%. Lead trends appear stable. Battery estimates 4 years to CARMEN. Pt notified of transmission results. He will talk to his boss and we will schedule him for a DCCV.  Harrison Blackmonote ICD transmissionI have reviewed and interpreted the device interrogation, settings, programming and nurseâ  s summary. The device is functioning within normal device parameters.    STRESS TEST 2/15/19:  The patient achieved a severely impaired, class II functional capacity with a cardiac limitation to exercise. A cardiac limitation is suggested given the low VO2 and oxygen pulse as well as the abnormal hemodynamic response. The VE/VCO2 slope was 37.2. The patient did not report any symptoms during testing. The test was terminated at the patient s request secondary to shortness of breath and right hip pain.    ECHOCARDIOGRAM 8/16/18:  Interpretation Summary  Technically difficult study. Poor acoustic windows.  Spontaneous echo contrast in left atrial appendage. No thrombus in left atrial  appendage.  Normal left ventricle size. The Ejection Fraction is estimated at 45-50%.  The right ventricle is normal size. Global right ventricular function is  normal.  This study was compared with the study from 6/1/18. There has been no change    PFT 11/15/13:                    PRE-BRONCH               POST-BRONCH                           Kateryna     Prd     %Prd    Kateryna    %Prd     %Ch  SPIROMETRY  FVC (L)                  4.46    4.97       90  FEV1 (L)                 3.51    3.77       93  FEV1/FVC (%)               79      76      103  FEV1/SVC (%)               88      77      114  FEV1/FEV6 (%)              79      79      101  FEF 25-75%               3.24    3.12      104  FEF Max (L/sec)          6.88    9.53       72  FIVC (L)                 4.59  FIF Max (L/sec)          5.86    3.82      153  LUNG VOLUMES  SVC (L)                  3.98    4.90       81  IC (L)                   3.41     3.67       93  ERV (L)                  0.58    1.23       47    DIFFUSION  DLCOunc                 22.95   29.09       79  DL/VA                    3.71    3.97       93  VA (L)                   6.19    7.33       84  INTERPRETATION:  The FVC, FEV1, FEV1/FVC ratio and XFT85-00% are within normal limits.   The inspiratory flow rates are within normal limits.  The reduced  diffusing capacity indicates a minimal loss of functional alveolar   capillary  surface.  However, the diffusing capacity was not corrected for the  patient's hemoglobin.  IMPRESSION:  Normal pulmonary function    Lab Reults   Component Value Date    WBC 6.0 02/15/2019    HGB 16.6 02/15/2019    HCT 52.0 02/15/2019     02/15/2019    CRP <2.9 05/10/2019    SED 10 05/10/2019     02/15/2019    POTASSIUM 4.5 02/15/2019    CHLORIDE 106 02/15/2019    CO2 26 02/15/2019    BUN 20 02/15/2019    CR 0.87 02/15/2019     (H) 02/15/2019    NEENA 8.8 02/15/2019    PHOS 2.8 11/10/2008    MAG 2.3 12/20/2018    ALBUMIN 3.7 02/15/2019    PROTTOTAL 7.2 02/15/2019    ALT 38 02/15/2019    AST 26 02/15/2019    ALKPHOS 102 02/15/2019    BILITOTAL 0.5 02/15/2019    INR 1.24 (H) 12/20/2018    TSH 1.58 02/15/2019     Labs today:  T&S, MRSA      ASSESSMENT and PLAN  Stu Meredith is a 63 year old male scheduled to undergo Right total hip arthroplasty with Nelson Gaspar MD on 6/5/19 at [facility] for treatment of Right hip pain.     He has the following specific operative considerations:     Pt has had prior anesthetic. Type: MAC and [facility] - no complications  - Anesthesia considerations:  Refer to PAC assessment in anesthesia records  - Risk of PONV score = 1.  If > 2, anti-emetic intervention recommended.      CARDIAC: METS 3,Activity limited by hip pain. Denies CP. Able to walk 1-2 blocks without stopping       RCRI :  0.9% risk of major adverse cardiac event.      ** HTN, A-Fib, hypertrophic  cardiomyopathy, s/p ICD in 2012, s/p ablation x3, s/p DCCV x10, hx VT     ** Echo 8/16/18: EF 45-50%     ** Stress 2/15/19: severely impaired, class II functional capacity with a cardiac limitation to exercise.    PULMONARY: GONZALEZ # of risks 4/8 = intermediate risk     Former smoker, 40 pk yr hx, quit 2015     No asthma, denies inhaler use     Chest CT 3/22/19: RUL & RLL nodules, f/u CT in 2 months recommended    GI: no GERD    ENDO: BMI 35     DM2, taking metformin, A1c today is 6.8    HEME: VTE risk: 1.8%      If afib, [id number] score 3.  Risk category: moderate.       Taking Xarelto, will stop 2 days prior to surgery    ORTHO: mildly limited extension neck ROM, no TMJ     S/P left hip arthroplasty 2014     Severe right hip osteoarthritis    Patient was discussed with Dr. Patel. Recommend pt f/u with cardiology for surgical clearance. If deemed appropriate surgical candidate from cardiology standpoint, then pt will be considered optimized for the proposed procedure. Dr. Ware & care coordinator will be contacted regarding a presurgical evaluation.     Arrival time, NPO, shower and medication instructions provided by nursing staff today.  Preparing For Your Surgery handout given.    Janae Gonzalez PA-C  Preoperative Assessment Center  [facility]  [facility]  Phone: [phone]  Fax: [phone]

## 2019-05-13 DIAGNOSIS — I25.10 CAD (CORONARY ARTERY DISEASE): ICD-10-CM

## 2019-05-13 DIAGNOSIS — I42.2 HYPERTROPHIC CARDIOMYOPATHY (H): Primary | ICD-10-CM

## 2019-05-13 DIAGNOSIS — Z01.810 PRE-OPERATIVE CARDIOVASCULAR EXAMINATION: ICD-10-CM

## 2019-05-13 DIAGNOSIS — I10 HYPERTENSION: ICD-10-CM

## 2019-05-13 LAB — HIV 1+2 AB+HIV1 P24 AG SERPL QL IA: NONREACTIVE

## 2019-05-14 DIAGNOSIS — Z96.642 HISTORY OF TOTAL LEFT HIP ARTHROPLASTY: Primary | ICD-10-CM

## 2019-05-14 RX ORDER — AMOXICILLIN 500 MG/1
TABLET, FILM COATED ORAL
Qty: 12 TABLET | Refills: 3 | Status: SHIPPED | OUTPATIENT
Start: 2019-05-14 | End: 2020-07-13

## 2019-05-14 NOTE — TELEPHONE ENCOUNTER
Patient requested refill of amoxicillin for dental procedures. Sent to patient's pharmacy as requested with 3 refills.

## 2019-05-28 ENCOUNTER — HOSPITAL ENCOUNTER (OUTPATIENT)
Dept: CT IMAGING | Facility: CLINIC | Age: 64
Discharge: HOME | End: 2019-05-28
Attending: INTERNAL MEDICINE | Admitting: INTERNAL MEDICINE
Payer: COMMERCIAL

## 2019-05-28 ENCOUNTER — HOSPITAL ENCOUNTER (OUTPATIENT)
Dept: CT IMAGING | Facility: CLINIC | Age: 64
End: 2019-05-28
Attending: INTERNAL MEDICINE
Payer: COMMERCIAL

## 2019-05-28 ENCOUNTER — OFFICE VISIT (OUTPATIENT)
Dept: INTERNAL MEDICINE | Facility: CLINIC | Age: 64
End: 2019-05-28
Payer: COMMERCIAL

## 2019-05-28 ENCOUNTER — OFFICE VISIT (OUTPATIENT)
Dept: CARDIOLOGY | Facility: CLINIC | Age: 64
End: 2019-05-28
Attending: INTERNAL MEDICINE
Payer: COMMERCIAL

## 2019-05-28 VITALS
WEIGHT: 217.15 LBS | DIASTOLIC BLOOD PRESSURE: 85 MMHG | OXYGEN SATURATION: 97 % | BODY MASS INDEX: 30.29 KG/M2 | SYSTOLIC BLOOD PRESSURE: 122 MMHG | HEART RATE: 70 BPM

## 2019-05-28 VITALS — RESPIRATION RATE: 14 BRPM | DIASTOLIC BLOOD PRESSURE: 67 MMHG | SYSTOLIC BLOOD PRESSURE: 116 MMHG

## 2019-05-28 VITALS
HEIGHT: 71 IN | OXYGEN SATURATION: 97 % | DIASTOLIC BLOOD PRESSURE: 85 MMHG | HEART RATE: 70 BPM | BODY MASS INDEX: 30.41 KG/M2 | WEIGHT: 217.2 LBS | SYSTOLIC BLOOD PRESSURE: 122 MMHG

## 2019-05-28 DIAGNOSIS — Z86.018 HISTORY OF ATYPICAL SKIN MOLE: ICD-10-CM

## 2019-05-28 DIAGNOSIS — E11.9 TYPE 2 DIABETES MELLITUS WITHOUT COMPLICATION, WITHOUT LONG-TERM CURRENT USE OF INSULIN (H): Primary | ICD-10-CM

## 2019-05-28 DIAGNOSIS — I48.0 PAROXYSMAL ATRIAL FIBRILLATION (H): ICD-10-CM

## 2019-05-28 DIAGNOSIS — I25.10 CAD (CORONARY ARTERY DISEASE): ICD-10-CM

## 2019-05-28 DIAGNOSIS — M79.671 BILATERAL FOOT PAIN: ICD-10-CM

## 2019-05-28 DIAGNOSIS — I10 HYPERTENSION: ICD-10-CM

## 2019-05-28 DIAGNOSIS — I42.2 HYPERTROPHIC CARDIOMYOPATHY (H): ICD-10-CM

## 2019-05-28 DIAGNOSIS — Z01.810 PRE-OPERATIVE CARDIOVASCULAR EXAMINATION: ICD-10-CM

## 2019-05-28 DIAGNOSIS — R91.8 LUNG NODULES: ICD-10-CM

## 2019-05-28 DIAGNOSIS — M79.672 BILATERAL FOOT PAIN: ICD-10-CM

## 2019-05-28 DIAGNOSIS — I42.2 HYPERTROPHIC CARDIOMYOPATHY (H): Primary | ICD-10-CM

## 2019-05-28 LAB — RADIOLOGIST FLAGS: NORMAL

## 2019-05-28 PROCEDURE — 25000125 ZZHC RX 250: Performed by: INTERNAL MEDICINE

## 2019-05-28 PROCEDURE — 25000132 ZZH RX MED GY IP 250 OP 250 PS 637: Performed by: INTERNAL MEDICINE

## 2019-05-28 PROCEDURE — 71250 CT THORAX DX C-: CPT

## 2019-05-28 PROCEDURE — 25000128 H RX IP 250 OP 636: Performed by: INTERNAL MEDICINE

## 2019-05-28 PROCEDURE — G0463 HOSPITAL OUTPT CLINIC VISIT: HCPCS | Mod: ZF

## 2019-05-28 PROCEDURE — 99214 OFFICE O/P EST MOD 30 MIN: CPT | Mod: ZP | Performed by: INTERNAL MEDICINE

## 2019-05-28 PROCEDURE — 75574 CT ANGIO HRT W/3D IMAGE: CPT | Mod: 26 | Performed by: INTERNAL MEDICINE

## 2019-05-28 PROCEDURE — 75574 CT ANGIO HRT W/3D IMAGE: CPT

## 2019-05-28 RX ORDER — IOPAMIDOL 755 MG/ML
120 INJECTION, SOLUTION INTRAVASCULAR ONCE
Status: COMPLETED | OUTPATIENT
Start: 2019-05-28 | End: 2019-05-28

## 2019-05-28 RX ORDER — METOPROLOL TARTRATE 50 MG
50-100 TABLET ORAL
Status: COMPLETED | OUTPATIENT
Start: 2019-05-28 | End: 2019-05-28

## 2019-05-28 RX ORDER — NITROGLYCERIN 0.4 MG/1
.4-.8 TABLET SUBLINGUAL
Status: DISCONTINUED | OUTPATIENT
Start: 2019-05-28 | End: 2019-05-29 | Stop reason: HOSPADM

## 2019-05-28 RX ORDER — METOPROLOL TARTRATE 1 MG/ML
5-15 INJECTION, SOLUTION INTRAVENOUS
Status: DISCONTINUED | OUTPATIENT
Start: 2019-05-28 | End: 2019-05-29 | Stop reason: HOSPADM

## 2019-05-28 RX ORDER — ACYCLOVIR 200 MG/1
0-1 CAPSULE ORAL
Status: DISCONTINUED | OUTPATIENT
Start: 2019-05-28 | End: 2019-05-29 | Stop reason: HOSPADM

## 2019-05-28 RX ADMIN — METOPROLOL TARTRATE 5 MG: 5 INJECTION INTRAVENOUS at 09:34

## 2019-05-28 RX ADMIN — IOPAMIDOL 120 ML: 755 INJECTION, SOLUTION INTRAVENOUS at 09:30

## 2019-05-28 RX ADMIN — METOPROLOL TARTRATE 100 MG: 100 TABLET, FILM COATED ORAL at 08:11

## 2019-05-28 RX ADMIN — NITROGLYCERIN 0.8 MG: 0.4 TABLET SUBLINGUAL at 09:31

## 2019-05-28 ASSESSMENT — MIFFLIN-ST. JEOR: SCORE: 1797.34

## 2019-05-28 ASSESSMENT — PAIN SCALES - GENERAL
PAINLEVEL: NO PAIN (0)
PAINLEVEL: NO PAIN (0)

## 2019-05-28 NOTE — PATIENT INSTRUCTIONS
1) Continue current metformin dose  2) Podiatry referral for after hip surgery  3) Dermatology referral for after hip surgery    Follow-up in 6 months.

## 2019-05-28 NOTE — PROGRESS NOTES
Pt arrived for Coronary CT angiogram. Test, meds and side effects reviewed with pt.  Monitor shows that patient is in atrial fibrillation/atrial flutter which is normal for this patient. Resting HR greater than 70 bpm. Given 100 mg PO Metoprolol per verbal order. HR remained >60 one hour after PO administration. Administered 5 mg IV metoprolol and 0.8 mg SL nitro on CTA table per verbal order. CTA completed; tolerated procedure well. Post monitoring completed and VSS. D/C instructions reviewed with pt whom verbalized understanding of need to increase PO fluids today. D/C to gold waiting room accompanied by staff.

## 2019-05-28 NOTE — PROGRESS NOTES
HPI  Mr. Meredith is a 63 year old male with history of HCM (dx '06, EF 20% -->? 60%) VT s/p ICD '12, nonobstructive CAD (cath '13), AF s/p PVI X 3 ('11, '16, '18) and DCCV X 10, currently on sotalol and xeralto who presents to clinic for cardiac evaluation for upcoming hip surgery.     From a cardiac standpoint patient relates that he has been feeling well.  He denies any chest pain or pressure, sob, orthopnea, pnd, syncope/presyncope vamsi or change in yanes.  He does note continued hip pain and is looking forward to discomfort relief from upcoming hip surgery.  Patient was recently found to be back in afib.  Due to limitations from hip difficult to determine if he has significant symptoms due to atrial fib.  He denies any problems with his medications and reports compliance.        Past Medical History:   Diagnosis Date     Atrial fibrillation (H) 12/9/11    failed medication, multiple DC cardioveresions; s/p Left atrial ablation to eliminate atrial fibrillation 12/9/11     Chest pain      CHF (congestive heart failure) (H) 7/30/2016     Coronary artery disease      DJD (degenerative joint disease)      Hip arthritis 1/15/2014     Hypertension      Hypertrophic cardiomyopathy (H) 10/09     Other abnormal heart sounds      Pacemaker     ICD     Palpitations      Pneumonia, organism unspecified(486)      Prediabetes 7/10/15    A1C 6.5     Status post implantation of automatic cardioverter/defibrillator (AICD)      Ventricular tachycardia (H)    - HCM diagnosed '06, previously burnt out (EF 20%) now with recovered EF  - h/o VT s/p dual chamber ICD '12  - AF s/p PVI X 2 ('11, '16, '18), h/o DCCV X 10      Meds:  Reviewed    Social History     Socioeconomic History     Marital status:      Spouse name: Not on file     Number of children: Not on file     Years of education: Not on file     Highest education level: Not on file   Occupational History     Occupation:      Employer: [employer]    Social Needs     Financial resource strain: Not on file     Food insecurity:     Worry: Not on file     Inability: Not on file     Transportation needs:     Medical: Not on file     Non-medical: Not on file   Tobacco Use     Smoking status: Former Smoker     Packs/day: 1.00     Years: 40.00     Pack years: 40.00     Types: Cigarettes     Start date: 1/1/1975     Last attempt to quit: 12/11/2015     Years since quitting: 3.4     Smokeless tobacco: Never Used   Substance and Sexual Activity     Alcohol use: Yes     Alcohol/week: 0.0 oz     Comment: 1 drink per week     Drug use: No     Sexual activity: Yes     Partners: Female   Lifestyle     Physical activity:     Days per week: Not on file     Minutes per session: Not on file     Stress: Not on file   Relationships     Social connections:     Talks on phone: Not on file     Gets together: Not on file     Attends Hinduism service: Not on file     Active member of club or organization: Not on file     Attends meetings of clubs or organizations: Not on file     Relationship status: Not on file     Intimate partner violence:     Fear of current or ex partner: Not on file     Emotionally abused: Not on file     Physically abused: Not on file     Forced sexual activity: Not on file   Other Topics Concern      Service Not Asked     Blood Transfusions Not Asked     Caffeine Concern Not Asked     Occupational Exposure Not Asked     Hobby Hazards Not Asked     Sleep Concern Not Asked     Stress Concern Not Asked     Weight Concern Not Asked     Special Diet Not Asked     Back Care Not Asked     Exercise No     Bike Helmet Not Asked     Seat Belt Yes     Self-Exams Not Asked     Parent/sibling w/ CABG, MI or angioplasty before 65F 55M? No   Social History Narrative     Not on file       Family History   Problem Relation Age of Onset     Heart Disease Mother         unknown     Obesity Mother      Gastrointestinal Disease Mother         diverticulitis     Diabetes  "Maternal Grandmother      Diabetes Paternal Grandmother      Cerebrovascular Disease Paternal Grandmother 94     Diabetes Paternal Grandfather      Cerebrovascular Disease Paternal Grandfather 78     Diabetes Son      C.A.D. Father         CABG age 78     Heart Disease Father         CABG x5     Diabetes Maternal Grandfather      LUNG DISEASE No family hx of      Deep Vein Thrombosis (DVT) No family hx of      Anesthesia Reaction No family hx of        ROS:   Constitutional: No fever, chills, or sweats. No weight gain/loss.   ENT: No visual disturbance, ear ache, epistaxis, sore throat.   Allergies/Immunologic: Negative.   Respiratory: No cough, hemoptysis.   Cardiovascular: As per HPI.   GI: No nausea, vomiting, hematemesis, melena, or hematochezia.   : No urinary frequency, dysuria, or hematuria.   Integument: Negative.   Psychiatric: Negative.   Neuro: Negative.   Endocrinology: Negative.   Musculoskeletal: Ongoing hip pain      /85 (BP Location: Right arm, Patient Position: Chair, Cuff Size: Adult Large)   Pulse 70   Ht 1.803 m (5' 11\")   Wt 98.5 kg (217 lb 3.2 oz)   SpO2 97%   BMI 30.29 kg/m    General: appears comfortable, alert and articulate  Head: normocephalic, atraumatic  Eyes: anicteric sclera, EOMI  Neck: no adenopathy, 2+ carotids without bruits  Orophyarynx: moist mucosa, no lesions, dentition intact  Heart: regular, S1/S2, 2/6 systolic murmur, no gallop or rub, estimated JVP 6-7cm  Lungs: clear, no rales or wheezing  Abdomen: soft, non-tender, bowel sounds present, no hepatomegaly  Extremities: no clubbing, cyanosis or edema  Neurological: normal speech and affect, no gross motor deficits           CPX Echo 6/1/2018:  Interpretation Summary  Submaximal treadmill stress test in a patient with a diagnosis of HCM.  The Ramirez treadmill score is 8 (low risk).  Exercise was stopped due to fatigue.  Normal blood pressure response to exercise.  No ECG evidence of ischemia.  No supraventricular or " ventricular arrhythmias. Frequent PVCs noted throughout the study.  Normal biventricular function with mild asymmetrical septal hypertrophy (12mm).  No dynamic outflow tract obstruction is present at rest or with exercise.  Normal segmental wall motion at rest and with exercise.  Minimal augmentation in LV function with exercise.  Average functional capacity for age.         CPX 2/15/2019:      Device interrogation 4/22/19  Alert received for AF burden. His presenting rhythm is AF/VS- ~70 bpm. AF has been 100% since 3/14/19. Normal device function. AP= 6% and = 3%. Lead trends appear stable. Battery estimates 4 years to CARMEN.    CTA coronary angiogram today  IMPRESSION:  1.  No evidence of coronary artery atherosclerosis or stenosis.  2.  Myocardial bridging involving the mid LAD.  3.  Total Agatston score 0 placing the patient in the lowest  percentile when compared to age and gender matched control group.      Assessment/Plan:  63 year old male with history of HCM (dx '06, EF 20% -->? 60%) VT s/p ICD '12, nonobstructive CAD (cath '13), AF s/p PVI X 3 ('11, '16, '18) and DCCV X 10, currently on sotalol and xeralto who presents to clinic for cardiac evaluation for upcoming hip surgery.      # AF --multiple recurrences with h/o multiple ablations and cardioversion.  Given hip discomfort/limitation difficult to determine if patient has current exercise intolerance due to recurrent atrial fib however CPX in past seems similar when in afib as well as sinus.  Rate controlled.   - continue xeralto  - continue sotalol 120 BID   - continue metoprolol 50 XL daily       # HCM -- previously burnt out with EF 20% ('13), now recovered (EF 60%).  No evidence of LVOT obstruction.  - continue metoprolol XL 50mg daily   - continue aldactone  50mg daily  - pt aware of exercise restrictions and family screening recommendations    # Nonobstructive CAD -- 10-30% stenoses on cath '13  - coronary CTA confirmed no significant disease  -  continue atorvastatin 20   - on xeralto for AF, no indication for ASA    # HTN -- controlled  - continue metoprolol XL, aldactone    # upcoming hip surgery  - no cardiac contraindications to upcoming hip surgery.  Would recommend judicious use of IV fluids while maintaining adequate preload as given his HCM he will be more suspectible to volume overload.  Ok with the plan to stop anti-coagulation 2-3 days prior to surgery and then resume as soon as able postoperative.  Feel free to contact me or the cardiology consult team on call to assist with management during the post-operative period if needed.        Mona Ware MD  Section Head - Advanced Heart Failure, Transplantation and Mechanical Circulatory Support  Director - Adult Congenital and Cardiovascular [facility]etics Center  Associate Professor of Medicine, [facility]

## 2019-05-28 NOTE — NURSING NOTE
Chief Complaint   Patient presents with     Diabetes     Patient is here for folllow up       Hermes Portillo CMA ([facility]) at 5:12 PM on 5/28/2019

## 2019-05-28 NOTE — LETTER
5/28/2019      RE: Stu Meredith  [address] [facility] [id number]       Dear Colleague,    Thank you for the opportunity to participate in the care of your patient, Stu Meredith, at the [facility] HEART [facility]. Please see a copy of my visit note below.      HPI  Mr. Meredith is a 63 year old male with history of HCM (dx '06, EF 20% -->? 60%) VT s/p ICD '12, nonobstructive CAD (cath '13), AF s/p PVI X 3 ('11, '16, '18) and DCCV X 10, currently on sotalol and xeralto who presents to clinic for cardiac evaluation for upcoming hip surgery.     From a cardiac standpoint patient relates that he has been feeling well.  He denies any chest pain or pressure, sob, orthopnea, pnd, syncope/presyncope vamsi or change in yanes.  He does note continued hip pain and is looking forward to discomfort relief from upcoming hip surgery.  Patient was recently found to be back in afib.  Due to limitations from hip difficult to determine if he has significant symptoms due to atrial fib.  He denies any problems with his medications and reports compliance.        Past Medical History:   Diagnosis Date     Atrial fibrillation (H) 12/9/11    failed medication, multiple DC cardioveresions; s/p Left atrial ablation to eliminate atrial fibrillation 12/9/11     Chest pain      CHF (congestive heart failure) (H) 7/30/2016     Coronary artery disease      DJD (degenerative joint disease)      Hip arthritis 1/15/2014     Hypertension      Hypertrophic cardiomyopathy (H) 10/09     Other abnormal heart sounds      Pacemaker     ICD     Palpitations      Pneumonia, organism unspecified(486)      Prediabetes 7/10/15    A1C 6.5     Status post implantation of automatic cardioverter/defibrillator (AICD)      Ventricular tachycardia (H)    - HCM diagnosed '06, previously burnt out (EF 20%) now with recovered EF  - h/o VT s/p dual chamber ICD '12  - AF s/p PVI X 2 ('11, '16, '18), h/o DCCV X  10      Meds:  Reviewed    Social History     Socioeconomic History     Marital status:      Spouse name: Not on file     Number of children: Not on file     Years of education: Not on file     Highest education level: Not on file   Occupational History     Occupation:      Employer: [employer]   Social Needs     Financial resource strain: Not on file     Food insecurity:     Worry: Not on file     Inability: Not on file     Transportation needs:     Medical: Not on file     Non-medical: Not on file   Tobacco Use     Smoking status: Former Smoker     Packs/day: 1.00     Years: 40.00     Pack years: 40.00     Types: Cigarettes     Start date: 1/1/1975     Last attempt to quit: 12/11/2015     Years since quitting: 3.4     Smokeless tobacco: Never Used   Substance and Sexual Activity     Alcohol use: Yes     Alcohol/week: 0.0 oz     Comment: 1 drink per week     Drug use: No     Sexual activity: Yes     Partners: Female   Lifestyle     Physical activity:     Days per week: Not on file     Minutes per session: Not on file     Stress: Not on file   Relationships     Social connections:     Talks on phone: Not on file     Gets together: Not on file     Attends Islam service: Not on file     Active member of club or organization: Not on file     Attends meetings of clubs or organizations: Not on file     Relationship status: Not on file     Intimate partner violence:     Fear of current or ex partner: Not on file     Emotionally abused: Not on file     Physically abused: Not on file     Forced sexual activity: Not on file   Other Topics Concern      Service Not Asked     Blood Transfusions Not Asked     Caffeine Concern Not Asked     Occupational Exposure Not Asked     Hobby Hazards Not Asked     Sleep Concern Not Asked     Stress Concern Not Asked     Weight Concern Not Asked     Special Diet Not Asked     Back Care Not Asked     Exercise No     Bike Helmet Not Asked     Seat Belt  "Yes     Self-Exams Not Asked     Parent/sibling w/ CABG, MI or angioplasty before 65F 55M? No   Social History Narrative     Not on file       Family History   Problem Relation Age of Onset     Heart Disease Mother         unknown     Obesity Mother      Gastrointestinal Disease Mother         diverticulitis     Diabetes Maternal Grandmother      Diabetes Paternal Grandmother      Cerebrovascular Disease Paternal Grandmother 94     Diabetes Paternal Grandfather      Cerebrovascular Disease Paternal Grandfather 78     Diabetes Son      C.A.D. Father         CABG age 78     Heart Disease Father         CABG x5     Diabetes Maternal Grandfather      LUNG DISEASE No family hx of      Deep Vein Thrombosis (DVT) No family hx of      Anesthesia Reaction No family hx of        ROS:   Constitutional: No fever, chills, or sweats. No weight gain/loss.   ENT: No visual disturbance, ear ache, epistaxis, sore throat.   Allergies/Immunologic: Negative.   Respiratory: No cough, hemoptysis.   Cardiovascular: As per HPI.   GI: No nausea, vomiting, hematemesis, melena, or hematochezia.   : No urinary frequency, dysuria, or hematuria.   Integument: Negative.   Psychiatric: Negative.   Neuro: Negative.   Endocrinology: Negative.   Musculoskeletal: Ongoing hip pain      /85 (BP Location: Right arm, Patient Position: Chair, Cuff Size: Adult Large)   Pulse 70   Ht 1.803 m (5' 11\")   Wt 98.5 kg (217 lb 3.2 oz)   SpO2 97%   BMI 30.29 kg/m     General: appears comfortable, alert and articulate  Head: normocephalic, atraumatic  Eyes: anicteric sclera, EOMI  Neck: no adenopathy, 2+ carotids without bruits  Orophyarynx: moist mucosa, no lesions, dentition intact  Heart: regular, S1/S2, 2/6 systolic murmur, no gallop or rub, estimated JVP 6-7cm  Lungs: clear, no rales or wheezing  Abdomen: soft, non-tender, bowel sounds present, no hepatomegaly  Extremities: no clubbing, cyanosis or edema  Neurological: normal speech and affect, no " gross motor deficits           CPX Echo 6/1/2018:  Interpretation Summary  Submaximal treadmill stress test in a patient with a diagnosis of HCM.  The Ramirez treadmill score is 8 (low risk).  Exercise was stopped due to fatigue.  Normal blood pressure response to exercise.  No ECG evidence of ischemia.  No supraventricular or ventricular arrhythmias. Frequent PVCs noted throughout the study.  Normal biventricular function with mild asymmetrical septal hypertrophy (12mm).  No dynamic outflow tract obstruction is present at rest or with exercise.  Normal segmental wall motion at rest and with exercise.  Minimal augmentation in LV function with exercise.  Average functional capacity for age.         CPX 2/15/2019:      Device interrogation 4/22/19  Alert received for AF burden. His presenting rhythm is AF/VS- ~70 bpm. AF has been 100% since 3/14/19. Normal device function. AP= 6% and = 3%. Lead trends appear stable. Battery estimates 4 years to CARMEN.    CTA coronary angiogram today  IMPRESSION:  1.  No evidence of coronary artery atherosclerosis or stenosis.  2.  Myocardial bridging involving the mid LAD.  3.  Total Agatston score 0 placing the patient in the lowest  percentile when compared to age and gender matched control group.      Assessment/Plan:  63 year old male with history of HCM (dx '06, EF 20% -->? 60%) VT s/p ICD '12, nonobstructive CAD (cath '13), AF s/p PVI X 3 ('11, '16, '18) and DCCV X 10, currently on sotalol and xeralto who presents to clinic for cardiac evaluation for upcoming hip surgery.      # AF --multiple recurrences with h/o multiple ablations and cardioversion.  Given hip discomfort/limitation difficult to determine if patient has current exercise intolerance due to recurrent atrial fib however CPX in past seems similar when in afib as well as sinus.  Rate controlled.   - continue xeralto  - continue sotalol 120 BID   - continue metoprolol 50 XL daily       # HCM -- previously burnt out  with EF 20% ('13), now recovered (EF 60%).  No evidence of LVOT obstruction.  - continue metoprolol XL 50mg daily   - continue aldactone  50mg daily  - pt aware of exercise restrictions and family screening recommendations    # Nonobstructive CAD -- 10-30% stenoses on cath '13  - coronary CTA confirmed no significant disease  - continue atorvastatin 20   - on xeralto for AF, no indication for ASA    # HTN -- controlled  - continue metoprolol XL, aldactone    # upcoming hip surgery  - no cardiac contraindications to upcoming hip surgery.  Would recommend judicious use of IV fluids while maintaining adequate preload as given his HCM he will be more suspectible to volume overload.  Ok with the plan to stop anti-coagulation 2-3 days prior to surgery and then resume as soon as able postoperative.  Feel free to contact me or the cardiology consult team on call to assist with management during the post-operative period if needed.        Mona Ware MD  Section Head - Advanced Heart Failure, Transplantation and Mechanical Circulatory Support  Director - Adult Congenital and Cardiovascular Genetics Center  Associate Professor of Medicine, [facility]

## 2019-05-28 NOTE — PROGRESS NOTES
PRIMARY CARE CENTER       SUBJECTIVE:  Stu Meredith is a 63 year old male with pmh of T2DM who comes in for f/u. Doing okay with metformin. Taking 1000 mg daily. A1c went from 7.0% to 6.8%. Also, his feet continue to hurt-especially in his toes. He is on feet all day at work. Not sharp needles. Dr. Ware is okay with gabapentin if needed. Also notes he is in A flutter again and treatment options are currently being discussed. Finally is going to be having R hip replaced next week. HAs been having a lot of pain and this is severely impacting him, limiting what he is able to do.    Past Medical History:   Diagnosis Date     Atrial fibrillation (H) 12/9/11    failed medication, multiple DC cardioveresions; s/p Left atrial ablation to eliminate atrial fibrillation 12/9/11     Chest pain      CHF (congestive heart failure) (H) 7/30/2016     Coronary artery disease      DJD (degenerative joint disease)      Hip arthritis 1/15/2014     Hypertension      Hypertrophic cardiomyopathy (H) 10/09     Other abnormal heart sounds      Pacemaker     ICD     Palpitations      Pneumonia, organism unspecified(486)      Prediabetes 7/10/15    A1C 6.5     Status post implantation of automatic cardioverter/defibrillator (AICD)      Ventricular tachycardia (H)      Medications and allergies reviewed by me today.       Review Of Systems    10 point ROS of systems including Constitutional, Eyes, Respiratory, Cardiovascular, Pulmonary, Gastroenterology, Genitourinary, Integumentary, Musculoskeletal, Psychiatric were all negative except for pertinent positives noted in my HPI.    OBJECTIVE:    /85   Pulse 70   Wt 98.5 kg (217 lb 2.5 oz)   SpO2 97%   BMI 30.29 kg/m     Wt Readings from Last 1 Encounters:   05/28/19 98.5 kg (217 lb 2.5 oz)       General: Pleasant male, in NAD  ENT: TMs normal bilaterally, oropharynx clear, MMM  Neck:  No LAD, no thyromegaly, no carotid bruits  Resp: lungs CAB  CV: Heart RRR,  no MRG  Abd: Soft, NT, ND, nl bowel sounds, no HSM  Ext: WWP, no LE edema  Skin: warm, dry, no rash  Neuro: AOX3, no focal deficits     ASSESSMENT/PLAN:  Stu was seen today for diabetes.    Diagnoses and all orders for this visit:    Type 2 diabetes mellitus without complication, without long-term current use of insulin (H)   A1c at goal   Continue metformin    BP at goal   RTC in 6 mos    Bilateral foot pain-doesn't seem classic for neuropathy, had normal monofilament exam at last visit. Refer to podiatry to assess if there is something anatomical causing pain.   -     PODIATRY/FOOT & ANKLE SURGERY REFERRAL    History of atypical skin mole  -     DERMATOLOGY REFERRAL      Pt should return to clinic for f/u with me in 6 mos.       Ngoc Harkins MD  05/28/19

## 2019-06-03 ENCOUNTER — TELEPHONE (OUTPATIENT)
Dept: ORTHOPEDICS | Facility: CLINIC | Age: 64
End: 2019-06-03

## 2019-06-03 NOTE — TELEPHONE ENCOUNTER
[facility] Call Center    Phone Message    May a detailed message be left on voicemail: yes    Reason for Call: Other:  Pt was wondering where to go for his surgery. Please follow up with Pt.    Action Taken: Message routed to:  [facility]

## 2019-06-03 NOTE — OR NURSING
"Per Williams/Rep at [employer] pt has Model # E142 Serial number [id number].  Rep says \"if magnet is used during surgery expect beeping tones as long as the magnet is in place  The magnet will suspend the shock ability but pacing will not be effected.  When the magnet is removed all function returns to normal\"  "

## 2019-06-04 ENCOUNTER — ANESTHESIA EVENT (OUTPATIENT)
Dept: SURGERY | Facility: CLINIC | Age: 64
End: 2019-06-04
Payer: COMMERCIAL

## 2019-06-04 NOTE — TELEPHONE ENCOUNTER
Spoke with patient to [facility] for surgery tomorrow 6/5/19 with Dr. Gaspar at the [facility].   Patient had no other questions at this time

## 2019-06-05 ENCOUNTER — HOSPITAL ENCOUNTER (INPATIENT)
Facility: CLINIC | Age: 64
LOS: 2 days | Discharge: HOME | End: 2019-06-07
Attending: ORTHOPAEDIC SURGERY | Admitting: ORTHOPAEDIC SURGERY
Payer: COMMERCIAL

## 2019-06-05 ENCOUNTER — ANESTHESIA (OUTPATIENT)
Dept: SURGERY | Facility: CLINIC | Age: 64
End: 2019-06-05
Payer: COMMERCIAL

## 2019-06-05 ENCOUNTER — APPOINTMENT (OUTPATIENT)
Dept: GENERAL RADIOLOGY | Facility: CLINIC | Age: 64
End: 2019-06-05
Attending: ORTHOPAEDIC SURGERY
Payer: COMMERCIAL

## 2019-06-05 DIAGNOSIS — Z98.890 STATUS POST HIP SURGERY: Primary | ICD-10-CM

## 2019-06-05 LAB
ABO + RH BLD: NORMAL
ABO + RH BLD: NORMAL
ALBUMIN UR-MCNC: 30 MG/DL
APPEARANCE UR: CLEAR
BILIRUB UR QL STRIP: NEGATIVE
BLD GP AB SCN SERPL QL: NORMAL
BLOOD BANK CMNT PATIENT-IMP: NORMAL
BLOOD BANK CMNT PATIENT-IMP: NORMAL
COLOR UR AUTO: YELLOW
CREAT SERPL-MCNC: 0.9 MG/DL (ref 0.66–1.25)
GFR SERPL CREATININE-BSD FRML MDRD: 89 ML/MIN/{1.73_M2}
GLUCOSE SERPL-MCNC: 117 MG/DL (ref 70–99)
GLUCOSE UR STRIP-MCNC: NEGATIVE MG/DL
HGB UR QL STRIP: NEGATIVE
HYALINE CASTS #/AREA URNS LPF: 7 /LPF (ref 0–2)
KETONES UR STRIP-MCNC: NEGATIVE MG/DL
LEUKOCYTE ESTERASE UR QL STRIP: NEGATIVE
MUCOUS THREADS #/AREA URNS LPF: PRESENT /LPF
NITRATE UR QL: NEGATIVE
PH UR STRIP: 5.5 PH (ref 5–7)
POTASSIUM SERPL-SCNC: 4.1 MMOL/L (ref 3.4–5.3)
RBC #/AREA URNS AUTO: <1 /HPF (ref 0–2)
SOURCE: ABNORMAL
SP GR UR STRIP: 1.02 (ref 1–1.03)
SPECIMEN EXP DATE BLD: NORMAL
SQUAMOUS #/AREA URNS AUTO: <1 /HPF (ref 0–1)
UROBILINOGEN UR STRIP-MCNC: 2 MG/DL (ref 0–2)
WBC #/AREA URNS AUTO: <1 /HPF (ref 0–5)

## 2019-06-05 PROCEDURE — C1713 ANCHOR/SCREW BN/BN,TIS/BN: HCPCS | Performed by: ORTHOPAEDIC SURGERY

## 2019-06-05 PROCEDURE — 99207 ZZC NO CHARGE LOS: CPT | Performed by: INTERNAL MEDICINE

## 2019-06-05 PROCEDURE — 99221 1ST HOSP IP/OBS SF/LOW 40: CPT | Performed by: INTERNAL MEDICINE

## 2019-06-05 PROCEDURE — 71000014 ZZH RECOVERY PHASE 1 LEVEL 2 FIRST HR: Performed by: ORTHOPAEDIC SURGERY

## 2019-06-05 PROCEDURE — 25800030 ZZH RX IP 258 OP 636: Performed by: INTERNAL MEDICINE

## 2019-06-05 PROCEDURE — 36415 COLL VENOUS BLD VENIPUNCTURE: CPT | Performed by: ANESTHESIOLOGY

## 2019-06-05 PROCEDURE — 82565 ASSAY OF CREATININE: CPT | Performed by: ANESTHESIOLOGY

## 2019-06-05 PROCEDURE — 40000986 XR PELVIS AND HIP PORTABLE RIGHT 2 VIEWS

## 2019-06-05 PROCEDURE — 25000132 ZZH RX MED GY IP 250 OP 250 PS 637: Performed by: INTERNAL MEDICINE

## 2019-06-05 PROCEDURE — 25000128 H RX IP 250 OP 636: Performed by: ANESTHESIOLOGY

## 2019-06-05 PROCEDURE — 84132 ASSAY OF SERUM POTASSIUM: CPT | Performed by: ANESTHESIOLOGY

## 2019-06-05 PROCEDURE — 25000128 H RX IP 250 OP 636: Performed by: NURSE ANESTHETIST, CERTIFIED REGISTERED

## 2019-06-05 PROCEDURE — 37000008 ZZH ANESTHESIA TECHNICAL FEE, 1ST 30 MIN: Performed by: ORTHOPAEDIC SURGERY

## 2019-06-05 PROCEDURE — 25000565 ZZH ISOFLURANE, EA 15 MIN: Performed by: ORTHOPAEDIC SURGERY

## 2019-06-05 PROCEDURE — 25000128 H RX IP 250 OP 636: Performed by: ORTHOPAEDIC SURGERY

## 2019-06-05 PROCEDURE — 25800030 ZZH RX IP 258 OP 636: Performed by: NURSE ANESTHETIST, CERTIFIED REGISTERED

## 2019-06-05 PROCEDURE — 36000064 ZZH SURGERY LEVEL 4 EA 15 ADDTL MIN - UMMC: Performed by: ORTHOPAEDIC SURGERY

## 2019-06-05 PROCEDURE — 82947 ASSAY GLUCOSE BLOOD QUANT: CPT | Performed by: ANESTHESIOLOGY

## 2019-06-05 PROCEDURE — 25000132 ZZH RX MED GY IP 250 OP 250 PS 637: Performed by: STUDENT IN AN ORGANIZED HEALTH CARE EDUCATION/TRAINING PROGRAM

## 2019-06-05 PROCEDURE — 37000009 ZZH ANESTHESIA TECHNICAL FEE, EACH ADDTL 15 MIN: Performed by: ORTHOPAEDIC SURGERY

## 2019-06-05 PROCEDURE — 25000125 ZZHC RX 250: Performed by: NURSE ANESTHETIST, CERTIFIED REGISTERED

## 2019-06-05 PROCEDURE — 81001 URINALYSIS AUTO W/SCOPE: CPT | Performed by: ORTHOPAEDIC SURGERY

## 2019-06-05 PROCEDURE — 27210794 ZZH OR GENERAL SUPPLY STERILE: Performed by: ORTHOPAEDIC SURGERY

## 2019-06-05 PROCEDURE — 25800030 ZZH RX IP 258 OP 636: Performed by: ORTHOPAEDIC SURGERY

## 2019-06-05 PROCEDURE — 25000131 ZZH RX MED GY IP 250 OP 636 PS 637: Performed by: STUDENT IN AN ORGANIZED HEALTH CARE EDUCATION/TRAINING PROGRAM

## 2019-06-05 PROCEDURE — 25000132 ZZH RX MED GY IP 250 OP 250 PS 637: Performed by: ORTHOPAEDIC SURGERY

## 2019-06-05 PROCEDURE — 12000001 ZZH R&B MED SURG/OB UMMC

## 2019-06-05 PROCEDURE — 99207 ZZC DOWN CODE DUE TO INITIAL EXAM: CPT | Performed by: INTERNAL MEDICINE

## 2019-06-05 PROCEDURE — 40000170 ZZH STATISTIC PRE-PROCEDURE ASSESSMENT II: Performed by: ORTHOPAEDIC SURGERY

## 2019-06-05 PROCEDURE — 0SR902A REPLACEMENT OF RIGHT HIP JOINT WITH METAL ON POLYETHYLENE SYNTHETIC SUBSTITUTE, UNCEMENTED, OPEN APPROACH: ICD-10-PCS | Performed by: ORTHOPAEDIC SURGERY

## 2019-06-05 PROCEDURE — 25000128 H RX IP 250 OP 636: Performed by: STUDENT IN AN ORGANIZED HEALTH CARE EDUCATION/TRAINING PROGRAM

## 2019-06-05 PROCEDURE — C1776 JOINT DEVICE (IMPLANTABLE): HCPCS | Performed by: ORTHOPAEDIC SURGERY

## 2019-06-05 PROCEDURE — 36000062 ZZH SURGERY LEVEL 4 1ST 30 MIN - UMMC: Performed by: ORTHOPAEDIC SURGERY

## 2019-06-05 DEVICE — IMP SHELL SNR ACET R3 3H 54MM 71335554: Type: IMPLANTABLE DEVICE | Site: HIP | Status: FUNCTIONAL

## 2019-06-05 DEVICE — IMP SCR ACET SNN SPHERICAL HEAD 6.5X40MM 71332540: Type: IMPLANTABLE DEVICE | Site: HIP | Status: FUNCTIONAL

## 2019-06-05 DEVICE — IMP SCR ACET SNN SPHERICAL HEAD 6.5X20MM 71332520: Type: IMPLANTABLE DEVICE | Site: HIP | Status: FUNCTIONAL

## 2019-06-05 DEVICE — IMP STEM SNN HIGH OFFSET SZ 7 71356107: Type: IMPLANTABLE DEVICE | Site: HIP | Status: FUNCTIONAL

## 2019-06-05 DEVICE — IMP LINER SNR ACET R3 XLPE 0DEG 36X54MM 71332754: Type: IMPLANTABLE DEVICE | Site: HIP | Status: FUNCTIONAL

## 2019-06-05 DEVICE — IMP HEAD FEMORAL SNN BIPOLAR COBALT 36MM +4 71303604: Type: IMPLANTABLE DEVICE | Site: HIP | Status: FUNCTIONAL

## 2019-06-05 RX ORDER — CALCIUM CARBONATE 500 MG/1
500 TABLET, CHEWABLE ORAL DAILY PRN
Status: DISCONTINUED | OUTPATIENT
Start: 2019-06-05 | End: 2019-06-07 | Stop reason: HOSPADM

## 2019-06-05 RX ORDER — SODIUM CHLORIDE, SODIUM LACTATE, POTASSIUM CHLORIDE, CALCIUM CHLORIDE 600; 310; 30; 20 MG/100ML; MG/100ML; MG/100ML; MG/100ML
INJECTION, SOLUTION INTRAVENOUS CONTINUOUS PRN
Status: DISCONTINUED | OUTPATIENT
Start: 2019-06-05 | End: 2019-06-05

## 2019-06-05 RX ORDER — NALOXONE HYDROCHLORIDE 0.4 MG/ML
.1-.4 INJECTION, SOLUTION INTRAMUSCULAR; INTRAVENOUS; SUBCUTANEOUS
Status: DISCONTINUED | OUTPATIENT
Start: 2019-06-05 | End: 2019-06-07 | Stop reason: HOSPADM

## 2019-06-05 RX ORDER — HYDROXYZINE HYDROCHLORIDE 25 MG/1
25 TABLET, FILM COATED ORAL EVERY 6 HOURS PRN
Status: DISCONTINUED | OUTPATIENT
Start: 2019-06-05 | End: 2019-06-07 | Stop reason: HOSPADM

## 2019-06-05 RX ORDER — ONDANSETRON 4 MG/1
4 TABLET, ORALLY DISINTEGRATING ORAL EVERY 30 MIN PRN
Status: DISCONTINUED | OUTPATIENT
Start: 2019-06-05 | End: 2019-06-05 | Stop reason: HOSPADM

## 2019-06-05 RX ORDER — SODIUM CHLORIDE 9 MG/ML
INJECTION, SOLUTION INTRAVENOUS
Status: DISPENSED
Start: 2019-06-05 | End: 2019-06-06

## 2019-06-05 RX ORDER — PROCHLORPERAZINE MALEATE 10 MG
10 TABLET ORAL EVERY 6 HOURS PRN
Status: DISCONTINUED | OUTPATIENT
Start: 2019-06-05 | End: 2019-06-07 | Stop reason: HOSPADM

## 2019-06-05 RX ORDER — SODIUM CHLORIDE, SODIUM LACTATE, POTASSIUM CHLORIDE, CALCIUM CHLORIDE 600; 310; 30; 20 MG/100ML; MG/100ML; MG/100ML; MG/100ML
INJECTION, SOLUTION INTRAVENOUS CONTINUOUS
Status: DISCONTINUED | OUTPATIENT
Start: 2019-06-05 | End: 2019-06-05

## 2019-06-05 RX ORDER — GABAPENTIN 100 MG/1
300 CAPSULE ORAL
Status: COMPLETED | OUTPATIENT
Start: 2019-06-05 | End: 2019-06-05

## 2019-06-05 RX ORDER — AMOXICILLIN 250 MG
2 CAPSULE ORAL 2 TIMES DAILY
Status: DISCONTINUED | OUTPATIENT
Start: 2019-06-05 | End: 2019-06-07 | Stop reason: HOSPADM

## 2019-06-05 RX ORDER — ALBUTEROL SULFATE 90 UG/1
2 AEROSOL, METERED RESPIRATORY (INHALATION) EVERY 4 HOURS PRN
Status: DISCONTINUED | OUTPATIENT
Start: 2019-06-05 | End: 2019-06-07 | Stop reason: HOSPADM

## 2019-06-05 RX ORDER — SODIUM CHLORIDE, SODIUM LACTATE, POTASSIUM CHLORIDE, CALCIUM CHLORIDE 600; 310; 30; 20 MG/100ML; MG/100ML; MG/100ML; MG/100ML
INJECTION, SOLUTION INTRAVENOUS CONTINUOUS
Status: DISCONTINUED | OUTPATIENT
Start: 2019-06-05 | End: 2019-06-05 | Stop reason: HOSPADM

## 2019-06-05 RX ORDER — ZOLPIDEM TARTRATE 5 MG/1
10 TABLET ORAL
Status: DISCONTINUED | OUTPATIENT
Start: 2019-06-05 | End: 2019-06-07 | Stop reason: HOSPADM

## 2019-06-05 RX ORDER — PROPOFOL 10 MG/ML
INJECTION, EMULSION INTRAVENOUS PRN
Status: DISCONTINUED | OUTPATIENT
Start: 2019-06-05 | End: 2019-06-05

## 2019-06-05 RX ORDER — LANOLIN ALCOHOL/MO/W.PET/CERES
3 CREAM (GRAM) TOPICAL
Status: DISCONTINUED | OUTPATIENT
Start: 2019-06-06 | End: 2019-06-07 | Stop reason: HOSPADM

## 2019-06-05 RX ORDER — ACETAMINOPHEN 325 MG/1
975 TABLET ORAL EVERY 8 HOURS
Status: DISCONTINUED | OUTPATIENT
Start: 2019-06-05 | End: 2019-06-07 | Stop reason: HOSPADM

## 2019-06-05 RX ORDER — METOPROLOL SUCCINATE 50 MG/1
50 TABLET, EXTENDED RELEASE ORAL DAILY
Status: DISCONTINUED | OUTPATIENT
Start: 2019-06-06 | End: 2019-06-06

## 2019-06-05 RX ORDER — AMOXICILLIN 250 MG
1 CAPSULE ORAL 2 TIMES DAILY
Status: DISCONTINUED | OUTPATIENT
Start: 2019-06-05 | End: 2019-06-07 | Stop reason: HOSPADM

## 2019-06-05 RX ORDER — ONDANSETRON 2 MG/ML
INJECTION INTRAMUSCULAR; INTRAVENOUS PRN
Status: DISCONTINUED | OUTPATIENT
Start: 2019-06-05 | End: 2019-06-05

## 2019-06-05 RX ORDER — DIPHENHYDRAMINE HCL 25 MG
25 CAPSULE ORAL EVERY 6 HOURS PRN
Status: DISCONTINUED | OUTPATIENT
Start: 2019-06-05 | End: 2019-06-07 | Stop reason: HOSPADM

## 2019-06-05 RX ORDER — SODIUM CHLORIDE 9 MG/ML
INJECTION, SOLUTION INTRAVENOUS CONTINUOUS
Status: DISCONTINUED | OUTPATIENT
Start: 2019-06-05 | End: 2019-06-07 | Stop reason: HOSPADM

## 2019-06-05 RX ORDER — HYDROMORPHONE HYDROCHLORIDE 1 MG/ML
.3-.5 INJECTION, SOLUTION INTRAMUSCULAR; INTRAVENOUS; SUBCUTANEOUS
Status: DISCONTINUED | OUTPATIENT
Start: 2019-06-05 | End: 2019-06-06

## 2019-06-05 RX ORDER — MORPHINE SULFATE 2 MG/ML
2-4 INJECTION, SOLUTION INTRAMUSCULAR; INTRAVENOUS
Status: DISCONTINUED | OUTPATIENT
Start: 2019-06-05 | End: 2019-06-07 | Stop reason: HOSPADM

## 2019-06-05 RX ORDER — LIDOCAINE 40 MG/G
CREAM TOPICAL
Status: DISCONTINUED | OUTPATIENT
Start: 2019-06-05 | End: 2019-06-07 | Stop reason: HOSPADM

## 2019-06-05 RX ORDER — CELECOXIB 200 MG/1
200 CAPSULE ORAL ONCE
Status: COMPLETED | OUTPATIENT
Start: 2019-06-05 | End: 2019-06-05

## 2019-06-05 RX ORDER — CEFAZOLIN SODIUM 2 G/100ML
2 INJECTION, SOLUTION INTRAVENOUS
Status: COMPLETED | OUTPATIENT
Start: 2019-06-05 | End: 2019-06-05

## 2019-06-05 RX ORDER — LIDOCAINE HYDROCHLORIDE 20 MG/ML
INJECTION, SOLUTION INFILTRATION; PERINEURAL PRN
Status: DISCONTINUED | OUTPATIENT
Start: 2019-06-05 | End: 2019-06-05

## 2019-06-05 RX ORDER — BISACODYL 10 MG
10 SUPPOSITORY, RECTAL RECTAL DAILY
Status: DISCONTINUED | OUTPATIENT
Start: 2019-06-06 | End: 2019-06-07 | Stop reason: HOSPADM

## 2019-06-05 RX ORDER — LIDOCAINE 40 MG/G
CREAM TOPICAL
Status: DISCONTINUED | OUTPATIENT
Start: 2019-06-05 | End: 2019-06-05 | Stop reason: CLARIF

## 2019-06-05 RX ORDER — METOCLOPRAMIDE HYDROCHLORIDE 5 MG/ML
10 INJECTION INTRAMUSCULAR; INTRAVENOUS EVERY 6 HOURS PRN
Status: DISCONTINUED | OUTPATIENT
Start: 2019-06-05 | End: 2019-06-07 | Stop reason: HOSPADM

## 2019-06-05 RX ORDER — ONDANSETRON 2 MG/ML
4 INJECTION INTRAMUSCULAR; INTRAVENOUS EVERY 6 HOURS PRN
Status: DISCONTINUED | OUTPATIENT
Start: 2019-06-05 | End: 2019-06-07 | Stop reason: HOSPADM

## 2019-06-05 RX ORDER — HYDROMORPHONE HYDROCHLORIDE 1 MG/ML
.3-.5 INJECTION, SOLUTION INTRAMUSCULAR; INTRAVENOUS; SUBCUTANEOUS
Status: DISCONTINUED | OUTPATIENT
Start: 2019-06-05 | End: 2019-06-05

## 2019-06-05 RX ORDER — DIPHENHYDRAMINE HYDROCHLORIDE 50 MG/ML
25 INJECTION INTRAMUSCULAR; INTRAVENOUS EVERY 6 HOURS PRN
Status: DISCONTINUED | OUTPATIENT
Start: 2019-06-05 | End: 2019-06-07 | Stop reason: HOSPADM

## 2019-06-05 RX ORDER — ACETAMINOPHEN 325 MG/1
975 TABLET ORAL ONCE
Status: COMPLETED | OUTPATIENT
Start: 2019-06-05 | End: 2019-06-05

## 2019-06-05 RX ORDER — FENTANYL CITRATE 50 UG/ML
25-50 INJECTION, SOLUTION INTRAMUSCULAR; INTRAVENOUS
Status: DISCONTINUED | OUTPATIENT
Start: 2019-06-05 | End: 2019-06-05 | Stop reason: HOSPADM

## 2019-06-05 RX ORDER — HYDROMORPHONE HYDROCHLORIDE 1 MG/ML
.3-.5 INJECTION, SOLUTION INTRAMUSCULAR; INTRAVENOUS; SUBCUTANEOUS EVERY 5 MIN PRN
Status: DISCONTINUED | OUTPATIENT
Start: 2019-06-05 | End: 2019-06-05 | Stop reason: HOSPADM

## 2019-06-05 RX ORDER — ONDANSETRON 2 MG/ML
4 INJECTION INTRAMUSCULAR; INTRAVENOUS EVERY 30 MIN PRN
Status: DISCONTINUED | OUTPATIENT
Start: 2019-06-05 | End: 2019-06-05 | Stop reason: HOSPADM

## 2019-06-05 RX ORDER — CEFAZOLIN SODIUM 1 G/3ML
1 INJECTION, POWDER, FOR SOLUTION INTRAMUSCULAR; INTRAVENOUS SEE ADMIN INSTRUCTIONS
Status: DISCONTINUED | OUTPATIENT
Start: 2019-06-05 | End: 2019-06-05 | Stop reason: HOSPADM

## 2019-06-05 RX ORDER — ONDANSETRON 4 MG/1
4 TABLET, ORALLY DISINTEGRATING ORAL EVERY 6 HOURS PRN
Status: DISCONTINUED | OUTPATIENT
Start: 2019-06-05 | End: 2019-06-07 | Stop reason: HOSPADM

## 2019-06-05 RX ORDER — SODIUM CHLORIDE 9 MG/ML
INJECTION, SOLUTION INTRAVENOUS CONTINUOUS
Status: DISCONTINUED | OUTPATIENT
Start: 2019-06-05 | End: 2019-06-06

## 2019-06-05 RX ORDER — NALOXONE HYDROCHLORIDE 0.4 MG/ML
.1-.4 INJECTION, SOLUTION INTRAMUSCULAR; INTRAVENOUS; SUBCUTANEOUS
Status: DISCONTINUED | OUTPATIENT
Start: 2019-06-05 | End: 2019-06-05

## 2019-06-05 RX ORDER — FENTANYL CITRATE 50 UG/ML
INJECTION, SOLUTION INTRAMUSCULAR; INTRAVENOUS PRN
Status: DISCONTINUED | OUTPATIENT
Start: 2019-06-05 | End: 2019-06-05

## 2019-06-05 RX ORDER — OXYCODONE HYDROCHLORIDE 5 MG/1
5-10 TABLET ORAL
Status: DISCONTINUED | OUTPATIENT
Start: 2019-06-05 | End: 2019-06-07 | Stop reason: HOSPADM

## 2019-06-05 RX ORDER — CEFAZOLIN SODIUM 2 G/100ML
2 INJECTION, SOLUTION INTRAVENOUS EVERY 8 HOURS
Status: COMPLETED | OUTPATIENT
Start: 2019-06-05 | End: 2019-06-06

## 2019-06-05 RX ORDER — HYDROMORPHONE HYDROCHLORIDE 2 MG/1
2-4 TABLET ORAL
Status: DISCONTINUED | OUTPATIENT
Start: 2019-06-05 | End: 2019-06-06

## 2019-06-05 RX ORDER — METOCLOPRAMIDE 10 MG/1
10 TABLET ORAL EVERY 6 HOURS PRN
Status: DISCONTINUED | OUTPATIENT
Start: 2019-06-05 | End: 2019-06-07 | Stop reason: HOSPADM

## 2019-06-05 RX ORDER — SOTALOL HYDROCHLORIDE 120 MG/1
120 TABLET ORAL 2 TIMES DAILY
Status: DISCONTINUED | OUTPATIENT
Start: 2019-06-05 | End: 2019-06-07 | Stop reason: HOSPADM

## 2019-06-05 RX ORDER — ACETAMINOPHEN 325 MG/1
650 TABLET ORAL EVERY 4 HOURS PRN
Status: DISCONTINUED | OUTPATIENT
Start: 2019-06-08 | End: 2019-06-07 | Stop reason: HOSPADM

## 2019-06-05 RX ORDER — OXYCODONE HYDROCHLORIDE 5 MG/1
5-10 TABLET ORAL
Status: DISCONTINUED | OUTPATIENT
Start: 2019-06-05 | End: 2019-06-05

## 2019-06-05 RX ADMIN — HYDROMORPHONE HYDROCHLORIDE 0.5 MG: 1 INJECTION, SOLUTION INTRAMUSCULAR; INTRAVENOUS; SUBCUTANEOUS at 14:16

## 2019-06-05 RX ADMIN — SODIUM CHLORIDE, POTASSIUM CHLORIDE, SODIUM LACTATE AND CALCIUM CHLORIDE: 600; 310; 30; 20 INJECTION, SOLUTION INTRAVENOUS at 10:26

## 2019-06-05 RX ADMIN — HYDROMORPHONE HYDROCHLORIDE 0.5 MG: 1 INJECTION, SOLUTION INTRAMUSCULAR; INTRAVENOUS; SUBCUTANEOUS at 14:32

## 2019-06-05 RX ADMIN — PHENYLEPHRINE HYDROCHLORIDE 100 MCG: 10 INJECTION INTRAVENOUS at 11:10

## 2019-06-05 RX ADMIN — PHENYLEPHRINE HYDROCHLORIDE 0.3 MCG/KG/MIN: 10 INJECTION INTRAVENOUS at 11:13

## 2019-06-05 RX ADMIN — SOTALOL HYDROCHLORIDE 120 MG: 120 TABLET ORAL at 20:53

## 2019-06-05 RX ADMIN — PHENYLEPHRINE HYDROCHLORIDE 100 MCG: 10 INJECTION INTRAVENOUS at 10:56

## 2019-06-05 RX ADMIN — FENTANYL CITRATE 50 MCG: 50 INJECTION, SOLUTION INTRAMUSCULAR; INTRAVENOUS at 11:57

## 2019-06-05 RX ADMIN — CEFAZOLIN SODIUM 2 G: 2 INJECTION, SOLUTION INTRAVENOUS at 10:52

## 2019-06-05 RX ADMIN — MIDAZOLAM 2 MG: 1 INJECTION INTRAMUSCULAR; INTRAVENOUS at 10:26

## 2019-06-05 RX ADMIN — ONDANSETRON 4 MG: 4 TABLET, ORALLY DISINTEGRATING ORAL at 20:31

## 2019-06-05 RX ADMIN — ROCURONIUM BROMIDE 20 MG: 10 INJECTION INTRAVENOUS at 11:29

## 2019-06-05 RX ADMIN — FENTANYL CITRATE 50 MCG: 50 INJECTION, SOLUTION INTRAMUSCULAR; INTRAVENOUS at 13:18

## 2019-06-05 RX ADMIN — CEFAZOLIN SODIUM 2 G: 2 INJECTION, SOLUTION INTRAVENOUS at 20:54

## 2019-06-05 RX ADMIN — SODIUM CHLORIDE: 9 INJECTION, SOLUTION INTRAVENOUS at 17:30

## 2019-06-05 RX ADMIN — LIDOCAINE HYDROCHLORIDE 60 MG: 20 INJECTION, SOLUTION INFILTRATION; PERINEURAL at 10:42

## 2019-06-05 RX ADMIN — SUGAMMADEX 200 MG: 100 INJECTION, SOLUTION INTRAVENOUS at 13:51

## 2019-06-05 RX ADMIN — HYDROMORPHONE HYDROCHLORIDE 0.5 MG: 1 INJECTION, SOLUTION INTRAMUSCULAR; INTRAVENOUS; SUBCUTANEOUS at 17:33

## 2019-06-05 RX ADMIN — PROCHLORPERAZINE EDISYLATE 10 MG: 5 INJECTION INTRAMUSCULAR; INTRAVENOUS at 21:49

## 2019-06-05 RX ADMIN — CEFAZOLIN SODIUM 1 G: 2 INJECTION, SOLUTION INTRAVENOUS at 12:52

## 2019-06-05 RX ADMIN — ROCURONIUM BROMIDE 50 MG: 10 INJECTION INTRAVENOUS at 10:42

## 2019-06-05 RX ADMIN — OXYCODONE HYDROCHLORIDE 5 MG: 5 TABLET ORAL at 15:13

## 2019-06-05 RX ADMIN — CELECOXIB 200 MG: 200 CAPSULE ORAL at 08:25

## 2019-06-05 RX ADMIN — PHENYLEPHRINE HYDROCHLORIDE 50 MCG: 10 INJECTION INTRAVENOUS at 12:38

## 2019-06-05 RX ADMIN — GABAPENTIN 300 MG: 300 CAPSULE ORAL at 08:25

## 2019-06-05 RX ADMIN — HYDROMORPHONE HYDROCHLORIDE 4 MG: 2 TABLET ORAL at 20:53

## 2019-06-05 RX ADMIN — ACETAMINOPHEN 975 MG: 325 TABLET, FILM COATED ORAL at 20:53

## 2019-06-05 RX ADMIN — ONDANSETRON 4 MG: 2 INJECTION INTRAMUSCULAR; INTRAVENOUS at 13:21

## 2019-06-05 RX ADMIN — PROPOFOL 120 MG: 10 INJECTION, EMULSION INTRAVENOUS at 10:42

## 2019-06-05 RX ADMIN — FENTANYL CITRATE 100 MCG: 50 INJECTION, SOLUTION INTRAMUSCULAR; INTRAVENOUS at 10:42

## 2019-06-05 RX ADMIN — HYDROMORPHONE HYDROCHLORIDE 0.5 MG: 1 INJECTION, SOLUTION INTRAMUSCULAR; INTRAVENOUS; SUBCUTANEOUS at 14:06

## 2019-06-05 RX ADMIN — SENNOSIDES AND DOCUSATE SODIUM 1 TABLET: 8.6; 5 TABLET ORAL at 20:53

## 2019-06-05 RX ADMIN — ACETAMINOPHEN 975 MG: 325 TABLET, FILM COATED ORAL at 08:25

## 2019-06-05 ASSESSMENT — LIFESTYLE VARIABLES: TOBACCO_USE: 1

## 2019-06-05 ASSESSMENT — ACTIVITIES OF DAILY LIVING (ADL)
ADLS_ACUITY_SCORE: 14
ADLS_ACUITY_SCORE: 14

## 2019-06-05 ASSESSMENT — MIFFLIN-ST. JEOR: SCORE: 1780.13

## 2019-06-05 ASSESSMENT — NEW YORK HEART ASSOCIATION (NYHA) CLASSIFICATION: NYHA FUNCTIONAL CLASS: II

## 2019-06-05 ASSESSMENT — ENCOUNTER SYMPTOMS: DYSRHYTHMIAS: 1

## 2019-06-05 NOTE — ANESTHESIA CARE TRANSFER NOTE
Patient: Stu Meredith    Procedure(s):  Right Total Hip Arthroplasty    Diagnosis: Right Hip Pain, Osteoarthritis Right Hip  Diagnosis Additional Information: No value filed.    Anesthesia Type:   No value filed.     Note:  Airway :Face Mask  Patient transferred to:PACU  Handoff Report: Identifed the Patient, Identified the Reponsible Provider, Reviewed the pertinent medical history, Discussed the surgical course, Reviewed Intra-OP anesthesia mangement and issues during anesthesia, Set expectations for post-procedure period and Allowed opportunity for questions and acknowledgement of understanding      Vitals: (Last set prior to Anesthesia Care Transfer)    CRNA VITALS  6/5/2019 1336 - 6/5/2019 1414      6/5/2019             Pulse:  88    ART BP:  137/73    ART Mean:  95    SpO2:  99 %                Electronically Signed By: KARSON Bernabe CRNA  June 5, 2019  2:14 PM

## 2019-06-05 NOTE — PLAN OF CARE
VS: VSS, A&O X 4, pt denies nausea, CP or SOB.   O2: On 2 L NC sat. 95-98%, pt hold his breath when sleeping CDB encouraged on CAPNO pt went's to take nap and refused CAPNO for now.   Output: Not void yet, pt on strict I&O.   Last BM: 06/04/19 passing gas.    Activity: Not out of bed yet, pt sleeping at this time.    Skin: Intact except surgical incision.    Pain: Comfortably manageable with PRN medication.    CMS: CMS and neuro intact.    Dressing: CDI.    Diet: Regular tolerated small amount, pt was slightly nauseas when eating but no emeses and pt sleeping comfortable at this time he is going to eat later in the evening.   LDA: PIV infusing.   Equipment: CAPNO,IV pole, PCD.   Plan: TBD.   Additional Info:

## 2019-06-05 NOTE — CONSULTS
Consult Date:  06/05/2019      INTERNAL MEDICINE CONSULTATION      ASSESSMENT:   1.  Status post right total hip arthroplasty.  The patient has been hemodynamically stable.  He is having moderate hip pain at this time.  THE PATIENT NOTES A HISTORY OF INTOLERANCE TO OXYCODONE AND PERHAPS HYDROCODONE and would like to try an alternative agent such as Dilaudid.   2.  Complex cardiac history related to hypertrophic cardiomyopathy with history of V-tach and recurrent atrial fibrillation.  The patient has no significant coronary artery disease.  Ejection fraction, per echocardiogram in 08/2018, was 45-50%.  He has been maintained on metoprolol, sotalol and is on Xarelto chronically for atrial fibrillation.   3.  Chronic fatigue and dyspnea with activity, felt to be on a cardiac basis, with normal pulmonary function tests recently obtained.   4.  Prediabetes versus diabetes mellitus type 2, maintained on metformin with most recent hemoglobin A1c of 6.8 on 05/10/2019.      RECOMMENDATION:  Electrolytes, renal function and magnesium level will be monitored.  The patient's volume status will be monitored closely.  Diuretics will be held for the very short-term, but likely will need to be restarted within 1-2 days postop.  He will be maintained on sotalol and metoprolol.  The patient is being started back on Xarelto 10 mg a day per Orthopedics tomorrow.  This should be increased to a therapeutic dose of 20 mg a day as soon as authorized by Orthopedics.  Accu-Cheks will be monitored.  Metformin will be restarted, likely tomorrow.  The patient will be started on oral and IV Dilaudid p.r.n. in view of a HISTORY OF INTOLERANCE TO HYDROCODONE AND OXYCODONE.  I would prefer to avoid NSAIDs such as ketorolac postoperatively in view of the possibility of a tenuous fluid balance and in view of chronic anticoagulation.      My assessment and recommendations were discussed in detail with the patient and his wife, who is present at the  bedside today.      Thank you for having me see this patient.      DISCUSSION:  Stu Meredith is a 63-year-old man who underwent a right total hip arthroplasty today by Dr. Gaspar for the treatment of osteoarthritis of the right hip.  I have been asked to see him by Dr. Gaspar to assess medical concerns including hypertrophic cardiomyopathy, history of V-tach and AFib, chronic anticoagulation and diabetes mellitus type 2.      For details of this patient's orthopedic history and the indications for surgery, please see Dr. Gaspar's notes.  The patient has noted progressive limitation of activity secondary to right hip pain.  He does, as well, note chronic dyspnea and fatigue with minimal activity, which his cardiologist believes is both related to his chronic pain and to impaired cardiac function.  He has had recent pulmonary function tests, which were normal.  The events of surgery are noted the patient had general anesthesia.  Estimated blood loss was 250 mL.  He received 1000 mL of IV fluids intraoperatively.  Blood pressures have generally been in the 110s to 120s systolically since the surgery.  Heart rates have been in the 70s.  O2 saturation in the mid to upper 90s on low flow oxygen.      The patient is seen by me shortly upon admission to the Orthopedic unit.  History was reviewed in [facility] and with the patient and his wife who was in attendance.      PAST MEDICAL HISTORY:     1.  Otherwise remarkable for his cardiac concerns, which are well described in Dr. Ware's notes from last month.  Briefly, this is remarkable for nonischemic hypertrophic cardiomyopathy with complications of V-tach and atrial fibrillation.  He is status post ICD placement in 2012.  He has undergone multiple DC cardioversions and has had multiple attempts at ablation.  He is, most recently, noted to be in atrial fibrillation.  Ejection fraction, per echocardiogram in 06/2018, was 45-50%.  He was not noted to have outflow obstruction at  rest or with activity per echocardiogram.   2.  Hypertension, treated with metoprolol and Aldactone.   3.  Prediabetes versus diabetes type 2, on metformin with recent hemoglobin A1c of 6.8.      PREOPERATIVE MEDICATIONS:   1.  Albuterol inhaler on a p.r.n. basis.   2.  Toprol-XL 50 mg daily, which he took this morning.     3.  Sotalol 120 mg twice daily.   4.  Vitamin B12 tablets 100 mcg daily.   5.  Ambien 10 mg at bedtime p.r.n.    6.  Amoxicillin prior to dental procedures.   7.  Lipitor 20 mg every evening.   8.  Metformin XR 1000 mg every evening with dinner.   9.  Multivitamins once a day.   10.  Aldactone 50 mg daily.   11.  Xarelto 20 mg daily.      MEDICATION ALLERGIES:  ATORVASTATIN, WHICH CAUSED MUSCLE ACHES; CHANTIX CAUSED INSOMNIA.      SOCIAL HISTORY:  The patient is .  He works as an  for [employer].  He quit smoking cigarettes in 2015.  He drinks alcohol infrequently.      FAMILY HISTORY:  Reviewed by me and is noncontributory.      REVIEW OF SYSTEMS:  Remarkable for chronic dyspnea and fatigue with minimal ambulation.  He notes progressive right hip and thigh pain and intermittent back pain with activity.  He denies palpitations, chest pain, shortness of breath, nausea, vomiting, abdominal pain, dysuria.  He does have chronic constipation.  I would add that he states that his ICD has not fired during that time he has had it in place.      A 10-point review of systems is negative except as noted above.      PHYSICAL EXAMINATION:   GENERAL:  He is a pleasant, well-appearing male who is lying comfortably in bed.   VITAL SIGNS:  Stable.   HEENT:  Face is symmetric.   NECK:  Supple.  There is no adenopathy or thyromegaly.   LUNGS:  Clear.   CARDIAC:  Exam reveals a regular rate and rhythm.  I hear no murmurs.  There is no JVD.   ABDOMEN:  Soft, nondistended, nontender.   EXTREMITIES:  There was no ankle edema.  There is no calf edema.   SKIN:  Exam reveals no rash.    NEUROLOGIC:  He is alert, fully oriented.  He is complaining of moderate right hip pain.  Cranial nerves are intact.      LABORATORY DATA:  Labs obtained this morning include a potassium 4.1, creatinine was 0.9.  Urinalysis was negative.      The patient did have a CT scan of the chest performed on , which revealed multiple stable pulmonary nodules with the recommendation for repeat CT scan in 1 year.  Incidentally noted was hepatic steatosis.  I would add that liver function tests, when last checked in 2019, were normal.  TSH was also normal.         KRUNAL DOZIER MD             D: 2019   T: 2019   MT: JOS      Name:     MANUEL WASHINGTON   MRN:      [id number]        Account:       [id number]   :      1955           Consult Date:  2019      Document: [id number]       cc: Ngoc Gaspar MD

## 2019-06-05 NOTE — ANESTHESIA PROCEDURE NOTES
Arterial Line Procedure Note  Staff:     Anesthesiologist:  Tomas Juan MD  Location: In OR After Induction  Procedure Start/Stop Times:     patient identified, IV checked, site marked, risks and benefits discussed, informed consent, monitors and equipment checked, pre-op evaluation and at physician/surgeon's request    Line Placement:     Procedure:  Arterial Line    Insertion Site:  Radial    Insertion laterality:  Left    Skin Prep: Chloraprep      Patient Prep: mask, sterile gloves and hand hygiene      Local skin infiltration:  None    Ultrasound Guided?: Yes      Artery evaluated via ultrasound confirming patency.   Using realtime imaging, the artery was punctured and the needle was observed entering the artery.      A permanent image is NOT entered into the patient's record.      Catheter size:  20 gauge, Quick cath    Dressing:  Tegaderm    Complications:  None obvious    Arterial waveform: Yes      IBP within 10% of NIBP: Yes

## 2019-06-05 NOTE — ANESTHESIA PREPROCEDURE EVALUATION
Anesthesia Pre-Procedure Evaluation    Patient: Stu Meredith   MRN:     [id number] Gender:   male   Age:    63 year old :      1955        Preoperative Diagnosis: * No surgery found *        Past Medical History:   Diagnosis Date     Atrial fibrillation (H) 11    failed medication, multiple DC cardioveresions; s/p Left atrial ablation to eliminate atrial fibrillation 11     Chest pain      CHF (congestive heart failure) (H) 2016     Coronary artery disease      DJD (degenerative joint disease)      Hip arthritis 1/15/2014     Hypertension      Hypertrophic cardiomyopathy (H) 10/09     Other abnormal heart sounds      Pacemaker     ICD     Palpitations      Pneumonia, organism unspecified(486)      Prediabetes 7/10/15    A1C 6.5     Status post implantation of automatic cardioverter/defibrillator (AICD)      Ventricular tachycardia (H)       Past Surgical History:   Procedure Laterality Date     ANESTHESIA CARDIOVERSION  2014    Procedure: ANESTHESIA CARDIOVERSION;  Surgeon: Generic Anesthesia Provider;  Location: [facility] OR     ANESTHESIA CARDIOVERSION N/A 2016    Procedure: ANESTHESIA CARDIOVERSION;  Surgeon: GENERIC ANESTHESIA PROVIDER;  Location: [facility] OR     ANESTHESIA CARDIOVERSION N/A 2017    Procedure: ANESTHESIA CARDIOVERSION;  Anesthesia Offsite Coverage Cardioversion @1100;  Surgeon: GENERIC ANESTHESIA PROVIDER;  Location: [facility] OR     ANESTHESIA CARDIOVERSION N/A 1/3/2018    Procedure: ANESTHESIA CARDIOVERSION;  Anesthesia Cardioverion;  Surgeon: GENERIC ANESTHESIA PROVIDER;  Location: [facility] OR     ANESTHESIA CARDIOVERSION N/A 2018    Procedure: ANESTHESIA CARDIOVERSION;  Anesthesia Coverage Cardioversion @1400;  Surgeon: GENERIC ANESTHESIA PROVIDER;  Location: [facility] OR     ANESTHESIA CARDIOVERSION N/A 2018    Procedure: ANESTHESIA CARDIOVERSION;  Anesthesia Cardioversion @0930;  Surgeon: GENERIC ANESTHESIA PROVIDER;  Location: [facility] OR     ANESTHESIA CARDIOVERSION N/A  12/20/2018    Procedure: Anesthesia Coverage Cardioversion @0830;  Surgeon: GENERIC ANESTHESIA PROVIDER;  Location: [facility] OR     ARTHROPLASTY HIP  1/15/2014    Procedure: ARTHROPLASTY HIP;  Left Total Hip Arthroplasty;  Surgeon: Nelson Gaspar MD;  Location: [facility] OR     CARDIAC SURGERY       COLONOSCOPY N/A 5/18/2018    Procedure: COMBINED COLONOSCOPY, SINGLE OR MULTIPLE BIOPSY/POLYPECTOMY BY BIOPSY;  COLONOSCOPY (PT HAS DEFIBRILLATOR) ;  Surgeon: Mike Nickerson MD;  Location:  GI     H ABLATION FOCAL AFIB  12/9/11    Left atrial ablation to eliminate atrial fibrillation     IMPLANT AUTOMATIC IMPLANTABLE CARDIOVERTER DEFIBRILLATOR  7/27/12    AICD implantation     TONSILLECTOMY  1964          Anesthesia Evaluation     . Pt has had prior anesthetic. Type: MAC and Regional           ROS/MED HX    ENT/Pulmonary: Comment: Chest CT 3/22/19 showed RUL & RLL nodules, recommended f/u CT in 2 mos.    PFT 11/15/13: normal, FEV1 3.51, FEV1/FVC 79%    Denies inhaler use    (+)tobacco use, Past use 40 pk yr hx, quit 2015 packs/day  , recent URI resolved Treated for cough/influenza in [place] mid-March: . .    Neurologic:  - neg neurologic ROS     Cardiovascular: Comment: Nonobstructive CAD -- 10-30% stenoses on cath '13.      (+) Dyslipidemia, hypertension--CAD, --. Taking blood thinners Pt has received instructions: Instructions Given to patient: stop Xarelto 2 days prior to surgery. CHF etiology: hypertrophic cardiomyopathy Last EF: 45-50% date: 8/16/18 NYHA classification: II. . pacemaker :ICD Reason placed:VT, AF  type;[employer] . dysrhythmias a-fib, . Previous cardiac testing Echodate:8/16/18results:Spontaneous echo contrast in left atrial appendage. No thrombus in left atrial  appendage.  Normal left ventricle size. The Ejection Fraction is estimated at 45-50%.  The right ventricle is normal size. Global right ventricular function is  normal.  This study was compared with the study from 6/1/18. There has been no  change  Stress Testdate:2/15/19 results:Severe impairment, class II functional capacityECG reviewed date:4/24/19 results: date: results:          METS/Exercise Tolerance: Comment: Activity limited by hip pain. Denies CP. 3 - Able to walk 1-2 blocks without stopping   Hematologic:  - neg hematologic  ROS       Musculoskeletal: Comment: S/P L hip arthroplasty 2014.  R hip osteoarthritis  (+) arthritis,  -       GI/Hepatic:  - neg GI/hepatic ROS       Renal/Genitourinary:  - ROS Renal section negative       Endo:     (+) type II DM Last HgA1c: 6.8 date: 5/10/19 Not using insulin - not using insulin pump not previously admitted for DM/DKA Obesity, .      Psychiatric:  - neg psychiatric ROS       Infectious Disease:  - neg infectious disease ROS       Malignancy:      - no malignancy   Other:    (+) No chance of pregnancy C-spine cleared: N/A, no H/O Chronic Pain,                       PHYSICAL EXAM:   Mental Status/Neuro: A/A/O; Age Appropriate   Airway: Facies: Feasible  Mallampati: I  Mouth/Opening: Full  TM distance: > 6 cm  Neck ROM: Full   Respiratory: Auscultation: CTAB     Resp. Rate: Normal     Resp. Effort: Normal      CV:    Comments:      Dental: Normal                  Lab Results   Component Value Date    WBC 6.0 02/15/2019    HGB 16.6 02/15/2019    HCT 52.0 02/15/2019     02/15/2019    CRP <2.9 05/10/2019    SED 10 05/10/2019     02/15/2019    POTASSIUM 4.1 06/05/2019    CHLORIDE 106 02/15/2019    CO2 26 02/15/2019    BUN 20 02/15/2019    CR 0.90 06/05/2019     (H) 06/05/2019    NEENA 8.8 02/15/2019    PHOS 2.8 11/10/2008    MAG 2.3 12/20/2018    ALBUMIN 3.7 02/15/2019    PROTTOTAL 7.2 02/15/2019    ALT 38 02/15/2019    AST 26 02/15/2019    ALKPHOS 102 02/15/2019    BILITOTAL 0.5 02/15/2019    INR 1.24 (H) 12/20/2018    TSH 1.58 02/15/2019       Preop Vitals  BP Readings from Last 3 Encounters:   06/05/19 121/85   05/28/19 122/85   05/28/19 122/85    Pulse Readings from Last 3 Encounters:  "  06/05/19 74   05/28/19 70   05/28/19 70      Resp Readings from Last 3 Encounters:   06/05/19 18   05/28/19 14   05/10/19 18    SpO2 Readings from Last 3 Encounters:   06/05/19 97%   05/28/19 97%   05/28/19 97%      Temp Readings from Last 1 Encounters:   06/05/19 36.6  C (97.9  F) (Oral)    Ht Readings from Last 1 Encounters:   06/05/19 1.803 m (5' 11\")      Wt Readings from Last 1 Encounters:   06/05/19 96.8 kg (213 lb 6.5 oz)    Estimated body mass index is 29.76 kg/m  as calculated from the following:    Height as of an earlier encounter on 6/5/19: 1.803 m (5' 11\").    Weight as of an earlier encounter on 6/5/19: 96.8 kg (213 lb 6.5 oz).     LDA:  Peripheral IV 06/05/19 Left Upper forearm (Active)   Site Assessment WDL 6/5/2019  8:15 AM   Line Status Saline locked 6/5/2019  8:15 AM   Phlebitis Scale 0-->no symptoms 6/5/2019  8:15 AM   Dressing Intervention New dressing  6/5/2019  8:15 AM   Number of days: 0       Left Groin Interventional Procedure Access (Active)   Number of days: 293       Right Groin Interventional Procedure Access (Active)   Number of days: 293            Assessment:   ASA SCORE: 3    NPO Status: > 6 hours since completed [employer]   Documentation: H&P complete; Preop Testing complete; Consents complete   Proceeding: Proceed without further delay  Tobacco Use:  NO Active use of Tobacco/UNKNOWN Tobacco use status     Plan:   Anes. Type:  General   Pre-Induction: Acetaminophen PO   Induction:  IV (Standard)   Airway: Oral ETT; CMAC/VL   Access/Monitoring: PIV; A-Line (Possible a-line depending on response to induction)   Maintenance: Balanced   Emergence: Procedure Site   Logistics: Same Day Surgery     Postop Pain/Sedation Strategy:  Standard-Options: Opioids PRN     PONV Management:  Adult Risk Factors:, Non-Smoker, Postop Opioids  Prevention: Ondansetron; Dexamethasone     CONSENT: Direct conversation   Plan and risks discussed with: Patient   Blood Products: Consented (ALL Blood " Products)                    PAC Discussion and Assessment    ASA Classification:   Case is suitable for:   Anesthetic techniques and relevant risks discussed:   Invasive monitoring and risk discussed:   Types:   Possibility and Risk of blood transfusion discussed:   NPO instructions given:   Additional anesthetic preparation and risks discussed:   Needs early admission to pre-op area:   Other:     PAC Resident/NP Anesthesia Assessment:  Stu Meredith is a 63 year old male scheduled to undergo Right total hip arthroplasty with Nelson Gaspar MD on 6/5/19 at [facility] for treatment of Right hip pain.     He has the following specific operative considerations:     Pt has had prior anesthetic. Type: MAC and Regional - no complications  - Anesthesia considerations:  Refer to PAC assessment in anesthesia records  - Risk of PONV score = 1.  If > 2, anti-emetic intervention recommended.      CARDIAC: METS 3,Activity limited by hip pain. Denies CP. Able to walk 1-2 blocks without stopping       RCRI :  0.9% risk of major adverse cardiac event.      ** HTN, A-Fib, hypertrophic cardiomyopathy, s/p ICD in 2012, s/p ablation x3, s/p DCCV x10, hx VT     ** Echo 8/16/18: EF 45-50%     ** Stress 2/15/19: severely impaired, class II functional capacity with a cardiac limitation to exercise.    PULMONARY: GONZALEZ # of risks 4/8 = intermediate risk     Former smoker, 40 pk yr hx, quit 2015     No asthma, denies inhaler use     Chest CT 3/22/19: RUL & RLL nodules, f/u CT in 2 months recommended    GI: no GERD    ENDO: BMI 35     DM2, taking metformin, A1c today is 6.8    HEME: VTE risk: 1.8%      If afib, [id number] score 3.  Risk category: moderate.       Taking Xarelto, will stop 2 days prior to surgery    ORTHO: mildly limited extension neck ROM, no TMJ     S/P left hip arthroplasty 2014     Severe right hip osteoarthritis    Patient was discussed with Dr. Patel. Recommend pt f/u with  cardiology for surgical clearance. If deemed appropriate surgical candidate from cardiology standpoint, then pt will be considered optimized for the proposed procedure. Dr. Ware & care coordinator will be contacted regarding a presurgical evaluation.         Reviewed and Signed by PAC Mid-Level Provider/Resident  Mid-Level Provider/Resident: Janae Gonzalez PA-C  Date: 5/13/19  Time: 0635    Attending Anesthesiologist Anesthesia Assessment:  I have examined the patient and reviewed the medical record.  I have discussed the patient with the FELECIA and concur with her assessment  The patient is scheduled for IRISH for severe osteoarthritis  The patient has a very significant cardiac history:  Hypertrophic cardiomyopathy without obstruction or SALVADOR on most recent echo (6/2018)  EF at that time was 45% though as low as 20% has been reported  History of VT by narrative record but no documentation seen,  ICD in place for over 12years without any discharge  History of AFIB, failed ablation x3 .  Has had multiple cardioversions with temporary conversion only.  Currently in Afib on sotalol.   Interrogation of PM reveals atrial pacing 62% of time, ventricular 0%.  Anticoagulated with xarelto  Had CPET testing 2/2019 that was suggestive of ischemia, maximum mets 4.3, maximum VO2 15  History of IDDM, most recent Hgb A1c 6.8  Normal Creatinine and GFR    PE:  WNWD male in NAD.  MPC 2, six cm TMD, limited neck extension, Lungs clear.  CV  Irr Irr    Will have patient see cardiologist earlier than scheduled given CPET positive for ischemia  If ischemic potential may need to  to [place] for surgery  Final plan per attending anesthesiologist the day of surgery.      Addendum:  Dr Ware was able to see the patient prior to surgery.  A CT angio revealed no obstructive coronary artery disease, his last echo revealed an EF of 50%, he has had no Afib in well over one year on sotalol and metoprolol.  The patient generally feels well.  I  have discussed this patient with Hakan Ware and Hakan Corrales and we feel he is candidate for UR with the recommendation for goal directed fluid therapy to avoid fluid overload (he will tolerate this poorly if he goes into atrial fibrillation).  Consider Flotrak to guide fluid administration    Wai        Reviewed and Signed by PAC Anesthesiologist  Anesthesiologist: Wai Patel MD  Date: 5/10/2019  Time:   Pass/Fail:   Disposition:     PAC Pharmacist Assessment:        Pharmacist:   Date:   Time:        Tomas Juan MD

## 2019-06-05 NOTE — PROGRESS NOTES
"Orthopaedic Surgery Post op Check  June 5, 2019    Subjective: Seen on 8A. Moderate pain post op. Some nausea, no vomiting. Denies numbness, tingling, chest pain, shortness of breath. Has not urinated as of yet.     Objective: /80 (BP Location: Right arm)   Pulse 79   Temp 97.7  F (36.5  C) (Oral)   Resp 11   Ht 1.803 m (5' 11\")   Wt 96.8 kg (213 lb 6.5 oz)   SpO2 98%   BMI 29.76 kg/m      General: NAD, alert and oriented, cooperative with exam.   Cardio: RRR, extremities wwp.   Respiratory: Non-labored breathing.  MSK: RLE: Toes wwp, DP 2+, bcr in all toes.  +EHL/FHL/GSC/TA with 5/5 strength. SILT SP/DP/Sa/Vázquez/T. Fires quad, SILT femoral nerve. Dressing c/d/i.    Labs:  Hemoglobin   Date Value Ref Range Status   02/15/2019 16.6 13.3 - 17.7 g/dL Final       Assessment and Plan: Stu Meredith is a 63 year old male now s/p R IRISH on 6/5 with Dr. Gaspar.     Ortho Primary  Activity: Up with assist and assistive devices as needed until independent. Posterior hip precautions to operative side x 3 months:  1) No hip flexion beyond 90 degrees  2) No adduction beyond midline  3) No internal rotation beyond neutral   Weight bearing status: WBAT  Antibiotics: Cefazolin x 24 hours  Diet: Begin with clear fluids and progress diet as tolerated. Bowel regimen. Anti-emetics PRN.    DVT prophylaxis: Mechanical while in hospital Xarelto 10 mg daily starting POD#1, resume home dose at discharge.   Elevation: Elevate heels off of bed on pillows   Wound Care: Aquacel x 5-7 days.    Drains: none  Pain management: Orals PRN, IV for breakthrough only  X-rays: AP Pelvis and Lateral operative hip in PACU.   Physical Therapy: Mobilization, ROM, ADL's, Posterior hip precautions.    Occupational Therapy: ADL's  Labs: Trend Hgb on POD #1, 2  Consults: PT, OT. Hospitalist, appreciate assistance in caring for this patient throughout the perioperative period     Disposition: Pending progress with therapies, pain control on orals, and " medical stability, anticipate discharge to Home vs TCU on POD #1-2.      Didier France MD  Orthopaedic Surgery Resident, PGY-4  Pager: [phone]

## 2019-06-05 NOTE — OR NURSING
PACU to Inpatient Nursing Handoff    Patient Stu Meredith is a 63 year old male who speaks English.   Procedure Procedure(s):  Right Total Hip Arthroplasty   Surgeon(s) Primary: Nelson Gaspar MD  Resident - Assisting: Didier France MD     No Known Allergies    Isolation  No active isolations     Past Medical History   has a past medical history of Atrial fibrillation (H) (12/9/11), Chest pain, CHF (congestive heart failure) (H) (7/30/2016), Coronary artery disease, DJD (degenerative joint disease), Hip arthritis (1/15/2014), Hypertension, Hypertrophic cardiomyopathy (H) (10/09), Other abnormal heart sounds, Pacemaker, Palpitations, Pneumonia, organism unspecified(486), Prediabetes (7/10/15), Status post implantation of automatic cardioverter/defibrillator (AICD), and Ventricular tachycardia (H). He also has no past medical history of Asthma, Asymptomatic human immunodeficiency virus (HIV) infection status (H), Blood in semen, Blood transfusion, Cancer (H), Cerebral infarction (H), Cerebral palsy (H), Complication of anesthesia, Congenital renal agenesis and dysgenesis, COPD (chronic obstructive pulmonary disease) (H), Depressive disorder, Diabetes (H), Diabetes mellitus, Diabetes mellitus (H), Epididymitis, bilateral, Epididymitis, left, Epididymitis, right, Goiter, Gout, Hernia, abdominal, History of blood transfusion, History of spinal cord injury, History of thrombophlebitis, Horseshoe kidney, Hydrocephalus, Malignant hyperthermia, Malignant neoplasm (H), Mumps, Orchitis, epididymitis, and epididymo-orchitis, with abscess, Parkinsons disease (H), Penile discharge, PONV (postoperative nausea and vomiting), Prostate infection, Spider veins, Spina bifida (H), STD (sexually transmitted disease), Swelling of testicle, Tethered cord (H), Thyroid disease, Tuberculosis, Uncomplicated asthma, or Unspecified cerebral artery occlusion with cerebral infarction.    Anesthesia General   Dermatome Level     Preop  Meds acetaminophen (Tylenol) - time given: 0825  celecoxib (Celebrex) - time given: 0825  gabapentin (Neurontin) - time given: 0825   Nerve block Not applicable   Intraop Meds fentanyl (Sublimaze): 200 mcg total  hydromorphone (Dilaudid): 0.5 mg total  ondansetron (Zofran): last given at 1321   Local Meds Yes - Local Cocktail (morphine, ropivacaine, epinephrine, Toradol)   Antibiotics cefazolin (Ancef) - last given at 1252     Pain Patient Currently in Pain: yes  Comfort: comfortably manageable  Pain Control: partially effective   PACU meds  hydromorphone (Dilaudid): 1 mg (total dose) last given at 1432   oxycodone (Roxicodone): 5 mg (total dose) last given at 1513    PCA / epidural No   Capnography Respiratory Monitoring (EtCO2): 38 mmHg   Telemetry ECG Rhythm: Atrial flutter;Paced rhythm   Inpatient Telemetry Monitor Ordered? No        Labs Glucose Lab Results   Component Value Date     06/05/2019       Hgb Lab Results   Component Value Date    HGB 16.6 02/15/2019       INR Lab Results   Component Value Date    INR 1.24 12/20/2018      PACU Imaging Completed     Wound/Incision Incision/Surgical Site 06/05/19 Right Hip (Active)   Incision Assessment UTV 6/5/2019  2:59 PM   Jennie-Incision Assessment UTV 6/5/2019  2:59 PM   Closure CHANDA 6/5/2019  2:59 PM   Incision Drainage Amount None 6/5/2019  2:59 PM   Dressing Intervention Clean, dry, intact 6/5/2019  2:59 PM   Number of days: 0      CMS        Equipment ice pack and abductor pillow   Other LDA Right Groin Interventional Procedure Access (Active)   Number of days: 293        IV Access Peripheral IV 06/05/19 Left Upper forearm (Active)   Site Assessment [facility] 6/5/2019  2:58 PM   Line Status Infusing 6/5/2019  2:58 PM   Phlebitis Scale 0-->no symptoms 6/5/2019  2:58 PM   Infiltration Scale 0 6/5/2019  2:08 PM   Dressing Intervention New dressing  6/5/2019  8:15 AM   Number of days: 0       Peripheral IV 06/05/19 Left Hand (Active)   Site Assessment [facility] 6/5/2019   2:58 PM   Line Status Saline locked 6/5/2019  2:58 PM   Phlebitis Scale 0-->no symptoms 6/5/2019  2:58 PM   Infiltration Scale 0 6/5/2019  2:08 PM   Number of days: 0       Left Groin Interventional Procedure Access (Active)   Number of days: 293       Right Groin Interventional Procedure Access (Active)   Number of days: 293      Blood Products Not applicable  mL   Intake/Output Date 06/05/19 0700 - 06/06/19 0659   Shift 5493-5443 6222-9185 2961-0235 24 Hour Total   INTAKE   I.V. 1000   1000   Shift Total(mL/kg) 1000(10.33)   1000(10.33)   OUTPUT   Urine 0   0   Blood 250   250   Shift Total(mL/kg) 250(2.58)   250(2.58)   Weight (kg) 96.8 96.8 96.8 96.8      Drains / White Right Groin Interventional Procedure Access (Active)   Number of days: 293      Time of void PreOp Void Prior to Procedure: 0700 (06/05/19 0816)    PostOp      Diapered? No   Bladder Scan     PO    tolerating sips     Vitals    B/P: 98/76  T: 97.9  F (36.6  C)    Temp src: Oral  P:  Pulse: 83 (06/05/19 1500)    Heart Rate: 92 (06/05/19 1500)     R: 10  O2:  SpO2: 97 %    O2 Device: Nasal cannula (06/05/19 1500)    Oxygen Delivery: 3 LPM (06/05/19 1500)         Family/support present significant other   Patient belongings     Patient transported on bed   DC meds/scripts (obs/outpt) Not applicable   Inpatient Pain Meds Released? Yes       Special needs/considerations Chacorta taking out his left radial arterial line before he comes up.   Tasks needing completion None       Iris Salinas, RN  [facility] [id number]

## 2019-06-05 NOTE — OR NURSING
Called Dr Juan, asking if he wants an EKG due to cardiac history, or PTT due to Xerelto.    No new orders.   Patient has had extensive cardiac workup.

## 2019-06-05 NOTE — BRIEF OP NOTE
Orthopaedic Surgery Brief Op-Note      Patient: Stu Meredith  : 1955  Date of Service: 2019 2:00 PM    Pre-operative Diagnosis: Right Hip Pain, Osteoarthritis Right Hip  Post-operative Diagnosis: same    Procedure(s) Performed: Procedure(s):  Right Total Hip Arthroplasty    Staff: Dr. Gaspar  Assistants:   Didier France MD    Anesthesia: General  EBL: 250 cc    Implants:   Implant Name Type Inv. Item Serial No.  Lot No. LRB No. Used   IMP SHELL SNR ACET R3 3H 54MM [id number] Total Joint Component/Insert IMP SHELL SNR ACET R3 3H 54MM [id number]  [place]  [id number] Right 1   IMP LINER SNR ACET R3 XLPE 0DEG [id number] Total Joint Component/Insert IMP LINER SNR ACET R3 XLPE 0DEG [id number]  [place]  [id number] Right 1   IMP SCR ACET SNN SPHERICAL HEAD 6.5X40MM [id number] Metallic Hardware/[employer] IMP SCR ACET SNN SPHERICAL HEAD 6.5X40MM [id number]  [place]  [id number] Right 1   IMP SCR ACET SNN SPHERICAL HEAD 6.5X20MM [id number] Metallic Hardware/[employer] IMP SCR ACET SNN SPHERICAL HEAD 6.5X20MM [id number]  [place]  [id number] Right 1   IMP STEM SNN HIGH OFFSET SZ 7 [id number] Total Joint Component/Insert IMP STEM SNN HIGH OFFSET SZ 7 [id number]  [place]  [id number] Right 1   IMP HEAD FEMORAL SNN BIPOLAR [place] 36MM +4 [id number] Total Joint Component/Insert IMP HEAD FEMORAL SNN BIPOLAR [place] 36MM +4 [id number]  [place]  [id number] Right 1     Drains: none  Intra-op Labs/Cxs/Specimens: None  Complications: No apparent complications during procedure  Findings: Please see dictated operative note for details    Disposition: Stable to PACU, then admit to Orthopaedics    POST OPERATIVE PLAN    Assessment/Plan: Stu Meredith is a 63 year old male s/p Procedure(s):  Right Total Hip Arthroplasty on 2019 with Dr. Gaspar.    Activity: Up with assist and assistive devices as needed until independent. Posterior hip precautions to operative side x 3 months:  1) No hip flexion beyond 90 degrees  2) No adduction  beyond midline  3) No internal rotation beyond neutral   Weight bearing status: WBAT  Antibiotics: Cefazolin x 24 hours  Diet: Begin with clear fluids and progress diet as tolerated. Bowel regimen. Anti-emetics PRN.    DVT prophylaxis: Mechanical while in hospital Xarelto 10 mg daily starting POD#1, resume home dose at discharge.   Elevation: Elevate heels off of bed on pillows   Wound Care: Aquacel x 5-7 days.    Drains: none  Pain management: Orals PRN, IV for breakthrough only  X-rays: AP Pelvis and Lateral operative hip in PACU.   Physical Therapy: Mobilization, ROM, ADL's, Posterior hip precautions.    Occupational Therapy: ADL's  Labs: Trend Hgb on POD #1, 2  Consults: PT, OT. Hospitalist, appreciate assistance in caring for this patient throughout the perioperative period    Disposition: Pending progress with therapies, pain control on orals, and medical stability, anticipate discharge to Home vs TCU on POD #1-2.    Didier France MD  PGY-4  Orthopaedic Surgery  [phone]

## 2019-06-05 NOTE — ANESTHESIA POSTPROCEDURE EVALUATION
Anesthesia POST Procedure Evaluation    Patient: Stu Meredith   MRN:     [id number] Gender:   male   Age:    63 year old :      1955        Preoperative Diagnosis: Right Hip Pain, Osteoarthritis Right Hip   Procedure(s):  Right Total Hip Arthroplasty   Postop Comments: No value filed.       Anesthesia Type:  General  No value filed.    Reportable Event: NO     PAIN: Uncomplicated   Sign Out status: Comfortable, Well controlled pain     PONV: No PONV   Sign Out status:  No Nausea or Vomiting     Neuro/Psych: Uneventful perioperative course   Sign Out Status: Preoperative baseline; Age appropriate mentation     Airway/Resp.: Uneventful perioperative course   Sign Out Status: Non labored breathing, age appropriate RR; Resp. Status within EXPECTED Parameters     CV: Uneventful perioperative course   Sign Out status: Appropriate BP and perfusion indices; Appropriate HR/Rhythm     Disposition:   Sign Out in:  PACU  Disposition:  Floor  Recovery Course: Uneventful  Follow-Up: Not required           Last Anesthesia Record Vitals:  CRNA VITALS  2019 1336 - 2019 1436      2019             Pulse:  88    ART BP:  137/73    ART Mean:  95    SpO2:  99 %          Last PACU Vitals:  Vitals Value Taken Time   /87 2019  2:45 PM   Temp 36.6  C (97.9  F) 2019  2:45 PM   Pulse 77 2019  2:45 PM   Resp 10 2019  2:54 PM   SpO2 99 % 2019  2:54 PM   Temp src     NIBP     Pulse     SpO2     Resp     Temp     Ht Rate     Temp 2     Vitals shown include unvalidated device data.      Electronically Signed By: Tomas Juan MD, 2019, 2:55 PM

## 2019-06-06 ENCOUNTER — APPOINTMENT (OUTPATIENT)
Dept: PHYSICAL THERAPY | Facility: CLINIC | Age: 64
End: 2019-06-06
Attending: ORTHOPAEDIC SURGERY
Payer: COMMERCIAL

## 2019-06-06 ENCOUNTER — APPOINTMENT (OUTPATIENT)
Dept: OCCUPATIONAL THERAPY | Facility: CLINIC | Age: 64
End: 2019-06-06
Attending: ORTHOPAEDIC SURGERY
Payer: COMMERCIAL

## 2019-06-06 LAB
ANION GAP SERPL CALCULATED.3IONS-SCNC: 6 MMOL/L (ref 3–14)
BUN SERPL-MCNC: 21 MG/DL (ref 7–30)
CALCIUM SERPL-MCNC: 8 MG/DL (ref 8.5–10.1)
CHLORIDE SERPL-SCNC: 107 MMOL/L (ref 94–109)
CO2 SERPL-SCNC: 26 MMOL/L (ref 20–32)
CREAT SERPL-MCNC: 1 MG/DL (ref 0.66–1.25)
GFR SERPL CREATININE-BSD FRML MDRD: 79 ML/MIN/{1.73_M2}
GLUCOSE BLDC GLUCOMTR-MCNC: 147 MG/DL (ref 70–99)
GLUCOSE SERPL-MCNC: 137 MG/DL (ref 70–99)
HGB BLD-MCNC: 12.4 G/DL (ref 13.3–17.7)
MAGNESIUM SERPL-MCNC: 1.9 MG/DL (ref 1.6–2.3)
POTASSIUM SERPL-SCNC: 4.4 MMOL/L (ref 3.4–5.3)
SODIUM SERPL-SCNC: 139 MMOL/L (ref 133–144)

## 2019-06-06 PROCEDURE — 97116 GAIT TRAINING THERAPY: CPT | Mod: GP

## 2019-06-06 PROCEDURE — 80048 BASIC METABOLIC PNL TOTAL CA: CPT | Performed by: ORTHOPAEDIC SURGERY

## 2019-06-06 PROCEDURE — 25000132 ZZH RX MED GY IP 250 OP 250 PS 637: Performed by: STUDENT IN AN ORGANIZED HEALTH CARE EDUCATION/TRAINING PROGRAM

## 2019-06-06 PROCEDURE — 25000128 H RX IP 250 OP 636: Performed by: STUDENT IN AN ORGANIZED HEALTH CARE EDUCATION/TRAINING PROGRAM

## 2019-06-06 PROCEDURE — 12000001 ZZH R&B MED SURG/OB UMMC

## 2019-06-06 PROCEDURE — 36415 COLL VENOUS BLD VENIPUNCTURE: CPT | Performed by: ORTHOPAEDIC SURGERY

## 2019-06-06 PROCEDURE — 97162 PT EVAL MOD COMPLEX 30 MIN: CPT | Mod: GP

## 2019-06-06 PROCEDURE — 85018 HEMOGLOBIN: CPT | Performed by: ORTHOPAEDIC SURGERY

## 2019-06-06 PROCEDURE — 97530 THERAPEUTIC ACTIVITIES: CPT | Mod: GO | Performed by: OCCUPATIONAL THERAPIST

## 2019-06-06 PROCEDURE — 00000146 ZZHCL STATISTIC GLUCOSE BY METER IP

## 2019-06-06 PROCEDURE — 97165 OT EVAL LOW COMPLEX 30 MIN: CPT | Mod: GO | Performed by: OCCUPATIONAL THERAPIST

## 2019-06-06 PROCEDURE — 25000132 ZZH RX MED GY IP 250 OP 250 PS 637: Performed by: HOSPITALIST

## 2019-06-06 PROCEDURE — 99232 SBSQ HOSP IP/OBS MODERATE 35: CPT | Performed by: INTERNAL MEDICINE

## 2019-06-06 PROCEDURE — 97530 THERAPEUTIC ACTIVITIES: CPT | Mod: GP

## 2019-06-06 PROCEDURE — 97110 THERAPEUTIC EXERCISES: CPT | Mod: GP

## 2019-06-06 PROCEDURE — 97535 SELF CARE MNGMENT TRAINING: CPT | Mod: GO | Performed by: OCCUPATIONAL THERAPIST

## 2019-06-06 PROCEDURE — 25000132 ZZH RX MED GY IP 250 OP 250 PS 637: Performed by: INTERNAL MEDICINE

## 2019-06-06 PROCEDURE — 83735 ASSAY OF MAGNESIUM: CPT | Performed by: ORTHOPAEDIC SURGERY

## 2019-06-06 RX ORDER — METFORMIN HCL 500 MG
500 TABLET, EXTENDED RELEASE 24 HR ORAL
Status: DISCONTINUED | OUTPATIENT
Start: 2019-06-06 | End: 2019-06-07 | Stop reason: HOSPADM

## 2019-06-06 RX ORDER — METOPROLOL SUCCINATE 25 MG/1
25 TABLET, EXTENDED RELEASE ORAL DAILY
Status: DISCONTINUED | OUTPATIENT
Start: 2019-06-06 | End: 2019-06-07 | Stop reason: HOSPADM

## 2019-06-06 RX ORDER — AMOXICILLIN 250 MG
1 CAPSULE ORAL 2 TIMES DAILY PRN
Qty: 100 TABLET | Refills: 1 | Status: ON HOLD | OUTPATIENT
Start: 2019-06-06 | End: 2019-08-19

## 2019-06-06 RX ORDER — OXYCODONE HYDROCHLORIDE 5 MG/1
5-10 TABLET ORAL
Qty: 40 TABLET | Refills: 0 | Status: SHIPPED | OUTPATIENT
Start: 2019-06-06 | End: 2019-06-10 | Stop reason: ALTCHOICE

## 2019-06-06 RX ORDER — POLYETHYLENE GLYCOL 3350 17 G/17G
17 POWDER, FOR SOLUTION ORAL 2 TIMES DAILY
Status: DISCONTINUED | OUTPATIENT
Start: 2019-06-06 | End: 2019-06-07 | Stop reason: HOSPADM

## 2019-06-06 RX ADMIN — ACETAMINOPHEN 975 MG: 325 TABLET, FILM COATED ORAL at 21:00

## 2019-06-06 RX ADMIN — OXYCODONE HYDROCHLORIDE 10 MG: 5 TABLET ORAL at 13:52

## 2019-06-06 RX ADMIN — ACETAMINOPHEN 975 MG: 325 TABLET, FILM COATED ORAL at 13:13

## 2019-06-06 RX ADMIN — MAGNESIUM HYDROXIDE 30 ML: 400 SUSPENSION ORAL at 21:39

## 2019-06-06 RX ADMIN — POLYETHYLENE GLYCOL 3350 17 G: 17 POWDER, FOR SOLUTION ORAL at 21:39

## 2019-06-06 RX ADMIN — OXYCODONE HYDROCHLORIDE 10 MG: 5 TABLET ORAL at 06:24

## 2019-06-06 RX ADMIN — METOPROLOL SUCCINATE 25 MG: 25 TABLET, EXTENDED RELEASE ORAL at 13:12

## 2019-06-06 RX ADMIN — OXYCODONE HYDROCHLORIDE 10 MG: 5 TABLET ORAL at 10:36

## 2019-06-06 RX ADMIN — VITAM B12 100 MCG: 100 TAB at 08:26

## 2019-06-06 RX ADMIN — OXYCODONE HYDROCHLORIDE 10 MG: 5 TABLET ORAL at 17:21

## 2019-06-06 RX ADMIN — OXYCODONE HYDROCHLORIDE 5 MG: 5 TABLET ORAL at 03:02

## 2019-06-06 RX ADMIN — SOTALOL HYDROCHLORIDE 120 MG: 120 TABLET ORAL at 08:26

## 2019-06-06 RX ADMIN — OXYCODONE HYDROCHLORIDE 5 MG: 5 TABLET ORAL at 21:00

## 2019-06-06 RX ADMIN — CEFAZOLIN SODIUM 2 G: 2 INJECTION, SOLUTION INTRAVENOUS at 04:45

## 2019-06-06 RX ADMIN — OXYCODONE HYDROCHLORIDE 5 MG: 5 TABLET ORAL at 00:05

## 2019-06-06 RX ADMIN — ACETAMINOPHEN 975 MG: 325 TABLET, FILM COATED ORAL at 04:51

## 2019-06-06 RX ADMIN — RIVAROXABAN 10 MG: 10 TABLET, FILM COATED ORAL at 13:12

## 2019-06-06 RX ADMIN — SENNOSIDES AND DOCUSATE SODIUM 2 TABLET: 8.6; 5 TABLET ORAL at 20:59

## 2019-06-06 RX ADMIN — SENNOSIDES AND DOCUSATE SODIUM 2 TABLET: 8.6; 5 TABLET ORAL at 08:26

## 2019-06-06 RX ADMIN — METFORMIN HYDROCHLORIDE 500 MG: 500 TABLET, EXTENDED RELEASE ORAL at 17:21

## 2019-06-06 RX ADMIN — SOTALOL HYDROCHLORIDE 120 MG: 120 TABLET ORAL at 20:59

## 2019-06-06 RX ADMIN — MAGNESIUM HYDROXIDE 15 ML: 400 SUSPENSION ORAL at 08:25

## 2019-06-06 ASSESSMENT — ACTIVITIES OF DAILY LIVING (ADL)
ADLS_ACUITY_SCORE: 16
ADLS_ACUITY_SCORE: 14
ADLS_ACUITY_SCORE: 14
ADLS_ACUITY_SCORE: 16

## 2019-06-06 NOTE — PLAN OF CARE
Discharge Planner PT   Patient plan for discharge: home  Current status: PT eval completed, treatment initiated. Pt educated on precautions, adheres to well w/ supine>sit Malinda and sit<>stand SBA. Pt ambulates 150'x 1 w/ FWW and SBA, good pace and tolerance to weightbearing despite increased pain reports this AM. Pt left in chair end of session, needs in reach.   Barriers to return to prior living situation: medical, stairs, mobility  Recommendations for discharge: home w/ assist PRN  Rationale for recommendations: Anticipate pt to progress to discharge home with assist from spouse as needed.        Entered by: Janine Lilly 06/06/2019 9:42 AM

## 2019-06-06 NOTE — PROGRESS NOTES
06/06/19 1033   Quick Adds   Type of Visit Initial Occupational Therapy Evaluation   Living Environment   Lives With spouse   Living Arrangements house   Transportation Anticipated car, drives self;family or friend will provide   Living Environment Comment Tub/shower; standard toilet   Self-Care   Usual Activity Tolerance moderate   Current Activity Tolerance moderate   Regular Exercise No   Equipment Currently Used at Home none   Activity/Exercise/Self-Care Comment Patient independent in self-cares/mobility with occassional use of SEC.   Functional Level   Ambulation 0-->independent   Transferring 0-->independent   Toileting 0-->independent   Bathing 0-->independent   Dressing 0-->independent   Eating 0-->independent   Communication 0-->understands/communicates without difficulty   Swallowing 0-->swallows foods/liquids without difficulty   Cognition 0 - no cognition issues reported   Fall history within last six months no   Prior Functional Level Comment Patient independent in self-cares/mobility with occassional use of SEC.   General Information   Onset of Illness/Injury or Date of Surgery - Date 06/05/19   Referring Physician Dr. Gaspar   Patient/Family Goals Statement return home to baseline   Additional Occupational Profile Info/Pertinent History of Current Problem POD #1 s/p R IRISH   Precautions/Limitations right hip precautions   Weight-Bearing Status - RLE weight-bearing as tolerated   Cognitive Status Examination   Cognitive Comment No cognitive concerns   Sensory Examination   Sensory Comments No N/T noted   Pain Assessment   Patient Currently in Pain Yes, see Vital Sign flowsheet   Range of Motion (ROM)   ROM Comment B UE AROM WFL   Mobility   Bed Mobility Bed mobility skill: Sit to supine   Bed Mobility Skill: Sit to Supine   Level of [place]: Sit/Supine minimum assist (75% patients effort)   Assistive Device: Sit/Supine other (see comments)  (leg )   Transfer Skill: Bed to Chair/Chair to  "Bed   Level of [place]: Bed to Chair stand-by assist   Assistive Device - Transfer Skill Bed to Chair Chair to Bed Rehab Eval standard walker   Transfer Skill: Sit to Stand   Level of [place]: Sit/Stand stand-by assist   Assistive Device for Transfer: Sit/Stand standard walker   Transfer Skill: Toilet Transfer   Level of [place]: Toilet stand-by assist   Assistive Device standard walker;seat riser   Tub/Shower Transfer   Tub/Shower Transfer Transfer Skill: Tub/Shower Transfers   Transfer Skill: Tub/Shower Transfer   Level of [place]: Tub/Shower contact guard   Assistive Device standard walker   Upper Body Dressing   Level of [place]: Dress Upper Body independent   Lower Body Dressing   Level of [place]: Dress Lower Body stand-by assist   Assistive Device long-handled shoe horn;reacher;sock-aid   Toileting   Level of [place]: Toilet stand-by assist   Grooming   Level of [place]: Grooming independent   Eating/Self Feeding   Level of [place]: Eating independent   Activities of Daily Living Analysis   Impairments Contributing to Impaired Activities of Daily Living pain;post surgical precautions;ROM decreased   General Therapy Interventions   Planned Therapy Interventions ADL retraining   Clinical Impression   Criteria for Skilled Therapeutic Interventions Met yes, treatment indicated   OT Diagnosis impaired self-cares/mobility   Influenced by the following impairments pain; decreased ROM   Assessment of Occupational Performance 1-3 Performance Deficits   Identified Performance Deficits dressing, bathing, toileting   Clinical Decision Making (Complexity) Low complexity   Therapy Frequency daily   Predicted Duration of Therapy Intervention (days/wks) 1 day   Anticipated Discharge Disposition Home with Assist   Risks and Benefits of Treatment have been explained. Yes   Patient, Family & other staff in agreement with plan of care Yes   [facility] AM-PAC TM \"6 Clicks\"   " "2016, Trustees of [facility], under license to [employer].  All rights reserved.   6 Clicks Short Forms Daily Activity Inpatient Short Form   [facility] AM-PAC  \"6 Clicks\" Daily Activity Inpatient Short Form   1. Putting on and taking off regular lower body clothing? 4 - None   2. Bathing (including washing, rinsing, drying)? 4 - None   3. Toileting, which includes using toilet, bedpan or urinal? 4 - None   4. Putting on and taking off regular upper body clothing? 4 - None   5. Taking care of personal grooming such as brushing teeth? 4 - None   6. Eating meals? 4 - None   Daily Activity Raw Score (Score out of 24.Lower scores equate to lower levels of function) 24   Total Evaluation Time   Total Evaluation Time (Minutes) 8     "

## 2019-06-06 NOTE — PLAN OF CARE
1900 - 0700    VS: VSS. A & O x 4.   O2: O2 sats >95% on 2L of O2. Capno would alarm d/t RR while sleeping. Pt stated he had sleep study done and does not have obstructive sleep apnea. Weaned to 1L of O2.  Lung sounds clear. Denies SOB.   Output: Strict I & O  Low urine output. PVR 72 and 32.  Per orders, moonlighter updated on low urine output. No further instructions.  1 episode of emesis.   Last BM: LBM 6/4/19. Passing gas.   Activity: Up w/ A1-2 FWW and gait belt.   Skin: Intact except for surgical incision.    Pain: Pt reporting R hip pain. Pain managed w/ PO dilaudid, but pt felt nauseated following dilaudid. Switched to oxycodone. Pain managed w/ prn oxycodone, scheduled tylenol, and ice applied.   CMS: Intact. Denies any n/t.   Dressing: R hip dressing CDI. No drainage noted.   Diet: Regular diet.    LDA: L PIV infusing at 50 mL/hr.  L hand PIV SL   Equipment: Capno  IV pump and pole  FWW and gait belt   Plan: Per ortho, pending progress with therapies, pain control on orals, and medical stability, anticipate discharge to Home vs TCU on POD #1-2.   Additional Info: Pt felt nauseated following PO dilaudid. Gave PO zofran w/ no relief, and episode of emesis followed. Prn IV compazine given w/ relief.    Call light within reach, able to make needs known.

## 2019-06-06 NOTE — PROGRESS NOTES
"Orthopaedic Surgery Progress Note   June 6, 2019    Subjective: No acute events overnight. Nausea now resolved. Pain well controlled. Tolerating diet. Voiding spontaneously but low UOP. Denies fever or chills, CP, SOB, numbness or tingling, motor dysfunction or weakness.     Objective: /67 (BP Location: Right arm)   Pulse 77   Temp 98.1  F (36.7  C)   Resp 12   Ht 1.803 m (5' 11\")   Wt 96.8 kg (213 lb 6.5 oz)   SpO2 96%   BMI 29.76 kg/m      General: NAD, alert and oriented, cooperative with exam.   Cardio: RRR, extremities wwp.   Respiratory: Non-labored breathing.  MSK: RLE: Toes wwp, DP 2+, bcr in all toes.  +EHL/FHL/GSC/TA with 5/5 strength. SILT SP/DP/Sa/Vázquez/T. Fires quad, SILT femoral nerve. Dressing c/d/i.        Labs:  Hemoglobin   Date Value Ref Range Status   06/06/2019 12.4 (L) 13.3 - 17.7 g/dL Final       Assessment and Plan: Stu Meredith is a 63 year old male now s/p R IRISH on 6/5 with Dr. Gaspar.      Ortho Primary  Activity: Up with assist and assistive devices as needed until independent. Posterior hip precautions to operative side x 3 months:  1) No hip flexion beyond 90 degrees  2) No adduction beyond midline  3) No internal rotation beyond neutral   Weight bearing status: WBAT  Antibiotics: Cefazolin x 24 hours  Diet: Begin with clear fluids and progress diet as tolerated. Bowel regimen. Anti-emetics PRN.    DVT prophylaxis: Mechanical while in hospital Xarelto 10 mg daily starting POD#1, resume home dose at discharge.   Elevation: Elevate heels off of bed on pillows   Wound Care: Aquacel x 5-7 days.    Drains: none  Pain management: Orals PRN, IV for breakthrough only  X-rays: AP Pelvis and Lateral operative hip in PACU.   Physical Therapy: Mobilization, ROM, ADL's, Posterior hip precautions.    Occupational Therapy: ADL's  Labs: Trend Hgb on POD #1, 2  Consults: PT, OT. Hospitalist, appreciate assistance in caring for this patient throughout the perioperative " period     Disposition: Pending progress with therapies, pain control on orals, and medical stability, anticipate discharge to Home today vs. Tomorrow     Didier France MD  Orthopaedic Surgery Resident, PGY-4  Pager: [phone]

## 2019-06-06 NOTE — PLAN OF CARE
Discharge Planner OT   Patient plan for discharge: Home with assist.  Patient independent in ADLs/mobility at baseline with occasional use of SEC.  Current status: Patient met all OT goals; reports increased pain. Completed toilet task, g/h tasks at sink, full body dressing, and tub transfer.  Educated in all hip precautions and AE; declines any AE needs.   Barriers to return to prior living situation: None  Recommendations for discharge: Home with assist  Rationale for recommendations: No further inpatient or home OT needs.    Occupational Therapy Discharge Summary    Reason for therapy discharge:    All goals and outcomes met, no further needs identified.    Progress towards therapy goal(s). See goals on Care Plan in [facility] electronic health record for goal details.  Goals met    Therapy recommendation(s):    No further therapy is recommended.           Entered by: Agustina Aranda 06/06/2019 11:16 AM

## 2019-06-06 NOTE — PLAN OF CARE
VS: VSS, A&O X 4, pt denies nausea, CP or SOB.   O2: Room air sat. Upper 90's.   Output: Voiding adequate amount with out difficulty.    Last BM: 06/04/19 passing gas.    Activity:  Walked on the [place]ell,up to chair with walker and SBA.   Skin: Intact except surgical incision.   Pain: Comfortably manageable with po PRN pain pills.   CMS: CMS and neuro intact.   Dressing: CDI.   Diet: Regular tolerating okay.   LDA: WILLIAMS MIKE.   Equipment: Walker, IV pole, personal belongings.    Plan: TBD.   Additional Info:

## 2019-06-06 NOTE — PROGRESS NOTES
06/06/19 0900   Quick Adds   Type of Visit Initial PT Evaluation       Present no   Living Environment   Lives With spouse   Living Arrangements house   Home Accessibility stairs to enter home   Number of Stairs, Main Entrance 2   Stair Railings, Main Entrance none   Transportation Anticipated family or friend will provide   Living Environment Comment PT: Pt lives with spouse in [place], [place] in bunglow style home, all needs met on first level. Pt reports wife available to assist at discharge.    Self-Care   Usual Activity Tolerance moderate   Current Activity Tolerance moderate   Regular Exercise No   Equipment Currently Used at Home cane, straight;walker, standard  (reacher)   Activity/Exercise/Self-Care Comment PT: Pt reports increased hip pain leading him to use SEC the last couple months, has FWW from previous surgeries. Pt reports being limited in distance walking. Works for [employer], will take at least 3 months off to recover.    Functional Level Prior   Ambulation 0-->independent   Transferring 0-->independent   Toileting 0-->independent   Bathing 0-->independent   Communication 0-->understands/communicates without difficulty   Swallowing 0-->swallows foods/liquids without difficulty   Cognition 0 - no cognition issues reported   Fall history within last six months no   Which of the above functional risks had a recent onset or change? ambulation;transferring   Prior Functional Level Comment PT: IND w/ mobility and ADLs at baseline   General Information   Onset of Illness/Injury or Date of Surgery - Date 06/05/19   Referring Physician Nelson Gaspar MD   Patient/Family Goals Statement PT: to return home   Pertinent History of Current Problem (include personal factors and/or comorbidities that impact the POC) PT: s/p R IRISH on 6/5 with Dr. Gaspar.    Precautions/Limitations right hip precautions;fall precautions   Weight-Bearing Status - LUE full weight-bearing    Weight-Bearing Status - RUE full weight-bearing   Weight-Bearing Status - LLE full weight-bearing   Weight-Bearing Status - RLE weight-bearing as tolerated   Heart Disease Risk Factors Age;Gender;Medical history;Overweight   General Observations PT: pt supine when arrived, agreeable to PT eval   General Info Comments PT: Ambulate with assist, capno and continous pulse ox   Cognitive Status Examination   Orientation orientation to person, place and time   Level of Consciousness alert   Follows Commands and Answers Questions 100% of the time   Personal Safety and Judgment intact   Memory intact   Pain Assessment   Patient Currently in Pain Yes, see Vital Sign flowsheet  (8/10)   Integumentary/Edema   Integumentary/Edema no deficits were identifed   Integumentary/Edema Comments PT: intact dressing   Posture    Posture Forward head position   Range of Motion (ROM)   ROM Comment PT: WFL within precautions   Strength   Strength Comments PT: WFL, >3/5 for functional mobility, not formally tested d/t pain   Bed Mobility   Bed Mobility Comments Malinda at RLE supine>sit   Transfer Skills   Transfer Comments PT: sit<>Stands SBA to FWW   Gait   Gait Comments PT: 150'x 1 w/ FWW, good pace and WBIng tolerance, slight antalgic gait pattern and decreased step length on LLE, use of UEs   Balance   Balance Comments PT: good standing static balance, needs FWW for dynamic tasks   Sensory Examination   Sensory Perception Comments reports no sensory changes   Modality Interventions   Planned Modality Interventions Cryotherapy   Planned Modality Interventions Comments PT: for post op pain and swelling   General Therapy Interventions   Planned Therapy Interventions balance training;bed mobility training;gait training;strengthening;ROM;transfer training;risk factor education;home program guidelines;progressive activity/exercise   Clinical Impression   Criteria for Skilled Therapeutic Intervention yes, treatment indicated   PT Diagnosis PT:  "impaired functional mobility   Influenced by the following impairments PT: pain, precautions, decreased activity tolerance and functional strength and balance   Functional limitations due to impairments PT: decreased (I) w/ functional mobility   Clinical Presentation Evolving/Changing   Clinical Presentation Rationale PT: current status, clinical judgement   Clinical Decision Making (Complexity) Moderate complexity   Therapy Frequency`   (BID)   Predicted Duration of Therapy Intervention (days/wks) 1-2 days   Anticipated Equipment Needs at Discharge   (none)   Anticipated Discharge Disposition Home with Assist   Risk & Benefits of therapy have been explained Yes   Patient, Family & other staff in agreement with plan of care Yes   Clinical Impression Comments see care plan note   [facility] TM \"6 Clicks\"   2016, Trustees of [facility], under license to [employer].  All rights reserved.   6 Clicks Short Forms Basic Mobility Inpatient Short Form   [facility]  \"6 Clicks\" V.2 Basic Mobility Inpatient Short Form   1. Turning from your back to your side while in a flat bed without using bedrails? 4 - None   2. Moving from lying on your back to sitting on the side of a flat bed without using bedrails? 3 - A Little   3. Moving to and from a bed to a chair (including a wheelchair)? 3 - A Little   4. Standing up from a chair using your arms (e.g., wheelchair, or bedside chair)? 3 - A Little   5. To walk in hospital room? 3 - A Little   6. Climbing 3-5 steps with a railing? 3 - A Little   Basic Mobility Raw Score (Score out of 24.Lower scores equate to lower levels of function) 19   Total Evaluation Time   Total Evaluation Time (Minutes) 5     "

## 2019-06-06 NOTE — PROGRESS NOTES
Pt was seen, case reviewed with team    Pt notes fair pain control  Was nauseated last night, feels better today.  No chest pain or SOB    Afebrile  BP 90s-100s/  HR 80s  BG low-mid 100s    Alert, fully oriented  Lungs clear  CV rrr  Abd soft, non-distended.  No calf edema        Results for HUNGER, BONNIE ALFREDO (MRN [id number]) as of 6/6/2019 11:49   Ref. Range 6/6/2019 05:14   Sodium Latest Ref Range: 133 - 144 mmol/L 139   Potassium Latest Ref Range: 3.4 - 5.3 mmol/L 4.4   Chloride Latest Ref Range: 94 - 109 mmol/L 107   Carbon Dioxide Latest Ref Range: 20 - 32 mmol/L 26   Urea Nitrogen Latest Ref Range: 7 - 30 mg/dL 21   Creatinine Latest Ref Range: 0.66 - 1.25 mg/dL 1.00   GFR Estimate Latest Ref Range: >60 mL/min/1.73_m2 79   GFR Estimate If Black Latest Ref Range: >60 mL/min/1.73_m2 >90   Calcium Latest Ref Range: 8.5 - 10.1 mg/dL 8.0 (L)   Anion Gap Latest Ref Range: 3 - 14 mmol/L 6   Magnesium Latest Ref Range: 1.6 - 2.3 mg/dL 1.9   Glucose Latest Ref Range: 70 - 99 mg/dL 137 (H)   Hemoglobin Latest Ref Range: 13.3 - 17.7 g/dL 12.4 (L)         Assessment    Status post right total hip arthroplasty.  Patient is doing well overall    History of hypertrophic cardia myopathy, with history of V. tach and recurrent atrial fibrillation.  CV status stable.  Blood pressure is mildly low, asymptomatic thus far.  Patient remains on sotalol and metoprolol.  Prior to admission Aldactone is on hold.  Volume status appears adequate.  Suspect low blood pressure secondary to anesthesia and pain medications    Chronic anticoagulation for atrial fibrillation.  Patient has been started on reduced dose of Xarelto for 3 days which will then be increased to therapeutic dose of 20 mg daily    Diabetes mellitus type 2, on metformin.  Blood glucose stable.  Metformin has not been restarted yet    Plan  Continue sotalol and Toprol (with blood pressure parameters)  Monitor blood pressure, electrolytes, renal function, hemoglobin  Saline  lock IV  Continue to hold Aldactone  Resume metformin XR this evening  Xarelto dosing, as above.

## 2019-06-07 ENCOUNTER — APPOINTMENT (OUTPATIENT)
Dept: PHYSICAL THERAPY | Facility: CLINIC | Age: 64
End: 2019-06-07
Attending: ORTHOPAEDIC SURGERY
Payer: COMMERCIAL

## 2019-06-07 VITALS
SYSTOLIC BLOOD PRESSURE: 131 MMHG | OXYGEN SATURATION: 95 % | DIASTOLIC BLOOD PRESSURE: 56 MMHG | WEIGHT: 213.41 LBS | RESPIRATION RATE: 16 BRPM | HEART RATE: 84 BPM | BODY MASS INDEX: 29.88 KG/M2 | HEIGHT: 71 IN | TEMPERATURE: 96.8 F

## 2019-06-07 LAB
ANION GAP SERPL CALCULATED.3IONS-SCNC: 5 MMOL/L (ref 3–14)
BUN SERPL-MCNC: 18 MG/DL (ref 7–30)
CALCIUM SERPL-MCNC: 8.6 MG/DL (ref 8.5–10.1)
CHLORIDE SERPL-SCNC: 105 MMOL/L (ref 94–109)
CO2 SERPL-SCNC: 26 MMOL/L (ref 20–32)
CREAT SERPL-MCNC: 0.93 MG/DL (ref 0.66–1.25)
GFR SERPL CREATININE-BSD FRML MDRD: 86 ML/MIN/{1.73_M2}
GLUCOSE BLDC GLUCOMTR-MCNC: 143 MG/DL (ref 70–99)
GLUCOSE SERPL-MCNC: 145 MG/DL (ref 70–99)
HGB BLD-MCNC: 12.8 G/DL (ref 13.3–17.7)
POTASSIUM SERPL-SCNC: 4.3 MMOL/L (ref 3.4–5.3)
SODIUM SERPL-SCNC: 136 MMOL/L (ref 133–144)

## 2019-06-07 PROCEDURE — 36415 COLL VENOUS BLD VENIPUNCTURE: CPT | Performed by: ORTHOPAEDIC SURGERY

## 2019-06-07 PROCEDURE — 97530 THERAPEUTIC ACTIVITIES: CPT | Mod: GP | Performed by: PHYSICAL THERAPIST

## 2019-06-07 PROCEDURE — 25000132 ZZH RX MED GY IP 250 OP 250 PS 637: Performed by: HOSPITALIST

## 2019-06-07 PROCEDURE — 99231 SBSQ HOSP IP/OBS SF/LOW 25: CPT | Performed by: INTERNAL MEDICINE

## 2019-06-07 PROCEDURE — 97110 THERAPEUTIC EXERCISES: CPT | Mod: GP | Performed by: PHYSICAL THERAPIST

## 2019-06-07 PROCEDURE — 97116 GAIT TRAINING THERAPY: CPT | Mod: GP | Performed by: PHYSICAL THERAPIST

## 2019-06-07 PROCEDURE — 25000132 ZZH RX MED GY IP 250 OP 250 PS 637: Performed by: INTERNAL MEDICINE

## 2019-06-07 PROCEDURE — 25000132 ZZH RX MED GY IP 250 OP 250 PS 637: Performed by: STUDENT IN AN ORGANIZED HEALTH CARE EDUCATION/TRAINING PROGRAM

## 2019-06-07 PROCEDURE — 00000146 ZZHCL STATISTIC GLUCOSE BY METER IP

## 2019-06-07 PROCEDURE — 85018 HEMOGLOBIN: CPT | Performed by: ORTHOPAEDIC SURGERY

## 2019-06-07 PROCEDURE — 80048 BASIC METABOLIC PNL TOTAL CA: CPT | Performed by: ORTHOPAEDIC SURGERY

## 2019-06-07 RX ADMIN — METOPROLOL SUCCINATE 25 MG: 25 TABLET, EXTENDED RELEASE ORAL at 09:07

## 2019-06-07 RX ADMIN — ACETAMINOPHEN 975 MG: 325 TABLET, FILM COATED ORAL at 06:32

## 2019-06-07 RX ADMIN — VITAM B12 100 MCG: 100 TAB at 09:07

## 2019-06-07 RX ADMIN — OXYCODONE HYDROCHLORIDE 5 MG: 5 TABLET ORAL at 06:32

## 2019-06-07 RX ADMIN — RIVAROXABAN 10 MG: 10 TABLET, FILM COATED ORAL at 10:35

## 2019-06-07 RX ADMIN — OXYCODONE HYDROCHLORIDE 5 MG: 5 TABLET ORAL at 09:26

## 2019-06-07 RX ADMIN — SOTALOL HYDROCHLORIDE 120 MG: 120 TABLET ORAL at 09:07

## 2019-06-07 RX ADMIN — POLYETHYLENE GLYCOL 3350 17 G: 17 POWDER, FOR SOLUTION ORAL at 09:07

## 2019-06-07 RX ADMIN — SENNOSIDES AND DOCUSATE SODIUM 2 TABLET: 8.6; 5 TABLET ORAL at 09:07

## 2019-06-07 ASSESSMENT — ACTIVITIES OF DAILY LIVING (ADL)
ADLS_ACUITY_SCORE: 16

## 2019-06-07 NOTE — PROGRESS NOTES
Pt feels well, states pain has been controlled  Voiding without difficulty  + BM last pm    No chest pain, SOB or dizziness    Afebrile   BP 130s/  HR 90s  BG 100s    Alert, fully oriented  Lungs clear  CV irreg, HR 70s  Abd soft, non-distended  No LE edema    Results for HUNGER, BONNIE ALFREDO (MRN [id number]) as of 6/7/2019 08:20   Ref. Range 6/7/2019 07:08   Sodium Latest Ref Range: 133 - 144 mmol/L 136   Potassium Latest Ref Range: 3.4 - 5.3 mmol/L 4.3   Chloride Latest Ref Range: 94 - 109 mmol/L 105   Carbon Dioxide Latest Ref Range: 20 - 32 mmol/L 26   Urea Nitrogen Latest Ref Range: 7 - 30 mg/dL 18   Creatinine Latest Ref Range: 0.66 - 1.25 mg/dL 0.93   GFR Estimate Latest Ref Range: >60 mL/min/1.73_m2 86   GFR Estimate If Black Latest Ref Range: >60 mL/min/1.73_m2 >90   Calcium Latest Ref Range: 8.5 - 10.1 mg/dL 8.6   Anion Gap Latest Ref Range: 3 - 14 mmol/L 5   Glucose Latest Ref Range: 70 - 99 mg/dL 145 (H)   Hemoglobin Latest Ref Range: 13.3 - 17.7 g/dL 12.8 (L)       Assessment      Status post right total hip arthroplasty.  Patient is doing well overall, will likely discharge to home today     History of hypertrophic cardiomyopathy, with history of V. tach and recurrent atrial fibrillation.  CV status stable.       Chronic anticoagulation for atrial fibrillation.  Patient has been started on reduced dose of Xarelto for 3 days which will then be increased to therapeutic dose of 20 mg daily     Diabetes mellitus type 2, on metformin.  Metformin restarted last pm    Plan  Likely discharge today, on PTA medications  Resume full dose Zarelto on discharge.    Addendum:    Patient's wife has noted the patient has had unusual movements of his neck where he has intermittent twitching of his head from side to side, primarily to the right side.  She is never noticed this before.  It may have started last evening.  Patient is not aware of it.  He states he wonders if he simply sleep deprived.  He feels like the tremors  have improved since he had a bowel movement.  He notes no difficulty swallowing.  He notes no tremors of his arms or anywhere else in his body.    Exam  Slight frequent barely perceptible rapid movements of his head to the right side.  No facial movements otherwise.  Speech normal.  Patient is fully oriented.  No tremors or rigidity of upper extremities.    Assessment  Head movements as noted above, in the absence of extraparametal symptoms or signs.  Unclear if related to oxycodone.  Patient did not receive antiemetics for over 24 hours, so I doubt these are the cause.    Plan  Patient appears stable for discharge.  Wife is comfortable monitoring patient's symptoms, will observe for changes in mentation, worsening tremors, difficulty swallowing,.  Continue judicious use of oxycodone.

## 2019-06-07 NOTE — PLAN OF CARE
VS: Temp 99.9, scheduled tylenol given, other VSS.   O2: RA.   Output: Voiding using urinal in bathroom, strict I/O.   Last BM: Abdomen rounded, BS hypoactive, not passing flatus at start of shift, moonlighter notified, pt refused enema and suppository, MOM, miralax, and prune juice given, BM 6/7/19.   Activity: Independent with walker, WBAT.   Skin: Incision.   Pain: Minimal pain in right hip managed with oxycodone and scheduled tylenol.   CMS: Intact.   Dressing: CDI.   Diet: Regular.   LDA: PIV left upper forearm SL.   Equipment: Walker, urinal, IS, refused PCDs and abduction pillow, pillows, call light within reach.   Plan: Continue to monitor.   Additional Info:

## 2019-06-07 NOTE — PROGRESS NOTES
Pt was given discharge instructions and medications and verbalized understanding. Pt left unit w/ wife to discharge to home at approx 1130.

## 2019-06-07 NOTE — PLAN OF CARE
Physical Therapy Discharge Summary    Reason for therapy discharge:    Discharged to home with HEP and assist from spouse as needed; pt owns a 2WW and cane; issued a leg  and 4 inch foam cushion today.   All goals and outcomes met, no further needs identified.    Progress towards therapy goal(s). See goals on Care Plan in [facility] electronic health record for goal details.  Goals met    Therapy recommendation(s):    Continue home exercise program. Pt may benefit from outpatient PT in a few weeks to progress gait patterning, posture and dynamic stability through R hip.

## 2019-06-07 NOTE — PROGRESS NOTES
Constipated for days. Abodmen getting bigger. Not passing gas. No vomiting. We will give soap suds enema.

## 2019-06-07 NOTE — DISCHARGE SUMMARY
ORTHOPAEDIC DISCHARGE SUMMARY     Date of Admission: 6/5/2019  Date of Discharge: 6/7/2019 11:19 AM  Disposition: Home  Staff Physician: Nelson Gaspar MD  Primary Care Provider: Ngoc Tolbert DIAGNOSIS:  Right Hip Pain, Osteoarthritis Right Hip    PROCEDURES: Procedure(s):  Right Total Hip Arthroplasty  on 6/5/2019    BRIEF HISTORY:  The patient is a 63 year old male with known right hip osteoarthritis. He failed conservative management and after a thorough discussion of the risks, benefits, and alternatives, elected to proceed with the above. Please see Dr. Gaspar's outpatient documentation for details.     HOSPITAL COURSE:    Surgery was uncomplicated. Stu Meredith has done well post-operatively. Medicine was consulted post operatively to aid in management of medical comorbidities. The patient received routine nursing cares and is medically stable. Vital signs are stable. The patient is tolerating a regular diet without GI distress/nausea or vomiting. Voiding spontaneously. All PT/OT goals have been met for safe mobility. Pain is now controlled on oral medications. Stool softeners have been used while taking pain medications to help prevent constipation. Stu Meredith is deemed medically safe to discharge to home with family.     Antibiotics:   given periop and 24 hours postop.   DVT prophylaxis:  Home Xarelto resumed POD#1  PT Progress: He has met PT/OT goals for safe mobility.    Pain Meds:  He was weaned off all IV pain meds by discharge.  Inpatient Events: No significant events or complications.     Discharge orders and instructions as below.    FOLLOWUP:      Future Appointments   Date Time Provider Department Center   6/7/2019 11:00 AM Janine Khan Pt, PT [facility]   6/7/2019  2:00 PM Mera Pierre, PT [facility]   6/12/2019  3:45 PM [facility]   6/21/2019 12:20 PM Nelson Perez PA-C UCUOR [facility]   7/9/2019  7:55 AM  Paco Rhodes MD UCDER [facility]   7/12/2019  9:00 AM  LAB UCLAB [facility]   7/12/2019  9:30 AM  CV DEVICE 1 CVCV [facility]   7/12/2019 10:00 AM Mona Ware MD [facility]   7/12/2019 10:30 AM Erik Cooper MD [facility]   7/18/2019  1:15 PM [facility]   10/25/2019 12:00 AM  ICD REMOTE UCCVSV [facility]       Appointments at [facility]. Call [phone] if you haven't heard regarding these appointments within 7 days of discharge.    PLANNED DISCHARGE ORDERS:        Discharge Medication List as of 6/7/2019 11:04 AM      START taking these medications    Details   oxyCODONE (ROXICODONE) 5 MG tablet Take 1-2 tablets (5-10 mg) by mouth every 3 hours as needed for moderate to severe pain, Disp-40 tablet, R-0, Local Print      senna-docusate (SENOKOT-S/PERICOLACE) 8.6-50 MG tablet Take 1 tablet by mouth 2 times daily as needed for constipation, Disp-100 tablet, R-1, E-Prescribe         CONTINUE these medications which have NOT CHANGED    Details   acetaminophen (TYLENOL) 325 MG tablet Take 325-650 mg by mouth every 6 hours as needed, Historical      atorvastatin (LIPITOR) 20 MG tablet Take 1 tablet (20 mg) by mouth daily, Disp-90 tablet, R-3, E-Prescribe      cyanocobalamin (VITAMIN B-12) 100 MCG tablet Take 100 mcg by mouth daily, Historical      metFORMIN (GLUCOPHAGE-XR) 500 MG 24 hr tablet Take 1 tablet (500 mg) by mouth daily (with dinner) for 7 days, THEN 2 tablets (1,000 mg) daily (with dinner)., Disp-180 tablet, R-3, E-Prescribe      metoprolol succinate (TOPROL-XL) 50 MG 24 hr tablet Take 1 tablet (50 mg) by mouth daily, Disp-90 tablet, R-3, E-Prescribe      multivitamin, therapeutic (THERA-VIT) TABS Take 1 tablet by mouth daily, Historical      sotalol (BETAPACE) 120 MG tablet Take 1 tablet (120 mg) by mouth 2 times daily, Disp-180 tablet, R-3, E-Prescribe      spironolactone (ALDACTONE) 50 MG tablet Take 1 tablet (50 mg) by mouth daily, Disp-90  tablet, R-3, E-PrescribePlease do not refill until patient request.      XARELTO 20 MG TABS tablet Take 1 tablet (20 mg) by mouth daily (with dinner), Disp-90 tablet, R-3, NATE, E-Prescribe      zolpidem (AMBIEN) 10 MG tablet Take 0.5-1 tablets (5-10 mg) by mouth nightly as needed for sleep, Disp-90 tablet, R-0, Local Print      albuterol (PROAIR HFA/PROVENTIL HFA/VENTOLIN HFA) 108 (90 Base) MCG/ACT inhaler Inhale 2 puffs into the lungs every 4 hours as needed for shortness of breath / dyspnea or wheezing, Disp-8.5 g, R-1, E-Prescribe      amoxicillin (AMOXIL) 500 MG tablet Take 4 tablets (2000 mg) by mouth 1 hour before dental procedures., Disp-12 tablet, R-3, E-Prescribe               Discharge Procedure Orders   Physical Therapy Referral   Referral Priority: Routine Referral Type: Rehab Therapy Physical Therapy   Number of Visits Requested: 1     Reason for your hospital stay   Order Comments: Right hip replacement surgery     Adult [facility] Follow-up and recommended labs and tests   Order Comments: Follow up with Chris TENORIO , at (location with clinic name or city) [facility], within 2 weeks  to evaluate after surgery. 6 week follow up with Dr. Gaspar.     Appointments on [place] and/or [facility] (with [facility] or [facility] provider or service). Call [phone] if you haven't heard regarding these appointments within 7 days of discharge.     Activity   Order Comments: Your activity upon discharge: activity as tolerated     Order Specific Question Answer Comments   Is discharge order? Yes      When to contact your care team   Order Comments: CALL YOUR PHYSICIAN IF:  1.  Your pain begins to worsen and is unable to be controlled with your medications.  2.  Excessive redness or drainage of cloudy or bloody material from the wounds (Clear red tinted fluid and some mild drainage should be expected). Drainage of any kind 5 days after surgery should be reported to the doctor.  3.  You have a temperature elevation  greater than 101.5    4.  You have pain, swelling or redness in your calf. You have numbness or weakness in your leg or foot.    You many call your physician at [phone] during business hours.    For after hours or on weekends, you may call the hospital at [phone] and ask for the orthopedic resident on call.     Discharge Instructions   Order Comments: TOTAL HIP ARTHROPLASTY POST OPERATIVE DISCHARGE INSTRUCTIONS    FOLLOW UP APPOINTMENT  You are scheduled for a post operative wound check with Dr. Gaspar's clinic approximately two weeks after surgery. At approximately six weeks after surgery, you will see Dr. Gaspar in clinic. During this visit, repeat X rays of your operative hip will be performed.      Your follow up appointments will be at the location that you regularly see Dr. Gaspar:    [facility]  [address] [place] [address] [phone]    [facility]  [id number] [place] [address] [phone]    [facility]  [address] [place] [address]  [phone]    Physical therapy:   A referral for physical therapy will be provided at the time of discharge.       ACTIVITY  Weight bearing status:   You may bear weight on your operative extremity as tolerated, using assistive devices (walker, cane) as needed. As you begin to feel more comfortable ambulating, you may gradually transition from a walker to a cane. Eventually, you should wean from all assistive devices. Although we would like you to discontinue use of assistive devices as soon as possible, do not transition until you have worked with your physical therapist to achieve safe balance and comfort.     Posterior hip precautions:  You must maintain the following posterior hip precautions for the next 12 weeks with no exceptions:  1) no hip flexion >90 degrees (no bending down at the waist)  2) no  internal rotation past neutral (no pivoting)  3) no adduction past midline (no crossing your legs)    Exercises:   Perform the following exercises at least three times per day for the first four weeks after surgery to prevent complications, such as blood clots in your legs:  1) Point and flex your feet  2) Move your ankle around in big circles  3) Wiggle your toes   Also, perform thigh muscle tightening exercises for 10 to 15 minutes at least three times per day for the first four weeks after surgery.    Athletic activities:  Activities such as swimming, bicycling, jogging, running, and stop-and-go sports should be avoided until directed by your provider.    Driving:  Driving is not permitted until directed by your provider. Under no circumstance are you permitted to drive while using narcotic pain medications.    Return to work:  You may return to work when directed by your provider.       COMFORT AND PAIN MANAGEMENT  Icing:  An ice pack will be provided to control swelling and discomfort after surgery. Place a thin towel on your skin and apply the ice pack overtop. You may apply ice for 20 minutes as often as two times per hour.    Pain medications & tapering:  You will be discharged with acetaminophen (Tylenol) and a narcotic medication for pain management after surgery. Acetaminophen is most effective when it is taken per the schedule outlined by your provider (every four, six, or eight hours as prescribed). You may safely use acetaminophen as prescribed for the first four weeks after surgery provided you do not exceed the maximum daily dose prescribed by your provider (usually 3000 mg - 4000 mg). The narcotic pain medication should only be taken on an as-needed basis when necessary and should be reserved for severe pain that is not controlled with scheduled acetaminophen. In the first three days following surgery, your symptoms may warrant use of the narcotic pain medication every three, four, or six hours as  prescribed. After three days, focus your efforts on decreasing (tapering) use of narcotic medications.   The most successful tapering strategy is to first, decrease the dose (number of tablets) and second, decrease the interval (time between doses). For example, if you begin taking two tablets every four hours after surgery, start your taper by decreasing one of these doses to one tablet. Every one to two days, decrease another dose to one tablet until you are eventually taking one tablet every four hours. Once this is achieved, focus on increasing the number of hours between doses, moving from one tablet every four hours to one tablet every six hours. As tolerated, continue to increase the interval to eight and twelve hours. Eventually, taper to one dose every evening and discontinue when no longer needed.      ANTICOAGULATION  Resume your home Xarelto. This will be sufficient for preventing blood clots after surgery .       WOUND CARE AND SHOWERING  Wound care:  You have a clean Aquacel dressing on your surgical wound. This dressing should stay in place for seven days to allow the incision to heal. If the Aquacel dressing becomes excessively wet or saturated before removal on day seven, please contact Dr. Gaspar's office. After seven days, remove the dressing and leave the wound open to air (see showering instructions below). If there is any drainage from the incision after removal of the Aquacel, dress the wound with sterile 4x4 gauze, using tape over top to secure the dressing in place over the incision. Please contact Dr. Gaspar's office if you notice the following after removal of the Aquacel dressin) significant cloudy, bloody, or malodorous drainage from the incision, 2) excessive warmth and redness around the incision.    Showering:  You may shower 48 hours after surgery provided the Aquacel dressing is in place. Although you are strictly prohibited from soaking or submerging the surgical wound  underwater, you may shower in the usual fashion, allowing water and soap to run over the Aquacel. Once the Aquacel is removed on the seventh day after surgery, you may continue to shower; however, the incision should be covered with saran wrap (or any other non-permeable covering) to allow the incision to remain dry and to prevent you from scrubbing/rubbing the incision. The steri-strips (small white tape that is directly on the incision areas) should be left on until they fall off or are removed at your first office visit. After your incision is examined at your first office visit, you will likely be allowed to return to showering without covering the incision.     Tub bathing:  Tub bathing, swimming, or any other activities that cause your incision to be submerged should be avoided until allowed by your provider. Typically, patients are allowed to return to these activities six weeks after surgery pending clearance by your provider.      CONTACTING YOUR PROVIDER:  You may experience symptoms that require follow-up before your scheduled two week appointment. Please contact Dr. Gaspar's office if you experience:  1) Pain that persists or worsens in the first few days after surgery  2) Excessive redness or drainage of cloudy or bloody material from the wounds (clear red tinted fluid and some mild drainage should be expected) or drainage of any kind five days after surgery  3) A temperature elevation greater than 101.5    4) Pain, swelling or redness in your calf  5) Numbness or weakness in your leg or foot      Regular business hours (Monday - Friday, 8am - 5pm):  [facility]: [phone]  [facility]: [phone]  [facility]: [phone]    After hours and weekends:  [facility] on call Orthopedic resident: [phone]     Diet   Order Comments: Follow this diet upon discharge: Orders Placed This  Encounter      Regular Diet Adult     Order Specific Question Answer Comments   Is discharge order? Yes          Discussed with Dr. Gaspar on the date of discharge.     Didier France MD  PGY-4  Orthopaedic Surgery  [phone]

## 2019-06-07 NOTE — PROGRESS NOTES
"Orthopaedic Surgery Progress Note   June 7, 2019    Subjective: No acute events overnight. Pain well controlled. Tolerating diet. Voiding spontaneously. Had BM last night, feeling better. Denies fever or chills, CP, SOB, numbness or tingling, motor dysfunction or weakness.     Objective: /84 (BP Location: Left arm)   Pulse 85   Temp 99.9  F (37.7  C) (Oral)   Resp 16   Ht 1.803 m (5' 11\")   Wt 96.8 kg (213 lb 6.5 oz)   SpO2 94%   BMI 29.76 kg/m      General: NAD, alert and oriented, cooperative with exam.   Cardio: RRR, extremities wwp.   Respiratory: Non-labored breathing.  MSK: RLE: Toes wwp, DP 2+, bcr in all toes.  +EHL/FHL/GSC/TA with 5/5 strength. SILT SP/DP/Sa/Vázquez/T. Fires quad, SILT femoral nerve. Dressing c/d/i.    Labs:  Hemoglobin   Date Value Ref Range Status   06/06/2019 12.4 (L) 13.3 - 17.7 g/dL Final       Assessment and Plan: Stu Meredith is a 63 year old male now s/p R IRISH on 6/5 with Dr. Gaspar.      Ortho Primary  Activity: Up with assist and assistive devices as needed until independent. Posterior hip precautions to operative side x 3 months:  1) No hip flexion beyond 90 degrees  2) No adduction beyond midline  3) No internal rotation beyond neutral   Weight bearing status: WBAT  Antibiotics: Cefazolin x 24 hours  Diet: Begin with clear fluids and progress diet as tolerated. Bowel regimen. Anti-emetics PRN.    DVT prophylaxis: Mechanical while in hospital Xarelto 10 mg daily starting POD#1, resume home dose at discharge.   Elevation: Elevate heels off of bed on pillows   Wound Care: Aquacel x 5-7 days.    Drains: none  Pain management: Orals PRN, IV for breakthrough only  X-rays: AP Pelvis and Lateral operative hip in PACU. - complete   Physical Therapy: Mobilization, ROM, ADL's, Posterior hip precautions.    Occupational Therapy: ADL's  Labs: Trend Hgb on POD #1, 2  Consults: PT, OT. Hospitalist, appreciate assistance in caring for this patient throughout the perioperative " period     Disposition: if cleared by PT and medicine today, ok for discharge to home today     Didier France MD  Orthopaedic Surgery Resident, PGY-4  Pager: [phone]

## 2019-06-10 ENCOUNTER — PATIENT OUTREACH (OUTPATIENT)
Dept: CARE COORDINATION | Facility: CLINIC | Age: 64
End: 2019-06-10

## 2019-06-10 ENCOUNTER — TELEPHONE (OUTPATIENT)
Dept: INTERNAL MEDICINE | Facility: CLINIC | Age: 64
End: 2019-06-10

## 2019-06-10 DIAGNOSIS — Z96.641 STATUS POST TOTAL REPLACEMENT OF RIGHT HIP: ICD-10-CM

## 2019-06-10 DIAGNOSIS — Z96.642 HISTORY OF TOTAL LEFT HIP ARTHROPLASTY: Primary | ICD-10-CM

## 2019-06-10 RX ORDER — HYDROCODONE BITARTRATE AND ACETAMINOPHEN 5; 325 MG/1; MG/1
1-2 TABLET ORAL EVERY 4 HOURS PRN
Qty: 40 TABLET | Refills: 0 | Status: SHIPPED | OUTPATIENT
Start: 2019-06-10 | End: 2019-10-02

## 2019-06-10 NOTE — TELEPHONE ENCOUNTER
Patient see's [facility] primary care provider.     Care coordination post hospital discharge pending.     Jennifer STAUFFER RN, BSN, PHN

## 2019-06-10 NOTE — PROGRESS NOTES
Date: 6/12/2019 Status: Sandeep   Time: 3:45 PM Length: 30   Visit Type: UMP RETURN [[id number]] JOSE: [phone]   Provider: Andreia Julien MD Department: [facility]     Patient reached out to care team   No further post discharge calls were made

## 2019-06-10 NOTE — TELEPHONE ENCOUNTER
[facility] Call Center    Phone Message    May a detailed message be left on voicemail: yes    Reason for Call: Other: Pt is returning call - he said nobody left a voicemail and isn't sure who was trying to reach him - please call pt back and leave detailed voicemail if he doesn't answer so he knows which dept to contact  - sending to ortho in regards to recent hip surgery with Dr Gaspar    Action Taken: Message routed to:  [facility] ([facility] Ortho

## 2019-06-10 NOTE — TELEPHONE ENCOUNTER
DOS: 6/5/19 Right total hip arthroplasty    Patient sends a message via [employer] requesting a postop pain medication change. On speaking with patient, he states that the Dilaudid he was given on discharge caused nausea and vomiting; oxycodone causes him to have tremors. Patient requests changing to [place]. Counseled patient that Norco has acetaminophen in it and that patient should keep his total 24-hour total of acetaminophen to no more than 3000 mg. Patient expresses understanding.     Prescription will be placed in the lockbox for pick-up by patient's spouse.

## 2019-06-10 NOTE — TELEPHONE ENCOUNTER
Patient's follow up with Dr. Gaspar have already been made.  He was called and told that we did not call him.  He requests that who ever is calling him to leave a message if he doesn't answer so he can call him back.

## 2019-06-20 NOTE — OP NOTE
OPERATIVE REPORT    DATE OF SERVICE: 6/5/19    SURGEON: Nelson Gaspar MD.    ASSISTANT(S):  Bethany France MD     PREOPERATIVE DIAGNOSIS:  Osteoarthritis    POSTOPERATIVE DIAGNOSIS:  Osteoarthritis    OPERATION PERFORMED:  Right total hip arthroplasty    IMPLANTS:    Implant Name Type Inv. Item Serial No.  Lot No. LRB No. Used   IMP SHELL SNR ACET R3 3H 54MM [id number] Total Joint Component/Insert IMP SHELL SNR ACET R3 3H 54MM [id number]  [place]  [id number] Right 1   IMP LINER SNR ACET R3 XLPE 0DEG [id number] Total Joint Component/Insert IMP LINER SNR ACET R3 XLPE 0DEG [id number]  [place]  [id number] Right 1   IMP SCR ACET SNN SPHERICAL HEAD 6.5X40MM [id number] [employer]/[place] IMP SCR ACET SNN SPHERICAL HEAD 6.5X40MM [id number]  [place]  [id number] Right 1   IMP SCR ACET SNN SPHERICAL HEAD 6.5X20MM [id number] [employer]/[place] IMP SCR ACET SNN SPHERICAL HEAD 6.5X20MM [id number]  [place]  [id number] Right 1   IMP STEM SNN HIGH OFFSET SZ 7 [id number] Total Joint Component/Insert IMP STEM SNN HIGH OFFSET SZ 7 [id number]  [place]  [id number] Right 1   IMP HEAD FEMORAL SNN BIPOLAR [place] 36MM +4 [id number] Total Joint Component/Insert IMP HEAD FEMORAL SNN BIPOLAR [place] 36MM +4 [id number]  [place]  [id number] Right 1         ANESTHETIC: General     OPERATIVE FINDINGS:  End stage arthrosis of the hip    BLOOD LOSS: about 250 cc    COMPLICATIONS:  None apparent    OPERATIVE INDICATIONS:  The patient has a long history of debilitating pain secondary to ostearthritis of the hip.  Despite comprehensive non-operative management these symptoms continued to interfere with activities of daily living.  After discussion of further treatment options including the risks and benefits that patient elected to proceed with a total hip.    DESCRIPTION OF THE PROCEDURE:  The patient was identified in the preoperative holding area.  The consent form including the risks and benefits were reviewed with the patient.  The operative limb was  identified and marked.  The patient was brought back to the operating room and placed supine on the operating table.  An anesthetic was induced by the anesthesia team.   The patient was placed in the lateral decubitus position and prepped and draped in the normal standard fashion for a hip replacement.  A time-out was called.  Antibiotics were given.  We utilized an approximately 15 cm curvilinear incision, centered on the vastus ridge, and performed a standard posterior approach to the hip.  The tensor fascia was split.  A small portion of gluteus bruno was split in line with its fibers.  The sciatic nerve was palpated.  The east-west retractor was placed.  The posterior border of gluteus medius was exposed and retracted.  The tendon of piriformis and that of the obturators was released from their attachments.  A trapdoor posterior capsulotomy was performed.  The hip was dislocated.  The lesser trochanter was exposed.  A ruler was used to measure and electrocautery was used to dmitriy our neck cut as preoperatively templated.  The head was measured with a caliper and found to be 50 mm.  This measurement was used to choose our first reamer.  The neck cut was re-measured. The femur was elevated.  A Hohmann was placed over the anterior rim of the acetabulum and the femur was subluxed anterior.  A split was made in the inferior capsule.  The transverse acetabular ligament was left intact and used a guide for the anterversion of the acetabular component.  Circumferential retractors were placed.  We began reaming and went up by two until sufficient contact was made with the acetabular rim.  We then went up by one millimeter for a one millimeter press-fit.  We were within one size of our preoperative plan.   A trail was placed.  It had an excellent press fit.  We then placed out final component in 40 degrees of inclination and approximately 20 degrees of anteversion, parallel to the transverse ligament.  The press fit was  "excellent.  Screws were placed for additional initial fixation.  A flat liner was then placed. It locked into place.  Attention was turned to the femur.  Retractors were placed to elevated the proximal femur and to protect the tendon of gluteus medius.  Remaining lateral neck was removed and the piriformis fossa was cleared of soft-tissue.  A box osteotome and canal finder were used to prepare for broaching.  A sharp broach was used to lateralize slightly.  We then broached up sequentially to a size 7.  It was rotationally stable and sat up 1-2 millimeters from the neck-cut.  Preoperatively the patient had templated to a high offset stem.  The high offset stem appropriately tensioned the abductors.  We trialed the following fmoeral heads: 0 and +4.  The +4 most appropriately tensioned the abductors and clinically equalized the leg-lengths.  The stability exam was excellent.  The hip was stable and there was no impingement posteriorly with hyper-extension and maximal external rotation.  With full extension, the knee could be fixed to bring the foot nearly to the buttock.  With the hip in ninety degrees of flexion and neutral rotation there was greater than 60 degrees of internal rotation before subluxation.  There appropriate movement with a \"michele\" test.  Happy with our stability exam, the final implant was placed in approximately twenty degrees of anteversion.  It sat within 1 mm of the broach.  We then trailed with a +4 head.  The stability exam was identical.  We then placed the final head on a clean, dry neck and impacted it into place. The hip was reduced after directly visualizing the entire acetabulum.  The wound was then irrigated.  The posterior capsule and short external rotators were sutured to the greater trochanter with non-absorbable suture through bone tunnels.The fascia was closed with interrupted Vicryl, the dermis with interrupted Vicryl, and skin with running monocryl, Dermabond and steri-strips. "  At the end of the procedure the sponge and needle counts were correct times two.  The patient tolerated the procedure well and returned to the PAR extubated and stable.    POSTOPERATIVE PLAN:  1. Weight bearing as tolerated  2. Standard posterior hip precautions  3. DVT prophylaxis   4. 24 hours of prophylactic antibiotics  5. Follow-up:  Wound clinic in 2 weeks and with Hubert in clinic in 6 weeks for x-rays and a rehabilitation check.

## 2019-06-21 ENCOUNTER — OFFICE VISIT (OUTPATIENT)
Dept: ORTHOPEDICS | Facility: CLINIC | Age: 64
End: 2019-06-21
Payer: COMMERCIAL

## 2019-06-21 DIAGNOSIS — Z96.641 STATUS POST TOTAL REPLACEMENT OF RIGHT HIP: Primary | ICD-10-CM

## 2019-06-21 RX ORDER — HYDROCODONE BITARTRATE AND ACETAMINOPHEN 5; 325 MG/1; MG/1
1 TABLET ORAL EVERY 8 HOURS PRN
Qty: 20 TABLET | Refills: 0 | Status: SHIPPED | OUTPATIENT
Start: 2019-06-21 | End: 2019-10-02

## 2019-06-21 NOTE — NURSING NOTE
Reason For Visit:   Chief Complaint   Patient presents with     Right Hip - Surgical Followup, Total Joint Post-op     Surgical Followup     2 wk pop DOS 6/5/19 Right IRISH       There were no vitals taken for this visit.    Pain Assessment  Patient Currently in Pain: Yes  0-10 Pain Scale: 4    Cara Harkins, ATC

## 2019-06-21 NOTE — LETTER
6/21/2019       RE: Stu Meredith  [address] [facility] [address]     Dear Colleague,    Thank you for referring your patient, Stu Meredith, to the [facility]. Please see a copy of my visit note below.    REASON FOR VIST/CC: Follow-up appointment following right IRISH with Dr. Torres on 06/05/2019.    HISTORY OF PRESENT ILLNESS:  Stu Meredith is a 63 year old male who is approximately 2 weeks status post right total hip arthroplasty with Dr. Torres.  Today, his recovery has been unremarkable but he continues to complain of improving pain in the right buttocks.  He reports that his pain is currently managed appropriately with hydrocodone 2 to 3 tablets daily and scheduled acetaminophen.  Stu is weightbearing as tolerated using a cane for assistance.  His first physical therapy appointment will be on Tuesday, but he reports that he has been performing home exercises without complication.    The patient endorses some swelling around the surgical incision but denies excessive surrounding redness. The incision has been dry, without discharge or drainage. Stu denies recent fevers and chills and other symptoms concerning for infection.     Stu is currently taking rivaroxaban daily for DVT prophylaxis. Patient denies calf pain or tenderness.    MEDICATIONS:   Current Outpatient Rx   Medication Sig Dispense Refill     acetaminophen (TYLENOL) 325 MG tablet Take 325-650 mg by mouth every 6 hours as needed       amoxicillin (AMOXIL) 500 MG tablet Take 4 tablets (2000 mg) by mouth 1 hour before dental procedures. 12 tablet 3     atorvastatin (LIPITOR) 20 MG tablet Take 1 tablet (20 mg) by mouth daily (Patient taking differently: Take 20 mg by mouth every evening ) 90 tablet 3     cyanocobalamin (VITAMIN B-12) 100 MCG tablet Take 100 mcg by mouth daily       HYDROcodone-acetaminophen (NORCO) 5-325 MG tablet Take 1-2 tablets by mouth  every 4 hours as needed for severe pain 40 tablet 0     metFORMIN (GLUCOPHAGE-XR) 500 MG 24 hr tablet Take 1 tablet (500 mg) by mouth daily (with dinner) for 7 days, THEN 2 tablets (1,000 mg) daily (with dinner). (Patient taking differently: Take 2 tablets (1,000 mg) daily (with dinner).) 180 tablet 3     metoprolol succinate (TOPROL-XL) 50 MG 24 hr tablet Take 1 tablet (50 mg) by mouth daily 90 tablet 3     multivitamin, therapeutic (THERA-VIT) TABS Take 1 tablet by mouth daily       senna-docusate (SENOKOT-S/PERICOLACE) 8.6-50 MG tablet Take 1 tablet by mouth 2 times daily as needed for constipation 100 tablet 1     sotalol (BETAPACE) 120 MG tablet Take 1 tablet (120 mg) by mouth 2 times daily 180 tablet 3     spironolactone (ALDACTONE) 50 MG tablet Take 1 tablet (50 mg) by mouth daily 90 tablet 3     XARELTO 20 MG TABS tablet Take 1 tablet (20 mg) by mouth daily (with dinner) 90 tablet 3     zolpidem (AMBIEN) 10 MG tablet Take 0.5-1 tablets (5-10 mg) by mouth nightly as needed for sleep (Patient taking differently: Take 10 mg by mouth nightly as needed for sleep ) 90 tablet 0     albuterol (PROAIR HFA/PROVENTIL HFA/VENTOLIN HFA) 108 (90 Base) MCG/ACT inhaler Inhale 2 puffs into the lungs every 4 hours as needed for shortness of breath / dyspnea or wheezing (Patient not taking: Reported on 5/8/2019) 8.5 g 1       ALLERGIES: Nsaids     PHYSICAL EXAMINATION:   On physical examination the patient is comfortable and is in no acute distress. The affect is appropriate and breathing is non-labored.    Surgical wound: The surgical wound was exposed and found to be clean and dry. There is mild edema around the incision. There is no erythema. The skin was appropriately warm to touch.     Gait is antalgic with notable weakness of the right gluteal muscles.    ASSESSMENT/PLAN:  Stu Meredith is a 63 year old male status post right total hip arthroplasty with Dr. Torres.  He is progressing well and his pain continues to  improve.  Patient still continues to have notable weakness in the right gluteal muscles that is impairing progress with gait.     Basic wound cares were performed at today's visit. We spent time discussing future wound/incision cares.    We also spent time discussing the patient's current pain regimen.  It is encouraging that he is slowly tapering from his medications.  At this time, he is using 2 to 3 tablets of hydrocodone daily which is appropriate given that he is only 2 weeks after surgery.  Additional refill will be provided to him for 20 tablets of hydrocodone.  We discussed strategies to continue tapering over the next several weeks.  He understands and agrees with this plan.    Stu continues to ambulate with impaired gait, likely due to weakness of his right gluteal muscles.  I encouraged him to meet with physical therapist on Tuesday and perform exercises targeting core and gluteal muscles. He will also focus on gait training.    The patient will follow up with Dr. Torres at 6 weeks postop.  At this appointment, Stu will have new AP and lateral x-rays obtained.  He has a clinic number and will call with any questions or concerns.    Nelson Perez PA-C  Orthopaedic Surgery

## 2019-06-21 NOTE — PROGRESS NOTES
REASON FOR VIST/CC: Follow-up appointment following right IRISH with Dr. Torres on 06/05/2019.    HISTORY OF PRESENT ILLNESS:  Stu Meredith is a 63 year old male who is approximately 2 weeks status post right total hip arthroplasty with Dr. Torres.  Today, his recovery has been unremarkable but he continues to complain of improving pain in the right buttocks.  He reports that his pain is currently managed appropriately with hydrocodone 2 to 3 tablets daily and scheduled acetaminophen.  Stu is weightbearing as tolerated using a cane for assistance.  His first physical therapy appointment will be on Tuesday, but he reports that he has been performing home exercises without complication.    The patient endorses some swelling around the surgical incision but denies excessive surrounding redness. The incision has been dry, without discharge or drainage. Stu denies recent fevers and chills and other symptoms concerning for infection.     Stu is currently taking rivaroxaban daily for DVT prophylaxis. Patient denies calf pain or tenderness.      MEDICATIONS:   Current Outpatient Rx   Medication Sig Dispense Refill     acetaminophen (TYLENOL) 325 MG tablet Take 325-650 mg by mouth every 6 hours as needed       amoxicillin (AMOXIL) 500 MG tablet Take 4 tablets (2000 mg) by mouth 1 hour before dental procedures. 12 tablet 3     atorvastatin (LIPITOR) 20 MG tablet Take 1 tablet (20 mg) by mouth daily (Patient taking differently: Take 20 mg by mouth every evening ) 90 tablet 3     cyanocobalamin (VITAMIN B-12) 100 MCG tablet Take 100 mcg by mouth daily       HYDROcodone-acetaminophen (NORCO) 5-325 MG tablet Take 1-2 tablets by mouth every 4 hours as needed for severe pain 40 tablet 0     metFORMIN (GLUCOPHAGE-XR) 500 MG 24 hr tablet Take 1 tablet (500 mg) by mouth daily (with dinner) for 7 days, THEN 2 tablets (1,000 mg) daily (with dinner). (Patient taking differently: Take 2 tablets (1,000 mg) daily (with dinner).)  180 tablet 3     metoprolol succinate (TOPROL-XL) 50 MG 24 hr tablet Take 1 tablet (50 mg) by mouth daily 90 tablet 3     multivitamin, therapeutic (THERA-VIT) TABS Take 1 tablet by mouth daily       senna-docusate (SENOKOT-S/PERICOLACE) 8.6-50 MG tablet Take 1 tablet by mouth 2 times daily as needed for constipation 100 tablet 1     sotalol (BETAPACE) 120 MG tablet Take 1 tablet (120 mg) by mouth 2 times daily 180 tablet 3     spironolactone (ALDACTONE) 50 MG tablet Take 1 tablet (50 mg) by mouth daily 90 tablet 3     XARELTO 20 MG TABS tablet Take 1 tablet (20 mg) by mouth daily (with dinner) 90 tablet 3     zolpidem (AMBIEN) 10 MG tablet Take 0.5-1 tablets (5-10 mg) by mouth nightly as needed for sleep (Patient taking differently: Take 10 mg by mouth nightly as needed for sleep ) 90 tablet 0     albuterol (PROAIR HFA/PROVENTIL HFA/VENTOLIN HFA) 108 (90 Base) MCG/ACT inhaler Inhale 2 puffs into the lungs every 4 hours as needed for shortness of breath / dyspnea or wheezing (Patient not taking: Reported on 5/8/2019) 8.5 g 1         ALLERGIES: Nsaids       PHYSICAL EXAMINATION:   On physical examination the patient is comfortable and is in no acute distress. The affect is appropriate and breathing is non-labored.    Surgical wound: The surgical wound was exposed and found to be clean and dry. There is mild edema around the incision. There is no erythema. The skin was appropriately warm to touch.     Gait is antalgic with notable weakness of the right gluteal muscles.    ASSESSMENT/PLAN:  Stu Meredith is a 63 year old male status post right total hip arthroplasty with Dr. Torres.  He is progressing well and his pain continues to improve.  Patient still continues to have notable weakness in the right gluteal muscles that is impairing progress with gait.     Basic wound cares were performed at today's visit. We spent time discussing future wound/incision cares.    We also spent time discussing the patient's current  pain regimen.  It is encouraging that he is slowly tapering from his medications.  At this time, he is using 2 to 3 tablets of hydrocodone daily which is appropriate given that he is only 2 weeks after surgery.  Additional refill will be provided to him for 20 tablets of hydrocodone.  We discussed strategies to continue tapering over the next several weeks.  He understands and agrees with this plan.    Stu continues to ambulate with impaired gait, likely due to weakness of his right gluteal muscles.  I encouraged him to meet with physical therapist on Tuesday and perform exercises targeting core and gluteal muscles. He will also focus on gait training.    The patient will follow up with Dr. Torres at 6 weeks postop.  At this appointment, Stu will have new AP and lateral x-rays obtained.  He has a clinic number and will call with any questions or concerns.      Nelson Perez PA-C  Orthopaedic Surgery

## 2019-06-29 DIAGNOSIS — F13.20 SEDATIVE, HYPNOTIC OR ANXIOLYTIC DEPENDENCE (H): ICD-10-CM

## 2019-07-01 RX ORDER — ZOLPIDEM TARTRATE 10 MG/1
5-10 TABLET ORAL
Qty: 90 TABLET | Refills: 0
Start: 2019-07-01 | End: 2019-09-14

## 2019-07-02 ENCOUNTER — TELEPHONE (OUTPATIENT)
Dept: INTERNAL MEDICINE | Facility: CLINIC | Age: 64
End: 2019-07-02

## 2019-07-02 NOTE — TELEPHONE ENCOUNTER
[facility]    Phone Message    May a detailed message be left on voicemail: yes    Reason for Call: Medication Question or concern regarding medication   Prescription Clarification  Name of Medication: zolpidem (AMBIEN) 10 MG tablet   Prescribing Provider: Ngoc Harkins   Pharmacy:      [facility] - [place], [place] - [address]     What on the order needs clarification? The rx was placed but nothing was sent to pharmacy, please send to pharmacy ASAP           Action Taken: Message routed to:  [facility] ([facility] PCC

## 2019-07-09 ENCOUNTER — OFFICE VISIT (OUTPATIENT)
Dept: DERMATOLOGY | Facility: CLINIC | Age: 64
End: 2019-07-09
Payer: COMMERCIAL

## 2019-07-09 DIAGNOSIS — D23.9 DERMATOFIBROMA: ICD-10-CM

## 2019-07-09 DIAGNOSIS — D48.5 NEOPLASM OF UNCERTAIN BEHAVIOR OF SKIN: Primary | ICD-10-CM

## 2019-07-09 DIAGNOSIS — L81.4 LENTIGO: ICD-10-CM

## 2019-07-09 DIAGNOSIS — D22.5 MELANOCYTIC NEVUS OF TRUNK: ICD-10-CM

## 2019-07-09 DIAGNOSIS — L82.0 INFLAMED SEBORRHEIC KERATOSIS: ICD-10-CM

## 2019-07-09 ASSESSMENT — PAIN SCALES - GENERAL: PAINLEVEL: NO PAIN (0)

## 2019-07-09 NOTE — NURSING NOTE
Lidocaine-epinephrine 1-1:782405 % injection   2 mL once for one use, starting 7/9/2019 ending 7/9/2019,  2mL disp, R-0, injection  Injected by Marj Gamble LPN

## 2019-07-09 NOTE — PROGRESS NOTES
[facility] Dermatology Note      Dermatology Problem List:  1. NUB: biopsy pending 7/9/19 (ISK vs BCC)  2. Centrofacial rosacea w/telangectasias  3. Benign skin findings: seborrheic keratoses, dermatofibromas, solar lentigines  4. Lower cutaneous lip solar lentigines    Encounter Date: Jul 9, 2019    CC:  Chief Complaint   Patient presents with     Skin Check     No personal or family hx of skin cancer. Haroon has a few non-healing spots of concern.       History of Present Illness:  Mr. Stu Meredith is a 63 year old male who with no history of skin cancer presents for a full body skin exam with multiple spots of concern. He reports that there are spots on his left hand, right arm, left thigh, and bilateral ankles that he is concerned about. He describes them as intermittently itchy but that they never bleed or ooze. He reports that some are newer and some have been there for a while, particularly the one on his left dorsal hand. He is concerned about the color changes on his lower lip. He reports that his lips never bleed or ooze but that he has dark spots inside his mouth as well. He says that he was worked up for a genetic syndrome but that nothing ever came of it.    He has no family history of skin cancer and no personal history of skin cancer. He does ride on his motorcycle and always wears a helmet and SPF 30+ sunscreen.     Additionally, Mr. Meredith would like to discuss his rosacea. He says that it is red on his cheeks and that he never develops acne; he denies burning and pain but feels that it is worsening and getting more red. He is bothered most by the appearance. He has tried topical treatments at [facility] dermatology in the past but is unsure what they were and does not believe it was helpful.  Past Medical History:   Patient Active Problem List   Diagnosis     Tobacco use disorder     Other abnormal heart sounds     Atrial fibrillation (H)     CARDIOVASCULAR SCREENING; LDL GOAL LESS  THAN 130     Hypertrophic cardiomyopathy (H)     Advanced directives, counseling/discussion     Chest pain     Ventricular tachycardia (H)     Automatic implantable cardioverter-defibrillator in situ- [employer], dual chamber- NOT dependent     Hip arthritis     Prediabetes     Obesity (BMI 30-39.9)     Insomnia, unspecified     S/P ablation of atrial flutter     CHF (congestive heart failure) (H)     Chronic Zolpidem use for insomnia     S/P ablation of atrial fibrillation     Lung nodules     Status post hip surgery     Past Surgical History:   Procedure Laterality Date     ANESTHESIA CARDIOVERSION  4/24/2014    Procedure: ANESTHESIA CARDIOVERSION;  Surgeon: Generic Anesthesia Provider;  [facility]     ANESTHESIA CARDIOVERSION N/A 5/12/2016    Procedure: ANESTHESIA CARDIOVERSION;  Surgeon: GENERIC ANESTHESIA PROVIDER;  [facility]     ANESTHESIA CARDIOVERSION N/A 8/7/2017    Procedure: ANESTHESIA CARDIOVERSION;  Anesthesia Offsite Coverage Cardioversion @1100;  Surgeon: GENERIC ANESTHESIA PROVIDER;  [facility]     ANESTHESIA CARDIOVERSION N/A 1/3/2018    Procedure: ANESTHESIA CARDIOVERSION;  Anesthesia Cardioverion;  Surgeon: GENERIC ANESTHESIA PROVIDER;  [facility]     ANESTHESIA CARDIOVERSION N/A 5/4/2018    Procedure: ANESTHESIA CARDIOVERSION;  Anesthesia Coverage Cardioversion @1400;  Surgeon: GENERIC ANESTHESIA PROVIDER;  [facility]     ANESTHESIA CARDIOVERSION N/A 9/27/2018    Procedure: ANESTHESIA CARDIOVERSION;  Anesthesia Cardioversion @0930;  Surgeon: GENERIC ANESTHESIA PROVIDER;  [facility]     ANESTHESIA CARDIOVERSION N/A 12/20/2018    Procedure: Anesthesia Coverage Cardioversion @0830;  Surgeon: GENERIC ANESTHESIA PROVIDER;  [facility]     ARTHROPLASTY HIP  1/15/2014    Procedure: ARTHROPLASTY HIP;  Left Total Hip Arthroplasty;  Surgeon: Nelson Gaspar MD;  [facility]     ARTHROPLASTY HIP Right 6/5/2019    Procedure: Right Total Hip Arthroplasty;   Surgeon: Nleson Gaspar MD;  [facility]     CARDIAC SURGERY       COLONOSCOPY N/A 5/18/2018    Procedure: COMBINED COLONOSCOPY, SINGLE OR MULTIPLE BIOPSY/POLYPECTOMY BY BIOPSY;  COLONOSCOPY (PT HAS DEFIBRILLATOR) ;  Surgeon: Mike Nickerson MD;  Location: [facility]     H ABLATION FOCAL AFIB  12/9/11    Left atrial ablation to eliminate atrial fibrillation     IMPLANT AUTOMATIC IMPLANTABLE CARDIOVERTER DEFIBRILLATOR  7/27/12    AICD implantation     TONSILLECTOMY  1964     Social History:  Patient reports that he quit smoking about 3 years ago. His smoking use included cigarettes. He started smoking about 44 years ago. He has a 40.00 pack-year smoking history. He has never used smokeless tobacco. He reports that he drinks alcohol. He reports that he does not use drugs. He enjoys riding his motorcycle.    Family History:  Family History   Problem Relation Age of Onset     Heart Disease Mother         unknown     Obesity Mother      Gastrointestinal Disease Mother         diverticulitis     Diabetes Maternal Grandmother      Diabetes Paternal Grandmother      Cerebrovascular Disease Paternal Grandmother 94     Diabetes Paternal Grandfather      Cerebrovascular Disease Paternal Grandfather 78     Diabetes Son      C.A.D. Father         CABG age 78     Heart Disease Father         CABG x5     Diabetes Maternal Grandfather      LUNG DISEASE No family hx of      Deep Vein Thrombosis (DVT) No family hx of      Anesthesia Reaction No family hx of      Melanoma No family hx of      Skin Cancer No family hx of      Medications:  Current Outpatient Medications   Medication Sig Dispense Refill     acetaminophen (TYLENOL) 325 MG tablet Take 325-650 mg by mouth every 6 hours as needed       amoxicillin (AMOXIL) 500 MG tablet Take 4 tablets (2000 mg) by mouth 1 hour before dental procedures. 12 tablet 3     atorvastatin (LIPITOR) 20 MG tablet Take 1 tablet (20 mg) by mouth daily (Patient taking differently: Take 20 mg by  mouth every evening ) 90 tablet 3     cyanocobalamin (VITAMIN B-12) 100 MCG tablet Take 100 mcg by mouth daily       HYDROcodone-acetaminophen (NORCO) 5-325 MG tablet Take 1 tablet by mouth every 8 hours as needed for severe pain 20 tablet 0     HYDROcodone-acetaminophen (NORCO) 5-325 MG tablet Take 1-2 tablets by mouth every 4 hours as needed for severe pain 40 tablet 0     metFORMIN (GLUCOPHAGE-XR) 500 MG 24 hr tablet Take 1 tablet (500 mg) by mouth daily (with dinner) for 7 days, THEN 2 tablets (1,000 mg) daily (with dinner). (Patient taking differently: Take 2 tablets (1,000 mg) daily (with dinner).) 180 tablet 3     metoprolol succinate (TOPROL-XL) 50 MG 24 hr tablet Take 1 tablet (50 mg) by mouth daily 90 tablet 3     multivitamin, therapeutic (THERA-VIT) TABS Take 1 tablet by mouth daily       senna-docusate (SENOKOT-S/PERICOLACE) 8.6-50 MG tablet Take 1 tablet by mouth 2 times daily as needed for constipation 100 tablet 1     sotalol (BETAPACE) 120 MG tablet Take 1 tablet (120 mg) by mouth 2 times daily 180 tablet 3     spironolactone (ALDACTONE) 50 MG tablet Take 1 tablet (50 mg) by mouth daily 90 tablet 3     XARELTO 20 MG TABS tablet Take 1 tablet (20 mg) by mouth daily (with dinner) 90 tablet 3     zolpidem (AMBIEN) 10 MG tablet Take 0.5-1 tablets (5-10 mg) by mouth nightly as needed for sleep 90 tablet 0     albuterol (PROAIR HFA/PROVENTIL HFA/VENTOLIN HFA) 108 (90 Base) MCG/ACT inhaler Inhale 2 puffs into the lungs every 4 hours as needed for shortness of breath / dyspnea or wheezing (Patient not taking: Reported on 5/8/2019) 8.5 g 1     Allergies   Allergen Reactions     Nsaids      Chronic anticoagulation     Review of Systems:  -As per HPI  -Constitutional: Otherwise feeling well today, in usual state of health.  -HEENT: Patient denies nonhealing oral sores.  -Skin: As above in HPI. No additional skin concerns.    Physical exam:  Vitals: There were no vitals taken for this visit.  GEN: This is a  well developed, well-nourished male in no acute distress, in a pleasant mood.    SKIN: Total skin excluding the undergarment areas was performed. The exam included the head/face, neck, both arms, chest, back, abdomen, both legs, digits and/or nails.     -Scatted greasy tan and flesh-colored plaques on trunk and arms    -Linear thick skin colored plaque with secondary linear excoriations on left dorsal thenar hand    -2 pink papules with dimple sign on left posterior arm and left thigh    -Central patchy teleangiectasias with background erythema and no pustules on bilateral cheeks; 1-2mm macule under R medial canthus with surrounding telangectasia    -Pinpoint white pustule with surrounding erythema under L medial canthus    -4-5mm pink papule with inferior arborizing telangectasias on inferior border on central chest    -Multiple brown pigmented macules on lower cutaneous lip and upper hard palate    -No other lesions of concern on areas examined.     Impression/Plan:  1. Neoplasm of uncertain behavior on the central chest. The differential diagnosis includes BCC and irritated SK.    Shave biopsy(performed with medical student participation): After discussion of benefits and risks including but not limited to bleeding, infection, scar, incomplete removal, recurrence, and non-diagnostic biopsy, written consent and photographs were obtained. Time-out was performed. The area was cleaned with isopropyl alcohol. 2mL of 1% lidocaine with 1:100,000 epinephrine was injected to obtain adequate anesthesia of the lesion on the central chest. A shave biopsy was performed. Hemostasis was achieved with aluminium chloride. Vaseline and a sterile dressing were applied. The patient tolerated the procedure and no complications were noted. The patient was provided with verbal and written post care instructions.     ABCDs of melanoma were discussed and self skin checks were advised.     Sun precaution was advised including the use of  sun screens of SPF 30 or higher, sun protective clothing, and avoidance of tanning beds.    2. Rosacea (centrofacial erythema & telangectasia), moderate    Benign nature was discussed. We discussed that topical treatments are often not very beneficial for this subtype of rosacea.     Given the lack of burning and flushing and solely cosmetic concern, discussed PDL as possible option. Patient provided information and cost, will consider and may set up cosmetic visit.    3. Benign skin findings: dermatofibromas-L posterior arm, L thigh; multiple seborrheic keratoses; irritated seborrheic keratosis on left dorsal hand; solar lentigines on cutaneous lip    Cryotherapy procedure note (performed by faculty): After verbal consent and discussion of risks and benefits including but no limited to dyspigmentation/scar, blister, infection, recurrence,1 lesion (irritated SK) wad treated with 1-2mm freeze border for 2 cycles with liquid nitrogen. Post cryotherapy instructions were provided.     Discussed that lesions on lower cutaneous lip and roof of mouth appear pigmented (consistent with solar damage/solar lentigines). Discussed that this is a benign finding and that there is no concern for genetic condition.    CC Ngoc Harkins MD  [address] [place] [address] on close of this encounter.    Follow-up in 1 year, earlier for new or changing lesions.     Staff Involved:  I,Wayne Canas, MS4, saw and examined the patient in the presence of Dr. Rhodes.     Staff Physician:  I was present with the medical student who participated in the service and in the documentation of the note. I have verified the history and personally performed the physical exam and medical decision making. I agree with the assessment and plan of care as documented in the note.     Paco Rhodes MD  Staff Dermatologist and Dermatopathologist  , Department of Dermatology

## 2019-07-09 NOTE — LETTER
7/9/2019       RE: Stu Meredith  [address] [facility] [id number]     Dear Colleague,    Thank you for referring your patient, Stu Meredith, to the [facility] DERMATOLOGY at [facility]. Please see a copy of my visit note below.    [facility] Dermatology Note      Dermatology Problem List:  1. NUB: biopsy pending 7/9/19 (ISK vs BCC)  2. Centrofacial rosacea w/telangectasias  3. Benign skin findings: seborrheic keratoses, dermatofibromas, solar lentigines  4. Lower cutaneous lip solar lentigines    Encounter Date: Jul 9, 2019    CC:  Chief Complaint   Patient presents with     Skin Check     No personal or family hx of skin cancer. Haroon has a few non-healing spots of concern.       History of Present Illness:  Mr. Stu Meredith is a 63 year old male who with no history of skin cancer presents for a full body skin exam with multiple spots of concern. He reports that there are spots on his left hand, right arm, left thigh, and bilateral ankles that he is concerned about. He describes them as intermittently itchy but that they never bleed or ooze. He reports that some are newer and some have been there for a while, particularly the one on his left dorsal hand. He is concerned about the color changes on his lower lip. He reports that his lips never bleed or ooze but that he has dark spots inside his mouth as well. He says that he was worked up for a genetic syndrome but that nothing ever came of it.    He has no family history of skin cancer and no personal history of skin cancer. He does ride on his motorcycle and always wears a helmet and SPF 30+ sunscreen.     Additionally, Mr. Meredith would like to discuss his rosacea. He says that it is red on his cheeks and that he never develops acne; he denies burning and pain but feels that it is worsening and getting more red. He is bothered most by the appearance. He has tried topical treatments [facility] in the past but is unsure what they were and does not believe it was helpful.  Past Medical History:   Patient Active Problem List   Diagnosis     Tobacco use disorder     Other abnormal heart sounds     Atrial fibrillation (H)     CARDIOVASCULAR SCREENING; LDL GOAL LESS THAN 130     Hypertrophic cardiomyopathy (H)     Advanced directives, counseling/discussion     Chest pain     Ventricular tachycardia (H)     Automatic implantable cardioverter-defibrillator in situ- [employer], dual chamber- NOT dependent     Hip arthritis     Prediabetes     Obesity (BMI 30-39.9)     Insomnia, unspecified     S/P ablation of atrial flutter     CHF (congestive heart failure) (H)     Chronic Zolpidem use for insomnia     S/P ablation of atrial fibrillation     Lung nodules     Status post hip surgery     Past Surgical History:   Procedure Laterality Date     ANESTHESIA CARDIOVERSION  4/24/2014    Procedure: ANESTHESIA CARDIOVERSION;  Surgeon: Generic Anesthesia Provider;  [facility]     ANESTHESIA CARDIOVERSION N/A 5/12/2016    Procedure: ANESTHESIA CARDIOVERSION;  Surgeon: GENERIC ANESTHESIA PROVIDER;  [facility]     ANESTHESIA CARDIOVERSION N/A 8/7/2017    Procedure: ANESTHESIA CARDIOVERSION;  Anesthesia Offsite Coverage Cardioversion @1100;  Surgeon: GENERIC ANESTHESIA PROVIDER;  [facility]     ANESTHESIA CARDIOVERSION N/A 1/3/2018    Procedure: ANESTHESIA CARDIOVERSION;  Anesthesia Cardioverion;  Surgeon: GENERIC ANESTHESIA PROVIDER;  [facility]: [facility]     ANESTHESIA CARDIOVERSION N/A 5/4/2018    Procedure: ANESTHESIA CARDIOVERSION;  Anesthesia Coverage Cardioversion @1400;  Surgeon: GENERIC ANESTHESIA PROVIDER;  [facility]     ANESTHESIA CARDIOVERSION N/A 9/27/2018    Procedure: ANESTHESIA CARDIOVERSION;  Anesthesia Cardioversion @0930;  Surgeon: GENERIC ANESTHESIA PROVIDER;  [facility]     ANESTHESIA CARDIOVERSION N/A 12/20/2018    Procedure: Anesthesia Coverage Cardioversion @0830;   Surgeon: GENERIC ANESTHESIA PROVIDER;  [facility]: [facility]     ARTHROPLASTY HIP  1/15/2014    Procedure: ARTHROPLASTY HIP;  Left Total Hip Arthroplasty;  Surgeon: Nelson Gaspar MD;  [facility]     ARTHROPLASTY HIP Right 6/5/2019    Procedure: Right Total Hip Arthroplasty;  Surgeon: Nelson Gaspar MD;  [facility]     CARDIAC SURGERY       COLONOSCOPY N/A 5/18/2018    Procedure: COMBINED COLONOSCOPY, SINGLE OR MULTIPLE BIOPSY/POLYPECTOMY BY BIOPSY;  COLONOSCOPY (PT HAS DEFIBRILLATOR) ;  Surgeon: Mike Nickerson MD;  Location:  GI     H ABLATION FOCAL AFIB  12/9/11    Left atrial ablation to eliminate atrial fibrillation     IMPLANT AUTOMATIC IMPLANTABLE CARDIOVERTER DEFIBRILLATOR  7/27/12    AICD implantation     TONSILLECTOMY  1964     Social History:  Patient reports that he quit smoking about 3 years ago. His smoking use included cigarettes. He started smoking about 44 years ago. He has a 40.00 pack-year smoking history. He has never used smokeless tobacco. He reports that he drinks alcohol. He reports that he does not use drugs. He enjoys riding his motorcycle.    Family History:  Family History   Problem Relation Age of Onset     Heart Disease Mother         unknown     Obesity Mother      Gastrointestinal Disease Mother         diverticulitis     Diabetes Maternal Grandmother      Diabetes Paternal Grandmother      Cerebrovascular Disease Paternal Grandmother 94     Diabetes Paternal Grandfather      Cerebrovascular Disease Paternal Grandfather 78     Diabetes Son      C.A.D. Father         CABG age 78     Heart Disease Father         CABG x5     Diabetes Maternal Grandfather      LUNG DISEASE No family hx of      Deep Vein Thrombosis (DVT) No family hx of      Anesthesia Reaction No family hx of      Melanoma No family hx of      Skin Cancer No family hx of      Medications:  Current Outpatient Medications   Medication Sig Dispense Refill     acetaminophen (TYLENOL) 325 MG tablet Take 325-650  mg by mouth every 6 hours as needed       amoxicillin (AMOXIL) 500 MG tablet Take 4 tablets (2000 mg) by mouth 1 hour before dental procedures. 12 tablet 3     atorvastatin (LIPITOR) 20 MG tablet Take 1 tablet (20 mg) by mouth daily (Patient taking differently: Take 20 mg by mouth every evening ) 90 tablet 3     cyanocobalamin (VITAMIN B-12) 100 MCG tablet Take 100 mcg by mouth daily       HYDROcodone-acetaminophen (NORCO) 5-325 MG tablet Take 1 tablet by mouth every 8 hours as needed for severe pain 20 tablet 0     HYDROcodone-acetaminophen (NORCO) 5-325 MG tablet Take 1-2 tablets by mouth every 4 hours as needed for severe pain 40 tablet 0     metFORMIN (GLUCOPHAGE-XR) 500 MG 24 hr tablet Take 1 tablet (500 mg) by mouth daily (with dinner) for 7 days, THEN 2 tablets (1,000 mg) daily (with dinner). (Patient taking differently: Take 2 tablets (1,000 mg) daily (with dinner).) 180 tablet 3     metoprolol succinate (TOPROL-XL) 50 MG 24 hr tablet Take 1 tablet (50 mg) by mouth daily 90 tablet 3     multivitamin, therapeutic (THERA-VIT) TABS Take 1 tablet by mouth daily       senna-docusate (SENOKOT-S/PERICOLACE) 8.6-50 MG tablet Take 1 tablet by mouth 2 times daily as needed for constipation 100 tablet 1     sotalol (BETAPACE) 120 MG tablet Take 1 tablet (120 mg) by mouth 2 times daily 180 tablet 3     spironolactone (ALDACTONE) 50 MG tablet Take 1 tablet (50 mg) by mouth daily 90 tablet 3     XARELTO 20 MG TABS tablet Take 1 tablet (20 mg) by mouth daily (with dinner) 90 tablet 3     zolpidem (AMBIEN) 10 MG tablet Take 0.5-1 tablets (5-10 mg) by mouth nightly as needed for sleep 90 tablet 0     albuterol (PROAIR HFA/PROVENTIL HFA/VENTOLIN HFA) 108 (90 Base) MCG/ACT inhaler Inhale 2 puffs into the lungs every 4 hours as needed for shortness of breath / dyspnea or wheezing (Patient not taking: Reported on 5/8/2019) 8.5 g 1     Allergies   Allergen Reactions     Nsaids      Chronic anticoagulation     Review of  Systems:  -As per HPI  -Constitutional: Otherwise feeling well today, in usual state of health.  -HEENT: Patient denies nonhealing oral sores.  -Skin: As above in HPI. No additional skin concerns.    Physical exam:  Vitals: There were no vitals taken for this visit.  GEN: This is a well developed, well-nourished male in no acute distress, in a pleasant mood.    SKIN: Total skin excluding the undergarment areas was performed. The exam included the head/face, neck, both arms, chest, back, abdomen, both legs, digits and/or nails.     -Scatted greasy tan and flesh-colored plaques on trunk and arms    -Linear thick skin colored plaque with secondary linear excoriations on left dorsal thenar hand    -2 pink papules with dimple sign on left posterior arm and left thigh    -Central patchy teleangiectasias with background erythema and no pustules on bilateral cheeks; 1-2mm macule under R medial canthus with surrounding telangectasia    -Pinpoint white pustule with surrounding erythema under L medial canthus    -4-5mm pink papule with inferior arborizing telangectasias on inferior border on central chest    -Multiple brown pigmented macules on lower cutaneous lip and upper hard palate    -No other lesions of concern on areas examined.     Impression/Plan:  1. Neoplasm of uncertain behavior on the central chest. The differential diagnosis includes BCC and irritated SK.    Shave biopsy(performed with medical student participation): After discussion of benefits and risks including but not limited to bleeding, infection, scar, incomplete removal, recurrence, and non-diagnostic biopsy, written consent and photographs were obtained. Time-out was performed. The area was cleaned with isopropyl alcohol. 2mL of 1% lidocaine with 1:100,000 epinephrine was injected to obtain adequate anesthesia of the lesion on the central chest. A shave biopsy was performed. Hemostasis was achieved with aluminium chloride. Vaseline and a sterile  dressing were applied. The patient tolerated the procedure and no complications were noted. The patient was provided with verbal and written post care instructions.     ABCDs of melanoma were discussed and self skin checks were advised.     Sun precaution was advised including the use of sun screens of SPF 30 or higher, sun protective clothing, and avoidance of tanning beds.    2. Rosacea (centrofacial erythema & telangectasia), moderate    Benign nature was discussed. We discussed that topical treatments are often not very beneficial for this subtype of rosacea.     Given the lack of burning and flushing and solely cosmetic concern, discussed PDL as possible option. Patient provided information and cost, will consider and may set up cosmetic visit.    3. Benign skin findings: dermatofibromas-L posterior arm, L thigh; multiple seborrheic keratoses; irritated seborrheic keratosis on left dorsal hand; solar lentigines on cutaneous lip    Cryotherapy procedure note (performed by faculty): After verbal consent and discussion of risks and benefits including but no limited to dyspigmentation/scar, blister, infection, recurrence,1 lesion (irritated SK) wad treated with 1-2mm freeze border for 2 cycles with liquid nitrogen. Post cryotherapy instructions were provided.     Discussed that lesions on lower cutaneous lip and roof of mouth appear pigmented (consistent with solar damage/solar lentigines). Discussed that this is a benign finding and that there is no concern for genetic condition.    CC Ngoc Harkins MD  [address] [place] [phone] on close of this encounter.    Follow-up in 1 year, earlier for new or changing lesions.     Staff Involved:  I,Wayne Canas, MS4, saw and examined the patient in the presence of Dr. Rhodes.       Again, thank you for allowing me to participate in the care of your patient.      Sincerely,    Paco Rhodes MD

## 2019-07-09 NOTE — PATIENT INSTRUCTIONS
Wound Care After a Biopsy    What is a skin biopsy?  A skin biopsy allows the doctor to examine a very small piece of tissue under the microscope to determine the diagnosis and the best treatment for the skin condition. A local anesthetic (numbing medicine)  is injected with a very small needle into the skin area to be tested. A small piece of skin is taken from the area. Sometimes a suture (stitch) is used.     What are the risks of a skin biopsy?  I will experience scar, bleeding, swelling, pain, crusting and redness. I may experience incomplete removal or recurrence. Risks of this procedure are excessive bleeding, bruising, infection, nerve damage, numbness, thick (hypertrophic or keloidal) scar and non-diagnostic biopsy.    How should I care for my wound for the first 24 hours?    Keep the wound dry and covered for 24 hours    If it bleeds, hold direct pressure on the area for 15 minutes. If bleeding does not stop then go to the emergency room    Avoid strenuous exercise the first 1-2 days or as your doctor instructs you    How should I care for the wound after 24 hours?    After 24 hours, remove the bandage    You may bathe or shower as normal    If you had a scalp biopsy, you can shampoo as usual and can use shower water to clean the biopsy site daily    Clean the wound twice a day with gentle soap and water    Do not scrub, be gentle    Apply white petroleum/Vaseline after cleaning the wound with a cotton swab or a clean finger, and keep the site covered with a Bandaid /bandage. Bandages are not necessary with a scalp biopsy    If you are unable to cover the site with a Bandaid /bandage, re-apply ointment 2-3 times a day to keep the site moist. Moisture will help with healing    Avoid strenuous activity for first 1-2 days    Avoid lakes, rivers, pools, and oceans until the stitches are removed or the site is healed    How do I clean my wound?    Wash hands thoroughly with soap or use hand  before all  wound care    Clean the wound with gentle soap and water    Apply white petroleum/Vaseline  to wound after it is clean    Replace the Bandaid /bandage to keep the wound covered for the first few days or as instructed by your doctor    If you had a scalp biopsy, warm shower water to the area on a daily basis should suffice    What should I use to clean my wound?     Cotton-tipped applicators (Qtips )    White petroleum jelly (Vaseline ). Use a clean new container and use Q-tips to apply.    Bandaids   as needed    Gentle soap     How should I care for my wound long term?    Do not get your wound dirty    Keep up with wound care for one week or until the area is healed.  A small scab will form and fall off by itself when the area is completely healed. The area will be red and will become pink in color as it heals. Sun protection is very important for how your scar will turn out. Sunscreen with an SPF 30 or greater is recommended once the area is healed.Cryotherapy    What is it?  Use of a very cold liquid, such as liquid nitrogen, to freeze and destroy abnormal skin cells that need to be removed    What should I expect?  Tenderness and redness  A small blister that might grow and fill with dark purple blood. There may be crusting.  More than one treatment may be needed if the lesions do not go away.    How do I care for the treated area?  Gently wash the area with your hands when bathing.  Use a thin layer of Vaseline to help with healing. You may use a Band-Aid.   The area should heal within 7-10 days and may leave behind a pink or lighter color.   Do not use an antibiotic or Neosporin ointment.   You may take acetaminophen (Tylenol) for pain.     Call your Doctor if you have:  Severe pain  Signs of infection (warmth, redness, cloudy yellow drainage, and or a bad smell)  Questions or concerns    Who should I call with questions?       [facility]: [phone]       [facility]  [facility]: [phone]         For urgent needs outside of business hours call the [facility] at [phone]        and ask for the dermatology resident on call        You should have some soreness but it should be mild and slowly go away over several days. Talk to your doctor about using tylenol for pain,    When should I call my doctor?  If you have increased:     Pain or swelling    Pus or drainage (clear or slightly yellow drainage is ok)    Temperature over 100F    Spreading redness or warmth around wound    When will I hear about my results?  The biopsy results can take 2-3 weeks to come back. The clinic will call you with the results, send you a [employer] message, or have you schedule a follow-up clinic or phone time to discuss the results. Contact our clinics if you do not hear from us in 3 weeks.     Who should I call with questions?    [facility]: [phone]     [facility]: [phone]    For urgent needs outside of business hours call the [facility] at [phone] and ask for the dermatology resident on call

## 2019-07-09 NOTE — NURSING NOTE
Dermatology Rooming Note    Stu Meredith's goals for this visit include:   Chief Complaint   Patient presents with     Skin Check     No personal or family hx of skin cancer. Haroon has a few non-healing spots of concern.     Lisset Thakkar, CMA

## 2019-07-10 ENCOUNTER — OFFICE VISIT (OUTPATIENT)
Dept: PULMONOLOGY | Facility: CLINIC | Age: 64
End: 2019-07-10
Attending: INTERNAL MEDICINE
Payer: COMMERCIAL

## 2019-07-10 VITALS
WEIGHT: 211.1 LBS | HEIGHT: 71 IN | DIASTOLIC BLOOD PRESSURE: 79 MMHG | SYSTOLIC BLOOD PRESSURE: 134 MMHG | BODY MASS INDEX: 29.55 KG/M2 | TEMPERATURE: 97.4 F | OXYGEN SATURATION: 97 % | RESPIRATION RATE: 16 BRPM | HEART RATE: 89 BPM

## 2019-07-10 DIAGNOSIS — R91.8 LUNG NODULES: Primary | ICD-10-CM

## 2019-07-10 LAB — COPATH REPORT: NORMAL

## 2019-07-10 PROCEDURE — G0463 HOSPITAL OUTPT CLINIC VISIT: HCPCS | Mod: ZF

## 2019-07-10 ASSESSMENT — MIFFLIN-ST. JEOR: SCORE: 1769.41

## 2019-07-10 ASSESSMENT — PAIN SCALES - GENERAL: PAINLEVEL: MODERATE PAIN (4)

## 2019-07-10 NOTE — PROGRESS NOTES
[facility] Follow Up Visit    Reason for Visit  Stu Meredith is a 63 year old male who is followed for abnormal lung cancer screening exam  Pulmonary HPI    He had hip surgery and still with pain but recovering. Reports fatigue and shortness of breath he is pretty sure it's due to his heart disease.     Other active medical problems include:   - atrial fibrillation s/p ablations    - hypertrophic cardiomyopathy    Exposure history: Denies asbestos or radon exposure   TB risk factors: No  Prior Imaging:Yes  Constitutional Symptoms: No  Personal history of cancer:No  Up to date on age-appropriate cancer screening:Yes    ROS Pulmonary  Dyspnea: No, Cough: Yes, Chest pain: No, Wheezing: No, Sputum Production: No, Hemoptysis: No  A complete ROS was otherwise negative except as noted in the HPI.  The patient was seen and examined by Andreia Julien MD   Current Outpatient Medications   Medication     acetaminophen (TYLENOL) 325 MG tablet     albuterol (PROAIR HFA/PROVENTIL HFA/VENTOLIN HFA) 108 (90 Base) MCG/ACT inhaler     atorvastatin (LIPITOR) 20 MG tablet     metFORMIN (GLUCOPHAGE-XR) 500 MG 24 hr tablet     metoprolol succinate (TOPROL-XL) 50 MG 24 hr tablet     multivitamin, therapeutic (THERA-VIT) TABS     sotalol (BETAPACE) 120 MG tablet     spironolactone (ALDACTONE) 50 MG tablet     XARELTO 20 MG TABS tablet     zolpidem (AMBIEN) 10 MG tablet       Allergies   Allergen Reactions     Nsaids      Chronic anticoagulation     Social History     Socioeconomic History     Marital status:      Spouse name: Not on file     Number of children: Not on file     Years of education: Not on file     Highest education level: Not on file   Occupational History     Occupation:      Employer: [employer]   Social Needs     Financial resource strain: Not on file     Food insecurity:     Worry: Not on file     Inability: Not on file     Transportation needs:      Medical: Not on file     Non-medical: Not on file   Tobacco Use     Smoking status: Former Smoker     Packs/day: 1.00     Years: 40.00     Pack years: 40.00     Types: Cigarettes     Start date: 1/1/1975     Last attempt to quit: 12/11/2015     Years since quitting: 3.5     Smokeless tobacco: Never Used   Substance and Sexual Activity     Alcohol use: Yes     Alcohol/week: 0.0 oz     Comment: 1 drink per week     Drug use: No     Sexual activity: Yes     Partners: Female   Lifestyle     Physical activity:     Days per week: Not on file     Minutes per session: Not on file     Stress: Not on file   Relationships     Social connections:     Talks on phone: Not on file     Gets together: Not on file     Attends Mandaen service: Not on file     Active member of club or organization: Not on file     Attends meetings of clubs or organizations: Not on file     Relationship status: Not on file     Intimate partner violence:     Fear of current or ex partner: Not on file     Emotionally abused: Not on file     Physically abused: Not on file     Forced sexual activity: Not on file   Other Topics Concern      Service Not Asked     Blood Transfusions Not Asked     Caffeine Concern Not Asked     Occupational Exposure Not Asked     Hobby Hazards Not Asked     Sleep Concern Not Asked     Stress Concern Not Asked     Weight Concern Not Asked     Special Diet Not Asked     Back Care Not Asked     Exercise No     Bike Helmet Not Asked     Seat Belt Yes     Self-Exams Not Asked     Parent/sibling w/ CABG, MI or angioplasty before 65F 55M? No   Social History Narrative     Not on file     Past Medical History:   Diagnosis Date     Atrial fibrillation (H) 12/9/11    failed medication, multiple DC cardioveresions; s/p Left atrial ablation to eliminate atrial fibrillation 12/9/11     Chest pain      CHF (congestive heart failure) (H) 7/30/2016     Coronary artery disease      DJD (degenerative joint disease)      Hip  arthritis 1/15/2014     Hypertension      Hypertrophic cardiomyopathy (H) 10/09     Other abnormal heart sounds      Pacemaker     ICD     Palpitations      Pneumonia, organism unspecified(486)      Prediabetes 7/10/15    A1C 6.5     Status post implantation of automatic cardioverter/defibrillator (AICD)      Ventricular tachycardia (H)      Past Surgical History:   Procedure Laterality Date     ANESTHESIA CARDIOVERSION  4/24/2014    Procedure: ANESTHESIA CARDIOVERSION;  Surgeon: Generic Anesthesia Provider;  [facility]     ANESTHESIA CARDIOVERSION N/A 5/12/2016    Procedure: ANESTHESIA CARDIOVERSION;  Surgeon: GENERIC ANESTHESIA PROVIDER;  [facility]     ANESTHESIA CARDIOVERSION N/A 8/7/2017    Procedure: ANESTHESIA CARDIOVERSION;  Anesthesia Offsite Coverage Cardioversion @1100;  Surgeon: GENERIC ANESTHESIA PROVIDER;  [facility]     ANESTHESIA CARDIOVERSION N/A 1/3/2018    Procedure: ANESTHESIA CARDIOVERSION;  Anesthesia Cardioverion;  Surgeon: GENERIC ANESTHESIA PROVIDER;  [facility]     ANESTHESIA CARDIOVERSION N/A 5/4/2018    Procedure: ANESTHESIA CARDIOVERSION;  Anesthesia Coverage Cardioversion @1400;  Surgeon: GENERIC ANESTHESIA PROVIDER;  [facility]     ANESTHESIA CARDIOVERSION N/A 9/27/2018    Procedure: ANESTHESIA CARDIOVERSION;  Anesthesia Cardioversion @0930;  Surgeon: GENERIC ANESTHESIA PROVIDER;  [facility]     ANESTHESIA CARDIOVERSION N/A 12/20/2018    Procedure: Anesthesia Coverage Cardioversion @0830;  Surgeon: GENERIC ANESTHESIA PROVIDER;  [facility]     ARTHROPLASTY HIP  1/15/2014    Procedure: ARTHROPLASTY HIP;  Left Total Hip Arthroplasty;  Surgeon: Nelson Gaspar MD;  [facility]     ARTHROPLASTY HIP Right 6/5/2019    Procedure: Right Total Hip Arthroplasty;  Surgeon: Nelson Gaspar MD;  [facility]     CARDIAC SURGERY       COLONOSCOPY N/A 5/18/2018    Procedure: COMBINED COLONOSCOPY, SINGLE OR MULTIPLE BIOPSY/POLYPECTOMY BY BIOPSY;   "COLONOSCOPY (PT HAS DEFIBRILLATOR) ;  Surgeon: Mike Nickerson MD;  Location: [facility]     H ABLATION FOCAL AFIB  12/9/11    Left atrial ablation to eliminate atrial fibrillation     IMPLANT AUTOMATIC IMPLANTABLE CARDIOVERTER DEFIBRILLATOR  7/27/12    AICD implantation     TONSILLECTOMY  1964     Family History   Problem Relation Age of Onset     Heart Disease Mother         unknown     Obesity Mother      Gastrointestinal Disease Mother         diverticulitis     Diabetes Maternal Grandmother      Diabetes Paternal Grandmother      Cerebrovascular Disease Paternal Grandmother 94     Diabetes Paternal Grandfather      Cerebrovascular Disease Paternal Grandfather 78     Diabetes Son      C.A.D. Father         CABG age 78     Heart Disease Father         CABG x5     Diabetes Maternal Grandfather      LUNG DISEASE No family hx of      Deep Vein Thrombosis (DVT) No family hx of      Anesthesia Reaction No family hx of      Melanoma No family hx of      Skin Cancer No family hx of      Exam:   /79 (BP Location: Right arm, Patient Position: Sitting, Cuff Size: Adult Regular)   Pulse 89   Temp 97.4  F (36.3  C) (Oral)   Resp 16   Ht 1.803 m (5' 10.98\")   Wt 95.8 kg (211 lb 1.6 oz)   SpO2 97%   BMI 29.46 kg/m    GENERAL APPEARANCE: Well developed, well nourished, alert, and in no apparent distress.  EYES: PERRL, EOMI  HENT: Nasal mucosa with no edema and no hyperemia. No nasal polyps.  EARS: Canals clear, TMs normal  MOUTH: Oral mucosa is moist, without any lesions, no tonsillar enlargement, no oropharyngeal exudate.  NECK: supple, no masses, no thyromegaly.  LYMPHATICS: No significant axillary, cervical, or supraclavicular nodes.  RESP: normal percussion, good air flow throughout.  No crackles. No rhonchi. No wheezes.  CV: Normal S1, S2, regular rhythm, normal rate. No murmur.  No rub. No gallop. No LE edema.   ABDOMEN:  Bowel sounds normal, soft, nontender, no HSM or masses.   MS: extremities normal. No " clubbing. No cyanosis.  SKIN: no rash on limited exam  NEURO: Mentation intact, speech normal, normal strength and tone, normal gait and stance  PSYCH: mentation appears normal. and affect normal/bright  Results:  - My interpretation of the images relevant for this visit includes: CT 5/28/19 compared to lung cancer screening CT 3/22/19 stable nodules.    - My interpretation of the PFT's relevant for this visit includes: Normal in 2013      Assessment and plan: Haroon is a 63-year-old male being evaluated for abnormal lung cancer screening CT.  Lung nodule - seen on screening CT, unfortunately he was just getting over bronchitis and received antibiotic therapy about 10 days prior to the CT.   Continue annual CT scan for lung cancer screening (avoid screening if acutely symptomatic)

## 2019-07-10 NOTE — LETTER
7/10/2019       RE: Stu Meredith  [address]  [place] [address]     Dear Colleague,    Thank you for referring your patient, Stu Meredith, to the [facility] at [facility]. Please see a copy of my visit note below.    [facility] Follow Up Visit    Reason for Visit  Stu Meredith is a 63 year old male who is followed for abnormal lung cancer screening exam  Pulmonary HPI    He had hip surgery and still with pain but recovering. Reports fatigue and shortness of breath he is pretty sure it's due to his heart disease.     Other active medical problems include:   - atrial fibrillation s/p ablations    - hypertrophic cardiomyopathy    Exposure history: Denies asbestos or radon exposure   TB risk factors: No  Prior Imaging:Yes  Constitutional Symptoms: No  Personal history of cancer:No  Up to date on age-appropriate cancer screening:Yes    ROS Pulmonary  Dyspnea: No, Cough: Yes, Chest pain: No, Wheezing: No, Sputum Production: No, Hemoptysis: No  A complete ROS was otherwise negative except as noted in the HPI.  The patient was seen and examined by Andreia Julien MD   Current Outpatient Medications   Medication     acetaminophen (TYLENOL) 325 MG tablet     albuterol (PROAIR HFA/PROVENTIL HFA/VENTOLIN HFA) 108 (90 Base) MCG/ACT inhaler     atorvastatin (LIPITOR) 20 MG tablet     metFORMIN (GLUCOPHAGE-XR) 500 MG 24 hr tablet     metoprolol succinate (TOPROL-XL) 50 MG 24 hr tablet     multivitamin, therapeutic (THERA-VIT) TABS     sotalol (BETAPACE) 120 MG tablet     spironolactone (ALDACTONE) 50 MG tablet     XARELTO 20 MG TABS tablet     zolpidem (AMBIEN) 10 MG tablet       Allergies   Allergen Reactions     Nsaids      Chronic anticoagulation     Social History     Socioeconomic History     Marital status:      Spouse name: Not on file     Number of children: Not on file     Years of  education: Not on file     Highest education level: Not on file   Occupational History     Occupation:      Employer: [employer]   Social Needs     Financial resource strain: Not on file     Food insecurity:     Worry: Not on file     Inability: Not on file     Transportation needs:     Medical: Not on file     Non-medical: Not on file   Tobacco Use     Smoking status: Former Smoker     Packs/day: 1.00     Years: 40.00     Pack years: 40.00     Types: Cigarettes     Start date: 1/1/1975     Last attempt to quit: 12/11/2015     Years since quitting: 3.5     Smokeless tobacco: Never Used   Substance and Sexual Activity     Alcohol use: Yes     Alcohol/week: 0.0 oz     Comment: 1 drink per week     Drug use: No     Sexual activity: Yes     Partners: Female   Lifestyle     Physical activity:     Days per week: Not on file     Minutes per session: Not on file     Stress: Not on file   Relationships     Social connections:     Talks on phone: Not on file     Gets together: Not on file     Attends Synagogue service: Not on file     Active member of club or organization: Not on file     Attends meetings of clubs or organizations: Not on file     Relationship status: Not on file     Intimate partner violence:     Fear of current or ex partner: Not on file     Emotionally abused: Not on file     Physically abused: Not on file     Forced sexual activity: Not on file   Other Topics Concern      Service Not Asked     Blood Transfusions Not Asked     Caffeine Concern Not Asked     Occupational Exposure Not Asked     Hobby Hazards Not Asked     Sleep Concern Not Asked     Stress Concern Not Asked     Weight Concern Not Asked     Special Diet Not Asked     Back Care Not Asked     Exercise No     Bike Helmet Not Asked     Seat Belt Yes     Self-Exams Not Asked     Parent/sibling w/ CABG, MI or angioplasty before 65F 55M? No   Social History Narrative     Not on file     Past Medical History:   Diagnosis  Date     Atrial fibrillation (H) 12/9/11    failed medication, multiple DC cardioveresions; s/p Left atrial ablation to eliminate atrial fibrillation 12/9/11     Chest pain      CHF (congestive heart failure) (H) 7/30/2016     Coronary artery disease      DJD (degenerative joint disease)      Hip arthritis 1/15/2014     Hypertension      Hypertrophic cardiomyopathy (H) 10/09     Other abnormal heart sounds      Pacemaker     ICD     Palpitations      Pneumonia, organism unspecified(486)      Prediabetes 7/10/15    A1C 6.5     Status post implantation of automatic cardioverter/defibrillator (AICD)      Ventricular tachycardia (H)      Past Surgical History:   Procedure Laterality Date     ANESTHESIA CARDIOVERSION  4/24/2014    Procedure: ANESTHESIA CARDIOVERSION;  Surgeon: Generic Anesthesia Provider;  [facility]     ANESTHESIA CARDIOVERSION N/A 5/12/2016    Procedure: ANESTHESIA CARDIOVERSION;  Surgeon: GENERIC ANESTHESIA PROVIDER;  [facility]     ANESTHESIA CARDIOVERSION N/A 8/7/2017    Procedure: ANESTHESIA CARDIOVERSION;  Anesthesia Offsite Coverage Cardioversion @1100;  Surgeon: GENERIC ANESTHESIA PROVIDER;  [facility]     ANESTHESIA CARDIOVERSION N/A 1/3/2018    Procedure: ANESTHESIA CARDIOVERSION;  Anesthesia Cardioverion;  Surgeon: GENERIC ANESTHESIA PROVIDER;  [facility]     ANESTHESIA CARDIOVERSION N/A 5/4/2018    Procedure: ANESTHESIA CARDIOVERSION;  Anesthesia Coverage Cardioversion @1400;  Surgeon: GENERIC ANESTHESIA PROVIDER;  [facility]     ANESTHESIA CARDIOVERSION N/A 9/27/2018    Procedure: ANESTHESIA CARDIOVERSION;  Anesthesia Cardioversion @0930;  Surgeon: GENERIC ANESTHESIA PROVIDER;  [facility]     ANESTHESIA CARDIOVERSION N/A 12/20/2018    Procedure: Anesthesia Coverage Cardioversion @0830;  Surgeon: GENERIC ANESTHESIA PROVIDER;  [facility]     ARTHROPLASTY HIP  1/15/2014    Procedure: ARTHROPLASTY HIP;  Left Total Hip Arthroplasty;  Surgeon: Nelson Gaspar  "MD;  [facility]     ARTHROPLASTY HIP Right 6/5/2019    Procedure: Right Total Hip Arthroplasty;  Surgeon: Nelson Gaspar MD;  [facility]     CARDIAC SURGERY       COLONOSCOPY N/A 5/18/2018    Procedure: COMBINED COLONOSCOPY, SINGLE OR MULTIPLE BIOPSY/POLYPECTOMY BY BIOPSY;  COLONOSCOPY (PT HAS DEFIBRILLATOR) ;  Surgeon: Mike Nickerson MD;  Location[facility]     H ABLATION FOCAL AFIB  12/9/11    Left atrial ablation to eliminate atrial fibrillation     IMPLANT AUTOMATIC IMPLANTABLE CARDIOVERTER DEFIBRILLATOR  7/27/12    AICD implantation     TONSILLECTOMY  1964     Family History   Problem Relation Age of Onset     Heart Disease Mother         unknown     Obesity Mother      Gastrointestinal Disease Mother         diverticulitis     Diabetes Maternal Grandmother      Diabetes Paternal Grandmother      Cerebrovascular Disease Paternal Grandmother 94     Diabetes Paternal Grandfather      Cerebrovascular Disease Paternal Grandfather 78     Diabetes Son      C.A.D. Father         CABG age 78     Heart Disease Father         CABG x5     Diabetes Maternal Grandfather      LUNG DISEASE No family hx of      Deep Vein Thrombosis (DVT) No family hx of      Anesthesia Reaction No family hx of      Melanoma No family hx of      Skin Cancer No family hx of      Exam:   /79 (BP Location: Right arm, Patient Position: Sitting, Cuff Size: Adult Regular)   Pulse 89   Temp 97.4  F (36.3  C) (Oral)   Resp 16   Ht 1.803 m (5' 10.98\")   Wt 95.8 kg (211 lb 1.6 oz)   SpO2 97%   BMI 29.46 kg/m     GENERAL APPEARANCE: Well developed, well nourished, alert, and in no apparent distress.  EYES: PERRL, EOMI  HENT: Nasal mucosa with no edema and no hyperemia. No nasal polyps.  EARS: Canals clear, TMs normal  MOUTH: Oral mucosa is moist, without any lesions, no tonsillar enlargement, no oropharyngeal exudate.  NECK: supple, no masses, no thyromegaly.  LYMPHATICS: No significant axillary, cervical, or supraclavicular " nodes.  RESP: normal percussion, good air flow throughout.  No crackles. No rhonchi. No wheezes.  CV: Normal S1, S2, regular rhythm, normal rate. No murmur.  No rub. No gallop. No LE edema.   ABDOMEN:  Bowel sounds normal, soft, nontender, no HSM or masses.   MS: extremities normal. No clubbing. No cyanosis.  SKIN: no rash on limited exam  NEURO: Mentation intact, speech normal, normal strength and tone, normal gait and stance  PSYCH: mentation appears normal. and affect normal/bright  Results:  - My interpretation of the images relevant for this visit includes: CT 5/28/19 compared to lung cancer screening CT 3/22/19 stable nodules.    - My interpretation of the PFT's relevant for this visit includes: Normal in 2013      Assessment and plan: Haroon is a 63-year-old male being evaluated for abnormal lung cancer screening CT.  Lung nodule - seen on screening CT, unfortunately he was just getting over bronchitis and received antibiotic therapy about 10 days prior to the CT.   Continue annual CT scan for lung cancer screening (avoid screening if acutely symptomatic)    Again, thank you for allowing me to participate in the care of your patient.      Sincerely,    Andreia Julien MD

## 2019-07-10 NOTE — NURSING NOTE
"Oncology Rooming Note    July 10, 2019 4:24 PM   Stu Meredith is a 63 year old male who presents for:    Chief Complaint   Patient presents with     RECHECK     Lung nodules     Initial Vitals: /79 (BP Location: Right arm, Patient Position: Sitting, Cuff Size: Adult Regular)   Pulse 89   Temp 97.4  F (36.3  C) (Oral)   Resp 16   Ht 1.803 m (5' 10.98\")   Wt 95.8 kg (211 lb 1.6 oz)   SpO2 97%   BMI 29.46 kg/m   Estimated body mass index is 29.46 kg/m  as calculated from the following:    Height as of this encounter: 1.803 m (5' 10.98\").    Weight as of this encounter: 95.8 kg (211 lb 1.6 oz). Body surface area is 2.19 meters squared.  Moderate Pain (4) Comment: Data Unavailable   No LMP for male patient.  Allergies reviewed: Yes  Medications reviewed: Yes    Medications: Medication refills not needed today.  Pharmacy name entered into EPIC:    [facility] - [place], [place] - [address] [id number] - [place], [place] - [address] [facility] [address]  [facility] - [place], [place] - [address]  [facility] - [place], [place] - [address] [facility] [address]    Clinical concerns: none        Kera Adonis, TATY              "

## 2019-07-12 ENCOUNTER — OFFICE VISIT (OUTPATIENT)
Dept: CARDIOLOGY | Facility: CLINIC | Age: 64
End: 2019-07-12
Attending: INTERNAL MEDICINE
Payer: COMMERCIAL

## 2019-07-12 ENCOUNTER — ANCILLARY PROCEDURE (OUTPATIENT)
Dept: CARDIOLOGY | Facility: CLINIC | Age: 64
End: 2019-07-12
Payer: COMMERCIAL

## 2019-07-12 VITALS
SYSTOLIC BLOOD PRESSURE: 123 MMHG | HEIGHT: 71 IN | HEART RATE: 76 BPM | BODY MASS INDEX: 29.4 KG/M2 | DIASTOLIC BLOOD PRESSURE: 86 MMHG | OXYGEN SATURATION: 97 % | WEIGHT: 210 LBS

## 2019-07-12 VITALS
BODY MASS INDEX: 29.4 KG/M2 | HEIGHT: 71 IN | SYSTOLIC BLOOD PRESSURE: 123 MMHG | OXYGEN SATURATION: 97 % | DIASTOLIC BLOOD PRESSURE: 86 MMHG | HEART RATE: 76 BPM | WEIGHT: 210 LBS

## 2019-07-12 DIAGNOSIS — I48.91 ATRIAL FIBRILLATION, UNSPECIFIED TYPE (H): ICD-10-CM

## 2019-07-12 DIAGNOSIS — I42.2 HYPERTROPHIC CARDIOMYOPATHY (H): ICD-10-CM

## 2019-07-12 DIAGNOSIS — I48.19 PERSISTENT ATRIAL FIBRILLATION (H): Primary | ICD-10-CM

## 2019-07-12 PROCEDURE — G0463 HOSPITAL OUTPT CLINIC VISIT: HCPCS | Mod: 25,ZF

## 2019-07-12 PROCEDURE — 93010 ELECTROCARDIOGRAM REPORT: CPT | Mod: ZP | Performed by: INTERNAL MEDICINE

## 2019-07-12 PROCEDURE — 93005 ELECTROCARDIOGRAM TRACING: CPT | Mod: ZF

## 2019-07-12 PROCEDURE — 99214 OFFICE O/P EST MOD 30 MIN: CPT | Mod: GC | Performed by: INTERNAL MEDICINE

## 2019-07-12 PROCEDURE — 99215 OFFICE O/P EST HI 40 MIN: CPT | Mod: 25 | Performed by: INTERNAL MEDICINE

## 2019-07-12 ASSESSMENT — PAIN SCALES - GENERAL
PAINLEVEL: NO PAIN (0)
PAINLEVEL: NO PAIN (0)

## 2019-07-12 ASSESSMENT — MIFFLIN-ST. JEOR
SCORE: 1764.68
SCORE: 1764.68

## 2019-07-12 NOTE — PROGRESS NOTES
"HPI  Mr. Meredith is a 63 year old male with history of HCM without obstruction (dx 2006, EF 20% -->? 60%), VT s/p ICD 2012, nonobstructive CAD (cath 2013), AF s/p PVI X 3 (2011, 2016, 2018) and DCCV X 10, currently on Sotalol and Xarelto who presents to clinic for evaluation and management. Recently underwent hip surgery without event.    Has moderate to severe fatigue ever since early Spring, corresponding roughly to when he has been in atrial fibrillation. He wakes up in the morning and feels he could \"fall right back asleep.\" Any exertion or activity causes mild to moderate MART. He is able to \"keep going,\" but this is certainly different from baseline. He cannot walk more than one to two blocks without feeling significantly winded and needed to take a break. No PND nor orthopnea. No TY nor abdominal swelling. Water weight has been stable. No palpitations nor syncope. He denies any problems with his medications and reports compliance.    Past Medical History:   Diagnosis Date     Atrial fibrillation (H) 12/9/11    failed medication, multiple DC cardioveresions; s/p Left atrial ablation to eliminate atrial fibrillation 12/9/11     Chest pain      CHF (congestive heart failure) (H) 7/30/2016     Coronary artery disease      DJD (degenerative joint disease)      Hip arthritis 1/15/2014     Hypertension      Hypertrophic cardiomyopathy (H) 10/09     Other abnormal heart sounds      Pacemaker     ICD     Palpitations      Pneumonia, organism unspecified(486)      Prediabetes 7/10/15    A1C 6.5     Status post implantation of automatic cardioverter/defibrillator (AICD)      Ventricular tachycardia (H)    - HCM diagnosed '06, previously burnt out (EF 20%) now with recovered EF  - h/o VT s/p dual chamber ICD '12  - AF s/p PVI X 2 ('11, '16, '18), h/o DCCV X 10    Meds:  Current Outpatient Medications   Medication     acetaminophen (TYLENOL) 325 MG tablet     amoxicillin (AMOXIL) 500 MG tablet     atorvastatin (LIPITOR) 20 " MG tablet     cyanocobalamin (VITAMIN B-12) 100 MCG tablet     HYDROcodone-acetaminophen (NORCO) 5-325 MG tablet     HYDROcodone-acetaminophen (NORCO) 5-325 MG tablet     metFORMIN (GLUCOPHAGE-XR) 500 MG 24 hr tablet     metoprolol succinate (TOPROL-XL) 50 MG 24 hr tablet     multivitamin, therapeutic (THERA-VIT) TABS     senna-docusate (SENOKOT-S/PERICOLACE) 8.6-50 MG tablet     sotalol (BETAPACE) 120 MG tablet     spironolactone (ALDACTONE) 50 MG tablet     XARELTO 20 MG TABS tablet     zolpidem (AMBIEN) 10 MG tablet     albuterol (PROAIR HFA/PROVENTIL HFA/VENTOLIN HFA) 108 (90 Base) MCG/ACT inhaler     No current facility-administered medications for this visit.      Facility-Administered Medications Ordered in Other Visits   Medication     Give   of usual dose of LONG ACTING insulin {insulin glargine (LANTUS, TOUJEO), insulin detemir (LEVEMIR), insulin degludec (TRESIBA)}  AM of procedure IF diabetic     HOLD: Insulin - RAPID/SHORT acting {insulin aspart (NOVOLOG), insulin lispro (HUMALOG)} IF diabetic pre-procedure     HOLD: Insulin - REGULAR IF diabetic  pre-procedure     HOLD: Oral hypoglycemics (see admin instructions for list of medications)     lidocaine (LMX4) cream     lidocaine 1 % 1 mL     magnesium sulfate 2 g in water intermittent infusion     May take oral meds with a sip of water, the morning of cardioversion procedure.     potassium chloride ER (K-DUR/KLOR-CON M) CR tablet 20 mEq     potassium chloride ER (K-DUR/KLOR-CON M) CR tablet 40 mEq     sodium chloride (PF) 0.9% PF flush 3 mL     sodium chloride (PF) 0.9% PF flush 3 mL         Social History     Socioeconomic History     Marital status:      Spouse name: Not on file     Number of children: Not on file     Years of education: Not on file     Highest education level: Not on file   Occupational History     Occupation:      Employer: [employer]   Social Needs     Financial resource strain: Not on file     Food  insecurity:     Worry: Not on file     Inability: Not on file     Transportation needs:     Medical: Not on file     Non-medical: Not on file   Tobacco Use     Smoking status: Former Smoker     Packs/day: 1.00     Years: 40.00     Pack years: 40.00     Types: Cigarettes     Start date: 1/1/1975     Last attempt to quit: 12/11/2015     Years since quitting: 3.5     Smokeless tobacco: Never Used   Substance and Sexual Activity     Alcohol use: Yes     Alcohol/week: 0.0 oz     Comment: 1 drink per week     Drug use: No     Sexual activity: Yes     Partners: Female   Lifestyle     Physical activity:     Days per week: Not on file     Minutes per session: Not on file     Stress: Not on file   Relationships     Social connections:     Talks on phone: Not on file     Gets together: Not on file     Attends Jain service: Not on file     Active member of club or organization: Not on file     Attends meetings of clubs or organizations: Not on file     Relationship status: Not on file     Intimate partner violence:     Fear of current or ex partner: Not on file     Emotionally abused: Not on file     Physically abused: Not on file     Forced sexual activity: Not on file   Other Topics Concern      Service Not Asked     Blood Transfusions Not Asked     Caffeine Concern Not Asked     Occupational Exposure Not Asked     Hobby Hazards Not Asked     Sleep Concern Not Asked     Stress Concern Not Asked     Weight Concern Not Asked     Special Diet Not Asked     Back Care Not Asked     Exercise No     Bike Helmet Not Asked     Seat Belt Yes     Self-Exams Not Asked     Parent/sibling w/ CABG, MI or angioplasty before 65F 55M? No   Social History Narrative     Not on file       Family History   Problem Relation Age of Onset     Heart Disease Mother         unknown     Obesity Mother      Gastrointestinal Disease Mother         diverticulitis     Diabetes Maternal Grandmother      Diabetes Paternal Grandmother       "Cerebrovascular Disease Paternal Grandmother 94     Diabetes Paternal Grandfather      Cerebrovascular Disease Paternal Grandfather 78     Diabetes Son      C.A.D. Father         CABG age 78     Heart Disease Father         CABG x5     Diabetes Maternal Grandfather      LUNG DISEASE No family hx of      Deep Vein Thrombosis (DVT) No family hx of      Anesthesia Reaction No family hx of      Melanoma No family hx of      Skin Cancer No family hx of        ROS:   Constitutional: No fever, chills, or sweats. No weight gain/loss.   ENT: No visual disturbance, ear ache, epistaxis, sore throat.   Allergies/Immunologic: Negative.   Respiratory: No cough, hemoptysis.   Cardiovascular: As per HPI.   GI: No nausea, vomiting, hematemesis, melena, or hematochezia.   : No urinary frequency, dysuria, or hematuria.   Integument: Negative.   Psychiatric: Negative.   Neuro: Negative.   Endocrinology: Negative.   Musculoskeletal: Ongoing hip pain    /86 (BP Location: Right arm, Patient Position: Chair, Cuff Size: Adult Large)   Pulse 76   Ht 1.803 m (5' 11\")   Wt 95.3 kg (210 lb)   SpO2 97%   BMI 29.29 kg/m    General: appears comfortable, alert and articulate  Head: normocephalic, atraumatic  Eyes: anicteric sclera, EOMI  Neck: no adenopathy, 2+ carotids without bruits  Orophyarynx: moist mucosa, no lesions, dentition intact  Heart: regular, S1/S2, 2/6 systolic murmur, no gallop or rub, estimated JVP 6-7cm  Lungs: clear, no rales or wheezing  Abdomen: soft, non-tender, bowel sounds present, no hepatomegaly  Extremities: no clubbing, cyanosis or edema  Neurological: normal speech and affect, no gross motor deficits    CPX Echo 6/1/2018:  Interpretation Summary  Submaximal treadmill stress test in a patient with a diagnosis of HCM.  The Ramirez treadmill score is 8 (low risk).  Exercise was stopped due to fatigue.  Normal blood pressure response to exercise.  No ECG evidence of ischemia.  No supraventricular or ventricular " "arrhythmias. Frequent PVCs noted throughout the study.  Normal biventricular function with mild asymmetrical septal hypertrophy (12mm).  No dynamic outflow tract obstruction is present at rest or with exercise.  Normal segmental wall motion at rest and with exercise.  Minimal augmentation in LV function with exercise.  Average functional capacity for age.         CPX 2/15/2019:      Device interrogation 4/22/19  Alert received for AF burden. His presenting rhythm is AF/VS- ~70 bpm. AF has been 100% since 3/14/19. Normal device function. AP= 6% and = 3%. Lead trends appear stable. Battery estimates 4 years to CARMEN.    CTA coronary angiogram today  IMPRESSION:  1.  No evidence of coronary artery atherosclerosis or stenosis.  2.  Myocardial bridging involving the mid LAD.  3.  Total Agatston score 0 placing the patient in the lowest percentile when compared to age and gender matched control group.    Device Interrogation Today:  Patient seen in the [facility] for evaluation and iterative programming of his [employer] dual lead ICD per MD orders. Patient has an appointment to see Martin Ware and Kenneth today. Normal ICD function.  Since 1/18/19, there have been 400 NSVT, 2 other untreated episodes, and 211 AT/AF episodes recorded.  The VT-1 monitored episodes are AF w/RVR with rates in the 140s.  The 2 NSVT episodes available for review last 12 beats in duration with rates of 162-179 bpm.  AF burden = 85% since 1/18/19 and AF appear persistent since approximately mid March. Patient reports he takes Xarelto.   Intrinsic rhythm = AF @  bpm. AP = 2%.  = 5%.  Estimated battery longevity to CARMEN = 3.5years.  Lead trends appear stable. Patient reports that he has been  feeling \"tired\".  Plan for patient to send a remote transmission in 3 months and return to clinic in 6 months.  EH Dempsey RN. Dual lead ICDI have reviewed and interpreted the device interrogation, settings, programming and nurse's summary. The device " is functioning within normal device parameters. I agree with the current findings, assessment and plan.    Assessment/Plan:    Mr. Meredith is a 63 year old male with history of HCM (dx 2006, EF 20% -->? 60%), VT s/p ICD 2012, nonobstructive CAD (cath 2013), AF s/p PVI X 3 (2011, 2016, 2018) and DCCV X 10, currently on sotalol and Xarelto who presents to clinic for evaluation and management. Recently underwent hip surgery without event. He remains in atrial fibrillation since 3/2019.    # Atrial Fibrillation --multiple recurrences with h/o multiple ablations and cardioversion. Rate controlled. From a symptom perspective, unclear if functional limitation is related to AF or recent hip issues (CPX results have been similar when in AF and NSR).  - continue Xarelto  - continue Sotalol 120 BID   - continue Metoprolol 50 XL daily   - follow-up with EP on management    # Hypertrophic cardiomyopathy -- previously burnt out with EF 20% ('13), now recovered (EF 60%).  No evidence of LVOT obstruction.   - continue metoprolol XL 50mg daily   - continue aldactone  50mg daily  - pt aware of exercise restrictions and family screening recommendations    # Nonobstructive CAD -- 10-30% stenoses on cath '13  - coronary CTA confirmed no significant disease  - continue atorvastatin 20     # HTN -- controlled  - continue metoprolol XL, aldactone    Overall, he is NYHA class III with significant limitation with light activity. Will defer to EP on further attempts to restore SR which he would certainly benefit from given his filling restriction. We could offer a right heart catheterization to evaluate filling pressures to evaluate how we could further optimize his hemodynamics. Would consider Entresto as well, which has some benefit in HFpEF patients. We may need to consider work-up for transplantation (with exception), at some point, pending his symptom and efficacy of further medical adjustments. He has non-obstructive physiology, and thus  ASA / myectomy would not be of benefit. Would agree with plan, in conjunction with EP, for change in antiarrhythmic therapy in effort to restore SR with [facility] while in-patient (their plan is for Tikosyn loading).    Follow-up 3-4 months in conjunction with EP.  The patient was seen and discussed with Dr. Ware.    Abhinav Mao MD  Cardiology Fellow      I have reviewed today's vital signs, notes, medications, labs and imaging. I have also seen and examined the patient and agree with the findings and plan as outlined above.    Mona Ware MD  Section Head - Advanced Heart Failure, Transplantation and Mechanical Circulatory Support  Director - [facility]  Associate Professor of Medicine, [facility]

## 2019-07-12 NOTE — NURSING NOTE
Chief Complaint   Patient presents with     Follow Up      heart problem -- 63 yr old male with PMH significant for HCM (dx 2006), VT s/p ICD 7/2012, troponin leak Oct 2013 (angiogram with non-significant CAD, LV gram showed EF 25%, recovered to 60% on TTE Dec 2013), HTN, and AFib presenting for follow-up     Vitals were taken and medications were reconciled. EKG was performed.    Dimple Fitzpatrick CMA    10:06 AM

## 2019-07-12 NOTE — LETTER
7/12/2019      RE: Stu Meredith  [address] [facility] [id number]       Dear Colleague,    Thank you for the opportunity to participate in the care of your patient, Stu Meredith, at the [facility]. Please see a copy of my visit note below.    Electrophysiology Clinic Note  HPI:   Mr. Meredith is a 63 year old male who has a past medical history significant for HCM diagnosed 2006, VT on Holter July 2012 s/p ICD 7/2012, troponin leak Oct 2013 (angiogram with non-significant CAD, LV gram showed EF 25%, recovered to 60% on TTE Dec 2013), HTN, AFib (CHADSVASC 2) with DCCVs then s/p PVI 12/2011, PVI for persistent AF on 7/28/16, PVI/CTI/CFAE with posterior wall isolation 8/17/18, s/p DCCV 8/7/17, 1/3/18l,  5/5/18, 9/27/18, 12/20/2018, and s/p right IRISH on 6/5/2019. He presents for follow-up.  The patient underwent atrial fibrillation ablation by Dr. Gonzalez at [facility] in 12/2011 and implantation of an ICD in 07/2012 because of ventricular tachycardia. As per patient, he has been in sinus rhythm approximately 1-1/2 years after ablation. The patient was found to have recurrent atrial fibrillation during a preoperative exam in 10/2013 and underwent electrical cardioversion in 11/2013. The patient underwent hip surgery in 01/2014. The patient noticed that he was in atrial fibrillation at a clinic visit in 01/2014.   He was seen in consult by Dr Analilia spain on 2/14/14 for consideration of ablation. At the time the patient was reported to be generally unaware of whether or not he is in sinus rhythm or in atrial fibrillation. He reports daytime somnolence but denies significant dyspnea on exertion, palpitations, irregular beat sensation, presyncope or syncope. In terms of risk factors for stroke, the patient has coronary heart disease, hypertension, but denies history of diabetes, previous myocardial cardiac infarction or heart failure. The  patient is on rivaroxaban for stroke prophylaxis and is on diltiazem and metoprolol. He was told that there was no consideration of ablation.   He came to EP clinic for second opinion in 3/3/2014. The patient mentioned this time he is not sure whether his afib is causing fatigue. However, he also attributed symptoms like headache and head fullness to his afib. He also had a cold around the same time. He still denied chest pain, syncope or presyncope, ICD shocks, MART or SOB. He does however indicate this time that he does feel irregular heart beat at times, along with chronic fatigue, although is not sure if fatigue is linked to afib. We agreed at that time to pursue a cardioversion to see whether he would feel better and we started him on sotalol 80 mg twice a day. He underwent cardioversion on 4/24/2014.  He then had a device transmission showed recurrent AF in 7/2014 which spontaneously converted. We decrease his diltiazem from 240 to 120 mg daily because of fatigue. His fatigue did get better when we decrease his diltiazem.  He did have a 9 day episode of A. Fib starting June 19, 2015, during which the patient felt very tired and no energy.  The heart rate was well controlled.  A. Fib burden was 19% since 5/19/2015, but it is the first episode of A. Fib since at least November 2014.  However at follow up in 2016 AF burden 1%. At follow up in 5/2016 his AF burden= 100% since 2/28/16. He reported some increased fatgiue and decreased stamina since being back in AF. At that visit, he elected to pursue repeat PVI ablation for AF that is refractory to Sotalol. He underwent repeat PVI ablation for persistent AF refractory to AATs on 7/28/16. He had successful re-isolation of all 4 pulmonary veins. He was re-hospitalized a couple days post ablation for SOB assocaited with hypervolemia which resolved with diuresis. He reports feeling well after ablation. He states his energy levels improved with restoration of sinus.  Diltiazem was stopped after ablation.  He has now had some recurrent episodes of AT/AF requiring DCCV on 8/7/17 and 1/3/18.  Device check showed AT episode that started 1/28/18 and lasted 25 days and self terminated. He then had recurrent AT/AF and had DCCV on 5/5/18. He followed up with Dr. Ware recently in clinic and reported having fatigue, MART, and decreased stamina. Device interrogation showed he had recurred back into AT/AF since 5/26/18. He was arranged for EP follow up.     Ep Visit 6/2018: He continues to have fatigue, MART, and decreased stamina. Last stress echo from 6/1/18 showed normal biventricular function with asymmetrical septal hypertrophy and no LVOT obstruction with rest or exercise. He denies any chest pain/pressures, dizziness, lightheadedness, leg/ankle swelling, PND, orthopnea, palpitations, or syncopal symptoms.Presenting 12 lead ECG shows AFL Vent Rate 111 bpm, QRS 80 ms, QTc 489 ms. Current cardiac medications include: Spironolactone, Lipitor, Sotalol, Toprol XL, and Xarelto.     He presents today for follow up. He underwent a repeat PVI/CTI on 8/17/18. Device shows AF has been 100% since 3/14/19. He underwent right TREVIN on 6/5/19. Recovering well after procedure. He reports issues with ongoing fatigue, MART, and decreased stamina. He denies any chest pain/pressures, dizziness, lightheadedness, leg/ankle swelling, PND, orthopnea, palpitations, or syncopal symptoms. Presenting 12 lead ECG shows atypical AFL Vent Rate 73 bpm, QRS 84 ms, QTc 420 ms. Device interrogation shows normal device function. Since 1/18/19, there have been 400 NSVT, 2 other untreated episodes, and 211 AT/AF episodes recorded.  The VT-1 monitored episodes are AF w/RVR with rates in the 140s.  The 2 NSVT episodes available for review last 12 beats in duration with rates of 162-179 bpm.  AF burden = 85% since 1/18/19 and AF appear persistent since approximately mid 3/2019. Intrinsic rhythm = AF @  bpm. AP = 2%.  =  5%. Lead trends appear stable. Current cardiac medications include: Spironolactone, Lipitor, Sotalol, Toprol XL, and Xarelto.     PAST MEDICAL HISTORY:  Past Medical History:   Diagnosis Date     Atrial fibrillation (H) 12/9/11    failed medication, multiple DC cardioveresions; s/p Left atrial ablation to eliminate atrial fibrillation 12/9/11     Chest pain      CHF (congestive heart failure) (H) 7/30/2016     Coronary artery disease      DJD (degenerative joint disease)      Hip arthritis 1/15/2014     Hypertension      Hypertrophic cardiomyopathy (H) 10/09     Other abnormal heart sounds      Pacemaker     ICD     Palpitations      Pneumonia, organism unspecified(486)      Prediabetes 7/10/15    A1C 6.5     Status post implantation of automatic cardioverter/defibrillator (AICD)      Ventricular tachycardia (H)        CURRENT MEDICATIONS:  Current Outpatient Medications   Medication Sig Dispense Refill     acetaminophen (TYLENOL) 325 MG tablet Take 325-650 mg by mouth every 6 hours as needed       albuterol (PROAIR HFA/PROVENTIL HFA/VENTOLIN HFA) 108 (90 Base) MCG/ACT inhaler Inhale 2 puffs into the lungs every 4 hours as needed for shortness of breath / dyspnea or wheezing (Patient not taking: Reported on 5/8/2019) 8.5 g 1     amoxicillin (AMOXIL) 500 MG tablet Take 4 tablets (2000 mg) by mouth 1 hour before dental procedures. 12 tablet 3     atorvastatin (LIPITOR) 20 MG tablet Take 1 tablet (20 mg) by mouth daily (Patient taking differently: Take 20 mg by mouth every evening ) 90 tablet 3     cyanocobalamin (VITAMIN B-12) 100 MCG tablet Take 100 mcg by mouth daily       HYDROcodone-acetaminophen (NORCO) 5-325 MG tablet Take 1 tablet by mouth every 8 hours as needed for severe pain 20 tablet 0     HYDROcodone-acetaminophen (NORCO) 5-325 MG tablet Take 1-2 tablets by mouth every 4 hours as needed for severe pain 40 tablet 0     metFORMIN (GLUCOPHAGE-XR) 500 MG 24 hr tablet Take 1 tablet (500 mg) by mouth daily  (with dinner) for 7 days, THEN 2 tablets (1,000 mg) daily (with dinner). (Patient taking differently: Take 2 tablets (1,000 mg) daily (with dinner).) 180 tablet 3     metoprolol succinate (TOPROL-XL) 50 MG 24 hr tablet Take 1 tablet (50 mg) by mouth daily 90 tablet 3     multivitamin, therapeutic (THERA-VIT) TABS Take 1 tablet by mouth daily       senna-docusate (SENOKOT-S/PERICOLACE) 8.6-50 MG tablet Take 1 tablet by mouth 2 times daily as needed for constipation 100 tablet 1     sotalol (BETAPACE) 120 MG tablet Take 1 tablet (120 mg) by mouth 2 times daily 180 tablet 3     spironolactone (ALDACTONE) 50 MG tablet Take 1 tablet (50 mg) by mouth daily 90 tablet 3     XARELTO 20 MG TABS tablet Take 1 tablet (20 mg) by mouth daily (with dinner) 90 tablet 3     zolpidem (AMBIEN) 10 MG tablet Take 0.5-1 tablets (5-10 mg) by mouth nightly as needed for sleep 90 tablet 0       PAST SURGICAL HISTORY:  Past Surgical History:   Procedure Laterality Date     ANESTHESIA CARDIOVERSION  4/24/2014    Procedure: ANESTHESIA CARDIOVERSION;  Surgeon: Generic Anesthesia Provider;  Location:  OR     ANESTHESIA CARDIOVERSION N/A 5/12/2016    Procedure: ANESTHESIA CARDIOVERSION;  Surgeon: GENERIC ANESTHESIA PROVIDER;  Location:  OR     ANESTHESIA CARDIOVERSION N/A 8/7/2017    Procedure: ANESTHESIA CARDIOVERSION;  Anesthesia Offsite Coverage Cardioversion @1100;  Surgeon: GENERIC ANESTHESIA PROVIDER;  Location:  OR     ANESTHESIA CARDIOVERSION N/A 1/3/2018    Procedure: ANESTHESIA CARDIOVERSION;  Anesthesia Cardioverion;  Surgeon: GENERIC ANESTHESIA PROVIDER;  Location:  OR     ANESTHESIA CARDIOVERSION N/A 5/4/2018    Procedure: ANESTHESIA CARDIOVERSION;  Anesthesia Coverage Cardioversion @1400;  Surgeon: GENERIC ANESTHESIA PROVIDER;  [facility]     ANESTHESIA CARDIOVERSION N/A 9/27/2018    Procedure: ANESTHESIA CARDIOVERSION;  Anesthesia Cardioversion @0930;  Surgeon: GENERIC ANESTHESIA PROVIDER;  Location:  OR      ANESTHESIA CARDIOVERSION N/A 12/20/2018    Procedure: Anesthesia Coverage Cardioversion @0830;  Surgeon: GENERIC ANESTHESIA PROVIDER;  [facility]     ARTHROPLASTY HIP  1/15/2014    Procedure: ARTHROPLASTY HIP;  Left Total Hip Arthroplasty;  Surgeon: Nelson Gaspar MD;  Location: [facility] OR     ARTHROPLASTY HIP Right 6/5/2019    Procedure: Right Total Hip Arthroplasty;  Surgeon: Nelson Gaspar MD;  Location: [facility] OR     CARDIAC SURGERY       COLONOSCOPY N/A 5/18/2018    Procedure: COMBINED COLONOSCOPY, SINGLE OR MULTIPLE BIOPSY/POLYPECTOMY BY BIOPSY;  COLONOSCOPY (PT HAS DEFIBRILLATOR) ;  Surgeon: Mike Nickerson MD;  Location:  GI     H ABLATION FOCAL AFIB  12/9/11    Left atrial ablation to eliminate atrial fibrillation     IMPLANT AUTOMATIC IMPLANTABLE CARDIOVERTER DEFIBRILLATOR  7/27/12    AICD implantation     TONSILLECTOMY  1964       ALLERGIES:     Allergies   Allergen Reactions     Nsaids      Chronic anticoagulation       FAMILY HISTORY:  Family History   Problem Relation Age of Onset     Heart Disease Mother         unknown     Obesity Mother      Gastrointestinal Disease Mother         diverticulitis     Diabetes Maternal Grandmother      Diabetes Paternal Grandmother      Cerebrovascular Disease Paternal Grandmother 94     Diabetes Paternal Grandfather      Cerebrovascular Disease Paternal Grandfather 78     Diabetes Son      C.A.D. Father         CABG age 78     Heart Disease Father         CABG x5     Diabetes Maternal Grandfather      LUNG DISEASE No family hx of      Deep Vein Thrombosis (DVT) No family hx of      Anesthesia Reaction No family hx of      Melanoma No family hx of      Skin Cancer No family hx of        SOCIAL HISTORY:  Social History     Tobacco Use     Smoking status: Former Smoker     Packs/day: 1.00     Years: 40.00     Pack years: 40.00     Types: Cigarettes     Start date: 1/1/1975     Last attempt to quit: 12/11/2015     Years since quitting: 3.5     Smokeless tobacco:  "Never Used   Substance Use Topics     Alcohol use: Yes     Alcohol/week: 0.0 oz     Comment: 1 drink per week     Drug use: No       ROS:   A comprehensive 10 point review of systems negative other than as mentioned in HPI.  Exam:  /86 (BP Location: Right arm, Patient Position: Chair, Cuff Size: Adult Large)   Pulse 76   Ht 1.803 m (5' 11\")   Wt 95.3 kg (210 lb)   SpO2 97%   BMI 29.29 kg/m     GENERAL APPEARANCE: healthy, alert and no distress  HEENT: no icterus, no xanthelasmas, normal pupil size and reaction, normal palate, mucosa moist, no central cyanosis  NECK: supple.  RESPIRATORY: lungs clear to auscultation - no rales, rhonchi or wheezes, no use of accessory muscles, no retractions, respirations are unlabored, normal respiratory rate  CARDIOVASCULAR:irregular, no murmur, click or rub, precordium quiet with normal PMI.  ABDOMEN: soft, non tender, bowel sounds normal, non distended.   EXTREMITIES: peripheral pulses normal, no edema, no bruits  NEURO: alert and oriented to person/place/time, normal speech, gait and affect  SKIN: no ecchymoses, no rashes    Labs:  Reviewed.     Procedures:  12/15/16 ECHOCARDIOGRAM:   Interpretation Summary  Mildly (EF 40-45%) reduced left ventricular function is present. Mild  asymmetric septal hypertrophy is present (14 mm). No dynamic outflow tract  obstruction is present.  Global right ventricular function is normal.  No significant valvular abnormalities are identified.  No pericardial effusion is present.    6/2018 STRESS ECHO:  Interpretation Summary  Submaximal treadmill stress test in a patient with a diagnosis of HCM.     The Ramirez treadmill score is 8 (low risk).  Exercise was stopped due to fatigue.  Normal blood pressure response to exercise.  No ECG evidence of ischemia.  No supraventricular or ventricular arrhythmias. Frequent PVCs noted throughout  the study.     Normal biventricular function with mild asymmetrical septal hypertrophy (12  mm).  No " dynamic outflow tract obstruction is present at rest or with exercise.  Normal segmental wall motion at rest and with exercise.  Minimal augmentation in LV function with exercise.     Average functional capacity for age.    Assessment and Plan:   Mr. Meredith is a 63 year old male who has a past medical history significant for HCM diagnosed , VT on Holter 2012 s/p ICD 2012, troponin leak Oct 2013 (angiogram with non-significant CAD, LV gram showed EF 25%, recovered to 60% on TTE Dec 2013), HTN, AFib (CHADSVASC 2) with DCCVs then s/p PVI 2011, PVI for persistent AF on 16, PVI/CTI/CFAE with posterior wall isolation 18, s/p DCCV 17, 1/3/18,  18, 18, 2018, and s/p right IRISH on 2019. He presents today for follow up. He underwent a repeat PVI/CTI on 18. Device shows AF has been 100% since 3/14/19. He underwent right TREVIN on 19. Recovering well after procedure. He reports issues with ongoing fatigue, MART, and decreased stamina. He denies any chest pain/pressures, dizziness, lightheadedness, leg/ankle swelling, PND, orthopnea, palpitations, or syncopal symptoms. Presenting 12 lead ECG shows atypical AFL Vent Rate 73 bpm, QRS 84 ms, QTc 420 ms. Current cardiac medications include: Spironolactone, Lipitor, Sotalol, Toprol XL, and Xarelto.   Persistent Atrial Fibrillation:   We discussed in detail with the patient management/treatment options for AF/AFL includin. Stroke Prophylaxis:  CHADSVASC= 2, corresponding to a 2.2% annual stroke / systemic emolism event rate. indicating need for long term oral anticoagulation.  He is on Xarelto. No bleeding issues.   2. Rate Control: Continue Metoprolol.   3. Rhythm Control: He has had a PVI ablation in  with several recurrences requiring DCCV and then repeat PVI in 2016. He had previously failed multaq and is currently on Sotalol.  He has now had some recurrent episodes of AT/AF requiring DCCV on 17 and 1/3/18.   Device check showed AT episode that started 1/28/18 and lasted 25 days and self terminated. He then had recurrent AT/AF and had DCCV on 5/5/18. He followed up with Dr. Ware recently in clinic and reported having fatigue, MART, and decreased stamina. Device interrogation showed he had recurred back into AT/AF since 5/26/18. He underwent a repeat PVI/CTI on 8/17/18. He subsequently had DCCV on 9/27/18, 12/20/2018. Device shows AF has been 100% since 3/14/19. His main symptoms are fatigue, MART, and decreased stamina. Unclear if this is related to AF or his HCM worsening. We discussed treatment options in detail including stopping sotalol and switching to a rate control only strategy, switching to alternative AAT, or considering repeat ablation. He does not want to be on amiodarone. Can consider switching to dofetilide, will likely have the same efficacy as Sotalol , but may help with fatigue. He would like to try this. We will schedule him to be admitted for dofetilide loading with plan to do RHC in AF and SR to help determine AF effect on his hemodynamics. We do RHC, admit following RHC for dofetilide loading, perform DCCV on day 2, and then repeat RHC on day 3 when in SR.   4. Risk Factor Management: maintain tight BP control, and GONZALEZ evaluation as indicated.      Hypertrophic cardiomyopathy:  -Start beta blockers  -Encouraged adequate hydration and avoidance of strenous physical activity   -Reinforced exercise recommendations with HCM (e.g. Circ 2014 recommendations: Avoidance of burst exercise, competitive training,weight-lifting)  -Family screening of 1st degree relatives recommended.   - ICD implanted in 7/2012    He will continue to follow with the device clinic per routine. He will follow up with Dr. Ware for HCM. Follow up with EP in 2 months after dofetilide loading.      The patient states understanding and is agreeable with plan.   This patient was seen and evaluated with Dr. Cooper. The above note  reflects our joint assessment and plan.   KARSON Richardson CNP  Pager: [phone]    EP STAFF NOTE  I have seen and examined the patient as part of a shared visit with HOLLY Richardson NP.  I agree with the note above. I reviewed today's vital signs and medications. I have reviewed and discussed with the advanced practice provider their physical examination, assessment, and plan   Briefly, recurrent afib , HCM, previous ablations, likely symptomatic  My key history/exam findings are: RRR.   The key management decisions made by me: his symptoms are likely the combination of diastolic HF and AFib, although we have te sense that his HF is the main . Plan to admit and switch to dofetilide as above. If dofetilide works, we would avoid the fatigue from sotalol. If it does not work, we will check how he feels to see if sotalol on top of the BB was driving the symptoms. If not, then the RHCs before and after DCCV will orient us to which of the 2 factors, Afib or diastolic HF, is the main  for his symptoms..    Erik Cooper MD [facility]  Cardiology - Electrophysiology

## 2019-07-12 NOTE — LETTER
"7/12/2019      RE: Stu Meredith  [address] [facility] [address]       Dear Colleague,    Thank you for the opportunity to participate in the care of your patient, Stu Meredith, at the [facility] at [facility]. Please see a copy of my visit note below.    HPI  Mr. Meredith is a 63 year old male with history of HCM without obstruction (dx 2006, EF 20% improved to 60%), VT s/p ICD 2012, nonobstructive CAD (cath 2013), AF s/p PVI X 3 (2011, 2016, 2018) and DCCV X 10, currently on Sotalol and Xarelto who presents to clinic for evaluation and management. Recently underwent hip surgery without event.    Has moderate to severe fatigue ever since early Spring, corresponding roughly to when he has been in atrial fibrillation. He wakes up in the morning and feels he could \"fall right back asleep.\" Any exertion or activity causes mild to moderate MART. He is able to \"keep going,\" but this is certainly different from baseline. He cannot walk more than one to two blocks without feeling significantly winded and needed to take a break. No PND nor orthopnea. No TY nor abdominal swelling. Water weight has been stable. No palpitations nor syncope. He denies any problems with his medications and reports compliance.    Past Medical History:   Diagnosis Date     Atrial fibrillation (H) 12/9/11    failed medication, multiple DC cardioveresions; s/p Left atrial ablation to eliminate atrial fibrillation 12/9/11     Chest pain      CHF (congestive heart failure) (H) 7/30/2016     Coronary artery disease      DJD (degenerative joint disease)      Hip arthritis 1/15/2014     Hypertension      Hypertrophic cardiomyopathy (H) 10/09     Other abnormal heart sounds      Pacemaker     ICD     Palpitations      Pneumonia, organism unspecified(486)      Prediabetes 7/10/15    A1C 6.5     Status post implantation of automatic cardioverter/defibrillator (AICD)      Ventricular tachycardia " (H)    - HCM diagnosed '06, previously burnt out (EF 20%) now with recovered EF  - h/o VT s/p dual chamber ICD '12  - AF s/p PVI X 2 ('11, '16, '18), h/o DCCV X 10    Meds:  acetaminophen (TYLENOL) 325 MG tablet Take 325-650 mg by mouth every 6 hours as needed   amoxicillin (AMOXIL) 500 MG tablet Take 4 tablets (2000 mg) by mouth 1 hour before dental procedures.   atorvastatin (LIPITOR) 20 MG tablet Take 1 tablet (20 mg) by mouth daily   cyanocobalamin (VITAMIN B-12) 100 MCG tablet Take 100 mcg by mouth daily   HYDROcodone-acetaminophen (NORCO) 5-325 MG tablet Take 1 tablet by mouth every 8 hours as needed for severe pain   HYDROcodone-acetaminophen (NORCO) 5-325 MG tablet Take 1-2 tablets by mouth every 4 hours as needed for severe pain   metFORMIN (GLUCOPHAGE-XR) 500 MG 24 hr tablet Take 1 tablet (500 mg) by mouth daily (with dinner) for 7 days, THEN 2 tablets (1,000 mg) daily (with dinner). (Patient taking differently: Take 2 tablets (1,000 mg) daily (with dinner).)   metoprolol succinate (TOPROL-XL) 50 MG 24 hr tablet Take 1 tablet (50 mg) by mouth daily   multivitamin, therapeutic (THERA-VIT) TABS Take 1 tablet by mouth daily   senna-docusate (SENOKOT-S/PERICOLACE) 8.6-50 MG tablet Take 1 tablet by mouth 2 times daily as needed for constipation   sotalol (BETAPACE) 120 MG tablet Take 1 tablet (120 mg) by mouth 2 times daily   spironolactone (ALDACTONE) 50 MG tablet Take 1 tablet (50 mg) by mouth daily   XARELTO 20 MG TABS tablet Take 1 tablet (20 mg) by mouth daily (with dinner)   zolpidem (AMBIEN) 10 MG tablet Take 0.5-1 tablets (5-10 mg) by mouth nightly as needed for sleep   albuterol (PROAIR HFA/PROVENTIL HFA/VENTOLIN HFA) 108 (90 Base) MCG/ACT inhaler Inhale 2 puffs into the lungs every 4 hours as needed for shortness of breath / dyspnea or wheezing       Social History     Socioeconomic History     Marital status:      Spouse name: Not on file     Number of children: Not on file     Years of  education: Not on file     Highest education level: Not on file   Occupational History     Occupation:      Employer: [employer]   Social Needs     Financial resource strain: Not on file     Food insecurity:     Worry: Not on file     Inability: Not on file     Transportation needs:     Medical: Not on file     Non-medical: Not on file   Tobacco Use     Smoking status: Former Smoker     Packs/day: 1.00     Years: 40.00     Pack years: 40.00     Types: Cigarettes     Start date: 1/1/1975     Last attempt to quit: 12/11/2015     Years since quitting: 3.5     Smokeless tobacco: Never Used   Substance and Sexual Activity     Alcohol use: Yes     Alcohol/week: 0.0 oz     Comment: 1 drink per week     Drug use: No     Sexual activity: Yes     Partners: Female   Lifestyle     Physical activity:     Days per week: Not on file     Minutes per session: Not on file     Stress: Not on file   Relationships     Social connections:     Talks on phone: Not on file     Gets together: Not on file     Attends [employer] service: Not on file     Active member of club or organization: Not on file     Attends meetings of clubs or organizations: Not on file     Relationship status: Not on file     Intimate partner violence:     Fear of current or ex partner: Not on file     Emotionally abused: Not on file     Physically abused: Not on file     Forced sexual activity: Not on file   Other Topics Concern      Service Not Asked     Blood Transfusions Not Asked     Caffeine Concern Not Asked     Occupational Exposure Not Asked     Hobby Hazards Not Asked     Sleep Concern Not Asked     Stress Concern Not Asked     Weight Concern Not Asked     Special Diet Not Asked     Back Care Not Asked     Exercise No     Bike Helmet Not Asked     Seat Belt Yes     Self-Exams Not Asked     Parent/sibling w/ CABG, MI or angioplasty before 65F 55M? No   Social History Narrative     Not on file       Family History   Problem  "Relation Age of Onset     Heart Disease Mother         unknown     Obesity Mother      Gastrointestinal Disease Mother         diverticulitis     Diabetes Maternal Grandmother      Diabetes Paternal Grandmother      Cerebrovascular Disease Paternal Grandmother 94     Diabetes Paternal Grandfather      Cerebrovascular Disease Paternal Grandfather 78     Diabetes Son      C.A.D. Father         CABG age 78     Heart Disease Father         CABG x5     Diabetes Maternal Grandfather      LUNG DISEASE No family hx of      Deep Vein Thrombosis (DVT) No family hx of      Anesthesia Reaction No family hx of      Melanoma No family hx of      Skin Cancer No family hx of        ROS:   Constitutional: No fever, chills, or sweats. No weight gain/loss.   ENT: No visual disturbance, ear ache, epistaxis, sore throat.   Allergies/Immunologic: Negative.   Respiratory: No cough, hemoptysis.   Cardiovascular: As per HPI.   GI: No nausea, vomiting, hematemesis, melena, or hematochezia.   : No urinary frequency, dysuria, or hematuria.   Integument: Negative.   Psychiatric: Negative.   Neuro: Negative.   Endocrinology: Negative.   Musculoskeletal: Ongoing hip pain      /86 (BP Location: Right arm, Patient Position: Chair, Cuff Size: Adult Large)   Pulse 76   Ht 1.803 m (5' 11\")   Wt 95.3 kg (210 lb)   SpO2 97%   BMI 29.29 kg/m     General: appears comfortable, alert and articulate  Head: normocephalic, atraumatic  Eyes: anicteric sclera, EOMI  Neck: no adenopathy, 2+ carotids without bruits  Orophyarynx: moist mucosa, no lesions, dentition intact  Heart: regular, S1/S2, 2/6 systolic murmur, no gallop or rub, estimated JVP 6-7cm  Lungs: clear, no rales or wheezing  Abdomen: soft, non-tender, bowel sounds present, no hepatomegaly  Extremities: no clubbing, cyanosis or edema  Neurological: normal speech and affect, no gross motor deficits    CPX Echo 6/1/2018:  Interpretation Summary  Submaximal treadmill stress test in a patient " "with a diagnosis of HCM.  The Ramirez treadmill score is 8 (low risk).  Exercise was stopped due to fatigue.  Normal blood pressure response to exercise.  No ECG evidence of ischemia.  No supraventricular or ventricular arrhythmias. Frequent PVCs noted throughout the study.  Normal biventricular function with mild asymmetrical septal hypertrophy (12mm).  No dynamic outflow tract obstruction is present at rest or with exercise.  Normal segmental wall motion at rest and with exercise.  Minimal augmentation in LV function with exercise.  Average functional capacity for age.         CPX 2/15/2019:      Device interrogation 4/22/19  Alert received for AF burden. His presenting rhythm is AF/VS- ~70 bpm. AF has been 100% since 3/14/19. Normal device function. AP= 6% and = 3%. Lead trends appear stable. Battery estimates 4 years to CARMEN.    CTA coronary angiogram today  IMPRESSION:  1.  No evidence of coronary artery atherosclerosis or stenosis.  2.  Myocardial bridging involving the mid LAD.  3.  Total Agatston score 0 placing the patient in the lowest percentile when compared to age and gender matched control group.    Device Interrogation Today:  Patient seen in the [facility] for evaluation and iterative programming of his [employer] dual lead ICD per MD orders. Patient has an appointment to see Martin Ware and Kenneth today. Normal ICD function.  Since 1/18/19, there have been 400 NSVT, 2 other untreated episodes, and 211 AT/AF episodes recorded.  The VT-1 monitored episodes are AF w/RVR with rates in the 140s.  The 2 NSVT episodes available for review last 12 beats in duration with rates of 162-179 bpm.  AF burden = 85% since 1/18/19 and AF appear persistent since approximately mid March. Patient reports he takes Xarelto.   Intrinsic rhythm = AF @  bpm. AP = 2%.  = 5%.  Estimated battery longevity to CARMEN = 3.5years.  Lead trends appear stable. Patient reports that he has been  feeling \"tired\".  Plan for patient " to send a remote transmission in 3 months and return to clinic in 6 months.  EH Dempsey RN. Dual lead ICDI have reviewed and interpreted the device interrogation, settings, programming and nurse's summary. The device is functioning within normal device parameters. I agree with the current findings, assessment and plan.    Assessment/Plan:    Mr. Meredith is a 63 year old male with history of HCM (dx 2006, EF 20% improved to 60%), VT s/p ICD 2012, nonobstructive CAD (cath 2013), AF s/p PVI X 3 (2011, 2016, 2018) and DCCV X 10, currently on sotalol and Xarelto who presents to clinic for evaluation and management. Recently underwent hip surgery without event. He remains in atrial fibrillation since 3/2019.    # Atrial Fibrillation --multiple recurrences with h/o multiple ablations and cardioversion. Rate controlled. From a symptom perspective, unclear if functional limitation is related to AF or recent hip issues (CPX results have been similar when in AF and NSR).  - continue Xarelto  - continue Sotalol 120 BID   - continue Metoprolol 50 XL daily   - follow-up with EP on management    # Hypertrophic cardiomyopathy -- previously burnt out with EF 20% ('13), now recovered (EF 60%).  No evidence of LVOT obstruction.   - continue metoprolol XL 50mg daily   - continue aldactone  50mg daily  - pt aware of exercise restrictions and family screening recommendations    # Nonobstructive CAD -- 10-30% stenoses on cath '13  - coronary CTA confirmed no significant disease  - continue atorvastatin 20     # HTN -- controlled  - continue metoprolol XL, aldactone    Overall, he is NYHA class III with significant limitation with light activity. Will defer to EP on further attempts to restore SR which he would certainly benefit from given his filling restriction. We could offer a right heart catheterization to evaluate filling pressures to evaluate how we could further optimize his hemodynamics. Would consider Entresto as well, which has  some benefit in HFpEF patients. We may need to consider work-up for transplantation (with exception), at some point, pending his symptom and efficacy of further medical adjustments. He has non-obstructive physiology, and thus ASA / myectomy would not be of benefit. Would agree with plan, in conjunction with EP, for change in antiarrhythmic therapy in effort to restore SR with RHC while in-patient (their plan is for Tikosyn loading).    Follow-up 3-4 months in conjunction with EP.  The patient was seen and discussed with Dr. Ware.    Abhinav Mao MD  Cardiology Fellow      I have reviewed today's vital signs, notes, medications, labs and imaging. I have also seen and examined the patient and agree with the findings and plan as outlined above.    Mona Ware MD  Section Head - Advanced Heart Failure, Transplantation and Mechanical Circulatory Support  Director - Adult Congenital and Cardiovascular Genetics Center  Associate Professor of Medicine, [facility]

## 2019-07-12 NOTE — PATIENT INSTRUCTIONS
"You were seen today in the Adult Congenital and Cardiovascular Genetics Clinic at the [facility].    Cardiology Providers you saw during your visit:  Dr Mona Ware    Diagnosis:  Hypertrophic Cardiomyopathy, A fib    Results:  The results of your labs, device check, and EKG were discussed with you in clinic today    Recommendations:    1.  Continue to eat a heart healthy, low salt diet.  2.  Continue to get 20-30 minutes of aerobic activity, 4-5 days per week.  Examples of aerobic activity include walking, running, swimming, cycling, etc.  3.  Continue to observe good oral hygiene, with regular dental visits.  4.  It was a pleasure to see you in clinic today.  Please do not hesitate to call with any questions or concerns.  You are scheduled for a remote transmission on 10/25/19.  We look forward to seeing you in clinic at your next device check in 6 months.  5. You will have a Right Heart Cath (RHC) and then be admitted for Tikosyn initiation (test claim: ~$0 copay). You will need to stop your sotalol 3 days prior to this admission. You will receive a cardioversion during this stay and another RHC. Tang or Flex will contact you with the specifics.      Maci Dempsey RN, BSN  Electrophysiology Nurse Clinician  [facility]     During Business Hours Please Call:  [phone]  After Hours Please Call:  [phone] - select option #4 and ask for job code [phone]    Vitals:    07/12/19 0948   BP: 123/86   BP Location: Right arm   Patient Position: Chair   Cuff Size: Adult Large   Pulse: 76   SpO2: 97%   Weight: 95.3 kg (210 lb)   Height: 1.803 m (5' 11\")       SBE prophylaxis:   Yes____  No_x__    Lifelong Bacterial Endocarditis Prophylaxis:  YES____  NO____    If YES is checked, follow the recommendations outlined below:   1. Take antibiotic(s) prior to recommended dental procedures and procedures on the respiratory tract or with infected skin, muscle or bones. SBE prophylaxis is not " needed for routine GI and  procedures (ie. Colonoscopy or vaginal delivery)   2. Observe good oral hygiene daily, as advised by your dentist. Get regular professional dental care.   3. Keep cuts clean.   4. Infections should be treated promptly.   5. Symptoms of Infective Endocarditis could include: fever lasting more than 4-5 days or a recurrent fever that initially resolves but returns within 1-2 days)     Exercise restrictions:   Yes__x__  No____         If yes, list restrictions:  Must be allowed to rest if fatigued or SOB      Work restrictions:  Yes____  No__x__         If yes, list restrictions:    FASTING CHOLESTEROL was checked in the last 5 years YES_x__  NO___  2019  Continue to eat a heart healthy, low salt diet.         ____ Fasting lipid panel order today         ____ No changes in medications          ____ I recommend the following changes in your cholesterol medications.:          ____ Please follow up for cholesterol screening at your primary care physician      Follow-up:      For after hours urgent needs, call [phone] and ask to speak to the Adult Congenital Physician on call.  Mention Job Code 0401.    For emergencies call [phone].    For any scheduling needs, please call [facility] , at [phone]  Thank you for your visit today!  If you have questions or concerns about today's visit, please call me.    Tang Lilly, RN, BSN  Cardiology Care Coordinator  [facility]  [phone]    [address] [place] [address]  Mail Code [id number]  [place] [address]

## 2019-07-12 NOTE — PATIENT INSTRUCTIONS
"You were seen today in the Adult Congenital and Cardiovascular Genetics Clinic at the [facility].    Cardiology Providers you saw during your visit:  Dr Mona Ware    Diagnosis:  Hypertrophic Cardiomyopathy, A fib    Results:  The results of your labs, device check, and EKG were discussed with you in clinic today    Recommendations:    1.  Continue to eat a heart healthy, low salt diet.  2.  Continue to get 20-30 minutes of aerobic activity, 4-5 days per week.  Examples of aerobic activity include walking, running, swimming, cycling, etc.  3.  Continue to observe good oral hygiene, with regular dental visits.  4.  It was a pleasure to see you in clinic today.  Please do not hesitate to call with any questions or concerns.  You are scheduled for a remote transmission on 10/25/19.  We look forward to seeing you in clinic at your next device check in 6 months.  5. You will have a Right Heart Cath (RHC) and then be admitted for Tikosyn initiation (test claim: ~$0 copay). You will need to stop your sotalol 3 days prior to this admission. You will receive a cardioversion during this stay and another RHC. Tang or Flex will contact you with the specifics.      Maci Dempsey RN, BSN  Electrophysiology Nurse Clinician  [facility]     During Business Hours Please Call:  [phone]  After Hours Please Call:  [phone] - select option #4 and ask for job code [phone]    Vitals:    07/12/19 0948   BP: 123/86   BP Location: Right arm   Patient Position: Chair   Cuff Size: Adult Large   Pulse: 76   SpO2: 97%   Weight: 95.3 kg (210 lb)   Height: 1.803 m (5' 11\")       SBE prophylaxis:   Yes____  No_x__    Lifelong Bacterial Endocarditis Prophylaxis:  YES____  NO____    If YES is checked, follow the recommendations outlined below:   1. Take antibiotic(s) prior to recommended dental procedures and procedures on the respiratory tract or with infected skin, muscle or bones. SBE prophylaxis is not " needed for routine GI and  procedures (ie. Colonoscopy or vaginal delivery)   2. Observe good oral hygiene daily, as advised by your dentist. Get regular professional dental care.   3. Keep cuts clean.   4. Infections should be treated promptly.   5. Symptoms of Infective Endocarditis could include: fever lasting more than 4-5 days or a recurrent fever that initially resolves but returns within 1-2 days)     Exercise restrictions:   Yes__x__  No____         If yes, list restrictions:  Must be allowed to rest if fatigued or SOB      Work restrictions:  Yes____  No__x__         If yes, list restrictions:    FASTING CHOLESTEROL was checked in the last 5 years YES_x__  NO___  2019  Continue to eat a heart healthy, low salt diet.         ____ Fasting lipid panel order today         ____ No changes in medications          ____ I recommend the following changes in your cholesterol medications.:          ____ Please follow up for cholesterol screening at your primary care physician      Follow-up:      For after hours urgent needs, call [phone] and ask to speak to the Adult Congenital Physician on call.  Mention Job Code 0401.    For emergencies call [phone].    For any scheduling needs, please call [facility] , at [phone]  Thank you for your visit today!  If you have questions or concerns about today's visit, please call me.    Tang Lilly, RN, BSN  Cardiology Care Coordinator  [facility]  [phone]    [address] [place] [address]  Mail Code [id number]  [place] [address]

## 2019-07-12 NOTE — PROGRESS NOTES
Electrophysiology Clinic Note  HPI:   Mr. Meredith is a 63 year old male who has a past medical history significant for HCM diagnosed 2006, VT on Holter July 2012 s/p ICD 7/2012, troponin leak Oct 2013 (angiogram with non-significant CAD, LV gram showed EF 25%, recovered to 60% on TTE Dec 2013), HTN, AFib (CHADSVASC 2) with DCCVs then s/p PVI 12/2011, PVI for persistent AF on 7/28/16, PVI/CTI/CFAE with posterior wall isolation 8/17/18, s/p DCCV 8/7/17, 1/3/18l,  5/5/18, 9/27/18, 12/20/2018, and s/p right IRISH on 6/5/2019. He presents for follow-up.  The patient underwent atrial fibrillation ablation by Dr. Gonzalez at [facility] in 12/2011 and implantation of an ICD in 07/2012 because of ventricular tachycardia. As per patient, he has been in sinus rhythm approximately 1-1/2 years after ablation. The patient was found to have recurrent atrial fibrillation during a preoperative exam in 10/2013 and underwent electrical cardioversion in 11/2013. The patient underwent hip surgery in 01/2014. The patient noticed that he was in atrial fibrillation at a clinic visit in 01/2014.   He was seen in consult by Dr Analilia spain on 2/14/14 for consideration of ablation. At the time the patient was reported to be generally unaware of whether or not he is in sinus rhythm or in atrial fibrillation. He reports daytime somnolence but denies significant dyspnea on exertion, palpitations, irregular beat sensation, presyncope or syncope. In terms of risk factors for stroke, the patient has coronary heart disease, hypertension, but denies history of diabetes, previous myocardial cardiac infarction or heart failure. The patient is on rivaroxaban for stroke prophylaxis and is on diltiazem and metoprolol. He was told that there was no consideration of ablation.   He came to EP clinic for second opinion in 3/3/2014. The patient mentioned this time he is not sure whether his afib is causing fatigue. However, he also attributed symptoms like  headache and head fullness to his afib. He also had a cold around the same time. He still denied chest pain, syncope or presyncope, ICD shocks, MART or SOB. He does however indicate this time that he does feel irregular heart beat at times, along with chronic fatigue, although is not sure if fatigue is linked to afib. We agreed at that time to pursue a cardioversion to see whether he would feel better and we started him on sotalol 80 mg twice a day. He underwent cardioversion on 4/24/2014.  He then had a device transmission showed recurrent AF in 7/2014 which spontaneously converted. We decrease his diltiazem from 240 to 120 mg daily because of fatigue. His fatigue did get better when we decrease his diltiazem.  He did have a 9 day episode of A. Fib starting June 19, 2015, during which the patient felt very tired and no energy.  The heart rate was well controlled.  A. Fib burden was 19% since 5/19/2015, but it is the first episode of A. Fib since at least November 2014.  However at follow up in 2016 AF burden 1%. At follow up in 5/2016 his AF burden= 100% since 2/28/16. He reported some increased fatgiue and decreased stamina since being back in AF. At that visit, he elected to pursue repeat PVI ablation for AF that is refractory to Sotalol. He underwent repeat PVI ablation for persistent AF refractory to AATs on 7/28/16. He had successful re-isolation of all 4 pulmonary veins. He was re-hospitalized a couple days post ablation for SOB assocaited with hypervolemia which resolved with diuresis. He reports feeling well after ablation. He states his energy levels improved with restoration of sinus. Diltiazem was stopped after ablation.  He has now had some recurrent episodes of AT/AF requiring DCCV on 8/7/17 and 1/3/18.  Device check showed AT episode that started 1/28/18 and lasted 25 days and self terminated. He then had recurrent AT/AF and had DCCV on 5/5/18. He followed up with Dr. Ware recently in clinic and  reported having fatigue, MART, and decreased stamina. Device interrogation showed he had recurred back into AT/AF since 5/26/18. He was arranged for EP follow up.     Ep Visit 6/2018: He continues to have fatigue, MART, and decreased stamina. Last stress echo from 6/1/18 showed normal biventricular function with asymmetrical septal hypertrophy and no LVOT obstruction with rest or exercise. He denies any chest pain/pressures, dizziness, lightheadedness, leg/ankle swelling, PND, orthopnea, palpitations, or syncopal symptoms.Presenting 12 lead ECG shows AFL Vent Rate 111 bpm, QRS 80 ms, QTc 489 ms. Current cardiac medications include: Spironolactone, Lipitor, Sotalol, Toprol XL, and Xarelto.     He presents today for follow up. He underwent a repeat PVI/CTI on 8/17/18. Device shows AF has been 100% since 3/14/19. He underwent right TREVIN on 6/5/19. Recovering well after procedure. He reports issues with ongoing fatigue, MART, and decreased stamina. He denies any chest pain/pressures, dizziness, lightheadedness, leg/ankle swelling, PND, orthopnea, palpitations, or syncopal symptoms. Presenting 12 lead ECG shows atypical AFL Vent Rate 73 bpm, QRS 84 ms, QTc 420 ms. Device interrogation shows normal device function. Since 1/18/19, there have been 400 NSVT, 2 other untreated episodes, and 211 AT/AF episodes recorded.  The VT-1 monitored episodes are AF w/RVR with rates in the 140s.  The 2 NSVT episodes available for review last 12 beats in duration with rates of 162-179 bpm.  AF burden = 85% since 1/18/19 and AF appear persistent since approximately mid 3/2019. Intrinsic rhythm = AF @  bpm. AP = 2%.  = 5%. Lead trends appear stable. Current cardiac medications include: Spironolactone, Lipitor, Sotalol, Toprol XL, and Xarelto.     PAST MEDICAL HISTORY:  Past Medical History:   Diagnosis Date     Atrial fibrillation (H) 12/9/11    failed medication, multiple DC cardioveresions; s/p Left atrial ablation to eliminate atrial  fibrillation 12/9/11     Chest pain      CHF (congestive heart failure) (H) 7/30/2016     Coronary artery disease      DJD (degenerative joint disease)      Hip arthritis 1/15/2014     Hypertension      Hypertrophic cardiomyopathy (H) 10/09     Other abnormal heart sounds      Pacemaker     ICD     Palpitations      Pneumonia, organism unspecified(486)      Prediabetes 7/10/15    A1C 6.5     Status post implantation of automatic cardioverter/defibrillator (AICD)      Ventricular tachycardia (H)        CURRENT MEDICATIONS:  Current Outpatient Medications   Medication Sig Dispense Refill     acetaminophen (TYLENOL) 325 MG tablet Take 325-650 mg by mouth every 6 hours as needed       albuterol (PROAIR HFA/PROVENTIL HFA/VENTOLIN HFA) 108 (90 Base) MCG/ACT inhaler Inhale 2 puffs into the lungs every 4 hours as needed for shortness of breath / dyspnea or wheezing (Patient not taking: Reported on 5/8/2019) 8.5 g 1     amoxicillin (AMOXIL) 500 MG tablet Take 4 tablets (2000 mg) by mouth 1 hour before dental procedures. 12 tablet 3     atorvastatin (LIPITOR) 20 MG tablet Take 1 tablet (20 mg) by mouth daily (Patient taking differently: Take 20 mg by mouth every evening ) 90 tablet 3     cyanocobalamin (VITAMIN B-12) 100 MCG tablet Take 100 mcg by mouth daily       HYDROcodone-acetaminophen (NORCO) 5-325 MG tablet Take 1 tablet by mouth every 8 hours as needed for severe pain 20 tablet 0     HYDROcodone-acetaminophen (NORCO) 5-325 MG tablet Take 1-2 tablets by mouth every 4 hours as needed for severe pain 40 tablet 0     metFORMIN (GLUCOPHAGE-XR) 500 MG 24 hr tablet Take 1 tablet (500 mg) by mouth daily (with dinner) for 7 days, THEN 2 tablets (1,000 mg) daily (with dinner). (Patient taking differently: Take 2 tablets (1,000 mg) daily (with dinner).) 180 tablet 3     metoprolol succinate (TOPROL-XL) 50 MG 24 hr tablet Take 1 tablet (50 mg) by mouth daily 90 tablet 3     multivitamin, therapeutic (THERA-VIT) TABS Take 1 tablet  by mouth daily       senna-docusate (SENOKOT-S/PERICOLACE) 8.6-50 MG tablet Take 1 tablet by mouth 2 times daily as needed for constipation 100 tablet 1     sotalol (BETAPACE) 120 MG tablet Take 1 tablet (120 mg) by mouth 2 times daily 180 tablet 3     spironolactone (ALDACTONE) 50 MG tablet Take 1 tablet (50 mg) by mouth daily 90 tablet 3     XARELTO 20 MG TABS tablet Take 1 tablet (20 mg) by mouth daily (with dinner) 90 tablet 3     zolpidem (AMBIEN) 10 MG tablet Take 0.5-1 tablets (5-10 mg) by mouth nightly as needed for sleep 90 tablet 0       PAST SURGICAL HISTORY:  Past Surgical History:   Procedure Laterality Date     ANESTHESIA CARDIOVERSION  4/24/2014    Procedure: ANESTHESIA CARDIOVERSION;  Surgeon: Generic Anesthesia Provider;  Location: [facility]     ANESTHESIA CARDIOVERSION N/A 5/12/2016    Procedure: ANESTHESIA CARDIOVERSION;  Surgeon: GENERIC ANESTHESIA PROVIDER;  Location: [facility]     ANESTHESIA CARDIOVERSION N/A 8/7/2017    Procedure: ANESTHESIA CARDIOVERSION;  Anesthesia Offsite Coverage Cardioversion @1100;  Surgeon: GENERIC ANESTHESIA PROVIDER;  Location: [facility]     ANESTHESIA CARDIOVERSION N/A 1/3/2018    Procedure: ANESTHESIA CARDIOVERSION;  Anesthesia Cardioverion;  Surgeon: GENERIC ANESTHESIA PROVIDER;  Location: [facility]     ANESTHESIA CARDIOVERSION N/A 5/4/2018    Procedure: ANESTHESIA CARDIOVERSION;  Anesthesia Coverage Cardioversion @1400;  Surgeon: GENERIC ANESTHESIA PROVIDER;  Location: [facility]     ANESTHESIA CARDIOVERSION N/A 9/27/2018    Procedure: ANESTHESIA CARDIOVERSION;  Anesthesia Cardioversion @0930;  Surgeon: GENERIC ANESTHESIA PROVIDER;  Location: [facility]     ANESTHESIA CARDIOVERSION N/A 12/20/2018    Procedure: Anesthesia Coverage Cardioversion @0830;  Surgeon: GENERIC ANESTHESIA PROVIDER;  Location: [facility]     ARTHROPLASTY HIP  1/15/2014    Procedure: ARTHROPLASTY HIP;  Left Total Hip Arthroplasty;  Surgeon: Nelson Gaspar MD;  Location: [facility]     ARTHROPLASTY HIP Right 6/5/2019     Procedure: Right Total Hip Arthroplasty;  Surgeon: Nelson Gaspar MD;  Location: [facility]     CARDIAC SURGERY       COLONOSCOPY N/A 5/18/2018    Procedure: COMBINED COLONOSCOPY, SINGLE OR MULTIPLE BIOPSY/POLYPECTOMY BY BIOPSY;  COLONOSCOPY (PT HAS DEFIBRILLATOR) ;  Surgeon: Mike Nickerson MD;  Location: [facility]     H ABLATION FOCAL AFIB  12/9/11    Left atrial ablation to eliminate atrial fibrillation     IMPLANT AUTOMATIC IMPLANTABLE CARDIOVERTER DEFIBRILLATOR  7/27/12    AICD implantation     TONSILLECTOMY  1964       ALLERGIES:     Allergies   Allergen Reactions     Nsaids      Chronic anticoagulation       FAMILY HISTORY:  Family History   Problem Relation Age of Onset     Heart Disease Mother         unknown     Obesity Mother      Gastrointestinal Disease Mother         diverticulitis     Diabetes Maternal Grandmother      Diabetes Paternal Grandmother      Cerebrovascular Disease Paternal Grandmother 94     Diabetes Paternal Grandfather      Cerebrovascular Disease Paternal Grandfather 78     Diabetes Son      C.A.D. Father         CABG age 78     Heart Disease Father         CABG x5     Diabetes Maternal Grandfather      LUNG DISEASE No family hx of      Deep Vein Thrombosis (DVT) No family hx of      Anesthesia Reaction No family hx of      Melanoma No family hx of      Skin Cancer No family hx of        SOCIAL HISTORY:  Social History     Tobacco Use     Smoking status: Former Smoker     Packs/day: 1.00     Years: 40.00     Pack years: 40.00     Types: Cigarettes     Start date: 1/1/1975     Last attempt to quit: 12/11/2015     Years since quitting: 3.5     Smokeless tobacco: Never Used   Substance Use Topics     Alcohol use: Yes     Alcohol/week: 0.0 oz     Comment: 1 drink per week     Drug use: No       ROS:   A comprehensive 10 point review of systems negative other than as mentioned in HPI.  Exam:  /86 (BP Location: Right arm, Patient Position: Chair, Cuff Size: Adult Large)   Pulse 76   Ht  "1.803 m (5' 11\")   Wt 95.3 kg (210 lb)   SpO2 97%   BMI 29.29 kg/m    GENERAL APPEARANCE: healthy, alert and no distress  HEENT: no icterus, no xanthelasmas, normal pupil size and reaction, normal palate, mucosa moist, no central cyanosis  NECK: supple.  RESPIRATORY: lungs clear to auscultation - no rales, rhonchi or wheezes, no use of accessory muscles, no retractions, respirations are unlabored, normal respiratory rate  CARDIOVASCULAR:irregular, no murmur, click or rub, precordium quiet with normal PMI.  ABDOMEN: soft, non tender, bowel sounds normal, non distended.   EXTREMITIES: peripheral pulses normal, no edema, no bruits  NEURO: alert and oriented to person/place/time, normal speech, gait and affect  SKIN: no ecchymoses, no rashes    Labs:  Reviewed.     Procedures:  12/15/16 ECHOCARDIOGRAM:   Interpretation Summary  Mildly (EF 40-45%) reduced left ventricular function is present. Mild  asymmetric septal hypertrophy is present (14 mm). No dynamic outflow tract  obstruction is present.  Global right ventricular function is normal.  No significant valvular abnormalities are identified.  No pericardial effusion is present.    6/2018 STRESS ECHO:  Interpretation Summary  Submaximal treadmill stress test in a patient with a diagnosis of HCM.     The Ramirez treadmill score is 8 (low risk).  Exercise was stopped due to fatigue.  Normal blood pressure response to exercise.  No ECG evidence of ischemia.  No supraventricular or ventricular arrhythmias. Frequent PVCs noted throughout  the study.     Normal biventricular function with mild asymmetrical septal hypertrophy (12  mm).  No dynamic outflow tract obstruction is present at rest or with exercise.  Normal segmental wall motion at rest and with exercise.  Minimal augmentation in LV function with exercise.     Average functional capacity for age.    Assessment and Plan:   Mr. Meredith is a 63 year old male who has a past medical history significant for HCM diagnosed " , VT on Holter 2012 s/p ICD 2012, troponin leak Oct 2013 (angiogram with non-significant CAD, LV gram showed EF 25%, recovered to 60% on TTE Dec 2013), HTN, AFib (CHADSVASC 2) with DCCVs then s/p PVI 2011, PVI for persistent AF on 16, PVI/CTI/CFAE with posterior wall isolation 18, s/p DCCV 17, 1/3/18,  18, 18, 2018, and s/p right IRISH on 2019. He presents today for follow up. He underwent a repeat PVI/CTI on 18. Device shows AF has been 100% since 3/14/19. He underwent right TREVIN on 19. Recovering well after procedure. He reports issues with ongoing fatigue, MART, and decreased stamina. He denies any chest pain/pressures, dizziness, lightheadedness, leg/ankle swelling, PND, orthopnea, palpitations, or syncopal symptoms. Presenting 12 lead ECG shows atypical AFL Vent Rate 73 bpm, QRS 84 ms, QTc 420 ms. Current cardiac medications include: Spironolactone, Lipitor, Sotalol, Toprol XL, and Xarelto.   Persistent Atrial Fibrillation:   We discussed in detail with the patient management/treatment options for AF/AFL includin. Stroke Prophylaxis:  CHADSVASC= 2, corresponding to a 2.2% annual stroke / systemic emolism event rate. indicating need for long term oral anticoagulation.  He is on Xarelto. No bleeding issues.   2. Rate Control: Continue Metoprolol.   3. Rhythm Control: He has had a PVI ablation in  with several recurrences requiring DCCV and then repeat PVI in 2016. He had previously failed multaq and is currently on Sotalol.  He has now had some recurrent episodes of AT/AF requiring DCCV on 17 and 1/3/18.  Device check showed AT episode that started 18 and lasted 25 days and self terminated. He then had recurrent AT/AF and had DCCV on 18. He followed up with Dr. Ware recently in clinic and reported having fatigue, MART, and decreased stamina. Device interrogation showed he had recurred back into AT/AF since 18. He underwent a  repeat PVI/CTI on 8/17/18. He subsequently had DCCV on 9/27/18, 12/20/2018. Device shows AF has been 100% since 3/14/19. His main symptoms are fatigue, MART, and decreased stamina. Unclear if this is related to AF or his HCM worsening. We discussed treatment options in detail including stopping sotalol and switching to a rate control only strategy, switching to alternative AAT, or considering repeat ablation. He does not want to be on amiodarone. Can consider switching to dofetilide, will likely have the same efficacy as Sotalol , but may help with fatigue. He would like to try this. We will schedule him to be admitted for dofetilide loading with plan to do RHC in AF and SR to help determine AF effect on his hemodynamics. We do RHC, admit following RHC for dofetilide loading, perform DCCV on day 2, and then repeat RHC on day 3 when in SR.   4. Risk Factor Management: maintain tight BP control, and GONZALEZ evaluation as indicated.      Hypertrophic cardiomyopathy:  -Start beta blockers  -Encouraged adequate hydration and avoidance of strenous physical activity   -Reinforced exercise recommendations with HCM (e.g. Circ 2014 recommendations: Avoidance of burst exercise, competitive training,weight-lifting)  -Family screening of 1st degree relatives recommended.   - ICD implanted in 7/2012    He will continue to follow with the device clinic per routine. He will follow up with Dr. Ware for HCM. Follow up with EP in 2 months after dofetilide loading.      The patient states understanding and is agreeable with plan.   This patient was seen and evaluated with Dr. Cooper. The above note reflects our joint assessment and plan.   KARSON Richardson CNP  Pager: [phone]    EP STAFF NOTE  I have seen and examined the patient as part of a shared visit with HOLLY Richardson NP.  I agree with the note above. I reviewed today's vital signs and medications. I have reviewed and discussed with the advanced practice provider their physical  examination, assessment, and plan   Briefly, recurrent afib , HCM, previous ablations, likely symptomatic  My key history/exam findings are: RRR.   The key management decisions made by me: his symptoms are likely the combination of diastolic HF and AFib, although we have te sense that his HF is the main . Plan to admit and switch to dofetilide as above. If dofetilide works, we would avoid the fatigue from sotalol. If it does not work, we will check how he feels to see if sotalol on top of the BB was driving the symptoms. If not, then the RHCs before and after DCCV will orient us to which of the 2 factors, Afib or diastolic HF, is the main  for his symptoms..    Erik Cooper MD [facility]  Cardiology - Electrophysiology

## 2019-07-12 NOTE — PATIENT INSTRUCTIONS
It was a pleasure to see you in clinic today.  Please do not hesitate to call with any questions or concerns.  You are scheduled for a remote transmission on 10/25/19.  We look forward to seeing you in clinic at your next device check in 6 months.    Maci Dempsey RN, BSN  Electrophysiology Nurse Clinician  [facility]    During Business Hours Please Call:  [phone]  After Hours Please Call:  [phone] - select option #4 and ask for job code [phone]

## 2019-07-12 NOTE — NURSING NOTE
Chief Complaint   Patient presents with     Follow Up     heart problem -- 63 yr old male with PMH significant for HCM (dx 2006), VT s/p ICD 7/2012, troponin leak Oct 2013 (angiogram with non-significant CAD, LV gram showed EF 25%, recovered to 60% on TTE Dec 2013), HTN, and AFib presenting for follow-up     Vitals were taken and medications were reconciled. EKG was performed.    Dimple Fitzpatrick CMA    10:03 AM

## 2019-07-12 NOTE — NURSING NOTE
Labs: Patient was given results of the laboratory testing obtained today.Patient demonstrated understanding of this information and agreed to call with further questions or concerns.     Med Reconcile: Reviewed and verified all current medications with the patient. The updated medication list was printed and given to the patient.    Return Appointment: Follow up to be determined after admission. Patient given instructions regarding scheduling next clinic visit. Patient demonstrated understanding of this information and agreed to call with further questions or concerns.    Patient stated he understood all health information given and agreed to call with further questions or concerns.    Tang Lilly, RN  RN Care Coordinator  [facility]  [phone]

## 2019-07-12 NOTE — NURSING NOTE
Labs: Patient was given results of the laboratory testing obtained today.Patient demonstrated understanding of this information and agreed to call with further questions or concerns.     Med Reconcile: Reviewed and verified all current medications with the patient. The updated medication list was printed and given to the patient.    Return Appointment: Follow up to be determined after admission. Patient given instructions regarding scheduling next clinic visit. Patient demonstrated understanding of this information and agreed to call with further questions or concerns.    Patient stated he understood all health information given and agreed to call with further questions or concerns.    Tang Lilly, RN  RN Care Coordinator  [facility]  [phone]

## 2019-07-15 ENCOUNTER — TELEPHONE (OUTPATIENT)
Dept: CARDIOLOGY | Facility: CLINIC | Age: 64
End: 2019-07-15

## 2019-07-15 DIAGNOSIS — I47.19 ATRIAL TACHYCARDIA (H): ICD-10-CM

## 2019-07-15 DIAGNOSIS — R06.02 SOB (SHORTNESS OF BREATH): ICD-10-CM

## 2019-07-15 DIAGNOSIS — R06.00 DYSPNEA: ICD-10-CM

## 2019-07-15 DIAGNOSIS — I42.2 HYPERTROPHIC CARDIOMYOPATHY (H): Primary | ICD-10-CM

## 2019-07-15 DIAGNOSIS — R53.83 FATIGUE: ICD-10-CM

## 2019-07-15 LAB
INTERPRETATION ECG - MUSE: NORMAL
MDC_IDC_LEAD_IMPLANT_DT: NORMAL
MDC_IDC_LEAD_IMPLANT_DT: NORMAL
MDC_IDC_LEAD_LOCATION: NORMAL
MDC_IDC_LEAD_LOCATION: NORMAL
MDC_IDC_LEAD_MFG: NORMAL
MDC_IDC_LEAD_MFG: NORMAL
MDC_IDC_LEAD_MODEL: NORMAL
MDC_IDC_LEAD_MODEL: NORMAL
MDC_IDC_LEAD_POLARITY_TYPE: NORMAL
MDC_IDC_LEAD_POLARITY_TYPE: NORMAL
MDC_IDC_LEAD_SERIAL: NORMAL
MDC_IDC_LEAD_SERIAL: NORMAL
MDC_IDC_MSMT_BATTERY_DTM: NORMAL
MDC_IDC_MSMT_BATTERY_REMAINING_LONGEVITY: 42 MO
MDC_IDC_MSMT_BATTERY_STATUS: NORMAL
MDC_IDC_MSMT_CAP_CHARGE_DTM: NORMAL
MDC_IDC_MSMT_CAP_CHARGE_ENERGY: 11 J
MDC_IDC_MSMT_CAP_CHARGE_TIME: 1.91 S
MDC_IDC_MSMT_CAP_CHARGE_TIME: 10.21 S
MDC_IDC_MSMT_CAP_CHARGE_TYPE: NORMAL
MDC_IDC_MSMT_CAP_CHARGE_TYPE: NORMAL
MDC_IDC_MSMT_LEADCHNL_RA_IMPEDANCE_VALUE: 860 OHM
MDC_IDC_MSMT_LEADCHNL_RA_PACING_THRESHOLD_AMPLITUDE: 0.5 V
MDC_IDC_MSMT_LEADCHNL_RA_PACING_THRESHOLD_PULSEWIDTH: 0.5 MS
MDC_IDC_MSMT_LEADCHNL_RA_SENSING_INTR_AMPL: 4.8 MV
MDC_IDC_MSMT_LEADCHNL_RV_IMPEDANCE_VALUE: 496 OHM
MDC_IDC_MSMT_LEADCHNL_RV_PACING_THRESHOLD_AMPLITUDE: 0.7 V
MDC_IDC_MSMT_LEADCHNL_RV_PACING_THRESHOLD_PULSEWIDTH: 0.5 MS
MDC_IDC_MSMT_LEADCHNL_RV_SENSING_INTR_AMPL: 25 MV
MDC_IDC_PG_IMPLANT_DTM: NORMAL
MDC_IDC_PG_MFG: NORMAL
MDC_IDC_PG_MODEL: NORMAL
MDC_IDC_PG_SERIAL: NORMAL
MDC_IDC_PG_TYPE: NORMAL
MDC_IDC_SESS_CLINIC_NAME: NORMAL
MDC_IDC_SESS_DTM: NORMAL
MDC_IDC_SESS_TYPE: NORMAL
MDC_IDC_SET_BRADY_AT_MODE_SWITCH_MODE: NORMAL
MDC_IDC_SET_BRADY_AT_MODE_SWITCH_RATE: 170 {BEATS}/MIN
MDC_IDC_SET_BRADY_LOWRATE: 60 {BEATS}/MIN
MDC_IDC_SET_BRADY_MAX_SENSOR_RATE: 120 {BEATS}/MIN
MDC_IDC_SET_BRADY_MAX_TRACKING_RATE: 120 {BEATS}/MIN
MDC_IDC_SET_BRADY_MODE: NORMAL
MDC_IDC_SET_BRADY_PAV_DELAY_HIGH: 260 MS
MDC_IDC_SET_BRADY_PAV_DELAY_LOW: 390 MS
MDC_IDC_SET_BRADY_SAV_DELAY_HIGH: 260 MS
MDC_IDC_SET_BRADY_SAV_DELAY_LOW: 390 MS
MDC_IDC_SET_LEADCHNL_RA_PACING_AMPLITUDE: 2.4 V
MDC_IDC_SET_LEADCHNL_RA_PACING_ANODE_ELECTRODE_1: NORMAL
MDC_IDC_SET_LEADCHNL_RA_PACING_ANODE_LOCATION_1: NORMAL
MDC_IDC_SET_LEADCHNL_RA_PACING_CAPTURE_MODE: NORMAL
MDC_IDC_SET_LEADCHNL_RA_PACING_CATHODE_ELECTRODE_1: NORMAL
MDC_IDC_SET_LEADCHNL_RA_PACING_CATHODE_LOCATION_1: NORMAL
MDC_IDC_SET_LEADCHNL_RA_PACING_POLARITY: NORMAL
MDC_IDC_SET_LEADCHNL_RA_PACING_PULSEWIDTH: 0.5 MS
MDC_IDC_SET_LEADCHNL_RA_SENSING_ADAPTATION_MODE: NORMAL
MDC_IDC_SET_LEADCHNL_RA_SENSING_ANODE_ELECTRODE_1: NORMAL
MDC_IDC_SET_LEADCHNL_RA_SENSING_ANODE_LOCATION_1: NORMAL
MDC_IDC_SET_LEADCHNL_RA_SENSING_CATHODE_ELECTRODE_1: NORMAL
MDC_IDC_SET_LEADCHNL_RA_SENSING_CATHODE_LOCATION_1: NORMAL
MDC_IDC_SET_LEADCHNL_RA_SENSING_POLARITY: NORMAL
MDC_IDC_SET_LEADCHNL_RA_SENSING_SENSITIVITY: 0.25 MV
MDC_IDC_SET_LEADCHNL_RV_PACING_AMPLITUDE: 2.4 V
MDC_IDC_SET_LEADCHNL_RV_PACING_ANODE_ELECTRODE_1: NORMAL
MDC_IDC_SET_LEADCHNL_RV_PACING_ANODE_LOCATION_1: NORMAL
MDC_IDC_SET_LEADCHNL_RV_PACING_CAPTURE_MODE: NORMAL
MDC_IDC_SET_LEADCHNL_RV_PACING_CATHODE_ELECTRODE_1: NORMAL
MDC_IDC_SET_LEADCHNL_RV_PACING_CATHODE_LOCATION_1: NORMAL
MDC_IDC_SET_LEADCHNL_RV_PACING_POLARITY: NORMAL
MDC_IDC_SET_LEADCHNL_RV_PACING_PULSEWIDTH: 0.5 MS
MDC_IDC_SET_LEADCHNL_RV_SENSING_ADAPTATION_MODE: NORMAL
MDC_IDC_SET_LEADCHNL_RV_SENSING_ANODE_ELECTRODE_1: NORMAL
MDC_IDC_SET_LEADCHNL_RV_SENSING_ANODE_LOCATION_1: NORMAL
MDC_IDC_SET_LEADCHNL_RV_SENSING_CATHODE_ELECTRODE_1: NORMAL
MDC_IDC_SET_LEADCHNL_RV_SENSING_CATHODE_LOCATION_1: NORMAL
MDC_IDC_SET_LEADCHNL_RV_SENSING_POLARITY: NORMAL
MDC_IDC_SET_LEADCHNL_RV_SENSING_SENSITIVITY: 0.6 MV
MDC_IDC_SET_ZONE_DETECTION_INTERVAL: 273 MS
MDC_IDC_SET_ZONE_DETECTION_INTERVAL: 333 MS
MDC_IDC_SET_ZONE_DETECTION_INTERVAL: 375 MS
MDC_IDC_SET_ZONE_TYPE: NORMAL
MDC_IDC_SET_ZONE_VENDOR_TYPE: NORMAL
MDC_IDC_STAT_AT_BURDEN_PERCENT: 85 %
MDC_IDC_STAT_AT_MODE_SW_PERCENT_TIME: 85 %
MDC_IDC_STAT_BRADY_RA_PERCENT_PACED: 2 %
MDC_IDC_STAT_BRADY_RV_PERCENT_PACED: 5 %
MDC_IDC_STAT_EPISODE_RECENT_COUNT: 2
MDC_IDC_STAT_EPISODE_RECENT_COUNT: 211
MDC_IDC_STAT_EPISODE_RECENT_COUNT: 400
MDC_IDC_STAT_EPISODE_RECENT_COUNT_DTM_END: NORMAL
MDC_IDC_STAT_EPISODE_RECENT_COUNT_DTM_START: NORMAL
MDC_IDC_STAT_EPISODE_TOTAL_COUNT: 4
MDC_IDC_STAT_EPISODE_TOTAL_COUNT: 5356
MDC_IDC_STAT_EPISODE_TOTAL_COUNT: 6
MDC_IDC_STAT_EPISODE_TOTAL_COUNT_DTM_END: NORMAL
MDC_IDC_STAT_EPISODE_TYPE: NORMAL
MDC_IDC_STAT_EPISODE_VENDOR_TYPE: NORMAL
MDC_IDC_STAT_TACHYTHERAPY_ATP_DELIVERED_RECENT: 0
MDC_IDC_STAT_TACHYTHERAPY_ATP_DELIVERED_TOTAL: 1
MDC_IDC_STAT_TACHYTHERAPY_RECENT_DTM_END: NORMAL
MDC_IDC_STAT_TACHYTHERAPY_RECENT_DTM_START: NORMAL
MDC_IDC_STAT_TACHYTHERAPY_SHOCKS_ABORTED_RECENT: 0
MDC_IDC_STAT_TACHYTHERAPY_SHOCKS_ABORTED_TOTAL: 0
MDC_IDC_STAT_TACHYTHERAPY_SHOCKS_DELIVERED_RECENT: 0
MDC_IDC_STAT_TACHYTHERAPY_SHOCKS_DELIVERED_TOTAL: 1
MDC_IDC_STAT_TACHYTHERAPY_TOTAL_DTM_END: NORMAL

## 2019-07-15 RX ORDER — LIDOCAINE 40 MG/G
CREAM TOPICAL
Status: CANCELLED | OUTPATIENT
Start: 2019-07-15

## 2019-07-17 ENCOUNTER — OFFICE VISIT (OUTPATIENT)
Dept: DERMATOLOGY | Facility: CLINIC | Age: 64
End: 2019-07-17
Payer: COMMERCIAL

## 2019-07-17 VITALS — HEART RATE: 66 BPM | SYSTOLIC BLOOD PRESSURE: 116 MMHG | DIASTOLIC BLOOD PRESSURE: 74 MMHG

## 2019-07-17 DIAGNOSIS — Z96.641 STATUS POST TOTAL REPLACEMENT OF RIGHT HIP: Primary | ICD-10-CM

## 2019-07-17 DIAGNOSIS — C44.519 BASAL CELL CARCINOMA OF ANTERIOR CHEST: Primary | ICD-10-CM

## 2019-07-17 RX ORDER — CEPHALEXIN 500 MG/1
2000 CAPSULE ORAL ONCE
Status: DISCONTINUED | OUTPATIENT
Start: 2019-07-17 | End: 2019-08-19

## 2019-07-17 ASSESSMENT — PAIN SCALES - GENERAL: PAINLEVEL: NO PAIN (0)

## 2019-07-17 NOTE — NURSING NOTE
2,000 mg of Keflex given to patient at 0900 with glass of water. Patient tolerated well and had no complaints.  LOT#[id number]  EXPIRATION:01/20

## 2019-07-17 NOTE — LETTER
7/17/2019       RE: Stu Meredith  [address] [facility] [address]     Dear Colleague,    Thank you for referring your patient, Stu Meredith, to the [facility] DERMATOLOGIC SURGERY at [facility]. Please see a copy of my visit note below.    DERMATOLOGIC EXCISION SURGERY PROCEDURE NOTE     NAME OF PROCEDURE: Excision with complex linear closure  Staff surgeon: Sourav Choe MD  Resident: Tomas Goldberg MD  Fellow: Luis Felipe Jeffrey MD  Scrub nurse: Ashanti Peters LPN    PRE-OPERATIVE DIAGNOSIS:  Basal Cell Carcinoma  POST-OPERATIVE DIAGNOSIS: Same   LOCATION: Left central chest  FINAL EXCISION SIZE(DEFECT SIZE): 2.4 cm  MARGIN: 0.4 cm  FINAL REPAIR LENGTH: 6.0 cm   ANESTHESIA:  1% lidocaine with 1:100,000 epinephrine  PREOP: 2g Keflex given preprocedurally given recent IRISH 6/5/19.     INDICATIONS: This patient presented with a 1.6 x 1.5 cm Basal Cell Carcinoma.. Excision was indicated.   We discussed the principles of treatment and most likely complications including bleeding, infection, scarring, alteration in skin color and sensation, wound dehiscence,muscle weakness in the area, or recurrence of the lesion or disease. We reviewed that on occasion, after healing, a secondary procedure or revision may be recommended in order to obtain the best cosmetic or functional result.   PROCEDURE: The patient was taken to the operative suite. Time-out was performed. The treatment area was anesthetized. The area was washed with Hibiclens, rinsed with saline and draped with sterile towels. The lesion was delineated and excised down to deep subcutaneous fat in a fusiform manner. Hemostasis was obtained by electrocoagulation.     REPAIR:  A complex layered linear closure was selected as the procedure which would maximally preserve both function and cosmesis and for the following reasons: 1) the defect was widely undermined.   After the excision of the tumor, the area was extensively  and carefully undermined using the scalpel blade. Hemostasis was obtained with electrocoagulation.  4-0 monocryl sutures were placed to close the subcutaneous and dermal layers. The epidermis was then carefully approximated along the length of the wound using 4-0 Prolene running simple sutures.    Estimated blood loss was less than 10 ml for all surgical sites. A sterile pressure dressing was applied and wound care instructions, with a written handout, were given. The patient was [facility] the [facility] alert and ambulatory.    FU for suture removal in 14 days and in dermatology clinic with MD in ~6 months.    Dr. Sourav Choe MD staffed the patient and was present for the key portions of the above procedure.     Staff Involved:  Fellow/Staff    Luis Felipe Jeffrey MD  PGY5 Dermatology  Mohs Surgery Fellow  [phone]       Attending attestation:  I was present for key elements of the procedure and immediately available for all other portions of the procedure.  I have reviewed the note and edited it as necessary.    Sourav Choe M.D.  Professor  Director of Dermatologic Surgery  Department of Dermatology  [facility]    Dermatology Surgery Clinic  [facility]  [address] [place] [id number]

## 2019-07-17 NOTE — PATIENT INSTRUCTIONS

## 2019-07-17 NOTE — NURSING NOTE
Chief Complaint   Patient presents with     Derm Problem     Haroon is here today for excision on his left central chest      Ashanti Peters LPN

## 2019-07-17 NOTE — PROGRESS NOTES
DERMATOLOGIC EXCISION SURGERY PROCEDURE NOTE     NAME OF PROCEDURE: Excision with complex linear closure  Staff surgeon: Sourav Choe MD  Resident: Tomas Goldberg MD  Fellow: Luis Felipe Jeffrey MD  Scrub nurse: Ashanti Peters LPN    PRE-OPERATIVE DIAGNOSIS:  Basal Cell Carcinoma  POST-OPERATIVE DIAGNOSIS: Same   LOCATION: Left central chest  FINAL EXCISION SIZE(DEFECT SIZE): 2.4 cm  MARGIN: 0.4 cm  FINAL REPAIR LENGTH: 6.0 cm   ANESTHESIA:  1% lidocaine with 1:100,000 epinephrine  PREOP: 2g Keflex given preprocedurally given recent IRISH 6/5/19.     INDICATIONS: This patient presented with a 1.6 x 1.5 cm Basal Cell Carcinoma.. Excision was indicated.   We discussed the principles of treatment and most likely complications including bleeding, infection, scarring, alteration in skin color and sensation, wound dehiscence,muscle weakness in the area, or recurrence of the lesion or disease. We reviewed that on occasion, after healing, a secondary procedure or revision may be recommended in order to obtain the best cosmetic or functional result.   PROCEDURE: The patient was taken to the operative suite. Time-out was performed. The treatment area was anesthetized. The area was washed with Hibiclens, rinsed with saline and draped with sterile towels. The lesion was delineated and excised down to deep subcutaneous fat in a fusiform manner. Hemostasis was obtained by electrocoagulation.     REPAIR:  A complex layered linear closure was selected as the procedure which would maximally preserve both function and cosmesis and for the following reasons: 1) the defect was widely undermined.   After the excision of the tumor, the area was extensively and carefully undermined using the scalpel blade. Hemostasis was obtained with electrocoagulation.  4-0 monocryl sutures were placed to close the subcutaneous and dermal layers. The epidermis was then carefully approximated along the length of the wound using 4-0 Prolene running simple sutures.     Estimated blood loss was less than 10 ml for all surgical sites. A sterile pressure dressing was applied and wound care instructions, with a written handout, were given. The patient was discharged from the [facility] alert and ambulatory.    FU for suture removal in 14 days and in dermatology clinic with MD in ~6 months.    Dr. Sourav Choe MD staffed the patient and was present for the key portions of the above procedure.     Staff Involved:  Fellow/Staff    Luis Felipe Jeffrey MD  PGY5 Dermatology  Mohs Surgery Fellow  [phone]       Attending attestation:  I was present for key elements of the procedure and immediately available for all other portions of the procedure.  I have reviewed the note and edited it as necessary.    Sourav Choe M.D.  Professor  Director of Dermatologic Surgery  Department of Dermatology  [facility]    Dermatology Surgery Clinic  [facility]  [address] [place] [phone]

## 2019-07-18 ENCOUNTER — ANCILLARY PROCEDURE (OUTPATIENT)
Dept: GENERAL RADIOLOGY | Facility: CLINIC | Age: 64
End: 2019-07-18
Attending: ORTHOPAEDIC SURGERY
Payer: COMMERCIAL

## 2019-07-18 ENCOUNTER — ANCILLARY PROCEDURE (OUTPATIENT)
Dept: CARDIOLOGY | Facility: CLINIC | Age: 64
End: 2019-07-18
Payer: COMMERCIAL

## 2019-07-18 ENCOUNTER — OFFICE VISIT (OUTPATIENT)
Dept: ORTHOPEDICS | Facility: CLINIC | Age: 64
End: 2019-07-18
Payer: COMMERCIAL

## 2019-07-18 DIAGNOSIS — I42.2 HYPERTROPHIC CARDIOMYOPATHY (H): ICD-10-CM

## 2019-07-18 DIAGNOSIS — Z47.89 ORTHOPEDIC AFTERCARE: Primary | ICD-10-CM

## 2019-07-18 RX ADMIN — Medication 9 ML: at 12:12

## 2019-07-18 ASSESSMENT — HOOS S4: HOW SEVERE IS YOUR HIP JOINT STIFFNESS AFTER FIRST WAKENING IN THE MORNING?: MODERATE

## 2019-07-18 ASSESSMENT — PAIN SCALES - GENERAL: PAINLEVEL: MILD PAIN (3)

## 2019-07-18 ASSESSMENT — ACTIVITIES OF DAILY LIVING (ADL)
ADL_MEAN: 1.58
ADL_SUBSCALE_SCORE: 60.29
ADL_SUM: 27

## 2019-07-18 NOTE — PROGRESS NOTES
Chief Complaint: No chief complaint on file.   6 weeks right total hip    HPI: Stu Meredith returns today in follow-up for his right hip. He reports that overall things are going well. He is using minimal Rx pain medication at this point and is advancing in physical therapy. He reports that he has more trouble at this point with his left knee than with the hip.     Current Status:  Results of the patient s Hip Disability and Osteoarthritis Outcome Score (HOOS)  are as follows (0-100 scales with 100 being the theoretical best):  Pain: 65   Symptoms:55  ADLs:60.29  Sports/Recreation:50  Quality of Life:43.75  (http://koos.nu/)  UCLA Activity Score: 3    Medications and allergies are documented in the EMR and have been reviewed.  Allergies: Patient has no known allergies.    Physical Exam:  On physical examination the patient appears the stated age, is in no acute distress, affect is appropriate, and breathing is non-labored.  There were no vitals taken for this visit.  There is no height or weight on file to calculate BMI.  Gait: mild Trendelenburg   Incision: healed   ROM:fluid and painless   Distally, the circulatory, motor, and sensation exam is intact with 5/5 EHL, gastroc-soleus, and tibialis anterior.  Sensation to light touch is intact.  Dorsalis pedis and posterior tibialis pulses are palpable.  There are no sores on the feet, no bruising, and no lymphedema.    Assessment: doing well   Plan: cont with PT with gait training. RTC in 6 weeks, sooner if issues.     Nelson Gaspar

## 2019-07-18 NOTE — LETTER
7/18/2019       RE: Stu Meredith  [address] [facility] [address]     Dear Colleague,    Thank you for referring your patient, Stu Meredith, to the [facility]. Please see a copy of my visit note below.    Chief Complaint: No chief complaint on file.   6 weeks right total hip    HPI: Stu Meredith returns today in follow-up for his right hip. He reports that overall things are going well. He is using minimal Rx pain medication at this point and is advancing in physical therapy. He reports that he has more trouble at this point with his left knee than with the hip.     Current Status:  Results of the patient s Hip Disability and Osteoarthritis Outcome Score (HOOS)  are as follows (0-100 scales with 100 being the theoretical best):  Pain: 65   Symptoms:55  ADLs:60.29  Sports/Recreation:50  Quality of Life:43.75  (http://koos.nu/)  UCLA Activity Score: 3    Medications and allergies are documented in the EMR and have been reviewed.  Allergies: Patient has no known allergies.    Physical Exam:  On physical examination the patient appears the stated age, is in no acute distress, affect is appropriate, and breathing is non-labored.  There were no vitals taken for this visit.  There is no height or weight on file to calculate BMI.  Gait: mild Trendelenburg   Incision: healed   ROM:fluid and painless   Distally, the circulatory, motor, and sensation exam is intact with 5/5 EHL, gastroc-soleus, and tibialis anterior.  Sensation to light touch is intact.  Dorsalis pedis and posterior tibialis pulses are palpable.  There are no sores on the feet, no bruising, and no lymphedema.    Assessment: doing well   Plan: cont with PT with gait training. RTC in 6 weeks, sooner if issues.     Nelson Gaspar    Reason For Visit:   Chief Complaint   Patient presents with     Right Hip - Follow Up     Follow Up     right hip pain post op 6 week recheck        There were no vitals taken for this visit.    Andrey Harkins CMA

## 2019-07-18 NOTE — PROGRESS NOTES
Reason For Visit:   Chief Complaint   Patient presents with     Right Hip - Follow Up     Follow Up     right hip pain post op 6 week recheck       There were no vitals taken for this visit.         Andrey Harkins CMA

## 2019-07-19 LAB — COPATH REPORT: NORMAL

## 2019-07-31 ENCOUNTER — ALLIED HEALTH/NURSE VISIT (OUTPATIENT)
Dept: DERMATOLOGY | Facility: CLINIC | Age: 64
End: 2019-07-31
Payer: COMMERCIAL

## 2019-07-31 ENCOUNTER — MYC MEDICAL ADVICE (OUTPATIENT)
Dept: INTERNAL MEDICINE | Facility: CLINIC | Age: 64
End: 2019-07-31

## 2019-07-31 DIAGNOSIS — C44.519 BASAL CELL CARCINOMA OF ANTERIOR CHEST: Primary | ICD-10-CM

## 2019-07-31 ASSESSMENT — PAIN SCALES - GENERAL: PAINLEVEL: NO PAIN (0)

## 2019-07-31 NOTE — NURSING NOTE
Chief Complaint   Patient presents with     Derm Problem     Haroon is here today for suture removal, patient has no complaints.      Ashanti Peters LPN

## 2019-07-31 NOTE — NURSING NOTE
Stu Meredith comes into clinic today at the request of Dr. Choe Ordering Provider for Suture removal. Sutures present clean, dry, and intact. No bleeding or tenderness noted. Patient tolerated procedure well.       This service provided today was under the supervising provider of the day Dr. Choe, who was available if needed.    Ashanti Peters

## 2019-08-04 ENCOUNTER — MYC REFILL (OUTPATIENT)
Dept: CARDIOLOGY | Facility: CLINIC | Age: 64
End: 2019-08-04

## 2019-08-04 DIAGNOSIS — I48.91 ATRIAL FIBRILLATION, UNSPECIFIED TYPE (H): ICD-10-CM

## 2019-08-04 DIAGNOSIS — I42.2 HYPERTROPHIC CARDIOMYOPATHY (H): ICD-10-CM

## 2019-08-05 RX ORDER — SPIRONOLACTONE 50 MG/1
50 TABLET, FILM COATED ORAL DAILY
Qty: 90 TABLET | Refills: 1 | Status: SHIPPED | OUTPATIENT
Start: 2019-08-05 | End: 2019-12-20

## 2019-08-06 ENCOUNTER — TELEPHONE (OUTPATIENT)
Dept: INTERNAL MEDICINE | Facility: CLINIC | Age: 64
End: 2019-08-06

## 2019-08-06 RX ORDER — LIDOCAINE HYDROCHLORIDE AND EPINEPHRINE 10; 10 MG/ML; UG/ML
3 INJECTION, SOLUTION INFILTRATION; PERINEURAL ONCE
Status: DISCONTINUED | OUTPATIENT
Start: 2019-08-06 | End: 2020-05-16

## 2019-08-06 NOTE — TELEPHONE ENCOUNTER
Podiatry and ortho are separate, sent the pt a message, routed to CC to schedule.   Jade Young Paramedic on 8/6/2019 at 8:39 AM

## 2019-08-13 NOTE — TELEPHONE ENCOUNTER
RECORDS RECEIVED FROM: new per ref per pt, bilat foot pain per ref-pt   DATE RECEIVED: 08/29/19   NOTES STATUS DETAILS   OFFICE NOTE from referring provider Internal See 08/06/19 Telephone Encounter    05/28/19 Dr. Kenya Harkins   OFFICE NOTE from other specialist n/a    DISCHARGE SUMMARY from hospital n/a    DISCHARGE REPORT from the ER n/a    OPERATIVE REPORT n/a    MEDICATION LIST Internal 08/06/19   IMPLANT RECORD/STICKER n/a    LABS     CBC/DIFF Internal 07/15/19   CULTURES n/a    INJECTIONS DONE IN RADIOLOGY n/a    MRI n/a    CT SCAN n/a    XRAYS (IMAGES & REPORTS) n/a    TUMOR     PATHOLOGY  Slides & report n/a

## 2019-08-19 ENCOUNTER — APPOINTMENT (OUTPATIENT)
Dept: LAB | Facility: CLINIC | Age: 64
End: 2019-08-19
Attending: INTERNAL MEDICINE
Payer: COMMERCIAL

## 2019-08-19 ENCOUNTER — SURGERY (OUTPATIENT)
Age: 64
End: 2019-08-19
Payer: COMMERCIAL

## 2019-08-19 ENCOUNTER — HOSPITAL ENCOUNTER (INPATIENT)
Facility: CLINIC | Age: 64
LOS: 3 days | Discharge: HOME | End: 2019-08-22
Attending: INTERNAL MEDICINE | Admitting: INTERNAL MEDICINE
Payer: COMMERCIAL

## 2019-08-19 ENCOUNTER — APPOINTMENT (OUTPATIENT)
Dept: MEDSURG UNIT | Facility: CLINIC | Age: 64
End: 2019-08-19
Attending: INTERNAL MEDICINE
Payer: COMMERCIAL

## 2019-08-19 DIAGNOSIS — R06.00 DYSPNEA: ICD-10-CM

## 2019-08-19 DIAGNOSIS — R06.02 SOB (SHORTNESS OF BREATH): ICD-10-CM

## 2019-08-19 DIAGNOSIS — I47.19 ATRIAL TACHYCARDIA (H): ICD-10-CM

## 2019-08-19 DIAGNOSIS — R53.83 FATIGUE: ICD-10-CM

## 2019-08-19 DIAGNOSIS — R35.89 DIURESIS: ICD-10-CM

## 2019-08-19 DIAGNOSIS — I48.19 PERSISTENT ATRIAL FIBRILLATION (H): Primary | Chronic | ICD-10-CM

## 2019-08-19 DIAGNOSIS — I42.2 HYPERTROPHIC CARDIOMYOPATHY (H): ICD-10-CM

## 2019-08-19 PROBLEM — Z51.81 ENCOUNTER FOR MONITORING DOFETILIDE THERAPY: Status: ACTIVE | Noted: 2019-08-19

## 2019-08-19 PROBLEM — Z79.899 ENCOUNTER FOR MONITORING DOFETILIDE THERAPY: Status: ACTIVE | Noted: 2019-08-19

## 2019-08-19 LAB
ANION GAP SERPL CALCULATED.3IONS-SCNC: 3 MMOL/L (ref 3–14)
BASOPHILS # BLD AUTO: 0.1 10E9/L (ref 0–0.2)
BASOPHILS NFR BLD AUTO: 1.4 %
BUN SERPL-MCNC: 12 MG/DL (ref 7–30)
CALCIUM SERPL-MCNC: 9.3 MG/DL (ref 8.5–10.1)
CHLORIDE SERPL-SCNC: 109 MMOL/L (ref 94–109)
CO2 SERPL-SCNC: 27 MMOL/L (ref 20–32)
CREAT SERPL-MCNC: 0.96 MG/DL (ref 0.66–1.25)
DIFFERENTIAL METHOD BLD: ABNORMAL
EOSINOPHIL # BLD AUTO: 0.2 10E9/L (ref 0–0.7)
EOSINOPHIL NFR BLD AUTO: 3.2 %
ERYTHROCYTE [DISTWIDTH] IN BLOOD BY AUTOMATED COUNT: 13.4 % (ref 10–15)
GFR SERPL CREATININE-BSD FRML MDRD: 83 ML/MIN/{1.73_M2}
GLUCOSE SERPL-MCNC: 113 MG/DL (ref 70–99)
HCT VFR BLD AUTO: 47.7 % (ref 40–53)
HGB BLD-MCNC: 15 G/DL (ref 13.3–17.7)
IMM GRANULOCYTES # BLD: 0 10E9/L (ref 0–0.4)
IMM GRANULOCYTES NFR BLD: 0.2 %
LYMPHOCYTES # BLD AUTO: 1.8 10E9/L (ref 0.8–5.3)
LYMPHOCYTES NFR BLD AUTO: 28.2 %
MAGNESIUM SERPL-MCNC: 2.1 MG/DL (ref 1.6–2.3)
MCH RBC QN AUTO: 29.4 PG (ref 26.5–33)
MCHC RBC AUTO-ENTMCNC: 31.4 G/DL (ref 31.5–36.5)
MCV RBC AUTO: 93 FL (ref 78–100)
MONOCYTES # BLD AUTO: 0.6 10E9/L (ref 0–1.3)
MONOCYTES NFR BLD AUTO: 9.4 %
NEUTROPHILS # BLD AUTO: 3.7 10E9/L (ref 1.6–8.3)
NEUTROPHILS NFR BLD AUTO: 57.6 %
NRBC # BLD AUTO: 0 10*3/UL
NRBC BLD AUTO-RTO: 0 /100
PLATELET # BLD AUTO: 248 10E9/L (ref 150–450)
POTASSIUM SERPL-SCNC: 4.8 MMOL/L (ref 3.4–5.3)
RBC # BLD AUTO: 5.11 10E12/L (ref 4.4–5.9)
SODIUM SERPL-SCNC: 139 MMOL/L (ref 133–144)
WBC # BLD AUTO: 6.5 10E9/L (ref 4–11)

## 2019-08-19 PROCEDURE — 85025 COMPLETE CBC W/AUTO DIFF WBC: CPT | Performed by: INTERNAL MEDICINE

## 2019-08-19 PROCEDURE — 36415 COLL VENOUS BLD VENIPUNCTURE: CPT | Performed by: INTERNAL MEDICINE

## 2019-08-19 PROCEDURE — 93005 ELECTROCARDIOGRAM TRACING: CPT

## 2019-08-19 PROCEDURE — 83735 ASSAY OF MAGNESIUM: CPT | Performed by: INTERNAL MEDICINE

## 2019-08-19 PROCEDURE — 27210794 ZZH OR GENERAL SUPPLY STERILE: Performed by: INTERNAL MEDICINE

## 2019-08-19 PROCEDURE — 40000172 ZZH STATISTIC PROCEDURE PREP ONLY

## 2019-08-19 PROCEDURE — 80048 BASIC METABOLIC PNL TOTAL CA: CPT | Performed by: INTERNAL MEDICINE

## 2019-08-19 PROCEDURE — 93451 RIGHT HEART CATH: CPT | Mod: 26 | Performed by: INTERNAL MEDICINE

## 2019-08-19 PROCEDURE — 99223 1ST HOSP IP/OBS HIGH 75: CPT | Mod: 25 | Performed by: INTERNAL MEDICINE

## 2019-08-19 PROCEDURE — 93010 ELECTROCARDIOGRAM REPORT: CPT | Mod: 76 | Performed by: INTERNAL MEDICINE

## 2019-08-19 PROCEDURE — 21400000 ZZH R&B CCU UMMC

## 2019-08-19 PROCEDURE — 25000132 ZZH RX MED GY IP 250 OP 250 PS 637: Performed by: INTERNAL MEDICINE

## 2019-08-19 PROCEDURE — 25000125 ZZHC RX 250: Performed by: INTERNAL MEDICINE

## 2019-08-19 PROCEDURE — 40000141 ZZH STATISTIC PERIPHERAL IV START W/O US GUIDANCE

## 2019-08-19 PROCEDURE — 25000132 ZZH RX MED GY IP 250 OP 250 PS 637: Performed by: DENTIST

## 2019-08-19 PROCEDURE — 93451 RIGHT HEART CATH: CPT | Performed by: INTERNAL MEDICINE

## 2019-08-19 RX ORDER — AMOXICILLIN 250 MG
2 CAPSULE ORAL 2 TIMES DAILY
Status: DISCONTINUED | OUTPATIENT
Start: 2019-08-19 | End: 2019-08-22 | Stop reason: HOSPADM

## 2019-08-19 RX ORDER — DOFETILIDE 0.5 MG/1
500 CAPSULE ORAL EVERY 12 HOURS SCHEDULED
Status: DISCONTINUED | OUTPATIENT
Start: 2019-08-19 | End: 2019-08-21

## 2019-08-19 RX ORDER — LIDOCAINE 40 MG/G
CREAM TOPICAL
Status: DISCONTINUED | OUTPATIENT
Start: 2019-08-19 | End: 2019-08-22 | Stop reason: HOSPADM

## 2019-08-19 RX ORDER — METFORMIN HCL 500 MG
1000 TABLET, EXTENDED RELEASE 24 HR ORAL
Status: DISCONTINUED | OUTPATIENT
Start: 2019-08-19 | End: 2019-08-22 | Stop reason: HOSPADM

## 2019-08-19 RX ORDER — LIDOCAINE 40 MG/G
CREAM TOPICAL
Status: COMPLETED | OUTPATIENT
Start: 2019-08-19 | End: 2019-08-19

## 2019-08-19 RX ORDER — UBIDECARENONE 75 MG
100 CAPSULE ORAL DAILY
Status: DISCONTINUED | OUTPATIENT
Start: 2019-08-19 | End: 2019-08-22 | Stop reason: HOSPADM

## 2019-08-19 RX ORDER — METOPROLOL SUCCINATE 50 MG/1
50 TABLET, EXTENDED RELEASE ORAL DAILY
Status: DISCONTINUED | OUTPATIENT
Start: 2019-08-20 | End: 2019-08-22 | Stop reason: HOSPADM

## 2019-08-19 RX ORDER — SPIRONOLACTONE 50 MG/1
50 TABLET, FILM COATED ORAL DAILY
Status: DISCONTINUED | OUTPATIENT
Start: 2019-08-19 | End: 2019-08-22 | Stop reason: HOSPADM

## 2019-08-19 RX ORDER — ALBUTEROL SULFATE 90 UG/1
2 AEROSOL, METERED RESPIRATORY (INHALATION) EVERY 4 HOURS PRN
Status: DISCONTINUED | OUTPATIENT
Start: 2019-08-19 | End: 2019-08-22 | Stop reason: HOSPADM

## 2019-08-19 RX ORDER — ACETAMINOPHEN 325 MG/1
325-650 TABLET ORAL EVERY 6 HOURS PRN
Status: DISCONTINUED | OUTPATIENT
Start: 2019-08-19 | End: 2019-08-22 | Stop reason: HOSPADM

## 2019-08-19 RX ORDER — AMOXICILLIN 250 MG
1 CAPSULE ORAL 2 TIMES DAILY
Status: DISCONTINUED | OUTPATIENT
Start: 2019-08-19 | End: 2019-08-22 | Stop reason: HOSPADM

## 2019-08-19 RX ORDER — FUROSEMIDE 10 MG/ML
40 INJECTION INTRAMUSCULAR; INTRAVENOUS ONCE
Status: DISCONTINUED | OUTPATIENT
Start: 2019-08-19 | End: 2019-08-21

## 2019-08-19 RX ORDER — ATORVASTATIN CALCIUM 20 MG/1
20 TABLET, FILM COATED ORAL DAILY
Status: DISCONTINUED | OUTPATIENT
Start: 2019-08-19 | End: 2019-08-22 | Stop reason: HOSPADM

## 2019-08-19 RX ORDER — ZOLPIDEM TARTRATE 5 MG/1
5-10 TABLET ORAL
Status: DISCONTINUED | OUTPATIENT
Start: 2019-08-19 | End: 2019-08-22 | Stop reason: HOSPADM

## 2019-08-19 RX ORDER — NALOXONE HYDROCHLORIDE 0.4 MG/ML
.1-.4 INJECTION, SOLUTION INTRAMUSCULAR; INTRAVENOUS; SUBCUTANEOUS
Status: DISCONTINUED | OUTPATIENT
Start: 2019-08-19 | End: 2019-08-22 | Stop reason: HOSPADM

## 2019-08-19 RX ORDER — MULTIVITAMIN,THERAPEUTIC
1 TABLET ORAL DAILY
Status: DISCONTINUED | OUTPATIENT
Start: 2019-08-19 | End: 2019-08-22 | Stop reason: HOSPADM

## 2019-08-19 RX ADMIN — METFORMIN HYDROCHLORIDE 1000 MG: 500 TABLET, EXTENDED RELEASE ORAL at 16:32

## 2019-08-19 RX ADMIN — LIDOCAINE HYDROCHLORIDE 5 ML: 10 INJECTION, SOLUTION EPIDURAL; INFILTRATION; INTRACAUDAL; PERINEURAL at 10:54

## 2019-08-19 RX ADMIN — ZOLPIDEM TARTRATE 10 MG: 5 TABLET, FILM COATED ORAL at 21:57

## 2019-08-19 RX ADMIN — ATORVASTATIN CALCIUM 20 MG: 20 TABLET, FILM COATED ORAL at 16:33

## 2019-08-19 RX ADMIN — THERA TABS 1 TABLET: TAB at 16:33

## 2019-08-19 RX ADMIN — DOFETILIDE 500 MCG: 0.5 CAPSULE ORAL at 19:52

## 2019-08-19 RX ADMIN — LIDOCAINE: 40 CREAM TOPICAL at 09:53

## 2019-08-19 ASSESSMENT — MIFFLIN-ST. JEOR: SCORE: 1743.82

## 2019-08-19 ASSESSMENT — ACTIVITIES OF DAILY LIVING (ADL)
ADLS_ACUITY_SCORE: 15
ADLS_ACUITY_SCORE: 15

## 2019-08-19 NOTE — PROGRESS NOTES
Pt admitted to 2A for a right heart cath and planned admission for Tikosyn loading.  6C aware and has bed.  Consent and H and P up to date.  Wife would like a phone call when done with procedure.

## 2019-08-19 NOTE — PLAN OF CARE
Admission          8/19/2019  8:43 AM  -----------------------------------------------------------  Reason for admission:  Primary team notified of pt arrival.  Admitted from: cath lab  Via: stretcher  Accompanied by: n/a  Belongings: Placed in closet  Admission Profile: complete  Teaching: orientation to unit and call light- call light within reach, call don't fall, use of console, meal times, when to call for the RN, and enforced importance of safety   Access: n/a  Telemetry:Placed on pt  Ht./Wt.: complete  2 RN Skin Assessment Completed By: Pretty RN & Maci RN  Pt status:    Temp:  [97.9  F (36.6  C)-98.3  F (36.8  C)] 98.3  F (36.8  C)  Pulse:  [77-95] 80  Resp:  [16-18] 18  BP: (122-149)/() 130/87  SpO2:  [98 %-99 %] 99 %

## 2019-08-19 NOTE — PLAN OF CARE
Neuro: A&Ox4.   Cardiac: Afib, VSS.   Respiratory: Sating >92% on RA.  GI/: Adequate urine output.  Diet/appetite: Tolerating cardiac diet. Eating well.  Activity:  Independent, up to chair and in halls.  Pain: denies  Skin: right internal jugular site from right heart cath, band-aid over site.   LDA's: n/a    Plan: Continue with POC. Notify primary team with changes. Awaiting orders.

## 2019-08-19 NOTE — H&P
[facility], [place]    History and Physical - Cards 1 Service        Date of Admission:  8/19/2019    Assessment & Plan   Stu Meredith is a 63 year old male with a h/o HCM, VT s/p ICD (7/2012), atrial fibrillation on rivaroxaban s/p multiple PVIs (x3) and DCCVs (x10), non-obstructive CAD, HTN, admitted for dofetilide loading and RHC.    # Atrial fibrillation  History of PVI ablation with several occurrences requiring DCCV, now with recurrent episodes of AT/AF. 100% on AF since 3/14/0219. Stress echo from 6/1/18 showed normal biventricular function with asymmetrical septal hypertrophy and no LVOT obstruction with rest or exercise. Symptomatic with fatigue, MART, and decreased stamina. CHADSVASC score 2. Followed by Dr. Cooper in EP clinic.   - continue rivaroxaban 20 mg for stroke prophylaxis  - continue metoprolol succinate 50 mg for rate control.  - RHC today, dofetilide loading, perform DCCV on day 2, and then repeat RHC on day 3 when in SR  - dofetilide 500 mcg bid  - EKG prior to first dose of dofetilide and 2-3 hours after each dose for 5 doses    # Hypertrophic cardiomyopathy   Previously burnt out with EF 20% (2013), now recovered EF 60%. Non-obstructive LVOT. NYHA class III. Discontinued PTA sotalol. Followed by Dr. Ware.  - continue metoprolol succinate 50 mg daily  - continue spironolactone 50 mg daily    Chronic issues:  # Nonsignificant CAD - continue atorvastatin 20 mg daily  # HTN - continue metoprolol and spironolactone as above  # Prediabetes - continue metformin 1000 mg with supper       Diet: Low Saturated Fat Na <2400 mg    Fluids: PO  DVT Prophylaxis: On PTA rivaroxaban  White Catheter: not present  Code Status: Full    Disposition Plan   Expected discharge: 2 - 3 days, recommended to prior living arrangement once dofetilide loading and RHC completed.  Entered: Laureen Tinsley DDS 08/19/2019, 12:34 PM       The patient's care was discussed with the  Attending Physician, Dr. Strickland and Patient.    Laureen Tinsley, DDS  Cards 1 Service  [facility], [place]  Pager: [phone]  Please see sticky note for cross cover information    Staff Physician Comments:     I personally interviewed and examined Stu Meredith today, and agree with residents assessment and plan as documented above.      Manjeet Strickland MD     Division of Cardiology    ______________________________________________________________________    Chief Complaint   RHC and dofetilide loading due to persistent atrial fibrillation       History of Present Illness   Stu Meredith is a 62 y/o M with h/o HCM (diagnosed 2006), VT s/p ICD insertion (2012), non-significant CAD (10-30% stenosis on angiogram 2013), HTN, A.Fib CHADSVASc 2 on Xarelto s/p multiple PVIs and DCCVs. Osteoarthritis s/p Lt and Rt IRISH (Rt on 6/5/2019).  He reports ongoing fatigue and decreased stamina. Unclear if this is related to AF or his HCM worsening. He denies any chest pain/pressures, dizziness, lightheadedness, leg/ankle swelling, PND, orthopnea, palpitations, or syncopal symptoms.    See Dr. Cooper note on 07/12/2019 for extensive cardiac HPI.      Review of Systems    CONSTITUTIONAL:  fatigue  EYES:  negative  HEENT:  negative  RESPIRATORY:  dyspnea with exertion   CARDIOVASCULAR:  negative  GASTROINTESTINAL:  negative  INTEGUMENT/BREAST:  negative  HEMATOLOGIC/LYMPHATIC:  negative  MUSCULOSKELETAL:  negative  NEUROLOGICAL:  negative    Past Medical History    I have reviewed this patient's medical history and updated it with pertinent information if needed.   Past Medical History:   Diagnosis Date     Atrial fibrillation (H) 12/9/11    failed medication, multiple DC cardioveresions; s/p Left atrial ablation to eliminate atrial fibrillation 12/9/11     Chest pain      CHF (congestive heart failure) (H) 7/30/2016     Coronary artery disease      DJD (degenerative  joint disease)      Fatigue 7/15/2019     Hip arthritis 1/15/2014     Hypertension      Hypertrophic cardiomyopathy (H) 10/09     Other abnormal heart sounds      Pacemaker     ICD     Palpitations      Pneumonia, organism unspecified(486)      Prediabetes 7/10/15    A1C 6.5     Status post implantation of automatic cardioverter/defibrillator (AICD)      Ventricular tachycardia (H)         Past Surgical History   I have reviewed this patient's surgical history and updated it with pertinent information if needed.  Past Surgical History:   Procedure Laterality Date     ANESTHESIA CARDIOVERSION  4/24/2014    Procedure: ANESTHESIA CARDIOVERSION;  Surgeon: Generic Anesthesia Provider;  Location: [facility]     ANESTHESIA CARDIOVERSION N/A 5/12/2016    Procedure: ANESTHESIA CARDIOVERSION;  Surgeon: GENERIC ANESTHESIA PROVIDER;  Location: [facility]     ANESTHESIA CARDIOVERSION N/A 8/7/2017    Procedure: ANESTHESIA CARDIOVERSION;  Anesthesia Offsite Coverage Cardioversion @1100;  Surgeon: GENERIC ANESTHESIA PROVIDER;  Location: [facility] OR     ANESTHESIA CARDIOVERSION N/A 1/3/2018    Procedure: ANESTHESIA CARDIOVERSION;  Anesthesia Cardioverion;  Surgeon: GENERIC ANESTHESIA PROVIDER;  Location: [facility]     ANESTHESIA CARDIOVERSION N/A 5/4/2018    Procedure: ANESTHESIA CARDIOVERSION;  Anesthesia Coverage Cardioversion @1400;  Surgeon: GENERIC ANESTHESIA PROVIDER;  Location: [facility]     ANESTHESIA CARDIOVERSION N/A 9/27/2018    Procedure: ANESTHESIA CARDIOVERSION;  Anesthesia Cardioversion @0930;  Surgeon: GENERIC ANESTHESIA PROVIDER;  Location: [facility]     ANESTHESIA CARDIOVERSION N/A 12/20/2018    Procedure: Anesthesia Coverage Cardioversion @0830;  Surgeon: GENERIC ANESTHESIA PROVIDER;  Location: [facility]     ARTHROPLASTY HIP  1/15/2014    Procedure: ARTHROPLASTY HIP;  Left Total Hip Arthroplasty;  Surgeon: Nelson Gaspar MD;  Location: [facility]     ARTHROPLASTY HIP Right 6/5/2019    Procedure: Right Total Hip Arthroplasty;  Surgeon: Hubert  Nelson VERA MD;  Location: [facility] OR     CARDIAC SURGERY       COLONOSCOPY N/A 5/18/2018    Procedure: COMBINED COLONOSCOPY, SINGLE OR MULTIPLE BIOPSY/POLYPECTOMY BY BIOPSY;  COLONOSCOPY (PT HAS DEFIBRILLATOR) ;  Surgeon: Mike Nickerson MD;  Location: [facility]     H ABLATION FOCAL AFIB  12/9/11    Left atrial ablation to eliminate atrial fibrillation     IMPLANT AUTOMATIC IMPLANTABLE CARDIOVERTER DEFIBRILLATOR  7/27/12    AICD implantation     TONSILLECTOMY  1964        Social History   I have reviewed this patient's social history and updated it with pertinent information if needed. Stu Meredith  reports that he quit smoking about 3 years ago. His smoking use included cigarettes. He started smoking about 44 years ago. He has a 40.00 pack-year smoking history. He has never used smokeless tobacco. He reports that he drinks alcohol. He reports that he does not use drugs.    Family History   I have reviewed this patient's family history and updated it with pertinent information if needed.   Family History   Problem Relation Age of Onset     Heart Disease Mother         unknown     Obesity Mother      Gastrointestinal Disease Mother         diverticulitis     Diabetes Maternal Grandmother      Diabetes Paternal Grandmother      Cerebrovascular Disease Paternal Grandmother 94     Diabetes Paternal Grandfather      Cerebrovascular Disease Paternal Grandfather 78     Diabetes Son      C.A.D. Father         CABG age 78     Heart Disease Father         CABG x5     Diabetes Maternal Grandfather      LUNG DISEASE No family hx of      Deep Vein Thrombosis (DVT) No family hx of      Anesthesia Reaction No family hx of      Melanoma No family hx of      Skin Cancer No family hx of        Prior to Admission Medications   Prior to Admission Medications   Prescriptions Last Dose Informant Patient Reported? Taking?   HYDROcodone-acetaminophen (NORCO) 5-325 MG tablet Past Week Self No Yes   Sig: Take 1-2 tablets by mouth every 4 hours  as needed for severe pain   HYDROcodone-acetaminophen (NORCO) 5-325 MG tablet  Self No No   Sig: Take 1 tablet by mouth every 8 hours as needed for severe pain   XARELTO 20 MG TABS tablet 8/19/2019 Self No Yes   Sig: Take 1 tablet (20 mg) by mouth daily (with dinner)   acetaminophen (TYLENOL) 325 MG tablet  Self Yes No   Sig: Take 325-650 mg by mouth every 6 hours as needed   albuterol (PROAIR HFA/PROVENTIL HFA/VENTOLIN HFA) 108 (90 Base) MCG/ACT inhaler  Self No No   Sig: Inhale 2 puffs into the lungs every 4 hours as needed for shortness of breath / dyspnea or wheezing   amoxicillin (AMOXIL) 500 MG tablet Unknown Self No No   Sig: Take 4 tablets (2000 mg) by mouth 1 hour before dental procedures.   atorvastatin (LIPITOR) 20 MG tablet 8/18/2019 Self No Yes   Sig: Take 1 tablet (20 mg) by mouth daily   cyanocobalamin (VITAMIN B-12) 100 MCG tablet Past Month Self Yes Yes   Sig: Take 100 mcg by mouth daily   metFORMIN (GLUCOPHAGE-XR) 500 MG 24 hr tablet 8/18/2019 Self No Yes   Sig: Take 1 tablet (500 mg) by mouth daily (with dinner) for 7 days, THEN 2 tablets (1,000 mg) daily (with dinner).   Patient taking differently: Take 2 tablets (1,000 mg) daily (with dinner).   metoprolol succinate (TOPROL-XL) 50 MG 24 hr tablet 8/19/2019 Self No Yes   Sig: Take 1 tablet (50 mg) by mouth daily   multivitamin, therapeutic (THERA-VIT) TABS More than a month Self Yes No   Sig: Take 1 tablet by mouth daily   sotalol (BETAPACE) 120 MG tablet 8/14/2019 Self No No   Sig: Take 1 tablet (120 mg) by mouth 2 times daily   spironolactone (ALDACTONE) 50 MG tablet 8/18/2019 Self No Yes   Sig: Take 1 tablet (50 mg) by mouth daily   zolpidem (AMBIEN) 10 MG tablet 8/18/2019 Self No Yes   Sig: Take 0.5-1 tablets (5-10 mg) by mouth nightly as needed for sleep      Facility-Administered Medications Last Administration Doses Remaining   lidocaine 1% with EPINEPHrine 1:100,000 injection 3 mL None recorded 1        Allergies   No Known  Allergies    Physical Exam   Vital Signs: Temp: 98.1  F (36.7  C) Temp src: Oral BP: (!) 129/91 Pulse: 88   Resp: 16 SpO2: 99 % O2 Device: None (Room air)    Weight: 205 lbs 6.4 oz    Constitutional: awake, alert, cooperative, no apparent distress, and appears stated age  Eyes: PERRL, EOMI, sclera clear, conjunctiva normal  ENT: Normocephalic, without obvious abnormality, atraumatic, external ears without lesions, oral pharynx with moist mucous membranes  Hematologic / Lymphatic: no cervical lymphadenopathy and no supraclavicular lymphadenopathy  Respiratory: No increased work of breathing, good air exchange, LCTA bilaterally, no crackles or wheezing  Cardiovascular: Irregularly irregular rhythm, tachycardic, normal S1 and S2, no S3 or S4, no murmur  GI: Soft, non-distended, non-tender, +BS  Skin: no bruising or bleeding and normal skin color, texture, turgor  Musculoskeletal: There is no redness, warmth, or swelling of the joints.  Full range of motion noted.    Neurologic: Awake, alert, oriented to name, place and time.  Cranial nerves II-XII are grossly intact.      Data   Data reviewed today: I reviewed all medications, new labs and imaging results over the last 24 hours.     Recent Labs   Lab 08/19/19  0857   WBC 6.5   HGB 15.0   MCV 93         POTASSIUM 4.8   CHLORIDE 109   CO2 27   BUN 12   CR 0.96   ANIONGAP 3   NEENA 9.3   *       ECHO (7/18/2019)  Technically difficult study due to poor accoustic windows.  The patient's rhythm is atrial arrhythmia.  Mildly (EF 45-50%) reduced left ventricular function is present. Mild diffuse  hypokinesis is present.  Known HCM. Septal thickness measures 1.4 cm. No SALVADOR or LVOT obstruction seen.  The right ventricle is normal size and function  No pericardial effusion is present.  IVC diameter <2.1 cm collapsing >50% with sniff suggests a normal RA pressure  of 3 mmHg.

## 2019-08-19 NOTE — PLAN OF CARE
[id number]  A/A fib with some P waves and a few sinus beats  D/He was frustrated with the many lab sticks, and lab came again and we suggested they add MG onto BMP drawn earlier as he did not want to get stuck again if not needed.  A/Mg result is listed now  P/Dofetilide due later, will follow with 12 lead per usual  D/He also wanted me to ask team about a med to help him pee fluid off so he can get rid of the fluid around his heart  I/message relayed to the team  P/await orders   D/Lasix is ordered, and he declined it and said it is too late to take it today or he will be up all night peeing. Also refused Aldactone dose today for same reason  I/gave him handout on tikosyn to review. He told me he is going for a walk

## 2019-08-19 NOTE — PHARMACY-PHARMACOTHERAPY NOTE
Dofetilide (TIKOSYN) Drug Interaction Pharmacy Consult    63 year old male is being initiated on dofetilide for the treatment of atrial fibrillation.  Pharmacy has been consulted to evaluate the patient's current medication list to ensure there are no potential drug interactions with dofetilide.      Assessment/Recommendation:    The patient is on metformin which may increase the serum concentration of dofetilide.  Severity Moderate   Reliability Rating Fair: Reported in the prescribing information   Patient Management Monitor patients receiving this combination closely for signs/symptoms of dofetilide toxicity.  Discussion Dofetilide prescribing information warns that concurrent use of dofetilide with metformin may result in increased dofetilide concentrations, and a resulting increase in the risk for toxicity, due to competition for renal tubular secretion. Both dofetilide and metformin undergo cationic secretion, creating a possibility for competitive inhibition.    Kelly Lyle, PharmD, pager [phone]

## 2019-08-20 ENCOUNTER — ANESTHESIA EVENT (OUTPATIENT)
Dept: SURGERY | Facility: CLINIC | Age: 64
End: 2019-08-20
Payer: COMMERCIAL

## 2019-08-20 ENCOUNTER — ANESTHESIA (OUTPATIENT)
Dept: SURGERY | Facility: CLINIC | Age: 64
End: 2019-08-20
Payer: COMMERCIAL

## 2019-08-20 PROBLEM — R53.83 FATIGUE: Status: RESOLVED | Noted: 2019-07-15 | Resolved: 2019-08-20

## 2019-08-20 PROBLEM — Z86.79 S/P ABLATION OF ATRIAL FIBRILLATION: Chronic | Status: ACTIVE | Noted: 2018-08-16

## 2019-08-20 PROBLEM — R06.00 DYSPNEA: Status: RESOLVED | Noted: 2019-07-15 | Resolved: 2019-08-20

## 2019-08-20 PROBLEM — Z98.890 S/P ABLATION OF ATRIAL FIBRILLATION: Chronic | Status: ACTIVE | Noted: 2018-08-16

## 2019-08-20 PROBLEM — R06.02 SOB (SHORTNESS OF BREATH): Status: RESOLVED | Noted: 2019-07-15 | Resolved: 2019-08-20

## 2019-08-20 PROBLEM — Z98.890 STATUS POST HIP SURGERY: Status: RESOLVED | Noted: 2019-06-05 | Resolved: 2019-08-20

## 2019-08-20 PROBLEM — I10 HTN (HYPERTENSION): Chronic | Status: ACTIVE | Noted: 2019-08-20

## 2019-08-20 LAB
ANION GAP SERPL CALCULATED.3IONS-SCNC: 9 MMOL/L (ref 3–14)
BUN SERPL-MCNC: 13 MG/DL (ref 7–30)
CALCIUM SERPL-MCNC: 9 MG/DL (ref 8.5–10.1)
CHLORIDE SERPL-SCNC: 109 MMOL/L (ref 94–109)
CO2 SERPL-SCNC: 26 MMOL/L (ref 20–32)
CREAT SERPL-MCNC: 0.85 MG/DL (ref 0.66–1.25)
ERYTHROCYTE [DISTWIDTH] IN BLOOD BY AUTOMATED COUNT: 13.3 % (ref 10–15)
GFR SERPL CREATININE-BSD FRML MDRD: >90 ML/MIN/{1.73_M2}
GLUCOSE SERPL-MCNC: 98 MG/DL (ref 70–99)
HCT VFR BLD AUTO: 45.7 % (ref 40–53)
HGB BLD-MCNC: 14.5 G/DL (ref 13.3–17.7)
INTERPRETATION ECG - MUSE: NORMAL
INTERPRETATION ECG - MUSE: NORMAL
MCH RBC QN AUTO: 29.1 PG (ref 26.5–33)
MCHC RBC AUTO-ENTMCNC: 31.7 G/DL (ref 31.5–36.5)
MCV RBC AUTO: 92 FL (ref 78–100)
PLATELET # BLD AUTO: 224 10E9/L (ref 150–450)
POTASSIUM SERPL-SCNC: 4.1 MMOL/L (ref 3.4–5.3)
RBC # BLD AUTO: 4.98 10E12/L (ref 4.4–5.9)
SODIUM SERPL-SCNC: 144 MMOL/L (ref 133–144)
WBC # BLD AUTO: 6.4 10E9/L (ref 4–11)

## 2019-08-20 PROCEDURE — 85027 COMPLETE CBC AUTOMATED: CPT | Performed by: INTERNAL MEDICINE

## 2019-08-20 PROCEDURE — 93005 ELECTROCARDIOGRAM TRACING: CPT

## 2019-08-20 PROCEDURE — 36415 COLL VENOUS BLD VENIPUNCTURE: CPT | Performed by: INTERNAL MEDICINE

## 2019-08-20 PROCEDURE — 93010 ELECTROCARDIOGRAM REPORT: CPT | Mod: 76 | Performed by: INTERNAL MEDICINE

## 2019-08-20 PROCEDURE — 80048 BASIC METABOLIC PNL TOTAL CA: CPT | Performed by: INTERNAL MEDICINE

## 2019-08-20 PROCEDURE — 99232 SBSQ HOSP IP/OBS MODERATE 35: CPT | Mod: GC | Performed by: INTERNAL MEDICINE

## 2019-08-20 PROCEDURE — 25000132 ZZH RX MED GY IP 250 OP 250 PS 637: Performed by: INTERNAL MEDICINE

## 2019-08-20 PROCEDURE — 25000132 ZZH RX MED GY IP 250 OP 250 PS 637: Performed by: DENTIST

## 2019-08-20 PROCEDURE — 21400000 ZZH R&B CCU UMMC

## 2019-08-20 PROCEDURE — 25000132 ZZH RX MED GY IP 250 OP 250 PS 637: Performed by: PHYSICIAN ASSISTANT

## 2019-08-20 RX ORDER — POTASSIUM CHLORIDE 750 MG/1
40 TABLET, EXTENDED RELEASE ORAL
Status: CANCELLED | OUTPATIENT
Start: 2019-08-20

## 2019-08-20 RX ORDER — POTASSIUM CHLORIDE 750 MG/1
20 TABLET, EXTENDED RELEASE ORAL
Status: CANCELLED | OUTPATIENT
Start: 2019-08-20

## 2019-08-20 RX ADMIN — SENNOSIDES AND DOCUSATE SODIUM 1 TABLET: 8.6; 5 TABLET ORAL at 10:18

## 2019-08-20 RX ADMIN — VITAMIN B12 0.1 MG ORAL TABLET 100 MCG: 0.1 TABLET ORAL at 07:37

## 2019-08-20 RX ADMIN — DOFETILIDE 500 MCG: 0.5 CAPSULE ORAL at 19:49

## 2019-08-20 RX ADMIN — RIVAROXABAN 20 MG: 10 TABLET, FILM COATED ORAL at 10:18

## 2019-08-20 RX ADMIN — ATORVASTATIN CALCIUM 20 MG: 20 TABLET, FILM COATED ORAL at 19:50

## 2019-08-20 RX ADMIN — METOPROLOL SUCCINATE 50 MG: 50 TABLET, EXTENDED RELEASE ORAL at 07:37

## 2019-08-20 RX ADMIN — ZOLPIDEM TARTRATE 10 MG: 5 TABLET, FILM COATED ORAL at 22:21

## 2019-08-20 RX ADMIN — SPIRONOLACTONE 50 MG: 50 TABLET, FILM COATED ORAL at 07:38

## 2019-08-20 RX ADMIN — DOFETILIDE 500 MCG: 0.5 CAPSULE ORAL at 07:37

## 2019-08-20 RX ADMIN — THERA TABS 1 TABLET: TAB at 07:37

## 2019-08-20 RX ADMIN — METFORMIN HYDROCHLORIDE 1000 MG: 500 TABLET, EXTENDED RELEASE ORAL at 17:44

## 2019-08-20 ASSESSMENT — ACTIVITIES OF DAILY LIVING (ADL)
ADLS_ACUITY_SCORE: 15
ADLS_ACUITY_SCORE: 13
ADLS_ACUITY_SCORE: 15
ADLS_ACUITY_SCORE: 13

## 2019-08-20 ASSESSMENT — ENCOUNTER SYMPTOMS: DYSRHYTHMIAS: 1

## 2019-08-20 ASSESSMENT — LIFESTYLE VARIABLES: TOBACCO_USE: 1

## 2019-08-20 ASSESSMENT — NEW YORK HEART ASSOCIATION (NYHA) CLASSIFICATION: NYHA FUNCTIONAL CLASS: II

## 2019-08-20 NOTE — PROGRESS NOTES
[facility], [place]    Progress Note - Cards 1 Service        Date of Admission:  8/19/2019    Assessment & Plan   Stu Meredith is a 63 year old male with a h/o HCM, VT s/p ICD (7/2012), atrial fibrillation on rivaroxaban s/p multiple PVIs (x3) and DCCVs (x10), non-obstructive CAD, HTN, admitted for dofetilide loading and RHC.     # Changes today  - DCCV tomorrow morning 8am; NPO at midnight  - RHC tomorrow afternoon    # Atrial fibrillation  History of PVI ablation with several occurrences requiring DCCV, now with recurrent episodes of AT/AF. 100% on AF since 3/14/0219. Stress echo from 6/1/18 showed normal biventricular function with asymmetrical septal hypertrophy and no LVOT obstruction with rest or exercise. Symptomatic with fatigue, MART, and decreased stamina. CHADSVASC score 2. Followed by Dr. Cooper in EP clinic. Plan to load dose dofetilide, RHC day 1, DCCV day 2, RHC day 3 if patient is in sinus rhythm. Patient taking sotalol prior to admission.  - dofetilide 500 mcg q12h  - EKG prior to first dose of dofetilide and 2-3 hours after each dose for 5 doses  - continue rivaroxaban 20 mg qday  - continue metoprolol succinate 50 mg     # Hypertrophic cardiomyopathy   Previously burnt out with EF 20% (2013), now recovered EF 60%. Non-obstructive LVOT. NYHA class III. Discontinued PTA sotalol. Followed by Dr. Ware.  - continue metoprolol succinate as above  - continue spironolactone 50 mg daily      Chronic issues:  # Nonsignificant CAD - continue atorvastatin 20 mg daily  # HTN - continue metoprolol and spironolactone as above  # Prediabetes - continue metformin 1000 mg with supper     Diet: NPO per Anesthesia Guidelines for Procedure/Surgery Except for: Meds    Fluids: PO  Lines: PIV left arm  DVT Prophylaxis: Anticoagulated on rivaroxiban   White Catheter: not present  Code Status: Full Code      Disposition Plan   Expected discharge: tomorrow, recommended to prior living  arrangement once dofetilide loading and RHC completed.  Entered: Laureen Tinsley DDS 08/20/2019, 8:55 AM       The patient's care was discussed with the Attending Physician, Dr. Strickland and Patient.    Laureen Tinsley DDS  Cards 1 Service  [facility], [place]  Pager: [phone]  Please see sticky note for cross cover information    Staff Physician Comments:     I personally interviewed and examined Stu Meredith today, and agree with cardiology fellows/residents assessment and plan as documented above. Atypical atrial flutter on EKG today but feels somewhat better than before. Plan DCCV tomorrow followed by right heart cath per request of Dr Cooper.    Manjeet Strickland MD     Division of Cardiology    ______________________________________________________________________    Interval History   Care team notes reviewed. No acute events overnight. This morning, the patient stated that he has not had anything to eat or drink since last night before midnight. The patient confirms he has been on rivaroxiban since June and has not missed any doses. He reports that he usually takes the medication in the evening, but decided to take it yesterday morning before being admitted. The cardioversion was cancelled today due to suspected conversion to sinus rhythm and the patient ate chips at 1:15pm. The patient is in atrial flutter and will need a cardioversion. The cardioversion will take place at 8:00am tomorrow. Discussed with nurse practitioner Maci Dominguez and patient will have the DCCV in the morning tomorrow and will have the RHC in the afternoon.     Data reviewed today: I reviewed all medications, new labs and imaging results over the last 24 hours.     Physical Exam   Vital Signs: Temp: 98  F (36.7  C) Temp src: Oral BP: 116/82 Pulse: 73 Heart Rate: 82 Resp: 16 SpO2: 98 % O2 Device: None (Room air)    Weight: 205 lbs 6.4 oz    Constitutional: awake, alert,  cooperative, no apparent distress, and appears stated age  Eyes: PERRL, EOMI, sclera clear, conjunctiva normal  ENT: Normocephalic, without obvious abnormality, atraumatic, external ears without lesions, oral pharynx with moist mucous membranes  Hematologic / Lymphatic: no cervical lymphadenopathy and no supraclavicular lymphadenopathy  Respiratory: No increased work of breathing, good air exchange, LCTA bilaterally, no crackles or wheezing  Cardiovascular: Irregularly irregular rhythm, regular rate, normal S1 and S2, no S3 or S4, no murmur  GI: Soft, non-distended, non-tender, +BS  Skin: no bruising or bleeding and normal skin color, texture, turgor  Musculoskeletal: There is no redness, warmth, or swelling of the joints.  Full range of motion noted.    Neurologic: Awake, alert, oriented to name, place and time.  Cranial nerves II-XII are grossly intact.      Data   Recent Labs   Lab 08/20/19  0518 08/19/19  0857   WBC 6.4 6.5   HGB 14.5 15.0   MCV 92 93    248    139   POTASSIUM 4.1 4.8   CHLORIDE 109 109   CO2 26 27   BUN 13 12   CR 0.85 0.96   ANIONGAP 9 3   NEENA 9.0 9.3   GLC 98 113*     RHC (8/19/2019)  Right sided filling pressures are mildly elevated.  Left sided filling pressures are mildly elevated.  Reduced cardiac output level.  Mild elevated Pulmonary Hypertension.

## 2019-08-20 NOTE — PLAN OF CARE
D: Pt admitted 8/19 for RHC and dofetilide initiation. Hx HCM, VT s/p ICD, afib on rivaroxaban s/p PVI ablation x3 and DCCV x10, CAD, HTN, prediabetes.  I: Monitored vitals and assessed pt status.   A: A0x4. VSS on RA. Afib with rare paced bts on tele. Denies pain, SOB, dizziness. Voiding adequately. Up ind. Pt slept between cares, making needs known.  P: Continue to monitor and report changes/concerns to Cards 1.

## 2019-08-20 NOTE — ANESTHESIA PREPROCEDURE EVALUATION
Anesthesia Pre-Procedure Evaluation    Patient: Stu Meredith   MRN:     [id number] Gender:   male   Age:    63 year old :      1955        Preoperative Diagnosis: Atrial Fibrillation   Procedure(s):  CARDIOVERSION     Past Medical History:   Diagnosis Date     Atrial fibrillation (H) 11    failed medication, multiple DC cardioveresions; s/p Left atrial ablation to eliminate atrial fibrillation 11     Chest pain      CHF (congestive heart failure) (H) 2016     Coronary artery disease      DJD (degenerative joint disease)      Fatigue 7/15/2019     Hip arthritis 1/15/2014     Hypertension      Hypertrophic cardiomyopathy (H) 10/09     Other abnormal heart sounds      Pacemaker     ICD     Palpitations      Pneumonia, organism unspecified(486)      Prediabetes 7/10/15    A1C 6.5     Status post implantation of automatic cardioverter/defibrillator (AICD)      Ventricular tachycardia (H)       Past Surgical History:   Procedure Laterality Date     ANESTHESIA CARDIOVERSION  2014    Procedure: ANESTHESIA CARDIOVERSION;  Surgeon: Generic Anesthesia Provider;  Location: [facility]     ANESTHESIA CARDIOVERSION N/A 2016    Procedure: ANESTHESIA CARDIOVERSION;  Surgeon: GENERIC ANESTHESIA PROVIDER;  Location: [facility]     ANESTHESIA CARDIOVERSION N/A 2017    Procedure: ANESTHESIA CARDIOVERSION;  Anesthesia Offsite Coverage Cardioversion @1100;  Surgeon: GENERIC ANESTHESIA PROVIDER;  Location: [facility]     ANESTHESIA CARDIOVERSION N/A 1/3/2018    Procedure: ANESTHESIA CARDIOVERSION;  Anesthesia Cardioverion;  Surgeon: GENERIC ANESTHESIA PROVIDER;  Location: [facility]     ANESTHESIA CARDIOVERSION N/A 2018    Procedure: ANESTHESIA CARDIOVERSION;  Anesthesia Coverage Cardioversion @1400;  Surgeon: GENERIC ANESTHESIA PROVIDER;  Location: [facility]     ANESTHESIA CARDIOVERSION N/A 2018    Procedure: ANESTHESIA CARDIOVERSION;  Anesthesia Cardioversion @0930;  Surgeon: GENERIC ANESTHESIA PROVIDER;   Location: [facility]     ANESTHESIA CARDIOVERSION N/A 12/20/2018    Procedure: Anesthesia Coverage Cardioversion @0830;  Surgeon: GENERIC ANESTHESIA PROVIDER;  Location: [facility]     ARTHROPLASTY HIP  1/15/2014    Procedure: ARTHROPLASTY HIP;  Left Total Hip Arthroplasty;  Surgeon: Nelson Gaspar MD;  Location: [facility]     ARTHROPLASTY HIP Right 6/5/2019    Procedure: Right Total Hip Arthroplasty;  Surgeon: Nelson Gaspar MD;  Location: [facility]     CARDIAC SURGERY       COLONOSCOPY N/A 5/18/2018    Procedure: COMBINED COLONOSCOPY, SINGLE OR MULTIPLE BIOPSY/POLYPECTOMY BY BIOPSY;  COLONOSCOPY (PT HAS DEFIBRILLATOR) ;  Surgeon: Mike Nickerson MD;  Location:  GI     CV RIGHT HEART CATH N/A 8/19/2019    Procedure: CV RIGHT HEART CATH;  Surgeon: Cisco Rausch MD;  Location:  HEART CARDIAC CATH LAB     H ABLATION FOCAL AFIB  12/9/11    Left atrial ablation to eliminate atrial fibrillation     IMPLANT AUTOMATIC IMPLANTABLE CARDIOVERTER DEFIBRILLATOR  7/27/12    AICD implantation     TONSILLECTOMY  1964          Anesthesia Evaluation     . Pt has had prior anesthetic. Type: MAC and Regional    History of anesthetic complications    bit his cheek during one DCCV      ROS/MED HX    ENT/Pulmonary: Comment: Chest CT 3/22/19 showed RUL & RLL nodules, recommended f/u CT in 2 mos.    PFT 11/15/13: normal, FEV1 3.51, FEV1/FVC 79%    Denies inhaler use    (+)tobacco use, Past use 40 pk yr hx, quit 2015 packs/day  , recent URI resolved Treated for cough/influenza in [place] mid-March: . .    Neurologic:  - neg neurologic ROS     Cardiovascular: Comment: Nonobstructive CAD -- 10-30% stenoses on cath '13.      (+) Dyslipidemia, hypertension--CAD, --. Taking blood thinners Pt has received instructions: Instructions Given to patient: stop Xarelto 2 days prior to surgery. CHF etiology: hypertrophic cardiomyopathy Last EF: 45-50% date: 8/16/18 NYHA classification: II. . :ICD Reason placed:VT, AF  type;[employer] . dysrhythmias a-fib,  . Previous cardiac testing Echodate:8/16/18 and 7/2019results:Spontaneous echo contrast in left atrial appendage. No thrombus in left atrial  appendage.  Normal left ventricle size. The Ejection Fraction is estimated at 45-50%.  The right ventricle is normal size. Global right ventricular function is  normal.  This study was compared with the study from 6/1/18. There has been no change    Interpretation Summary  Technically difficult study due to poor accoustic windows.  The patient's rhythm is atrial arrhythmia.  Mildly (EF 45-50%) reduced left ventricular function is present. Mild diffuse  hypokinesis is present.  Known HCM. Septal thickness measures 1.4 cm. No SALVADOR or LVOT obstruction seen.  The right ventricle is normal size and function  No pericardial effusion is present.  IVC diameter <2.1 cm collapsing >50% with sniff suggests a normal RA pressure  of 3 mmHg.  _____________________________________________________________________________  __        Left Ventricle  Left ventricular size is normal. The pattern of wall thickening is consistent  with hypertrophic cardiomyopathy. Diastolic function not assessed due to  atrial fibrillation. Mildly (EF 45-50%) reduced left ventricular function is  present. Mild diffuse hypokinesis is present.     Right Ventricle  The right ventricle is normal size. Global right ventricular function is  normal. A pacemaker lead is noted in the right ventricle.     Atria  The right atria appears normal. Moderate left atrial enlargement is present.     Mitral Valve  The mitral valve is normal. Trace mitral insufficiency is present.        Aortic Valve  Aortic valve is normal in structure and function. The aortic valve is  tricuspid.     Tricuspid Valve  The tricuspid valve is normal. Right ventricular systolic pressure is 29mmHg  above the right atrial pressure. Trace tricuspid insufficiency is present.     Pulmonic Valve  The pulmonic valve is normal.     Vessels  The inferior vena  cava was normal in size with preserved respiratory  variability. The aorta root is normal. IVC diameter <2.1 cm collapsing >50%  with sniff suggests a normal RA pressure of 3 mmHg.     Pericardium  No pericardial effusion is present.        Compared to Previous Study  In comparison to TTE on 7/29/2016, there is no change in LVEF. Pulmonary HTN  has improved from moderate to mild since last TTE.     Attestation  I have personally viewed the imaging and agree with the interpretation and  report as documented by the fellow, Hannah Mahajan, and/or edited by me.  _____________________________________________________________________________  __     MMode/2D Measurements & Calculations  IVSd: 1.1 cm  LVIDd: 5.1 cm  LVIDs: 3.7 cm  LVPWd: 1.1 cm  FS: 27.5 %  LV mass(C)d: 211.0 grams  LV mass(C)dI: 98.0 grams/m2  Ao root diam: 3.0 cm  LA dimension: 5.0 cm  LA/Ao: 1.7  LVOT diam: 2.3 cm  LVOT area: 4.1 cm2  LA Volume (BP): 78.6 ml  LA Volume Index (BP): 36.6 ml/m2     RWT: 0.43        Doppler Measurements & Calculations  MV E max ashley: 87.6 cm/sec  MV dec slope: 779.9 cm/sec2  MV dec time: 0.11 sec  PA V2 max: 93.0 cm/sec  PA max PG: 3.5 mmHg  PA acc time: 0.08 sec  Stress Testdate:2/15/19 results:Severe impairment, class II functional capacityECG reviewed date:8/21/19 results:Atrial flutter with 2:1 A-V conduction 92  Nonspecific ST and T wave abnormality  Prolonged QT  When compared with ECG of 20-AUG-2019 21:52, (unconfirmed)  ST elevation now present in Inferior leads  QT has lengthenedCath date: 7/2019 results:Right sided Cath, PAP 40/25    Right sided filling pressures are mildly elevated.    Left sided filling pressures are mildly elevated.    Reduced cardiac output level.    Mild elevated Pulmonary Hypertension          METS/Exercise Tolerance: Comment: Activity limited by hip pain. Denies CP. 3 - Able to walk 1-2 blocks without stopping   Hematologic:  - neg hematologic  ROS       Musculoskeletal: Comment: S/P L hip  arthroplasty 2014.  R hip osteoarthritis  (+) arthritis,  -       GI/Hepatic:  - neg GI/hepatic ROS       Renal/Genitourinary:  - ROS Renal section negative       Endo:     (+) type II DM Last HgA1c: 6.8 date: 5/10/19 Not using insulin - not using insulin pump not previously admitted for DM/DKA Obesity, .      Psychiatric:  - neg psychiatric ROS       Infectious Disease:  - neg infectious disease ROS       Malignancy:      - no malignancy   Other:    (+) No chance of pregnancy C-spine cleared: N/A, no H/O Chronic Pain,                       PHYSICAL EXAM:   Mental Status/Neuro: A/A/O   Airway:   Mallampati: II  Mouth/Opening: Full  TM distance: > 6 cm  Neck ROM: Limited   Respiratory: Auscultation: CTAB     Resp. Rate: Normal     Resp. Effort: Normal      CV: Rhythm: Irregular  Heart: Normal Sounds  Edema: None   Comments:                      LABS:  CBC:   Lab Results   Component Value Date    WBC 6.4 08/20/2019    WBC 6.5 08/19/2019    HGB 14.5 08/20/2019    HGB 15.0 08/19/2019    HCT 45.7 08/20/2019    HCT 47.7 08/19/2019     08/20/2019     08/19/2019     BMP:   Lab Results   Component Value Date     08/20/2019     08/19/2019    POTASSIUM 4.1 08/20/2019    POTASSIUM 4.8 08/19/2019    CHLORIDE 109 08/20/2019    CHLORIDE 109 08/19/2019    CO2 26 08/20/2019    CO2 27 08/19/2019    BUN 13 08/20/2019    BUN 12 08/19/2019    CR 0.85 08/20/2019    CR 0.96 08/19/2019    GLC 98 08/20/2019     (H) 08/19/2019     COAGS:   Lab Results   Component Value Date    INR 1.24 (H) 12/20/2018     POC:   Lab Results   Component Value Date     (H) 06/07/2019     OTHER:   Lab Results   Component Value Date    LACT 1.0 07/30/2016    A1C 6.8 (H) 05/10/2019    NEENA 9.0 08/20/2019    PHOS 2.8 11/10/2008    MAG 2.1 08/19/2019    ALBUMIN 3.7 02/15/2019    PROTTOTAL 7.2 02/15/2019    ALT 38 02/15/2019    AST 26 02/15/2019    ALKPHOS 102 02/15/2019    BILITOTAL 0.5 02/15/2019    TSH 1.58 02/15/2019    CRP  "<2.9 05/10/2019    SED 10 05/10/2019        Preop Vitals    BP Readings from Last 3 Encounters:   08/20/19 116/82   07/17/19 116/74   07/12/19 123/86    Pulse Readings from Last 3 Encounters:   08/19/19 73   07/17/19 66   07/12/19 76      Resp Readings from Last 3 Encounters:   08/20/19 16   07/10/19 16   06/07/19 16    SpO2 Readings from Last 3 Encounters:   08/20/19 98%   07/12/19 97%   07/12/19 97%      Temp Readings from Last 1 Encounters:   08/20/19 36.7  C (98  F) (Oral)    Ht Readings from Last 1 Encounters:   08/19/19 1.803 m (5' 11\")      Wt Readings from Last 1 Encounters:   08/19/19 93.2 kg (205 lb 6.4 oz)    Estimated body mass index is 28.65 kg/m  as calculated from the following:    Height as of this encounter: 1.803 m (5' 11\").    Weight as of this encounter: 93.2 kg (205 lb 6.4 oz).     LDA:  Peripheral IV 08/19/19 Left;Anterior Lower forearm (Active)   Site Assessment WDL 8/20/2019  2:30 AM   Line Status Saline locked 8/20/2019  2:30 AM   Phlebitis Scale 0-->no symptoms 8/20/2019  2:30 AM   Infiltration Scale 0 8/20/2019  2:30 AM   Infiltration Site Treatment Method  None 8/19/2019  4:00 PM   Extravasation? No 8/19/2019  4:00 PM   Number of days: 1        Assessment:   ASA SCORE: 3    H&P: History and physical reviewed and following examination; no interval change.    NPO Status: NPO Appropriate     Plan:   Anes. Type:  MAC   Pre-Medication: None   Induction:  IV (Standard)   Airway: Native Airway   Access/Monitoring: PIV   Maintenance: N/a     Postop Plan:   Postop Pain: None  Postop Sedation/Airway: Not planned     CONSENT: Direct conversation   Plan and risks discussed with: Patient   Blood Products: Consent Deferred (Minimal Blood Loss)       Comments for Plan/Consent:  Gauze emily Patel MD  "

## 2019-08-21 ENCOUNTER — APPOINTMENT (OUTPATIENT)
Dept: CARDIOLOGY | Facility: CLINIC | Age: 64
End: 2019-08-21
Attending: INTERNAL MEDICINE
Payer: COMMERCIAL

## 2019-08-21 ENCOUNTER — SURGERY (OUTPATIENT)
Age: 64
End: 2019-08-21
Payer: COMMERCIAL

## 2019-08-21 LAB
ANION GAP SERPL CALCULATED.3IONS-SCNC: 9 MMOL/L (ref 3–14)
BUN SERPL-MCNC: 17 MG/DL (ref 7–30)
CALCIUM SERPL-MCNC: 8.8 MG/DL (ref 8.5–10.1)
CHLORIDE SERPL-SCNC: 108 MMOL/L (ref 94–109)
CO2 SERPL-SCNC: 25 MMOL/L (ref 20–32)
CREAT SERPL-MCNC: 0.81 MG/DL (ref 0.66–1.25)
GFR SERPL CREATININE-BSD FRML MDRD: >90 ML/MIN/{1.73_M2}
GLUCOSE SERPL-MCNC: 104 MG/DL (ref 70–99)
INR PPP: 1.16 (ref 0.86–1.14)
INTERPRETATION ECG - MUSE: NORMAL
MAGNESIUM SERPL-MCNC: 2 MG/DL (ref 1.6–2.3)
POTASSIUM SERPL-SCNC: 4 MMOL/L (ref 3.4–5.3)
SODIUM SERPL-SCNC: 141 MMOL/L (ref 133–144)

## 2019-08-21 PROCEDURE — 25000132 ZZH RX MED GY IP 250 OP 250 PS 637: Performed by: DENTIST

## 2019-08-21 PROCEDURE — 83735 ASSAY OF MAGNESIUM: CPT | Performed by: DENTIST

## 2019-08-21 PROCEDURE — 25000128 H RX IP 250 OP 636: Performed by: STUDENT IN AN ORGANIZED HEALTH CARE EDUCATION/TRAINING PROGRAM

## 2019-08-21 PROCEDURE — 93010 ELECTROCARDIOGRAM REPORT: CPT | Mod: 76 | Performed by: INTERNAL MEDICINE

## 2019-08-21 PROCEDURE — 5A2204Z RESTORATION OF CARDIAC RHYTHM, SINGLE: ICD-10-PCS | Performed by: INTERNAL MEDICINE

## 2019-08-21 PROCEDURE — 36415 COLL VENOUS BLD VENIPUNCTURE: CPT | Performed by: DENTIST

## 2019-08-21 PROCEDURE — 25000125 ZZHC RX 250: Performed by: INTERNAL MEDICINE

## 2019-08-21 PROCEDURE — 25000132 ZZH RX MED GY IP 250 OP 250 PS 637: Performed by: INTERNAL MEDICINE

## 2019-08-21 PROCEDURE — 4A023N6 MEASUREMENT OF CARDIAC SAMPLING AND PRESSURE, RIGHT HEART, PERCUTANEOUS APPROACH: ICD-10-PCS | Performed by: INTERNAL MEDICINE

## 2019-08-21 PROCEDURE — 99233 SBSQ HOSP IP/OBS HIGH 50: CPT | Mod: 25 | Performed by: INTERNAL MEDICINE

## 2019-08-21 PROCEDURE — 36415 COLL VENOUS BLD VENIPUNCTURE: CPT | Performed by: PHYSICIAN ASSISTANT

## 2019-08-21 PROCEDURE — 27210794 ZZH OR GENERAL SUPPLY STERILE: Performed by: INTERNAL MEDICINE

## 2019-08-21 PROCEDURE — 21400000 ZZH R&B CCU UMMC

## 2019-08-21 PROCEDURE — 93005 ELECTROCARDIOGRAM TRACING: CPT

## 2019-08-21 PROCEDURE — 80048 BASIC METABOLIC PNL TOTAL CA: CPT | Performed by: DENTIST

## 2019-08-21 PROCEDURE — 25000125 ZZHC RX 250: Performed by: NURSE ANESTHETIST, CERTIFIED REGISTERED

## 2019-08-21 PROCEDURE — 93451 RIGHT HEART CATH: CPT | Performed by: INTERNAL MEDICINE

## 2019-08-21 PROCEDURE — 37000008 ZZH ANESTHESIA TECHNICAL FEE, 1ST 30 MIN

## 2019-08-21 PROCEDURE — 40000141 ZZH STATISTIC PERIPHERAL IV START W/O US GUIDANCE

## 2019-08-21 PROCEDURE — 92960 CARDIOVERSION ELECTRIC EXT: CPT | Performed by: INTERNAL MEDICINE

## 2019-08-21 PROCEDURE — 85610 PROTHROMBIN TIME: CPT | Performed by: PHYSICIAN ASSISTANT

## 2019-08-21 PROCEDURE — 92960 CARDIOVERSION ELECTRIC EXT: CPT

## 2019-08-21 PROCEDURE — 25000132 ZZH RX MED GY IP 250 OP 250 PS 637: Performed by: PHYSICIAN ASSISTANT

## 2019-08-21 PROCEDURE — 93451 RIGHT HEART CATH: CPT | Mod: 26 | Performed by: INTERNAL MEDICINE

## 2019-08-21 RX ORDER — DOFETILIDE 0.25 MG/1
250 CAPSULE ORAL EVERY 12 HOURS SCHEDULED
Status: DISCONTINUED | OUTPATIENT
Start: 2019-08-21 | End: 2019-08-22 | Stop reason: HOSPADM

## 2019-08-21 RX ORDER — POTASSIUM CHLORIDE 750 MG/1
20 TABLET, EXTENDED RELEASE ORAL
Status: COMPLETED | OUTPATIENT
Start: 2019-08-21 | End: 2019-08-21

## 2019-08-21 RX ORDER — FUROSEMIDE 10 MG/ML
40 INJECTION INTRAMUSCULAR; INTRAVENOUS ONCE
Status: COMPLETED | OUTPATIENT
Start: 2019-08-21 | End: 2019-08-21

## 2019-08-21 RX ORDER — POTASSIUM CHLORIDE 750 MG/1
40 TABLET, EXTENDED RELEASE ORAL
Status: DISCONTINUED | OUTPATIENT
Start: 2019-08-21 | End: 2019-08-21 | Stop reason: HOSPADM

## 2019-08-21 RX ADMIN — FUROSEMIDE 40 MG: 10 INJECTION, SOLUTION INTRAVENOUS at 16:37

## 2019-08-21 RX ADMIN — ZOLPIDEM TARTRATE 10 MG: 5 TABLET, FILM COATED ORAL at 22:15

## 2019-08-21 RX ADMIN — Medication 50 MG: at 08:40

## 2019-08-21 RX ADMIN — METOPROLOL SUCCINATE 50 MG: 50 TABLET, EXTENDED RELEASE ORAL at 09:28

## 2019-08-21 RX ADMIN — THERA TABS 1 TABLET: TAB at 09:28

## 2019-08-21 RX ADMIN — ACETAMINOPHEN 650 MG: 325 TABLET, FILM COATED ORAL at 19:09

## 2019-08-21 RX ADMIN — METFORMIN HYDROCHLORIDE 1000 MG: 500 TABLET, EXTENDED RELEASE ORAL at 16:37

## 2019-08-21 RX ADMIN — ATORVASTATIN CALCIUM 20 MG: 20 TABLET, FILM COATED ORAL at 09:27

## 2019-08-21 RX ADMIN — VITAMIN B12 0.1 MG ORAL TABLET 100 MCG: 0.1 TABLET ORAL at 09:27

## 2019-08-21 RX ADMIN — DOFETILIDE 250 MCG: 0.25 CAPSULE ORAL at 20:03

## 2019-08-21 RX ADMIN — SPIRONOLACTONE 50 MG: 50 TABLET, FILM COATED ORAL at 09:27

## 2019-08-21 RX ADMIN — RIVAROXABAN 20 MG: 10 TABLET, FILM COATED ORAL at 07:54

## 2019-08-21 RX ADMIN — DOFETILIDE 500 MCG: 0.5 CAPSULE ORAL at 09:27

## 2019-08-21 RX ADMIN — POTASSIUM CHLORIDE 20 MEQ: 750 TABLET, EXTENDED RELEASE ORAL at 11:48

## 2019-08-21 RX ADMIN — ACETAMINOPHEN 650 MG: 325 TABLET, FILM COATED ORAL at 09:26

## 2019-08-21 ASSESSMENT — MIFFLIN-ST. JEOR: SCORE: 1733.84

## 2019-08-21 ASSESSMENT — ACTIVITIES OF DAILY LIVING (ADL)
ADLS_ACUITY_SCORE: 13

## 2019-08-21 ASSESSMENT — PAIN DESCRIPTION - DESCRIPTORS: DESCRIPTORS: DISCOMFORT

## 2019-08-21 NOTE — OP NOTE
CARDIOVERSION    PROCEDURE:  direct current cardioversion  PROCEDURE DATE: 8/21/2019     Pre-procedure diagnosis:  Atrial fib  Post-procedure diagnosis: same  Complications:  None      BRIEF CLINICAL HISTORY:  Mr. Meredith is a 63 year old male who has a past medical history significant for HCM diagnosed 2006, VT on Holter July 2012 s/p ICD 7/2012, troponin leak Oct 2013 (angiogram with non-significant CAD, LV gram showed EF 25%, recovered to 60% on TTE Dec 2013), HTN, AFib (CHADSVASC 2) with DCCVs then s/p PVI 12/2011, PVI for persistent AF on 7/28/16, PVI/CTI/CFAE with posterior wall isolation 8/17/18, s/p DCCV 8/7/17, 1/3/18l,  5/5/18, 9/27/18, 12/20/2018, and s/p right IRISH on 6/5/2019    He is here today for A Fib DCCV. He takes riveroxaban        PROCEDURE:  The patient arrived at the Echo Laboratory in a fasting, non-sedated state.  Informed consent was previously obtained from patient, who understood indications, risks, and benefits of the procedure. Riveroxabanwas verified for at least the last month.  Transesophageal echo was performed by the echo lab team to rule out intracardiac thrombus.  With help from Anesthesia, patient was deeply sedated.  150J of synchronized biphasic shock was delivered through the cardiac monitor, and the patient was successfully converted to normal sinus rhythm.  After sedation wore off, patient awoke and remained neurologically intact.  The patient tolerated the procedure well from a hemodynamic and respiratory standpoint without any complications.  He was observed in the Echo Laboratory and then discharged to home after sedation wore off and he was ambulatory.    IMPRESSION:  1.  Successful direct current cardioversion with 150J biphasic shock.    RECOMMENDATIONS/PLANS:  1.   Follow-up with Dr Rueda  2.   Continue anticoagulation    I appreciated the opportunity to see and assess Mr Meredith and direct the procedure described above . The above note summarizes my findings and  current recommendations. Please do not hesitate to contact me if you have any questions or concerns.    Fred Gutierres MD [facility]   Pager [phone]  Office [phone]

## 2019-08-21 NOTE — PROVIDER NOTIFICATION
Cards cross-cover paged regarding KMw=460 ms on pre-procedure 12-lead EKG, up from FUp=156 ms after last dofetilide dose yesterday evening. Dr. Pena aware and will review EKG. Continue to monitor.

## 2019-08-21 NOTE — PLAN OF CARE
D: Pt admitted 8/19 for RHC and dofetilide initiation. Hx HCM, VT s/p ICD, afib on rivaroxaban s/p PVI ablation x3 and DCCV x10, CAD, HTN, prediabetes.  I: Monitored vitals and assessed pt status. PRN ambien. NPO since midnight anticipating cardioversion this AM.  A: A0x4. VSS on RA. Aflutter with rare paced bts on tele. Remained aflutter with XVu=588ji on 12-lead following third dofetilide dose; MD aware. Denies pain, SOB, dizziness, palpitations. Pt notes his endurance/stamina is decreased when ambulating hallways. Voiding adequately. Up ind. Pt slept between cares, making needs known.  P: Continue to monitor and report changes/concerns to Cards 1.

## 2019-08-21 NOTE — ANESTHESIA CARE TRANSFER NOTE
Patient: Stu Meredith    Procedure(s):  Anesthesia Coverage Cardioversion @0800    Diagnosis: Atrial Flutter  Diagnosis Additional Information: No value filed.    Anesthesia Type:   MAC     Note:  Airway :Nasal Cannula  Patient transferred to:Phase II  Comments: Spont resp, VSS, report to ECHO RNHandoff Report: Identifed the Patient, Identified the Reponsible Provider, Reviewed the pertinent medical history, Discussed the surgical course, Reviewed Intra-OP anesthesia mangement and issues during anesthesia, Set expectations for post-procedure period and Allowed opportunity for questions and acknowledgement of understanding      Vitals: (Last set prior to Anesthesia Care Transfer)              Electronically Signed By: KARSON Moore CRNA  August 21, 2019  8:49 AM

## 2019-08-21 NOTE — PLAN OF CARE
D- Tikosyn initiation   I.A- Aflutter HR 90's/paced. Cardioversion this am, now in NSR. VSS. RA. Denies pain and SOB. RHC this afternoon. K+ 4.0, replaced per protocol.  Plan for Tikosyn dose at 2000 and ekg 2hrs post dose. Then okay to discharge home late tonight or in am per cards 2.   P- Continue to monitor and notify team of changes.

## 2019-08-21 NOTE — ANESTHESIA POSTPROCEDURE EVALUATION
Anesthesia POST Procedure Evaluation    Patient: Stu Meredith   MRN:     [id number] Gender:   male   Age:    63 year old :      1955        Preoperative Diagnosis: Atrial Flutter   Procedure(s):  Anesthesia Coverage Cardioversion @0800   Postop Comments: No value filed.       Anesthesia Type:  Not documented  MAC    Reportable Event: NO     PAIN: Uncomplicated   Sign Out status: Comfortable, Well controlled pain     PONV: No PONV   Sign Out status:  No Nausea or Vomiting     Neuro/Psych: Uneventful perioperative course   Sign Out Status: Preoperative baseline; Age appropriate mentation     Airway/Resp.: Uneventful perioperative course   Sign Out Status: Airway Device present     CV: Uneventful perioperative course   Sign Out status: Appropriate BP and perfusion indices; Appropriate HR/Rhythm     Disposition:   Sign Out in:  PACU  Disposition:  Phase II; Home  Recovery Course: Uneventful  Follow-Up: Not required           Last Anesthesia Record Vitals:  CRNA VITALS  2019 0814 - 2019 0914      2019             NIBP:  134/88    Pulse:  71    SpO2:  100 %    Resp Rate (observed):  16    Resp Rate (set):  --    EKG:  Sinus rhythm          Last PACU Vitals:  No vitals data found for the desired time range.        Electronically Signed By: Martha Guerrero MD, 2019, 10:38 AM

## 2019-08-21 NOTE — PLAN OF CARE
D:  History HCM, VT s/p ICD (7/2012), atrial fibrillation on rivaroxaban s/p multiple PVIs (x3) and DCCVs (x10), non-obstructive CAD, HTN, admitted for dofetilide loading and RHC    I/A:  VSS on room air, SR on tele.  Cardioversion this AM, RHC this afternoon, IV lasix.  Dofetilide decreased to 250mcg, EKG 2 hrs after per orders    P:  Continue w/ plan of care, notify team w/ changes/concerns.  Possible discharge tomorrow

## 2019-08-21 NOTE — PROGRESS NOTES
Pt arrived in ECHO department  for scheduled DCCV.   Procedure explained, questions answered and consent signed. Discharge instructions discussed with patient.  Anesthesia gave pt 50 mg IV brevitol for sedation and pt was DCCV at 150 Joules to a SR.  Pt denied C.P. after procedure and VSS.  Report given to 6C RN.

## 2019-08-21 NOTE — PROGRESS NOTES
[facility], [place]    Progress Note - Cards 1 Service        Date of Admission:  8/19/2019    Assessment & Plan   Stu Meredith is a 63 year old male with a h/o HCM, VT s/p ICD (7/2012), atrial fibrillation on rivaroxaban s/p multiple PVIs (x3) and DCCVs (x10), non-obstructive CAD, HTN, admitted for dofetilide loading and RHC.     # Changes today  - DCCV this morning  - RHC this afternoon    # Atrial fibrillation  History of PVI ablation with several occurrences requiring DCCV, now with recurrent episodes of AT/AF. 100% on AF since 3/14/0219. Stress echo from 6/1/18 showed normal biventricular function with asymmetrical septal hypertrophy and no LVOT obstruction with rest or exercise. Symptomatic with fatigue, MART, and decreased stamina. CHADSVASC score 2. Followed by Dr. Cooper in EP clinic. Patient taking sotalol prior to admission. RHC on 8/19, DCCV and RHC on 8/21. Patient in normal sinus rhythm after DCCV with QTc of 442 ms at 0851.  - dofetilide 500 mcg q12h  - EKG prior to first dose of dofetilide and 2-3 hours after each dose for 5 doses  - continue rivaroxaban 20 mg qday  - continue metoprolol succinate 50 mg     # Hypertrophic cardiomyopathy   Previously burnt out with EF 20% (2013), now recovered EF 60%. Non-obstructive LVOT. NYHA class III. Discontinued PTA sotalol. Followed by Dr. Ware.  - continue metoprolol succinate as above  - continue spironolactone 50 mg daily      Chronic issues:  # Nonsignificant CAD - continue atorvastatin 20 mg daily  # HTN - continue metoprolol and spironolactone as above  # Prediabetes - continue metformin 1000 mg with supper     Diet: NPO per Anesthesia Guidelines for Procedure/Surgery Except for: Meds    Fluids: PO  Lines: PIV left arm  DVT Prophylaxis: Anticoagulated on rivaroxiban   White Catheter: not present  Code Status: Full Code      Disposition Plan   Expected discharge: today, recommended to prior living arrangement once  dofetilide loading and RHC completed.  Entered: Laureen Tinsley DDS 08/21/2019, 8:30 AM       The patient's care was discussed with the Attending Physician, Dr. Strickland and Patient.    Laureen Tinsley DDS  Cards 1 Service  [facility], [place]  Pager: [phone]  Please see sticky note for cross cover information    Staff Physician Comments:      I personally interviewed and examined Stu Meredith today, and agree with cardiology fellows/residents assessment and plan as documented above. Successful DCCV this morning, feeling better and now in sinus rhythm. QTc around 450 ms and tolerating dofetilide. Plan for right heart cath per request of Dr Cooper.     Manjeet Strickland MD     Division of Cardiology  ______________________________________________________________________    Interval History   Care team notes reviewed. No acute events overnight. Patient NPO at midnight for cardioversion this AM and RHC this afternoon. Patient denies headaches, dizziness, shortness of breath, chest pain.     Data reviewed today: I reviewed all medications, new labs and imaging results over the last 24 hours.     Physical Exam   Vital Signs: Temp: 97.5  F (36.4  C) Temp src: Oral BP: (!) 137/95 Pulse: 94 Heart Rate: 95 Resp: 16 SpO2: 98 % O2 Device: None (Room air)    Weight: 203 lbs 3.2 oz    Constitutional: awake, alert, cooperative, no apparent distress, and appears stated age  Eyes: PERRL, EOMI, sclera clear, conjunctiva normal  ENT: Normocephalic, without obvious abnormality, atraumatic, external ears without lesions, oral pharynx with moist mucous membranes  Hematologic / Lymphatic: no cervical lymphadenopathy and no supraclavicular lymphadenopathy  Respiratory: No increased work of breathing, good air exchange, LCTA bilaterally, no crackles or wheezing  Cardiovascular: RRR, normal S1 and S2, no S3 or S4, no murmur  GI: Soft, non-distended, non-tender, +BS  Skin: no  bruising or bleeding and normal skin color, texture, turgor  Musculoskeletal: There is no redness, warmth, or swelling of the joints.  Full range of motion noted.    Neurologic: Awake, alert, oriented to name, place and time.  Cranial nerves II-XII are grossly intact.      Data   Recent Labs   Lab 08/21/19  0614 08/20/19  0518 08/19/19  0857   WBC  --  6.4 6.5   HGB  --  14.5 15.0   MCV  --  92 93   PLT  --  224 248   INR 1.16*  --   --     144 139   POTASSIUM 4.0 4.1 4.8   CHLORIDE 108 109 109   CO2 25 26 27   BUN 17 13 12   CR 0.81 0.85 0.96   ANIONGAP 9 9 3   NEENA 8.8 9.0 9.3   * 98 113*     RHC (8/19/2019)  Right sided filling pressures are mildly elevated.  Left sided filling pressures are mildly elevated.  Reduced cardiac output level.  Mild elevated Pulmonary Hypertension.

## 2019-08-22 ENCOUNTER — PATIENT OUTREACH (OUTPATIENT)
Dept: CARDIOLOGY | Facility: CLINIC | Age: 64
End: 2019-08-22

## 2019-08-22 ENCOUNTER — TELEPHONE (OUTPATIENT)
Dept: CARDIOLOGY | Facility: CLINIC | Age: 64
End: 2019-08-22

## 2019-08-22 VITALS
WEIGHT: 203.4 LBS | OXYGEN SATURATION: 97 % | DIASTOLIC BLOOD PRESSURE: 84 MMHG | HEART RATE: 60 BPM | SYSTOLIC BLOOD PRESSURE: 137 MMHG | TEMPERATURE: 97.6 F | BODY MASS INDEX: 28.48 KG/M2 | HEIGHT: 71 IN | RESPIRATION RATE: 16 BRPM

## 2019-08-22 LAB
ANION GAP SERPL CALCULATED.3IONS-SCNC: 8 MMOL/L (ref 3–14)
BUN SERPL-MCNC: 16 MG/DL (ref 7–30)
CALCIUM SERPL-MCNC: 8.9 MG/DL (ref 8.5–10.1)
CHLORIDE SERPL-SCNC: 106 MMOL/L (ref 94–109)
CO2 SERPL-SCNC: 26 MMOL/L (ref 20–32)
CREAT SERPL-MCNC: 0.79 MG/DL (ref 0.66–1.25)
ERYTHROCYTE [DISTWIDTH] IN BLOOD BY AUTOMATED COUNT: 13.3 % (ref 10–15)
GFR SERPL CREATININE-BSD FRML MDRD: >90 ML/MIN/{1.73_M2}
GLUCOSE SERPL-MCNC: 104 MG/DL (ref 70–99)
HCT VFR BLD AUTO: 43.1 % (ref 40–53)
HGB BLD-MCNC: 13.9 G/DL (ref 13.3–17.7)
INR PPP: 1.42 (ref 0.86–1.14)
INTERPRETATION ECG - MUSE: NORMAL
MAGNESIUM SERPL-MCNC: 2 MG/DL (ref 1.6–2.3)
MCH RBC QN AUTO: 29.6 PG (ref 26.5–33)
MCHC RBC AUTO-ENTMCNC: 32.3 G/DL (ref 31.5–36.5)
MCV RBC AUTO: 92 FL (ref 78–100)
PLATELET # BLD AUTO: 212 10E9/L (ref 150–450)
POTASSIUM SERPL-SCNC: 4.1 MMOL/L (ref 3.4–5.3)
RBC # BLD AUTO: 4.69 10E12/L (ref 4.4–5.9)
SODIUM SERPL-SCNC: 142 MMOL/L (ref 133–144)
WBC # BLD AUTO: 6.6 10E9/L (ref 4–11)

## 2019-08-22 PROCEDURE — 25000132 ZZH RX MED GY IP 250 OP 250 PS 637: Performed by: PHYSICIAN ASSISTANT

## 2019-08-22 PROCEDURE — 25000132 ZZH RX MED GY IP 250 OP 250 PS 637: Performed by: DENTIST

## 2019-08-22 PROCEDURE — 85610 PROTHROMBIN TIME: CPT | Performed by: DENTIST

## 2019-08-22 PROCEDURE — 99238 HOSP IP/OBS DSCHRG MGMT 30/<: CPT | Performed by: INTERNAL MEDICINE

## 2019-08-22 PROCEDURE — 85027 COMPLETE CBC AUTOMATED: CPT | Performed by: DENTIST

## 2019-08-22 PROCEDURE — 80048 BASIC METABOLIC PNL TOTAL CA: CPT | Performed by: DENTIST

## 2019-08-22 PROCEDURE — 93005 ELECTROCARDIOGRAM TRACING: CPT

## 2019-08-22 PROCEDURE — 93010 ELECTROCARDIOGRAM REPORT: CPT | Performed by: INTERNAL MEDICINE

## 2019-08-22 PROCEDURE — 36415 COLL VENOUS BLD VENIPUNCTURE: CPT | Performed by: DENTIST

## 2019-08-22 PROCEDURE — 25000132 ZZH RX MED GY IP 250 OP 250 PS 637: Performed by: STUDENT IN AN ORGANIZED HEALTH CARE EDUCATION/TRAINING PROGRAM

## 2019-08-22 PROCEDURE — 83735 ASSAY OF MAGNESIUM: CPT | Performed by: DENTIST

## 2019-08-22 PROCEDURE — 25000132 ZZH RX MED GY IP 250 OP 250 PS 637: Performed by: INTERNAL MEDICINE

## 2019-08-22 RX ORDER — FUROSEMIDE 40 MG
40 TABLET ORAL DAILY
Status: DISCONTINUED | OUTPATIENT
Start: 2019-08-22 | End: 2019-08-22 | Stop reason: HOSPADM

## 2019-08-22 RX ORDER — FUROSEMIDE 40 MG
40 TABLET ORAL DAILY
Qty: 30 TABLET | Refills: 0 | Status: SHIPPED | OUTPATIENT
Start: 2019-08-22 | End: 2019-09-16

## 2019-08-22 RX ORDER — DOFETILIDE 0.25 MG/1
250 CAPSULE ORAL EVERY 12 HOURS
Qty: 60 CAPSULE | Refills: 0 | Status: SHIPPED | OUTPATIENT
Start: 2019-08-22 | End: 2019-09-17

## 2019-08-22 RX ADMIN — DOFETILIDE 250 MCG: 0.25 CAPSULE ORAL at 08:12

## 2019-08-22 RX ADMIN — THERA TABS 1 TABLET: TAB at 08:12

## 2019-08-22 RX ADMIN — RIVAROXABAN 20 MG: 10 TABLET, FILM COATED ORAL at 08:15

## 2019-08-22 RX ADMIN — METOPROLOL SUCCINATE 50 MG: 50 TABLET, EXTENDED RELEASE ORAL at 08:12

## 2019-08-22 RX ADMIN — SPIRONOLACTONE 50 MG: 50 TABLET, FILM COATED ORAL at 08:12

## 2019-08-22 RX ADMIN — ATORVASTATIN CALCIUM 20 MG: 20 TABLET, FILM COATED ORAL at 08:12

## 2019-08-22 RX ADMIN — FUROSEMIDE 40 MG: 40 TABLET ORAL at 11:32

## 2019-08-22 RX ADMIN — VITAMIN B12 0.1 MG ORAL TABLET 100 MCG: 0.1 TABLET ORAL at 08:12

## 2019-08-22 ASSESSMENT — ACTIVITIES OF DAILY LIVING (ADL)
ADLS_ACUITY_SCORE: 11

## 2019-08-22 ASSESSMENT — MIFFLIN-ST. JEOR: SCORE: 1734.75

## 2019-08-22 NOTE — PROGRESS NOTES
VSS. NSR. RA. EKG post Tikosyn dose,  ms. Discharge instructions discussed with patient, pt verbalizes understanding. Belongings sent with patient.

## 2019-08-22 NOTE — PLAN OF CARE
A&O. No c/o pain. SR with 1st degree AV block. QTc 442 after 2000 dose of tikosyn; MD updated. BP's stable. On RA. LS clear. Cardiac diet. Good urine output. Plan for possible discharge today.

## 2019-08-22 NOTE — PROGRESS NOTES
Haroon called back and we scheduled CORE follow up per Cards 1 request on August 27th at 4:30 with labs prior. Given triage number to call in the meantime if he has any worsening shortness of breath, weight gain, swelling in his legs.     Jeanette Whitley RN

## 2019-08-22 NOTE — DISCHARGE SUMMARY
[facility]   Cardiology Discharge Summary      Date of Admission:  8/19/2019  Date of Discharge:  8/22/2019   Discharging Provider: Ama Castelan  Date of Service: 8/22/2019     Primary Care     Ngoc Tolbert  [address] [facility] [address]      Identification and Chief Compaint: Stu Meredith is a 63 year old male who presented on 8/19/2019 for dofetilide loading, cardioversion, and RHC.    Discharge Diagnoses       Encounter for monitoring dofetilide therapy    Atrial fibrillation (H)    Hypertrophic cardiomyopathy (H)    Automatic implantable cardioverter-defibrillator in situ- [employer], dual chamber- NOT dependent    Prediabetes    Atrial tachycardia (H)    HTN (hypertension)    Fatigue    SOB (shortness of breath)    Dyspnea    Discharge Disposition   Discharged to home    Discharge Orders      Basic metabolic panel     Reason for your hospital stay    You were hospitalized for dofetilide loading with cardioversion and right heart catheterization.     Adult [facility] Follow-up and recommended labs and tests    Follow up with [facility] clinic in 2 months to evaluate medication and treatment change. The following labs/tests are recommended: Potassium level.    Follow up with primary care provider, Ngoc Harkins, within 30 days for hospital follow-up.    Follow up with Dr. Cooper or Maci Dominguez in 2 months.    Appointments on [place] and[place]or [facility] [place]with [facility] or [facility] provider or service). Call [phone] if you haven't heard regarding these appointments within 7 days of discharge.     Activity    Your activity upon discharge: activity as tolerated     When to contact your care team    Call your primary doctor if you have any of the following: increased shortness of breath, increased swelling or increased pain.     Diet    Follow this diet upon discharge: Low Saturated Fat Na <2400 mg     Discharge Medications   Discharge  Medication List as of 8/22/2019 12:38 PM      START taking these medications    Details   dofetilide (TIKOSYN) 250 MCG capsule Take 1 capsule (250 mcg) by mouth every 12 hours, Disp-60 capsule, R-0, Local Print         CONTINUE these medications which have CHANGED    Details   furosemide (LASIX) 40 MG tablet Take 1 tablet (40 mg) by mouth daily, Disp-30 tablet, R-0, Local Print         CONTINUE these medications which have NOT CHANGED    Details   acetaminophen (TYLENOL) 325 MG tablet Take 325-650 mg by mouth every 6 hours as needed, Historical      albuterol (PROAIR HFA/PROVENTIL HFA/VENTOLIN HFA) 108 (90 Base) MCG/ACT inhaler Inhale 2 puffs into the lungs every 4 hours as needed for shortness of breath / dyspnea or wheezing, Disp-8.5 g, R-1, E-Prescribe      amoxicillin (AMOXIL) 500 MG tablet Take 4 tablets (2000 mg) by mouth 1 hour before dental procedures., Disp-12 tablet, R-3, E-Prescribe      atorvastatin (LIPITOR) 20 MG tablet Take 1 tablet (20 mg) by mouth daily, Disp-90 tablet, R-3, E-Prescribe      cyanocobalamin (VITAMIN B-12) 100 MCG tablet Take 100 mcg by mouth daily, Historical      !! HYDROcodone-acetaminophen (NORCO) 5-325 MG tablet Take 1 tablet by mouth every 8 hours as needed for severe pain, Disp-20 tablet, R-0, Local Print      !! HYDROcodone-acetaminophen (NORCO) 5-325 MG tablet Take 1-2 tablets by mouth every 4 hours as needed for severe pain, Disp-40 tablet, R-0, Local Print      metFORMIN (GLUCOPHAGE-XR) 500 MG 24 hr tablet Take 1 tablet (500 mg) by mouth daily (with dinner) for 7 days, THEN 2 tablets (1,000 mg) daily (with dinner)., Disp-180 tablet, R-3, E-Prescribe      metoprolol succinate (TOPROL-XL) 50 MG 24 hr tablet Take 1 tablet (50 mg) by mouth daily, Disp-90 tablet, R-3, E-Prescribe      multivitamin, therapeutic (THERA-VIT) TABS Take 1 tablet by mouth daily, Historical      spironolactone (ALDACTONE) 50 MG tablet Take 1 tablet (50 mg) by mouth daily, Disp-90 tablet, R-1,  E-Prescribe      XARELTO 20 MG TABS tablet Take 1 tablet (20 mg) by mouth daily (with dinner), Disp-90 tablet, R-3, NATE, E-Prescribe      zolpidem (AMBIEN) 10 MG tablet Take 0.5-1 tablets (5-10 mg) by mouth nightly as needed for sleep, Disp-90 tablet, R-0, No Print Out       !! - Potential duplicate medications found. Please discuss with provider.      STOP taking these medications       sotalol (BETAPACE) 120 MG tablet Comments:   Reason for Stopping:             Allergies   No Known Allergies    Consultations This Hospital Stay  PHARMACY IP CONSULT  VASCULAR ACCESS CARE ADULT IP CONSULT  [facility] CLINIC EVALUATION IP CONSULT    Significant Results and Procedures   Procedures    Right heart catheterization 8/19/2019    DC cardioversion 8/21/2019    Right heart catheterization 8/21/2019    Data   Results for orders placed or performed in visit on 07/17/19   XR Pelvis w Hip RT 1 View    Narrative    EXAMINATION: XR PELVIS AND HIP RIGHT 1 VIEW  7/18/2019 10:42 AM     CLINICAL HISTORY:  Status post total replacement of right hip     COMPARISON: 6/5/2019    FINDINGS: AP pelvis and crosstable lateral view of the right hip were  obtained and compared to the prior examination dated 6/5/2019.     Right hip: Stable postsurgical changes of total hip arthroplasty.    Left hip: Stable postsurgical changes of the total hip arthroplasty.    Preserved SI joints.      Impression    IMPRESSION: Bilateral postsurgical changes of the stable total hip  arthroplasties.    JUTTA ELLERMANN, MD     RHC (8/21/2019)  Right sided filling pressures are mildly elevated.  Moderately elevated pulmonary artery hypertension.  Left sided filling pressures are severely elevated.  Reduced cardiac output level.  RA 12 mmHg  PA 38 mmHg  PCWP 30 mmHg     RHC (8/19/2019)  Right sided filling pressures are mildly elevated.  Left sided filling pressures are mildly elevated.  Reduced cardiac output level.  Mild elevated Pulmonary Hypertension.  RA 10 mmHg  PA 31  mmHg  PCWP 21 mmHg      History of Present Illness   Stu Meredith is a 64 y/o M with h/o HCM (diagnosed 2006), VT s/p ICD insertion (2012), non-significant CAD (10-30% stenosis on angiogram 2013), HTN, A.Fib CHADSVASc 2 on Xarelto s/p multiple PVIs and DCCVs. Osteoarthritis s/p Lt and Rt IRISH (Rt on 6/5/2019). He reports ongoing fatigue and decreased stamina. Unclear if this is related to AF or his HCM worsening. He denies any chest pain/pressures, dizziness, lightheadedness, leg/ankle swelling, PND, orthopnea, palpitations, or syncopal symptoms.     See Dr. Cooper note on 07/12/2019 for extensive cardiac HPI.      Hospital Course   Stu Meredith was admitted on 8/19/2019.  The following problems were addressed during his hospitalization:    # Atrial fibrillation  History of PVI ablation with several occurrences requiring DCCV, with recurrent episodes of atrial tachycardia/atrial fibrillaiton on admission. 100% atrial fibrillation since 3/14/2019. Stress echo from 6/1/18 showed normal biventricular function with asymmetrical septal hypertrophy and no LVOT obstruction with rest or exercise. Symptomatic with fatigue, dyspnea on exertion, and decreased stamina. CHADSVASC score 2. Followed by Dr. Cooper in EP clinic. Patient taking sotalol 120 mg daily prior to admission, discontinued. Patient had RHC on 8/19 and was started on dofetilide 500 mcg q12h. Patient in normal sinus rhythm after DCCV with QTc of 442 ms in the morning of 8/21. QTc prolonged to 509 ms. Reduced dose of dofetilide to 250 mcg. Most recent EKG shows QTc of 428 ms. Patient continued on PTA rivaroxaban 20 mg qday and metoprolol succinate 50 mg.     # Hypertrophic cardiomyopathy   Previously burnt out with EF 20% (2013), now recovered EF 60%. Non-obstructive LVOT. NYHA class III. PCWP on 8/21 was 30 mmHg. Started on furosemide 40 mg daily. Patient continued on metoprolol succinate as above and PTA spironolactone 50 mg daily. Followed by   Chaz.  Patient will follow up with [facility] clinic in approximately 1 week.    Chronic issues:  # Nonsignificant CAD - Continued PTA atorvastatin 20 mg daily  # HTN - Continued PTA metoprolol and spironolactone as above  # Prediabetes - Continued PTA metformin 1000 mg with supper    Pending Results   Unresulted Labs Ordered in the Past 30 Days of this Admission     No orders found from 7/20/2019 to 8/20/2019.          Physical Exam   Temp:  [97.6  F (36.4  C)-98.5  F (36.9  C)] 97.6  F (36.4  C)  Heart Rate:  [60-68] 68  Resp:  [16-18] 16  BP: (118-137)/(66-91) 137/84  SpO2:  [95 %-99 %] 97 %  Vitals:    08/19/19 1140 08/21/19 0400 08/22/19 0626   Weight: 93.2 kg (205 lb 6.4 oz) 92.2 kg (203 lb 3.2 oz) 92.3 kg (203 lb 6.4 oz)       # Discharge Pain Plan:   - Patient currently has NO PAIN and is not being prescribed pain medications on discharge.    Constitutional: awake, alert, cooperative, no apparent distress, and appears stated age  Eyes: PERRL, EOMI, sclera clear, conjunctiva normal  ENT: Normocephalic, without obvious abnormality, atraumatic, external ears without lesions, oral pharynx with moist mucous membranes  Hematologic / Lymphatic: no cervical lymphadenopathy and no supraclavicular lymphadenopathy  Respiratory: No increased work of breathing, good air exchange, LCTA bilaterally, no crackles or wheezing  Cardiovascular: RRR, normal S1 and S2, no S3 or S4, no murmur  GI: Soft, non-distended, non-tender, +BS  Skin: no bruising or bleeding and normal skin color, texture, turgor  Musculoskeletal: There is no redness, warmth, or swelling of the joints.  Full range of motion noted.    Neurologic: Awake, alert, oriented to name, place and time.  Cranial nerves II-XII are grossly intact.      The discharge plan was discussed with the patient and Dr. Savannah Strickland.    Total time on this discharge was greater than 30 minutes.    Laureen Tinsley DDS  Cardiology - [facility]      Agree with excellent discharge  summary as written above.    Ama Castelan PA-C  [facility] Cardiology     Staff Physician Comments:     I personally interviewed and examined Stu Meredith today during a shared visit, and agree with Ama Salazar cardiology COBY's assessment and plan as documented above.   History was personally confirmed by me. Labs, imaging studies, EKGs, and telemetry were reviewed.   On exam without signs of heart failure and with clear lungs. EKG with QTc of 428 ms and potassium > 4.0  Follow up labs in 1 week and follow up with cardiology, Dr Cooper in 2-4 weeks    Manjeet Strickland MD     Division of Cardiology  [facility] [place] [address]    Clinic nurse:  Joe Pulido LPN   Nurse Care Coordinator- Heart Care   [phone] option 1, than option 3    [phone] Appointments  [phone] Fax  [phone] After hours

## 2019-08-22 NOTE — PROGRESS NOTES
[facility] Consult received inpatient for patient to follow up in [facility] after discharge. Not able to see inpatient. Left voicemail for Haroon to call back to schedule [facility] follow up.     Jeanette Whitley RN

## 2019-08-22 NOTE — TELEPHONE ENCOUNTER
Health Call Center    Phone Message    May a detailed message be left on voicemail: yes    Reason for Call: Other: pt returning call. Pt states that he now has his phone in his pocket and is waiting on a call back. Please call the pt back to discuss.     Action Taken: Message routed to:  [facility] ([facility] cardiology

## 2019-08-23 ENCOUNTER — PRE VISIT (OUTPATIENT)
Dept: CARDIOLOGY | Facility: CLINIC | Age: 64
End: 2019-08-23

## 2019-08-23 DIAGNOSIS — I42.2 HYPERTROPHIC CARDIOMYOPATHY (H): Primary | Chronic | ICD-10-CM

## 2019-08-23 LAB — INTERPRETATION ECG - MUSE: NORMAL

## 2019-08-25 ASSESSMENT — ENCOUNTER SYMPTOMS
MUSCLE WEAKNESS: 1
SYNCOPE: 0
BACK PAIN: 0
TASTE DISTURBANCE: 0
LEG PAIN: 0
SNORES LOUDLY: 0
LIGHT-HEADEDNESS: 0
STIFFNESS: 0
JOINT SWELLING: 0
HOARSE VOICE: 1
HYPERTENSION: 0
DYSPNEA ON EXERTION: 0
HEMOPTYSIS: 0
MYALGIAS: 0
PALPITATIONS: 1
POSTURAL DYSPNEA: 0
TROUBLE SWALLOWING: 0
MUSCLE CRAMPS: 0
WHEEZING: 0
EXERCISE INTOLERANCE: 0
SHORTNESS OF BREATH: 1
SLEEP DISTURBANCES DUE TO BREATHING: 0
SINUS CONGESTION: 0
SORE THROAT: 0
NECK MASS: 0
HYPOTENSION: 0
NECK PAIN: 0
SINUS PAIN: 0
SMELL DISTURBANCE: 0
COUGH DISTURBING SLEEP: 0
ORTHOPNEA: 0
COUGH: 0
SPUTUM PRODUCTION: 0
ARTHRALGIAS: 0

## 2019-08-27 ENCOUNTER — OFFICE VISIT (OUTPATIENT)
Dept: CARDIOLOGY | Facility: CLINIC | Age: 64
End: 2019-08-27
Attending: NURSE PRACTITIONER
Payer: COMMERCIAL

## 2019-08-27 VITALS
SYSTOLIC BLOOD PRESSURE: 107 MMHG | HEART RATE: 64 BPM | BODY MASS INDEX: 29.02 KG/M2 | OXYGEN SATURATION: 97 % | DIASTOLIC BLOOD PRESSURE: 71 MMHG | WEIGHT: 207.3 LBS | HEIGHT: 71 IN

## 2019-08-27 DIAGNOSIS — I42.2 HYPERTROPHIC CARDIOMYOPATHY (H): Chronic | ICD-10-CM

## 2019-08-27 DIAGNOSIS — I42.2 HYPERTROPHIC CARDIOMYOPATHY (H): Primary | ICD-10-CM

## 2019-08-27 LAB
ANION GAP SERPL CALCULATED.3IONS-SCNC: 5 MMOL/L (ref 3–14)
BUN SERPL-MCNC: 15 MG/DL (ref 7–30)
CALCIUM SERPL-MCNC: 9.1 MG/DL (ref 8.5–10.1)
CHLORIDE SERPL-SCNC: 105 MMOL/L (ref 94–109)
CO2 SERPL-SCNC: 29 MMOL/L (ref 20–32)
CREAT SERPL-MCNC: 0.93 MG/DL (ref 0.66–1.25)
GFR SERPL CREATININE-BSD FRML MDRD: 86 ML/MIN/{1.73_M2}
GLUCOSE SERPL-MCNC: 102 MG/DL (ref 70–99)
NT-PROBNP SERPL-MCNC: 1231 PG/ML (ref 0–125)
POTASSIUM SERPL-SCNC: 4.1 MMOL/L (ref 3.4–5.3)
SODIUM SERPL-SCNC: 140 MMOL/L (ref 133–144)

## 2019-08-27 PROCEDURE — 99214 OFFICE O/P EST MOD 30 MIN: CPT | Mod: ZP | Performed by: NURSE PRACTITIONER

## 2019-08-27 PROCEDURE — 36415 COLL VENOUS BLD VENIPUNCTURE: CPT | Performed by: NURSE PRACTITIONER

## 2019-08-27 PROCEDURE — G0463 HOSPITAL OUTPT CLINIC VISIT: HCPCS | Mod: ZF

## 2019-08-27 PROCEDURE — 80048 BASIC METABOLIC PNL TOTAL CA: CPT | Performed by: NURSE PRACTITIONER

## 2019-08-27 PROCEDURE — 93010 ELECTROCARDIOGRAM REPORT: CPT | Mod: ZP | Performed by: INTERNAL MEDICINE

## 2019-08-27 PROCEDURE — 83880 ASSAY OF NATRIURETIC PEPTIDE: CPT | Performed by: NURSE PRACTITIONER

## 2019-08-27 ASSESSMENT — MINNESOTA LIVING WITH HEART FAILURE QUESTIONNAIRE (MLHF)
MAKING YOU STAY IN A HOSPITAL: 2
DIFFICULTY SOCIALIZING WITH FAMILY OR FRIENDS: 1
SWELLING IN ANKLES OR LEGS: 1
LOSS OF SELF CONTROL IN YOUR LIFE: 1
WORKING AROUND THE HOUSE OR YARD DIFFICULT: 2
TOTAL_SCORE: 33
COSTING YOU MONEY FOR MEDICAL CARE: 3
DIFFICULTY WORKING TO EARN A LIVING: 2
MAKING YOU WORRY: 2
DIFFICULTY SLEEPING WELL AT NIGHT: 1
MAKING YOU SHORT OF BREATH: 3
DIFFICULTY WITH RECREATIONAL PASTIMES, SPORTS, HOBBIES: 1
TIRED, FATIGUED OR LOW ON ENERGY: 2
MAKING YOU FEEL DEPRESSED: 1
GIVING YOU SIDE EFFECTS FROM TREATMENTS: 2
DIFFICULTY WITH SEXUAL ACTIVITIES: 1
DIFFICULTY TO CONCENTRATE OR REMEMBERING THINGS: 1
WALKING ABOUT OR CLIMBING STAIRS DIFFICULT: 2
HAVING TO SIT OR LIE DOWN DURING THE DAY: 2
EATING LESS FOODS YOU LIKE: 1
FEELING LIKE A BURDEN TO FAMILY AND FRIENDS: 1
DIFFICULTY GOING AWAY FROM HOME: 1

## 2019-08-27 ASSESSMENT — MIFFLIN-ST. JEOR: SCORE: 1752.44

## 2019-08-27 ASSESSMENT — PAIN SCALES - GENERAL: PAINLEVEL: NO PAIN (0)

## 2019-08-27 NOTE — LETTER
8/27/2019      RE: Stu Meredith  [address] [facility] [address]       Dear Colleague,    Thank you for the opportunity to participate in the care of your patient, Stu Meredith, at the [facility] HEART [facility]. Please see a copy of my visit note below.    HPI  Mr. Meredith is a 63 year old male with history of HCM without obstruction (dx 2006, EF 20% improved to 60%), VT s/p ICD 2012, nonobstructive CAD (cath 2013), AF s/p PVI X 3 (2011, 2016, 2018) and DCCV X 10, currently on Xarelto who was recently admitted for dofetilide loading, cardioversion, and RHC. Given prolonged QTc of 509 ms, defetilide dose was reduced to 250 mcg with improvement to 428 ms. Continued metoprolol succinate 50 mg daily and discharged to home. He presents to clinic for follow up.    Mr. Meredith has been feeling fairly well. Mild HA- frontal developing mid day and persisting, unrelieved with tylenol 1 g. No focal deficits.    No SOB, orthopnea, pnd, chest pain, daily palpitations, lightheadedness, syncope. Appetite good. No nausea or early satiety. No ankle edema.       Past Medical History:   Diagnosis Date     Atrial fibrillation (H) 12/9/11    failed medication, multiple DC cardioveresions; s/p Left atrial ablation to eliminate atrial fibrillation 12/9/11     Chest pain      CHF (congestive heart failure) (H) 7/30/2016     Coronary artery disease      DJD (degenerative joint disease)      Hip arthritis 1/15/2014     Hypertension      Hypertrophic cardiomyopathy (H) 10/09     Other abnormal heart sounds      Pacemaker     ICD     Palpitations      Pneumonia, organism unspecified(486)      Prediabetes 7/10/15    A1C 6.5     Status post implantation of automatic cardioverter/defibrillator (AICD)      Ventricular tachycardia (H)    - HCM diagnosed '06, previously burnt out (EF 20%) now with recovered EF  - h/o VT s/p dual chamber ICD '12  - AF s/p PVI X 2 ('11, '16, '18), h/o DCCV  X 10    Meds:     Current Outpatient Medications on File Prior to Visit:  acetaminophen (TYLENOL) 325 MG tablet Take 325-650 mg by mouth every 6 hours as needed   amoxicillin (AMOXIL) 500 MG tablet Take 4 tablets (2000 mg) by mouth 1 hour before dental procedures.   atorvastatin (LIPITOR) 20 MG tablet Take 1 tablet (20 mg) by mouth daily   cyanocobalamin (VITAMIN B-12) 100 MCG tablet Take 100 mcg by mouth daily   dofetilide (TIKOSYN) 250 MCG capsule Take 1 capsule (250 mcg) by mouth every 12 hours   furosemide (LASIX) 40 MG tablet Take 1 tablet (40 mg) by mouth daily   HYDROcodone-acetaminophen (NORCO) 5-325 MG tablet Take 1-2 tablets by mouth every 4 hours as needed for severe pain   metoprolol succinate (TOPROL-XL) 50 MG 24 hr tablet Take 1 tablet (50 mg) by mouth daily   multivitamin, therapeutic (THERA-VIT) TABS Take 1 tablet by mouth daily   spironolactone (ALDACTONE) 50 MG tablet Take 1 tablet (50 mg) by mouth daily   XARELTO 20 MG TABS tablet Take 1 tablet (20 mg) by mouth daily (with dinner)   zolpidem (AMBIEN) 10 MG tablet Take 0.5-1 tablets (5-10 mg) by mouth nightly as needed for sleep   albuterol (PROAIR HFA/PROVENTIL HFA/VENTOLIN HFA) 108 (90 Base) MCG/ACT inhaler Inhale 2 puffs into the lungs every 4 hours as needed for shortness of breath / dyspnea or wheezing (Patient not taking: Reported on 8/27/2019)   HYDROcodone-acetaminophen (NORCO) 5-325 MG tablet Take 1 tablet by mouth every 8 hours as needed for severe pain (Patient not taking: Reported on 8/27/2019)   metFORMIN (GLUCOPHAGE-XR) 500 MG 24 hr tablet Take 1 tablet (500 mg) by mouth daily (with dinner) for 7 days, THEN 2 tablets (1,000 mg) daily (with dinner). (Patient taking differently: Take 2 tablets (1,000 mg) daily (with dinner).)     Current Facility-Administered Medications on File Prior to Visit:  lidocaine 1% with EPINEPHrine 1:100,000 injection 3 mL       Social History     Socioeconomic History     Marital status:      Spouse  name: Not on file     Number of children: Not on file     Years of education: Not on file     Highest education level: Not on file   Occupational History     Occupation:      Employer: [employer]   Social Needs     Financial resource strain: Not on file     Food insecurity:     Worry: Not on file     Inability: Not on file     Transportation needs:     Medical: Not on file     Non-medical: Not on file   Tobacco Use     Smoking status: Former Smoker     Packs/day: 1.00     Years: 40.00     Pack years: 40.00     Types: Cigarettes     Start date: 1/1/1975     Last attempt to quit: 12/11/2015     Years since quitting: 3.5     Smokeless tobacco: Never Used   Substance and Sexual Activity     Alcohol use: Yes     Alcohol/week: 0.0 oz     Comment: 1 drink per week     Drug use: No     Sexual activity: Yes     Partners: Female   Lifestyle     Physical activity:     Days per week: Not on file     Minutes per session: Not on file     Stress: Not on file   Relationships     Social connections:     Talks on phone: Not on file     Gets together: Not on file     Attends Yazidism service: Not on file     Active member of club or organization: Not on file     Attends meetings of clubs or organizations: Not on file     Relationship status: Not on file     Intimate partner violence:     Fear of current or ex partner: Not on file     Emotionally abused: Not on file     Physically abused: Not on file     Forced sexual activity: Not on file   Other Topics Concern      Service Not Asked     Blood Transfusions Not Asked     Caffeine Concern Not Asked     Occupational Exposure Not Asked     Hobby Hazards Not Asked     Sleep Concern Not Asked     Stress Concern Not Asked     Weight Concern Not Asked     Special Diet Not Asked     Back Care Not Asked     Exercise No     Bike Helmet Not Asked     Seat Belt Yes     Self-Exams Not Asked     Parent/sibling w/ CABG, MI or angioplasty before 65F 55M? No   Social  "History Narrative     Not on file       Family History   Problem Relation Age of Onset     Heart Disease Mother         unknown     Obesity Mother      Gastrointestinal Disease Mother         diverticulitis     Diabetes Maternal Grandmother      Diabetes Paternal Grandmother      Cerebrovascular Disease Paternal Grandmother 94     Diabetes Paternal Grandfather      Cerebrovascular Disease Paternal Grandfather 78     Diabetes Son      C.A.D. Father         CABG age 78     Heart Disease Father         CABG x5     Diabetes Maternal Grandfather      LUNG DISEASE No family hx of      Deep Vein Thrombosis (DVT) No family hx of      Anesthesia Reaction No family hx of      Melanoma No family hx of      Skin Cancer No family hx of        ROS:   Constitutional: No fever, chills, or sweats. No weight gain/loss.   ENT: No visual disturbance, ear ache, epistaxis, sore throat.   Allergies/Immunologic: Negative.   Respiratory: No cough, hemoptysis.   Cardiovascular: As per HPI.   GI: No nausea, vomiting, hematemesis, melena, or hematochezia.   : No urinary frequency, dysuria, or hematuria.   Integument: Negative.   Psychiatric: Negative.   Neuro: Negative.   Endocrinology: Negative.   Musculoskeletal: Ongoing hip pain    Physical Exam  /71 (BP Location: Right arm, Patient Position: Chair, Cuff Size: Adult Large)   Pulse 64   Ht 1.803 m (5' 11\")   Wt 94 kg (207 lb 4.8 oz)   SpO2 97%   BMI 28.91 kg/m     General: appears comfortable, alert and articulate  Head: normocephalic, atraumatic  Eyes: anicteric sclera, EOMI  Neck: no adenopathy  Orophyarynx: moist mucosa, no lesions, dentition intact  Heart: regular, S1/S2, 2/6 systolic murmur, no gallop or rub, estimated JVP flat  Lungs: clear, no rales or wheezing  Abdomen: soft, non-tender, bowel sounds present, no hepatomegaly  Extremities: no clubbing, cyanosis or edema  Neurological: normal speech and affect, no gross motor deficits    CPX Echo 6/1/2018:  Interpretation " Summary  Submaximal treadmill stress test in a patient with a diagnosis of HCM.  The Ramirez treadmill score is 8 (low risk).  Exercise was stopped due to fatigue.  Normal blood pressure response to exercise.  No ECG evidence of ischemia.  No supraventricular or ventricular arrhythmias. Frequent PVCs noted throughout the study.  Normal biventricular function with mild asymmetrical septal hypertrophy (12mm).  No dynamic outflow tract obstruction is present at rest or with exercise.  Normal segmental wall motion at rest and with exercise.  Minimal augmentation in LV function with exercise.  Average functional capacity for age.      RHC 8/21/2019             CPX 2/15/2019:      Device interrogation 4/22/19  Alert received for AF burden. His presenting rhythm is AF/VS- ~70 bpm. AF has been 100% since 3/14/19. Normal device function. AP= 6% and = 3%. Lead trends appear stable. Battery estimates 4 years to CARMEN.    CTA coronary angiogram    IMPRESSION:  1.  No evidence of coronary artery atherosclerosis or stenosis.  2.  Myocardial bridging involving the mid LAD.  3.  Total Agatston score 0 placing the patient in the lowest percentile when compared to age and gender matched control group.      Assessment/Plan:    Mr. Meredith is a pleasant 63 year old man with a history including HCM, VT s/p ICD, nonobstructive CAD, AF s/p PVIx3 and DCCVx10 and recent orthopedic surgery. He is euvolemic today. We'll check an EKG today to monitor QTc given prolonged on dofetilide prompting recent reductions in dose. He'll return to clinic in 1 month and call with any issues in the meantime.     >30 minutes spent in direct care, >50% in counseling    Please do not hesitate to contact me if you have any questions/concerns.     Sincerely,     Jessy Perry NP

## 2019-08-27 NOTE — PROGRESS NOTES
HPI  Mr. Meredith is a 63 year old male with history of HCM without obstruction (dx 2006, EF 20% improved to 60%), VT s/p ICD 2012, nonobstructive CAD (cath 2013), AF s/p PVI X 3 (2011, 2016, 2018) and DCCV X 10, currently on Xarelto who was recently admitted for dofetilide loading, cardioversion, and RHC. Given prolonged QTc of 509 ms, defetilide dose was reduced to 250 mcg with improvement to 428 ms. Continued metoprolol succinate 50 mg daily and discharged to home. He presents to clinic for follow up.    Mr. Meredith has been feeling fairly well. Mild HA- frontal developing mid day and persisting, unrelieved with tylenol 1 g. No focal deficits.    No SOB, orthopnea, pnd, chest pain, daily palpitations, lightheadedness, syncope. Appetite good. No nausea or early satiety. No ankle edema.       Past Medical History:   Diagnosis Date     Atrial fibrillation (H) 12/9/11    failed medication, multiple DC cardioveresions; s/p Left atrial ablation to eliminate atrial fibrillation 12/9/11     Chest pain      CHF (congestive heart failure) (H) 7/30/2016     Coronary artery disease      DJD (degenerative joint disease)      Hip arthritis 1/15/2014     Hypertension      Hypertrophic cardiomyopathy (H) 10/09     Other abnormal heart sounds      Pacemaker     ICD     Palpitations      Pneumonia, organism unspecified(486)      Prediabetes 7/10/15    A1C 6.5     Status post implantation of automatic cardioverter/defibrillator (AICD)      Ventricular tachycardia (H)    - HCM diagnosed '06, previously burnt out (EF 20%) now with recovered EF  - h/o VT s/p dual chamber ICD '12  - AF s/p PVI X 2 ('11, '16, '18), h/o DCCV X 10    Meds:     Current Outpatient Medications on File Prior to Visit:  acetaminophen (TYLENOL) 325 MG tablet Take 325-650 mg by mouth every 6 hours as needed   amoxicillin (AMOXIL) 500 MG tablet Take 4 tablets (2000 mg) by mouth 1 hour before dental procedures.   atorvastatin (LIPITOR) 20 MG tablet Take 1 tablet (20  mg) by mouth daily   cyanocobalamin (VITAMIN B-12) 100 MCG tablet Take 100 mcg by mouth daily   dofetilide (TIKOSYN) 250 MCG capsule Take 1 capsule (250 mcg) by mouth every 12 hours   furosemide (LASIX) 40 MG tablet Take 1 tablet (40 mg) by mouth daily   HYDROcodone-acetaminophen (NORCO) 5-325 MG tablet Take 1-2 tablets by mouth every 4 hours as needed for severe pain   metoprolol succinate (TOPROL-XL) 50 MG 24 hr tablet Take 1 tablet (50 mg) by mouth daily   multivitamin, therapeutic (THERA-VIT) TABS Take 1 tablet by mouth daily   spironolactone (ALDACTONE) 50 MG tablet Take 1 tablet (50 mg) by mouth daily   XARELTO 20 MG TABS tablet Take 1 tablet (20 mg) by mouth daily (with dinner)   zolpidem (AMBIEN) 10 MG tablet Take 0.5-1 tablets (5-10 mg) by mouth nightly as needed for sleep   albuterol (PROAIR HFA/PROVENTIL HFA/VENTOLIN HFA) 108 (90 Base) MCG/ACT inhaler Inhale 2 puffs into the lungs every 4 hours as needed for shortness of breath / dyspnea or wheezing (Patient not taking: Reported on 8/27/2019)   HYDROcodone-acetaminophen (NORCO) 5-325 MG tablet Take 1 tablet by mouth every 8 hours as needed for severe pain (Patient not taking: Reported on 8/27/2019)   metFORMIN (GLUCOPHAGE-XR) 500 MG 24 hr tablet Take 1 tablet (500 mg) by mouth daily (with dinner) for 7 days, THEN 2 tablets (1,000 mg) daily (with dinner). (Patient taking differently: Take 2 tablets (1,000 mg) daily (with dinner).)     Current Facility-Administered Medications on File Prior to Visit:  lidocaine 1% with EPINEPHrine 1:100,000 injection 3 mL       Social History     Socioeconomic History     Marital status:      Spouse name: Not on file     Number of children: Not on file     Years of education: Not on file     Highest education level: Not on file   Occupational History     Occupation:      Employer: [employer] Needs     Financial resource strain: Not on file     Food insecurity:     Worry: Not on file      Inability: Not on file     Transportation needs:     Medical: Not on file     Non-medical: Not on file   Tobacco Use     Smoking status: Former Smoker     Packs/day: 1.00     Years: 40.00     Pack years: 40.00     Types: Cigarettes     Start date: 1/1/1975     Last attempt to quit: 12/11/2015     Years since quitting: 3.5     Smokeless tobacco: Never Used   Substance and Sexual Activity     Alcohol use: Yes     Alcohol/week: 0.0 oz     Comment: 1 drink per week     Drug use: No     Sexual activity: Yes     Partners: Female   Lifestyle     Physical activity:     Days per week: Not on file     Minutes per session: Not on file     Stress: Not on file   Relationships     Social connections:     Talks on phone: Not on file     Gets together: Not on file     Attends Orthodox service: Not on file     Active member of club or organization: Not on file     Attends meetings of clubs or organizations: Not on file     Relationship status: Not on file     Intimate partner violence:     Fear of current or ex partner: Not on file     Emotionally abused: Not on file     Physically abused: Not on file     Forced sexual activity: Not on file   Other Topics Concern      Service Not Asked     Blood Transfusions Not Asked     Caffeine Concern Not Asked     Occupational Exposure Not Asked     Hobby Hazards Not Asked     Sleep Concern Not Asked     Stress Concern Not Asked     Weight Concern Not Asked     Special Diet Not Asked     Back Care Not Asked     Exercise No     Bike Helmet Not Asked     Seat Belt Yes     Self-Exams Not Asked     Parent/sibling w/ CABG, MI or angioplasty before 65F 55M? No   Social History Narrative     Not on file       Family History   Problem Relation Age of Onset     Heart Disease Mother         unknown     Obesity Mother      Gastrointestinal Disease Mother         diverticulitis     Diabetes Maternal Grandmother      Diabetes Paternal Grandmother      Cerebrovascular Disease Paternal Grandmother 94  "    Diabetes Paternal Grandfather      Cerebrovascular Disease Paternal Grandfather 78     Diabetes Son      [place].A.D. Father         CABG age 78     Heart Disease Father         CABG x5     Diabetes Maternal Grandfather      LUNG DISEASE No family hx of      Deep Vein Thrombosis (DVT) No family hx of      Anesthesia Reaction No family hx of      Melanoma No family hx of      Skin Cancer No family hx of        ROS:   Constitutional: No fever, chills, or sweats. No weight gain/loss.   ENT: No visual disturbance, ear ache, epistaxis, sore throat.   Allergies/Immunologic: Negative.   Respiratory: No cough, hemoptysis.   Cardiovascular: As per HPI.   GI: No nausea, vomiting, hematemesis, melena, or hematochezia.   : No urinary frequency, dysuria, or hematuria.   Integument: Negative.   Psychiatric: Negative.   Neuro: Negative.   Endocrinology: Negative.   Musculoskeletal: Ongoing hip pain    Physical Exam  /71 (BP Location: Right arm, Patient Position: Chair, Cuff Size: Adult Large)   Pulse 64   Ht 1.803 m (5' 11\")   Wt 94 kg (207 lb 4.8 oz)   SpO2 97%   BMI 28.91 kg/m    General: appears comfortable, alert and articulate  Head: normocephalic, atraumatic  Eyes: anicteric sclera, EOMI  Neck: no adenopathy  Orophyarynx: moist mucosa, no lesions, dentition intact  Heart: regular, S1/S2, 2/6 systolic murmur, no gallop or rub, estimated JVP flat  Lungs: clear, no rales or wheezing  Abdomen: soft, non-tender, bowel sounds present, no hepatomegaly  Extremities: no clubbing, cyanosis or edema  Neurological: normal speech and affect, no gross motor deficits    CPX Echo 6/1/2018:  Interpretation Summary  Submaximal treadmill stress test in a patient with a diagnosis of HCM.  The Ramirez treadmill score is 8 (low risk).  Exercise was stopped due to fatigue.  Normal blood pressure response to exercise.  No ECG evidence of ischemia.  No supraventricular or ventricular arrhythmias. Frequent PVCs noted throughout the " study.  Normal biventricular function with mild asymmetrical septal hypertrophy (12mm).  No dynamic outflow tract obstruction is present at rest or with exercise.  Normal segmental wall motion at rest and with exercise.  Minimal augmentation in LV function with exercise.  Average functional capacity for age.      RHC 8/21/2019             CPX 2/15/2019:      Device interrogation 4/22/19  Alert received for AF burden. His presenting rhythm is AF/VS- ~70 bpm. AF has been 100% since 3/14/19. Normal device function. AP= 6% and = 3%. Lead trends appear stable. Battery estimates 4 years to CARMEN.    CTA coronary angiogram    IMPRESSION:  1.  No evidence of coronary artery atherosclerosis or stenosis.  2.  Myocardial bridging involving the mid LAD.  3.  Total Agatston score 0 placing the patient in the lowest percentile when compared to age and gender matched control group.      Assessment/Plan:    Mr. Meredith is a pleasant 63 year old man with a history including HCM, VT s/p ICD, nonobstructive CAD, AF s/p PVIx3 and DCCVx10 and recent orthopedic surgery. He is euvolemic today. We'll check an EKG today to monitor QTc given prolonged on dofetilide prompting recent reductions in dose. He'll return to clinic in 1 month and call with any issues in the meantime.     >30 minutes spent in direct care, >50% in counseling

## 2019-08-27 NOTE — PATIENT INSTRUCTIONS
Take your medicines every day, as directed    Changes made today:  o No changes today     Monitor Your Weight and Symptoms    Contact us if you:      Gain 2 pounds in one day or 5 pounds in one week    Feel more short of breath    Notice more leg swelling    Feel lightheadeded   Change your lifestyle    Limit Salt or Sodium:    2000 mg  Limit Fluids:    2000 mL or approximately 64 ounces  Eat a Heart Healthy Diet    Low in saturated fats  Stay Active:    Aim to move at least 150 minutes every  week         To Contact us    During Business Hours:  [phone], option # 1 (University)  Then option # 4 (medical questions)     After hours, weekends or holidays:   [phone], Option #4  Ask to speak to the On-Call Cardiologist. Inform them you are a CORE/heart failure patient at the [facility].     [facility] [employer] allows you to communicate directly with your heart team through secure messaging.    [email or web address] can be accessed any time on your phone, computer, or tablet.    If you need assistance, we'd be happy to help!         Keep your Heart Appointments:    Please have labs drawn in 2 weeks  And  Return to [facility] clinic in 1 month  Please review [facility] pamphlet

## 2019-08-27 NOTE — NURSING NOTE
Vitals completed successfully and medication reconciled.     Ramonita Nelson CMA  4:06 PM  Chief Complaint   Patient presents with     New Patient      New CORE 63yr old male with a h/o HCM, VT s/p ICD and CAD presenting for HF follow-up post hospitalization with labs prior.

## 2019-08-28 ASSESSMENT — HOOS S4: HOW SEVERE IS YOUR HIP JOINT STIFFNESS AFTER FIRST WAKENING IN THE MORNING?: MILD

## 2019-08-28 ASSESSMENT — ACTIVITIES OF DAILY LIVING (ADL)
ADL_SUBSCALE_SCORE: 70.58
ADL_MEAN: 1.17
ADL_SUM: 20

## 2019-08-29 ENCOUNTER — PRE VISIT (OUTPATIENT)
Dept: ORTHOPEDICS | Facility: CLINIC | Age: 64
End: 2019-08-29

## 2019-08-29 ENCOUNTER — OFFICE VISIT (OUTPATIENT)
Dept: ORTHOPEDICS | Facility: CLINIC | Age: 64
End: 2019-08-29
Attending: INTERNAL MEDICINE
Payer: COMMERCIAL

## 2019-08-29 ENCOUNTER — OFFICE VISIT (OUTPATIENT)
Dept: ORTHOPEDICS | Facility: CLINIC | Age: 64
End: 2019-08-29
Payer: COMMERCIAL

## 2019-08-29 ENCOUNTER — ANCILLARY PROCEDURE (OUTPATIENT)
Dept: GENERAL RADIOLOGY | Facility: CLINIC | Age: 64
End: 2019-08-29
Attending: NURSE PRACTITIONER
Payer: COMMERCIAL

## 2019-08-29 VITALS — BODY MASS INDEX: 29.4 KG/M2 | WEIGHT: 210 LBS | HEIGHT: 71 IN

## 2019-08-29 VITALS — WEIGHT: 210 LBS | HEIGHT: 71 IN | BODY MASS INDEX: 29.4 KG/M2

## 2019-08-29 DIAGNOSIS — M79.671 BILATERAL FOOT PAIN: Primary | ICD-10-CM

## 2019-08-29 DIAGNOSIS — M79.671 PAIN IN BOTH FEET: Primary | ICD-10-CM

## 2019-08-29 DIAGNOSIS — Z47.89 ORTHOPEDIC AFTERCARE: Primary | ICD-10-CM

## 2019-08-29 DIAGNOSIS — M79.672 BILATERAL FOOT PAIN: ICD-10-CM

## 2019-08-29 DIAGNOSIS — I42.2 HYPERTROPHIC CARDIOMYOPATHY (H): Primary | ICD-10-CM

## 2019-08-29 DIAGNOSIS — M79.671 BILATERAL FOOT PAIN: ICD-10-CM

## 2019-08-29 DIAGNOSIS — M79.672 BILATERAL FOOT PAIN: Primary | ICD-10-CM

## 2019-08-29 DIAGNOSIS — M79.672 PAIN IN BOTH FEET: Primary | ICD-10-CM

## 2019-08-29 RX ORDER — GABAPENTIN 300 MG/1
300 CAPSULE ORAL 3 TIMES DAILY
Qty: 60 CAPSULE | Refills: 0 | Status: SHIPPED | OUTPATIENT
Start: 2019-08-29 | End: 2019-09-16

## 2019-08-29 ASSESSMENT — MIFFLIN-ST. JEOR
SCORE: 1771.93
SCORE: 1764.68

## 2019-08-29 NOTE — LETTER
"8/29/2019       RE: Stu Meredith  [address] [facility] [address]     Dear Colleague,    Thank you for referring your patient, Stu Meredith, to the [facility]. Please see a copy of my visit note below.    CC: \"burning to bottom of both feet\"    HPI:  Haroon is seen in my clinic today for chief complaint of burn to the bottom of his feet.  He thought he was being referred to podiatry he is here today with the above complaint.  He states he has no injury falls trips or trauma.  He states that he has no pain in the morning as the day progresses he has \"burning pain in the bottom of the ball of his foot to go to the end of his toes.\"  He states that this happens every day.  He has worsening symptoms at night.  The pain is relieved by taking off his shoes and elevating.  He does not smoke cigarettes.  He is now recently being treated for unmanaged type 2 diabetes on metformin.  His most recent hemoglobin A1c is now at 6.8.  He does state that when he wears wider toe shoes his symptoms are improved.    PMH: Reviewed in chart today 8/29/2019    ROS: Reviewed from patient tablet today 8/29/119 with all systems negative except for those listed below    PE:  Pleasant cooperative male.  He is 5 foot 11 210 pounds in no acute distress.  His BMI is 28.  He rises from the chair unguarded.  He walks without a limp.  Examination of his both of his feet and ankle show no effusions.  No lower extremity edema.  He has palpable pulses.  He does have full sensation in all dermatomes to bilateral lower extremities.  He has near normal ankle motion of the tibiotalar joint that is symmetrical.  He has no pain while palpating the midfoot on either foot.  There is no pain with inversion eversion of his midfoot.  There is no palpable areas of tenderness posteriorly along the metatarsal heads.  There is no abnormal calluses.    X-rays were taken today which " show early stage osteophytic changes of the tibiotalar joint bilateral ankles.  He has diffuse osteo-fights noted to the foot and ankle joint of his foot and ankle but nothing acute.  No acute or obvious osteochondral defects.  No acute fractures.  No obvious Lisfranc.    Diagnosis:    1.  Possible early start of neuropathy to his bilateral lower extremity    Plan:    1.  This time I did explain that his condition does not seem to be orthopedically related.  There is no surgery recommended for this.  Recommended that he started on Neurontin to help at night to 600 mg at night.  He will follow-up with his family care physician for continued management.    Total time spent was 30 minutes with greater than 50% spent in face-to-face consultation in collaboration of care.    Again, thank you for allowing me to participate in the care of your patient.      Sincerely,    KARSON Moser CNP

## 2019-08-29 NOTE — LETTER
"8/29/2019       RE: Stu Meredith  [address]  [facility] [address]     Dear Colleague,    Thank you for referring your patient, Stu Meredith, to the [facility] at [facility]. Please see a copy of my visit note below.    Chief Complaint: Surgical Followup (12 week POP right IRISH )     HPI: Stu Meredith returns today in follow-up for his hip. He reports that \"the hip is real good.\" We discussed his recent admission to cardiology for cardioversion without MI. Ready to back to work.     Current Status:  Results of the patient s Hip Disability and Osteoarthritis Outcome Score (HOOS)  are as follows (0-100 scales with 100 being the theoretical best):  Pain: 72.5   Symptoms: 75   ADLs:70.58   Sports/Recreation: 56.25   Quality of Life:62.5  ([email or web address].nu/)  [facility] Activity Score:4    Medications and allergies are documented in the EMR and have been reviewed.    Current Outpatient Medications:      acetaminophen (TYLENOL) 325 MG tablet, Take 325-650 mg by mouth every 6 hours as needed, Disp: , Rfl:      albuterol (PROAIR HFA/PROVENTIL HFA/VENTOLIN HFA) 108 (90 Base) MCG/ACT inhaler, Inhale 2 puffs into the lungs every 4 hours as needed for shortness of breath / dyspnea or wheezing (Patient not taking: Reported on 8/27/2019), Disp: 8.5 g, Rfl: 1     amoxicillin (AMOXIL) 500 MG tablet, Take 4 tablets (2000 mg) by mouth 1 hour before dental procedures., Disp: 12 tablet, Rfl: 3     atorvastatin (LIPITOR) 20 MG tablet, Take 1 tablet (20 mg) by mouth daily, Disp: 90 tablet, Rfl: 3     cyanocobalamin (VITAMIN B-12) 100 MCG tablet, Take 100 mcg by mouth daily, Disp: , Rfl:      dofetilide (TIKOSYN) 250 MCG capsule, Take 1 capsule (250 mcg) by mouth every 12 hours, Disp: 60 capsule, Rfl: 0     furosemide (LASIX) 40 MG tablet, Take 1 tablet (40 mg) by mouth daily, Disp: 30 tablet, Rfl: 0     gabapentin (NEURONTIN) 300 MG capsule, Take 1 capsule (300 mg) by " "mouth 3 times daily, Disp: 60 capsule, Rfl: 0     HYDROcodone-acetaminophen (NORCO) 5-325 MG tablet, Take 1 tablet by mouth every 8 hours as needed for severe pain (Patient not taking: Reported on 8/27/2019), Disp: 20 tablet, Rfl: 0     HYDROcodone-acetaminophen (NORCO) 5-325 MG tablet, Take 1-2 tablets by mouth every 4 hours as needed for severe pain, Disp: 40 tablet, Rfl: 0     metFORMIN (GLUCOPHAGE-XR) 500 MG 24 hr tablet, Take 1 tablet (500 mg) by mouth daily (with dinner) for 7 days, THEN 2 tablets (1,000 mg) daily (with dinner). (Patient taking differently: Take 2 tablets (1,000 mg) daily (with dinner).), Disp: 180 tablet, Rfl: 3     metoprolol succinate (TOPROL-XL) 50 MG 24 hr tablet, Take 1 tablet (50 mg) by mouth daily, Disp: 90 tablet, Rfl: 3     multivitamin, therapeutic (THERA-VIT) TABS, Take 1 tablet by mouth daily, Disp: , Rfl:      spironolactone (ALDACTONE) 50 MG tablet, Take 1 tablet (50 mg) by mouth daily, Disp: 90 tablet, Rfl: 1     XARELTO 20 MG TABS tablet, Take 1 tablet (20 mg) by mouth daily (with dinner), Disp: 90 tablet, Rfl: 3     zolpidem (AMBIEN) 10 MG tablet, Take 0.5-1 tablets (5-10 mg) by mouth nightly as needed for sleep, Disp: 90 tablet, Rfl: 0    Current Facility-Administered Medications:      lidocaine 1% with EPINEPHrine 1:100,000 injection 3 mL, 3 mL, Intradermal, Once, Paco Rhodes MD  Allergies: Patient has no known allergies.    Physical Exam:  On physical examination the patient appears the stated age, is in no acute distress, affect is appropriate, and breathing is non-labored.  Ht 1.803 m (5' 11\")   Wt 95.3 kg (210 lb)   BMI 29.29 kg/m     Body mass index is 29.29 kg/m .  Gait: normal   ROM: fluid and painless    Assessment: hip doing very well appropriate for one year follow-up  Plan: RTC one year for radiographs, sooner if issues.     Ngoc Mark*    Again, thank you for allowing me to participate in the care of your patient.  "     Sincerely,    Nelson Gaspar MD

## 2019-08-29 NOTE — NURSING NOTE
"Reason For Visit:   Chief Complaint   Patient presents with     Surgical Followup     12 week POP right IRISH        Ht 1.803 m (5' 11\")   Wt 95.3 kg (210 lb)   BMI 29.29 kg/m           Sae Bloom ATC  "

## 2019-08-29 NOTE — PROGRESS NOTES
"Chief Complaint: Surgical Followup (12 week POP right IRISH )       HPI: Stu Meredith returns today in follow-up for his hip. He reports that \"the hip is real good.\" We discussed his recent admission to cardiology for cardioversion without MI. Ready to back to work.     Current Status:  Results of the patient s Hip Disability and Osteoarthritis Outcome Score (HOOS)  are as follows (0-100 scales with 100 being the theoretical best):  Pain: 72.5   Symptoms: 75   ADLs:70.58   Sports/Recreation: 56.25   Quality of Life:62.5  ([email or web address].nu/)  [facility] Activity Score:4    Medications and allergies are documented in the EMR and have been reviewed.    Current Outpatient Medications:      acetaminophen (TYLENOL) 325 MG tablet, Take 325-650 mg by mouth every 6 hours as needed, Disp: , Rfl:      albuterol (PROAIR HFA/PROVENTIL HFA/VENTOLIN HFA) 108 (90 Base) MCG/ACT inhaler, Inhale 2 puffs into the lungs every 4 hours as needed for shortness of breath / dyspnea or wheezing (Patient not taking: Reported on 8/27/2019), Disp: 8.5 g, Rfl: 1     amoxicillin (AMOXIL) 500 MG tablet, Take 4 tablets (2000 mg) by mouth 1 hour before dental procedures., Disp: 12 tablet, Rfl: 3     atorvastatin (LIPITOR) 20 MG tablet, Take 1 tablet (20 mg) by mouth daily, Disp: 90 tablet, Rfl: 3     cyanocobalamin (VITAMIN B-12) 100 MCG tablet, Take 100 mcg by mouth daily, Disp: , Rfl:      dofetilide (TIKOSYN) 250 MCG capsule, Take 1 capsule (250 mcg) by mouth every 12 hours, Disp: 60 capsule, Rfl: 0     furosemide (LASIX) 40 MG tablet, Take 1 tablet (40 mg) by mouth daily, Disp: 30 tablet, Rfl: 0     gabapentin (NEURONTIN) 300 MG capsule, Take 1 capsule (300 mg) by mouth 3 times daily, Disp: 60 capsule, Rfl: 0     HYDROcodone-acetaminophen (NORCO) 5-325 MG tablet, Take 1 tablet by mouth every 8 hours as needed for severe pain (Patient not taking: Reported on 8/27/2019), Disp: 20 tablet, Rfl: 0     HYDROcodone-acetaminophen (NORCO) 5-325 MG tablet, Take " "1-2 tablets by mouth every 4 hours as needed for severe pain, Disp: 40 tablet, Rfl: 0     metFORMIN (GLUCOPHAGE-XR) 500 MG 24 hr tablet, Take 1 tablet (500 mg) by mouth daily (with dinner) for 7 days, THEN 2 tablets (1,000 mg) daily (with dinner). (Patient taking differently: Take 2 tablets (1,000 mg) daily (with dinner).), Disp: 180 tablet, Rfl: 3     metoprolol succinate (TOPROL-XL) 50 MG 24 hr tablet, Take 1 tablet (50 mg) by mouth daily, Disp: 90 tablet, Rfl: 3     multivitamin, therapeutic (THERA-VIT) TABS, Take 1 tablet by mouth daily, Disp: , Rfl:      spironolactone (ALDACTONE) 50 MG tablet, Take 1 tablet (50 mg) by mouth daily, Disp: 90 tablet, Rfl: 1     XARELTO 20 MG TABS tablet, Take 1 tablet (20 mg) by mouth daily (with dinner), Disp: 90 tablet, Rfl: 3     zolpidem (AMBIEN) 10 MG tablet, Take 0.5-1 tablets (5-10 mg) by mouth nightly as needed for sleep, Disp: 90 tablet, Rfl: 0    Current Facility-Administered Medications:      lidocaine 1% with EPINEPHrine 1:100,000 injection 3 mL, 3 mL, Intradermal, Once, Paco Rhdoes MD  Allergies: Patient has no known allergies.    Physical Exam:  On physical examination the patient appears the stated age, is in no acute distress, affect is appropriate, and breathing is non-labored.  Ht 1.803 m (5' 11\")   Wt 95.3 kg (210 lb)   BMI 29.29 kg/m    Body mass index is 29.29 kg/m .  Gait: normal   ROM: fluid and painless    Assessment: hip doing very well appropriate for one year follow-up  Plan: RTC one year for radiographs, sooner if issues.     Ngoc Mark*    "

## 2019-08-29 NOTE — PROGRESS NOTES
"CC: \"burning to bottom of both feet\"    HPI:  Haroon is seen in my clinic today for chief complaint of burn to the bottom of his feet.  He thought he was being referred to podiatry he is here today with the above complaint.  He states he has no injury falls trips or trauma.  He states that he has no pain in the morning as the day progresses he has \"burning pain in the bottom of the ball of his foot to go to the end of his toes.\"  He states that this happens every day.  He has worsening symptoms at night.  The pain is relieved by taking off his shoes and elevating.  He does not smoke cigarettes.  He is now recently being treated for unmanaged type 2 diabetes on metformin.  His most recent hemoglobin A1c is now at 6.8.  He does state that when he wears wider toe shoes his symptoms are improved.    PMH: Reviewed in chart today 8/29/2019    ROS: Reviewed from patient tablet today 8/29/119 with all systems negative except for those listed below    PE:  Pleasant cooperative male.  He is 5 foot 11 210 pounds in no acute distress.  His BMI is 28.  He rises from the chair unguarded.  He walks without a limp.  Examination of his both of his feet and ankle show no effusions.  No lower extremity edema.  He has palpable pulses.  He does have full sensation in all dermatomes to bilateral lower extremities.  He has near normal ankle motion of the tibiotalar joint that is symmetrical.  He has no pain while palpating the midfoot on either foot.  There is no pain with inversion eversion of his midfoot.  There is no palpable areas of tenderness posteriorly along the metatarsal heads.  There is no abnormal calluses.    X-rays were taken today which show early stage osteophytic changes of the tibiotalar joint bilateral ankles.  He has diffuse osteo-fights noted to the foot and ankle joint of his foot and ankle but nothing acute.  No acute or obvious osteochondral defects.  No acute fractures.  No obvious Lisfranc.    Diagnosis:    1.  " Possible early start of neuropathy to his bilateral lower extremity    Plan:    1.  This time I did explain that his condition does not seem to be orthopedically related.  There is no surgery recommended for this.  Recommended that he started on Neurontin to help at night to 600 mg at night.  He will follow-up with his family care physician for continued management.    Total time spent was 30 minutes with greater than 50% spent in face-to-face consultation in collaboration of care.  Answers for HPI/ROS submitted by the patient on 8/25/2019   General Symptoms: No  Skin Symptoms: No  HENT Symptoms: Yes  EYE SYMPTOMS: No  HEART SYMPTOMS: Yes  LUNG SYMPTOMS: Yes  INTESTINAL SYMPTOMS: No  URINARY SYMPTOMS: No  REPRODUCTIVE SYMPTOMS: Yes  SKELETAL SYMPTOMS: Yes  BLOOD SYMPTOMS: No  NERVOUS SYSTEM SYMPTOMS: No  MENTAL HEALTH SYMPTOMS: No  Ear pain: No  Ear discharge: No  Hearing loss: No  Tinnitus: Yes  Nosebleeds: No  Congestion: No  Sinus pain: No  Trouble swallowing: No   Voice hoarseness: Yes  Mouth sores: No  Sore throat: No  Tooth pain: No  Gum tenderness: No  Bleeding gums: No  Change in taste: No  Change in sense of smell: No  Dry mouth: No  Hearing aid used: No  Neck lump: No  Cough: No  Sputum or phlegm: No  Coughing up blood: No  Difficulty breating or shortness of breath: Yes  Snoring: No  Wheezing: No  Difficulty breathing on exertion: No  Nighttime Cough: No  Difficulty breathing when lying flat: No  Chest pain or pressure: No  Fast or irregular heartbeat: Yes  Pain in legs with walking: No  Trouble breathing while lying down: No  Fingers or toes appear blue: No  High blood pressure: No  Low blood pressure: No  Fainting: No  Murmurs: No  Pacemaker: No  Varicose veins: No  Edema or swelling: No  Wake up at night with shortness of breath: No  Light-headedness: No  Exercise intolerance: No  Back pain: No  Muscle aches: No  Neck pain: No  Swollen joints: No  Joint pain: No  Bone pain: No  Muscle cramps:  No  Muscle weakness: Yes  Joint stiffness: No  Bone fracture: No  Scrotal pain or swelling: No  Erectile dysfunction: Yes  Penile discharge: No  Genital ulcers: No  Reduced libido: No

## 2019-08-29 NOTE — NURSING NOTE
"Reason For Visit:   Chief Complaint   Patient presents with     Consult     Bilateral foot pain       Ht 1.815 m (5' 11.46\")   Wt 95.3 kg (210 lb)   BMI 28.92 kg/m      Pain Assessment  Patient Currently in Pain: Yes  0-10 Pain Scale: 2(At night can get to 8 or 9)  Primary Pain Location: Foot(Bilateral)  Pain Descriptors: Burning    Begum Karyn, ATC    "

## 2019-09-03 DIAGNOSIS — R97.20 ELEVATED PROSTATE SPECIFIC ANTIGEN (PSA): Primary | ICD-10-CM

## 2019-09-14 ENCOUNTER — MYC REFILL (OUTPATIENT)
Dept: CARDIOLOGY | Facility: CLINIC | Age: 64
End: 2019-09-14

## 2019-09-14 ENCOUNTER — MYC REFILL (OUTPATIENT)
Dept: INTERNAL MEDICINE | Facility: CLINIC | Age: 64
End: 2019-09-14

## 2019-09-14 DIAGNOSIS — F13.20 SEDATIVE, HYPNOTIC OR ANXIOLYTIC DEPENDENCE (H): ICD-10-CM

## 2019-09-14 DIAGNOSIS — I42.2 HYPERTROPHIC CARDIOMYOPATHY (H): ICD-10-CM

## 2019-09-14 DIAGNOSIS — I48.91 ATRIAL FIBRILLATION, UNSPECIFIED TYPE (H): ICD-10-CM

## 2019-09-14 RX ORDER — SPIRONOLACTONE 50 MG/1
50 TABLET, FILM COATED ORAL DAILY
Qty: 90 TABLET | Refills: 1 | Status: CANCELLED | OUTPATIENT
Start: 2019-09-14

## 2019-09-16 DIAGNOSIS — G62.9 NEUROPATHY: Primary | ICD-10-CM

## 2019-09-16 RX ORDER — ZOLPIDEM TARTRATE 10 MG/1
5-10 TABLET ORAL
Qty: 90 TABLET | Refills: 0 | Status: SHIPPED | OUTPATIENT
Start: 2019-10-04 | End: 2019-12-17

## 2019-09-16 NOTE — TELEPHONE ENCOUNTER
Gabapentin      Last Written Prescription Date:  8-29-19  Last Fill Quantity: 60 (less than 30 day supply),   # refills: 0  Last Office Visit : 5-28-19  Future Office visit:  10-8-19    Routing refill request to provider for review/approval because:  Not on protocol  Medication previously ordered by Ortho - requesting PCP manage.      Kathleen M Doege RN

## 2019-09-16 NOTE — TELEPHONE ENCOUNTER
Patient Requested  zolpidem (AMBIEN) 10 MG tablet  Last Filled  07/05/2019  Last Office Visit  05/28/2019  Next Office Visit  10/08/2019   Checked  09/16/2019    DX: Chronic Zolpidem use for insomnia     Pharmacy: [facility] - [place], [place] - [address] at 8:49 AM on 9/16/2019.

## 2019-09-17 RX ORDER — GABAPENTIN 300 MG/1
300 CAPSULE ORAL 3 TIMES DAILY
Qty: 90 CAPSULE | Refills: 3 | Status: SHIPPED | OUTPATIENT
Start: 2019-09-17 | End: 2019-12-17

## 2019-09-24 DIAGNOSIS — R97.20 ELEVATED PROSTATE SPECIFIC ANTIGEN (PSA): Primary | ICD-10-CM

## 2019-09-27 ENCOUNTER — OFFICE VISIT (OUTPATIENT)
Dept: CARDIOLOGY | Facility: CLINIC | Age: 64
End: 2019-09-27
Attending: INTERNAL MEDICINE
Payer: COMMERCIAL

## 2019-09-27 VITALS
BODY MASS INDEX: 29.4 KG/M2 | SYSTOLIC BLOOD PRESSURE: 122 MMHG | DIASTOLIC BLOOD PRESSURE: 74 MMHG | HEIGHT: 71 IN | HEART RATE: 60 BPM | OXYGEN SATURATION: 96 % | WEIGHT: 210 LBS

## 2019-09-27 DIAGNOSIS — I42.2 HYPERTROPHIC CARDIOMYOPATHY (H): ICD-10-CM

## 2019-09-27 DIAGNOSIS — I42.2 HYPERTROPHIC CARDIOMYOPATHY (H): Primary | ICD-10-CM

## 2019-09-27 DIAGNOSIS — R97.20 ELEVATED PROSTATE SPECIFIC ANTIGEN (PSA): ICD-10-CM

## 2019-09-27 LAB
ANION GAP SERPL CALCULATED.3IONS-SCNC: 3 MMOL/L (ref 3–14)
BUN SERPL-MCNC: 20 MG/DL (ref 7–30)
CALCIUM SERPL-MCNC: 9.4 MG/DL (ref 8.5–10.1)
CHLORIDE SERPL-SCNC: 106 MMOL/L (ref 94–109)
CO2 SERPL-SCNC: 30 MMOL/L (ref 20–32)
CREAT SERPL-MCNC: 0.93 MG/DL (ref 0.66–1.25)
GFR SERPL CREATININE-BSD FRML MDRD: 86 ML/MIN/{1.73_M2}
GLUCOSE SERPL-MCNC: 122 MG/DL (ref 70–99)
POTASSIUM SERPL-SCNC: 4.3 MMOL/L (ref 3.4–5.3)
PSA SERPL-MCNC: 4.06 UG/L (ref 0–4)
SODIUM SERPL-SCNC: 139 MMOL/L (ref 133–144)

## 2019-09-27 PROCEDURE — 84153 ASSAY OF PSA TOTAL: CPT | Performed by: UROLOGY

## 2019-09-27 PROCEDURE — 99214 OFFICE O/P EST MOD 30 MIN: CPT | Mod: ZP | Performed by: INTERNAL MEDICINE

## 2019-09-27 PROCEDURE — 93010 ELECTROCARDIOGRAM REPORT: CPT | Mod: ZP | Performed by: INTERNAL MEDICINE

## 2019-09-27 PROCEDURE — 36415 COLL VENOUS BLD VENIPUNCTURE: CPT | Performed by: UROLOGY

## 2019-09-27 PROCEDURE — 80048 BASIC METABOLIC PNL TOTAL CA: CPT | Performed by: UROLOGY

## 2019-09-27 PROCEDURE — G0463 HOSPITAL OUTPT CLINIC VISIT: HCPCS

## 2019-09-27 ASSESSMENT — MIFFLIN-ST. JEOR: SCORE: 1764.68

## 2019-09-27 ASSESSMENT — PAIN SCALES - GENERAL: PAINLEVEL: NO PAIN (0)

## 2019-09-27 NOTE — PATIENT INSTRUCTIONS
You were seen today in the Adult Congenital and Cardiovascular Genetics Clinic at the [facility].    Cardiology Providers you saw during your visit:  Mona Ware MD    Diagnosis:  Hypertrophic cardiomyopathy    Results:  Mona Ware MD reviewed the results of your lab testing today in clinic.    Recommendations:    1. Continue to eat a heart healthy, low salt diet.  2. Continue to get 20-30 minutes of aerobic activity, 4-5 days per week.  Examples of aerobic activity include walking, running, swimming, cycling, etc.  3. Continue to observe good oral hygiene, with regular dental visits.  4. No changes today.      SBE prophylaxis:   Yes____  No_X___    Lifelong Bacterial Endocarditis Prophylaxis:  YES____  NO____    If YES is checked, follow the recommendations outlined below:  1. Take antibiotic(s) prior to recommended dental procedures and procedures on the respiratory tract or with infected skin, muscle or bones. SBE prophylaxis is not needed for routine GI and  procedures (ie. Colonoscopy or vaginal delivery)  2. Observe good oral hygiene daily, as advised by your dentist. Get regular professional dental care.  3. Keep cuts clean.  4. Infections should be treated promptly.  5. Symptoms of Infective Endocarditis could include: fever lasting more than 4-5 days or a recurrent fever that initially resolves but returns within 1-2 days)      Exercise restrictions:   Yes__X__  No____         If yes, list restrictions:  Must be allowed to rest if fatigued or SOB      Work restrictions:  Yes____  No_X___         If yes, list restrictions:    FASTING CHOLESTEROL was checked in the last 5 years YES_X__  NO___ (2019)  Continue to eat a heart healthy, low salt diet.         ____ Fasting lipid panel order today         ____ No changes in medications          ____ I recommend the following changes in your cholesterol medications.:          ____ Please follow up for cholesterol screening at your primary care  physician      Follow-up:  Follow up with Dr. Ware on 12/20 with CPX, labs, and Echo.    If you have questions or concerns please contact us at:    Dimple Birch, MSN, RN, CNL    Radhika Mitchell (Scheduling)  Nurse Care Coordinator     Clinic   Adult Congenital and CV Genetics   Adult Congenital and CV Genetic  [facility]   [facility]  (P) [phone]     (P) [phone]  [email or web address]   (F) [phone]        For after hours urgent needs, call [phone] and ask to speak to the Adult Congenital Physician on call.  Mention Job Code 0401.    For emergencies call 911.    [facility]  [facility]  Mail Code [id number]  [address] [place]  [address]

## 2019-09-27 NOTE — LETTER
9/27/2019      RE: Stu Meredith  [address] [address]       Dear Colleague,    Thank you for the opportunity to participate in the care of your patient, Stu Meredith, at the [facility]. Please see a copy of my visit note below.    HPI:  63 year old male with history of HCM without obstruction (dx 2006, EF 20% improved to 60%), VT s/p ICD 2012, nonobstructive CAD (cath 2013), AF s/p PVI X 3 (2011, 2016, 2018) and DCCV X 10 who since last clinic visit with me was admitted for dofetilide loading, cardioversion, and RHC.     Today patient relates that he has been feeling well.  His energy level, yanes and exercise capacity have improved.    He denies any chest pain/pressure, PND, orthopnea, abdominal swelling/distension, palpitations, syncope or presyncope. He denies any problems with his medications and reports compliance.      Past Medical History:   Diagnosis Date     Atrial fibrillation (H) 12/9/11    failed medication, multiple DC cardioveresions; s/p Left atrial ablation to eliminate atrial fibrillation 12/9/11     Chest pain      CHF (congestive heart failure) (H) 7/30/2016     Coronary artery disease      DJD (degenerative joint disease)      Hip arthritis 1/15/2014     Hypertension      Hypertrophic cardiomyopathy (H) 10/09     Other abnormal heart sounds      Pacemaker     ICD     Palpitations      Pneumonia, organism unspecified(486)      Prediabetes 7/10/15    A1C 6.5     Status post implantation of automatic cardioverter/defibrillator (AICD)      Ventricular tachycardia (H)    - HCM diagnosed '06, previously burnt out (EF 20%) now with recovered EF  - h/o VT s/p dual chamber ICD '12  - AF s/p PVI X 2 ('11, '16, '18), h/o DCCV X 10    Meds:  acetaminophen (TYLENOL) 325 MG tablet, Take 325-650 mg by mouth every 6 hours as needed  amoxicillin (AMOXIL) 500 MG tablet, Take 4 tablets (2000 mg) by mouth 1 hour before dental  procedures. (Patient not taking: Reported on 10/2/2019)  atorvastatin (LIPITOR) 20 MG tablet, Take 1 tablet (20 mg) by mouth daily  cyanocobalamin (VITAMIN B-12) 100 MCG tablet, Take 100 mcg by mouth daily  dofetilide (TIKOSYN) 250 MCG capsule, Take 1 capsule (250 mcg) by mouth every 12 hours  furosemide (LASIX) 40 MG tablet, Take 1 tablet (40 mg) by mouth daily  gabapentin (NEURONTIN) 300 MG capsule, Take 1 capsule (300 mg) by mouth 3 times daily  multivitamin, therapeutic (THERA-VIT) TABS, Take 1 tablet by mouth daily  spironolactone (ALDACTONE) 50 MG tablet, Take 1 tablet (50 mg) by mouth daily  XARELTO 20 MG TABS tablet, Take 1 tablet (20 mg) by mouth daily (with dinner)  zolpidem (AMBIEN) 10 MG tablet, Take 0.5-1 tablets (5-10 mg) by mouth nightly as needed for sleep  metFORMIN (GLUCOPHAGE-XR) 500 MG 24 hr tablet, Take 1 tablet (500 mg) by mouth daily (with dinner) for 7 days, THEN 2 tablets (1,000 mg) daily (with dinner). (Patient taking differently: Take 2 tablets (1,000 mg) daily (with dinner).)    lidocaine 1% with EPINEPHrine 1:100,000 injection 3 mL          Social History     Socioeconomic History     Marital status:      Spouse name: Not on file     Number of children: Not on file     Years of education: Not on file     Highest education level: Not on file   Occupational History     Occupation:      Employer: [employer]   Social Needs     Financial resource strain: Not on file     Food insecurity:     Worry: Not on file     Inability: Not on file     Transportation needs:     Medical: Not on file     Non-medical: Not on file   Tobacco Use     Smoking status: Former Smoker     Packs/day: 1.00     Years: 40.00     Pack years: 40.00     Types: Cigarettes     Start date: 1/1/1975     Last attempt to quit: 12/11/2015     Years since quitting: 3.5     Smokeless tobacco: Never Used   Substance and Sexual Activity     Alcohol use: Yes     Alcohol/week: 0.0 oz     Comment: 1 drink  per week     Drug use: No     Sexual activity: Yes     Partners: Female   Lifestyle     Physical activity:     Days per week: Not on file     Minutes per session: Not on file     Stress: Not on file   Relationships     Social connections:     Talks on phone: Not on file     Gets together: Not on file     Attends Gnosticism service: Not on file     Active member of club or organization: Not on file     Attends meetings of clubs or organizations: Not on file     Relationship status: Not on file     Intimate partner violence:     Fear of current or ex partner: Not on file     Emotionally abused: Not on file     Physically abused: Not on file     Forced sexual activity: Not on file   Other Topics Concern      Service Not Asked     Blood Transfusions Not Asked     Caffeine Concern Not Asked     Occupational Exposure Not Asked     Hobby Hazards Not Asked     Sleep Concern Not Asked     Stress Concern Not Asked     Weight Concern Not Asked     Special Diet Not Asked     Back Care Not Asked     Exercise No     Bike Helmet Not Asked     Seat Belt Yes     Self-Exams Not Asked     Parent/sibling w/ CABG, MI or angioplasty before 65F 55M? No   Social History Narrative     Not on file       Family History   Problem Relation Age of Onset     Heart Disease Mother         unknown     Obesity Mother      Gastrointestinal Disease Mother         diverticulitis     Diabetes Maternal Grandmother      Diabetes Paternal Grandmother      Cerebrovascular Disease Paternal Grandmother 94     Diabetes Paternal Grandfather      Cerebrovascular Disease Paternal Grandfather 78     Diabetes Son      C.A.D. Father         CABG age 78     Heart Disease Father         CABG x5     Diabetes Maternal Grandfather      LUNG DISEASE No family hx of      Deep Vein Thrombosis (DVT) No family hx of      Anesthesia Reaction No family hx of      Melanoma No family hx of      Skin Cancer No family hx of        ROS:   Constitutional: No fever, chills, or  "sweats. No weight gain/loss.   ENT: No visual disturbance, ear ache, epistaxis, sore throat.   Allergies/Immunologic: Negative.   Respiratory: No cough, hemoptysis.   Cardiovascular: As per HPI.   GI: No nausea, vomiting, hematemesis, melena, or hematochezia.   : No urinary frequency, dysuria, or hematuria.   Integument: Negative.   Psychiatric: Negative.   Neuro: Negative.   Endocrinology: Negative.   Musculoskeletal: Ongoing hip pain      /74 (BP Location: Right arm, Patient Position: Chair, Cuff Size: Adult Regular)   Pulse 60   Ht 1.803 m (5' 11\")   Wt 95.3 kg (210 lb)   SpO2 96%   BMI 29.29 kg/m     General: appears comfortable, alert and articulate  Head: normocephalic, atraumatic  Eyes: anicteric sclera, EOMI  Neck: no adenopathy, 2+ carotids without bruits  Orophyarynx: moist mucosa, no lesions, dentition intact  Heart: regular, S1/S2, 2/6 systolic murmur, no gallop or rub, estimated JVP <10cm  Lungs: clear, no rales or wheezing  Abdomen: soft, non-tender, bowel sounds present, no hepatomegaly  Extremities: no clubbing, cyanosis or edema  Neurological: normal speech and affect, no gross motor deficits    Labs:  Orders Only on 09/27/2019   Component Date Value Ref Range Status     PSA 09/27/2019 4.06* 0 - 4 ug/L Final    Assay Method:  Chemiluminescence using Siemens Vista analyzer     Sodium 09/27/2019 139  133 - 144 mmol/L Final     Potassium 09/27/2019 4.3  3.4 - 5.3 mmol/L Final     Chloride 09/27/2019 106  94 - 109 mmol/L Final     Carbon Dioxide 09/27/2019 30  20 - 32 mmol/L Final     Anion Gap 09/27/2019 3  3 - 14 mmol/L Final     Glucose 09/27/2019 122* 70 - 99 mg/dL Final     Urea Nitrogen 09/27/2019 20  7 - 30 mg/dL Final     Creatinine 09/27/2019 0.93  0.66 - 1.25 mg/dL Final     GFR Estimate 09/27/2019 86  >60 mL/min/[1.73_m2] Final    Comment: Non  GFR Calc  Starting 12/18/2018, serum creatinine based estimated GFR (eGFR) will be   calculated using the Chronic Kidney " Disease Epidemiology Collaboration   (CKD-EPI) equation.       GFR Estimate If Black 09/27/2019 >90  >60 mL/min/[1.73_m2] Final    Comment:  GFR Calc  Starting 12/18/2018, serum creatinine based estimated GFR (eGFR) will be   calculated using the Chronic Kidney Disease Epidemiology Collaboration   (CKD-EPI) equation.       Calcium 09/27/2019 9.4  8.5 - 10.1 mg/dL Final         CPX Echo 6/1/2018:  Interpretation Summary  Submaximal treadmill stress test in a patient with a diagnosis of HCM.  The Ramirez treadmill score is 8 (low risk).  Exercise was stopped due to fatigue.  Normal blood pressure response to exercise.  No ECG evidence of ischemia.  No supraventricular or ventricular arrhythmias. Frequent PVCs noted throughout the study.  Normal biventricular function with mild asymmetrical septal hypertrophy (12mm).  No dynamic outflow tract obstruction is present at rest or with exercise.  Normal segmental wall motion at rest and with exercise.  Minimal augmentation in LV function with exercise.  Average functional capacity for age.         CPX 2/15/2019:        CTA coronary angiogram 5/28/19  IMPRESSION:  1.  No evidence of coronary artery atherosclerosis or stenosis.  2.  Myocardial bridging involving the mid LAD.  3.  Total Agatston score 0 placing the patient in the lowest percentile when compared to age and gender matched control group.    Right heart cath 8/21/19   Time Systolic Diastolic Mean A Wave V Wave EDP Max dp/dt HR   RA Pressures  3:38 PM   12 mmHg    16 mmHg    14 mmHg      61 bpm      RV Pressures  3:38 PM 60 mmHg        16 mmHg     106 bpm      PA Pressures  3:41 PM 60 mmHg    22 mmHg    38 mmHg        69 bpm      PCW Pressures  3:39 PM   30 mmHg    28 mmHg    38 mmHg      74 bpm         Time Hb SAT(%) PO2 Content PA Sat   PA  3:28 PM  63.6 %      63.6 %      Art  3:28 PM  99 %     18.04 mL/dL       Cardiac Output Phase: Baseline      Time TDCO TDCI Tj C.O. Tj C.I. Tj HR   Cardiac  Output Results  3:28 PM 3.8 L/min    1.79 L/min/m2    4.41 L/min    2.08 L/min/m2             Assessment/Plan:  63 year old male with history of HCM without obstruction (dx 2006, EF 20% improved to 60%), VT s/p ICD 2012, nonobstructive CAD (cath 2013), AF s/p PVI X 3 (2011, 2016, 2018) and DCCV X 10 who since last clinic visit with me was admitted for dofetilide loading, cardioversion, and RHC    # Atrial Fibrillation s/p recent admission for dofetilide loading cardioversion and RHC  - continue Xarelto  - continue dofetilide 250mcg  BID   - continue Metoprolol 50 XL daily       # Hypertrophic cardiomyopathy -- previously burnt out with EF 20% ('13), now recovered (EF 60%).  No evidence of LVOT obstruction.   - continue metoprolol XL 50mg daily   - continue aldactone  50mg daily  - pt aware of exercise restrictions and family screening recommendations    # Nonobstructive CAD -- 10-30% stenoses on cath '13  - coronary CTA confirmed no significant disease  - continue atorvastatin 20     # HTN -- controlled  - continue metoprolol XL, aldactone      Follow-up:  On 12/20/19 with CPX, labs, and Echo.  Will be happy to see sooner if change in clinical status or new questions/concerns arise.      Mona Ware MD  Section Head - Advanced Heart Failure, Transplantation and Mechanical Circulatory Support  Director - Adult Congenital and Cardiovascular Genetics Center  Associate Professor of Medicine, [facility]

## 2019-09-30 ENCOUNTER — OFFICE VISIT (OUTPATIENT)
Dept: UROLOGY | Facility: CLINIC | Age: 64
End: 2019-09-30
Payer: COMMERCIAL

## 2019-09-30 VITALS
HEIGHT: 71 IN | BODY MASS INDEX: 29.4 KG/M2 | OXYGEN SATURATION: 97 % | SYSTOLIC BLOOD PRESSURE: 130 MMHG | HEART RATE: 59 BPM | WEIGHT: 210 LBS | DIASTOLIC BLOOD PRESSURE: 80 MMHG

## 2019-09-30 DIAGNOSIS — I42.1 HOCM (HYPERTROPHIC OBSTRUCTIVE CARDIOMYOPATHY) (H): ICD-10-CM

## 2019-09-30 DIAGNOSIS — R97.20 ELEVATED PROSTATE SPECIFIC ANTIGEN (PSA): Primary | ICD-10-CM

## 2019-09-30 LAB
INTERPRETATION ECG - MUSE: NORMAL
PR INTERVAL - MUSE: 2

## 2019-09-30 PROCEDURE — 99214 OFFICE O/P EST MOD 30 MIN: CPT | Mod: 25 | Performed by: UROLOGY

## 2019-09-30 PROCEDURE — 51798 US URINE CAPACITY MEASURE: CPT | Performed by: UROLOGY

## 2019-09-30 ASSESSMENT — MIFFLIN-ST. JEOR: SCORE: 1764.68

## 2019-09-30 ASSESSMENT — PAIN SCALES - GENERAL: PAINLEVEL: MODERATE PAIN (4)

## 2019-09-30 NOTE — PROGRESS NOTES
History: it is a great pleasure to see this very pleasant63-year-old gentleman in follow-up consultation today.  As you recall, we have seen in the past with problems with an elevated PSA which jaskaran to 6.0 but subsequently after we followed  This we noticed that he declined and the most recent PSA recently was 4.06.  Results for BONNIE WASHINGTON (MRN [id number]) as of 9/30/2019 15:56   Ref. Range 11/10/2008 10:10 7/20/2012 07:35 12/10/2015 08:22 12/26/2017 08:52 9/27/2019 15:12   PSA Latest Ref Range: 0 - 4 ug/L 1.57 2.66 3.67 6.00 (H) 4.06 (H)     There is no family history of prostate cancer.    The patient does notice some leaking of the urine stream with minimal evidence of nocturia and without evidence of dysuria, hematuria or urinary tract infection    His general health is otherwise stable  He continues to be managed because of the long-term treatment for atrial fibrillation and cardiovascular issues, as noted below.  Past Medical History:   Diagnosis Date     Atrial fibrillation (H) 12/9/11    failed medication, multiple DC cardioveresions; s/p Left atrial ablation to eliminate atrial fibrillation 12/9/11     Chest pain      CHF (congestive heart failure) (H) 7/30/2016     Coronary artery disease      DJD (degenerative joint disease)      Fatigue 7/15/2019     Hip arthritis 1/15/2014     Hypertension      Hypertrophic cardiomyopathy (H) 10/09     Other abnormal heart sounds      Pacemaker     ICD     Palpitations      Pneumonia, organism unspecified(486)      Prediabetes 7/10/15    A1C 6.5     Status post implantation of automatic cardioverter/defibrillator (AICD)      Ventricular tachycardia (H)        Social History     Socioeconomic History     Marital status:      Spouse name: Not on file     Number of children: Not on file     Years of education: Not on file     Highest education level: Not on file   Occupational History     Occupation:      Employer: [employer]      Financial resource strain: Not on file     Food insecurity:     Worry: Not on file     Inability: Not on file     Transportation needs:     Medical: Not on file     Non-medical: Not on file   Tobacco Use     Smoking status: Former Smoker     Packs/day: 1.00     Years: 40.00     Pack years: 40.00     Types: Cigarettes     Start date: 1/1/1975     Last attempt to quit: 12/11/2015     Years since quitting: 3.8     Smokeless tobacco: Never Used   Substance and Sexual Activity     Alcohol use: Yes     Alcohol/week: 0.0 standard drinks     Comment: 1 drink per week     Drug use: No     Sexual activity: Yes     Partners: Female   Lifestyle     Physical activity:     Days per week: Not on file     Minutes per session: Not on file     Stress: Not on file   Relationships     Social connections:     Talks on phone: Not on file     Gets together: Not on file     Attends Baptism service: Not on file     Active member of club or organization: Not on file     Attends meetings of clubs or organizations: Not on file     Relationship status: Not on file     Intimate partner violence:     Fear of current or ex partner: Not on file     Emotionally abused: Not on file     Physically abused: Not on file     Forced sexual activity: Not on file   Other Topics Concern      Service Not Asked     Blood Transfusions Not Asked     Caffeine Concern Not Asked     Occupational Exposure Not Asked     Hobby Hazards Not Asked     Sleep Concern Not Asked     Stress Concern Not Asked     Weight Concern Not Asked     Special Diet Not Asked     Back Care Not Asked     Exercise No     Bike Helmet Not Asked     Seat Belt Yes     Self-Exams Not Asked     Parent/sibling w/ CABG, MI or angioplasty before 65F 55M? No   Social History Narrative     Not on file       Past Surgical History:   Procedure Laterality Date     ANESTHESIA CARDIOVERSION  4/24/2014    Procedure: ANESTHESIA CARDIOVERSION;  Surgeon: Generic Anesthesia Provider;  Location:   OR     ANESTHESIA CARDIOVERSION N/A 5/12/2016    Procedure: ANESTHESIA CARDIOVERSION;  Surgeon: GENERIC ANESTHESIA PROVIDER;  Location: [facility] OR     ANESTHESIA CARDIOVERSION N/A 8/7/2017    Procedure: ANESTHESIA CARDIOVERSION;  Anesthesia Offsite Coverage Cardioversion @1100;  Surgeon: GENERIC ANESTHESIA PROVIDER;  Location: [facility] OR     ANESTHESIA CARDIOVERSION N/A 1/3/2018    Procedure: ANESTHESIA CARDIOVERSION;  Anesthesia Cardioverion;  Surgeon: GENERIC ANESTHESIA PROVIDER;  Location: [facility] OR     ANESTHESIA CARDIOVERSION N/A 5/4/2018    Procedure: ANESTHESIA CARDIOVERSION;  Anesthesia Coverage Cardioversion @1400;  Surgeon: GENERIC ANESTHESIA PROVIDER;  Location: [facility] OR     ANESTHESIA CARDIOVERSION N/A 9/27/2018    Procedure: ANESTHESIA CARDIOVERSION;  Anesthesia Cardioversion @0930;  Surgeon: GENERIC ANESTHESIA PROVIDER;  Location: [facility] OR     ANESTHESIA CARDIOVERSION N/A 12/20/2018    Procedure: Anesthesia Coverage Cardioversion @0830;  Surgeon: GENERIC ANESTHESIA PROVIDER;  Location: [facility]     ANESTHESIA CARDIOVERSION N/A 8/21/2019    Procedure: Anesthesia Coverage Cardioversion @0800;  Surgeon: GENERIC ANESTHESIA PROVIDER;  Location: [facility]     ARTHROPLASTY HIP  1/15/2014    Procedure: ARTHROPLASTY HIP;  Left Total Hip Arthroplasty;  Surgeon: Nelson Gaspar MD;  Location: [facility]     ARTHROPLASTY HIP Right 6/5/2019    Procedure: Right Total Hip Arthroplasty;  Surgeon: Nelson Gaspar MD;  Location: [facility] OR     CARDIAC SURGERY       COLONOSCOPY N/A 5/18/2018    Procedure: COMBINED COLONOSCOPY, SINGLE OR MULTIPLE BIOPSY/POLYPECTOMY BY BIOPSY;  COLONOSCOPY (PT HAS DEFIBRILLATOR) ;  Surgeon: Mike Nickerson MD;  Location:  GI     CV RIGHT HEART CATH N/A 8/19/2019    Procedure: CV RIGHT HEART CATH;  Surgeon: Cisco Rausch MD;  Location:  HEART CARDIAC CATH LAB     CV RIGHT HEART CATH N/A 8/21/2019    Procedure: Right Heart Cath;  Surgeon: Ciaran Burton MD;  Location:  HEART CARDIAC CATH LAB     H ABLATION  FOCAL AFIB  12/9/11    Left atrial ablation to eliminate atrial fibrillation     IMPLANT AUTOMATIC IMPLANTABLE CARDIOVERTER DEFIBRILLATOR  7/27/12    AICD implantation     TONSILLECTOMY  1964       Family History   Problem Relation Age of Onset     Heart Disease Mother         unknown     Obesity Mother      Gastrointestinal Disease Mother         diverticulitis     Diabetes Maternal Grandmother      Diabetes Paternal Grandmother      Cerebrovascular Disease Paternal Grandmother 94     Diabetes Paternal Grandfather      Cerebrovascular Disease Paternal Grandfather 78     Diabetes Son      C.A.D. Father         CABG age 78     Heart Disease Father         CABG x5     Diabetes Maternal Grandfather      LUNG DISEASE No family hx of      Deep Vein Thrombosis (DVT) No family hx of      Anesthesia Reaction No family hx of      Melanoma No family hx of      Skin Cancer No family hx of          Current Outpatient Medications:      acetaminophen (TYLENOL) 325 MG tablet, Take 325-650 mg by mouth every 6 hours as needed, Disp: , Rfl:      amoxicillin (AMOXIL) 500 MG tablet, Take 4 tablets (2000 mg) by mouth 1 hour before dental procedures., Disp: 12 tablet, Rfl: 3     atorvastatin (LIPITOR) 20 MG tablet, Take 1 tablet (20 mg) by mouth daily, Disp: 90 tablet, Rfl: 3     cyanocobalamin (VITAMIN B-12) 100 MCG tablet, Take 100 mcg by mouth daily, Disp: , Rfl:      dofetilide (TIKOSYN) 250 MCG capsule, Take 1 capsule (250 mcg) by mouth every 12 hours, Disp: 60 capsule, Rfl: 11     furosemide (LASIX) 40 MG tablet, Take 1 tablet (40 mg) by mouth daily, Disp: 90 tablet, Rfl: 1     gabapentin (NEURONTIN) 300 MG capsule, Take 1 capsule (300 mg) by mouth 3 times daily, Disp: 90 capsule, Rfl: 3     metoprolol succinate (TOPROL-XL) 50 MG 24 hr tablet, Take 1 tablet (50 mg) by mouth daily, Disp: 90 tablet, Rfl: 3     multivitamin, therapeutic (THERA-VIT) TABS, Take 1 tablet by mouth daily, Disp: , Rfl:      spironolactone (ALDACTONE) 50 MG  "tablet, Take 1 tablet (50 mg) by mouth daily, Disp: 90 tablet, Rfl: 1     XARELTO 20 MG TABS tablet, Take 1 tablet (20 mg) by mouth daily (with dinner), Disp: 90 tablet, Rfl: 3     [START ON 10/4/2019] zolpidem (AMBIEN) 10 MG tablet, Take 0.5-1 tablets (5-10 mg) by mouth nightly as needed for sleep, Disp: 90 tablet, Rfl: 0     albuterol (PROAIR HFA/PROVENTIL HFA/VENTOLIN HFA) 108 (90 Base) MCG/ACT inhaler, Inhale 2 puffs into the lungs every 4 hours as needed for shortness of breath / dyspnea or wheezing, Disp: 8.5 g, Rfl: 1     HYDROcodone-acetaminophen (NORCO) 5-325 MG tablet, Take 1 tablet by mouth every 8 hours as needed for severe pain, Disp: 20 tablet, Rfl: 0     HYDROcodone-acetaminophen (NORCO) 5-325 MG tablet, Take 1-2 tablets by mouth every 4 hours as needed for severe pain, Disp: 40 tablet, Rfl: 0     metFORMIN (GLUCOPHAGE-XR) 500 MG 24 hr tablet, Take 1 tablet (500 mg) by mouth daily (with dinner) for 7 days, THEN 2 tablets (1,000 mg) daily (with dinner). (Patient taking differently: Take 2 tablets (1,000 mg) daily (with dinner).), Disp: 180 tablet, Rfl: 3    Current Facility-Administered Medications:      lidocaine 1% with EPINEPHrine 1:100,000 injection 3 mL, 3 mL, Intradermal, Once, Paco Rhodes MD    10 point ROS of systems including Constitutional, Eyes, Respiratory, Cardiovascular, Gastroenterology, Genitourinary, Integumentary, Muscularskeletal, Psychiatric and Neurologic were all negative except for pertinent positives noted in my HPI.    Examination:   /80   Pulse 59   Ht 1.803 m (5' 11\")   Wt 95.3 kg (210 lb)   SpO2 97%   BMI 29.29 kg/m    General Impression: Very pleasant patient in no acute distress, well-oriented in time place and person and quite conversational  Mental Status: normal  HEENT: Extraocular movements intact.  No clinical evidence of jaundice on examination of eyes.  Mucous membranes are unremarkable  Skin: Warm.  No other abnormalities  Respiratory System: " Unlabored on room air.  Respiratory cycle normal  Lymph Nodes: negative  Back/Flank Tenderness: the spine is normal  Cardiovascular System: No significant peripheral pitting edema  Abdominal Examination: mildly obese abdomen with no other remarkable features  Extremities: extremities unremarkable  Genitial: ot examined  Rectal Examination: good sphincter tone, normal perianal sensation.  Smooth rectal mucosa without hemorrhoids or fissures.  Smooth soft and mildly enlarged prostate without evidence of tenderness, bogginess or nodules.  Seminal vesicles.  Not palpable.  Peritoneum.  Peritoneum was otherwise the examination  Neurologic System: There are no significant acute abnormal neurological signs in the central or peripheral nervous systems    Impression  : the patient has 2 significant issues.  1.  He has a history of an elevated PSA.  At its high point it was 6.0.  It is now declined to 4.06  The prostate examination today felt quite benign,  I will recommend that he see his postoperative physician for an annual PSA and examination.  2.  He had noticed some slight indentation in the strength of his urine stream but he does not have nocturia or significant frequency and his postvoid residual today was very low at only 2cc.    Therefore I recommend the following.  I do not need to see him on a regular basis.  However, he will need an annual PSA and prostate examination by his personal physician.  In addition the patient will need to monitor his symptoms.  I would like to see him probably however on the following circumstances.  1.  Gross hematuria.  2.  Inanition in the quality of his urinary symptoms.  3.  If the PSA rises above 6.5.    I did carefully discussed the entire situation with the patient in detail today.  I addressed and answered all his questions    Plan: I will see him on a p.r.n. basis.  He will follow up with his personal physician.  I will see him under the circumstances listed above  "promptly    Time: 25 minutes with greater than 50% in discussion and consultation    \"This dictation was performed with voice recognition software and may contain errors,  omissions and inadvertent word substitution.\"      "

## 2019-09-30 NOTE — LETTER
9/30/2019       RE: Stu Washington  [address] [facility] [address]     Dear Colleague,    Thank you for referring your patient, Stu Washington, to the [facility] [place] at [facility]. Please see a copy of my visit note below.    History: it is a great pleasure to see this very pleasant63-year-old gentleman in follow-up consultation today.  As you recall, we have seen in the past with problems with an elevated PSA which jaskaran to 6.0 but subsequently after we followed  This we noticed that he declined and the most recent PSA recently was 4.06.  Results for BONNIE WASHINGTON (MRN [id number]) as of 9/30/2019 15:56   Ref. Range 11/10/2008 10:10 7/20/2012 07:35 12/10/2015 08:22 12/26/2017 08:52 9/27/2019 15:12   PSA Latest Ref Range: 0 - 4 ug/L 1.57 2.66 3.67 6.00 (H) 4.06 (H)     There is no family history of prostate cancer.    The patient does notice some leaking of the urine stream with minimal evidence of nocturia and without evidence of dysuria, hematuria or urinary tract infection    His general health is otherwise stable  He continues to be managed because of the long-term treatment for atrial fibrillation and cardiovascular issues, as noted below.  Past Medical History:   Diagnosis Date     Atrial fibrillation (H) 12/9/11    failed medication, multiple DC cardioveresions; s/p Left atrial ablation to eliminate atrial fibrillation 12/9/11     Chest pain      CHF (congestive heart failure) (H) 7/30/2016     Coronary artery disease      DJD (degenerative joint disease)      Fatigue 7/15/2019     Hip arthritis 1/15/2014     Hypertension      Hypertrophic cardiomyopathy (H) 10/09     Other abnormal heart sounds      Pacemaker     ICD     Palpitations      Pneumonia, organism unspecified(486)      Prediabetes 7/10/15    A1C 6.5     Status post implantation of automatic cardioverter/defibrillator (AICD)      Ventricular tachycardia (H)         Social History     Socioeconomic History     Marital status:      Spouse name: Not on file     Number of children: Not on file     Years of education: Not on file     Highest education level: Not on file   Occupational History     Occupation:      Employer: [employer]   Social Needs     Financial resource strain: Not on file     Food insecurity:     Worry: Not on file     Inability: Not on file     Transportation needs:     Medical: Not on file     Non-medical: Not on file   Tobacco Use     Smoking status: Former Smoker     Packs/day: 1.00     Years: 40.00     Pack years: 40.00     Types: Cigarettes     Start date: 1/1/1975     Last attempt to quit: 12/11/2015     Years since quitting: 3.8     Smokeless tobacco: Never Used   Substance and Sexual Activity     Alcohol use: Yes     Alcohol/week: 0.0 standard drinks     Comment: 1 drink per week     Drug use: No     Sexual activity: Yes     Partners: Female   Lifestyle     Physical activity:     Days per week: Not on file     Minutes per session: Not on file     Stress: Not on file   Relationships     Social connections:     Talks on phone: Not on file     Gets together: Not on file     Attends Buddhist service: Not on file     Active member of club or organization: Not on file     Attends meetings of clubs or organizations: Not on file     Relationship status: Not on file     Intimate partner violence:     Fear of current or ex partner: Not on file     Emotionally abused: Not on file     Physically abused: Not on file     Forced sexual activity: Not on file   Other Topics Concern      Service Not Asked     Blood Transfusions Not Asked     Caffeine Concern Not Asked     Occupational Exposure Not Asked     Hobby Hazards Not Asked     Sleep Concern Not Asked     Stress Concern Not Asked     Weight Concern Not Asked     Special Diet Not Asked     Back Care Not Asked     Exercise No     Bike Helmet Not Asked     Seat Belt Yes      Self-Exams Not Asked     Parent/sibling w/ CABG, MI or angioplasty before 65F 55M? No   Social History Narrative     Not on file       Past Surgical History:   Procedure Laterality Date     ANESTHESIA CARDIOVERSION  4/24/2014    Procedure: ANESTHESIA CARDIOVERSION;  Surgeon: Generic Anesthesia Provider;  Location: [facility]     ANESTHESIA CARDIOVERSION N/A 5/12/2016    Procedure: ANESTHESIA CARDIOVERSION;  Surgeon: GENERIC ANESTHESIA PROVIDER;  Location: [facility]     ANESTHESIA CARDIOVERSION N/A 8/7/2017    Procedure: ANESTHESIA CARDIOVERSION;  Anesthesia Offsite Coverage Cardioversion @1100;  Surgeon: GENERIC ANESTHESIA PROVIDER;  Location: [facility]     ANESTHESIA CARDIOVERSION N/A 1/3/2018    Procedure: ANESTHESIA CARDIOVERSION;  Anesthesia Cardioverion;  Surgeon: GENERIC ANESTHESIA PROVIDER;  Location: [facility]     ANESTHESIA CARDIOVERSION N/A 5/4/2018    Procedure: ANESTHESIA CARDIOVERSION;  Anesthesia Coverage Cardioversion @1400;  Surgeon: GENERIC ANESTHESIA PROVIDER;  Location: [facility]     ANESTHESIA CARDIOVERSION N/A 9/27/2018    Procedure: ANESTHESIA CARDIOVERSION;  Anesthesia Cardioversion @0930;  Surgeon: GENERIC ANESTHESIA PROVIDER;  Location: [facility]     ANESTHESIA CARDIOVERSION N/A 12/20/2018    Procedure: Anesthesia Coverage Cardioversion @0830;  Surgeon: GENERIC ANESTHESIA PROVIDER;  Location: [facility]     ANESTHESIA CARDIOVERSION N/A 8/21/2019    Procedure: Anesthesia Coverage Cardioversion @0800;  Surgeon: GENERIC ANESTHESIA PROVIDER;  Location: [facility]     ARTHROPLASTY HIP  1/15/2014    Procedure: ARTHROPLASTY HIP;  Left Total Hip Arthroplasty;  Surgeon: Nelson Gaspar MD;  Location: [facility]     ARTHROPLASTY HIP Right 6/5/2019    Procedure: Right Total Hip Arthroplasty;  Surgeon: Nelson Gaspar MD;  Location: [facility] OR     CARDIAC SURGERY       COLONOSCOPY N/A 5/18/2018    Procedure: COMBINED COLONOSCOPY, SINGLE OR MULTIPLE BIOPSY/POLYPECTOMY BY BIOPSY;  COLONOSCOPY (PT HAS DEFIBRILLATOR) ;  Surgeon: Mike Nickerson  MD;  Location:  GI     CV RIGHT HEART CATH N/A 8/19/2019    Procedure: CV RIGHT HEART CATH;  Surgeon: Cisco Rausch MD;  Location:  HEART CARDIAC CATH LAB     CV RIGHT HEART CATH N/A 8/21/2019    Procedure: Right Heart Cath;  Surgeon: Ciaran Burton MD;  Location:  HEART CARDIAC CATH LAB     H ABLATION FOCAL AFIB  12/9/11    Left atrial ablation to eliminate atrial fibrillation     IMPLANT AUTOMATIC IMPLANTABLE CARDIOVERTER DEFIBRILLATOR  7/27/12    AICD implantation     TONSILLECTOMY  1964       Family History   Problem Relation Age of Onset     Heart Disease Mother         unknown     Obesity Mother      Gastrointestinal Disease Mother         diverticulitis     Diabetes Maternal Grandmother      Diabetes Paternal Grandmother      Cerebrovascular Disease Paternal Grandmother 94     Diabetes Paternal Grandfather      Cerebrovascular Disease Paternal Grandfather 78     Diabetes Son      C.A.D. Father         CABG age 78     Heart Disease Father         CABG x5     Diabetes Maternal Grandfather      LUNG DISEASE No family hx of      Deep Vein Thrombosis (DVT) No family hx of      Anesthesia Reaction No family hx of      Melanoma No family hx of      Skin Cancer No family hx of          Current Outpatient Medications:      acetaminophen (TYLENOL) 325 MG tablet, Take 325-650 mg by mouth every 6 hours as needed, Disp: , Rfl:      amoxicillin (AMOXIL) 500 MG tablet, Take 4 tablets (2000 mg) by mouth 1 hour before dental procedures., Disp: 12 tablet, Rfl: 3     atorvastatin (LIPITOR) 20 MG tablet, Take 1 tablet (20 mg) by mouth daily, Disp: 90 tablet, Rfl: 3     cyanocobalamin (VITAMIN B-12) 100 MCG tablet, Take 100 mcg by mouth daily, Disp: , Rfl:      dofetilide (TIKOSYN) 250 MCG capsule, Take 1 capsule (250 mcg) by mouth every 12 hours, Disp: 60 capsule, Rfl: 11     furosemide (LASIX) 40 MG tablet, Take 1 tablet (40 mg) by mouth daily, Disp: 90 tablet, Rfl: 1     gabapentin (NEURONTIN) 300 MG capsule, Take 1  "capsule (300 mg) by mouth 3 times daily, Disp: 90 capsule, Rfl: 3     metoprolol succinate (TOPROL-XL) 50 MG 24 hr tablet, Take 1 tablet (50 mg) by mouth daily, Disp: 90 tablet, Rfl: 3     multivitamin, therapeutic (THERA-VIT) TABS, Take 1 tablet by mouth daily, Disp: , Rfl:      spironolactone (ALDACTONE) 50 MG tablet, Take 1 tablet (50 mg) by mouth daily, Disp: 90 tablet, Rfl: 1     XARELTO 20 MG TABS tablet, Take 1 tablet (20 mg) by mouth daily (with dinner), Disp: 90 tablet, Rfl: 3     [START ON 10/4/2019] zolpidem (AMBIEN) 10 MG tablet, Take 0.5-1 tablets (5-10 mg) by mouth nightly as needed for sleep, Disp: 90 tablet, Rfl: 0     albuterol (PROAIR HFA/PROVENTIL HFA/VENTOLIN HFA) 108 (90 Base) MCG/ACT inhaler, Inhale 2 puffs into the lungs every 4 hours as needed for shortness of breath / dyspnea or wheezing, Disp: 8.5 g, Rfl: 1     HYDROcodone-acetaminophen (NORCO) 5-325 MG tablet, Take 1 tablet by mouth every 8 hours as needed for severe pain, Disp: 20 tablet, Rfl: 0     HYDROcodone-acetaminophen (NORCO) 5-325 MG tablet, Take 1-2 tablets by mouth every 4 hours as needed for severe pain, Disp: 40 tablet, Rfl: 0     metFORMIN (GLUCOPHAGE-XR) 500 MG 24 hr tablet, Take 1 tablet (500 mg) by mouth daily (with dinner) for 7 days, THEN 2 tablets (1,000 mg) daily (with dinner). (Patient taking differently: Take 2 tablets (1,000 mg) daily (with dinner).), Disp: 180 tablet, Rfl: 3    Current Facility-Administered Medications:      lidocaine 1% with EPINEPHrine 1:100,000 injection 3 mL, 3 mL, Intradermal, Once, Paco Rhodes MD    10 point ROS of systems including Constitutional, Eyes, Respiratory, Cardiovascular, Gastroenterology, Genitourinary, Integumentary, Muscularskeletal, Psychiatric and Neurologic were all negative except for pertinent positives noted in my HPI.    Examination:   /80   Pulse 59   Ht 1.803 m (5' 11\")   Wt 95.3 kg (210 lb)   SpO2 97%   BMI 29.29 kg/m     General Impression: Very " pleasant patient in no acute distress, well-oriented in time place and person and quite conversational  Mental Status: normal  HEENT: Extraocular movements intact.  No clinical evidence of jaundice on examination of eyes.  Mucous membranes are unremarkable  Skin: Warm.  No other abnormalities  Respiratory System: Unlabored on room air.  Respiratory cycle normal  Lymph Nodes: negative  Back/Flank Tenderness: the spine is normal  Cardiovascular System: No significant peripheral pitting edema  Abdominal Examination: mildly obese abdomen with no other remarkable features  Extremities: extremities unremarkable  Genitial: ot examined  Rectal Examination: good sphincter tone, normal perianal sensation.  Smooth rectal mucosa without hemorrhoids or fissures.  Smooth soft and mildly enlarged prostate without evidence of tenderness, bogginess or nodules.  Seminal vesicles.  Not palpable.  Peritoneum.  Peritoneum was otherwise the examination  Neurologic System: There are no significant acute abnormal neurological signs in the central or peripheral nervous systems    Impression  : the patient has 2 significant issues.  1.  He has a history of an elevated PSA.  At its high point it was 6.0.  It is now declined to 4.06  The prostate examination today felt quite benign,  I will recommend that he see his postoperative physician for an annual PSA and examination.  2.  He had noticed some slight indentation in the strength of his urine stream but he does not have nocturia or significant frequency and his postvoid residual today was very low at only 2cc.    Therefore I recommend the following.  I do not need to see him on a regular basis.  However, he will need an annual PSA and prostate examination by his personal physician.  In addition the patient will need to monitor his symptoms.  I would like to see him probably however on the following circumstances.  1.  Gross hematuria.  2.  Inanition in the quality of his urinary  "symptoms.  3.  If the PSA rises above 6.5.    I did carefully discussed the entire situation with the patient in detail today.  I addressed and answered all his questions    Plan: I will see him on a p.r.n. basis.  He will follow up with his personal physician.  I will see him under the circumstances listed above promptly    Time: 25 minutes with greater than 50% in discussion and consultation    \"This dictation was performed with voice recognition software and may contain errors,  omissions and inadvertent word substitution.\"    Boaz Miller MD      "

## 2019-09-30 NOTE — NURSING NOTE
Chief Complaint   Patient presents with     Elevated PSA     patient is here for 1 year follow up.      Shelby Alonso, CMA

## 2019-10-02 ENCOUNTER — OFFICE VISIT (OUTPATIENT)
Dept: URGENT CARE | Facility: URGENT CARE | Age: 64
End: 2019-10-02
Payer: COMMERCIAL

## 2019-10-02 VITALS
DIASTOLIC BLOOD PRESSURE: 71 MMHG | RESPIRATION RATE: 18 BRPM | OXYGEN SATURATION: 97 % | WEIGHT: 207 LBS | HEART RATE: 69 BPM | BODY MASS INDEX: 28.87 KG/M2 | SYSTOLIC BLOOD PRESSURE: 105 MMHG | TEMPERATURE: 97.9 F

## 2019-10-02 DIAGNOSIS — R06.2 WHEEZING: ICD-10-CM

## 2019-10-02 DIAGNOSIS — J30.9 ALLERGIC RHINITIS, UNSPECIFIED SEASONALITY, UNSPECIFIED TRIGGER: Primary | ICD-10-CM

## 2019-10-02 DIAGNOSIS — R05.9 COUGH: ICD-10-CM

## 2019-10-02 PROCEDURE — 99214 OFFICE O/P EST MOD 30 MIN: CPT | Performed by: PHYSICIAN ASSISTANT

## 2019-10-02 RX ORDER — CODEINE PHOSPHATE AND GUAIFENESIN 10; 100 MG/5ML; MG/5ML
1-2 SOLUTION ORAL EVERY 4 HOURS PRN
Qty: 240 ML | Refills: 0 | Status: ON HOLD | OUTPATIENT
Start: 2019-10-02 | End: 2020-05-16

## 2019-10-02 RX ORDER — ALBUTEROL SULFATE 90 UG/1
2 AEROSOL, METERED RESPIRATORY (INHALATION) EVERY 6 HOURS
Qty: 18 G | Refills: 0 | Status: SHIPPED | OUTPATIENT
Start: 2019-10-02 | End: 2019-12-31

## 2019-10-02 RX ORDER — DOXYCYCLINE 100 MG/1
100 CAPSULE ORAL 2 TIMES DAILY
Qty: 20 CAPSULE | Refills: 0 | Status: SHIPPED | OUTPATIENT
Start: 2019-10-02 | End: 2019-10-12

## 2019-10-02 RX ORDER — LORATADINE 10 MG/1
10 TABLET ORAL DAILY
Qty: 30 TABLET | Refills: 0 | Status: SHIPPED | OUTPATIENT
Start: 2019-10-02 | End: 2019-12-17

## 2019-10-02 NOTE — PROGRESS NOTES
Patient presents with:  Urgent Care: cough, wheezing, sob and running stuffy nose since monday night.       SUBJECTIVE:   Stu Meredith is a 63 year old male presenting with a chief complaint of:  1) cough for 3 days  2) wheezing  3) subjective fevers.    4) some sneezing.      Patient reports that he typically develops these symptoms during season changes.      Onset of symptoms was as above  Course of illness is worsening.    Severity moderate  Current and Associated symptoms: as above.    Treatment measures tried include nyquil.  Predisposing factors include frequent lung infections per patient.    Past Medical History:   Diagnosis Date     Atrial fibrillation (H) 12/9/11    failed medication, multiple DC cardioveresions; s/p Left atrial ablation to eliminate atrial fibrillation 12/9/11     Chest pain      CHF (congestive heart failure) (H) 7/30/2016     Coronary artery disease      DJD (degenerative joint disease)      Fatigue 7/15/2019     Hip arthritis 1/15/2014     Hypertension      Hypertrophic cardiomyopathy (H) 10/09     Other abnormal heart sounds      Pacemaker     ICD     Palpitations      Pneumonia, organism unspecified(486)      Prediabetes 7/10/15    A1C 6.5     Status post implantation of automatic cardioverter/defibrillator (AICD)      Ventricular tachycardia (H)      Patient Active Problem List   Diagnosis     Atrial fibrillation (H)     CARDIOVASCULAR SCREENING; LDL GOAL LESS THAN 130     Hypertrophic cardiomyopathy (H)     Advanced directives, counseling/discussion     Ventricular tachycardia (H)     Automatic implantable cardioverter-defibrillator in situ- [employer], dual chamber- NOT dependent     Prediabetes     S/P ablation of atrial flutter     CHF (congestive heart failure) (H)     Chronic Zolpidem use for insomnia     S/P ablation of atrial fibrillation     Lung nodules     Atrial tachycardia (H)     Encounter for monitoring dofetilide therapy     HTN (hypertension)     Social  History     Tobacco Use     Smoking status: Former Smoker     Packs/day: 1.00     Years: 40.00     Pack years: 40.00     Types: Cigarettes     Start date: 1/1/1975     Last attempt to quit: 12/11/2015     Years since quitting: 3.8     Smokeless tobacco: Never Used   Substance Use Topics     Alcohol use: Yes     Alcohol/week: 0.0 standard drinks     Comment: 1 drink per week       ROS:  CONSTITUTIONAL:NEGATIVE for fever, chills, change in weight  INTEGUMENTARY/SKIN: NEGATIVE for worrisome rashes, moles or lesions  EYES: NEGATIVE for vision changes or irritation  ENT/MOUTH: as per HPI  RESP:as per HPI  CV: NEGATIVE for chest pain, palpitations or peripheral edema  GI: NEGATIVE for nausea, abdominal pain, heartburn, or change in bowel habits  : negative for dysuria, hematuria, decreased urinary stream, erectile dysfunction  MUSCULOSKELETAL: NEGATIVE for significant arthralgias or myalgia  Review of systems negative except as stated above.    OBJECTIVE  :/71   Pulse 69   Temp 97.9  F (36.6  C) (Oral)   Resp 18   Wt 93.9 kg (207 lb)   SpO2 97%   BMI 28.87 kg/m    GENERAL APPEARANCE: healthy, alert and no distress  EYES: EOMI,  PERRL, conjunctiva clear  HENT: ear canals and TM's normal.  Nose and mouth without ulcers, erythema or lesions  HENT: nasal turbinates boggy with bluish hue and rhinorrhea clear  NECK: supple, nontender, no lymphadenopathy  RESP: lungs clear to auscultation - no rales, rhonchi or wheezes  CV: regular rates and rhythm, normal S1 S2, no murmur noted  ABDOMEN:  soft, nontender, no HSM or masses and bowel sounds normal  NEURO: Normal strength and tone, sensory exam grossly normal,  normal speech and mentation  SKIN: no suspicious lesions or rashes    (J30.9) Allergic rhinitis, unspecified seasonality, unspecified trigger  (primary encounter diagnosis)  Comment:   Plan: loratadine (CLARITIN) 10 MG tablet        Stay on daily for 2-3 weeks during allergy season in spring and fall.       (R05) Cough  Comment:   Plan: guaiFENesin-codeine (ROBITUSSIN AC) 100-10         MG/5ML solution,     Start Doxycycline only if symptoms do not improve/resolve with prednisone and loratadine.        doxycycline hyclate         (VIBRAMYCIN) 100 MG capsule take twice a day for 10 days            (R06.2) Wheezing  Comment:   Plan: albuterol (PROAIR HFA/PROVENTIL HFA/VENTOLIN         HFA) 108 (90 Base) MCG/ACT inhaler

## 2019-10-02 NOTE — PATIENT INSTRUCTIONS
(J30.9) Allergic rhinitis, unspecified seasonality, unspecified trigger  (primary encounter diagnosis)  Comment:   Plan: loratadine (CLARITIN) 10 MG tablet        Stay on daily for 2-3 weeks during allergy season in spring and fall.      (R05) Cough  Comment:   Plan: guaiFENesin-codeine (ROBITUSSIN AC) 100-10         MG/5ML solution,     Start Doxycycline only if symptoms do not improve/resolve with prednisone and loratadine.      doxycycline hyclate         (VIBRAMYCIN) 100 MG capsule take twice a day for 10 days            (R06.2) Wheezing  Comment:   Plan: albuterol (PROAIR HFA/PROVENTIL HFA/VENTOLIN         HFA) 108 (90 Base) MCG/ACT inhaler

## 2019-10-03 RX ORDER — METOPROLOL SUCCINATE 50 MG/1
TABLET, EXTENDED RELEASE ORAL
Qty: 90 TABLET | Refills: 3 | Status: SHIPPED | OUTPATIENT
Start: 2019-10-03 | End: 2020-09-29

## 2019-10-08 ENCOUNTER — OFFICE VISIT (OUTPATIENT)
Dept: INTERNAL MEDICINE | Facility: CLINIC | Age: 64
End: 2019-10-08
Payer: COMMERCIAL

## 2019-10-08 VITALS
RESPIRATION RATE: 16 BRPM | DIASTOLIC BLOOD PRESSURE: 83 MMHG | BODY MASS INDEX: 29.12 KG/M2 | HEART RATE: 62 BPM | WEIGHT: 208.8 LBS | OXYGEN SATURATION: 97 % | SYSTOLIC BLOOD PRESSURE: 132 MMHG

## 2019-10-08 DIAGNOSIS — E11.9 TYPE 2 DIABETES MELLITUS WITHOUT COMPLICATION, WITHOUT LONG-TERM CURRENT USE OF INSULIN (H): Primary | ICD-10-CM

## 2019-10-08 ASSESSMENT — PAIN SCALES - GENERAL: PAINLEVEL: MILD PAIN (3)

## 2019-10-08 NOTE — PROGRESS NOTES
SUBJECTIVE:  Stu Meredith is a 63 year old male with history of HCM, VT s/p ICD placement, HTN, A-fib who presents for hospital follow up. He does not have a particular concern to address today. He did not like his most recent hospitalization experience. He recently saw his cardiology and had some medication changes which he is happy with. His symptoms have improved. Regarding the burning sensation of his feet, he says his symptoms improved significantly since starting gabapentin. He has not been exercising, his schedule does not allow him to. He has been working on improving his diet. 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 He was started on doxycycline and restarted on his inhaler. He is starting to feel better but still recovering from it.     Past Medical History:   Diagnosis Date     Atrial fibrillation (H) 12/9/11    failed medication, multiple DC cardioveresions; s/p Left atrial ablation to eliminate atrial fibrillation 12/9/11     Chest pain      CHF (congestive heart failure) (H) 7/30/2016     Coronary artery disease      DJD (degenerative joint disease)      Fatigue 7/15/2019     Hip arthritis 1/15/2014     Hypertension      Hypertrophic cardiomyopathy (H) 10/09     Other abnormal heart sounds      Pacemaker     ICD     Palpitations      Pneumonia, organism unspecified(486)      Prediabetes 7/10/15    A1C 6.5     Status post implantation of automatic cardioverter/defibrillator (AICD)      Ventricular tachycardia (H)          Review Of Systems  Constitutional: recently sick with the common cold, had a fever    Eyes: no vision change, diplopia or red eyes   Ears, Nose, Mouth, Throat: no tinnitus or hearing change, no epistaxis or nasal discharge, no oral lesions, throat clear   Cardiovascular: no chest pain, palpitations, or pain with walking, no orthopnea or PND   Respiratory: he's been wheezing since getting sick with the cold   GI: diarrhea since starting doxycycline   : no dysuria  or hematuria   Musculoskeletal: no joint or muscle pain or swelling   Neuro: no loss of strength or sensation, no numbness or tingling, no tremor. Improved burning sensation   Psych: no depression or anxiety, no sleep problems        Current Outpatient Medications   Medication Sig Dispense Refill     acetaminophen (TYLENOL) 325 MG tablet Take 325-650 mg by mouth every 6 hours as needed       albuterol (PROAIR HFA/PROVENTIL HFA/VENTOLIN HFA) 108 (90 Base) MCG/ACT inhaler Inhale 2 puffs into the lungs every 6 hours 18 g 0     atorvastatin (LIPITOR) 20 MG tablet Take 1 tablet (20 mg) by mouth daily 90 tablet 3     cyanocobalamin (VITAMIN B-12) 100 MCG tablet Take 100 mcg by mouth daily       dofetilide (TIKOSYN) 250 MCG capsule Take 1 capsule (250 mcg) by mouth every 12 hours 60 capsule 11     doxycycline hyclate (VIBRAMYCIN) 100 MG capsule Take 1 capsule (100 mg) by mouth 2 times daily for 10 days 20 capsule 0     furosemide (LASIX) 40 MG tablet Take 1 tablet (40 mg) by mouth daily 90 tablet 1     gabapentin (NEURONTIN) 300 MG capsule Take 1 capsule (300 mg) by mouth 3 times daily 90 capsule 3     guaiFENesin-codeine (ROBITUSSIN AC) 100-10 MG/5ML solution Take 5-10 mLs by mouth every 4 hours as needed 240 mL 0     loratadine (CLARITIN) 10 MG tablet Take 1 tablet (10 mg) by mouth daily 30 tablet 0     metoprolol succinate ER (TOPROL-XL) 50 MG 24 hr tablet TAKE ONE TABLET BY MOUTH EVERY DAY 90 tablet 3     multivitamin, therapeutic (THERA-VIT) TABS Take 1 tablet by mouth daily       spironolactone (ALDACTONE) 50 MG tablet Take 1 tablet (50 mg) by mouth daily 90 tablet 1     XARELTO 20 MG TABS tablet Take 1 tablet (20 mg) by mouth daily (with dinner) 90 tablet 3     zolpidem (AMBIEN) 10 MG tablet Take 0.5-1 tablets (5-10 mg) by mouth nightly as needed for sleep 90 tablet 0     amoxicillin (AMOXIL) 500 MG tablet Take 4 tablets (2000 mg) by mouth 1 hour before dental procedures. (Patient not taking: Reported on 10/2/2019)  12 tablet 3     metFORMIN (GLUCOPHAGE-XR) 500 MG 24 hr tablet Take 1 tablet (500 mg) by mouth daily (with dinner) for 7 days, THEN 2 tablets (1,000 mg) daily (with dinner). (Patient taking differently: Take 2 tablets (1,000 mg) daily (with dinner).) 180 tablet 3       PHYSICAL EXAM:  /83 (BP Location: Right arm, Patient Position: Sitting, Cuff Size: Adult Large)   Pulse 62   Resp 16   Wt 94.7 kg (208 lb 12.8 oz)   SpO2 97%   BMI 29.12 kg/m    Constitutional: no distress, comfortable, pleasant    Cardiovascular: regular rate and rhythm, normal S1 and S2, no murmurs  Respiratory: clear to auscultation, no wheezes or crackles, normal breath sounds   Gastrointestinal: positive bowel sounds, nontender  Musculoskeletal: full range of motion, no edema   Psychological: appropriate mood         ASSESSMENT/PLAN:  Stu Meredith is a 63 year old male with history of HCM, VT s/p ICD placement, HTN, A-fib who presents for hospital follow up.     Type 2 diabetes mellitus without complication   Overall, he feels like his lifestyle modifications is changing his diet but not exercising. He reports adherence to his medication. We encouraged patient to try to exercise even if its once a week. We recommended that patient follow up in November for Hgb A1c check.   - Follow in November 2019    Health Maintenance   Declined flu shot. Patient reports that he gets sick every time he gets the flu shot.       Jessica Case, MS4     Provider Disclosure:  I agree with above History, Review of Systems, Physical exam and Plan. I have reviewed the content of the documentation and have edited it as needed. I have personally performed the services documented here and the documentation accurately represents those services and the decisions I have made.     Ngoc Harkins MD

## 2019-10-08 NOTE — NURSING NOTE
Chief Complaint   Patient presents with     Hospital F/U     Patient is following up after cardioversion and hip replacement       Liliam Moon, EMT   Normal for race

## 2019-10-08 NOTE — PATIENT INSTRUCTIONS
[facility] Medication Refill Request Information:  * Please contact your pharmacy regarding ANY request for medication refills.  ** [facility] Prescription Fax = [phone]  * Please allow 3 business days for routine medication refills.  * Please allow 5 business days for controlled substance medication refills.     [facility] Test Result notification information:  *You will be notified with in 7-10 days of your appointment day regarding the results of your test.  If you are on MyChart you will be notified as soon as the provider has reviewed the results and signed off on them.      Dr. iCro Todd

## 2019-10-08 NOTE — PROGRESS NOTES
[facility]  Primary Care Center   Ngoc Harkins MD  10/08/2019      Chief Complaint:   No chief complaint on file.       History of Present Illness:   Stu Meredith is a 63 year old male with a history of hypertension, congestive heart failure, and atrial fibrillation who presents *** for {eval/fu:[id number]} of ***.    He presented to the [facility] on 10/2/19 for evaluation of a persistent cough with associated wheezing, fevers, and snoring. He was given Claritin and Robitussin in addition to an albuterol inhaler at that time as it was thought his symptoms were due to the changing seasons. He was also prescribed a course of doxycycline if his symptoms did not improve.     Other concerns discussed:  ***     Review of Systems:   Pertinent items are noted in HPI or as in patient entered ROS below, remainder of complete ROS is negative.     Active Medications:     Current Outpatient Medications:      acetaminophen (TYLENOL) 325 MG tablet, Take 325-650 mg by mouth every 6 hours as needed, Disp: , Rfl:      albuterol (PROAIR HFA/PROVENTIL HFA/VENTOLIN HFA) 108 (90 Base) MCG/ACT inhaler, Inhale 2 puffs into the lungs every 6 hours, Disp: 18 g, Rfl: 0     amoxicillin (AMOXIL) 500 MG tablet, Take 4 tablets (2000 mg) by mouth 1 hour before dental procedures. (Patient not taking: Reported on 10/2/2019), Disp: 12 tablet, Rfl: 3     atorvastatin (LIPITOR) 20 MG tablet, Take 1 tablet (20 mg) by mouth daily, Disp: 90 tablet, Rfl: 3     cyanocobalamin (VITAMIN B-12) 100 MCG tablet, Take 100 mcg by mouth daily, Disp: , Rfl:      dofetilide (TIKOSYN) 250 MCG capsule, Take 1 capsule (250 mcg) by mouth every 12 hours, Disp: 60 capsule, Rfl: 11     doxycycline hyclate (VIBRAMYCIN) 100 MG capsule, Take 1 capsule (100 mg) by mouth 2 times daily for 10 days, Disp: 20 capsule, Rfl: 0     furosemide (LASIX) 40 MG tablet, Take 1 tablet (40 mg) by mouth daily, Disp: 90 tablet, Rfl: 1     gabapentin (NEURONTIN) 300  MG capsule, Take 1 capsule (300 mg) by mouth 3 times daily, Disp: 90 capsule, Rfl: 3     guaiFENesin-codeine (ROBITUSSIN AC) 100-10 MG/5ML solution, Take 5-10 mLs by mouth every 4 hours as needed, Disp: 240 mL, Rfl: 0     loratadine (CLARITIN) 10 MG tablet, Take 1 tablet (10 mg) by mouth daily, Disp: 30 tablet, Rfl: 0     metFORMIN (GLUCOPHAGE-XR) 500 MG 24 hr tablet, Take 1 tablet (500 mg) by mouth daily (with dinner) for 7 days, THEN 2 tablets (1,000 mg) daily (with dinner). (Patient taking differently: Take 2 tablets (1,000 mg) daily (with dinner).), Disp: 180 tablet, Rfl: 3     metoprolol succinate ER (TOPROL-XL) 50 MG 24 hr tablet, TAKE ONE TABLET BY MOUTH EVERY DAY, Disp: 90 tablet, Rfl: 3     multivitamin, therapeutic (THERA-VIT) TABS, Take 1 tablet by mouth daily, Disp: , Rfl:      spironolactone (ALDACTONE) 50 MG tablet, Take 1 tablet (50 mg) by mouth daily, Disp: 90 tablet, Rfl: 1     XARELTO 20 MG TABS tablet, Take 1 tablet (20 mg) by mouth daily (with dinner), Disp: 90 tablet, Rfl: 3     zolpidem (AMBIEN) 10 MG tablet, Take 0.5-1 tablets (5-10 mg) by mouth nightly as needed for sleep, Disp: 90 tablet, Rfl: 0    Current Facility-Administered Medications:      lidocaine 1% with EPINEPHrine 1:100,000 injection 3 mL, 3 mL, Intradermal, Once, Paco Rhodes MD      Allergies:   Patient has no known allergies.      Past Medical History:  Atrial fibrillation    Congestive heart failure   Coronary artery disease   Degenerative joint disease  Hip arthritis   Hypertension   Hypertrophic cardiomyopathy   Palpitations  Pneumonia  Prediabetes  Ventricular tachycardia   Insomnia  Lung nodules   Atrial tachycardia     Past Surgical History:  Anesthesia cardioversion (x8) - 8/21/19  Left total hip arthroplasty - 1/15/14  Right total hip arthroplasty - 6/5/19  Cardiac surgery   CV right heart cath (x2) - 8/21/19  Left atrial ablation to eliminate atrial fibrillation - 12/9/11  Implant automatic cardioverter  defibrillator - 7/27/12  Tonsillectomy - 1964    Family History:   Heart disease - mother  Obesity - mother  Diverticulitis - mother  Diabetes - maternal grandmother, paternal grandmother, paternal grandfather, son, maternal grandfather   Cerebrovascular disease - paternal grandmother, paternal grandfather  Coronary artery disease - father (CABG x5)      Social History:   The patient is , a former smoker (1 ppd for 40 years, quit 12/11/15), and does consume alcohol (1 drink per week). He is an  for the [employer].         Physical Exam:   There were no vitals taken for this visit.   General: Pleasant male, in no apparent distress  ENT: TMs normal bilaterally, oropharynx clear, mucous membranes are moist.   Neck:  No lymphadenopathy, no thyromegaly, no carotid bruits  Resp: lungs clear to ausculation bilaterally  CV: Heart regular rate and rhythm, no murmur, rub, or gallop  Abd: Soft, nontender, nondistended, normal bowel sounds, no HSM  Ext: Warm and well perfused, no LE edema  Skin: warm, dry, no rash  Neuro: Alert and oriented x 3, no focal deficits        Assessment and Plan:  There are no diagnoses linked to this encounter.     Follow-up: No follow-ups on file.         Scribe Preparation Attestation:  I, Trinh Resendiz, a scribe, prepared the chart for today's encounter.     Portions of this medical record were completed by a scribe. UPON MY REVIEW AND AUTHENTICATION BY ELECTRONIC SIGNATURE, this confirms (a) I performed the applicable clinical services, and (b) the record is accurate.

## 2019-10-28 NOTE — PROGRESS NOTES
HPI:  63 year old male with history of HCM without obstruction (dx 2006, EF 20% improved to 60%), VT s/p ICD 2012, nonobstructive CAD (cath 2013), AF s/p PVI X 3 (2011, 2016, 2018) and DCCV X 10 who since last clinic visit with me was admitted for dofetilide loading, cardioversion, and RHC.     Today patient relates that he has been feeling well.  His energy level, yanes and exercise capacity have improved.    He denies any chest pain/pressure, PND, orthopnea, abdominal swelling/distension, palpitations, syncope or presyncope. He denies any problems with his medications and reports compliance.      Past Medical History:   Diagnosis Date     Atrial fibrillation (H) 12/9/11    failed medication, multiple DC cardioveresions; s/p Left atrial ablation to eliminate atrial fibrillation 12/9/11     Chest pain      CHF (congestive heart failure) (H) 7/30/2016     Coronary artery disease      DJD (degenerative joint disease)      Hip arthritis 1/15/2014     Hypertension      Hypertrophic cardiomyopathy (H) 10/09     Other abnormal heart sounds      Pacemaker     ICD     Palpitations      Pneumonia, organism unspecified(486)      Prediabetes 7/10/15    A1C 6.5     Status post implantation of automatic cardioverter/defibrillator (AICD)      Ventricular tachycardia (H)    - HCM diagnosed '06, previously burnt out (EF 20%) now with recovered EF  - h/o VT s/p dual chamber ICD '12  - AF s/p PVI X 2 ('11, '16, '18), h/o DCCV X 10    Meds:  acetaminophen (TYLENOL) 325 MG tablet, Take 325-650 mg by mouth every 6 hours as needed  amoxicillin (AMOXIL) 500 MG tablet, Take 4 tablets (2000 mg) by mouth 1 hour before dental procedures. (Patient not taking: Reported on 10/2/2019)  atorvastatin (LIPITOR) 20 MG tablet, Take 1 tablet (20 mg) by mouth daily  cyanocobalamin (VITAMIN B-12) 100 MCG tablet, Take 100 mcg by mouth daily  dofetilide (TIKOSYN) 250 MCG capsule, Take 1 capsule (250 mcg) by mouth every 12 hours  furosemide (LASIX) 40 MG  tablet, Take 1 tablet (40 mg) by mouth daily  gabapentin (NEURONTIN) 300 MG capsule, Take 1 capsule (300 mg) by mouth 3 times daily  multivitamin, therapeutic (THERA-VIT) TABS, Take 1 tablet by mouth daily  spironolactone (ALDACTONE) 50 MG tablet, Take 1 tablet (50 mg) by mouth daily  XARELTO 20 MG TABS tablet, Take 1 tablet (20 mg) by mouth daily (with dinner)  zolpidem (AMBIEN) 10 MG tablet, Take 0.5-1 tablets (5-10 mg) by mouth nightly as needed for sleep  metFORMIN (GLUCOPHAGE-XR) 500 MG 24 hr tablet, Take 1 tablet (500 mg) by mouth daily (with dinner) for 7 days, THEN 2 tablets (1,000 mg) daily (with dinner). (Patient taking differently: Take 2 tablets (1,000 mg) daily (with dinner).)    lidocaine 1% with EPINEPHrine 1:100,000 injection 3 mL          Social History     Socioeconomic History     Marital status:      Spouse name: Not on file     Number of children: Not on file     Years of education: Not on file     Highest education level: Not on file   Occupational History     Occupation: [employer]     Employer: [employer]   Social Needs     Financial resource strain: Not on file     Food insecurity:     Worry: Not on file     Inability: Not on file     Transportation needs:     Medical: Not on file     Non-medical: Not on file   Tobacco Use     Smoking status: Former Smoker     Packs/day: 1.00     Years: 40.00     Pack years: 40.00     Types: Cigarettes     Start date: 1/1/1975     Last attempt to quit: 12/11/2015     Years since quitting: 3.5     Smokeless tobacco: Never Used   Substance and Sexual Activity     Alcohol use: Yes     Alcohol/week: 0.0 oz     Comment: 1 drink per week     Drug use: No     Sexual activity: Yes     Partners: Female   Lifestyle     Physical activity:     Days per week: Not on file     Minutes per session: Not on file     Stress: Not on file   Relationships     Social connections:     Talks on phone: Not on file     Gets together: Not on file     Attends  Buddhist service: Not on file     Active member of club or organization: Not on file     Attends meetings of clubs or organizations: Not on file     Relationship status: Not on file     Intimate partner violence:     Fear of current or ex partner: Not on file     Emotionally abused: Not on file     Physically abused: Not on file     Forced sexual activity: Not on file   Other Topics Concern      Service Not Asked     Blood Transfusions Not Asked     Caffeine Concern Not Asked     Occupational Exposure Not Asked     Hobby Hazards Not Asked     Sleep Concern Not Asked     Stress Concern Not Asked     Weight Concern Not Asked     Special Diet Not Asked     Back Care Not Asked     Exercise No     Bike Helmet Not Asked     Seat Belt Yes     Self-Exams Not Asked     Parent/sibling w/ CABG, MI or angioplasty before 65F 55M? No   Social History Narrative     Not on file       Family History   Problem Relation Age of Onset     Heart Disease Mother         unknown     Obesity Mother      Gastrointestinal Disease Mother         diverticulitis     Diabetes Maternal Grandmother      Diabetes Paternal Grandmother      Cerebrovascular Disease Paternal Grandmother 94     Diabetes Paternal Grandfather      Cerebrovascular Disease Paternal Grandfather 78     Diabetes Son      C.A.D. Father         CABG age 78     Heart Disease Father         CABG x5     Diabetes Maternal Grandfather      LUNG DISEASE No family hx of      Deep Vein Thrombosis (DVT) No family hx of      Anesthesia Reaction No family hx of      Melanoma No family hx of      Skin Cancer No family hx of        ROS:   Constitutional: No fever, chills, or sweats. No weight gain/loss.   ENT: No visual disturbance, ear ache, epistaxis, sore throat.   Allergies/Immunologic: Negative.   Respiratory: No cough, hemoptysis.   Cardiovascular: As per HPI.   GI: No nausea, vomiting, hematemesis, melena, or hematochezia.   : No urinary frequency, dysuria, or hematuria.  "  Integument: Negative.   Psychiatric: Negative.   Neuro: Negative.   Endocrinology: Negative.   Musculoskeletal: Ongoing hip pain      /74 (BP Location: Right arm, Patient Position: Chair, Cuff Size: Adult Regular)   Pulse 60   Ht 1.803 m (5' 11\")   Wt 95.3 kg (210 lb)   SpO2 96%   BMI 29.29 kg/m    General: appears comfortable, alert and articulate  Head: normocephalic, atraumatic  Eyes: anicteric sclera, EOMI  Neck: no adenopathy, 2+ carotids without bruits  Orophyarynx: moist mucosa, no lesions, dentition intact  Heart: regular, S1/S2, 2/6 systolic murmur, no gallop or rub, estimated JVP <10cm  Lungs: clear, no rales or wheezing  Abdomen: soft, non-tender, bowel sounds present, no hepatomegaly  Extremities: no clubbing, cyanosis or edema  Neurological: normal speech and affect, no gross motor deficits    Labs:  Orders Only on 09/27/2019   Component Date Value Ref Range Status     PSA 09/27/2019 4.06* 0 - 4 ug/L Final    Assay Method:  Chemiluminescence using Siemens Vista analyzer     Sodium 09/27/2019 139  133 - 144 mmol/L Final     Potassium 09/27/2019 4.3  3.4 - 5.3 mmol/L Final     Chloride 09/27/2019 106  94 - 109 mmol/L Final     Carbon Dioxide 09/27/2019 30  20 - 32 mmol/L Final     Anion Gap 09/27/2019 3  3 - 14 mmol/L Final     Glucose 09/27/2019 122* 70 - 99 mg/dL Final     Urea Nitrogen 09/27/2019 20  7 - 30 mg/dL Final     Creatinine 09/27/2019 0.93  0.66 - 1.25 mg/dL Final     GFR Estimate 09/27/2019 86  >60 mL/min/[1.73_m2] Final    Comment: Non  GFR Calc  Starting 12/18/2018, serum creatinine based estimated GFR (eGFR) will be   calculated using the Chronic Kidney Disease Epidemiology Collaboration   (CKD-EPI) equation.       GFR Estimate If Black 09/27/2019 >90  >60 mL/min/[1.73_m2] Final    Comment:  GFR Calc  Starting 12/18/2018, serum creatinine based estimated GFR (eGFR) will be   calculated using the Chronic Kidney Disease Epidemiology Collaboration "   (CKD-EPI) equation.       Calcium 09/27/2019 9.4  8.5 - 10.1 mg/dL Final         CPX Echo 6/1/2018:  Interpretation Summary  Submaximal treadmill stress test in a patient with a diagnosis of HCM.  The Ramirez treadmill score is 8 (low risk).  Exercise was stopped due to fatigue.  Normal blood pressure response to exercise.  No ECG evidence of ischemia.  No supraventricular or ventricular arrhythmias. Frequent PVCs noted throughout the study.  Normal biventricular function with mild asymmetrical septal hypertrophy (12mm).  No dynamic outflow tract obstruction is present at rest or with exercise.  Normal segmental wall motion at rest and with exercise.  Minimal augmentation in LV function with exercise.  Average functional capacity for age.         CPX 2/15/2019:        CTA coronary angiogram 5/28/19  IMPRESSION:  1.  No evidence of coronary artery atherosclerosis or stenosis.  2.  Myocardial bridging involving the mid LAD.  3.  Total Agatston score 0 placing the patient in the lowest percentile when compared to age and gender matched control group.    Right heart cath 8/21/19   Time Systolic Diastolic Mean A Wave V Wave EDP Max dp/dt HR   RA Pressures  3:38 PM   12 mmHg    16 mmHg    14 mmHg      61 bpm      RV Pressures  3:38 PM 60 mmHg        16 mmHg     106 bpm      PA Pressures  3:41 PM 60 mmHg    22 mmHg    38 mmHg        69 bpm      PCW Pressures  3:39 PM   30 mmHg    28 mmHg    38 mmHg      74 bpm         Time Hb SAT(%) PO2 Content PA Sat   PA  3:28 PM  63.6 %      63.6 %      Art  3:28 PM  99 %     18.04 mL/dL       Cardiac Output Phase: Baseline      Time TDCO TDCI Tj C.O. Tj C.I. Tj HR   Cardiac Output Results  3:28 PM 3.8 L/min    1.79 L/min/m2    4.41 L/min    2.08 L/min/m2             Assessment/Plan:  63 year old male with history of HCM without obstruction (dx 2006, EF 20% improved to 60%), VT s/p ICD 2012, nonobstructive CAD (cath 2013), AF s/p PVI X 3 (2011, 2016, 2018) and DCCV X 10 who since  last clinic visit with me was admitted for dofetilide loading, cardioversion, and RHC    # Atrial Fibrillation s/p recent admission for dofetilide loading cardioversion and RHC  - continue Xarelto  - continue dofetilide 250mcg  BID   - continue Metoprolol 50 XL daily       # Hypertrophic cardiomyopathy -- previously burnt out with EF 20% ('13), now recovered (EF 60%).  No evidence of LVOT obstruction.   - continue metoprolol XL 50mg daily   - continue aldactone  50mg daily  - pt aware of exercise restrictions and family screening recommendations    # Nonobstructive CAD -- 10-30% stenoses on cath '13  - coronary CTA confirmed no significant disease  - continue atorvastatin 20     # HTN -- controlled  - continue metoprolol XL, aldactone      Follow-up:  On 12/20/19 with CPX, labs, and Echo.  Will be happy to see sooner if change in clinical status or new questions/concerns arise.      Mona Ware MD  Section Head - Advanced Heart Failure, Transplantation and Mechanical Circulatory Support  Director - Adult Congenital and Cardiovascular [facility]etics [facility]  Associate Professor of Medicine, [employer]

## 2019-11-08 ENCOUNTER — OFFICE VISIT (OUTPATIENT)
Dept: CARDIOLOGY | Facility: CLINIC | Age: 64
End: 2019-11-08
Attending: INTERNAL MEDICINE
Payer: COMMERCIAL

## 2019-11-08 ENCOUNTER — HEALTH MAINTENANCE LETTER (OUTPATIENT)
Age: 64
End: 2019-11-08

## 2019-11-08 VITALS
DIASTOLIC BLOOD PRESSURE: 79 MMHG | BODY MASS INDEX: 29.68 KG/M2 | WEIGHT: 212 LBS | SYSTOLIC BLOOD PRESSURE: 128 MMHG | OXYGEN SATURATION: 98 % | HEIGHT: 71 IN | HEART RATE: 60 BPM

## 2019-11-08 DIAGNOSIS — I42.2 HYPERTROPHIC CARDIOMYOPATHY (H): Primary | ICD-10-CM

## 2019-11-08 DIAGNOSIS — I42.2 HYPERTROPHIC CARDIOMYOPATHY (H): ICD-10-CM

## 2019-11-08 DIAGNOSIS — I51.7 HYPERTROPHIC CARDIOMEGALY: Primary | ICD-10-CM

## 2019-11-08 PROCEDURE — 99214 OFFICE O/P EST MOD 30 MIN: CPT | Mod: ZP | Performed by: INTERNAL MEDICINE

## 2019-11-08 PROCEDURE — 93010 ELECTROCARDIOGRAM REPORT: CPT | Mod: ZP | Performed by: INTERNAL MEDICINE

## 2019-11-08 PROCEDURE — G0463 HOSPITAL OUTPT CLINIC VISIT: HCPCS | Mod: ZF

## 2019-11-08 ASSESSMENT — PAIN SCALES - GENERAL: PAINLEVEL: NO PAIN (0)

## 2019-11-08 ASSESSMENT — MIFFLIN-ST. JEOR: SCORE: 1773.76

## 2019-11-08 NOTE — LETTER
11/8/2019      RE: Stu Meredith  [address] [facility] [address]       Dear Colleague,    Thank you for the opportunity to participate in the care of your patient, Stu Meredith, at the [facility] HEART [facility]. Please see a copy of my visit note below.    Electrophysiology Clinic Note  HPI:   Mr. Meredith is a 63 year old male who has a past medical history significant for HCM diagnosed 2006, VT on Holter July 2012 s/p ICD 7/2012, troponin leak Oct 2013 (angiogram with non-significant CAD, LV gram showed EF 25%, recovered to 60% on TTE Dec 2013), HTN, AFib (CHADSVASC 2) with DCCVs then s/p PVI 12/2011, PVI for persistent AF on 7/28/16, PVI/CTI/CFAE with posterior wall isolation 8/17/18, s/p DCCV 8/7/17, 1/3/18l,  5/5/18, 9/27/18, 12/20/2018, and s/p right IRISH on 6/5/2019. He presents for follow-up.  The patient underwent atrial fibrillation ablation by Dr. Gonzalez at [facility] in 12/2011 and implantation of an ICD in 07/2012 because of ventricular tachycardia. As per patient, he has been in sinus rhythm approximately 1-1/2 years after ablation. The patient was found to have recurrent atrial fibrillation during a preoperative exam in 10/2013 and underwent electrical cardioversion in 11/2013. The patient underwent hip surgery in 01/2014. The patient noticed that he was in atrial fibrillation at a clinic visit in 01/2014.   He was seen in consult by Dr Analilia spain on 2/14/14 for consideration of ablation. At the time the patient was reported to be generally unaware of whether or not he is in sinus rhythm or in atrial fibrillation. He reports daytime somnolence but denies significant dyspnea on exertion, palpitations, irregular beat sensation, presyncope or syncope. In terms of risk factors for stroke, the patient has coronary heart disease, hypertension, but denies history of diabetes, previous myocardial cardiac infarction or heart failure. The  patient is on rivaroxaban for stroke prophylaxis and is on diltiazem and metoprolol. He was told that there was no consideration of ablation.   He came to EP clinic for second opinion in 3/3/2014. The patient mentioned this time he is not sure whether his afib is causing fatigue. However, he also attributed symptoms like headache and head fullness to his afib. He also had a cold around the same time. He still denied chest pain, syncope or presyncope, ICD shocks, MART or SOB. He does however indicate this time that he does feel irregular heart beat at times, along with chronic fatigue, although is not sure if fatigue is linked to afib. We agreed at that time to pursue a cardioversion to see whether he would feel better and we started him on sotalol 80 mg twice a day. He underwent cardioversion on 4/24/2014.  He then had a device transmission showed recurrent AF in 7/2014 which spontaneously converted. We decrease his diltiazem from 240 to 120 mg daily because of fatigue. His fatigue did get better when we decrease his diltiazem.  He did have a 9 day episode of A. Fib starting June 19, 2015, during which the patient felt very tired and no energy.  The heart rate was well controlled.  A. Fib burden was 19% since 5/19/2015, but it is the first episode of A. Fib since at least November 2014.  However at follow up in 2016 AF burden 1%. At follow up in 5/2016 his AF burden= 100% since 2/28/16. He reported some increased fatgiue and decreased stamina since being back in AF. At that visit, he elected to pursue repeat PVI ablation for AF that is refractory to Sotalol. He underwent repeat PVI ablation for persistent AF refractory to AATs on 7/28/16. He had successful re-isolation of all 4 pulmonary veins. He was re-hospitalized a couple days post ablation for SOB assocaited with hypervolemia which resolved with diuresis. He reports feeling well after ablation. He states his energy levels improved with restoration of sinus.  Diltiazem was stopped after ablation.  He has now had some recurrent episodes of AT/AF requiring DCCV on 8/7/17 and 1/3/18.  Device check showed AT episode that started 1/28/18 and lasted 25 days and self terminated. He then had recurrent AT/AF and had DCCV on 5/5/18. He followed up with Dr. Ware recently in clinic and reported having fatigue, MART, and decreased stamina. Device interrogation showed he had recurred back into AT/AF since 5/26/18. He was arranged for EP follow up.     Ep Visit 6/2018: He continues to have fatigue, MART, and decreased stamina. Last stress echo from 6/1/18 showed normal biventricular function with asymmetrical septal hypertrophy and no LVOT obstruction with rest or exercise. He denies any chest pain/pressures, dizziness, lightheadedness, leg/ankle swelling, PND, orthopnea, palpitations, or syncopal symptoms.Presenting 12 lead ECG shows AFL Vent Rate 111 bpm, QRS 80 ms, QTc 489 ms. Current cardiac medications include: Spironolactone, Lipitor, Sotalol, Toprol XL, and Xarelto.     EP Visit 7/12/19: He presents today for follow up. He underwent a repeat PVI/CTI on 8/17/18. Device shows AF has been 100% since 3/14/19. He underwent right TREVIN on 6/5/19. Recovering well after procedure. He reports issues with ongoing fatigue, MART, and decreased stamina. He denies any chest pain/pressures, dizziness, lightheadedness, leg/ankle swelling, PND, orthopnea, palpitations, or syncopal symptoms. Presenting 12 lead ECG shows atypical AFL Vent Rate 73 bpm, QRS 84 ms, QTc 420 ms. Device interrogation shows normal device function. Since 1/18/19, there have been 400 NSVT, 2 other untreated episodes, and 211 AT/AF episodes recorded.  The VT-1 monitored episodes are AF w/RVR with rates in the 140s.  The 2 NSVT episodes available for review last 12 beats in duration with rates of 162-179 bpm.  AF burden = 85% since 1/18/19 and AF appear persistent since approximately mid 3/2019. Intrinsic rhythm = AF @   bpm. AP = 2%.  = 5%. Lead trends appear stable. Current cardiac medications include: Spironolactone, Lipitor, Sotalol, Toprol XL, and Xarelto.     He presents today for follow up. He was arranged for dofetilide loading and had RHC in AF, then DCCV, then repeat RHC in sinus to evaluate if arrhyhtmia was vs CM vs sotalol was main  of symptoms. RHC was relatively unchanged in AF and sinus. He reports having much better energy levels off Sotalol. He denies any chest pain/pressures, dizziness, lightheadedness, worsening shortness of breath, leg/ankle swelling, PND, orthopnea, palpitations, or syncopal symptoms. Presenting 12 lead ECG shows AP with prolonged AVD Vent Rate 60 bpm,  ms, QRS 86 ms, QTc 408 ms. Device interrogation shows normal ICD function. Since last device interrogation on 7/12/19, there have been 10 AT/AF episodes, most recent on 11/3/19 lasting 7.2 hours.  AF burden since 7/12/19 = 34%.  AF burden graph shows minimal AT/AF episodes recorded since ECV on 8/21/19.  1 asymptomatic VT episode recorded on 9/16 lasting 25 seconds @ 241 bpm received ATP x 1 with successful conversion. Patient states he was ill with URI/fever that day.   34 NSVT detections also recorded with 2 egms available displaying  one 5 beat PAT and one 4 beat NSVT. Intrinsic rhythm = SB @ 50 bpm. AP = 10%.  = 3%.  Estimated battery longevity to CARMEN = 4.5 years. Lead trends appear stable. Current cardiac medications include: Spironolactone, Lipitor, Sotalol, Toprol XL, and Xarelto.         PAST MEDICAL HISTORY:  Past Medical History:   Diagnosis Date     Atrial fibrillation (H) 12/9/11    failed medication, multiple DC cardioveresions; s/p Left atrial ablation to eliminate atrial fibrillation 12/9/11     Chest pain      CHF (congestive heart failure) (H) 7/30/2016     Coronary artery disease      DJD (degenerative joint disease)      Fatigue 7/15/2019     Hip arthritis 1/15/2014     Hypertension      Hypertrophic  cardiomyopathy (H) 10/09     Other abnormal heart sounds      Pacemaker     ICD     Palpitations      Pneumonia, organism unspecified(486)      Prediabetes 7/10/15    A1C 6.5     Status post implantation of automatic cardioverter/defibrillator (AICD)      Ventricular tachycardia (H)        CURRENT MEDICATIONS:  Current Outpatient Medications   Medication Sig Dispense Refill     acetaminophen (TYLENOL) 325 MG tablet Take 325-650 mg by mouth every 6 hours as needed       albuterol (PROAIR HFA/PROVENTIL HFA/VENTOLIN HFA) 108 (90 Base) MCG/ACT inhaler Inhale 2 puffs into the lungs every 6 hours 18 g 0     amoxicillin (AMOXIL) 500 MG tablet Take 4 tablets (2000 mg) by mouth 1 hour before dental procedures. (Patient not taking: Reported on 10/2/2019) 12 tablet 3     atorvastatin (LIPITOR) 20 MG tablet Take 1 tablet (20 mg) by mouth daily 90 tablet 3     cyanocobalamin (VITAMIN B-12) 100 MCG tablet Take 100 mcg by mouth daily       dofetilide (TIKOSYN) 250 MCG capsule Take 1 capsule (250 mcg) by mouth every 12 hours 60 capsule 11     furosemide (LASIX) 40 MG tablet Take 1 tablet (40 mg) by mouth daily 90 tablet 1     gabapentin (NEURONTIN) 300 MG capsule Take 1 capsule (300 mg) by mouth 3 times daily 90 capsule 3     guaiFENesin-codeine (ROBITUSSIN AC) 100-10 MG/5ML solution Take 5-10 mLs by mouth every 4 hours as needed 240 mL 0     loratadine (CLARITIN) 10 MG tablet Take 1 tablet (10 mg) by mouth daily 30 tablet 0     metFORMIN (GLUCOPHAGE-XR) 500 MG 24 hr tablet Take 1 tablet (500 mg) by mouth daily (with dinner) for 7 days, THEN 2 tablets (1,000 mg) daily (with dinner). (Patient taking differently: Take 2 tablets (1,000 mg) daily (with dinner).) 180 tablet 3     metoprolol succinate ER (TOPROL-XL) 50 MG 24 hr tablet TAKE ONE TABLET BY MOUTH EVERY DAY 90 tablet 3     multivitamin, therapeutic (THERA-VIT) TABS Take 1 tablet by mouth daily       spironolactone (ALDACTONE) 50 MG tablet Take 1 tablet (50 mg) by mouth daily  90 tablet 1     XARELTO 20 MG TABS tablet Take 1 tablet (20 mg) by mouth daily (with dinner) 90 tablet 3     zolpidem (AMBIEN) 10 MG tablet Take 0.5-1 tablets (5-10 mg) by mouth nightly as needed for sleep 90 tablet 0       PAST SURGICAL HISTORY:  Past Surgical History:   Procedure Laterality Date     ANESTHESIA CARDIOVERSION  4/24/2014    Procedure: ANESTHESIA CARDIOVERSION;  Surgeon: Generic Anesthesia Provider;  Location: [facility] OR     ANESTHESIA CARDIOVERSION N/A 5/12/2016    Procedure: ANESTHESIA CARDIOVERSION;  Surgeon: GENERIC ANESTHESIA PROVIDER;  Location: [facility] OR     ANESTHESIA CARDIOVERSION N/A 8/7/2017    Procedure: ANESTHESIA CARDIOVERSION;  Anesthesia Offsite Coverage Cardioversion @1100;  Surgeon: GENERIC ANESTHESIA PROVIDER;  Location: [facility] OR     ANESTHESIA CARDIOVERSION N/A 1/3/2018    Procedure: ANESTHESIA CARDIOVERSION;  Anesthesia Cardioverion;  Surgeon: GENERIC ANESTHESIA PROVIDER;  Location: [facility] OR     ANESTHESIA CARDIOVERSION N/A 5/4/2018    Procedure: ANESTHESIA CARDIOVERSION;  Anesthesia Coverage Cardioversion @1400;  Surgeon: GENERIC ANESTHESIA PROVIDER;  Location: [facility] OR     ANESTHESIA CARDIOVERSION N/A 9/27/2018    Procedure: ANESTHESIA CARDIOVERSION;  Anesthesia Cardioversion @0930;  Surgeon: GENERIC ANESTHESIA PROVIDER;  Location: [facility] OR     ANESTHESIA CARDIOVERSION N/A 12/20/2018    Procedure: Anesthesia Coverage Cardioversion @0830;  Surgeon: GENERIC ANESTHESIA PROVIDER;  Location: [facility] OR     ANESTHESIA CARDIOVERSION N/A 8/21/2019    Procedure: Anesthesia Coverage Cardioversion @0800;  Surgeon: GENERIC ANESTHESIA PROVIDER;  Location: [facility] OR     ARTHROPLASTY HIP  1/15/2014    Procedure: ARTHROPLASTY HIP;  Left Total Hip Arthroplasty;  Surgeon: Nelson Gaspar MD;  Location: [facility] OR     ARTHROPLASTY HIP Right 6/5/2019    Procedure: Right Total Hip Arthroplasty;  Surgeon: Nelson Gaspar MD;  Location: [facility] OR     CARDIAC SURGERY       COLONOSCOPY N/A 5/18/2018    Procedure: COMBINED COLONOSCOPY,  SINGLE OR MULTIPLE BIOPSY/POLYPECTOMY BY BIOPSY;  COLONOSCOPY (PT HAS DEFIBRILLATOR) ;  Surgeon: Mike Nickerson MD;  Location:  GI     CV RIGHT HEART CATH N/A 8/19/2019    Procedure: CV RIGHT HEART CATH;  Surgeon: Cisco Rausch MD;  Location:  HEART CARDIAC CATH LAB     CV RIGHT HEART CATH N/A 8/21/2019    Procedure: Right Heart Cath;  Surgeon: Ciaran Burton MD;  Location:  HEART CARDIAC CATH LAB     H ABLATION FOCAL AFIB  12/9/11    Left atrial ablation to eliminate atrial fibrillation     IMPLANT AUTOMATIC IMPLANTABLE CARDIOVERTER DEFIBRILLATOR  7/27/12    AICD implantation     TONSILLECTOMY  1964       ALLERGIES:     No Known Allergies    FAMILY HISTORY:  Family History   Problem Relation Age of Onset     Heart Disease Mother         unknown     Obesity Mother      Gastrointestinal Disease Mother         diverticulitis     Diabetes Maternal Grandmother      Diabetes Paternal Grandmother      Cerebrovascular Disease Paternal Grandmother 94     Diabetes Paternal Grandfather      Cerebrovascular Disease Paternal Grandfather 78     Diabetes Son      C.A.D. Father         CABG age 78     Heart Disease Father         CABG x5     Diabetes Maternal Grandfather      LUNG DISEASE No family hx of      Deep Vein Thrombosis (DVT) No family hx of      Anesthesia Reaction No family hx of      Melanoma No family hx of      Skin Cancer No family hx of        SOCIAL HISTORY:  Social History     Tobacco Use     Smoking status: Former Smoker     Packs/day: 1.00     Years: 40.00     Pack years: 40.00     Types: Cigarettes     Start date: 1/1/1975     Last attempt to quit: 12/11/2015     Years since quitting: 3.9     Smokeless tobacco: Never Used   Substance Use Topics     Alcohol use: Yes     Alcohol/week: 0.0 standard drinks     Comment: 1 drink per week     Drug use: No       ROS:   A comprehensive 10 point review of systems negative other than as mentioned in HPI.  Exam:  /79 (BP Location: Right arm, Patient  "Position: Chair, Cuff Size: Adult Regular)   Pulse 60   Ht 1.803 m (5' 11\")   Wt 96.2 kg (212 lb)   SpO2 98%   BMI 29.57 kg/m     GENERAL APPEARANCE: healthy, alert and no distress  HEENT: no icterus, no xanthelasmas, normal pupil size and reaction, normal palate, mucosa moist, no central cyanosis  NECK: supple.  RESPIRATORY: lungs clear to auscultation - no rales, rhonchi or wheezes, no use of accessory muscles, no retractions, respirations are unlabored, normal respiratory rate  CARDIOVASCULAR:regular, S1/S2, no murmur, click or rub, precordium quiet with normal PMI.  ABDOMEN: soft, non tender, bowel sounds normal, non distended.   EXTREMITIES: peripheral pulses normal, no edema, no bruits  NEURO: alert and oriented to person/place/time, normal speech, gait and affect  SKIN: no ecchymoses, no rashes    Labs:  Reviewed.     Procedures:  12/15/16 ECHOCARDIOGRAM:   Interpretation Summary  Mildly (EF 40-45%) reduced left ventricular function is present. Mild  asymmetric septal hypertrophy is present (14 mm). No dynamic outflow tract  obstruction is present.  Global right ventricular function is normal.  No significant valvular abnormalities are identified.  No pericardial effusion is present.    6/2018 STRESS ECHO:  Interpretation Summary  Submaximal treadmill stress test in a patient with a diagnosis of HCM.     The Ramirez treadmill score is 8 (low risk).  Exercise was stopped due to fatigue.  Normal blood pressure response to exercise.  No ECG evidence of ischemia.  No supraventricular or ventricular arrhythmias. Frequent PVCs noted throughout  the study.     Normal biventricular function with mild asymmetrical septal hypertrophy (12  mm).  No dynamic outflow tract obstruction is present at rest or with exercise.  Normal segmental wall motion at rest and with exercise.  Minimal augmentation in LV function with exercise.     Average functional capacity for age.    Assessment and Plan:   Mr. Meredith is a 63 year old " male who has a past medical history significant for HCM diagnosed , VT on Holter 2012 s/p ICD 2012, troponin leak Oct 2013 (angiogram with non-significant CAD, LV gram showed EF 25%, recovered to 60% on TTE Dec 2013), HTN, AFib (CHADSVASC 2) with DCCVs then s/p PVI 2011, PVI for persistent AF on 16, PVI/CTI/CFAE with posterior wall isolation 18, s/p DCCV 17, 1/3/18,  18, 18, 2018, and s/p right IRISH on 2019. He presents today for follow up. He was arranged for dofetilide loading and had RHC in AF, then DCCV, then repeat RHC in sinus to evaluate if arrhyhtmia was vs CM vs sotalol was main  of symptoms. RHC was relatively unchanged in AF and sinus. He reports having much better energy levels off Sotalol. He denies any chest pain/pressures, dizziness, lightheadedness, worsening shortness of breath, leg/ankle swelling, PND, orthopnea, palpitations, or syncopal symptoms. Presenting 12 lead ECG shows AP with prolonged AVD Vent Rate 60 bpm,  ms, QRS 86 ms, QTc 408 ms. Device interrogation shows normal ICD function. Since last device interrogation on 19, there have been 10 AT/AF episodes, most recent on 11/3/19 lasting 7.2 hours.  AF burden since 19 = 34%.  AF burden graph shows minimal AT/AF episodes recorded since ECV on 19.  1 asymptomatic VT episode recorded on  lasting 25 seconds @ 241 bpm received ATP x 1 with successful conversion. Patient states he was ill with URI/fever that day.   34 NSVT detections also recorded with 2 egms available displaying  one 5 beat PAT and one 4 beat NSVT. Intrinsic rhythm = SB @ 50 bpm. AP = 10%.  = 3%.  Estimated battery longevity to CARMEN = 4.5 years. Lead trends appear stable. Current cardiac medications include: Spironolactone, Lipitor, Sotalol, Toprol XL, and Xarelto.    Persistent Atrial Fibrillation:   We discussed in detail with the patient management/treatment options for AF/AFL includin. Stroke  Prophylaxis:  CHADSVASC= 2, corresponding to a 2.2% annual stroke / systemic emolism event rate. indicating need for long term oral anticoagulation.  He is on Xarelto. No bleeding issues.   2. Rate Control: Continue Metoprolol.   3. Rhythm Control: He has had a PVI ablation in 2011 with several recurrences requiring DCCV and then repeat PVI in July 2016. He had previously failed multaq and is currently on Sotalol.  He has now had some recurrent episodes of AT/AF requiring DCCV on 8/7/17 and 1/3/18.  Device check showed AT episode that started 1/28/18 and lasted 25 days and self terminated. He then had recurrent AT/AF and had DCCV on 5/5/18. He followed up with Dr. Ware recently in clinic and reported having fatigue, MART, and decreased stamina. Device interrogation showed he had recurred back into AT/AF since 5/26/18. He underwent a repeat PVI/CTI on 8/17/18. He subsequently had DCCV on 9/27/18, 12/20/2018. Then went back into persistent AF on 3/14/19. His main symptoms are fatigue, MART, and decreased stamina. Unclear if this was related to AF, HCM worsening, or possibly Sotalol. Therefore, we stopped his Sotalol and he underwent RHC in AF, dofetolide loading, DCCV, then repeat RHC in SR. This showed no significant hemodynamic changes in AF vs SR. He does feel much better being on dofetilide. Since starting, he has had lower burden of AF, with some self limiting episodes. QTC and renal function stable.   4. Risk Factor Management: maintain tight BP control, and GONZALEZ evaluation as indicated.      Hypertrophic cardiomyopathy:  -Start beta blockers  -Encouraged adequate hydration and avoidance of strenous physical activity   -Reinforced exercise recommendations with HCM (e.g. Circ 2014 recommendations: Avoidance of burst exercise, competitive training,weight-lifting)  -Family screening of 1st degree relatives recommended.   - ICD implanted in 7/2012    He will continue to follow with the device clinic per routine. He  will follow up with Dr. Ware for HCM. Follow up with EP in 6 months.     The patient states understanding and is agreeable with plan.   This patient was seen and evaluated with Dr. Cooper. The above note reflects our joint assessment and plan.   KARSON Richardson CNP  Pager: [phone]    EP STAFF NOTE  I have seen and examined the patient as part of a shared visit with HOLLY Richardson NP.  I agree with the note above. I reviewed today's vital signs and medications. I have reviewed and discussed with the advanced practice provider their physical examination, assessment, and plan   Briefly, doing very well on dofetilide  My key history/exam findings are: RRR.   The key management decisions made by me: f/u in 6 months.    Erik Cooper MD [facility]  Cardiology - Electrophysiology

## 2019-11-08 NOTE — NURSING NOTE
Chief Complaint   Patient presents with     Follow Up     63 year old male with history of hypertrophic cardiomyopathy presenting for follow up.

## 2019-11-08 NOTE — PATIENT INSTRUCTIONS
It was a pleasure to see you in clinic today.  Please do not hesitate to call with any questions or concerns.  You are scheduled for a remote transmission on 2/20/20.  We look forward to seeing you in clinic at your next device check in 6 months.    Maci Dempsey RN, BSN  Electrophysiology Nurse Clinician  [facility]    During Business Hours Please Call:  [phone]  After Hours Please Call:  [phone] - select option #4 and ask for job code [phone]

## 2019-11-08 NOTE — PROGRESS NOTES
Electrophysiology Clinic Note  HPI:   Mr. Meredith is a 63 year old male who has a past medical history significant for HCM diagnosed 2006, VT on Holter July 2012 s/p ICD 7/2012, troponin leak Oct 2013 (angiogram with non-significant CAD, LV gram showed EF 25%, recovered to 60% on TTE Dec 2013), HTN, AFib (CHADSVASC 2) with DCCVs then s/p PVI 12/2011, PVI for persistent AF on 7/28/16, PVI/CTI/CFAE with posterior wall isolation 8/17/18, s/p DCCV 8/7/17, 1/3/18l,  5/5/18, 9/27/18, 12/20/2018, and s/p right IRISH on 6/5/2019. He presents for follow-up.  The patient underwent atrial fibrillation ablation by Dr. Gonzalez at [facility] in 12/2011 and implantation of an ICD in 07/2012 because of ventricular tachycardia. As per patient, he has been in sinus rhythm approximately 1-1/2 years after ablation. The patient was found to have recurrent atrial fibrillation during a preoperative exam in 10/2013 and underwent electrical cardioversion in 11/2013. The patient underwent hip surgery in 01/2014. The patient noticed that he was in atrial fibrillation at a clinic visit in 01/2014.   He was seen in consult by Dr Analilia spain on 2/14/14 for consideration of ablation. At the time the patient was reported to be generally unaware of whether or not he is in sinus rhythm or in atrial fibrillation. He reports daytime somnolence but denies significant dyspnea on exertion, palpitations, irregular beat sensation, presyncope or syncope. In terms of risk factors for stroke, the patient has coronary heart disease, hypertension, but denies history of diabetes, previous myocardial cardiac infarction or heart failure. The patient is on rivaroxaban for stroke prophylaxis and is on diltiazem and metoprolol. He was told that there was no consideration of ablation.   He came to EP clinic for second opinion in 3/3/2014. The patient mentioned this time he is not sure whether his afib is causing fatigue. However, he also attributed symptoms like  headache and head fullness to his afib. He also had a cold around the same time. He still denied chest pain, syncope or presyncope, ICD shocks, MART or SOB. He does however indicate this time that he does feel irregular heart beat at times, along with chronic fatigue, although is not sure if fatigue is linked to afib. We agreed at that time to pursue a cardioversion to see whether he would feel better and we started him on sotalol 80 mg twice a day. He underwent cardioversion on 4/24/2014.  He then had a device transmission showed recurrent AF in 7/2014 which spontaneously converted. We decrease his diltiazem from 240 to 120 mg daily because of fatigue. His fatigue did get better when we decrease his diltiazem.  He did have a 9 day episode of A. Fib starting June 19, 2015, during which the patient felt very tired and no energy.  The heart rate was well controlled.  A. Fib burden was 19% since 5/19/2015, but it is the first episode of A. Fib since at least November 2014.  However at follow up in 2016 AF burden 1%. At follow up in 5/2016 his AF burden= 100% since 2/28/16. He reported some increased fatgiue and decreased stamina since being back in AF. At that visit, he elected to pursue repeat PVI ablation for AF that is refractory to Sotalol. He underwent repeat PVI ablation for persistent AF refractory to AATs on 7/28/16. He had successful re-isolation of all 4 pulmonary veins. He was re-hospitalized a couple days post ablation for SOB assocaited with hypervolemia which resolved with diuresis. He reports feeling well after ablation. He states his energy levels improved with restoration of sinus. Diltiazem was stopped after ablation.  He has now had some recurrent episodes of AT/AF requiring DCCV on 8/7/17 and 1/3/18.  Device check showed AT episode that started 1/28/18 and lasted 25 days and self terminated. He then had recurrent AT/AF and had DCCV on 5/5/18. He followed up with Dr. Ware recently in clinic and  reported having fatigue, MART, and decreased stamina. Device interrogation showed he had recurred back into AT/AF since 5/26/18. He was arranged for EP follow up.     Ep Visit 6/2018: He continues to have fatigue, MART, and decreased stamina. Last stress echo from 6/1/18 showed normal biventricular function with asymmetrical septal hypertrophy and no LVOT obstruction with rest or exercise. He denies any chest pain/pressures, dizziness, lightheadedness, leg/ankle swelling, PND, orthopnea, palpitations, or syncopal symptoms.Presenting 12 lead ECG shows AFL Vent Rate 111 bpm, QRS 80 ms, QTc 489 ms. Current cardiac medications include: Spironolactone, Lipitor, Sotalol, Toprol XL, and Xarelto.     EP Visit 7/12/19: He presents today for follow up. He underwent a repeat PVI/CTI on 8/17/18. Device shows AF has been 100% since 3/14/19. He underwent right TREVIN on 6/5/19. Recovering well after procedure. He reports issues with ongoing fatigue, MART, and decreased stamina. He denies any chest pain/pressures, dizziness, lightheadedness, leg/ankle swelling, PND, orthopnea, palpitations, or syncopal symptoms. Presenting 12 lead ECG shows atypical AFL Vent Rate 73 bpm, QRS 84 ms, QTc 420 ms. Device interrogation shows normal device function. Since 1/18/19, there have been 400 NSVT, 2 other untreated episodes, and 211 AT/AF episodes recorded.  The VT-1 monitored episodes are AF w/RVR with rates in the 140s.  The 2 NSVT episodes available for review last 12 beats in duration with rates of 162-179 bpm.  AF burden = 85% since 1/18/19 and AF appear persistent since approximately mid 3/2019. Intrinsic rhythm = AF @  bpm. AP = 2%.  = 5%. Lead trends appear stable. Current cardiac medications include: Spironolactone, Lipitor, Sotalol, Toprol XL, and Xarelto.     He presents today for follow up. He was arranged for dofetilide loading and had RHC in AF, then DCCV, then repeat RHC in sinus to evaluate if arrhyhtmia was vs CM vs sotalol  was main  of symptoms. RHC was relatively unchanged in AF and sinus. He reports having much better energy levels off Sotalol. He denies any chest pain/pressures, dizziness, lightheadedness, worsening shortness of breath, leg/ankle swelling, PND, orthopnea, palpitations, or syncopal symptoms. Presenting 12 lead ECG shows AP with prolonged AVD Vent Rate 60 bpm,  ms, QRS 86 ms, QTc 408 ms. Device interrogation shows normal ICD function. Since last device interrogation on 7/12/19, there have been 10 AT/AF episodes, most recent on 11/3/19 lasting 7.2 hours.  AF burden since 7/12/19 = 34%.  AF burden graph shows minimal AT/AF episodes recorded since ECV on 8/21/19.  1 asymptomatic VT episode recorded on 9/16 lasting 25 seconds @ 241 bpm received ATP x 1 with successful conversion. Patient states he was ill with URI/fever that day.   34 NSVT detections also recorded with 2 egms available displaying  one 5 beat PAT and one 4 beat NSVT. Intrinsic rhythm = SB @ 50 bpm. AP = 10%.  = 3%.  Estimated battery longevity to CARMEN = 4.5 years. Lead trends appear stable. Current cardiac medications include: Spironolactone, Lipitor, Sotalol, Toprol XL, and Xarelto.         PAST MEDICAL HISTORY:  Past Medical History:   Diagnosis Date     Atrial fibrillation (H) 12/9/11    failed medication, multiple DC cardioveresions; s/p Left atrial ablation to eliminate atrial fibrillation 12/9/11     Chest pain      CHF (congestive heart failure) (H) 7/30/2016     Coronary artery disease      DJD (degenerative joint disease)      Fatigue 7/15/2019     Hip arthritis 1/15/2014     Hypertension      Hypertrophic cardiomyopathy (H) 10/09     Other abnormal heart sounds      Pacemaker     ICD     Palpitations      Pneumonia, organism unspecified(486)      Prediabetes 7/10/15    A1C 6.5     Status post implantation of automatic cardioverter/defibrillator (AICD)      Ventricular tachycardia (H)        CURRENT MEDICATIONS:  Current Outpatient  Medications   Medication Sig Dispense Refill     acetaminophen (TYLENOL) 325 MG tablet Take 325-650 mg by mouth every 6 hours as needed       albuterol (PROAIR HFA/PROVENTIL HFA/VENTOLIN HFA) 108 (90 Base) MCG/ACT inhaler Inhale 2 puffs into the lungs every 6 hours 18 g 0     amoxicillin (AMOXIL) 500 MG tablet Take 4 tablets (2000 mg) by mouth 1 hour before dental procedures. (Patient not taking: Reported on 10/2/2019) 12 tablet 3     atorvastatin (LIPITOR) 20 MG tablet Take 1 tablet (20 mg) by mouth daily 90 tablet 3     cyanocobalamin (VITAMIN B-12) 100 MCG tablet Take 100 mcg by mouth daily       dofetilide (TIKOSYN) 250 MCG capsule Take 1 capsule (250 mcg) by mouth every 12 hours 60 capsule 11     furosemide (LASIX) 40 MG tablet Take 1 tablet (40 mg) by mouth daily 90 tablet 1     gabapentin (NEURONTIN) 300 MG capsule Take 1 capsule (300 mg) by mouth 3 times daily 90 capsule 3     guaiFENesin-codeine (ROBITUSSIN AC) 100-10 MG/5ML solution Take 5-10 mLs by mouth every 4 hours as needed 240 mL 0     loratadine (CLARITIN) 10 MG tablet Take 1 tablet (10 mg) by mouth daily 30 tablet 0     metFORMIN (GLUCOPHAGE-XR) 500 MG 24 hr tablet Take 1 tablet (500 mg) by mouth daily (with dinner) for 7 days, THEN 2 tablets (1,000 mg) daily (with dinner). (Patient taking differently: Take 2 tablets (1,000 mg) daily (with dinner).) 180 tablet 3     metoprolol succinate ER (TOPROL-XL) 50 MG 24 hr tablet TAKE ONE TABLET BY MOUTH EVERY DAY 90 tablet 3     multivitamin, therapeutic (THERA-VIT) TABS Take 1 tablet by mouth daily       spironolactone (ALDACTONE) 50 MG tablet Take 1 tablet (50 mg) by mouth daily 90 tablet 1     XARELTO 20 MG TABS tablet Take 1 tablet (20 mg) by mouth daily (with dinner) 90 tablet 3     zolpidem (AMBIEN) 10 MG tablet Take 0.5-1 tablets (5-10 mg) by mouth nightly as needed for sleep 90 tablet 0       PAST SURGICAL HISTORY:  Past Surgical History:   Procedure Laterality Date     ANESTHESIA CARDIOVERSION   4/24/2014    Procedure: ANESTHESIA CARDIOVERSION;  Surgeon: Generic Anesthesia Provider;  Location: [facility]     ANESTHESIA CARDIOVERSION N/A 5/12/2016    Procedure: ANESTHESIA CARDIOVERSION;  Surgeon: GENERIC ANESTHESIA PROVIDER;  Location: [facility]     ANESTHESIA CARDIOVERSION N/A 8/7/2017    Procedure: ANESTHESIA CARDIOVERSION;  Anesthesia Offsite Coverage Cardioversion @1100;  Surgeon: GENERIC ANESTHESIA PROVIDER;  Location: [facility]     ANESTHESIA CARDIOVERSION N/A 1/3/2018    Procedure: ANESTHESIA CARDIOVERSION;  Anesthesia Cardioverion;  Surgeon: GENERIC ANESTHESIA PROVIDER;  Location: [facility]     ANESTHESIA CARDIOVERSION N/A 5/4/2018    Procedure: ANESTHESIA CARDIOVERSION;  Anesthesia Coverage Cardioversion @1400;  Surgeon: GENERIC ANESTHESIA PROVIDER;  Location: [facility]     ANESTHESIA CARDIOVERSION N/A 9/27/2018    Procedure: ANESTHESIA CARDIOVERSION;  Anesthesia Cardioversion @0930;  Surgeon: GENERIC ANESTHESIA PROVIDER;  Location: [facility]     ANESTHESIA CARDIOVERSION N/A 12/20/2018    Procedure: Anesthesia Coverage Cardioversion @0830;  Surgeon: GENERIC ANESTHESIA PROVIDER;  Location: [facility]     ANESTHESIA CARDIOVERSION N/A 8/21/2019    Procedure: Anesthesia Coverage Cardioversion @0800;  Surgeon: GENERIC ANESTHESIA PROVIDER;  Location: [facility]     ARTHROPLASTY HIP  1/15/2014    Procedure: ARTHROPLASTY HIP;  Left Total Hip Arthroplasty;  Surgeon: Nelson Gaspar MD;  Location: [facility]     ARTHROPLASTY HIP Right 6/5/2019    Procedure: Right Total Hip Arthroplasty;  Surgeon: Nelson Gaspar MD;  Location: [facility] OR     CARDIAC SURGERY       COLONOSCOPY N/A 5/18/2018    Procedure: COMBINED COLONOSCOPY, SINGLE OR MULTIPLE BIOPSY/POLYPECTOMY BY BIOPSY;  COLONOSCOPY (PT HAS DEFIBRILLATOR) ;  Surgeon: Mike Nickerson MD;  Location:  GI     CV RIGHT HEART CATH N/A 8/19/2019    Procedure: CV RIGHT HEART CATH;  Surgeon: Cisco Rausch MD;  [facility]     CV RIGHT HEART CATH N/A 8/21/2019    Procedure:  "Right Heart Cath;  Surgeon: Ciaran Burton MD;  Location:  HEART CARDIAC CATH LAB     H ABLATION FOCAL AFIB  12/9/11    Left atrial ablation to eliminate atrial fibrillation     IMPLANT AUTOMATIC IMPLANTABLE CARDIOVERTER DEFIBRILLATOR  7/27/12    AICD implantation     TONSILLECTOMY  1964       ALLERGIES:     No Known Allergies    FAMILY HISTORY:  Family History   Problem Relation Age of Onset     Heart Disease Mother         unknown     Obesity Mother      Gastrointestinal Disease Mother         diverticulitis     Diabetes Maternal Grandmother      Diabetes Paternal Grandmother      Cerebrovascular Disease Paternal Grandmother 94     Diabetes Paternal Grandfather      Cerebrovascular Disease Paternal Grandfather 78     Diabetes Son      C.A.D. Father         CABG age 78     Heart Disease Father         CABG x5     Diabetes Maternal Grandfather      LUNG DISEASE No family hx of      Deep Vein Thrombosis (DVT) No family hx of      Anesthesia Reaction No family hx of      Melanoma No family hx of      Skin Cancer No family hx of        SOCIAL HISTORY:  Social History     Tobacco Use     Smoking status: Former Smoker     Packs/day: 1.00     Years: 40.00     Pack years: 40.00     Types: Cigarettes     Start date: 1/1/1975     Last attempt to quit: 12/11/2015     Years since quitting: 3.9     Smokeless tobacco: Never Used   Substance Use Topics     Alcohol use: Yes     Alcohol/week: 0.0 standard drinks     Comment: 1 drink per week     Drug use: No       ROS:   A comprehensive 10 point review of systems negative other than as mentioned in HPI.  Exam:  /79 (BP Location: Right arm, Patient Position: Chair, Cuff Size: Adult Regular)   Pulse 60   Ht 1.803 m (5' 11\")   Wt 96.2 kg (212 lb)   SpO2 98%   BMI 29.57 kg/m    GENERAL APPEARANCE: healthy, alert and no distress  HEENT: no icterus, no xanthelasmas, normal pupil size and reaction, normal palate, mucosa moist, no central cyanosis  NECK: " supple.  RESPIRATORY: lungs clear to auscultation - no rales, rhonchi or wheezes, no use of accessory muscles, no retractions, respirations are unlabored, normal respiratory rate  CARDIOVASCULAR:regular, S1/S2, no murmur, click or rub, precordium quiet with normal PMI.  ABDOMEN: soft, non tender, bowel sounds normal, non distended.   EXTREMITIES: peripheral pulses normal, no edema, no bruits  NEURO: alert and oriented to person/place/time, normal speech, gait and affect  SKIN: no ecchymoses, no rashes    Labs:  Reviewed.     Procedures:  12/15/16 ECHOCARDIOGRAM:   Interpretation Summary  Mildly (EF 40-45%) reduced left ventricular function is present. Mild  asymmetric septal hypertrophy is present (14 mm). No dynamic outflow tract  obstruction is present.  Global right ventricular function is normal.  No significant valvular abnormalities are identified.  No pericardial effusion is present.    6/2018 STRESS ECHO:  Interpretation Summary  Submaximal treadmill stress test in a patient with a diagnosis of HCM.     The Ramirez treadmill score is 8 (low risk).  Exercise was stopped due to fatigue.  Normal blood pressure response to exercise.  No ECG evidence of ischemia.  No supraventricular or ventricular arrhythmias. Frequent PVCs noted throughout  the study.     Normal biventricular function with mild asymmetrical septal hypertrophy (12  mm).  No dynamic outflow tract obstruction is present at rest or with exercise.  Normal segmental wall motion at rest and with exercise.  Minimal augmentation in LV function with exercise.     Average functional capacity for age.    Assessment and Plan:   Mr. Meredith is a 63 year old male who has a past medical history significant for HCM diagnosed 2006, VT on Holter July 2012 s/p ICD 7/2012, troponin leak Oct 2013 (angiogram with non-significant CAD, LV gram showed EF 25%, recovered to 60% on TTE Dec 2013), HTN, AFib (CHADSVASC 2) with DCCVs then s/p PVI 12/2011, PVI for persistent AF on  16, PVI/CTI/CFAE with posterior wall isolation 18, s/p DCCV 17, 1/3/18,  18, 18, 2018, and s/p right IRISH on 2019. He presents today for follow up. He was arranged for dofetilide loading and had RHC in AF, then DCCV, then repeat RHC in sinus to evaluate if arrhyhtmia was vs CM vs sotalol was main  of symptoms. RHC was relatively unchanged in AF and sinus. He reports having much better energy levels off Sotalol. He denies any chest pain/pressures, dizziness, lightheadedness, worsening shortness of breath, leg/ankle swelling, PND, orthopnea, palpitations, or syncopal symptoms. Presenting 12 lead ECG shows AP with prolonged AVD Vent Rate 60 bpm,  ms, QRS 86 ms, QTc 408 ms. Device interrogation shows normal ICD function. Since last device interrogation on 19, there have been 10 AT/AF episodes, most recent on 11/3/19 lasting 7.2 hours.  AF burden since 19 = 34%.  AF burden graph shows minimal AT/AF episodes recorded since ECV on 19.  1 asymptomatic VT episode recorded on  lasting 25 seconds @ 241 bpm received ATP x 1 with successful conversion. Patient states he was ill with URI/fever that day.   34 NSVT detections also recorded with 2 egms available displaying  one 5 beat PAT and one 4 beat NSVT. Intrinsic rhythm = SB @ 50 bpm. AP = 10%.  = 3%.  Estimated battery longevity to CARMEN = 4.5 years. Lead trends appear stable. Current cardiac medications include: Spironolactone, Lipitor, Sotalol, Toprol XL, and Xarelto.    Persistent Atrial Fibrillation:   We discussed in detail with the patient management/treatment options for AF/AFL includin. Stroke Prophylaxis:  CHADSVASC= 2, corresponding to a 2.2% annual stroke / systemic emolism event rate. indicating need for long term oral anticoagulation.  He is on Xarelto. No bleeding issues.   2. Rate Control: Continue Metoprolol.   3. Rhythm Control: He has had a PVI ablation in  with several recurrences  requiring DCCV and then repeat PVI in July 2016. He had previously failed multaq and is currently on Sotalol.  He has now had some recurrent episodes of AT/AF requiring DCCV on 8/7/17 and 1/3/18.  Device check showed AT episode that started 1/28/18 and lasted 25 days and self terminated. He then had recurrent AT/AF and had DCCV on 5/5/18. He followed up with Dr. Ware recently in clinic and reported having fatigue, MART, and decreased stamina. Device interrogation showed he had recurred back into AT/AF since 5/26/18. He underwent a repeat PVI/CTI on 8/17/18. He subsequently had DCCV on 9/27/18, 12/20/2018. Then went back into persistent AF on 3/14/19. His main symptoms are fatigue, MART, and decreased stamina. Unclear if this was related to AF, HCM worsening, or possibly Sotalol. Therefore, we stopped his Sotalol and he underwent RHC in AF, dofetolide loading, DCCV, then repeat RHC in SR. This showed no significant hemodynamic changes in AF vs SR. He does feel much better being on dofetilide. Since starting, he has had lower burden of AF, with some self limiting episodes. QTC and renal function stable.   4. Risk Factor Management: maintain tight BP control, and GONZALEZ evaluation as indicated.      Hypertrophic cardiomyopathy:  -Start beta blockers  -Encouraged adequate hydration and avoidance of strenous physical activity   -Reinforced exercise recommendations with HCM (e.g. Circ 2014 recommendations: Avoidance of burst exercise, competitive training,weight-lifting)  -Family screening of 1st degree relatives recommended.   - ICD implanted in 7/2012    He will continue to follow with the device clinic per routine. He will follow up with Dr. Ware for HCM. Follow up with EP in 6 months.     The patient states understanding and is agreeable with plan.   This patient was seen and evaluated with Dr. Cooper. The above note reflects our joint assessment and plan.   KARSON Richardson CNP  Pager: [phone]    EP STAFF NOTE  I have  seen and examined the patient as part of a shared visit with HOLLY Richardson NP.  I agree with the note above. I reviewed today's vital signs and medications. I have reviewed and discussed with the advanced practice provider their physical examination, assessment, and plan   Briefly, doing very well on dofetilide  My key history/exam findings are: RRR.   The key management decisions made by me: f/u in 6 months.    Erik Cooper MD [facility]  Cardiology - Electrophysiology

## 2019-11-08 NOTE — PATIENT INSTRUCTIONS
You were seen today in the Adult Congenital and Cardiovascular Genetics Clinic at the [facility].    Cardiology Providers you saw during your visit:  Erik Cooper MD    Diagnosis:  HCM    Results:  Erik Cooper MD reviewed the results of your device testing today in clinic.    Recommendations:    1. Continue to eat a heart healthy, low salt diet.  2. Continue to get 20-30 minutes of aerobic activity, 4-5 days per week.  Examples of aerobic activity include walking, running, swimming, cycling, etc.  3. Continue to observe good oral hygiene, with regular dental visits.  4. No changes today.  5. We will reach out with PCP recommendations.      SBE prophylaxis:   Yes____  No__X__    Lifelong Bacterial Endocarditis Prophylaxis:  YES____  NO____    If YES is checked, follow the recommendations outlined below:  1. Take antibiotic(s) prior to recommended dental procedures and procedures on the respiratory tract or with infected skin, muscle or bones. SBE prophylaxis is not needed for routine GI and  procedures (ie. Colonoscopy or vaginal delivery)  2. Observe good oral hygiene daily, as advised by your dentist. Get regular professional dental care.  3. Keep cuts clean.  4. Infections should be treated promptly.  5. Symptoms of Infective Endocarditis could include: fever lasting more than 4-5 days or a recurrent fever that initially resolves but returns within 1-2 days)      Exercise restrictions:   Yes__X__  No____         If yes, list restrictions:  Must be allowed to rest if fatigued or SOB      Work restrictions:  Yes____  No_X___         If yes, list restrictions:    FASTING CHOLESTEROL was checked in the last 5 years YES_X__  NO___ (2019)  Continue to eat a heart healthy, low salt diet.         ____ Fasting lipid panel order today         ____ No changes in medications          ____ I recommend the following changes in your cholesterol medications.:          ____ Please follow up for cholesterol  screening at your primary care physician      Follow-up: Follow up with Dr. Cooper 6 months with EKG.    If you have questions or concerns please contact us at:    Dimple Birch, MSN, RN, CNL    Radhika Mitchell (Scheduling)  Nurse Care Coordinator     Clinic   Adult Congenital and CV Genetics   Adult Congenital and CV Genetic  [facility]   [facility]  (P) [phone]     (P) [phone]  [email or web address]   (F) [phone]        For after hours urgent needs, call [phone] and ask to speak to the Adult Congenital Physician on call.  Mention Job Code 0401.    For emergencies call 601.    [facility]  Mail Code [id number]  [address] [place]  [address]

## 2019-11-09 LAB
MDC_IDC_LEAD_IMPLANT_DT: NORMAL
MDC_IDC_LEAD_IMPLANT_DT: NORMAL
MDC_IDC_LEAD_LOCATION: NORMAL
MDC_IDC_LEAD_LOCATION: NORMAL
MDC_IDC_LEAD_MFG: NORMAL
MDC_IDC_LEAD_MFG: NORMAL
MDC_IDC_LEAD_MODEL: NORMAL
MDC_IDC_LEAD_MODEL: NORMAL
MDC_IDC_LEAD_POLARITY_TYPE: NORMAL
MDC_IDC_LEAD_POLARITY_TYPE: NORMAL
MDC_IDC_LEAD_SERIAL: NORMAL
MDC_IDC_LEAD_SERIAL: NORMAL
MDC_IDC_MSMT_BATTERY_DTM: NORMAL
MDC_IDC_MSMT_BATTERY_REMAINING_LONGEVITY: 54 MO
MDC_IDC_MSMT_BATTERY_STATUS: NORMAL
MDC_IDC_MSMT_CAP_CHARGE_DTM: NORMAL
MDC_IDC_MSMT_CAP_CHARGE_ENERGY: 11 J
MDC_IDC_MSMT_CAP_CHARGE_TIME: 1.91 S
MDC_IDC_MSMT_CAP_CHARGE_TIME: 10.4 S
MDC_IDC_MSMT_CAP_CHARGE_TYPE: NORMAL
MDC_IDC_MSMT_CAP_CHARGE_TYPE: NORMAL
MDC_IDC_MSMT_LEADCHNL_RA_IMPEDANCE_VALUE: 766 OHM
MDC_IDC_MSMT_LEADCHNL_RA_PACING_THRESHOLD_AMPLITUDE: 0.5 V
MDC_IDC_MSMT_LEADCHNL_RA_PACING_THRESHOLD_PULSEWIDTH: 0.5 MS
MDC_IDC_MSMT_LEADCHNL_RA_SENSING_INTR_AMPL: 7.5 MV
MDC_IDC_MSMT_LEADCHNL_RV_IMPEDANCE_VALUE: 465 OHM
MDC_IDC_MSMT_LEADCHNL_RV_PACING_THRESHOLD_AMPLITUDE: 0.8 V
MDC_IDC_MSMT_LEADCHNL_RV_PACING_THRESHOLD_PULSEWIDTH: 0.5 MS
MDC_IDC_MSMT_LEADCHNL_RV_SENSING_INTR_AMPL: 25 MV
MDC_IDC_PG_IMPLANT_DTM: NORMAL
MDC_IDC_PG_MFG: NORMAL
MDC_IDC_PG_MODEL: NORMAL
MDC_IDC_PG_SERIAL: NORMAL
MDC_IDC_PG_TYPE: NORMAL
MDC_IDC_SESS_CLINIC_NAME: NORMAL
MDC_IDC_SESS_DTM: NORMAL
MDC_IDC_SESS_TYPE: NORMAL
MDC_IDC_SET_BRADY_AT_MODE_SWITCH_MODE: NORMAL
MDC_IDC_SET_BRADY_AT_MODE_SWITCH_RATE: 170 {BEATS}/MIN
MDC_IDC_SET_BRADY_LOWRATE: 60 {BEATS}/MIN
MDC_IDC_SET_BRADY_MAX_SENSOR_RATE: 120 {BEATS}/MIN
MDC_IDC_SET_BRADY_MAX_TRACKING_RATE: 120 {BEATS}/MIN
MDC_IDC_SET_BRADY_MODE: NORMAL
MDC_IDC_SET_BRADY_PAV_DELAY_HIGH: 260 MS
MDC_IDC_SET_BRADY_PAV_DELAY_LOW: 390 MS
MDC_IDC_SET_BRADY_SAV_DELAY_HIGH: 260 MS
MDC_IDC_SET_BRADY_SAV_DELAY_LOW: 390 MS
MDC_IDC_SET_LEADCHNL_RA_PACING_AMPLITUDE: 2.4 V
MDC_IDC_SET_LEADCHNL_RA_PACING_ANODE_ELECTRODE_1: NORMAL
MDC_IDC_SET_LEADCHNL_RA_PACING_ANODE_LOCATION_1: NORMAL
MDC_IDC_SET_LEADCHNL_RA_PACING_CAPTURE_MODE: NORMAL
MDC_IDC_SET_LEADCHNL_RA_PACING_CATHODE_ELECTRODE_1: NORMAL
MDC_IDC_SET_LEADCHNL_RA_PACING_CATHODE_LOCATION_1: NORMAL
MDC_IDC_SET_LEADCHNL_RA_PACING_POLARITY: NORMAL
MDC_IDC_SET_LEADCHNL_RA_PACING_PULSEWIDTH: 0.5 MS
MDC_IDC_SET_LEADCHNL_RA_SENSING_ADAPTATION_MODE: NORMAL
MDC_IDC_SET_LEADCHNL_RA_SENSING_ANODE_ELECTRODE_1: NORMAL
MDC_IDC_SET_LEADCHNL_RA_SENSING_ANODE_LOCATION_1: NORMAL
MDC_IDC_SET_LEADCHNL_RA_SENSING_CATHODE_ELECTRODE_1: NORMAL
MDC_IDC_SET_LEADCHNL_RA_SENSING_CATHODE_LOCATION_1: NORMAL
MDC_IDC_SET_LEADCHNL_RA_SENSING_POLARITY: NORMAL
MDC_IDC_SET_LEADCHNL_RA_SENSING_SENSITIVITY: 0.25 MV
MDC_IDC_SET_LEADCHNL_RV_PACING_AMPLITUDE: 2.4 V
MDC_IDC_SET_LEADCHNL_RV_PACING_ANODE_ELECTRODE_1: NORMAL
MDC_IDC_SET_LEADCHNL_RV_PACING_ANODE_LOCATION_1: NORMAL
MDC_IDC_SET_LEADCHNL_RV_PACING_CAPTURE_MODE: NORMAL
MDC_IDC_SET_LEADCHNL_RV_PACING_CATHODE_ELECTRODE_1: NORMAL
MDC_IDC_SET_LEADCHNL_RV_PACING_CATHODE_LOCATION_1: NORMAL
MDC_IDC_SET_LEADCHNL_RV_PACING_POLARITY: NORMAL
MDC_IDC_SET_LEADCHNL_RV_PACING_PULSEWIDTH: 0.5 MS
MDC_IDC_SET_LEADCHNL_RV_SENSING_ADAPTATION_MODE: NORMAL
MDC_IDC_SET_LEADCHNL_RV_SENSING_ANODE_ELECTRODE_1: NORMAL
MDC_IDC_SET_LEADCHNL_RV_SENSING_ANODE_LOCATION_1: NORMAL
MDC_IDC_SET_LEADCHNL_RV_SENSING_CATHODE_ELECTRODE_1: NORMAL
MDC_IDC_SET_LEADCHNL_RV_SENSING_CATHODE_LOCATION_1: NORMAL
MDC_IDC_SET_LEADCHNL_RV_SENSING_POLARITY: NORMAL
MDC_IDC_SET_LEADCHNL_RV_SENSING_SENSITIVITY: 0.6 MV
MDC_IDC_SET_ZONE_DETECTION_INTERVAL: 273 MS
MDC_IDC_SET_ZONE_DETECTION_INTERVAL: 333 MS
MDC_IDC_SET_ZONE_DETECTION_INTERVAL: 375 MS
MDC_IDC_SET_ZONE_TYPE: NORMAL
MDC_IDC_SET_ZONE_VENDOR_TYPE: NORMAL
MDC_IDC_STAT_BRADY_RA_PERCENT_PACED: 10 %
MDC_IDC_STAT_BRADY_RV_PERCENT_PACED: 3 %
MDC_IDC_STAT_EPISODE_RECENT_COUNT: 0
MDC_IDC_STAT_EPISODE_RECENT_COUNT: 1
MDC_IDC_STAT_EPISODE_RECENT_COUNT: 34
MDC_IDC_STAT_EPISODE_RECENT_COUNT_DTM_END: NORMAL
MDC_IDC_STAT_EPISODE_RECENT_COUNT_DTM_START: NORMAL
MDC_IDC_STAT_EPISODE_TOTAL_COUNT: 4
MDC_IDC_STAT_EPISODE_TOTAL_COUNT: 5390
MDC_IDC_STAT_EPISODE_TOTAL_COUNT: 7
MDC_IDC_STAT_EPISODE_TOTAL_COUNT_DTM_END: NORMAL
MDC_IDC_STAT_EPISODE_TYPE: NORMAL
MDC_IDC_STAT_EPISODE_VENDOR_TYPE: NORMAL
MDC_IDC_STAT_TACHYTHERAPY_ATP_DELIVERED_RECENT: 1
MDC_IDC_STAT_TACHYTHERAPY_ATP_DELIVERED_TOTAL: 2
MDC_IDC_STAT_TACHYTHERAPY_RECENT_DTM_END: NORMAL
MDC_IDC_STAT_TACHYTHERAPY_RECENT_DTM_START: NORMAL
MDC_IDC_STAT_TACHYTHERAPY_SHOCKS_ABORTED_RECENT: 0
MDC_IDC_STAT_TACHYTHERAPY_SHOCKS_ABORTED_TOTAL: 0
MDC_IDC_STAT_TACHYTHERAPY_SHOCKS_DELIVERED_RECENT: 0
MDC_IDC_STAT_TACHYTHERAPY_SHOCKS_DELIVERED_TOTAL: 1
MDC_IDC_STAT_TACHYTHERAPY_TOTAL_DTM_END: NORMAL

## 2019-11-11 LAB — INTERPRETATION ECG - MUSE: NORMAL

## 2019-11-26 ENCOUNTER — CARE COORDINATION (OUTPATIENT)
Dept: CARDIOLOGY | Facility: CLINIC | Age: 64
End: 2019-11-26

## 2019-12-03 ENCOUNTER — DOCUMENTATION ONLY (OUTPATIENT)
Dept: CARE COORDINATION | Facility: CLINIC | Age: 64
End: 2019-12-03

## 2019-12-11 ENCOUNTER — CARE COORDINATION (OUTPATIENT)
Dept: CARDIOLOGY | Facility: CLINIC | Age: 64
End: 2019-12-11

## 2019-12-15 ASSESSMENT — ENCOUNTER SYMPTOMS
HYPOTENSION: 0
SYNCOPE: 0
SPUTUM PRODUCTION: 1
SLEEP DISTURBANCES DUE TO BREATHING: 0
ORTHOPNEA: 0
EXERCISE INTOLERANCE: 0
COUGH: 1
DYSPNEA ON EXERTION: 0
HYPERTENSION: 0
SNORES LOUDLY: 1
COUGH DISTURBING SLEEP: 0
HEMOPTYSIS: 0
POSTURAL DYSPNEA: 0
LEG PAIN: 0
PALPITATIONS: 0
LIGHT-HEADEDNESS: 0
WHEEZING: 1
SHORTNESS OF BREATH: 0

## 2019-12-15 ASSESSMENT — ACTIVITIES OF DAILY LIVING (ADL)
IN_THE_PAST_7_DAYS,_DID_YOU_NEED_HELP_FROM_OTHERS_TO_PERFORM_EVERYDAY_ACTIVITIES_SUCH_AS_EATING,_GETTING_DRESSED,_GROOMING,_BATHING,_WALKING,_OR_USING_THE_TOILET: N
IN_THE_PAST_7_DAYS,_DID_YOU_NEED_HELP_FROM_OTHERS_TO_TAKE_CARE_OF_THINGS_SUCH_AS_LAUNDRY_AND_HOUSEKEEPING,_BANKING,_SHOPPING,_USING_THE_TELEPHONE,_FOOD_PREPARATION,_TRANSPORTATION,_OR_TAKING_YOUR_OWN_MEDICATIONS?: N

## 2019-12-17 ENCOUNTER — OFFICE VISIT (OUTPATIENT)
Dept: INTERNAL MEDICINE | Facility: CLINIC | Age: 64
End: 2019-12-17
Payer: COMMERCIAL

## 2019-12-17 VITALS
DIASTOLIC BLOOD PRESSURE: 80 MMHG | OXYGEN SATURATION: 97 % | SYSTOLIC BLOOD PRESSURE: 135 MMHG | BODY MASS INDEX: 29.57 KG/M2 | WEIGHT: 212 LBS | HEART RATE: 62 BPM

## 2019-12-17 DIAGNOSIS — E11.9 TYPE 2 DIABETES MELLITUS WITHOUT COMPLICATION, WITHOUT LONG-TERM CURRENT USE OF INSULIN (H): ICD-10-CM

## 2019-12-17 DIAGNOSIS — Z87.891 FORMER SMOKER, STOPPED SMOKING IN DISTANT PAST: ICD-10-CM

## 2019-12-17 DIAGNOSIS — Z13.6 CARDIOVASCULAR SCREENING; LDL GOAL LESS THAN 130: ICD-10-CM

## 2019-12-17 DIAGNOSIS — Z13.6 SCREENING FOR AAA (ABDOMINAL AORTIC ANEURYSM): ICD-10-CM

## 2019-12-17 DIAGNOSIS — G62.9 NEUROPATHY: ICD-10-CM

## 2019-12-17 DIAGNOSIS — I42.2 HYPERTROPHIC CARDIOMYOPATHY (H): ICD-10-CM

## 2019-12-17 DIAGNOSIS — R05.3 CHRONIC COUGH: ICD-10-CM

## 2019-12-17 DIAGNOSIS — Z76.89 ENCOUNTER TO ESTABLISH CARE WITH NEW DOCTOR: Primary | ICD-10-CM

## 2019-12-17 DIAGNOSIS — F13.20 SEDATIVE, HYPNOTIC OR ANXIOLYTIC DEPENDENCE (H): ICD-10-CM

## 2019-12-17 LAB
ALBUMIN SERPL-MCNC: 3.9 G/DL (ref 3.4–5)
ALP SERPL-CCNC: 94 U/L (ref 40–150)
ALT SERPL W P-5'-P-CCNC: 30 U/L (ref 0–70)
ANION GAP SERPL CALCULATED.3IONS-SCNC: 5 MMOL/L (ref 3–14)
AST SERPL W P-5'-P-CCNC: 19 U/L (ref 0–45)
BASOPHILS # BLD AUTO: 0.1 10E9/L (ref 0–0.2)
BASOPHILS NFR BLD AUTO: 1.5 %
BILIRUB SERPL-MCNC: 0.7 MG/DL (ref 0.2–1.3)
BUN SERPL-MCNC: 18 MG/DL (ref 7–30)
CALCIUM SERPL-MCNC: 9.4 MG/DL (ref 8.5–10.1)
CHLORIDE SERPL-SCNC: 106 MMOL/L (ref 94–109)
CO2 SERPL-SCNC: 30 MMOL/L (ref 20–32)
CREAT SERPL-MCNC: 1.03 MG/DL (ref 0.66–1.25)
DIFFERENTIAL METHOD BLD: NORMAL
EOSINOPHIL # BLD AUTO: 0.3 10E9/L (ref 0–0.7)
EOSINOPHIL NFR BLD AUTO: 4.1 %
ERYTHROCYTE [DISTWIDTH] IN BLOOD BY AUTOMATED COUNT: 13.3 % (ref 10–15)
GFR SERPL CREATININE-BSD FRML MDRD: 76 ML/MIN/{1.73_M2}
GLUCOSE SERPL-MCNC: 104 MG/DL (ref 70–99)
HBA1C MFR BLD: 6.7 % (ref 0–5.6)
HCT VFR BLD AUTO: 44.5 % (ref 40–53)
HGB BLD-MCNC: 14.6 G/DL (ref 13.3–17.7)
IMM GRANULOCYTES # BLD: 0 10E9/L (ref 0–0.4)
IMM GRANULOCYTES NFR BLD: 0.4 %
LYMPHOCYTES # BLD AUTO: 2 10E9/L (ref 0.8–5.3)
LYMPHOCYTES NFR BLD AUTO: 26.4 %
MCH RBC QN AUTO: 30.2 PG (ref 26.5–33)
MCHC RBC AUTO-ENTMCNC: 32.8 G/DL (ref 31.5–36.5)
MCV RBC AUTO: 92 FL (ref 78–100)
MONOCYTES # BLD AUTO: 0.7 10E9/L (ref 0–1.3)
MONOCYTES NFR BLD AUTO: 9.9 %
NEUTROPHILS # BLD AUTO: 4.3 10E9/L (ref 1.6–8.3)
NEUTROPHILS NFR BLD AUTO: 57.7 %
NRBC # BLD AUTO: 0 10*3/UL
NRBC BLD AUTO-RTO: 0 /100
NT-PROBNP SERPL-MCNC: 715 PG/ML (ref 0–125)
PLATELET # BLD AUTO: 270 10E9/L (ref 150–450)
POTASSIUM SERPL-SCNC: 4.2 MMOL/L (ref 3.4–5.3)
PROT SERPL-MCNC: 7.6 G/DL (ref 6.8–8.8)
RBC # BLD AUTO: 4.84 10E12/L (ref 4.4–5.9)
SODIUM SERPL-SCNC: 140 MMOL/L (ref 133–144)
WBC # BLD AUTO: 7.5 10E9/L (ref 4–11)

## 2019-12-17 RX ORDER — METFORMIN HCL 500 MG
1000 TABLET, EXTENDED RELEASE 24 HR ORAL
Qty: 180 TABLET | Refills: 3 | Status: SHIPPED | OUTPATIENT
Start: 2019-12-17 | End: 2021-02-24

## 2019-12-17 RX ORDER — ZOLPIDEM TARTRATE 10 MG/1
5-10 TABLET ORAL
Qty: 90 TABLET | Refills: 0 | Status: SHIPPED | OUTPATIENT
Start: 2019-12-17 | End: 2020-05-04

## 2019-12-17 RX ORDER — ATORVASTATIN CALCIUM 20 MG/1
20 TABLET, FILM COATED ORAL DAILY
Qty: 90 TABLET | Refills: 3 | Status: SHIPPED | OUTPATIENT
Start: 2019-12-17 | End: 2021-04-09

## 2019-12-17 RX ORDER — GABAPENTIN 300 MG/1
600 CAPSULE ORAL 2 TIMES DAILY
Qty: 360 CAPSULE | Refills: 3 | Status: SHIPPED | OUTPATIENT
Start: 2019-12-17 | End: 2020-12-15

## 2019-12-17 ASSESSMENT — PAIN SCALES - GENERAL: PAINLEVEL: NO PAIN (0)

## 2019-12-17 NOTE — NURSING NOTE
Patient received Shingrix round 1 vaccine. Vaccine was given under the supervision of Dr. Guzman. See immunization list for administration details. Pt was advised to remain in [facility] lobby for 15 minutes in case of an averse reaction. Given by Karen Rudd CMA, EMT 3:15 PM 12/17/2019

## 2019-12-17 NOTE — NURSING NOTE
Chief Complaint   Patient presents with     Establish Care     former pt of Dr Zambrano     Refill Request     pt already had blood work done. In chart     Kimberly Nissen, EMT at 1:41 PM on 12/17/2019

## 2019-12-17 NOTE — PROGRESS NOTES
PRIMARY [facility]ARE [facility]ENTER       SUBJECTIVE:  Stu Meredith is a 64 year old male with a PMHx of VT s/p ICD currently on defetolide, atrial fibrillation s/p cardioversion and multiple ablations, HTN, diabetes, chronic insomnia who presents today for annual exam and medication renewal. He was previously a patient of Dr Zambrano.  He is overall feeling well today.  He does note that in September of this year he had a upper respiratory tract infection which manifested as 1 day of fever, shortness of breath and productive cough.  This lasted about a week and he did receive a short course of antibiotics.  His cough improved but has persisted over the last 3 months.  He notes it is slowly improving but still present, it is mostly nonproductive with scant white/gray sputum.  He denies significant shortness of breath.  He denies recurrent fevers or chills or night sweats.  He denies chest pain or dizziness.  He denies any weight loss or poor appetite.  He does have a significant smoking history although has quit.    He otherwise is feeling well, he does not check his blood sugar although denies any symptoms of hypoglycemia such as diaphoresis or tremors.  He is not on insulin or sulfonylurea.  He does continue to suffer from insomnia and uses Ambien every evening which he said works well for him.  He feels his mood is overall good.  He is planning to retire within the next year which he is looking forward to.    Medications and allergies reviewed by me today.     ROS:   14 point ROS is otherwise negative    Patient Active Problem List   Diagnosis     Atrial fibrillation (H)     CARDIOVASCULAR SCREENING; LDL GOAL LESS THAN 130     Hypertrophic cardiomyopathy (H)     Advanced directives, counseling/discussion     Ventricular tachycardia (H)     Automatic implantable cardioverter-defibrillator in situ- [employer], dual chamber- NOT dependent     Prediabetes     S/P ablation of atrial flutter     CHF  (congestive heart failure) (H)     Chronic Zolpidem use for insomnia     S/P ablation of atrial fibrillation     Lung nodules     Atrial tachycardia (H)     Encounter for monitoring dofetilide therapy     HTN (hypertension)     Past Medical History:   Diagnosis Date     Atrial fibrillation (H) 12/9/11    failed medication, multiple DC cardioveresions; s/p Left atrial ablation to eliminate atrial fibrillation 12/9/11     Chest pain      CHF (congestive heart failure) (H) 7/30/2016     Coronary artery disease      DJD (degenerative joint disease)      Fatigue 7/15/2019     Hip arthritis 1/15/2014     Hypertension      Hypertrophic cardiomyopathy (H) 10/09     Other abnormal heart sounds      Pacemaker     ICD     Palpitations      Pneumonia, organism unspecified(486)      Prediabetes 7/10/15    A1C 6.5     Status post implantation of automatic cardioverter/defibrillator (AICD)      Ventricular tachycardia (H)      Past Surgical History:   Procedure Laterality Date     ANESTHESIA CARDIOVERSION  4/24/2014    Procedure: ANESTHESIA CARDIOVERSION;  Surgeon: Generic Anesthesia Provider;  [facility]     ANESTHESIA CARDIOVERSION N/A 5/12/2016    Procedure: ANESTHESIA CARDIOVERSION;  Surgeon: GENERIC ANESTHESIA PROVIDER;  [facility]     ANESTHESIA CARDIOVERSION N/A 8/7/2017    Procedure: ANESTHESIA CARDIOVERSION;  Anesthesia Offsite Coverage Cardioversion @1100;  Surgeon: GENERIC ANESTHESIA PROVIDER;  [facility]     ANESTHESIA CARDIOVERSION N/A 1/3/2018    Procedure: ANESTHESIA CARDIOVERSION;  Anesthesia Cardioverion;  Surgeon: GENERIC ANESTHESIA PROVIDER;  [facility]     ANESTHESIA CARDIOVERSION N/A 5/4/2018    Procedure: ANESTHESIA CARDIOVERSION;  Anesthesia Coverage Cardioversion @1400;  Surgeon: GENERIC ANESTHESIA PROVIDER;  [facility]     ANESTHESIA CARDIOVERSION N/A 9/27/2018    Procedure: ANESTHESIA CARDIOVERSION;  Anesthesia Cardioversion @0930;  Surgeon: GENERIC ANESTHESIA PROVIDER;  [facility]     ANESTHESIA CARDIOVERSION N/A 12/20/2018    Procedure: Anesthesia Coverage Cardioversion @0830;  Surgeon: GENERIC ANESTHESIA PROVIDER;  [facility]     ANESTHESIA CARDIOVERSION N/A 8/21/2019    Procedure: Anesthesia Coverage Cardioversion @0800;  Surgeon: GENERIC ANESTHESIA PROVIDER;  [facility]     ARTHROPLASTY HIP  1/15/2014    Procedure: ARTHROPLASTY HIP;  Left Total Hip Arthroplasty;  Surgeon: Nelson Gaspar MD;  [facility]     ARTHROPLASTY HIP Right 6/5/2019    Procedure: Right Total Hip Arthroplasty;  Surgeon: Nelson Gaspar MD;  [facility]     CARDIAC SURGERY       COLONOSCOPY N/A 5/18/2018    Procedure: COMBINED COLONOSCOPY, SINGLE OR MULTIPLE BIOPSY/POLYPECTOMY BY BIOPSY;  COLONOSCOPY (PT HAS DEFIBRILLATOR) ;  Surgeon: Mike Nickerson MD;  Location:  GI     CV RIGHT HEART CATH N/A 8/19/2019    Procedure: CV RIGHT HEART CATH;  Surgeon: Cisco Rausch MD;  Location:  HEART CARDIAC CATH LAB     CV RIGHT HEART CATH N/A 8/21/2019    Procedure: Right Heart Cath;  Surgeon: Ciaran Burton MD;  Location:  HEART CARDIAC CATH LAB     H ABLATION FOCAL AFIB  12/9/11    Left atrial ablation to eliminate atrial fibrillation     IMPLANT AUTOMATIC IMPLANTABLE CARDIOVERTER DEFIBRILLATOR  7/27/12    AICD implantation     TONSILLECTOMY  1964     Family History   Problem Relation Age of Onset     Heart Disease Mother         unknown     Obesity Mother      Gastrointestinal Disease Mother         diverticulitis     Diabetes Maternal Grandmother      Diabetes Paternal Grandmother      Cerebrovascular Disease Paternal Grandmother 94     Diabetes Paternal Grandfather      Cerebrovascular Disease Paternal Grandfather 78     Diabetes Son      C.A.D. Father         CABG age 78     Heart Disease Father         CABG x5     Diabetes Maternal Grandfather      LUNG DISEASE No family hx of      Deep Vein Thrombosis (DVT) No family hx of      Anesthesia Reaction No family hx of      Melanoma No  family hx of      Skin Cancer No family hx of      Social History     Tobacco Use     Smoking status: Former Smoker     Packs/day: 1.00     Years: 40.00     Pack years: 40.00     Types: Cigarettes     Start date: 1975     Last attempt to quit: 2015     Years since quittin.0     Smokeless tobacco: Never Used   Substance Use Topics     Alcohol use: Yes     Alcohol/week: 0.0 standard drinks     Comment: 1 drink per week     Drug use: No     Current Outpatient Medications   Medication Sig Dispense Refill     acetaminophen (TYLENOL) 325 MG tablet Take 325-650 mg by mouth every 6 hours as needed       albuterol (PROAIR HFA/PROVENTIL HFA/VENTOLIN HFA) 108 (90 Base) MCG/ACT inhaler Inhale 2 puffs into the lungs every 6 hours 18 g 0     amoxicillin (AMOXIL) 500 MG tablet Take 4 tablets (2000 mg) by mouth 1 hour before dental procedures. 12 tablet 3     atorvastatin (LIPITOR) 20 MG tablet Take 1 tablet (20 mg) by mouth daily 90 tablet 3     cyanocobalamin (VITAMIN B-12) 100 MCG tablet Take 100 mcg by mouth daily       dofetilide (TIKOSYN) 250 MCG capsule Take 1 capsule (250 mcg) by mouth every 12 hours 60 capsule 11     furosemide (LASIX) 40 MG tablet Take 1 tablet (40 mg) by mouth daily 90 tablet 1     gabapentin (NEURONTIN) 300 MG capsule Take 2 capsules (600 mg) by mouth 2 times daily 360 capsule 3     guaiFENesin-codeine (ROBITUSSIN AC) 100-10 MG/5ML solution Take 5-10 mLs by mouth every 4 hours as needed 240 mL 0     metFORMIN (GLUCOPHAGE-XR) 500 MG 24 hr tablet Take 2 tablets (1,000 mg) by mouth daily (with dinner) Take 2 tablets (1,000 mg) daily (with dinner). 180 tablet 3     metoprolol succinate ER (TOPROL-XL) 50 MG 24 hr tablet TAKE ONE TABLET BY MOUTH EVERY DAY 90 tablet 3     multivitamin, therapeutic (THERA-VIT) TABS Take 1 tablet by mouth daily       spironolactone (ALDACTONE) 50 MG tablet Take 1 tablet (50 mg) by mouth daily 90 tablet 1     XARELTO 20 MG TABS tablet Take 1 tablet (20 mg) by mouth  daily (with dinner) 90 tablet 3     zolpidem (AMBIEN) 10 MG tablet Take 0.5-1 tablets (5-10 mg) by mouth nightly as needed for sleep 90 tablet 0     No Known Allergies  /80   Pulse 62   Wt 96.2 kg (212 lb)   SpO2 97%   BMI 29.57 kg/m      Wt Readings from Last 1 Encounters:   11/08/19 96.2 kg (212 lb)       GENERAL APPEARANCE: healthy, alert and no distress     EYES:PEERL, no scleral icterus      HENT: mouth without ulcers or lesions     NECK: no adenopathy, no asymmetry, masses     RESP: lungs clear to auscultation - no rales, rhonchi or wheezes     CV: regular rates and rhythm, normal S1 S2, and no murmur, click or rub     ABDOMEN:  soft, nontender, no HSM or masses and bowel sounds normal     MS: extremities normal- no gross deformities noted     SKIN: no suspicious lesions or rashes     NEURO: Normal strength, mentation intact and speech normal     PSYCH: mentation appears normal. and affect normal/bright     ASSESSMENT/PLAN:  Stu was seen today for establish care and refill request.    Diagnoses and all orders for this visit:    Screening for AAA (abdominal aortic aneurysm)  Given long smoking history and age we discussed risks and benefits of a one-time AAA screen.  Will obtain an abdominal ultrasound.  -     US Abdominal Aorta Imaging; Future    Neuropathy  -     gabapentin (NEURONTIN) 300 MG capsule; Take 2 capsules (600 mg) by mouth 2 times daily    CARDIOVASCULAR SCREENING; LDL GOAL LESS THAN 130  -     atorvastatin (LIPITOR) 20 MG tablet; Take 1 tablet (20 mg) by mouth daily    Type 2 diabetes mellitus without complication, without long-term current use of insulin (H)  Overall well controlled, labs prior to appointment indicate hemoglobin A1c of 6.7.  No changes, he does not check blood sugar regularly but no risk of hypoglycemia on current regimen.  -     metFORMIN (GLUCOPHAGE-XR) 500 MG 24 hr tablet; Take 2 tablets (1,000 mg) by mouth daily (with dinner) Take 2 tablets (1,000 mg) daily  (with dinner).    Chronic Zolpidem use for insomnia  Has been reliant on this for the past 10 years, this is been a long-term medication for him I refilled it today with the caveat I would like to discuss this further with him at her next appointment before we refill the medication.  Would like to reduce his reliance on this medication moving forward especially given risks in patients with advanced age including memory and confusion as well as falls.  Will discuss at next appointment in 3 months prior to refilling again.  -     zolpidem (AMBIEN) 10 MG tablet; Take 0.5-1 tablets (5-10 mg) by mouth nightly as needed for sleep    Former smoker, stopped smoking in distant past  Will be due for repeat low dose CT next year, will schedule in May.  -     CT Chest Lung Cancer Scrn Low Dose wo; Future    Chronic cough  Following an apparent either bacterial versus viral upper respiratory tract infection although the duration seems to be too long for a simple post viral cough.  No obvious symptoms of GERD and no history of seasonal allergies.  He notably had normal pulmonary function tests in 2013 although given his heavy smoking history I am concerned this could be a manifestation of untreated COPD.  I would like to repeat his pulmonary function tests and obtain a chest x-ray given the duration of his cough.  If these are normal I would see him again in 3 months to assess further especially if this is not resolved.  -     XR Chest 2 Views; Future  -     General PFT Lab (Please always keep checked); Future  -     Pulmonary Function Test; Future    Other orders  -     ZOSTER VACCINE RECOMBINANT ADJUVANTED IM NJX     Pt should return to clinic for f/u with me in 3 months.     Sathya Garcia MD  Dec 17, 2019    Pt was seen and plan of care discussed with Dr Guzman.      Teaching Physician Note:  I was present during the visit and the patient was seen and examined by me.   I discussed the case with the resident and agree  with the note as documented by the resident with the following exceptions:  None.    Laury Guzman M.D.  Internal Medicine   pager [phone]

## 2019-12-19 ENCOUNTER — MYC MEDICAL ADVICE (OUTPATIENT)
Dept: CARDIOLOGY | Facility: CLINIC | Age: 64
End: 2019-12-19

## 2019-12-20 ENCOUNTER — HOSPITAL ENCOUNTER (OUTPATIENT)
Dept: CARDIOLOGY | Facility: CLINIC | Age: 64
Discharge: HOME | End: 2019-12-20
Attending: INTERNAL MEDICINE | Admitting: INTERNAL MEDICINE
Payer: COMMERCIAL

## 2019-12-20 ENCOUNTER — ANCILLARY PROCEDURE (OUTPATIENT)
Dept: ULTRASOUND IMAGING | Facility: CLINIC | Age: 64
End: 2019-12-20
Attending: INTERNAL MEDICINE
Payer: COMMERCIAL

## 2019-12-20 ENCOUNTER — HOSPITAL ENCOUNTER (OUTPATIENT)
Dept: CARDIOLOGY | Facility: CLINIC | Age: 64
End: 2019-12-20
Attending: INTERNAL MEDICINE
Payer: COMMERCIAL

## 2019-12-20 ENCOUNTER — OFFICE VISIT (OUTPATIENT)
Dept: DERMATOLOGY | Facility: CLINIC | Age: 64
End: 2019-12-20
Payer: COMMERCIAL

## 2019-12-20 ENCOUNTER — ANCILLARY PROCEDURE (OUTPATIENT)
Dept: GENERAL RADIOLOGY | Facility: CLINIC | Age: 64
End: 2019-12-20
Attending: INTERNAL MEDICINE
Payer: COMMERCIAL

## 2019-12-20 VITALS
WEIGHT: 213.6 LBS | SYSTOLIC BLOOD PRESSURE: 124 MMHG | HEART RATE: 61 BPM | DIASTOLIC BLOOD PRESSURE: 86 MMHG | OXYGEN SATURATION: 96 % | BODY MASS INDEX: 29.79 KG/M2

## 2019-12-20 DIAGNOSIS — I42.2 HYPERTROPHIC CARDIOMYOPATHY (H): ICD-10-CM

## 2019-12-20 DIAGNOSIS — L28.1 PRURIGO NODULARIS: Primary | ICD-10-CM

## 2019-12-20 DIAGNOSIS — Z12.83 SKIN CANCER SCREENING: ICD-10-CM

## 2019-12-20 DIAGNOSIS — Z13.6 SCREENING FOR AAA (ABDOMINAL AORTIC ANEURYSM): ICD-10-CM

## 2019-12-20 DIAGNOSIS — R05.3 CHRONIC COUGH: ICD-10-CM

## 2019-12-20 DIAGNOSIS — Z85.828 HISTORY OF BASAL CELL CARCINOMA: ICD-10-CM

## 2019-12-20 PROCEDURE — 93306 TTE W/DOPPLER COMPLETE: CPT

## 2019-12-20 PROCEDURE — 94621 CARDIOPULM EXERCISE TESTING: CPT | Mod: 26 | Performed by: INTERNAL MEDICINE

## 2019-12-20 PROCEDURE — 93306 TTE W/DOPPLER COMPLETE: CPT | Mod: 26 | Performed by: INTERNAL MEDICINE

## 2019-12-20 PROCEDURE — 99214 OFFICE O/P EST MOD 30 MIN: CPT | Mod: 25 | Performed by: INTERNAL MEDICINE

## 2019-12-20 PROCEDURE — 94621 CARDIOPULM EXERCISE TESTING: CPT

## 2019-12-20 PROCEDURE — G0463 HOSPITAL OUTPT CLINIC VISIT: HCPCS | Mod: ZF

## 2019-12-20 RX ORDER — EPLERENONE 50 MG/1
50 TABLET, FILM COATED ORAL DAILY
Qty: 90 TABLET | Refills: 3 | Status: SHIPPED | OUTPATIENT
Start: 2019-12-20 | End: 2020-12-17

## 2019-12-20 ASSESSMENT — PAIN SCALES - GENERAL
PAINLEVEL: NO PAIN (0)
PAINLEVEL: NO PAIN (0)

## 2019-12-20 NOTE — LETTER
12/20/2019       RE: Stu Meredith  [address] [facility] [id number]     Dear Colleague,    Thank you for referring your patient, Stu Meredith, to the [facility] DERMATOLOGY at [facility]. Please see a copy of my visit note below.    [facility] Dermatology Note      Dermatology Problem List:  1.  History of NMSC  -BCC of Left central chest s/p excision 07/17/19  2. Centrofacial rosacea w/telangectasias  3. Benign skin findings: seborrheic keratoses, dermatofibromas, solar lentigines  4. Lower cutaneous lip solar lentigines  5. 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 Left hand has a spot that's itchy and did cryo on there and it helps but it keeps coming back. It's been there for 10 years.         History of Present Illness:  Mr. Stu Meredith is a 64 year old male who is a return patient to the clinic and presents for a skin check. The patient was last seen on 07/17/19 by Dr. Choe when a BCC on the left central chest was excised. At today's visit, the patient notes lesions of concern on his hand and chest. He states that the spot on his left hand has been present for 10 years but is now itchy and has previously been treated with cryotherapy by Dr. Rhodes on 07/09/2019 who described it as an irritated SK. He states the SK always returns and that he has tried many treatments over the last 10 years including steroid shots and special medicated bandages. He endorses scratching and and picking at the area. The patient notes the lesion on his chest appeared 3-4 weeks ago and was a little bloody. He states the lesion is not tender or itchy. The patient denies painful, itching, tingling or bleeding lesions unless otherwise noted.    Past Medical  History:   Patient Active Problem List   Diagnosis     Atrial fibrillation (H)     CARDIOVASCULAR SCREENING; LDL GOAL LESS THAN 130     Hypertrophic cardiomyopathy (H)     Advanced directives, counseling/discussion     Ventricular tachycardia (H)     Automatic implantable cardioverter-defibrillator in situ- [employer], dual chamber- NOT dependent     Prediabetes     S/P ablation of atrial flutter     CHF (congestive heart failure) (H)     Chronic Zolpidem use for insomnia     S/P ablation of atrial fibrillation     Lung nodules     Atrial tachycardia (H)     Encounter for monitoring dofetilide therapy     HTN (hypertension)     Past Medical History:   Diagnosis Date     Atrial fibrillation (H) 12/9/11    failed medication, multiple DC cardioveresions; s/p Left atrial ablation to eliminate atrial fibrillation 12/9/11     Chest pain      CHF (congestive heart failure) (H) 7/30/2016     Coronary artery disease      DJD (degenerative joint disease)      Fatigue 7/15/2019     Hip arthritis 1/15/2014     Hypertension      Hypertrophic cardiomyopathy (H) 10/09     Other abnormal heart sounds      Pacemaker     ICD     Palpitations      Pneumonia, organism unspecified(486)      Prediabetes 7/10/15    A1C 6.5     Status post implantation of automatic cardioverter/defibrillator (AICD)      Ventricular tachycardia (H)      Past Surgical History:   Procedure Laterality Date     ANESTHESIA CARDIOVERSION  4/24/2014    Procedure: ANESTHESIA CARDIOVERSION;  Surgeon: Generic Anesthesia Provider;  [facility]     ANESTHESIA CARDIOVERSION N/A 5/12/2016    Procedure: ANESTHESIA CARDIOVERSION;  Surgeon: GENERIC ANESTHESIA PROVIDER;  [facility]     ANESTHESIA CARDIOVERSION N/A 8/7/2017    Procedure: ANESTHESIA CARDIOVERSION;  Anesthesia Offsite Coverage Cardioversion @1100;  Surgeon: GENERIC ANESTHESIA PROVIDER;  [facility]     ANESTHESIA CARDIOVERSION N/A 1/3/2018    Procedure: ANESTHESIA CARDIOVERSION;  Anesthesia  Cardioverion;  Surgeon: GENERIC ANESTHESIA PROVIDER;  [facility]     ANESTHESIA CARDIOVERSION N/A 5/4/2018    Procedure: ANESTHESIA CARDIOVERSION;  Anesthesia Coverage Cardioversion @1400;  Surgeon: GENERIC ANESTHESIA PROVIDER;  [facility]     ANESTHESIA CARDIOVERSION N/A 9/27/2018    Procedure: ANESTHESIA CARDIOVERSION;  Anesthesia Cardioversion @0930;  Surgeon: GENERIC ANESTHESIA PROVIDER;  [facility]     ANESTHESIA CARDIOVERSION N/A 12/20/2018    Procedure: Anesthesia Coverage Cardioversion @0830;  Surgeon: GENERIC ANESTHESIA PROVIDER;  [facility]     ANESTHESIA CARDIOVERSION N/A 8/21/2019    Procedure: Anesthesia Coverage Cardioversion @0800;  Surgeon: GENERIC ANESTHESIA PROVIDER;  [facility]     ARTHROPLASTY HIP  1/15/2014    Procedure: ARTHROPLASTY HIP;  Left Total Hip Arthroplasty;  Surgeon: Nelson Gaspar MD;  [facility]     ARTHROPLASTY HIP Right 6/5/2019    Procedure: Right Total Hip Arthroplasty;  Surgeon: Nelson Gaspar MD;  [facility]     CARDIAC SURGERY       COLONOSCOPY N/A 5/18/2018    Procedure: COMBINED COLONOSCOPY, SINGLE OR MULTIPLE BIOPSY/POLYPECTOMY BY BIOPSY;  COLONOSCOPY (PT HAS DEFIBRILLATOR) ;  Surgeon: Mike Nickerson MD;  Location:  GI     CV RIGHT HEART CATH N/A 8/19/2019    Procedure: CV RIGHT HEART CATH;  Surgeon: Cisco Rausch MD;  Location:  HEART CARDIAC CATH LAB     CV RIGHT HEART CATH N/A 8/21/2019    Procedure: Right Heart Cath;  Surgeon: Ciaran Burton MD;  Location:  HEART CARDIAC CATH LAB     H ABLATION FOCAL AFIB  12/9/11    Left atrial ablation to eliminate atrial fibrillation     IMPLANT AUTOMATIC IMPLANTABLE CARDIOVERTER DEFIBRILLATOR  7/27/12    AICD implantation     TONSILLECTOMY  1964       Social History:   reports that he quit smoking about 4 years ago. His smoking use included cigarettes. He started smoking about 44 years ago. He has a 40.00 pack-year smoking history. He has never used smokeless tobacco. He  reports current alcohol use. He reports that he does not use drugs.    Family History:  Family History   Problem Relation Age of Onset     Heart Disease Mother         unknown     Obesity Mother      Gastrointestinal Disease Mother         diverticulitis     Diabetes Maternal Grandmother      Diabetes Paternal Grandmother      Cerebrovascular Disease Paternal Grandmother 94     Diabetes Paternal Grandfather      Cerebrovascular Disease Paternal Grandfather 78     Diabetes Son      C.A.D. Father         CABG age 78     Heart Disease Father         CABG x5     Diabetes Maternal Grandfather      LUNG DISEASE No family hx of      Deep Vein Thrombosis (DVT) No family hx of      Anesthesia Reaction No family hx of      Melanoma No family hx of      Skin Cancer No family hx of        Medications:  Current Outpatient Medications   Medication Sig Dispense Refill     acetaminophen (TYLENOL) 325 MG tablet Take 325-650 mg by mouth every 6 hours as needed       albuterol (PROAIR HFA/PROVENTIL HFA/VENTOLIN HFA) 108 (90 Base) MCG/ACT inhaler Inhale 2 puffs into the lungs every 6 hours 18 g 0     amoxicillin (AMOXIL) 500 MG tablet Take 4 tablets (2000 mg) by mouth 1 hour before dental procedures. 12 tablet 3     atorvastatin (LIPITOR) 20 MG tablet Take 1 tablet (20 mg) by mouth daily 90 tablet 3     cyanocobalamin (VITAMIN B-12) 100 MCG tablet Take 100 mcg by mouth daily       dofetilide (TIKOSYN) 250 MCG capsule Take 1 capsule (250 mcg) by mouth every 12 hours 60 capsule 11     furosemide (LASIX) 40 MG tablet Take 1 tablet (40 mg) by mouth daily 90 tablet 1     gabapentin (NEURONTIN) 300 MG capsule Take 2 capsules (600 mg) by mouth 2 times daily 360 capsule 3     guaiFENesin-codeine (ROBITUSSIN AC) 100-10 MG/5ML solution Take 5-10 mLs by mouth every 4 hours as needed 240 mL 0     metFORMIN (GLUCOPHAGE-XR) 500 MG 24 hr tablet Take 2 tablets (1,000 mg) by mouth daily (with dinner) Take 2 tablets (1,000 mg) daily (with dinner). 180  tablet 3     metoprolol succinate ER (TOPROL-XL) 50 MG 24 hr tablet TAKE ONE TABLET BY MOUTH EVERY DAY 90 tablet 3     multivitamin, therapeutic (THERA-VIT) TABS Take 1 tablet by mouth daily       spironolactone (ALDACTONE) 50 MG tablet Take 1 tablet (50 mg) by mouth daily 90 tablet 1     XARELTO 20 MG TABS tablet Take 1 tablet (20 mg) by mouth daily (with dinner) 90 tablet 3     zolpidem (AMBIEN) 10 MG tablet Take 0.5-1 tablets (5-10 mg) by mouth nightly as needed for sleep 90 tablet 0       No Known Allergies    Review of Systems:  -Constitutional: The patient denies fatigue, fevers, chills, unintended weight loss, and night sweats.  -HEENT: Patient denies nonhealing oral sores.  -Skin: As above in HPI. No additional skin concerns.    Physical exam:  Vitals: There were no vitals taken for this visit.  GEN: This is a well developed, well-nourished male in no acute distress, in a pleasant mood.    SKIN: Full skin, which includes the head/face, both arms, chest, back, abdomen,both legs, genitalia and/or groin buttocks, digits and/or nails, was examined.  -There is no erythema, telangectasias, nodularity, or pigmentation on the left central chest.  -Linear thick skin colored plaque with secondary linear excoriations on left dorsal thenar hand  -No other lesions of concern on areas examined.       Impression/Plan:  1. Skin cancer screening    ABCDs of melanoma were discussed and self skin checks were advised.    Sun precaution was advised including the use of sun screens of SPF 30 or higher, sun protective clothing, and avoidance of tanning beds.    Provided sunscreen, melanoma and sun protective clothing handouts    2. History of NMSC - NERD    ABCDs of melanoma were discussed and self skin checks were advised.    Sun precaution was advised including the use of sun screens of SPF 30 or higher, sun protective clothing, and avoidance of tanning beds.    No evidence of recurrence    Patient to return for skin checks  "every 6 months    3. Prurigo nodule vs Lichen simplex chronicus - left dorsal thenar hand - has hasd ILK to area as well as \"special bandages\" with an outside provider which have not been effective    Cryotherapy procedure note: After verbal consent and discussion of risks and benefits including but no limited to dyspigmentation/scar, blister, and pain, 1 was treated with 1-2mm freeze border for 2 cycles with liquid nitrogen. Post cryotherapy instructions were provided.    Plan to treat again in 1 month    CC Dr. Paredes on close of this encounter.  Follow-up in 1 month, earlier for new or changing lesions.       Staff Involved:  Staff/Scribe    Scribe Disclosure:  I, Binh Alatorre, am serving as a scribe to document services personally performed by Eliza Motta PA-C, based on data collection and the provider's statements to me.     Provider Disclosure:   The documentation recorded by the scribe accurately reflects the services I personally performed and the decisions made by me.    All risks, benefits and alternatives were discussed with patient.  Patient is in agreement and understands the assessment and plan.  All questions were answered.    Eliza Motta PA-C, MPAS  [facility]: Phone: [phone], Fax: [phone]  [facility]: Phone: [phone],  Fax: [phone]  "

## 2019-12-20 NOTE — LETTER
12/20/2019      RE: Stu Meredith  [address] [facility] [address]       Dear Colleague,    Thank you for the opportunity to participate in the care of your patient, Stu Meredith, at the [facility] HEART [facility] at [facility]. Please see a copy of my visit note below.    HPI:  64 year old male with history of HCM without obstruction (dx 2006, EF 20% improved to 60%), VT s/p ICD 2012, nonobstructive CAD (cath 2013), AF s/p PVI X 3 (2011, 2016, 2018) and DCCV X 10 who since last clinic visit with me was admitted for dofetilide loading, cardioversion, and RHC.     Today patient relates that he has been feeling well.  He denies any chest pain/pressure, PND, orthopnea, abdominal swelling/distension, palpitations, syncope, presyncope or change in exercise tolerance. Pt does note some breast tenderness. He denies any other problems with his medications and reports compliance.      Past Medical History:   Diagnosis Date     Atrial fibrillation (H) 12/9/11    failed medication, multiple DC cardioveresions; s/p Left atrial ablation to eliminate atrial fibrillation 12/9/11     Chest pain      CHF (congestive heart failure) (H) 7/30/2016     Coronary artery disease      DJD (degenerative joint disease)      Hip arthritis 1/15/2014     Hypertension      Hypertrophic cardiomyopathy (H) 10/09     Other abnormal heart sounds      Pacemaker     ICD     Palpitations      Pneumonia, organism unspecified(486)      Prediabetes 7/10/15    A1C 6.5     Status post implantation of automatic cardioverter/defibrillator (AICD)      Ventricular tachycardia (H)    - HCM diagnosed '06, previously burnt out (EF 20%) now with recovered EF  - h/o VT s/p dual chamber ICD '12  - AF s/p PVI X 2 ('11, '16, '18), h/o DCCV X 10    Meds:  acetaminophen (TYLENOL) 325 MG tablet, Take 325-650 mg by mouth every 6 hours as needed  amoxicillin (AMOXIL) 500 MG tablet, Take 4 tablets (2000 mg) by mouth 1 hour  before dental procedures.  atorvastatin (LIPITOR) 20 MG tablet, Take 1 tablet (20 mg) by mouth daily  cyanocobalamin (VITAMIN B-12) 100 MCG tablet, Take 100 mcg by mouth daily  furosemide (LASIX) 40 MG tablet, Take 1 tablet (40 mg) by mouth daily  gabapentin (NEURONTIN) 300 MG capsule, Take 2 capsules (600 mg) by mouth 2 times daily  guaiFENesin-codeine (ROBITUSSIN AC) 100-10 MG/5ML solution, Take 5-10 mLs by mouth every 4 hours as needed  metFORMIN (GLUCOPHAGE-XR) 500 MG 24 hr tablet, Take 2 tablets (1,000 mg) by mouth daily (with dinner) Take 2 tablets (1,000 mg) daily (with dinner).  metoprolol succinate ER (TOPROL-XL) 50 MG 24 hr tablet, TAKE ONE TABLET BY MOUTH EVERY DAY  multivitamin, therapeutic (THERA-VIT) TABS, Take 1 tablet by mouth daily  zolpidem (AMBIEN) 10 MG tablet, Take 0.5-1 tablets (5-10 mg) by mouth nightly as needed for sleep  [] doxycycline hyclate (VIBRAMYCIN) 100 MG capsule, Take 1 capsule (100 mg) by mouth 2 times daily for 10 days    lidocaine 1% with EPINEPHrine 1:100,000 injection 3 mL    dofetilide 250mcg bid  xarelto 20mg daily      Social History     Socioeconomic History     Marital status:      Spouse name: Not on file     Number of children: Not on file     Years of education: Not on file     Highest education level: Not on file   Occupational History     Occupation:      Employer: [email or web address]   Social Needs     Financial resource strain: Not on file     Food insecurity:     Worry: Not on file     Inability: Not on file     Transportation needs:     Medical: Not on file     Non-medical: Not on file   Tobacco Use     Smoking status: Former Smoker     Packs/day: 1.00     Years: 40.00     Pack years: 40.00     Types: Cigarettes     Start date: 1975     Last attempt to quit: 2015     Years since quitting: 3.5     Smokeless tobacco: Never Used   Substance and Sexual Activity     Alcohol use: Yes     Alcohol/week: 0.0 oz     Comment: 1 drink per  week     Drug use: No     Sexual activity: Yes     Partners: Female   Lifestyle     Physical activity:     Days per week: Not on file     Minutes per session: Not on file     Stress: Not on file   Relationships     Social connections:     Talks on phone: Not on file     Gets together: Not on file     Attends Judaism service: Not on file     Active member of club or organization: Not on file     Attends meetings of clubs or organizations: Not on file     Relationship status: Not on file     Intimate partner violence:     Fear of current or ex partner: Not on file     Emotionally abused: Not on file     Physically abused: Not on file     Forced sexual activity: Not on file   Other Topics Concern      Service Not Asked     Blood Transfusions Not Asked     Caffeine Concern Not Asked     Occupational Exposure Not Asked     Hobby Hazards Not Asked     Sleep Concern Not Asked     Stress Concern Not Asked     Weight Concern Not Asked     Special Diet Not Asked     Back Care Not Asked     Exercise No     Bike Helmet Not Asked     Seat Belt Yes     Self-Exams Not Asked     Parent/sibling w/ CABG, MI or angioplasty before 65F 55M? No   Social History Narrative     Not on file       Family History   Problem Relation Age of Onset     Heart Disease Mother         unknown     Obesity Mother      Gastrointestinal Disease Mother         diverticulitis     Diabetes Maternal Grandmother      Diabetes Paternal Grandmother      Cerebrovascular Disease Paternal Grandmother 94     Diabetes Paternal Grandfather      Cerebrovascular Disease Paternal Grandfather 78     Diabetes Son      C.A.D. Father         CABG age 78     Heart Disease Father         CABG x5     Diabetes Maternal Grandfather      LUNG DISEASE No family hx of      Deep Vein Thrombosis (DVT) No family hx of      Anesthesia Reaction No family hx of      Melanoma No family hx of      Skin Cancer No family hx of        ROS:   Constitutional: No fever, chills, or  sweats. No weight gain/loss.   ENT: No visual disturbance, ear ache, epistaxis, sore throat.   Allergies/Immunologic: Negative.   Respiratory: No cough, hemoptysis.   Cardiovascular: As per HPI.   GI: No nausea, vomiting, hematemesis, melena, or hematochezia.   : No urinary frequency, dysuria, or hematuria.   Integument: Negative.   Psychiatric: Negative.   Neuro: Negative.   Endocrinology: Negative.   Musculoskeletal: Ongoing hip pain      /86 (BP Location: Left arm, Patient Position: Chair, Cuff Size: Adult Regular)   Pulse 61   Wt 96.9 kg (213 lb 9.6 oz)   SpO2 96%   BMI 29.79 kg/m     General: appears comfortable, alert and articulate  Head: normocephalic, atraumatic  Eyes: anicteric sclera, EOMI  Neck: no adenopathy, 2+ carotids without bruits  Orophyarynx: moist mucosa, no lesions, dentition intact  Heart: regular, S1/S2, 2/6 systolic murmur, no gallop or rub, estimated JVP <10cm  Lungs: clear, no rales or wheezing  Abdomen: soft, non-tender, bowel sounds present, no hepatomegaly  Extremities: no clubbing, cyanosis or edema  Neurological: normal speech and affect, no gross motor deficits    Labs:  Orders Only on 12/17/2019   Component Date Value Ref Range Status     Hemoglobin A1C 12/17/2019 6.7* 0 - 5.6 % Final    Comment: Normal <5.7% Prediabetes 5.7-6.4%  Diabetes 6.5% or higher - adopted from ADA   consensus guidelines.       N-Terminal Pro Bnp 12/17/2019 715* 0 - 125 pg/mL Final    Comment:    Reference range shown and results flagged as abnormal are for the outpatient,   non acute settings. Establishing a baseline value for each individual patient   is useful for follow-up.  Suggested inpatient cut points for confirming diagnosis of CHF in an acute   setting are:   >450 pg/mL (age 18 to less than 50)   >900 pg/mL (age 50 to less than 75)   >1800 pg/mL (75 yrs and older)  An inpatient or emergency department NT-proPBNP <300 pg/mL effectively rules   out acute CHF, with 99% negative predictive  value.        Sodium 12/17/2019 140  133 - 144 mmol/L Final     Potassium 12/17/2019 4.2  3.4 - 5.3 mmol/L Final     Chloride 12/17/2019 106  94 - 109 mmol/L Final     Carbon Dioxide 12/17/2019 30  20 - 32 mmol/L Final     Anion Gap 12/17/2019 5  3 - 14 mmol/L Final     Glucose 12/17/2019 104* 70 - 99 mg/dL Final     Urea Nitrogen 12/17/2019 18  7 - 30 mg/dL Final     Creatinine 12/17/2019 1.03  0.66 - 1.25 mg/dL Final     GFR Estimate 12/17/2019 76  >60 mL/min/[1.73_m2] Final    Comment: Non  GFR Calc  Starting 12/18/2018, serum creatinine based estimated GFR (eGFR) will be   calculated using the Chronic Kidney Disease Epidemiology Collaboration   (CKD-EPI) equation.       GFR Estimate If Black 12/17/2019 88  >60 mL/min/[1.73_m2] Final    Comment:  GFR Calc  Starting 12/18/2018, serum creatinine based estimated GFR (eGFR) will be   calculated using the Chronic Kidney Disease Epidemiology Collaboration   (CKD-EPI) equation.       Calcium 12/17/2019 9.4  8.5 - 10.1 mg/dL Final     Bilirubin Total 12/17/2019 0.7  0.2 - 1.3 mg/dL Final     Albumin 12/17/2019 3.9  3.4 - 5.0 g/dL Final     Protein Total 12/17/2019 7.6  6.8 - 8.8 g/dL Final     Alkaline Phosphatase 12/17/2019 94  40 - 150 U/L Final     ALT 12/17/2019 30  0 - 70 U/L Final     AST 12/17/2019 19  0 - 45 U/L Final     WBC 12/17/2019 7.5  4.0 - 11.0 10e9/L Final     RBC Count 12/17/2019 4.84  4.4 - 5.9 10e12/L Final     Hemoglobin 12/17/2019 14.6  13.3 - 17.7 g/dL Final     Hematocrit 12/17/2019 44.5  40.0 - 53.0 % Final     MCV 12/17/2019 92  78 - 100 fl Final     MCH 12/17/2019 30.2  26.5 - 33.0 pg Final     MCHC 12/17/2019 32.8  31.5 - 36.5 g/dL Final     RDW 12/17/2019 13.3  10.0 - 15.0 % Final     Platelet Count 12/17/2019 270  150 - 450 10e9/L Final     Diff Method 12/17/2019 Automated Method   Final     % Neutrophils 12/17/2019 57.7  % Final     % Lymphocytes 12/17/2019 26.4  % Final     % Monocytes 12/17/2019 9.9  %  Final     % Eosinophils 12/17/2019 4.1  % Final     % Basophils 12/17/2019 1.5  % Final     % Immature Granulocytes 12/17/2019 0.4  % Final     Nucleated RBCs 12/17/2019 0  0 /100 Final     Absolute Neutrophil 12/17/2019 4.3  1.6 - 8.3 10e9/L Final     Absolute Lymphocytes 12/17/2019 2.0  0.8 - 5.3 10e9/L Final     Absolute Monocytes 12/17/2019 0.7  0.0 - 1.3 10e9/L Final     Absolute Eosinophils 12/17/2019 0.3  0.0 - 0.7 10e9/L Final     Absolute Basophils 12/17/2019 0.1  0.0 - 0.2 10e9/L Final     Abs Immature Granulocytes 12/17/2019 0.0  0 - 0.4 10e9/L Final     Absolute Nucleated RBC 12/17/2019 0.0   Final         CPX Echo 6/1/2018:  Interpretation Summary  Submaximal treadmill stress test in a patient with a diagnosis of HCM.  The Ramirez treadmill score is 8 (low risk).  Exercise was stopped due to fatigue.  Normal blood pressure response to exercise.  No ECG evidence of ischemia.  No supraventricular or ventricular arrhythmias. Frequent PVCs noted throughout the study.  Normal biventricular function with mild asymmetrical septal hypertrophy (12mm).  No dynamic outflow tract obstruction is present at rest or with exercise.  Normal segmental wall motion at rest and with exercise.  Minimal augmentation in LV function with exercise.  Average functional capacity for age.         CPX 2/15/2019:        CTA coronary angiogram 5/28/19  IMPRESSION:  1.  No evidence of coronary artery atherosclerosis or stenosis.  2.  Myocardial bridging involving the mid LAD.  3.  Total Agatston score 0 placing the patient in the lowest percentile when compared to age and gender matched control group.    Right heart cath 8/21/19   Time Systolic Diastolic Mean A Wave V Wave EDP Max dp/dt HR   RA Pressures  3:38 PM   12 mmHg    16 mmHg    14 mmHg      61 bpm      RV Pressures  3:38 PM 60 mmHg        16 mmHg     106 bpm      PA Pressures  3:41 PM 60 mmHg    22 mmHg    38 mmHg        69 bpm      PCW Pressures  3:39 PM   30 mmHg    28 mmHg     38 mmHg      74 bpm         Time Hb SAT(%) PO2 Content PA Sat   PA  3:28 PM  63.6 %      63.6 %      Art  3:28 PM  99 %     18.04 mL/dL       Cardiac Output Phase: Baseline      Time TDCO TDCI Tj C.O. Tj C.I. Tj HR   Cardiac Output Results  3:28 PM 3.8 L/min    1.79 L/min/m2    4.41 L/min    2.08 L/min/m2           Echo today  Global and regional left ventricular function is normal with an EF of 55-60%.  Global right ventricular function is normal.  IVC diameter <2.1 cm collapsing >50% with sniff suggests a normal RA pressure  of 3 mmHg.  No pericardial effusion is present.  Mild pulmonary hypertension is present.  No change from prior.           Assessment/Plan:  63 year old male with history of HCM without obstruction (dx 2006, EF 20% improved to 60%), VT s/p ICD 2012, nonobstructive CAD (cath 2013), AF s/p PVI X 3 (2011, 2016, 2018) and DCCV X 10 who presents for ongoing evaluation and management,      # Hypertrophic cardiomyopathy -- previously burnt out with EF 20% ('13), now recovered (EF 60%).  No evidence of LVOT obstruction.  Echo today with no significant change.  CPX today with mild improvement in exercise capacity.    - continue metoprolol XL 50mg daily   - change aldactone to eplerenone 50mg daily  - pt aware of exercise restrictions and family screening recommendations    # Nonobstructive CAD -- 10-30% stenoses on cath '13  - coronary CTA confirmed no significant disease  - continue atorvastatin 20     # HTN -- controlled  - continue metoprolol XL, aldactone changed to eplerenone as above    # Atrial Fibrillation   - continue Xarelto  - continue dofetilide 250mcg  BID   - continue Metoprolol 50 XL daily       Mona Ware MD  Section Head - Advanced Heart Failure, Transplantation and Mechanical Circulatory Support  Director - Adult Congenital and Cardiovascular Genetics Center  Associate Professor of Medicine, [facility]

## 2019-12-20 NOTE — PATIENT INSTRUCTIONS
Cryotherapy    What is it?    Use of a very cold liquid, such as liquid nitrogen, to freeze and destroy abnormal skin cells that need to be removed    What should I expect?    Tenderness and redness    A small blister that might grow and fill with dark purple blood. There may be crusting.    More than one treatment may be needed if the lesions do not go away.    How do I care for the treated area?    Gently wash the area with your hands when bathing.    Use a thin layer of Vaseline to help with healing. You may use a Band-Aid.     The area should heal within 7-10 days and may leave behind a pink or lighter color.     Do not use an antibiotic or Neosporin ointment.     You may take acetaminophen (Tylenol) for pain.     Call your Doctor if you have:    Severe pain    Signs of infection (warmth, redness, cloudy yellow drainage, and or a bad smell)    Questions or concerns    Who should I call with questions?       [facility]: [phone]       [facility]: [phone]       For urgent needs outside of business hours call the [facility] at [phone] and ask for the dermatology resident on call.

## 2019-12-20 NOTE — NURSING NOTE
Chief Complaint   Patient presents with     Follow Up     64 year old male with history of hypertrophic cardiomyopathy presenting for follow up.     Vitals were taken and medications were reconciled.   Shu Cast  3:41 PM

## 2019-12-20 NOTE — PATIENT INSTRUCTIONS
You were seen today in the Adult Congenital and Cardiovascular Genetics Clinic at the [facility].    Cardiology Providers you saw during your visit:  Mona Ware MD    Diagnosis:  HCM    Results:  Mona Ware MD reviewed the results of your CPX, labs, CXR and echo testing today in clinic.    Recommendations:    1. Continue to eat a heart healthy, low salt diet.  2. Continue to get 20-30 minutes of aerobic activity, 4-5 days per week.  Examples of aerobic activity include walking, running, swimming, cycling, etc.  3. Continue to observe good oral hygiene, with regular dental visits.  4. Begin taking eplerenone 50 mg once daily.      SBE prophylaxis:   Yes____  No__X__    Lifelong Bacterial Endocarditis Prophylaxis:  YES____  NO____    If YES is checked, follow the recommendations outlined below:  1. Take antibiotic(s) prior to recommended dental procedures and procedures on the respiratory tract or with infected skin, muscle or bones. SBE prophylaxis is not needed for routine GI and  procedures (ie. Colonoscopy or vaginal delivery)  2. Observe good oral hygiene daily, as advised by your dentist. Get regular professional dental care.  3. Keep cuts clean.  4. Infections should be treated promptly.  5. Symptoms of Infective Endocarditis could include: fever lasting more than 4-5 days or a recurrent fever that initially resolves but returns within 1-2 days)      Exercise restrictions:   Yes__X__  No____         If yes, list restrictions:  Must be allowed to rest if fatigued or SOB      Work restrictions:  Yes____  No_X___         If yes, list restrictions:    FASTING CHOLESTEROL was checked in the last 5 years YES_X__  NO___ (2019)  Continue to eat a heart healthy, low salt diet.         ____ Fasting lipid panel order today         ____ No changes in medications          ____ I recommend the following changes in your cholesterol medications.:          ____ Please follow up for cholesterol screening at  your primary care physician      Follow-up:  Follow up with Dr. Ware in October with labs and device check.    If you have questions or concerns please contact us at:    Dimple Birch, MSN, RN, CNL    Radhika Mitchell (Scheduling)  Nurse Care Coordinator     Clinic   Adult Congenital and CV Genetics   Adult Congenital and CV Genetic  [facility]   [facility]  (P) [phone]     (P) [phone]  [email or web address]   (F) [phone]        For after hours urgent needs, call [phone] and ask to speak to the Adult Congenital Physician on call.  Mention Job Code 0401.    For emergencies call [phone].    [facility]   Clinics and Surgery Center  Mail Code [id number]  [address] [place]  [address]

## 2019-12-22 ENCOUNTER — MYC MEDICAL ADVICE (OUTPATIENT)
Dept: CARDIOLOGY | Facility: CLINIC | Age: 64
End: 2019-12-22

## 2019-12-22 DIAGNOSIS — I42.2 HYPERTROPHIC CARDIOMYOPATHY (H): ICD-10-CM

## 2019-12-22 DIAGNOSIS — I48.20 CHRONIC ATRIAL FIBRILLATION (H): ICD-10-CM

## 2019-12-22 DIAGNOSIS — I48.19 PERSISTENT ATRIAL FIBRILLATION (H): Chronic | ICD-10-CM

## 2019-12-22 RX ORDER — DOFETILIDE 0.25 MG/1
250 CAPSULE ORAL EVERY 12 HOURS
Qty: 180 CAPSULE | Refills: 3 | Status: SHIPPED | OUTPATIENT
Start: 2019-12-22 | End: 2020-12-17

## 2019-12-23 RX ORDER — RIVAROXABAN 20 MG/1
20 TABLET, FILM COATED ORAL
Qty: 90 TABLET | Refills: 3 | Status: SHIPPED | OUTPATIENT
Start: 2019-12-23 | End: 2020-07-09

## 2019-12-30 ENCOUNTER — ANCILLARY PROCEDURE (OUTPATIENT)
Dept: CARDIOLOGY | Facility: CLINIC | Age: 64
End: 2019-12-30
Attending: INTERNAL MEDICINE
Payer: COMMERCIAL

## 2019-12-30 DIAGNOSIS — I47.20 VENTRICULAR TACHYCARDIA (H): ICD-10-CM

## 2019-12-30 PROCEDURE — 93295 DEV INTERROG REMOTE 1/2/MLT: CPT | Mod: ZP | Performed by: INTERNAL MEDICINE

## 2019-12-31 ENCOUNTER — OFFICE VISIT (OUTPATIENT)
Dept: URGENT CARE | Facility: URGENT CARE | Age: 64
End: 2019-12-31
Payer: COMMERCIAL

## 2019-12-31 VITALS
DIASTOLIC BLOOD PRESSURE: 78 MMHG | SYSTOLIC BLOOD PRESSURE: 116 MMHG | WEIGHT: 216 LBS | HEART RATE: 91 BPM | BODY MASS INDEX: 30.24 KG/M2 | RESPIRATION RATE: 16 BRPM | HEIGHT: 71 IN | TEMPERATURE: 97.5 F | OXYGEN SATURATION: 96 %

## 2019-12-31 DIAGNOSIS — J40 BRONCHITIS: Primary | ICD-10-CM

## 2019-12-31 DIAGNOSIS — I42.2 HYPERTROPHIC CARDIOMYOPATHY (H): Chronic | ICD-10-CM

## 2019-12-31 PROCEDURE — 99214 OFFICE O/P EST MOD 30 MIN: CPT | Performed by: FAMILY MEDICINE

## 2019-12-31 RX ORDER — ALBUTEROL SULFATE 90 UG/1
2 AEROSOL, METERED RESPIRATORY (INHALATION) EVERY 6 HOURS
Qty: 18 G | Refills: 0 | Status: SHIPPED | OUTPATIENT
Start: 2019-12-31 | End: 2020-10-18 | Stop reason: ALTCHOICE

## 2019-12-31 RX ORDER — DOXYCYCLINE 100 MG/1
100 CAPSULE ORAL 2 TIMES DAILY
Qty: 20 CAPSULE | Refills: 0 | Status: ON HOLD | OUTPATIENT
Start: 2019-12-31 | End: 2020-05-16

## 2019-12-31 RX ORDER — CODEINE PHOSPHATE AND GUAIFENESIN 10; 100 MG/5ML; MG/5ML
1-2 SOLUTION ORAL EVERY 4 HOURS PRN
Qty: 118 ML | Refills: 0 | Status: ON HOLD | OUTPATIENT
Start: 2019-12-31 | End: 2020-05-16

## 2019-12-31 ASSESSMENT — MIFFLIN-ST. JEOR: SCORE: 1786.9

## 2019-12-31 NOTE — PATIENT INSTRUCTIONS

## 2019-12-31 NOTE — PROGRESS NOTES
SUBJECTIVE:  Chief Complaint   Patient presents with     URI     x4 days-worse in the am-headache from coughing, coughing spasms-nasal congestion with yellow drainage-slight throat pain-cough yellow sptum, appetite loss,tried dayquil and nyquil, nyquil helps him sleep     Stu A Hunger is a 64 year old male who presents to the clinic today with a chief complaint of cough , shortness of breath. and wheezing. for 1 week(s).  Patient denies pleuritic chest pain  His cough is described as persistent, daytime, nightime, productive of yellow sputum and wheezing.    The patient's symptoms are moderate and worsening.  Associated symptoms include malaise. The patient's symptoms are exacerbated by exercise  Patient has been using OTC cough suppressants  to improve symptoms.    Past Medical History:   Diagnosis Date     Atrial fibrillation (H) 12/9/11    failed medication, multiple DC cardioveresions; s/p Left atrial ablation to eliminate atrial fibrillation 12/9/11     Chest pain      CHF (congestive heart failure) (H) 7/30/2016     Coronary artery disease      DJD (degenerative joint disease)      Fatigue 7/15/2019     Hip arthritis 1/15/2014     Hypertension      Hypertrophic cardiomyopathy (H) 10/09     Other abnormal heart sounds      Pacemaker     ICD     Palpitations      Pneumonia, organism unspecified(486)      Prediabetes 7/10/15    A1C 6.5     Status post implantation of automatic cardioverter/defibrillator (AICD)      Ventricular tachycardia (H)      Patient Active Problem List   Diagnosis     Atrial fibrillation (H)     CARDIOVASCULAR SCREENING; LDL GOAL LESS THAN 130     Hypertrophic cardiomyopathy (H)     Advanced directives, counseling/discussion     Ventricular tachycardia (H)     Automatic implantable cardioverter-defibrillator in situ- [employer], dual chamber- NOT dependent     Prediabetes     S/P ablation of atrial flutter     CHF (congestive heart failure) (H)     Chronic Zolpidem use for  insomnia     S/P ablation of atrial fibrillation     Lung nodules     Atrial tachycardia (H)     Encounter for monitoring dofetilide therapy     HTN (hypertension)       ALLERGIES:  Patient has no known allergies.    MEDs  acetaminophen (TYLENOL) 325 MG tablet, Take 325-650 mg by mouth every 6 hours as needed  amoxicillin (AMOXIL) 500 MG tablet, Take 4 tablets (2000 mg) by mouth 1 hour before dental procedures.  atorvastatin (LIPITOR) 20 MG tablet, Take 1 tablet (20 mg) by mouth daily  cyanocobalamin (VITAMIN B-12) 100 MCG tablet, Take 100 mcg by mouth daily  dofetilide (TIKOSYN) 250 MCG capsule, Take 1 capsule (250 mcg) by mouth every 12 hours  eplerenone (INSPRA) 50 MG tablet, Take 1 tablet (50 mg) by mouth daily  furosemide (LASIX) 40 MG tablet, Take 1 tablet (40 mg) by mouth daily  gabapentin (NEURONTIN) 300 MG capsule, Take 2 capsules (600 mg) by mouth 2 times daily  guaiFENesin-codeine (ROBITUSSIN AC) 100-10 MG/5ML solution, Take 5-10 mLs by mouth every 4 hours as needed  metFORMIN (GLUCOPHAGE-XR) 500 MG 24 hr tablet, Take 2 tablets (1,000 mg) by mouth daily (with dinner) Take 2 tablets (1,000 mg) daily (with dinner).  metoprolol succinate ER (TOPROL-XL) 50 MG 24 hr tablet, TAKE ONE TABLET BY MOUTH EVERY DAY  multivitamin, therapeutic (THERA-VIT) TABS, Take 1 tablet by mouth daily  XARELTO ANTICOAGULANT 20 MG TABS tablet, Take 1 tablet (20 mg) by mouth daily (with dinner)  zolpidem (AMBIEN) 10 MG tablet, Take 0.5-1 tablets (5-10 mg) by mouth nightly as needed for sleep  [] doxycycline hyclate (VIBRAMYCIN) 100 MG capsule, Take 1 capsule (100 mg) by mouth 2 times daily for 10 days    lidocaine 1% with EPINEPHrine 1:100,000 injection 3 mL        Social History     Tobacco Use     Smoking status: Former Smoker     Packs/day: 1.00     Years: 40.00     Pack years: 40.00     Types: Cigarettes     Start date: 1975     Last attempt to quit: 2015     Years since quittin.0     Smokeless tobacco:  "Never Used   Substance Use Topics     Alcohol use: Yes     Alcohol/week: 0.0 standard drinks     Comment: 1 drink per week       Family History   Problem Relation Age of Onset     Heart Disease Mother         unknown     Obesity Mother      Gastrointestinal Disease Mother         diverticulitis     Diabetes Maternal Grandmother      Diabetes Paternal Grandmother      Cerebrovascular Disease Paternal Grandmother 94     Diabetes Paternal Grandfather      Cerebrovascular Disease Paternal Grandfather 78     Diabetes Son      C.A.D. Father         CABG age 78     Heart Disease Father         CABG x5     Diabetes Maternal Grandfather      LUNG DISEASE No family hx of      Deep Vein Thrombosis (DVT) No family hx of      Anesthesia Reaction No family hx of      Melanoma No family hx of      Skin Cancer No family hx of          ROS  CONSTITUTIONAL:NEGATIVE for fever, chills,   INTEGUMENTARY/SKIN: NEGATIVE for worrisome rashes,  or lesions  EYES: NEGATIVE for vision changes or irritation  GI: NEGATIVE for nausea, abdominal pain,   or change in bowel habits    OBJECTIVE:  /78   Pulse 91   Temp 97.5  F (36.4  C) (Tympanic)   Resp 16   Ht 1.803 m (5' 11\")   Wt 98 kg (216 lb)   SpO2 96%   BMI 30.13 kg/m    GENERAL APPEARANCE: alert, moderate distress and cooperative, frequent congested cough  EYES: EOMI,  PERRL, conjunctiva clear  HENT: ear canals and TM's normal.  Nose and mouth without ulcers, erythema or lesions  NECK: supple, nontender, no lymphadenopathy  RESP: no rales .  Extensive bilateral rhonchi, no crackles, no wheezes  CV: regular rates and rhythm, normal S1 S2, no murmur noted  ABDOMEN:  soft, nontender, no HSM or masses and bowel sounds normal  NEURO: Normal strength and tone, sensory exam grossly normal,  normal speech and mentation  SKIN: no suspicious lesions or rashes       ASSESSMENT:    Bronchitis     - doxycycline hyclate (VIBRAMYCIN) 100 MG capsule; Take 1 capsule (100 mg) by mouth 2 times daily " for 10 days  - albuterol (PROAIR HFA/PROVENTIL HFA/VENTOLIN HFA) 108 (90 Base) MCG/ACT inhaler; Inhale 2 puffs into the lungs every 6 hours  - guaiFENesin-codeine (ROBITUSSIN AC) 100-10 MG/5ML solution; Take 5-10 mLs by mouth every 4 hours as needed for cough      We discussed that based on the patient's description of symptoms, history and physical exam, that a bacterial infection has likely developed in the chest requiring treatment with antibiotics.     We discussed that the chest infection often starts as a viral illness, however, over time, the secretions in the chest can start to grow bacteria, with increased chest discomfort , productive sputum.  Antibiotics are only helpful when bacteria has started to grow in the respiratory secretions.   Any residual symptoms from a viral illness will not respond to the antibiotic.    The patient is advised to monitor the symptoms of the illness, and if worsening,  worse chest pain, increased productive sputum, persistent fevers/ chills, shortness of breath, then seek re-evaluation with primary care, UC or ER     Albuterol inhaler 1-2 puffs q 4-6 hours prn wheezing or short of breath       Symptomatic measures encouraged, humidified air, plenty of fluids.  Patient may consider OTC expectorant and/or cough suppressant to treat symptoms.   acetaminophen, ibuprofen for pain and fever    Return if worsening     Hypertrophic cardiomyopathy (H)    Does not have rales ,  Weight is baseline- no chest pain- do not suspect  CHF exacerbation

## 2020-01-01 DIAGNOSIS — I48.0 PAF (PAROXYSMAL ATRIAL FIBRILLATION) (H): Primary | ICD-10-CM

## 2020-01-01 RX ORDER — MAGNESIUM SULFATE HEPTAHYDRATE 40 MG/ML
2 INJECTION, SOLUTION INTRAVENOUS
Status: CANCELLED | OUTPATIENT
Start: 2020-01-01

## 2020-01-01 RX ORDER — POTASSIUM CHLORIDE 1500 MG/1
20 TABLET, EXTENDED RELEASE ORAL
Status: CANCELLED | OUTPATIENT
Start: 2020-01-01

## 2020-01-01 RX ORDER — POTASSIUM CHLORIDE 1500 MG/1
40 TABLET, EXTENDED RELEASE ORAL
Status: CANCELLED | OUTPATIENT
Start: 2020-01-01

## 2020-01-01 RX ORDER — LIDOCAINE 40 MG/G
CREAM TOPICAL
Status: CANCELLED | OUTPATIENT
Start: 2020-01-01

## 2020-01-02 ENCOUNTER — CARE COORDINATION (OUTPATIENT)
Dept: CARDIOLOGY | Facility: CLINIC | Age: 65
End: 2020-01-02

## 2020-01-02 NOTE — PROGRESS NOTES
Date: 1/2/2020    Time of Call: 2:29 PM     Diagnosis:  A fib     [ TORB ] Ordering provider: KARSON Richardson  Order: Recent device interrogation notes A fib, please call patient to ensure he has been taking his blood thinner uninterrupted and schedule DCCV      Order received by: Tang Lilly RN     Follow-up/additional notes: Patient called with recommendations from Maci Dominguez.  Haroon states that he hasn't missed any doses of the Xarelto.  He notes to being feeling tired but denies his heart racing or any shortness of breath.  Will have Radha call patient to schedule DCCV, Haroon denies any questions regarding the procedure.  Instructed patient to present to the ER if he notes his heart racing, notes any shortness of breath, or shortness of breath with activity that prevents him from completing his day to day activities.  Haroon states that he understands information provided and will call with further questions or concerns.

## 2020-01-03 ENCOUNTER — TELEPHONE (OUTPATIENT)
Dept: CARDIOLOGY | Facility: CLINIC | Age: 65
End: 2020-01-03

## 2020-01-03 NOTE — TELEPHONE ENCOUNTER
Fanta Erazo, RN occupational health nurse from [employer]. 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 Cooper. The information on the form was not complete and she needs to have that assess is fitness to work. Please give her a call so that she can give more information in regards to this.      [phone]

## 2020-01-05 LAB
MDC_IDC_EPISODE_DTM: NORMAL
MDC_IDC_EPISODE_DURATION: 177 S
MDC_IDC_EPISODE_DURATION: 18 S
MDC_IDC_EPISODE_DURATION: 256 S
MDC_IDC_EPISODE_DURATION: 28 S
MDC_IDC_EPISODE_DURATION: 30 S
MDC_IDC_EPISODE_DURATION: 40 S
MDC_IDC_EPISODE_DURATION: 5 S
MDC_IDC_EPISODE_DURATION: 600 S
MDC_IDC_EPISODE_DURATION: 88 S
MDC_IDC_EPISODE_DURATION: 9 S
MDC_IDC_EPISODE_ID: NORMAL
MDC_IDC_EPISODE_TYPE: NORMAL
MDC_IDC_LEAD_IMPLANT_DT: NORMAL
MDC_IDC_LEAD_IMPLANT_DT: NORMAL
MDC_IDC_LEAD_LOCATION: NORMAL
MDC_IDC_LEAD_LOCATION: NORMAL
MDC_IDC_LEAD_MFG: NORMAL
MDC_IDC_LEAD_MFG: NORMAL
MDC_IDC_LEAD_MODEL: NORMAL
MDC_IDC_LEAD_MODEL: NORMAL
MDC_IDC_LEAD_POLARITY_TYPE: NORMAL
MDC_IDC_LEAD_POLARITY_TYPE: NORMAL
MDC_IDC_LEAD_SERIAL: NORMAL
MDC_IDC_LEAD_SERIAL: NORMAL
MDC_IDC_MSMT_BATTERY_DTM: NORMAL
MDC_IDC_MSMT_BATTERY_REMAINING_LONGEVITY: 48 MO
MDC_IDC_MSMT_BATTERY_REMAINING_PERCENTAGE: 58 %
MDC_IDC_MSMT_BATTERY_STATUS: NORMAL
MDC_IDC_MSMT_CAP_CHARGE_DTM: NORMAL
MDC_IDC_MSMT_CAP_CHARGE_DTM: NORMAL
MDC_IDC_MSMT_CAP_CHARGE_ENERGY: 11 J
MDC_IDC_MSMT_CAP_CHARGE_TIME: 1.9 S
MDC_IDC_MSMT_CAP_CHARGE_TIME: 10.4 S
MDC_IDC_MSMT_CAP_CHARGE_TYPE: NORMAL
MDC_IDC_MSMT_CAP_CHARGE_TYPE: NORMAL
MDC_IDC_MSMT_LEADCHNL_RA_IMPEDANCE_VALUE: 879 OHM
MDC_IDC_MSMT_LEADCHNL_RV_IMPEDANCE_VALUE: 480 OHM
MDC_IDC_PG_IMPLANT_DTM: NORMAL
MDC_IDC_PG_MFG: NORMAL
MDC_IDC_PG_MODEL: NORMAL
MDC_IDC_PG_SERIAL: NORMAL
MDC_IDC_PG_TYPE: NORMAL
MDC_IDC_SESS_CLINIC_NAME: NORMAL
MDC_IDC_SESS_DTM: NORMAL
MDC_IDC_SESS_TYPE: NORMAL
MDC_IDC_SET_BRADY_AT_MODE_SWITCH_MODE: NORMAL
MDC_IDC_SET_BRADY_AT_MODE_SWITCH_RATE: 170 {BEATS}/MIN
MDC_IDC_SET_BRADY_LOWRATE: 60 {BEATS}/MIN
MDC_IDC_SET_BRADY_MAX_SENSOR_RATE: 120 {BEATS}/MIN
MDC_IDC_SET_BRADY_MAX_TRACKING_RATE: 120 {BEATS}/MIN
MDC_IDC_SET_BRADY_MODE: NORMAL
MDC_IDC_SET_BRADY_PAV_DELAY_HIGH: 260 MS
MDC_IDC_SET_BRADY_PAV_DELAY_LOW: 390 MS
MDC_IDC_SET_BRADY_SAV_DELAY_HIGH: 260 MS
MDC_IDC_SET_BRADY_SAV_DELAY_LOW: 390 MS
MDC_IDC_SET_LEADCHNL_RA_PACING_AMPLITUDE: 2.4 V
MDC_IDC_SET_LEADCHNL_RA_PACING_POLARITY: NORMAL
MDC_IDC_SET_LEADCHNL_RA_PACING_PULSEWIDTH: 0.5 MS
MDC_IDC_SET_LEADCHNL_RA_SENSING_ADAPTATION_MODE: NORMAL
MDC_IDC_SET_LEADCHNL_RA_SENSING_POLARITY: NORMAL
MDC_IDC_SET_LEADCHNL_RA_SENSING_SENSITIVITY: 0.25 MV
MDC_IDC_SET_LEADCHNL_RV_PACING_AMPLITUDE: 2.4 V
MDC_IDC_SET_LEADCHNL_RV_PACING_POLARITY: NORMAL
MDC_IDC_SET_LEADCHNL_RV_PACING_PULSEWIDTH: 0.5 MS
MDC_IDC_SET_LEADCHNL_RV_SENSING_ADAPTATION_MODE: NORMAL
MDC_IDC_SET_LEADCHNL_RV_SENSING_POLARITY: NORMAL
MDC_IDC_SET_LEADCHNL_RV_SENSING_SENSITIVITY: 0.6 MV
MDC_IDC_SET_ZONE_DETECTION_INTERVAL: 273 MS
MDC_IDC_SET_ZONE_DETECTION_INTERVAL: 333 MS
MDC_IDC_SET_ZONE_DETECTION_INTERVAL: 375 MS
MDC_IDC_SET_ZONE_TYPE: NORMAL
MDC_IDC_SET_ZONE_VENDOR_TYPE: NORMAL
MDC_IDC_STAT_AT_BURDEN_PERCENT: 5.6 %
MDC_IDC_STAT_BRADY_DTM_END: NORMAL
MDC_IDC_STAT_BRADY_DTM_START: NORMAL
MDC_IDC_STAT_BRADY_RA_PERCENT_PACED: 17 %
MDC_IDC_STAT_BRADY_RV_PERCENT_PACED: 0 %
MDC_IDC_STAT_EPISODE_RECENT_COUNT: 0
MDC_IDC_STAT_EPISODE_RECENT_COUNT: 121
MDC_IDC_STAT_EPISODE_RECENT_COUNT: 31
MDC_IDC_STAT_EPISODE_RECENT_COUNT_DTM_END: NORMAL
MDC_IDC_STAT_EPISODE_RECENT_COUNT_DTM_START: NORMAL
MDC_IDC_STAT_EPISODE_TYPE: NORMAL
MDC_IDC_STAT_EPISODE_VENDOR_TYPE: NORMAL
MDC_IDC_STAT_TACHYTHERAPY_ATP_DELIVERED_RECENT: 0
MDC_IDC_STAT_TACHYTHERAPY_ATP_DELIVERED_TOTAL: 2
MDC_IDC_STAT_TACHYTHERAPY_RECENT_DTM_END: NORMAL
MDC_IDC_STAT_TACHYTHERAPY_RECENT_DTM_START: NORMAL
MDC_IDC_STAT_TACHYTHERAPY_SHOCKS_ABORTED_RECENT: 0
MDC_IDC_STAT_TACHYTHERAPY_SHOCKS_ABORTED_TOTAL: 0
MDC_IDC_STAT_TACHYTHERAPY_SHOCKS_DELIVERED_RECENT: 0
MDC_IDC_STAT_TACHYTHERAPY_SHOCKS_DELIVERED_TOTAL: 1
MDC_IDC_STAT_TACHYTHERAPY_TOTAL_DTM_END: NORMAL
MDC_IDC_STAT_TACHYTHERAPY_TOTAL_DTM_START: NORMAL

## 2020-01-06 ENCOUNTER — HOSPITAL ENCOUNTER (OUTPATIENT)
Facility: CLINIC | Age: 65
End: 2020-01-06
Payer: COMMERCIAL

## 2020-01-06 NOTE — TELEPHONE ENCOUNTER
Number called and not in service. Called Haroon who confirmed number. He also provided email. Sent email to Fanta at [email or web address] asking for a return call.

## 2020-01-14 NOTE — TELEPHONE ENCOUNTER
Called and left another VM for Fanta asking her to send us the additional information she needs completed. Provided fax and email, as well as callback information.

## 2020-01-16 ENCOUNTER — APPOINTMENT (OUTPATIENT)
Dept: LAB | Facility: CLINIC | Age: 65
End: 2020-01-16
Attending: INTERNAL MEDICINE
Payer: COMMERCIAL

## 2020-01-16 ENCOUNTER — HOSPITAL ENCOUNTER (OUTPATIENT)
Dept: CARDIOLOGY | Facility: CLINIC | Age: 65
Discharge: HOME | End: 2020-01-16
Attending: NURSE PRACTITIONER | Admitting: NURSE PRACTITIONER
Payer: COMMERCIAL

## 2020-01-16 ENCOUNTER — HOSPITAL ENCOUNTER (OUTPATIENT)
Facility: CLINIC | Age: 65
Discharge: HOME | End: 2020-01-16
Attending: INTERNAL MEDICINE | Admitting: INTERNAL MEDICINE
Payer: COMMERCIAL

## 2020-01-16 ENCOUNTER — ANESTHESIA (OUTPATIENT)
Dept: SURGERY | Facility: CLINIC | Age: 65
End: 2020-01-16
Payer: COMMERCIAL

## 2020-01-16 ENCOUNTER — APPOINTMENT (OUTPATIENT)
Dept: MEDSURG UNIT | Facility: CLINIC | Age: 65
End: 2020-01-16
Attending: INTERNAL MEDICINE
Payer: COMMERCIAL

## 2020-01-16 ENCOUNTER — ANESTHESIA EVENT (OUTPATIENT)
Dept: SURGERY | Facility: CLINIC | Age: 65
End: 2020-01-16
Payer: COMMERCIAL

## 2020-01-16 ENCOUNTER — ANCILLARY PROCEDURE (OUTPATIENT)
Dept: CARDIOLOGY | Facility: CLINIC | Age: 65
End: 2020-01-16
Attending: INTERNAL MEDICINE
Payer: COMMERCIAL

## 2020-01-16 VITALS
SYSTOLIC BLOOD PRESSURE: 120 MMHG | DIASTOLIC BLOOD PRESSURE: 70 MMHG | RESPIRATION RATE: 20 BRPM | OXYGEN SATURATION: 96 %

## 2020-01-16 VITALS
SYSTOLIC BLOOD PRESSURE: 120 MMHG | OXYGEN SATURATION: 95 % | RESPIRATION RATE: 14 BRPM | HEART RATE: 67 BPM | DIASTOLIC BLOOD PRESSURE: 83 MMHG

## 2020-01-16 DIAGNOSIS — I47.20 VENTRICULAR TACHYCARDIA (H): Primary | Chronic | ICD-10-CM

## 2020-01-16 DIAGNOSIS — I47.20 VENTRICULAR TACHYCARDIA (H): ICD-10-CM

## 2020-01-16 LAB
MAGNESIUM SERPL-MCNC: 2.1 MG/DL (ref 1.6–2.3)
POTASSIUM SERPL-SCNC: 4.1 MMOL/L (ref 3.4–5.3)

## 2020-01-16 PROCEDURE — 37000008 ZZH ANESTHESIA TECHNICAL FEE, 1ST 30 MIN

## 2020-01-16 PROCEDURE — 36415 COLL VENOUS BLD VENIPUNCTURE: CPT | Performed by: NURSE PRACTITIONER

## 2020-01-16 PROCEDURE — 92960 CARDIOVERSION ELECTRIC EXT: CPT

## 2020-01-16 PROCEDURE — 84132 ASSAY OF SERUM POTASSIUM: CPT | Performed by: NURSE PRACTITIONER

## 2020-01-16 PROCEDURE — 25000125 ZZHC RX 250: Performed by: NURSE ANESTHETIST, CERTIFIED REGISTERED

## 2020-01-16 PROCEDURE — 83735 ASSAY OF MAGNESIUM: CPT | Performed by: NURSE PRACTITIONER

## 2020-01-16 PROCEDURE — 40000166 ZZH STATISTIC PP CARE STAGE 1

## 2020-01-16 PROCEDURE — 93283 PRGRMG EVAL IMPLANTABLE DFB: CPT

## 2020-01-16 PROCEDURE — 93283 PRGRMG EVAL IMPLANTABLE DFB: CPT | Mod: 26 | Performed by: INTERNAL MEDICINE

## 2020-01-16 PROCEDURE — 92960 CARDIOVERSION ELECTRIC EXT: CPT | Performed by: NURSE PRACTITIONER

## 2020-01-16 RX ORDER — POTASSIUM CHLORIDE 1500 MG/1
20 TABLET, EXTENDED RELEASE ORAL
Status: DISCONTINUED | OUTPATIENT
Start: 2020-01-16 | End: 2020-01-17 | Stop reason: HOSPADM

## 2020-01-16 RX ORDER — LIDOCAINE 40 MG/G
CREAM TOPICAL
Status: DISCONTINUED | OUTPATIENT
Start: 2020-01-16 | End: 2020-01-17 | Stop reason: HOSPADM

## 2020-01-16 RX ORDER — POTASSIUM CHLORIDE 1500 MG/1
40 TABLET, EXTENDED RELEASE ORAL
Status: DISCONTINUED | OUTPATIENT
Start: 2020-01-16 | End: 2020-01-17 | Stop reason: HOSPADM

## 2020-01-16 RX ADMIN — METHOHEXITAL SODIUM 50 MG: 500 INJECTION, POWDER, LYOPHILIZED, FOR SOLUTION INTRAMUSCULAR; INTRAVENOUS; RECTAL at 13:23

## 2020-01-16 ASSESSMENT — ENCOUNTER SYMPTOMS: DYSRHYTHMIAS: 1

## 2020-01-16 ASSESSMENT — LIFESTYLE VARIABLES: TOBACCO_USE: 1

## 2020-01-16 ASSESSMENT — NEW YORK HEART ASSOCIATION (NYHA) CLASSIFICATION: NYHA FUNCTIONAL CLASS: II

## 2020-01-16 NOTE — PROGRESS NOTES
Pt here on 2A post cardioversion; pt awake and alert, denies pain. Family at bedside. No EKG needed per Maci Dominguez due to pacemaker. Pt taking PO; will continue to monitor.

## 2020-01-16 NOTE — PROGRESS NOTES
Pt up walking, steady on his feet; voided; tolerated PO, food and drink. Denies pain; copy of discharge instructions given to pt; stated understanding. IV discontinued, catheter intact. Awaiting ride. Will discharge to car when family here.

## 2020-01-16 NOTE — ANESTHESIA PREPROCEDURE EVALUATION
Anesthesia Pre-Procedure Evaluation    Patient: Stu Meredith   MRN:     [id number] Gender:   male   Age:    64 year old :      1955        Preoperative Diagnosis: Atrial fibrillation, unspecified type (H) [I48.91]   Procedure(s):  ANESTHESIA, FOR CARDIOVERSION     Past Medical History:   Diagnosis Date     Atrial fibrillation (H) 11    failed medication, multiple DC cardioveresions; s/p Left atrial ablation to eliminate atrial fibrillation 11     Chest pain      CHF (congestive heart failure) (H) 2016     Coronary artery disease      DJD (degenerative joint disease)      Fatigue 7/15/2019     Hip arthritis 1/15/2014     Hypertension      Hypertrophic cardiomyopathy (H) 10/09     Other abnormal heart sounds      Pacemaker     ICD     Palpitations      Pneumonia, organism unspecified(486)      Prediabetes 7/10/15    A1C 6.5     Status post implantation of automatic cardioverter/defibrillator (AICD)      Ventricular tachycardia (H)       Past Surgical History:   Procedure Laterality Date     ANESTHESIA CARDIOVERSION  2014    Procedure: ANESTHESIA CARDIOVERSION;  Surgeon: Generic Anesthesia Provider;  Location: [facility]     ANESTHESIA CARDIOVERSION N/A 2016    Procedure: ANESTHESIA CARDIOVERSION;  Surgeon: GENERIC ANESTHESIA PROVIDER;  Location: [facility]     ANESTHESIA CARDIOVERSION N/A 2017    Procedure: ANESTHESIA CARDIOVERSION;  Anesthesia Offsite Coverage Cardioversion @1100;  Surgeon: GENERIC ANESTHESIA PROVIDER;  Location: [facility]     ANESTHESIA CARDIOVERSION N/A 1/3/2018    Procedure: ANESTHESIA CARDIOVERSION;  Anesthesia Cardioverion;  Surgeon: GENERIC ANESTHESIA PROVIDER;  Location: [facility]     ANESTHESIA CARDIOVERSION N/A 2018    Procedure: ANESTHESIA CARDIOVERSION;  Anesthesia Coverage Cardioversion @1400;  Surgeon: GENERIC ANESTHESIA PROVIDER;  Location: [facility]     ANESTHESIA CARDIOVERSION N/A 2018    Procedure: ANESTHESIA CARDIOVERSION;  Anesthesia Cardioversion  @0930;  Surgeon: GENERIC ANESTHESIA PROVIDER;  Location: [facility]     ANESTHESIA CARDIOVERSION N/A 12/20/2018    Procedure: Anesthesia Coverage Cardioversion @0830;  Surgeon: GENERIC ANESTHESIA PROVIDER;  Location: [facility]     ANESTHESIA CARDIOVERSION N/A 8/21/2019    Procedure: Anesthesia Coverage Cardioversion @0800;  Surgeon: GENERIC ANESTHESIA PROVIDER;  Location: [facility]     ARTHROPLASTY HIP  1/15/2014    Procedure: ARTHROPLASTY HIP;  Left Total Hip Arthroplasty;  Surgeon: Nelson Gaspar MD;  Location: [facility]     ARTHROPLASTY HIP Right 6/5/2019    Procedure: Right Total Hip Arthroplasty;  Surgeon: Nelson Gaspar MD;  Location: [facility]     CARDIAC SURGERY       COLONOSCOPY N/A 5/18/2018    Procedure: COMBINED COLONOSCOPY, SINGLE OR MULTIPLE BIOPSY/POLYPECTOMY BY BIOPSY;  COLONOSCOPY (PT HAS DEFIBRILLATOR) ;  Surgeon: Mike Nickerson MD;  Location:  GI     CV RIGHT HEART CATH N/A 8/19/2019    Procedure: CV RIGHT HEART CATH;  Surgeon: Cisco Rausch MD;  Location:  HEART CARDIAC CATH LAB     CV RIGHT HEART CATH N/A 8/21/2019    Procedure: Right Heart Cath;  Surgeon: Ciaran Burton MD;  Location:  HEART CARDIAC CATH LAB     H ABLATION FOCAL AFIB  12/9/11    Left atrial ablation to eliminate atrial fibrillation     IMPLANT AUTOMATIC IMPLANTABLE CARDIOVERTER DEFIBRILLATOR  7/27/12    AICD implantation     TONSILLECTOMY  1964          Anesthesia Evaluation     . Pt has had prior anesthetic. Type: MAC and Regional    History of anesthetic complications    bit his cheek during one DCCV      ROS/MED HX    ENT/Pulmonary: Comment: Chest CT 3/22/19 showed RUL & RLL nodules, recommended f/u CT in 2 mos.    PFT 11/15/13: normal, FEV1 3.51, FEV1/FVC 79%    Denies inhaler use    (+)tobacco use, Past use 40 pk yr hx, quit 2015 packs/day  , recent URI resolved Treated for cough/influenza in [place] mid-March: . .    Neurologic:  - neg neurologic ROS     Cardiovascular: Comment: Nonobstructive CAD -- 10-30% stenoses on cath  '13.      (+) Dyslipidemia, hypertension--CAD, --. Taking blood thinners Pt has received instructions: Instructions Given to patient: stop Xarelto 2 days prior to surgery. CHF etiology: hypertrophic cardiomyopathy Last EF: 45-50% date: 8/16/18 NYHA classification: II. . :ICD Reason placed:VT, AF  type;[employer] . dysrhythmias a-fib, . Previous cardiac testing Echodate:8/16/18 and 7/2019results:Spontaneous echo contrast in left atrial appendage. No thrombus in left atrial  appendage.  Normal left ventricle size. The Ejection Fraction is estimated at 45-50%.  The right ventricle is normal size. Global right ventricular function is  normal.  This study was compared with the study from 6/1/18. There has been no change    Interpretation Summary  Technically difficult study due to poor accoustic windows.  The patient's rhythm is atrial arrhythmia.  Mildly (EF 45-50%) reduced left ventricular function is present. Mild diffuse  hypokinesis is present.  Known HCM. Septal thickness measures 1.4 cm. No SALVADOR or LVOT obstruction seen.  The right ventricle is normal size and function  No pericardial effusion is present.  IVC diameter <2.1 cm collapsing >50% with sniff suggests a normal RA pressure  of 3 mmHg.  _____________________________________________________________________________  __        Left Ventricle  Left ventricular size is normal. The pattern of wall thickening is consistent  with hypertrophic cardiomyopathy. Diastolic function not assessed due to  atrial fibrillation. Mildly (EF 45-50%) reduced left ventricular function is  present. Mild diffuse hypokinesis is present.     Right Ventricle  The right ventricle is normal size. Global right ventricular function is  normal. A pacemaker lead is noted in the right ventricle.     Atria  The right atria appears normal. Moderate left atrial enlargement is present.     Mitral Valve  The mitral valve is normal. Trace mitral insufficiency is present.        Aortic  Valve  Aortic valve is normal in structure and function. The aortic valve is  tricuspid.     Tricuspid Valve  The tricuspid valve is normal. Right ventricular systolic pressure is 29mmHg  above the right atrial pressure. Trace tricuspid insufficiency is present.     Pulmonic Valve  The pulmonic valve is normal.     Vessels  The inferior vena cava was normal in size with preserved respiratory  variability. The aorta root is normal. IVC diameter <2.1 cm collapsing >50%  with sniff suggests a normal RA pressure of 3 mmHg.     Pericardium  No pericardial effusion is present.        Compared to Previous Study  In comparison to TTE on 7/29/2016, there is no change in LVEF. Pulmonary HTN  has improved from moderate to mild since last TTE.     Attestation  I have personally viewed the imaging and agree with the interpretation and  report as documented by the fellow, Hannah Mahajan, and/or edited by me.  _____________________________________________________________________________  __     MMode/2D Measurements & Calculations  IVSd: 1.1 cm  LVIDd: 5.1 cm  LVIDs: 3.7 cm  LVPWd: 1.1 cm  FS: 27.5 %  LV mass(C)d: 211.0 grams  LV mass(C)dI: 98.0 grams/m2  Ao root diam: 3.0 cm  LA dimension: 5.0 cm  LA/Ao: 1.7  LVOT diam: 2.3 cm  LVOT area: 4.1 cm2  LA Volume (BP): 78.6 ml  LA Volume Index (BP): 36.6 ml/m2     RWT: 0.43        Doppler Measurements & Calculations  MV E max ashley: 87.6 cm/sec  MV dec slope: 779.9 cm/sec2  MV dec time: 0.11 sec  PA V2 max: 93.0 cm/sec  PA max PG: 3.5 mmHg  PA acc time: 0.08 sec  Stress Testdate:2/15/19 results:Severe impairment, class II functional capacityECG reviewed date:8/21/19 results:Atrial flutter with 2:1 A-V conduction 92  Nonspecific ST and T wave abnormality  Prolonged QT  When compared with ECG of 20-AUG-2019 21:52, (unconfirmed)  ST elevation now present in Inferior leads  QT has lengthenedCath date: 7/2019 results:Right sided Cath, PAP 40/25    Right sided filling pressures are mildly  elevated.    Left sided filling pressures are mildly elevated.    Reduced cardiac output level.    Mild elevated Pulmonary Hypertension          METS/Exercise Tolerance: Comment: Activity limited by hip pain. Denies CP. 3 - Able to walk 1-2 blocks without stopping   Hematologic:  - neg hematologic  ROS       Musculoskeletal: Comment: S/P L hip arthroplasty 2014.  R hip osteoarthritis  (+) arthritis,  -       GI/Hepatic:  - neg GI/hepatic ROS       Renal/Genitourinary:  - ROS Renal section negative       Endo:     (+) type II DM Last HgA1c: 6.8 date: 5/10/19 Not using insulin - not using insulin pump not previously admitted for DM/DKA Obesity, .      Psychiatric:  - neg psychiatric ROS       Infectious Disease:  - neg infectious disease ROS       Malignancy:      - no malignancy   Other:    (+) No chance of pregnancy C-spine cleared: N/A, no H/O Chronic Pain,                       PHYSICAL EXAM:   Mental Status/Neuro:    Airway: Facies: Feasible   Respiratory: Auscultation: CTAB     Resp. Rate: Normal     Resp. Effort: Normal      CV: Rhythm: Afib   Comments:                      LABS:  CBC:   Lab Results   Component Value Date    WBC 7.5 12/17/2019    WBC 6.6 08/22/2019    HGB 14.6 12/17/2019    HGB 13.9 08/22/2019    HCT 44.5 12/17/2019    HCT 43.1 08/22/2019     12/17/2019     08/22/2019     BMP:   Lab Results   Component Value Date     12/17/2019     09/27/2019    POTASSIUM 4.1 01/16/2020    POTASSIUM 4.2 12/17/2019    CHLORIDE 106 12/17/2019    CHLORIDE 106 09/27/2019    CO2 30 12/17/2019    CO2 30 09/27/2019    BUN 18 12/17/2019    BUN 20 09/27/2019    CR 1.03 12/17/2019    CR 0.93 09/27/2019     (H) 12/17/2019     (H) 09/27/2019     COAGS:   Lab Results   Component Value Date    INR 1.42 (H) 08/22/2019     POC:   Lab Results   Component Value Date     (H) 06/07/2019     OTHER:   Lab Results   Component Value Date    LACT 1.0 07/30/2016    A1C 6.7 (H) 12/17/2019     "NEENA 9.4 12/17/2019    PHOS 2.8 11/10/2008    MAG 2.1 01/16/2020    ALBUMIN 3.9 12/17/2019    PROTTOTAL 7.6 12/17/2019    ALT 30 12/17/2019    AST 19 12/17/2019    ALKPHOS 94 12/17/2019    BILITOTAL 0.7 12/17/2019    TSH 1.58 02/15/2019    CRP <2.9 05/10/2019    SED 10 05/10/2019        Preop Vitals    BP Readings from Last 3 Encounters:   01/16/20 120/83   12/31/19 116/78   12/20/19 124/86    Pulse Readings from Last 3 Encounters:   01/16/20 67   12/31/19 91   12/20/19 61      Resp Readings from Last 3 Encounters:   01/16/20 14   12/31/19 16   10/08/19 16    SpO2 Readings from Last 3 Encounters:   01/16/20 95%   12/31/19 96%   12/20/19 96%      Temp Readings from Last 1 Encounters:   12/31/19 36.4  C (97.5  F) (Tympanic)    Ht Readings from Last 1 Encounters:   12/31/19 1.803 m (5' 11\")      Wt Readings from Last 1 Encounters:   12/31/19 98 kg (216 lb)    Estimated body mass index is 30.13 kg/m  as calculated from the following:    Height as of 12/31/19: 1.803 m (5' 11\").    Weight as of 12/31/19: 98 kg (216 lb).     LDA:  Peripheral IV 01/16/20 Distal;Left Upper arm (Active)   Number of days: 0        Assessment:   ASA SCORE: 3    H&P: History and physical reviewed and following examination; no interval change.   Smoking Status:  Non-Smoker/Unknown   NPO Status: NPO Appropriate     Plan:   Anes. Type:  MAC   Pre-Medication: None   Induction:  N/a   Airway: Native Airway   Access/Monitoring: PIV   Maintenance: N/a     Postop Plan:   Postop Pain: None  Postop Sedation/Airway: Not planned     PONV Management:  NO PONV Prophylaxis Required     CONSENT: Direct conversation   Plan and risks discussed with: Patient                      Alana Motta MD  "

## 2020-01-16 NOTE — ANESTHESIA POSTPROCEDURE EVALUATION
Anesthesia POST Procedure Evaluation    Patient: Stu Meredith   MRN:     [id number] Gender:   male   Age:    64 year old :      1955        Preoperative Diagnosis: Atrial fibrillation, unspecified type (H) [I48.91]   Procedure(s):  ANESTHESIA, FOR CARDIOVERSION   Postop Comments: No value filed.       Anesthesia Type:  Not documented  No value filed.    Reportable Event: NO     PAIN: Uncomplicated   Sign Out status: Comfortable, Well controlled pain     PONV: No PONV   Sign Out status:  No Nausea or Vomiting     Neuro/Psych: Uneventful perioperative course   Sign Out Status: Preoperative baseline; Age appropriate mentation     Airway/Resp.: Uneventful perioperative course   Sign Out Status: Non labored breathing, age appropriate RR; Resp. Status within EXPECTED Parameters     CV: Uneventful perioperative course   Sign Out status: Appropriate BP and perfusion indices; Appropriate HR/Rhythm     Disposition:   Sign-Out Location: cardiology.  Recovery Course: Uneventful  Follow-Up: Not required           Last Anesthesia Record Vitals:  CRNA VITALS  2020 1257 - 2020 1347      2020             SpO2:  95 %    EKG:  Sinus rhythm          Last PACU Vitals:  No vitals data found for the desired time range.        Electronically Signed By: Alana Motta MD, 2020, 1:47 PM

## 2020-01-16 NOTE — ANESTHESIA CARE TRANSFER NOTE
Patient: Stu Meredith    Procedure(s):  ANESTHESIA, FOR CARDIOVERSION    Diagnosis: Atrial fibrillation, unspecified type (H) [I48.91]  Diagnosis Additional Information: No value filed.    Anesthesia Type:   No value filed.     Note:  Airway :Nasal Cannula  Patient transferred to: Recovery  Comments: Awake, VSS, patent airway, report to RN.Handoff Report: Identifed the Patient, Identified the Reponsible Provider, Reviewed the pertinent medical history, Discussed the surgical course, Reviewed Intra-OP anesthesia mangement and issues during anesthesia, Set expectations for post-procedure period and Allowed opportunity for questions and acknowledgement of understanding      Vitals: (Last set prior to Anesthesia Care Transfer)    CRNA VITALS  1/16/2020 1257 - 1/16/2020 1335      1/16/2020             SpO2:  95 %    EKG:  Sinus rhythm                Electronically Signed By: KARSON Guzmán CRNA  January 16, 2020  1:35 PM

## 2020-01-16 NOTE — PROGRESS NOTES
Pt arrived in ECHO department for scheduled Cardioversion.   Procedure explained, questions answered and consent signed. Discharge instructions discussed with patient.  Anesthesia gave pt 50 mg IV brevitol for sedation and pt was DCCV at 150 Joules to a SR.  Pt denied chest pain after procedure and VSS.   Report given to 2A RN.

## 2020-01-17 LAB — INTERPRETATION ECG - MUSE: NORMAL

## 2020-01-20 LAB
MDC_IDC_EPISODE_DTM: NORMAL
MDC_IDC_EPISODE_ID: NORMAL
MDC_IDC_EPISODE_TYPE: NORMAL
MDC_IDC_LEAD_IMPLANT_DT: NORMAL
MDC_IDC_LEAD_IMPLANT_DT: NORMAL
MDC_IDC_LEAD_LOCATION: NORMAL
MDC_IDC_LEAD_LOCATION: NORMAL
MDC_IDC_LEAD_MFG: NORMAL
MDC_IDC_LEAD_MFG: NORMAL
MDC_IDC_LEAD_MODEL: NORMAL
MDC_IDC_LEAD_MODEL: NORMAL
MDC_IDC_LEAD_POLARITY_TYPE: NORMAL
MDC_IDC_LEAD_POLARITY_TYPE: NORMAL
MDC_IDC_LEAD_SERIAL: NORMAL
MDC_IDC_LEAD_SERIAL: NORMAL
MDC_IDC_MSMT_BATTERY_STATUS: NORMAL
MDC_IDC_MSMT_CAP_CHARGE_DTM: NORMAL
MDC_IDC_MSMT_CAP_CHARGE_ENERGY: 11 J
MDC_IDC_MSMT_CAP_CHARGE_TIME: 1.91 S
MDC_IDC_MSMT_CAP_CHARGE_TIME: 10.05
MDC_IDC_MSMT_CAP_CHARGE_TYPE: NORMAL
MDC_IDC_MSMT_CAP_CHARGE_TYPE: NORMAL
MDC_IDC_MSMT_LEADCHNL_RA_IMPEDANCE_VALUE: 873 OHM
MDC_IDC_MSMT_LEADCHNL_RA_PACING_THRESHOLD_AMPLITUDE: 0.5 V
MDC_IDC_MSMT_LEADCHNL_RA_PACING_THRESHOLD_PULSEWIDTH: 0.5 MS
MDC_IDC_MSMT_LEADCHNL_RA_SENSING_INTR_AMPL: 9.5 MV
MDC_IDC_MSMT_LEADCHNL_RV_IMPEDANCE_VALUE: 483 OHM
MDC_IDC_MSMT_LEADCHNL_RV_PACING_THRESHOLD_AMPLITUDE: 0.8 V
MDC_IDC_MSMT_LEADCHNL_RV_PACING_THRESHOLD_PULSEWIDTH: 0.5 MS
MDC_IDC_MSMT_LEADCHNL_RV_SENSING_INTR_AMPL: 25 MV
MDC_IDC_PG_IMPLANT_DTM: NORMAL
MDC_IDC_PG_MFG: NORMAL
MDC_IDC_PG_MODEL: NORMAL
MDC_IDC_PG_SERIAL: NORMAL
MDC_IDC_PG_TYPE: NORMAL
MDC_IDC_SESS_CLINIC_NAME: NORMAL
MDC_IDC_SESS_DTM: NORMAL
MDC_IDC_SESS_TYPE: NORMAL
MDC_IDC_SET_BRADY_AT_MODE_SWITCH_MODE: NORMAL
MDC_IDC_SET_BRADY_AT_MODE_SWITCH_RATE: 170 {BEATS}/MIN
MDC_IDC_SET_BRADY_LOWRATE: 60 {BEATS}/MIN
MDC_IDC_SET_BRADY_MAX_SENSOR_RATE: 120 {BEATS}/MIN
MDC_IDC_SET_BRADY_MAX_TRACKING_RATE: 120 {BEATS}/MIN
MDC_IDC_SET_BRADY_MODE: NORMAL
MDC_IDC_SET_BRADY_PAV_DELAY_HIGH: 260 MS
MDC_IDC_SET_BRADY_PAV_DELAY_LOW: 390 MS
MDC_IDC_SET_BRADY_SAV_DELAY_HIGH: 260 MS
MDC_IDC_SET_BRADY_SAV_DELAY_LOW: 390 MS
MDC_IDC_SET_LEADCHNL_RA_PACING_AMPLITUDE: 2.4 V
MDC_IDC_SET_LEADCHNL_RA_PACING_ANODE_ELECTRODE_1: NORMAL
MDC_IDC_SET_LEADCHNL_RA_PACING_ANODE_LOCATION_1: NORMAL
MDC_IDC_SET_LEADCHNL_RA_PACING_CAPTURE_MODE: NORMAL
MDC_IDC_SET_LEADCHNL_RA_PACING_CATHODE_ELECTRODE_1: NORMAL
MDC_IDC_SET_LEADCHNL_RA_PACING_CATHODE_LOCATION_1: NORMAL
MDC_IDC_SET_LEADCHNL_RA_PACING_POLARITY: NORMAL
MDC_IDC_SET_LEADCHNL_RA_PACING_PULSEWIDTH: 0.5 MS
MDC_IDC_SET_LEADCHNL_RA_SENSING_ADAPTATION_MODE: NORMAL
MDC_IDC_SET_LEADCHNL_RA_SENSING_ANODE_ELECTRODE_1: NORMAL
MDC_IDC_SET_LEADCHNL_RA_SENSING_ANODE_LOCATION_1: NORMAL
MDC_IDC_SET_LEADCHNL_RA_SENSING_CATHODE_ELECTRODE_1: NORMAL
MDC_IDC_SET_LEADCHNL_RA_SENSING_CATHODE_LOCATION_1: NORMAL
MDC_IDC_SET_LEADCHNL_RA_SENSING_POLARITY: NORMAL
MDC_IDC_SET_LEADCHNL_RA_SENSING_SENSITIVITY: 0.25 MV
MDC_IDC_SET_LEADCHNL_RV_PACING_AMPLITUDE: 2.4 V
MDC_IDC_SET_LEADCHNL_RV_PACING_ANODE_ELECTRODE_1: NORMAL
MDC_IDC_SET_LEADCHNL_RV_PACING_ANODE_LOCATION_1: NORMAL
MDC_IDC_SET_LEADCHNL_RV_PACING_CAPTURE_MODE: NORMAL
MDC_IDC_SET_LEADCHNL_RV_PACING_CATHODE_ELECTRODE_1: NORMAL
MDC_IDC_SET_LEADCHNL_RV_PACING_CATHODE_LOCATION_1: NORMAL
MDC_IDC_SET_LEADCHNL_RV_PACING_POLARITY: NORMAL
MDC_IDC_SET_LEADCHNL_RV_PACING_PULSEWIDTH: 0.5 MS
MDC_IDC_SET_LEADCHNL_RV_SENSING_ADAPTATION_MODE: NORMAL
MDC_IDC_SET_LEADCHNL_RV_SENSING_ANODE_ELECTRODE_1: NORMAL
MDC_IDC_SET_LEADCHNL_RV_SENSING_ANODE_LOCATION_1: NORMAL
MDC_IDC_SET_LEADCHNL_RV_SENSING_CATHODE_ELECTRODE_1: NORMAL
MDC_IDC_SET_LEADCHNL_RV_SENSING_CATHODE_LOCATION_1: NORMAL
MDC_IDC_SET_LEADCHNL_RV_SENSING_POLARITY: NORMAL
MDC_IDC_SET_LEADCHNL_RV_SENSING_SENSITIVITY: 0.6 MV
MDC_IDC_SET_ZONE_DETECTION_INTERVAL: 273 MS
MDC_IDC_SET_ZONE_DETECTION_INTERVAL: 333 MS
MDC_IDC_SET_ZONE_DETECTION_INTERVAL: 375 MS
MDC_IDC_SET_ZONE_TYPE: NORMAL
MDC_IDC_SET_ZONE_VENDOR_TYPE: NORMAL
MDC_IDC_STAT_EPISODE_RECENT_COUNT: 2
MDC_IDC_STAT_EPISODE_RECENT_COUNT: 3
MDC_IDC_STAT_EPISODE_RECENT_COUNT: 366
MDC_IDC_STAT_EPISODE_RECENT_COUNT_DTM_END: NORMAL
MDC_IDC_STAT_EPISODE_RECENT_COUNT_DTM_START: NORMAL
MDC_IDC_STAT_EPISODE_TOTAL_COUNT: 10
MDC_IDC_STAT_EPISODE_TOTAL_COUNT: 5756
MDC_IDC_STAT_EPISODE_TOTAL_COUNT: 6
MDC_IDC_STAT_EPISODE_TOTAL_COUNT_DTM_END: NORMAL
MDC_IDC_STAT_EPISODE_TYPE: NORMAL
MDC_IDC_STAT_EPISODE_VENDOR_TYPE: NORMAL
MDC_IDC_STAT_TACHYTHERAPY_ATP_DELIVERED_RECENT: 1
MDC_IDC_STAT_TACHYTHERAPY_ATP_DELIVERED_TOTAL: 3
MDC_IDC_STAT_TACHYTHERAPY_RECENT_DTM_END: NORMAL
MDC_IDC_STAT_TACHYTHERAPY_RECENT_DTM_START: NORMAL
MDC_IDC_STAT_TACHYTHERAPY_SHOCKS_ABORTED_RECENT: 1
MDC_IDC_STAT_TACHYTHERAPY_SHOCKS_ABORTED_TOTAL: 1
MDC_IDC_STAT_TACHYTHERAPY_SHOCKS_DELIVERED_RECENT: 0
MDC_IDC_STAT_TACHYTHERAPY_SHOCKS_DELIVERED_TOTAL: 1
MDC_IDC_STAT_TACHYTHERAPY_TOTAL_DTM_END: NORMAL

## 2020-01-20 NOTE — PROGRESS NOTES
HPI:  64 year old male with history of HCM without obstruction (dx 2006, EF 20% improved to 60%), VT s/p ICD 2012, nonobstructive CAD (cath 2013), AF s/p PVI X 3 (2011, 2016, 2018) and DCCV X 10 who since last clinic visit with me was admitted for dofetilide loading, cardioversion, and RHC.     Today patient relates that he has been feeling well.  He denies any chest pain/pressure, PND, orthopnea, abdominal swelling/distension, palpitations, syncope, presyncope or change in exercise tolerance. Pt does note some breast tenderness. He denies any other problems with his medications and reports compliance.      Past Medical History:   Diagnosis Date     Atrial fibrillation (H) 12/9/11    failed medication, multiple DC cardioveresions; s/p Left atrial ablation to eliminate atrial fibrillation 12/9/11     Chest pain      CHF (congestive heart failure) (H) 7/30/2016     Coronary artery disease      DJD (degenerative joint disease)      Hip arthritis 1/15/2014     Hypertension      Hypertrophic cardiomyopathy (H) 10/09     Other abnormal heart sounds      Pacemaker     ICD     Palpitations      Pneumonia, organism unspecified(486)      Prediabetes 7/10/15    A1C 6.5     Status post implantation of automatic cardioverter/defibrillator (AICD)      Ventricular tachycardia (H)    - HCM diagnosed '06, previously burnt out (EF 20%) now with recovered EF  - h/o VT s/p dual chamber ICD '12  - AF s/p PVI X 2 ('11, '16, '18), h/o DCCV X 10    Meds:  acetaminophen (TYLENOL) 325 MG tablet, Take 325-650 mg by mouth every 6 hours as needed  amoxicillin (AMOXIL) 500 MG tablet, Take 4 tablets (2000 mg) by mouth 1 hour before dental procedures.  atorvastatin (LIPITOR) 20 MG tablet, Take 1 tablet (20 mg) by mouth daily  cyanocobalamin (VITAMIN B-12) 100 MCG tablet, Take 100 mcg by mouth daily  furosemide (LASIX) 40 MG tablet, Take 1 tablet (40 mg) by mouth daily  gabapentin (NEURONTIN) 300 MG capsule, Take 2 capsules (600 mg) by mouth 2  times daily  guaiFENesin-codeine (ROBITUSSIN AC) 100-10 MG/5ML solution, Take 5-10 mLs by mouth every 4 hours as needed  metFORMIN (GLUCOPHAGE-XR) 500 MG 24 hr tablet, Take 2 tablets (1,000 mg) by mouth daily (with dinner) Take 2 tablets (1,000 mg) daily (with dinner).  metoprolol succinate ER (TOPROL-XL) 50 MG 24 hr tablet, TAKE ONE TABLET BY MOUTH EVERY DAY  multivitamin, therapeutic (THERA-VIT) TABS, Take 1 tablet by mouth daily  zolpidem (AMBIEN) 10 MG tablet, Take 0.5-1 tablets (5-10 mg) by mouth nightly as needed for sleep  [] doxycycline hyclate (VIBRAMYCIN) 100 MG capsule, Take 1 capsule (100 mg) by mouth 2 times daily for 10 days    lidocaine 1% with EPINEPHrine 1:100,000 injection 3 mL    dofetilide 250mcg bid  xarelto 20mg daily      Social History     Socioeconomic History     Marital status:      Spouse name: Not on file     Number of children: Not on file     Years of education: Not on file     Highest education level: Not on file   Occupational History     Occupation: [employer]     Employer: [employer]   Social Needs     Financial resource strain: Not on file     Food insecurity:     Worry: Not on file     Inability: Not on file     Transportation needs:     Medical: Not on file     Non-medical: Not on file   Tobacco Use     Smoking status: Former Smoker     Packs/day: 1.00     Years: 40.00     Pack years: 40.00     Types: Cigarettes     Start date: 1975     Last attempt to quit: 2015     Years since quitting: 3.5     Smokeless tobacco: Never Used   Substance and Sexual Activity     Alcohol use: Yes     Alcohol/week: 0.0 oz     Comment: 1 drink per week     Drug use: No     Sexual activity: Yes     Partners: Female   Lifestyle     Physical activity:     Days per week: Not on file     Minutes per session: Not on file     Stress: Not on file   Relationships     Social connections:     Talks on phone: Not on file     Gets together: Not on file     Attends Yazidism  service: Not on file     Active member of club or organization: Not on file     Attends meetings of clubs or organizations: Not on file     Relationship status: Not on file     Intimate partner violence:     Fear of current or ex partner: Not on file     Emotionally abused: Not on file     Physically abused: Not on file     Forced sexual activity: Not on file   Other Topics Concern      Service Not Asked     Blood Transfusions Not Asked     Caffeine Concern Not Asked     Occupational Exposure Not Asked     Hobby Hazards Not Asked     Sleep Concern Not Asked     Stress Concern Not Asked     Weight Concern Not Asked     Special Diet Not Asked     Back Care Not Asked     Exercise No     Bike Helmet Not Asked     Seat Belt Yes     Self-Exams Not Asked     Parent/sibling w/ CABG, MI or angioplasty before 65F 55M? No   Social History Narrative     Not on file       Family History   Problem Relation Age of Onset     Heart Disease Mother         unknown     Obesity Mother      Gastrointestinal Disease Mother         diverticulitis     Diabetes Maternal Grandmother      Diabetes Paternal Grandmother      Cerebrovascular Disease Paternal Grandmother 94     Diabetes Paternal Grandfather      Cerebrovascular Disease Paternal Grandfather 78     Diabetes Son      C.A.D. Father         CABG age 78     Heart Disease Father         CABG x5     Diabetes Maternal Grandfather      LUNG DISEASE No family hx of      Deep Vein Thrombosis (DVT) No family hx of      Anesthesia Reaction No family hx of      Melanoma No family hx of      Skin Cancer No family hx of        ROS:   Constitutional: No fever, chills, or sweats. No weight gain/loss.   ENT: No visual disturbance, ear ache, epistaxis, sore throat.   Allergies/Immunologic: Negative.   Respiratory: No cough, hemoptysis.   Cardiovascular: As per HPI.   GI: No nausea, vomiting, hematemesis, melena, or hematochezia.   : No urinary frequency, dysuria, or hematuria.   Integument:  Negative.   Psychiatric: Negative.   Neuro: Negative.   Endocrinology: Negative.   Musculoskeletal: Ongoing hip pain      /86 (BP Location: Left arm, Patient Position: Chair, Cuff Size: Adult Regular)   Pulse 61   Wt 96.9 kg (213 lb 9.6 oz)   SpO2 96%   BMI 29.79 kg/m    General: appears comfortable, alert and articulate  Head: normocephalic, atraumatic  Eyes: anicteric sclera, EOMI  Neck: no adenopathy, 2+ carotids without bruits  Orophyarynx: moist mucosa, no lesions, dentition intact  Heart: regular, S1/S2, 2/6 systolic murmur, no gallop or rub, estimated JVP <10cm  Lungs: clear, no rales or wheezing  Abdomen: soft, non-tender, bowel sounds present, no hepatomegaly  Extremities: no clubbing, cyanosis or edema  Neurological: normal speech and affect, no gross motor deficits    Labs:  Orders Only on 12/17/2019   Component Date Value Ref Range Status     Hemoglobin A1C 12/17/2019 6.7* 0 - 5.6 % Final    Comment: Normal <5.7% Prediabetes 5.7-6.4%  Diabetes 6.5% or higher - adopted from ADA   consensus guidelines.       N-Terminal Pro Bnp 12/17/2019 715* 0 - 125 pg/mL Final    Comment:    Reference range shown and results flagged as abnormal are for the outpatient,   non acute settings. Establishing a baseline value for each individual patient   is useful for follow-up.  Suggested inpatient cut points for confirming diagnosis of CHF in an acute   setting are:   >450 pg/mL (age 18 to less than 50)   >900 pg/mL (age 50 to less than 75)   >1800 pg/mL (75 yrs and older)  An inpatient or emergency department NT-proPBNP <300 pg/mL effectively rules   out acute CHF, with 99% negative predictive value.        Sodium 12/17/2019 140  133 - 144 mmol/L Final     Potassium 12/17/2019 4.2  3.4 - 5.3 mmol/L Final     Chloride 12/17/2019 106  94 - 109 mmol/L Final     Carbon Dioxide 12/17/2019 30  20 - 32 mmol/L Final     Anion Gap 12/17/2019 5  3 - 14 mmol/L Final     Glucose 12/17/2019 104* 70 - 99 mg/dL Final     Urea  Nitrogen 12/17/2019 18  7 - 30 mg/dL Final     Creatinine 12/17/2019 1.03  0.66 - 1.25 mg/dL Final     GFR Estimate 12/17/2019 76  >60 mL/min/[1.73_m2] Final    Comment: Non  GFR Calc  Starting 12/18/2018, serum creatinine based estimated GFR (eGFR) will be   calculated using the Chronic Kidney Disease Epidemiology Collaboration   (CKD-EPI) equation.       GFR Estimate If Black 12/17/2019 88  >60 mL/min/[1.73_m2] Final    Comment:  GFR Calc  Starting 12/18/2018, serum creatinine based estimated GFR (eGFR) will be   calculated using the Chronic Kidney Disease Epidemiology Collaboration   (CKD-EPI) equation.       Calcium 12/17/2019 9.4  8.5 - 10.1 mg/dL Final     Bilirubin Total 12/17/2019 0.7  0.2 - 1.3 mg/dL Final     Albumin 12/17/2019 3.9  3.4 - 5.0 g/dL Final     Protein Total 12/17/2019 7.6  6.8 - 8.8 g/dL Final     Alkaline Phosphatase 12/17/2019 94  40 - 150 U/L Final     ALT 12/17/2019 30  0 - 70 U/L Final     AST 12/17/2019 19  0 - 45 U/L Final     WBC 12/17/2019 7.5  4.0 - 11.0 10e9/L Final     RBC Count 12/17/2019 4.84  4.4 - 5.9 10e12/L Final     Hemoglobin 12/17/2019 14.6  13.3 - 17.7 g/dL Final     Hematocrit 12/17/2019 44.5  40.0 - 53.0 % Final     MCV 12/17/2019 92  78 - 100 fl Final     MCH 12/17/2019 30.2  26.5 - 33.0 pg Final     MCHC 12/17/2019 32.8  31.5 - 36.5 g/dL Final     RDW 12/17/2019 13.3  10.0 - 15.0 % Final     Platelet Count 12/17/2019 270  150 - 450 10e9/L Final     Diff Method 12/17/2019 Automated Method   Final     % Neutrophils 12/17/2019 57.7  % Final     % Lymphocytes 12/17/2019 26.4  % Final     % Monocytes 12/17/2019 9.9  % Final     % Eosinophils 12/17/2019 4.1  % Final     % Basophils 12/17/2019 1.5  % Final     % Immature Granulocytes 12/17/2019 0.4  % Final     Nucleated RBCs 12/17/2019 0  0 /100 Final     Absolute Neutrophil 12/17/2019 4.3  1.6 - 8.3 10e9/L Final     Absolute Lymphocytes 12/17/2019 2.0  0.8 - 5.3 10e9/L Final     Absolute  Monocytes 12/17/2019 0.7  0.0 - 1.3 10e9/L Final     Absolute Eosinophils 12/17/2019 0.3  0.0 - 0.7 10e9/L Final     Absolute Basophils 12/17/2019 0.1  0.0 - 0.2 10e9/L Final     Abs Immature Granulocytes 12/17/2019 0.0  0 - 0.4 10e9/L Final     Absolute Nucleated RBC 12/17/2019 0.0   Final         CPX Echo 6/1/2018:  Interpretation Summary  Submaximal treadmill stress test in a patient with a diagnosis of HCM.  The Ramirez treadmill score is 8 (low risk).  Exercise was stopped due to fatigue.  Normal blood pressure response to exercise.  No ECG evidence of ischemia.  No supraventricular or ventricular arrhythmias. Frequent PVCs noted throughout the study.  Normal biventricular function with mild asymmetrical septal hypertrophy (12mm).  No dynamic outflow tract obstruction is present at rest or with exercise.  Normal segmental wall motion at rest and with exercise.  Minimal augmentation in LV function with exercise.  Average functional capacity for age.         CPX 2/15/2019:        CTA coronary angiogram 5/28/19  IMPRESSION:  1.  No evidence of coronary artery atherosclerosis or stenosis.  2.  Myocardial bridging involving the mid LAD.  3.  Total Agatston score 0 placing the patient in the lowest percentile when compared to age and gender matched control group.    Right heart cath 8/21/19   Time Systolic Diastolic Mean A Wave V Wave EDP Max dp/dt HR   RA Pressures  3:38 PM   12 mmHg    16 mmHg    14 mmHg      61 bpm      RV Pressures  3:38 PM 60 mmHg        16 mmHg     106 bpm      PA Pressures  3:41 PM 60 mmHg    22 mmHg    38 mmHg        69 bpm      PCW Pressures  3:39 PM   30 mmHg    28 mmHg    38 mmHg      74 bpm         Time Hb SAT(%) PO2 Content PA Sat   PA  3:28 PM  63.6 %      63.6 %      Art  3:28 PM  99 %     18.04 mL/dL       Cardiac Output Phase: Baseline      Time TDCO TDCI Tj C.O. Tj C.I. Tj HR   Cardiac Output Results  3:28 PM 3.8 L/min    1.79 L/min/m2    4.41 L/min    2.08 L/min/m2            Echo today  Global and regional left ventricular function is normal with an EF of 55-60%.  Global right ventricular function is normal.  IVC diameter <2.1 cm collapsing >50% with sniff suggests a normal RA pressure  of 3 mmHg.  No pericardial effusion is present.  Mild pulmonary hypertension is present.  No change from prior.           Assessment/Plan:  63 year old male with history of HCM without obstruction (dx 2006, EF 20% improved to 60%), VT s/p ICD 2012, nonobstructive CAD (cath 2013), AF s/p PVI X 3 (2011, 2016, 2018) and DCCV X 10 who presents for ongoing evaluation and management,      # Hypertrophic cardiomyopathy -- previously burnt out with EF 20% ('13), now recovered (EF 60%).  No evidence of LVOT obstruction.  Echo today with no significant change.  CPX today with mild improvement in exercise capacity.    - continue metoprolol XL 50mg daily   - change aldactone to eplerenone 50mg daily  - pt aware of exercise restrictions and family screening recommendations    # Nonobstructive CAD -- 10-30% stenoses on cath '13  - coronary CTA confirmed no significant disease  - continue atorvastatin 20     # HTN -- controlled  - continue metoprolol XL, aldactone changed to eplerenone as above    # Atrial Fibrillation   - continue Xarelto  - continue dofetilide 250mcg  BID   - continue Metoprolol 50 XL daily       Mona Ware MD  Section Head - Advanced Heart Failure, Transplantation and Mechanical Circulatory Support  Director - [facility]  Associate Professor of Medicine, [facility]

## 2020-01-23 ENCOUNTER — OFFICE VISIT (OUTPATIENT)
Dept: DERMATOLOGY | Facility: CLINIC | Age: 65
End: 2020-01-23
Payer: COMMERCIAL

## 2020-01-23 DIAGNOSIS — L28.1 PRURIGO NODULARIS: Primary | ICD-10-CM

## 2020-01-23 DIAGNOSIS — Z85.828 HISTORY OF BASAL CELL CARCINOMA: ICD-10-CM

## 2020-01-23 ASSESSMENT — PAIN SCALES - GENERAL: PAINLEVEL: NO PAIN (0)

## 2020-01-23 NOTE — NURSING NOTE
Dermatology Rooming Note    Stu Meredith's goals for this visit include:   Chief Complaint   Patient presents with     Derm Problem     Haroon is here today for a follow up on his left hand.      CELIA Luna

## 2020-01-23 NOTE — PROGRESS NOTES
[facility] Dermatology Note      Dermatology Problem List:  1. History of NMSC  -BCC of Left central chest s/p excision 07/17/19  2. Centrofacial rosacea w/telangectasias  3. Benign skin findings: seborrheic keratoses, dermatofibromas, solar lentigines  4. Lower cutaneous lip solar lentigines  5. Prurigo nodule vs Lichen simplex chronicus  -s/p cryo 12/20/19, resolved 1/23/2020    Encounter Date: Jan 23, 2020    CC:  Chief Complaint   Patient presents with     Derm Problem     Haroon is here today for a follow up on his left hand.          History of Present Illness:  Mr. Stu Meredith is a 64 year old male who presents as a follow-up for his hand. The patient was last seen on 12/20/2019 when a prurigo nodule vs LSC on the left dorsal thenar hand was treated with cryotherapy. At today's visit, the patient notes his hand has greatly improved. States if it comes back again he will return for more cryo. Additionally, the patient inquires about the BCC on his chest that was previously excised. States he never heard if they got the whole thing or not and would like confirmation of this. The patient denies additional lesions or areas of concern. The patient denies painful, itching, tingling or bleeding lesions unless otherwise noted..     Past Medical History:   Patient Active Problem List   Diagnosis     Atrial fibrillation (H)     CARDIOVASCULAR SCREENING; LDL GOAL LESS THAN 130     Hypertrophic cardiomyopathy (H)     Advanced directives, counseling/discussion     Ventricular tachycardia (H)     Automatic implantable cardioverter-defibrillator in situ- [place], dual chamber- NOT dependent     Prediabetes     S/P ablation of atrial flutter     CHF (congestive heart failure) (H)     Chronic Zolpidem use for insomnia     S/P ablation of atrial fibrillation     Lung nodules     Atrial tachycardia (H)     Encounter for monitoring dofetilide therapy     HTN (hypertension)     Past Medical  History:   Diagnosis Date     Atrial fibrillation (H) 12/9/11    failed medication, multiple DC cardioveresions; s/p Left atrial ablation to eliminate atrial fibrillation 12/9/11     Chest pain      CHF (congestive heart failure) (H) 7/30/2016     Coronary artery disease      DJD (degenerative joint disease)      Fatigue 7/15/2019     Hip arthritis 1/15/2014     Hypertension      Hypertrophic cardiomyopathy (H) 10/09     Other abnormal heart sounds      Pacemaker     ICD     Palpitations      Pneumonia, organism unspecified(486)      Prediabetes 7/10/15    A1C 6.5     Status post implantation of automatic cardioverter/defibrillator (AICD)      Ventricular tachycardia (H)      Past Surgical History:   Procedure Laterality Date     ANESTHESIA CARDIOVERSION  4/24/2014    Procedure: ANESTHESIA CARDIOVERSION;  Surgeon: Generic Anesthesia Provider;  Location: [facility] OR     ANESTHESIA CARDIOVERSION N/A 5/12/2016    Procedure: ANESTHESIA CARDIOVERSION;  Surgeon: GENERIC ANESTHESIA PROVIDER;  Location: [facility] OR     ANESTHESIA CARDIOVERSION N/A 8/7/2017    Procedure: ANESTHESIA CARDIOVERSION;  Anesthesia Offsite Coverage Cardioversion @1100;  Surgeon: GENERIC ANESTHESIA PROVIDER;  Location: [facility] OR     ANESTHESIA CARDIOVERSION N/A 1/3/2018    Procedure: ANESTHESIA CARDIOVERSION;  Anesthesia Cardioverion;  Surgeon: GENERIC ANESTHESIA PROVIDER;  Location: [facility] OR     ANESTHESIA CARDIOVERSION N/A 5/4/2018    Procedure: ANESTHESIA CARDIOVERSION;  Anesthesia Coverage Cardioversion @1400;  Surgeon: GENERIC ANESTHESIA PROVIDER;  Location: [facility] OR     ANESTHESIA CARDIOVERSION N/A 9/27/2018    Procedure: ANESTHESIA CARDIOVERSION;  Anesthesia Cardioversion @0930;  Surgeon: GENERIC ANESTHESIA PROVIDER;  Location: [facility]     ANESTHESIA CARDIOVERSION N/A 12/20/2018    Procedure: Anesthesia Coverage Cardioversion @0830;  Surgeon: GENERIC ANESTHESIA PROVIDER;  Location: [facility]     ANESTHESIA CARDIOVERSION N/A 8/21/2019    Procedure: Anesthesia Coverage  Cardioversion @0800;  Surgeon: GENERIC ANESTHESIA PROVIDER;  Location: [facility]     ANESTHESIA CARDIOVERSION N/A 1/16/2020    Procedure: ANESTHESIA, FOR CARDIOVERSION;  Surgeon: GENERIC ANESTHESIA PROVIDER;  Location: [facility]     ARTHROPLASTY HIP  1/15/2014    Procedure: ARTHROPLASTY HIP;  Left Total Hip Arthroplasty;  Surgeon: Nelson Gaspar MD;  Location: [facility] OR     ARTHROPLASTY HIP Right 6/5/2019    Procedure: Right Total Hip Arthroplasty;  Surgeon: Nelson Gaspar MD;  Location: [facility] OR     CARDIAC SURGERY       COLONOSCOPY N/A 5/18/2018    Procedure: COMBINED COLONOSCOPY, SINGLE OR MULTIPLE BIOPSY/POLYPECTOMY BY BIOPSY;  COLONOSCOPY (PT HAS DEFIBRILLATOR) ;  Surgeon: Mike Nickerson MD;  Location:  GI     CV RIGHT HEART CATH N/A 8/19/2019    Procedure: CV RIGHT HEART CATH;  Surgeon: Cisco Rausch MD;  Location:  HEART CARDIAC CATH LAB     CV RIGHT HEART CATH N/A 8/21/2019    Procedure: Right Heart Cath;  Surgeon: Ciaran Burton MD;  Location:  HEART CARDIAC CATH LAB     H ABLATION FOCAL AFIB  12/9/11    Left atrial ablation to eliminate atrial fibrillation     IMPLANT AUTOMATIC IMPLANTABLE CARDIOVERTER DEFIBRILLATOR  7/27/12    AICD implantation     TONSILLECTOMY  1964       Social History:   reports that he quit smoking about 4 years ago. His smoking use included cigarettes. He started smoking about 45 years ago. He has a 40.00 pack-year smoking history. He has never used smokeless tobacco. He reports current alcohol use. He reports that he does not use drugs.    Family History:  Family History   Problem Relation Age of Onset     Heart Disease Mother         unknown     Obesity Mother      Gastrointestinal Disease Mother         diverticulitis     Diabetes Maternal Grandmother      Diabetes Paternal Grandmother      Cerebrovascular Disease Paternal Grandmother 94     Diabetes Paternal Grandfather      Cerebrovascular Disease Paternal Grandfather 78     Diabetes Son      C.A.D. Father         CABG  age 78     Heart Disease Father         CABG x5     Diabetes Maternal Grandfather      LUNG DISEASE No family hx of      Deep Vein Thrombosis (DVT) No family hx of      Anesthesia Reaction No family hx of      Melanoma No family hx of      Skin Cancer No family hx of        Medications:  Current Outpatient Medications   Medication Sig Dispense Refill     acetaminophen (TYLENOL) 325 MG tablet Take 325-650 mg by mouth every 6 hours as needed       albuterol (PROAIR HFA/PROVENTIL HFA/VENTOLIN HFA) 108 (90 Base) MCG/ACT inhaler Inhale 2 puffs into the lungs every 6 hours 18 g 0     amoxicillin (AMOXIL) 500 MG tablet Take 4 tablets (2000 mg) by mouth 1 hour before dental procedures. 12 tablet 3     atorvastatin (LIPITOR) 20 MG tablet Take 1 tablet (20 mg) by mouth daily 90 tablet 3     cyanocobalamin (VITAMIN B-12) 100 MCG tablet Take 100 mcg by mouth daily       dofetilide (TIKOSYN) 250 MCG capsule Take 1 capsule (250 mcg) by mouth every 12 hours 180 capsule 3     eplerenone (INSPRA) 50 MG tablet Take 1 tablet (50 mg) by mouth daily 90 tablet 3     furosemide (LASIX) 40 MG tablet Take 1 tablet (40 mg) by mouth daily 90 tablet 1     gabapentin (NEURONTIN) 300 MG capsule Take 2 capsules (600 mg) by mouth 2 times daily 360 capsule 3     guaiFENesin-codeine (ROBITUSSIN AC) 100-10 MG/5ML solution Take 5-10 mLs by mouth every 4 hours as needed for cough 118 mL 0     guaiFENesin-codeine (ROBITUSSIN AC) 100-10 MG/5ML solution Take 5-10 mLs by mouth every 4 hours as needed 240 mL 0     metFORMIN (GLUCOPHAGE-XR) 500 MG 24 hr tablet Take 2 tablets (1,000 mg) by mouth daily (with dinner) Take 2 tablets (1,000 mg) daily (with dinner). 180 tablet 3     metoprolol succinate ER (TOPROL-XL) 50 MG 24 hr tablet TAKE ONE TABLET BY MOUTH EVERY DAY 90 tablet 3     multivitamin, therapeutic (THERA-VIT) TABS Take 1 tablet by mouth daily       XARELTO ANTICOAGULANT 20 MG TABS tablet Take 1 tablet (20 mg) by mouth daily (with dinner) 90 tablet 3  "    zolpidem (AMBIEN) 10 MG tablet Take 0.5-1 tablets (5-10 mg) by mouth nightly as needed for sleep 90 tablet 0       No Known Allergies    Review of Systems:  -Constitutional: The patient denies fatigue, fevers, chills, unintended weight loss, and night sweats.  -HEENT: Patient denies nonhealing oral sores.  -Skin: As above in HPI. No additional skin concerns.    Physical exam:  Vitals: There were no vitals taken for this visit.  GEN: This is a well developed, well-nourished male in no acute distress, in a pleasant mood.    SKIN: Focused examination of the face, neck, chest and bilateral hands was performed.  -linear thick skin colored plaque with secondary linear excoriations on the left dorsal thenar hand that was previously present has resolved  -No other lesions of concern on areas examined.       Impression/Plan:  1.Hx of BCC - s/p excision July 2019 - reviewed final pathology with patient - it was completley excised and no residual malignancy was seen. Patient expressed understanding and is happy with this. Will returns at a later date for next FBSE.Reminded patient to wear sunscreen on his trip to [place] in 3 weeks.      2.Prurigo nodule vs Lichen simplex chronicus - left dorsal thenar hand - has hasd ILK to area as well as \"special bandages\" with an outside provider which have not been effective - Resolved    Appears to be resolved after treatment with cryotherapy    No further intervention required. Patient to report changes.    CC Dr. Azar on close of this encounter.  Follow-up prn for new or changing lesions.       Staff Involved:  Staff/Scribe    Scribe Disclosure:  I, Binh Alatorre, am serving as a scribe to document services personally performed by Eliza Motta PA-C, based on data collection and the provider's statements to me.     Provider Disclosure:   The documentation recorded by the scribe accurately reflects the services I personally performed and the decisions made by me.    All risks, " benefits and alternatives were discussed with patient.  Patient is in agreement and understands the assessment and plan.  All questions were answered.    Eliza Motta PA-C, MPAS  [facility] [place]: Phone: [phone], Fax: [phone]  [facility]: Phone: [phone],  Fax: [phone]

## 2020-01-29 ENCOUNTER — TELEPHONE (OUTPATIENT)
Dept: CARDIOLOGY | Facility: CLINIC | Age: 65
End: 2020-01-29

## 2020-01-29 NOTE — TELEPHONE ENCOUNTER
[employer] Medication Request    Medication/Dose: Dofetilide 250 mcg by mouth every 12 hours  ICD code (if different than what is on RX):    Previously Tried and Failed:    Rationale:      Insurance Name:    Insurance ID:        Pharmacy Information (if different than what is on RX)  Name:    Phone:

## 2020-01-30 ENCOUNTER — ANCILLARY PROCEDURE (OUTPATIENT)
Dept: CARDIOLOGY | Facility: CLINIC | Age: 65
End: 2020-01-30
Attending: NURSE PRACTITIONER
Payer: COMMERCIAL

## 2020-01-30 DIAGNOSIS — I47.20 VENTRICULAR TACHYCARDIA (H): Primary | Chronic | ICD-10-CM

## 2020-01-30 DIAGNOSIS — I47.20 VENTRICULAR TACHYCARDIA (H): ICD-10-CM

## 2020-01-30 PROCEDURE — 93296 REM INTERROG EVL PM/IDS: CPT | Mod: ZF

## 2020-01-30 PROCEDURE — 93295 DEV INTERROG REMOTE 1/2/MLT: CPT | Mod: ZP | Performed by: INTERNAL MEDICINE

## 2020-01-31 LAB
MDC_IDC_EPISODE_DTM: NORMAL
MDC_IDC_EPISODE_DURATION: 12 S
MDC_IDC_EPISODE_DURATION: 15 S
MDC_IDC_EPISODE_DURATION: 1905 S
MDC_IDC_EPISODE_DURATION: 226 S
MDC_IDC_EPISODE_DURATION: 444 S
MDC_IDC_EPISODE_DURATION: 6 S
MDC_IDC_EPISODE_DURATION: 6808 S
MDC_IDC_EPISODE_DURATION: 7 S
MDC_IDC_EPISODE_DURATION: 8 S
MDC_IDC_EPISODE_DURATION: 9 S
MDC_IDC_EPISODE_DURATION: NORMAL S
MDC_IDC_EPISODE_ID: NORMAL
MDC_IDC_EPISODE_TYPE: NORMAL
MDC_IDC_LEAD_IMPLANT_DT: NORMAL
MDC_IDC_LEAD_IMPLANT_DT: NORMAL
MDC_IDC_LEAD_LOCATION: NORMAL
MDC_IDC_LEAD_LOCATION: NORMAL
MDC_IDC_LEAD_MFG: NORMAL
MDC_IDC_LEAD_MFG: NORMAL
MDC_IDC_LEAD_MODEL: NORMAL
MDC_IDC_LEAD_MODEL: NORMAL
MDC_IDC_LEAD_POLARITY_TYPE: NORMAL
MDC_IDC_LEAD_POLARITY_TYPE: NORMAL
MDC_IDC_LEAD_SERIAL: NORMAL
MDC_IDC_LEAD_SERIAL: NORMAL
MDC_IDC_MSMT_BATTERY_DTM: NORMAL
MDC_IDC_MSMT_BATTERY_REMAINING_LONGEVITY: 42 MO
MDC_IDC_MSMT_BATTERY_REMAINING_PERCENTAGE: 53 %
MDC_IDC_MSMT_BATTERY_STATUS: NORMAL
MDC_IDC_MSMT_CAP_CHARGE_DTM: NORMAL
MDC_IDC_MSMT_CAP_CHARGE_DTM: NORMAL
MDC_IDC_MSMT_CAP_CHARGE_ENERGY: 11 J
MDC_IDC_MSMT_CAP_CHARGE_TIME: 1.9 S
MDC_IDC_MSMT_CAP_CHARGE_TIME: 10.1 S
MDC_IDC_MSMT_CAP_CHARGE_TYPE: NORMAL
MDC_IDC_MSMT_CAP_CHARGE_TYPE: NORMAL
MDC_IDC_MSMT_LEADCHNL_RA_IMPEDANCE_VALUE: 834 OHM
MDC_IDC_MSMT_LEADCHNL_RA_PACING_THRESHOLD_AMPLITUDE: 0.5 V
MDC_IDC_MSMT_LEADCHNL_RA_PACING_THRESHOLD_PULSEWIDTH: 0.5 MS
MDC_IDC_MSMT_LEADCHNL_RV_IMPEDANCE_VALUE: 453 OHM
MDC_IDC_MSMT_LEADCHNL_RV_PACING_THRESHOLD_AMPLITUDE: 0.8 V
MDC_IDC_MSMT_LEADCHNL_RV_PACING_THRESHOLD_PULSEWIDTH: 0.5 MS
MDC_IDC_PG_IMPLANT_DTM: NORMAL
MDC_IDC_PG_MFG: NORMAL
MDC_IDC_PG_MODEL: NORMAL
MDC_IDC_PG_SERIAL: NORMAL
MDC_IDC_PG_TYPE: NORMAL
MDC_IDC_SESS_CLINIC_NAME: NORMAL
MDC_IDC_SESS_DTM: NORMAL
MDC_IDC_SESS_TYPE: NORMAL
MDC_IDC_SET_BRADY_AT_MODE_SWITCH_MODE: NORMAL
MDC_IDC_SET_BRADY_AT_MODE_SWITCH_RATE: 170 {BEATS}/MIN
MDC_IDC_SET_BRADY_LOWRATE: 60 {BEATS}/MIN
MDC_IDC_SET_BRADY_MAX_SENSOR_RATE: 120 {BEATS}/MIN
MDC_IDC_SET_BRADY_MAX_TRACKING_RATE: 120 {BEATS}/MIN
MDC_IDC_SET_BRADY_MODE: NORMAL
MDC_IDC_SET_BRADY_PAV_DELAY_HIGH: 260 MS
MDC_IDC_SET_BRADY_PAV_DELAY_LOW: 390 MS
MDC_IDC_SET_BRADY_SAV_DELAY_HIGH: 260 MS
MDC_IDC_SET_BRADY_SAV_DELAY_LOW: 390 MS
MDC_IDC_SET_LEADCHNL_RA_PACING_AMPLITUDE: 2.4 V
MDC_IDC_SET_LEADCHNL_RA_PACING_POLARITY: NORMAL
MDC_IDC_SET_LEADCHNL_RA_PACING_PULSEWIDTH: 0.5 MS
MDC_IDC_SET_LEADCHNL_RA_SENSING_ADAPTATION_MODE: NORMAL
MDC_IDC_SET_LEADCHNL_RA_SENSING_POLARITY: NORMAL
MDC_IDC_SET_LEADCHNL_RA_SENSING_SENSITIVITY: 0.25 MV
MDC_IDC_SET_LEADCHNL_RV_PACING_AMPLITUDE: 2.4 V
MDC_IDC_SET_LEADCHNL_RV_PACING_POLARITY: NORMAL
MDC_IDC_SET_LEADCHNL_RV_PACING_PULSEWIDTH: 0.5 MS
MDC_IDC_SET_LEADCHNL_RV_SENSING_ADAPTATION_MODE: NORMAL
MDC_IDC_SET_LEADCHNL_RV_SENSING_POLARITY: NORMAL
MDC_IDC_SET_LEADCHNL_RV_SENSING_SENSITIVITY: 0.6 MV
MDC_IDC_SET_ZONE_DETECTION_INTERVAL: 273 MS
MDC_IDC_SET_ZONE_DETECTION_INTERVAL: 333 MS
MDC_IDC_SET_ZONE_DETECTION_INTERVAL: 375 MS
MDC_IDC_SET_ZONE_TYPE: NORMAL
MDC_IDC_SET_ZONE_VENDOR_TYPE: NORMAL
MDC_IDC_STAT_BRADY_DTM_END: NORMAL
MDC_IDC_STAT_BRADY_DTM_START: NORMAL
MDC_IDC_STAT_BRADY_RA_PERCENT_PACED: 1 %
MDC_IDC_STAT_BRADY_RV_PERCENT_PACED: 2 %
MDC_IDC_STAT_EPISODE_RECENT_COUNT: 0
MDC_IDC_STAT_EPISODE_RECENT_COUNT: 6
MDC_IDC_STAT_EPISODE_RECENT_COUNT: 74
MDC_IDC_STAT_EPISODE_RECENT_COUNT_DTM_END: NORMAL
MDC_IDC_STAT_EPISODE_RECENT_COUNT_DTM_START: NORMAL
MDC_IDC_STAT_EPISODE_TYPE: NORMAL
MDC_IDC_STAT_EPISODE_VENDOR_TYPE: NORMAL
MDC_IDC_STAT_TACHYTHERAPY_ATP_DELIVERED_RECENT: 0
MDC_IDC_STAT_TACHYTHERAPY_ATP_DELIVERED_TOTAL: 3
MDC_IDC_STAT_TACHYTHERAPY_RECENT_DTM_END: NORMAL
MDC_IDC_STAT_TACHYTHERAPY_RECENT_DTM_START: NORMAL
MDC_IDC_STAT_TACHYTHERAPY_SHOCKS_ABORTED_RECENT: 0
MDC_IDC_STAT_TACHYTHERAPY_SHOCKS_ABORTED_TOTAL: 1
MDC_IDC_STAT_TACHYTHERAPY_SHOCKS_DELIVERED_RECENT: 0
MDC_IDC_STAT_TACHYTHERAPY_SHOCKS_DELIVERED_TOTAL: 1
MDC_IDC_STAT_TACHYTHERAPY_TOTAL_DTM_END: NORMAL
MDC_IDC_STAT_TACHYTHERAPY_TOTAL_DTM_START: NORMAL

## 2020-02-02 DIAGNOSIS — I42.2 HYPERTROPHIC CARDIOMYOPATHY (H): ICD-10-CM

## 2020-02-04 RX ORDER — FUROSEMIDE 40 MG
TABLET ORAL
Qty: 90 TABLET | Refills: 0 | OUTPATIENT
Start: 2020-02-04

## 2020-02-04 NOTE — TELEPHONE ENCOUNTER
PA Initiation    Medication: dofetilide (TIKOSYN) 250 MCG capsule -   Insurance Company: [facility] - Phone [phone] Fax [phone]  Pharmacy Filling the Rx: [facility] - [place], [place] - [address]  Filling Pharmacy Phone:    Filling Pharmacy Fax:    Start Date: 2/4/2020

## 2020-02-05 NOTE — TELEPHONE ENCOUNTER
Prior Authorization Not Needed per Insurance    Medication: dofetilide (TIKOSYN) 250 MCG capsule - NOT NEEDED  Insurance Company: [employer] [place] - Phone [phone] Fax [phone]  Expected CoPay:      Pharmacy Filling the Rx: [facility] - [place], [place] - [address]   Pharmacy Notified: Yes  Patient Notified: Comment:  **Instructed pharmacy to notify patient when script is ready to /ship.**

## 2020-02-13 ENCOUNTER — ANCILLARY PROCEDURE (OUTPATIENT)
Dept: CARDIOLOGY | Facility: CLINIC | Age: 65
End: 2020-02-13
Attending: INTERNAL MEDICINE
Payer: COMMERCIAL

## 2020-02-13 DIAGNOSIS — I47.20 VENTRICULAR TACHYCARDIA (H): ICD-10-CM

## 2020-02-13 PROCEDURE — 99207 CARDIAC DEVICE CHECK - REMOTE: CPT | Performed by: INTERNAL MEDICINE

## 2020-02-13 PROCEDURE — 93296 REM INTERROG EVL PM/IDS: CPT | Mod: ZF

## 2020-02-14 LAB
MDC_IDC_EPISODE_DTM: NORMAL
MDC_IDC_EPISODE_DURATION: 62 S
MDC_IDC_EPISODE_DURATION: 823 S
MDC_IDC_EPISODE_DURATION: NORMAL S
MDC_IDC_EPISODE_ID: NORMAL
MDC_IDC_EPISODE_TYPE: NORMAL
MDC_IDC_LEAD_IMPLANT_DT: NORMAL
MDC_IDC_LEAD_IMPLANT_DT: NORMAL
MDC_IDC_LEAD_LOCATION: NORMAL
MDC_IDC_LEAD_LOCATION: NORMAL
MDC_IDC_LEAD_MFG: NORMAL
MDC_IDC_LEAD_MFG: NORMAL
MDC_IDC_LEAD_MODEL: NORMAL
MDC_IDC_LEAD_MODEL: NORMAL
MDC_IDC_LEAD_POLARITY_TYPE: NORMAL
MDC_IDC_LEAD_POLARITY_TYPE: NORMAL
MDC_IDC_LEAD_SERIAL: NORMAL
MDC_IDC_LEAD_SERIAL: NORMAL
MDC_IDC_MSMT_BATTERY_DTM: NORMAL
MDC_IDC_MSMT_BATTERY_REMAINING_LONGEVITY: 42 MO
MDC_IDC_MSMT_BATTERY_REMAINING_PERCENTAGE: 52 %
MDC_IDC_MSMT_BATTERY_STATUS: NORMAL
MDC_IDC_MSMT_CAP_CHARGE_DTM: NORMAL
MDC_IDC_MSMT_CAP_CHARGE_DTM: NORMAL
MDC_IDC_MSMT_CAP_CHARGE_ENERGY: 11 J
MDC_IDC_MSMT_CAP_CHARGE_TIME: 1.9 S
MDC_IDC_MSMT_CAP_CHARGE_TIME: 10.1 S
MDC_IDC_MSMT_CAP_CHARGE_TYPE: NORMAL
MDC_IDC_MSMT_CAP_CHARGE_TYPE: NORMAL
MDC_IDC_MSMT_LEADCHNL_RA_IMPEDANCE_VALUE: 860 OHM
MDC_IDC_MSMT_LEADCHNL_RA_PACING_THRESHOLD_AMPLITUDE: 0.5 V
MDC_IDC_MSMT_LEADCHNL_RA_PACING_THRESHOLD_PULSEWIDTH: 0.5 MS
MDC_IDC_MSMT_LEADCHNL_RV_IMPEDANCE_VALUE: 450 OHM
MDC_IDC_MSMT_LEADCHNL_RV_PACING_THRESHOLD_AMPLITUDE: 0.8 V
MDC_IDC_MSMT_LEADCHNL_RV_PACING_THRESHOLD_PULSEWIDTH: 0.5 MS
MDC_IDC_PG_IMPLANT_DTM: NORMAL
MDC_IDC_PG_MFG: NORMAL
MDC_IDC_PG_MODEL: NORMAL
MDC_IDC_PG_SERIAL: NORMAL
MDC_IDC_PG_TYPE: NORMAL
MDC_IDC_SESS_CLINIC_NAME: NORMAL
MDC_IDC_SESS_DTM: NORMAL
MDC_IDC_SESS_TYPE: NORMAL
MDC_IDC_SET_BRADY_AT_MODE_SWITCH_MODE: NORMAL
MDC_IDC_SET_BRADY_AT_MODE_SWITCH_RATE: 170 {BEATS}/MIN
MDC_IDC_SET_BRADY_LOWRATE: 60 {BEATS}/MIN
MDC_IDC_SET_BRADY_MAX_SENSOR_RATE: 120 {BEATS}/MIN
MDC_IDC_SET_BRADY_MAX_TRACKING_RATE: 120 {BEATS}/MIN
MDC_IDC_SET_BRADY_MODE: NORMAL
MDC_IDC_SET_BRADY_PAV_DELAY_HIGH: 260 MS
MDC_IDC_SET_BRADY_PAV_DELAY_LOW: 390 MS
MDC_IDC_SET_BRADY_SAV_DELAY_HIGH: 260 MS
MDC_IDC_SET_BRADY_SAV_DELAY_LOW: 390 MS
MDC_IDC_SET_LEADCHNL_RA_PACING_AMPLITUDE: 2.4 V
MDC_IDC_SET_LEADCHNL_RA_PACING_POLARITY: NORMAL
MDC_IDC_SET_LEADCHNL_RA_PACING_PULSEWIDTH: 0.5 MS
MDC_IDC_SET_LEADCHNL_RA_SENSING_ADAPTATION_MODE: NORMAL
MDC_IDC_SET_LEADCHNL_RA_SENSING_POLARITY: NORMAL
MDC_IDC_SET_LEADCHNL_RA_SENSING_SENSITIVITY: 0.25 MV
MDC_IDC_SET_LEADCHNL_RV_PACING_AMPLITUDE: 2.4 V
MDC_IDC_SET_LEADCHNL_RV_PACING_POLARITY: NORMAL
MDC_IDC_SET_LEADCHNL_RV_PACING_PULSEWIDTH: 0.5 MS
MDC_IDC_SET_LEADCHNL_RV_SENSING_ADAPTATION_MODE: NORMAL
MDC_IDC_SET_LEADCHNL_RV_SENSING_POLARITY: NORMAL
MDC_IDC_SET_LEADCHNL_RV_SENSING_SENSITIVITY: 0.6 MV
MDC_IDC_SET_ZONE_DETECTION_INTERVAL: 273 MS
MDC_IDC_SET_ZONE_DETECTION_INTERVAL: 333 MS
MDC_IDC_SET_ZONE_DETECTION_INTERVAL: 375 MS
MDC_IDC_SET_ZONE_TYPE: NORMAL
MDC_IDC_SET_ZONE_VENDOR_TYPE: NORMAL
MDC_IDC_STAT_BRADY_DTM_END: NORMAL
MDC_IDC_STAT_BRADY_DTM_START: NORMAL
MDC_IDC_STAT_BRADY_RA_PERCENT_PACED: 0 %
MDC_IDC_STAT_BRADY_RV_PERCENT_PACED: 1 %
MDC_IDC_STAT_EPISODE_RECENT_COUNT: 0
MDC_IDC_STAT_EPISODE_RECENT_COUNT: 110
MDC_IDC_STAT_EPISODE_RECENT_COUNT: 25
MDC_IDC_STAT_EPISODE_RECENT_COUNT_DTM_END: NORMAL
MDC_IDC_STAT_EPISODE_RECENT_COUNT_DTM_START: NORMAL
MDC_IDC_STAT_EPISODE_TYPE: NORMAL
MDC_IDC_STAT_EPISODE_VENDOR_TYPE: NORMAL
MDC_IDC_STAT_TACHYTHERAPY_ATP_DELIVERED_RECENT: 0
MDC_IDC_STAT_TACHYTHERAPY_ATP_DELIVERED_TOTAL: 3
MDC_IDC_STAT_TACHYTHERAPY_RECENT_DTM_END: NORMAL
MDC_IDC_STAT_TACHYTHERAPY_RECENT_DTM_START: NORMAL
MDC_IDC_STAT_TACHYTHERAPY_SHOCKS_ABORTED_RECENT: 0
MDC_IDC_STAT_TACHYTHERAPY_SHOCKS_ABORTED_TOTAL: 1
MDC_IDC_STAT_TACHYTHERAPY_SHOCKS_DELIVERED_RECENT: 0
MDC_IDC_STAT_TACHYTHERAPY_SHOCKS_DELIVERED_TOTAL: 1
MDC_IDC_STAT_TACHYTHERAPY_TOTAL_DTM_END: NORMAL
MDC_IDC_STAT_TACHYTHERAPY_TOTAL_DTM_START: NORMAL

## 2020-02-22 ENCOUNTER — MYC MEDICAL ADVICE (OUTPATIENT)
Dept: CARDIOLOGY | Facility: CLINIC | Age: 65
End: 2020-02-22

## 2020-02-23 ENCOUNTER — HEALTH MAINTENANCE LETTER (OUTPATIENT)
Age: 65
End: 2020-02-23

## 2020-02-25 NOTE — TELEPHONE ENCOUNTER
Called patient to discuss. Haroon and RNCC has discussed earlier that no appointments were available on March 13 with Dr. Ware, and she wasn't asking to see him until 12/2020. Patient noted no new concerns at this time, but wants to proceed with disability paperwork. Will look to find slot for him to see Dr. Ware and proceed from there.

## 2020-02-27 ENCOUNTER — TELEPHONE (OUTPATIENT)
Dept: CARDIOLOGY | Facility: CLINIC | Age: 65
End: 2020-02-27

## 2020-02-27 DIAGNOSIS — I42.2 HYPERTROPHIC CARDIOMYOPATHY (H): ICD-10-CM

## 2020-02-27 NOTE — TELEPHONE ENCOUNTER
Viry from [employer] left VM in regards to their employee Haroon. They did receive a cardiovascular report for him but it wasn't fully completed, she is requesting a callback to discuss.    [phone]

## 2020-02-28 NOTE — TELEPHONE ENCOUNTER
Called Viry and left voicemail asking her to send us the paperwork to be completed at [phone], attention Radhika Dobbs

## 2020-02-29 RX ORDER — FUROSEMIDE 40 MG
40 TABLET ORAL DAILY
Qty: 90 TABLET | Refills: 2 | Status: SHIPPED | OUTPATIENT
Start: 2020-02-29 | End: 2020-07-28

## 2020-02-29 NOTE — TELEPHONE ENCOUNTER
12/20/2019  [facility] [place]   Mona Ware MD   Cardiology     furosemide (LASIX) 40 MG tablet

## 2020-03-05 ENCOUNTER — DOCUMENTATION ONLY (OUTPATIENT)
Dept: CARE COORDINATION | Facility: CLINIC | Age: 65
End: 2020-03-05

## 2020-03-09 ENCOUNTER — TELEPHONE (OUTPATIENT)
Dept: CARDIOLOGY | Facility: CLINIC | Age: 65
End: 2020-03-09

## 2020-03-12 NOTE — TELEPHONE ENCOUNTER
Emailed completed forms to [facility]. Called and confirmed receipt.

## 2020-03-13 ENCOUNTER — ANCILLARY PROCEDURE (OUTPATIENT)
Dept: CARDIOLOGY | Facility: CLINIC | Age: 65
End: 2020-03-13
Attending: INTERNAL MEDICINE
Payer: COMMERCIAL

## 2020-03-13 VITALS
SYSTOLIC BLOOD PRESSURE: 117 MMHG | BODY MASS INDEX: 29.82 KG/M2 | WEIGHT: 213 LBS | HEIGHT: 71 IN | OXYGEN SATURATION: 97 % | DIASTOLIC BLOOD PRESSURE: 80 MMHG | HEART RATE: 108 BPM

## 2020-03-13 VITALS
HEIGHT: 71 IN | DIASTOLIC BLOOD PRESSURE: 80 MMHG | BODY MASS INDEX: 29.82 KG/M2 | SYSTOLIC BLOOD PRESSURE: 117 MMHG | OXYGEN SATURATION: 97 % | WEIGHT: 213 LBS | HEART RATE: 108 BPM

## 2020-03-13 DIAGNOSIS — I48.0 PAF (PAROXYSMAL ATRIAL FIBRILLATION) (H): ICD-10-CM

## 2020-03-13 DIAGNOSIS — I42.2 HYPERTROPHIC CARDIOMYOPATHY (H): ICD-10-CM

## 2020-03-13 DIAGNOSIS — I48.0 PAROXYSMAL ATRIAL FIBRILLATION (H): ICD-10-CM

## 2020-03-13 DIAGNOSIS — I51.7 HYPERTROPHIC CARDIOMEGALY: ICD-10-CM

## 2020-03-13 DIAGNOSIS — I47.20 VENTRICULAR TACHYCARDIA (H): ICD-10-CM

## 2020-03-13 DIAGNOSIS — I51.7 HYPERTROPHIC CARDIOMEGALY: Primary | ICD-10-CM

## 2020-03-13 DIAGNOSIS — I47.20 VENTRICULAR TACHYCARDIA (H): Primary | ICD-10-CM

## 2020-03-13 DIAGNOSIS — R06.02 SOB (SHORTNESS OF BREATH): ICD-10-CM

## 2020-03-13 DIAGNOSIS — I47.19 ATRIAL TACHYCARDIA (H): ICD-10-CM

## 2020-03-13 DIAGNOSIS — I48.20 CHRONIC ATRIAL FIBRILLATION (H): ICD-10-CM

## 2020-03-13 DIAGNOSIS — I48.91 ATRIAL FIBRILLATION, UNSPECIFIED TYPE (H): ICD-10-CM

## 2020-03-13 DIAGNOSIS — I42.2 HYPERTROPHIC CARDIOMYOPATHY (H): Primary | ICD-10-CM

## 2020-03-13 LAB
MDC_IDC_EPISODE_DTM: NORMAL
MDC_IDC_EPISODE_ID: NORMAL
MDC_IDC_EPISODE_TYPE: NORMAL
MDC_IDC_LEAD_IMPLANT_DT: NORMAL
MDC_IDC_LEAD_IMPLANT_DT: NORMAL
MDC_IDC_LEAD_LOCATION: NORMAL
MDC_IDC_LEAD_LOCATION: NORMAL
MDC_IDC_LEAD_MFG: NORMAL
MDC_IDC_LEAD_MFG: NORMAL
MDC_IDC_LEAD_MODEL: NORMAL
MDC_IDC_LEAD_MODEL: NORMAL
MDC_IDC_LEAD_POLARITY_TYPE: NORMAL
MDC_IDC_LEAD_POLARITY_TYPE: NORMAL
MDC_IDC_LEAD_SERIAL: NORMAL
MDC_IDC_LEAD_SERIAL: NORMAL
MDC_IDC_MSMT_BATTERY_DTM: NORMAL
MDC_IDC_MSMT_BATTERY_REMAINING_LONGEVITY: 42 MO
MDC_IDC_MSMT_BATTERY_STATUS: NORMAL
MDC_IDC_MSMT_CAP_CHARGE_DTM: NORMAL
MDC_IDC_MSMT_CAP_CHARGE_ENERGY: 11 J
MDC_IDC_MSMT_CAP_CHARGE_TIME: 1.91 S
MDC_IDC_MSMT_CAP_CHARGE_TIME: 10.05
MDC_IDC_MSMT_CAP_CHARGE_TYPE: NORMAL
MDC_IDC_MSMT_CAP_CHARGE_TYPE: NORMAL
MDC_IDC_MSMT_LEADCHNL_RA_IMPEDANCE_VALUE: 846 OHM
MDC_IDC_MSMT_LEADCHNL_RA_PACING_THRESHOLD_AMPLITUDE: 0.5 V
MDC_IDC_MSMT_LEADCHNL_RA_PACING_THRESHOLD_PULSEWIDTH: 0.5 MS
MDC_IDC_MSMT_LEADCHNL_RA_SENSING_INTR_AMPL: 11.3 MV
MDC_IDC_MSMT_LEADCHNL_RV_IMPEDANCE_VALUE: 491 OHM
MDC_IDC_MSMT_LEADCHNL_RV_PACING_THRESHOLD_AMPLITUDE: 0.8 V
MDC_IDC_MSMT_LEADCHNL_RV_PACING_THRESHOLD_PULSEWIDTH: 0.5 MS
MDC_IDC_MSMT_LEADCHNL_RV_SENSING_INTR_AMPL: 25 MV
MDC_IDC_PG_IMPLANT_DTM: NORMAL
MDC_IDC_PG_MFG: NORMAL
MDC_IDC_PG_MODEL: NORMAL
MDC_IDC_PG_SERIAL: NORMAL
MDC_IDC_PG_TYPE: NORMAL
MDC_IDC_SESS_CLINIC_NAME: NORMAL
MDC_IDC_SESS_DTM: NORMAL
MDC_IDC_SESS_TYPE: NORMAL
MDC_IDC_SET_BRADY_AT_MODE_SWITCH_MODE: NORMAL
MDC_IDC_SET_BRADY_AT_MODE_SWITCH_RATE: 170 {BEATS}/MIN
MDC_IDC_SET_BRADY_LOWRATE: 60 {BEATS}/MIN
MDC_IDC_SET_BRADY_MAX_SENSOR_RATE: 120 {BEATS}/MIN
MDC_IDC_SET_BRADY_MAX_TRACKING_RATE: 120 {BEATS}/MIN
MDC_IDC_SET_BRADY_MODE: NORMAL
MDC_IDC_SET_BRADY_PAV_DELAY_HIGH: 260 MS
MDC_IDC_SET_BRADY_PAV_DELAY_LOW: 390 MS
MDC_IDC_SET_BRADY_SAV_DELAY_HIGH: 260 MS
MDC_IDC_SET_BRADY_SAV_DELAY_LOW: 390 MS
MDC_IDC_SET_LEADCHNL_RA_PACING_AMPLITUDE: 2.4 V
MDC_IDC_SET_LEADCHNL_RA_PACING_CAPTURE_MODE: NORMAL
MDC_IDC_SET_LEADCHNL_RA_PACING_POLARITY: NORMAL
MDC_IDC_SET_LEADCHNL_RA_PACING_PULSEWIDTH: 0.5 MS
MDC_IDC_SET_LEADCHNL_RA_SENSING_ADAPTATION_MODE: NORMAL
MDC_IDC_SET_LEADCHNL_RA_SENSING_POLARITY: NORMAL
MDC_IDC_SET_LEADCHNL_RA_SENSING_SENSITIVITY: 0.25 MV
MDC_IDC_SET_LEADCHNL_RV_PACING_AMPLITUDE: 2.4 V
MDC_IDC_SET_LEADCHNL_RV_PACING_CAPTURE_MODE: NORMAL
MDC_IDC_SET_LEADCHNL_RV_PACING_POLARITY: NORMAL
MDC_IDC_SET_LEADCHNL_RV_PACING_PULSEWIDTH: 0.5 MS
MDC_IDC_SET_LEADCHNL_RV_SENSING_ADAPTATION_MODE: NORMAL
MDC_IDC_SET_LEADCHNL_RV_SENSING_POLARITY: NORMAL
MDC_IDC_SET_LEADCHNL_RV_SENSING_SENSITIVITY: 0.6 MV
MDC_IDC_SET_ZONE_DETECTION_INTERVAL: 273 MS
MDC_IDC_SET_ZONE_DETECTION_INTERVAL: 333 MS
MDC_IDC_SET_ZONE_DETECTION_INTERVAL: 375 MS
MDC_IDC_SET_ZONE_TYPE: NORMAL
MDC_IDC_SET_ZONE_VENDOR_TYPE: NORMAL
MDC_IDC_STAT_BRADY_RA_PERCENT_PACED: 1 %
MDC_IDC_STAT_BRADY_RV_PERCENT_PACED: 2 %
MDC_IDC_STAT_EPISODE_RECENT_COUNT: 188
MDC_IDC_STAT_EPISODE_RECENT_COUNT: 188
MDC_IDC_STAT_EPISODE_RECENT_COUNT: 569
MDC_IDC_STAT_EPISODE_RECENT_COUNT_DTM_END: NORMAL
MDC_IDC_STAT_EPISODE_RECENT_COUNT_DTM_START: NORMAL
MDC_IDC_STAT_EPISODE_TOTAL_COUNT: 194
MDC_IDC_STAT_EPISODE_TOTAL_COUNT: 198
MDC_IDC_STAT_EPISODE_TOTAL_COUNT: 6325
MDC_IDC_STAT_EPISODE_TOTAL_COUNT_DTM_END: NORMAL
MDC_IDC_STAT_EPISODE_TYPE: NORMAL
MDC_IDC_STAT_EPISODE_VENDOR_TYPE: NORMAL
MDC_IDC_STAT_TACHYTHERAPY_ATP_DELIVERED_RECENT: 0
MDC_IDC_STAT_TACHYTHERAPY_ATP_DELIVERED_TOTAL: 3
MDC_IDC_STAT_TACHYTHERAPY_RECENT_DTM_END: NORMAL
MDC_IDC_STAT_TACHYTHERAPY_RECENT_DTM_START: NORMAL
MDC_IDC_STAT_TACHYTHERAPY_SHOCKS_ABORTED_RECENT: 0
MDC_IDC_STAT_TACHYTHERAPY_SHOCKS_ABORTED_TOTAL: 1
MDC_IDC_STAT_TACHYTHERAPY_SHOCKS_DELIVERED_RECENT: 0
MDC_IDC_STAT_TACHYTHERAPY_SHOCKS_DELIVERED_TOTAL: 1
MDC_IDC_STAT_TACHYTHERAPY_TOTAL_DTM_END: NORMAL

## 2020-03-13 PROCEDURE — 99215 OFFICE O/P EST HI 40 MIN: CPT | Mod: 25 | Performed by: INTERNAL MEDICINE

## 2020-03-13 PROCEDURE — 99214 OFFICE O/P EST MOD 30 MIN: CPT | Mod: 25 | Performed by: INTERNAL MEDICINE

## 2020-03-13 PROCEDURE — 93005 ELECTROCARDIOGRAM TRACING: CPT | Mod: ZF

## 2020-03-13 PROCEDURE — G0463 HOSPITAL OUTPT CLINIC VISIT: HCPCS | Mod: 25,ZF

## 2020-03-13 PROCEDURE — 40000269 ZZH STATISTIC NO CHARGE FACILITY FEE: Mod: ZF

## 2020-03-13 RX ORDER — SODIUM CHLORIDE 9 MG/ML
INJECTION, SOLUTION INTRAVENOUS CONTINUOUS
Status: CANCELLED | OUTPATIENT
Start: 2020-03-13

## 2020-03-13 RX ORDER — DABIGATRAN ETEXILATE 150 MG/1
150 CAPSULE ORAL 2 TIMES DAILY
Qty: 60 CAPSULE | Refills: 0 | Status: SHIPPED | OUTPATIENT
Start: 2020-03-13 | End: 2020-10-18 | Stop reason: ALTCHOICE

## 2020-03-13 RX ORDER — LIDOCAINE 40 MG/G
CREAM TOPICAL
Status: CANCELLED | OUTPATIENT
Start: 2020-03-13

## 2020-03-13 ASSESSMENT — MIFFLIN-ST. JEOR
SCORE: 1773.29
SCORE: 1773.29

## 2020-03-13 ASSESSMENT — PAIN SCALES - GENERAL
PAINLEVEL: NO PAIN (0)
PAINLEVEL: NO PAIN (0)

## 2020-03-13 NOTE — LETTER
3/13/2020      RE: Stu Meredith  [address] [facility] [address]       Dear Colleague,    Thank you for the opportunity to participate in the care of your patient, Stu Meredith, at the [facility]. Please see a copy of my visit note below.    HPI:  64 year old male with history of HCM without obstruction (dx 2006, EF 20% improved to 60%), VT s/p ICD 2012, nonobstructive CAD (cath 2013), AF s/p PVI X 3 (2011, 2016, 2018) and DCCV X 10 who since last clinic visit has had recurrence of afib/flutter.  He underwent DCCV in mid-January but reverted back into afib/flutter in late-January.      Today patient reports that he has noted increased fatigue and yanes with decreased exercise tolerance since recurrence of afib/flutter.  He denies any chest pain/pressure, PND, orthopnea, abdominal swelling/distension, palpitations, syncope or edema.  He denies any other problems with his medications and reports compliance.      Past Medical History:   Diagnosis Date     Atrial fibrillation (H) 12/9/11    failed medication, multiple DC cardioveresions; s/p Left atrial ablation to eliminate atrial fibrillation 12/9/11     Chest pain      CHF (congestive heart failure) (H) 7/30/2016     Coronary artery disease      DJD (degenerative joint disease)      Hip arthritis 1/15/2014     Hypertension      Hypertrophic cardiomyopathy (H) 10/09     Other abnormal heart sounds      Pacemaker     ICD     Palpitations      Pneumonia, organism unspecified(486)      Prediabetes 7/10/15    A1C 6.5     Status post implantation of automatic cardioverter/defibrillator (AICD)      Ventricular tachycardia (H)    - HCM diagnosed '06, previously burnt out (EF 20%) now with recovered EF  - h/o VT s/p dual chamber ICD '12  - AF s/p PVI X 2 ('11, '16, '18), h/o DCCV X 10    Meds:  acetaminophen (TYLENOL) 325 MG tablet, Take 325-650 mg by mouth every 6 hours as needed  albuterol (PROAIR  HFA/PROVENTIL HFA/VENTOLIN HFA) 108 (90 Base) MCG/ACT inhaler, Inhale 2 puffs into the lungs every 6 hours  amoxicillin (AMOXIL) 500 MG tablet, Take 4 tablets (2000 mg) by mouth 1 hour before dental procedures.  atorvastatin (LIPITOR) 20 MG tablet, Take 1 tablet (20 mg) by mouth daily  cyanocobalamin (VITAMIN B-12) 100 MCG tablet, Take 100 mcg by mouth daily  dofetilide (TIKOSYN) 250 MCG capsule, Take 1 capsule (250 mcg) by mouth every 12 hours  eplerenone (INSPRA) 50 MG tablet, Take 1 tablet (50 mg) by mouth daily  furosemide (LASIX) 40 MG tablet, Take 1 tablet (40 mg) by mouth daily  gabapentin (NEURONTIN) 300 MG capsule, Take 2 capsules (600 mg) by mouth 2 times daily  metFORMIN (GLUCOPHAGE-XR) 500 MG 24 hr tablet, Take 2 tablets (1,000 mg) by mouth daily (with dinner) Take 2 tablets (1,000 mg) daily (with dinner).  metoprolol succinate ER (TOPROL-XL) 50 MG 24 hr tablet, TAKE ONE TABLET BY MOUTH EVERY DAY  multivitamin, therapeutic (THERA-VIT) TABS, Take 1 tablet by mouth daily  XARELTO ANTICOAGULANT 20 MG TABS tablet, Take 1 tablet (20 mg) by mouth daily (with dinner)  zolpidem (AMBIEN) 10 MG tablet, Take 0.5-1 tablets (5-10 mg) by mouth nightly as needed for sleep  dabigatran ANTICOAGULANT (PRADAXA ANTICOAGULANT) 150 MG capsule, Take 1 capsule (150 mg) by mouth 2 times daily Store in original 's bottle or blister pack; use within 120 days of opening.  [] doxycycline hyclate (VIBRAMYCIN) 100 MG capsule, Take 1 capsule (100 mg) by mouth 2 times daily for 10 days  guaiFENesin-codeine (ROBITUSSIN AC) 100-10 MG/5ML solution, Take 5-10 mLs by mouth every 4 hours as needed for cough  guaiFENesin-codeine (ROBITUSSIN AC) 100-10 MG/5ML solution, Take 5-10 mLs by mouth every 4 hours as needed    lidocaine 1% with EPINEPHrine 1:100,000 injection 3 mL        Social History     Socioeconomic History     Marital status:      Spouse name: Not on file     Number of children: Not on file     Years of  education: Not on file     Highest education level: Not on file   Occupational History     Occupation:      Employer: [employer]   Social Needs     Financial resource strain: Not on file     Food insecurity:     Worry: Not on file     Inability: Not on file     Transportation needs:     Medical: Not on file     Non-medical: Not on file   Tobacco Use     Smoking status: Former Smoker     Packs/day: 1.00     Years: 40.00     Pack years: 40.00     Types: Cigarettes     Start date: 1/1/1975     Last attempt to quit: 12/11/2015     Years since quitting: 3.5     Smokeless tobacco: Never Used   Substance and Sexual Activity     Alcohol use: Yes     Alcohol/week: 0.0 oz     Comment: 1 drink per week     Drug use: No     Sexual activity: Yes     Partners: Female   Lifestyle     Physical activity:     Days per week: Not on file     Minutes per session: Not on file     Stress: Not on file   Relationships     Social connections:     Talks on phone: Not on file     Gets together: Not on file     Attends Jehovah's witness service: Not on file     Active member of club or organization: Not on file     Attends meetings of clubs or organizations: Not on file     Relationship status: Not on file     Intimate partner violence:     Fear of current or ex partner: Not on file     Emotionally abused: Not on file     Physically abused: Not on file     Forced sexual activity: Not on file   Other Topics Concern      Service Not Asked     Blood Transfusions Not Asked     Caffeine Concern Not Asked     Occupational Exposure Not Asked     Hobby Hazards Not Asked     Sleep Concern Not Asked     Stress Concern Not Asked     Weight Concern Not Asked     Special Diet Not Asked     Back Care Not Asked     Exercise No     Bike Helmet Not Asked     Seat Belt Yes     Self-Exams Not Asked     Parent/sibling w/ CABG, MI or angioplasty before 65F 55M? No   Social History Narrative     Not on file       Family History   Problem  "Relation Age of Onset     Heart Disease Mother         unknown     Obesity Mother      Gastrointestinal Disease Mother         diverticulitis     Diabetes Maternal Grandmother      Diabetes Paternal Grandmother      Cerebrovascular Disease Paternal Grandmother 94     Diabetes Paternal Grandfather      Cerebrovascular Disease Paternal Grandfather 78     Diabetes Son      C.A.D. Father         CABG age 78     Heart Disease Father         CABG x5     Diabetes Maternal Grandfather      LUNG DISEASE No family hx of      Deep Vein Thrombosis (DVT) No family hx of      Anesthesia Reaction No family hx of      Melanoma No family hx of      Skin Cancer No family hx of        ROS:   Constitutional: No fever, chills, or sweats. No weight gain/loss.   ENT: No visual disturbance, ear ache, epistaxis, sore throat.   Allergies/Immunologic: Negative.   Respiratory: No cough, hemoptysis.   Cardiovascular: As per HPI.   GI: No nausea, vomiting, hematemesis, melena, or hematochezia.   : No urinary frequency, dysuria, or hematuria.   Integument: Negative.   Psychiatric: Negative.   Neuro: Negative.   Endocrinology: Negative.   Musculoskeletal: Ongoing hip pain      /80 (BP Location: Right arm, Patient Position: Chair, Cuff Size: Adult Regular)   Pulse 108   Ht 1.803 m (5' 11\")   Wt 96.6 kg (213 lb)   SpO2 97%   BMI 29.71 kg/m    General: appears comfortable, alert and articulate  Head: normocephalic, atraumatic  Eyes: anicteric sclera, EOMI  Neck: no adenopathy, 2+ carotids without bruits  Orophyarynx: moist mucosa, no lesions, dentition intact  Heart: regular, S1/S2, 2/6 systolic murmur, no gallop or rub, estimated JVP <10cm  Lungs: clear, no rales or wheezing  Abdomen: soft, non-tender, bowel sounds present, no hepatomegaly  Extremities: no clubbing, cyanosis or edema  Neurological: normal speech and affect, no gross motor deficits      CPX Echo 6/1/2018:  Interpretation Summary  Submaximal treadmill stress test in a " patient with a diagnosis of HCM.  The Ramirez treadmill score is 8 (low risk).  Exercise was stopped due to fatigue.  Normal blood pressure response to exercise.  No ECG evidence of ischemia.  No supraventricular or ventricular arrhythmias. Frequent PVCs noted throughout the study.  Normal biventricular function with mild asymmetrical septal hypertrophy (12mm).  No dynamic outflow tract obstruction is present at rest or with exercise.  Normal segmental wall motion at rest and with exercise.  Minimal augmentation in LV function with exercise.  Average functional capacity for age.         CPX 2/15/2019:        CTA coronary angiogram 5/28/19  IMPRESSION:  1.  No evidence of coronary artery atherosclerosis or stenosis.  2.  Myocardial bridging involving the mid LAD.  3.  Total Agatston score 0 placing the patient in the lowest percentile when compared to age and gender matched control group.    Right heart cath 8/21/19   Time Systolic Diastolic Mean A Wave V Wave EDP Max dp/dt HR   RA Pressures  3:38 PM   12 mmHg    16 mmHg    14 mmHg      61 bpm      RV Pressures  3:38 PM 60 mmHg        16 mmHg     106 bpm      PA Pressures  3:41 PM 60 mmHg    22 mmHg    38 mmHg        69 bpm      PCW Pressures  3:39 PM   30 mmHg    28 mmHg    38 mmHg      74 bpm         Time Hb SAT(%) PO2 Content PA Sat   PA  3:28 PM  63.6 %      63.6 %      Art  3:28 PM  99 %     18.04 mL/dL       Cardiac Output Phase: Baseline      Time TDCO TDCI Tj C.O. Tj C.I. Tj HR   Cardiac Output Results  3:28 PM 3.8 L/min    1.79 L/min/m2    4.41 L/min    2.08 L/min/m2           Echo 12/20/2020  Global and regional left ventricular function is normal with an EF of 55-60%.  Global right ventricular function is normal.  IVC diameter <2.1 cm collapsing >50% with sniff suggests a normal RA pressure  of 3 mmHg.  No pericardial effusion is present.  Mild pulmonary hypertension is present.  No change from prior.           Assessment/Plan:  64 year old male with  history of HCM without obstruction (dx 2006, EF 20% improved to 60%), VT s/p ICD 2012, nonobstructive CAD (cath 2013), AF s/p PVI X 3 (2011, 2016, 2018) and DCCV X 10 who presents for ongoing evaluation and management,      # Hypertrophic cardiomyopathy -- previously burnt out with EF 20% ('13), now recovered (EF 60%).  No evidence of LVOT obstruction.  Echo in December with no significant change.    - will obtain CPX now that patient is in afib/flutter to reassess exercise capacity   - continue metoprolol XL 50mg daily   - continue eplerenone 50mg daily  - pt aware of exercise restrictions and family screening recommendations    # Nonobstructive CAD -- 10-30% stenoses on cath '13  - coronary CTA confirmed no significant disease  - continue atorvastatin 20     # HTN -- controlled  - continue metoprolol XL and eplerenone     # Atrial Fibrillation   - continue Xarelto  - continue dofetilide 250mcg  BID   - continue Metoprolol 50 XL daily   - plans for repeat atrial arrhythmia ablation.  Patient seen by EP today.  Please see their note for detailed finding and plans.      Mona Ware MD  Section Head - Advanced Heart Failure, Transplantation and Mechanical Circulatory Support  Director - Adult Congenital and Cardiovascular Genetics Center  Associate Professor of Medicine, [facility]

## 2020-03-13 NOTE — PATIENT INSTRUCTIONS
You were seen today in the Adult Congenital and Cardiovascular Genetics Clinic at the [facility].    Cardiology Providers you saw during your visit:  Mona Ware MD and Erik Cooper MD    Diagnosis:  HCM    Results:  Mona Ware MD and Erik Cooper MD reviewed the results of your EKG and device testing today in clinic.    Recommendations:    1. Continue to eat a heart healthy, low salt diet.  2. Continue to get 20-30 minutes of aerobic activity, 4-5 days per week.  Examples of aerobic activity include walking, running, swimming, cycling, etc.  3. Continue to observe good oral hygiene, with regular dental visits.  4. One week before you ablation, stop taking Xarelto and begin taking Pradaxa. Take Pradaxa for one week before ablation. Continue to take Pradaxa for three weeks after the ablation. At that time, you can resume taking Xarelto.  5. I sent a prescription for Pradaxa 150 mg twice daily for one month.  6. Please have a CPX as soon as possible.    You are scheduled for an Atrial Fibrillation Ablation, at [facility], [place], with Dr. Erik Cooper. The hospital is located at [address] [place] [address] [place].  If you need to cancel this procedure, please call [phone].     **Medication Change: 1 week prior to ablation, stop Eliquis and start Pradaxa 150mg twice a day. You will be given a 30 day supply. Continue taking until you run out, and then switch back to Eliquis. A 30 day voucher has been provided.     Please disregard any automated, reminder phone calls regarding arrival times. Follow the below arrival times.     Pre-Anesthesia Phone Call will occur 1-2 days prior to procedure date.  You do not need to come to the [place].    Atrial Fibrillation Ablation with Transesophageal Echocardiogram (ALEJANDRA)    1. Please review the attached instructions on showering before your procedure at the end of this letter.  2. Your history and physical  will be completed by our nurse practitioner when you arrive.  3. Please do not eat anything for 8 hours prior to your procedure. You may have sips of water up until 2 hours prior to your arrival.  4. If you are diabetic follow the following instructions: (Contact your diabetes team if you have questions)   * Hold oral diabetic medications and short-acting insulins (aspart, lispro, regular) the morning of the procedure.   * Hold mixed insulins (70/30, 75/25, 50/50) the morning of procedure. Instead, take 66% of NPH (N) component.   * Take 80% of your normal long-acting insulin (glargine/Lantus or levemir/Detemir, degludec/Tresiba) the evening before and/or morning of the procedure.  5. The morning of your procedure, you may take your scheduled medications with a sip of water - continue your blood thinner (warfarin, Pradaxa).  6. If the ALEJANDRA shows a clot in your heart, the procedure will be canceled. If your INR is too low or too high, the procedure may be canceled.  7. You will receive general anesthesia for this procedure.   8. You may stay in the hospital overnight or you may discharge the same day      Post-Procedure Instructions  Care of groin site:    Remove the Band-Aid after 24 hours. If there is minor oozing, apply another Band-aid and remove it after 12 hours.     Do NOT take a bath, use a hot tub, pool, or submerse in water for at least 3 days. You may shower.     It is normal to have a small bruise or lump at the site.    Do not scrub the site.    Do not use lotion or powder near the puncture site for 3 days.    If you start bleeding from the site in your groin: Lie down flat and press firmly on the site. Call your physician immediately, or, come to the emergency room.  Call 911 right away if you have bleeding that is heavy or does not stop.    Call your doctor/provider if:     You have a large or growing hard lump around the site.     The site is red, swollen, hot or tender.     Blood or fluid is draining  from the site.     You have chills or a fever greater than 101 F (38 C).     Your leg or arm turns bluish, feels numb or cool.     You have hives, a rash or unusual itching.    Activity Restrictions    For the first 2 days: Do not stoop or squat. When you cough, sneeze or move your bowels, hold your hand over the puncture site and press gently.    Do not lift more than 10 pounds or exertional activity for 10 days.  - No driving for 24 hours after (with or without general anesthesia).      Follow up appointment- to be scheduled    Please do not hesitate to utilize MyChart or call us if you have any questions or concerns.    Radha Willson,  [facility] Procedure   [phone]      Showering Before Surgery   Your surgeon has asked you to take 2 showers before surgery.  Why is this important?  It is normal for bacteria (germs) to be on your skin. The skin protects us from these germs. When you have surgery, we cut the skin. Sometimes germs get into the cuts and cause infection (illness caused by germs). By following the instructions below and using special soap, you will lower the number of germs on your skin. This decreases your chance of infection.  Special soap  Buy or get 8 ounces of antiseptic surgical soap called 4% CHG. Common name brands of this soap are Hibiclens and Exidine.   You can find it at your local pharmacy, clinic or retail store. If you have trouble, ask your pharmacist to help you find the right substitute.   A note about shaving:  Do not shave within 12 inches of your incision (surgical cut) area for at least 3 days before surgery. Shaving can make small cuts in the skin. This puts you at a higher risk of infection.  Items you will need for each shower:    1 newly washed towel    4 ounces of one of the above soaps  Follow these instructions:  The evening before surgery   1. Wash your hair and body with your regular shampoo and soap. Make sure you rinse the shampoo and soap from your hair and body.    2. Using clean hands, apply about 2 ounces of soap gently on your skin from the neck to your toes. Use on your groin area last. Do not use this soap on your face or head. If you get any soap in your eyes, ears or mouth, rinse right away.   3. Repeat step 2. It is very important to let the soap stay on your skin for at least 1 minute.   4. Rinse well and dry off using a clean towel.If you feel any tingling, itching or other irritation, rinse right away. It is normal to feel some coolness on the skin after using the antiseptic soap. Your skin may feel a bit dry after the shower, but do not use any lotions, creams or moisturizers. Do not use hair spray or other products in your hair.  5. Dress in freshly washed clothes or pajamas. Use fresh pillowcases and sheets on your bed.    The morning of surgery  1. Wash your hair and body with your regular shampoo and soap. Make sure you rinse the shampoo and soap from your hair and body.   2. Using clean hands, apply about 2 ounces of soap gently on your skin from the neck to your toes. Use on your groin area last. Do not use this soap on your face or head. If you get any soap in your eyes, ears or mouth, rinse right away.   3. Repeat step 2. It is very important to let the soap stay on your skin for at least 1 minute.   4. Rinse well and dry off using a clean towel.If you feel any tingling, itching or other irritation, rinse right away. It is normal to feel some coolness on the skin after using the antiseptic soap. Your skin may feel a bit dry after the shower, but do not use any lotions, creams or moisturizers. Do not use hair spray or other products in your hair.  5. Dress in clean clothes.  If you have any questions about showering or an allergy to CHG soap, please call the Preadmissions Nursing Department at the hospital where you are having your surgery.  [facility] [place] ([place]): [phone]  This phone number will be answered  between the hours of 8:00 a.m. and 6:30 p.m. Monday through Friday.        SBE prophylaxis:   Yes____  No_X___    Lifelong Bacterial Endocarditis Prophylaxis:  YES____  NO____    If YES is checked, follow the recommendations outlined below:  1. Take antibiotic(s) prior to recommended dental procedures and procedures on the respiratory tract or with infected skin, muscle or bones. SBE prophylaxis is not needed for routine GI and  procedures (ie. Colonoscopy or vaginal delivery)  2. Observe good oral hygiene daily, as advised by your dentist. Get regular professional dental care.  3. Keep cuts clean.  4. Infections should be treated promptly.  5. Symptoms of Infective Endocarditis could include: fever lasting more than 4-5 days or a recurrent fever that initially resolves but returns within 1-2 days)      Exercise restrictions:   Yes__X__  No____         If yes, list restrictions:  Must be allowed to rest if fatigued or SOB      Work restrictions:  Yes____  No_X___         If yes, list restrictions:    FASTING CHOLESTEROL was checked in the last 5 years YES_X__  NO___ (2019)  Continue to eat a heart healthy, low salt diet.         ____ Fasting lipid panel order today         ____ No changes in medications          ____ I recommend the following changes in your cholesterol medications.:          ____ Please follow up for cholesterol screening at your primary care physician      Follow-up:  Follow up with Dr. Cooper and Dr. Ware 3 months after ablation.    If you have questions or concerns please contact us at:    Dimple Birch, MSN, RN, CNL    Radhika Mitchell (Scheduling)  Nurse Care Coordinator     Clinic   Adult Congenital and CV Genetics   Adult Congenital and CV Genetic  [facility]   [facility]  (P) [phone]     (P) [phone]  [email or web address]   (F) [phone]        For after hours urgent needs, call [phone] and ask  to speak to the Adult Congenital Physician on call.  Mention Job Code 0401.    For emergencies call 911.    [facility]  Mail Code [id number]  [address] [id number]

## 2020-03-13 NOTE — LETTER
3/13/2020      RE: Stu Meredith  [address] [facility] [id number]       Dear Colleague,    Thank you for the opportunity to participate in the care of your patient, Stu Meredith, at the [facility] HEART [facility] at [facility]. Please see a copy of my visit note below.    Electrophysiology Clinic Note  HPI:   Mr. Meredith is a 64 year old male who has a past medical history significant for HCM diagnosed 2006, VT on Holter July 2012 s/p ICD 7/2012, troponin leak Oct 2013 (angiogram with non-significant CAD, LV gram showed EF 25%, recovered to 60% on TTE Dec 2013), HTN, AFib (CHADSVASC 2) with DCCVs then s/p PVI 12/2011, PVI for persistent AF on 7/28/16, PVI/CTI/CFAE with posterior wall isolation 8/17/18, s/p DCCV 8/7/17, 1/3/18l,  5/5/18, 9/27/18, 12/20/2018, 1/16/2020, and s/p right IRISH on 6/5/2019. He presents for follow-up.  The patient underwent atrial fibrillation ablation by Dr. Gonzalez at [facility] in 12/2011 and implantation of an ICD in 07/2012 because of ventricular tachycardia. As per patient, he has been in sinus rhythm approximately 1-1/2 years after ablation. The patient was found to have recurrent atrial fibrillation during a preoperative exam in 10/2013 and underwent electrical cardioversion in 11/2013. The patient underwent hip surgery in 01/2014. The patient noticed that he was in atrial fibrillation at a clinic visit in 01/2014.   He was seen in consult by Dr Analilia spain on 2/14/14 for consideration of ablation. At the time the patient was reported to be generally unaware of whether or not he is in sinus rhythm or in atrial fibrillation. He reports daytime somnolence but denies significant dyspnea on exertion, palpitations, irregular beat sensation, presyncope or syncope. In terms of risk factors for stroke, the patient has coronary heart disease, hypertension, but denies history of diabetes, previous myocardial cardiac infarction or heart  failure. The patient is on rivaroxaban for stroke prophylaxis and is on diltiazem and metoprolol. He was told that there was no consideration of ablation.   He came to EP clinic for second opinion in 3/3/2014. The patient mentioned this time he is not sure whether his afib is causing fatigue. However, he also attributed symptoms like headache and head fullness to his afib. He also had a cold around the same time. He still denied chest pain, syncope or presyncope, ICD shocks, MART or SOB. He does however indicate this time that he does feel irregular heart beat at times, along with chronic fatigue, although is not sure if fatigue is linked to afib. We agreed at that time to pursue a cardioversion to see whether he would feel better and we started him on sotalol 80 mg twice a day. He underwent cardioversion on 4/24/2014.  He then had a device transmission showed recurrent AF in 7/2014 which spontaneously converted. We decrease his diltiazem from 240 to 120 mg daily because of fatigue. His fatigue did get better when we decrease his diltiazem.  He did have a 9 day episode of A. Fib starting June 19, 2015, during which the patient felt very tired and no energy.  The heart rate was well controlled.  A. Fib burden was 19% since 5/19/2015, but it is the first episode of A. Fib since at least November 2014.  However at follow up in 2016 AF burden 1%. At follow up in 5/2016 his AF burden= 100% since 2/28/16. He reported some increased fatgiue and decreased stamina since being back in AF. At that visit, he elected to pursue repeat PVI ablation for AF that is refractory to Sotalol. He underwent repeat PVI ablation for persistent AF refractory to AATs on 7/28/16. He had successful re-isolation of all 4 pulmonary veins. He was re-hospitalized a couple days post ablation for SOB assocaited with hypervolemia which resolved with diuresis. He reports feeling well after ablation. He states his energy levels improved with restoration  of sinus. Diltiazem was stopped after ablation.  He has now had some recurrent episodes of AT/AF requiring DCCV on 8/7/17 and 1/3/18.  Device check showed AT episode that started 1/28/18 and lasted 25 days and self terminated. He then had recurrent AT/AF and had DCCV on 5/5/18. He followed up with Dr. Ware recently in clinic and reported having fatigue, MART, and decreased stamina. Device interrogation showed he had recurred back into AT/AF since 5/26/18. He was arranged for EP follow up.     Ep Visit 6/2018: He continues to have fatigue, MART, and decreased stamina. Last stress echo from 6/1/18 showed normal biventricular function with asymmetrical septal hypertrophy and no LVOT obstruction with rest or exercise. He denies any chest pain/pressures, dizziness, lightheadedness, leg/ankle swelling, PND, orthopnea, palpitations, or syncopal symptoms.Presenting 12 lead ECG shows AFL Vent Rate 111 bpm, QRS 80 ms, QTc 489 ms. Current cardiac medications include: Spironolactone, Lipitor, Sotalol, Toprol XL, and Xarelto.     EP Visit 7/12/19: He presents today for follow up. He underwent a repeat PVI/CTI on 8/17/18. Device shows AF has been 100% since 3/14/19. He underwent right TREVIN on 6/5/19. Recovering well after procedure. He reports issues with ongoing fatigue, MART, and decreased stamina. He denies any chest pain/pressures, dizziness, lightheadedness, leg/ankle swelling, PND, orthopnea, palpitations, or syncopal symptoms. Presenting 12 lead ECG shows atypical AFL Vent Rate 73 bpm, QRS 84 ms, QTc 420 ms. Device interrogation shows normal device function. Since 1/18/19, there have been 400 NSVT, 2 other untreated episodes, and 211 AT/AF episodes recorded.  The VT-1 monitored episodes are AF w/RVR with rates in the 140s.  The 2 NSVT episodes available for review last 12 beats in duration with rates of 162-179 bpm.  AF burden = 85% since 1/18/19 and AF appear persistent since approximately mid 3/2019. Intrinsic rhythm = AF  @  bpm. AP = 2%.  = 5%. Lead trends appear stable. Current cardiac medications include: Spironolactone, Lipitor, Sotalol, Toprol XL, and Xarelto.     EP Visit 11/8/19: He presents today for follow up. He was arranged for dofetilide loading and had RHC in AF, then DCCV, then repeat RHC in sinus to evaluate if arrhyhtmia was vs CM vs sotalol was main  of symptoms. RHC was relatively unchanged in AF and sinus. He reports having much better energy levels off Sotalol. He denies any chest pain/pressures, dizziness, lightheadedness, worsening shortness of breath, leg/ankle swelling, PND, orthopnea, palpitations, or syncopal symptoms. Presenting 12 lead ECG shows AP with prolonged AVD Vent Rate 60 bpm,  ms, QRS 86 ms, QTc 408 ms. Device interrogation shows normal ICD function. Since last device interrogation on 7/12/19, there have been 10 AT/AF episodes, most recent on 11/3/19 lasting 7.2 hours.  AF burden since 7/12/19 = 34%.  AF burden graph shows minimal AT/AF episodes recorded since ECV on 8/21/19.  1 asymptomatic VT episode recorded on 9/16 lasting 25 seconds @ 241 bpm received ATP x 1 with successful conversion. Patient states he was ill with URI/fever that day.   34 NSVT detections also recorded with 2 egms available displaying  one 5 beat PAT and one 4 beat NSVT. Intrinsic rhythm = SB @ 50 bpm. AP = 10%.  = 3%.  Estimated battery longevity to CARMEN = 4.5 years. Lead trends appear stable. Current cardiac medications include: Spironolactone, Lipitor, Sotalol, Toprol XL, and Xarelto.     He presents today for follow up. He reports feeling fatigued and having some MART. He denies any chest pain/pressures, dizziness, lightheadedness, leg/ankle swelling, PND, orthopnea, palpitations, or syncopal symptoms. He had recurrent AF/AFL and had DCCV on 1/16/20. He recurred back into AFL on 1/29/20 and remains in it. Device interrogation shows normal ICD function. Since ECV on 1/16/20, 569 NSVT and 188 VT-1  monitor zone episodes recorded, AF burden = 85% and appears persistent since 1/26/20.  The available VT-1 episodes display RVR and not a true ventricular arrhythmia.   Intrinsic rhythm = Atrial flutter with a sensed ventricular response of 102-115 bpm.   AP =<1%.  = 2%.  Patient reports that he has been noticing MART and elevated heart rate with minimal activity. Presenting 12 lead ECG shows AFL Vent Rate 108 bpm, QRS 88 ms, QTc 536 ms.  Current cardiac medications include: Spironolactone, Lipitor, Sotalol, Toprol XL, and Xarelto.         PAST MEDICAL HISTORY:  Past Medical History:   Diagnosis Date     Atrial fibrillation (H) 12/9/11    failed medication, multiple DC cardioveresions; s/p Left atrial ablation to eliminate atrial fibrillation 12/9/11     Chest pain      CHF (congestive heart failure) (H) 7/30/2016     Coronary artery disease      DJD (degenerative joint disease)      Fatigue 7/15/2019     Hip arthritis 1/15/2014     Hypertension      Hypertrophic cardiomyopathy (H) 10/09     Other abnormal heart sounds      Pacemaker     ICD     Palpitations      Pneumonia, organism unspecified(486)      Prediabetes 7/10/15    A1C 6.5     Status post implantation of automatic cardioverter/defibrillator (AICD)      Ventricular tachycardia (H)        CURRENT MEDICATIONS:  Current Outpatient Medications   Medication Sig Dispense Refill     acetaminophen (TYLENOL) 325 MG tablet Take 325-650 mg by mouth every 6 hours as needed       albuterol (PROAIR HFA/PROVENTIL HFA/VENTOLIN HFA) 108 (90 Base) MCG/ACT inhaler Inhale 2 puffs into the lungs every 6 hours 18 g 0     amoxicillin (AMOXIL) 500 MG tablet Take 4 tablets (2000 mg) by mouth 1 hour before dental procedures. 12 tablet 3     atorvastatin (LIPITOR) 20 MG tablet Take 1 tablet (20 mg) by mouth daily 90 tablet 3     cyanocobalamin (VITAMIN B-12) 100 MCG tablet Take 100 mcg by mouth daily       dofetilide (TIKOSYN) 250 MCG capsule Take 1 capsule (250 mcg) by mouth every  12 hours 180 capsule 3     eplerenone (INSPRA) 50 MG tablet Take 1 tablet (50 mg) by mouth daily 90 tablet 3     furosemide (LASIX) 40 MG tablet Take 1 tablet (40 mg) by mouth daily 90 tablet 2     gabapentin (NEURONTIN) 300 MG capsule Take 2 capsules (600 mg) by mouth 2 times daily 360 capsule 3     guaiFENesin-codeine (ROBITUSSIN AC) 100-10 MG/5ML solution Take 5-10 mLs by mouth every 4 hours as needed for cough 118 mL 0     guaiFENesin-codeine (ROBITUSSIN AC) 100-10 MG/5ML solution Take 5-10 mLs by mouth every 4 hours as needed 240 mL 0     metFORMIN (GLUCOPHAGE-XR) 500 MG 24 hr tablet Take 2 tablets (1,000 mg) by mouth daily (with dinner) Take 2 tablets (1,000 mg) daily (with dinner). 180 tablet 3     metoprolol succinate ER (TOPROL-XL) 50 MG 24 hr tablet TAKE ONE TABLET BY MOUTH EVERY DAY 90 tablet 3     multivitamin, therapeutic (THERA-VIT) TABS Take 1 tablet by mouth daily       XARELTO ANTICOAGULANT 20 MG TABS tablet Take 1 tablet (20 mg) by mouth daily (with dinner) 90 tablet 3     zolpidem (AMBIEN) 10 MG tablet Take 0.5-1 tablets (5-10 mg) by mouth nightly as needed for sleep 90 tablet 0       PAST SURGICAL HISTORY:  Past Surgical History:   Procedure Laterality Date     ANESTHESIA CARDIOVERSION  4/24/2014    Procedure: ANESTHESIA CARDIOVERSION;  Surgeon: Generic Anesthesia Provider;  Location: [facility] OR     ANESTHESIA CARDIOVERSION N/A 5/12/2016    Procedure: ANESTHESIA CARDIOVERSION;  Surgeon: GENERIC ANESTHESIA PROVIDER;  Location: [facility] OR     ANESTHESIA CARDIOVERSION N/A 8/7/2017    Procedure: ANESTHESIA CARDIOVERSION;  Anesthesia Offsite Coverage Cardioversion @1100;  Surgeon: GENERIC ANESTHESIA PROVIDER;  Location: [facility] OR     ANESTHESIA CARDIOVERSION N/A 1/3/2018    Procedure: ANESTHESIA CARDIOVERSION;  Anesthesia Cardioverion;  Surgeon: GENERIC ANESTHESIA PROVIDER;  Location: [facility] OR     ANESTHESIA CARDIOVERSION N/A 5/4/2018    Procedure: ANESTHESIA CARDIOVERSION;  Anesthesia Coverage Cardioversion @1400;   Surgeon: GENERIC ANESTHESIA PROVIDER;  Location: [facility] OR     ANESTHESIA CARDIOVERSION N/A 9/27/2018    Procedure: ANESTHESIA CARDIOVERSION;  Anesthesia Cardioversion @0930;  Surgeon: GENERIC ANESTHESIA PROVIDER;  Location: [facility] OR     ANESTHESIA CARDIOVERSION N/A 12/20/2018    Procedure: Anesthesia Coverage Cardioversion @0830;  Surgeon: GENERIC ANESTHESIA PROVIDER;  Location: [facility] OR     ANESTHESIA CARDIOVERSION N/A 8/21/2019    Procedure: Anesthesia Coverage Cardioversion @0800;  Surgeon: GENERIC ANESTHESIA PROVIDER;  Location: [facility] OR     ANESTHESIA CARDIOVERSION N/A 1/16/2020    Procedure: ANESTHESIA, FOR CARDIOVERSION;  Surgeon: GENERIC ANESTHESIA PROVIDER;  Location: [facility] OR     ARTHROPLASTY HIP  1/15/2014    Procedure: ARTHROPLASTY HIP;  Left Total Hip Arthroplasty;  Surgeon: Nelson Gaspar MD;  Location: [facility] OR     ARTHROPLASTY HIP Right 6/5/2019    Procedure: Right Total Hip Arthroplasty;  Surgeon: Nelson Gaspar MD;  Location: [facility] OR     CARDIAC SURGERY       COLONOSCOPY N/A 5/18/2018    Procedure: COMBINED COLONOSCOPY, SINGLE OR MULTIPLE BIOPSY/POLYPECTOMY BY BIOPSY;  COLONOSCOPY (PT HAS DEFIBRILLATOR) ;  Surgeon: Mike Nickerson MD;  Location:  GI     CV RIGHT HEART CATH N/A 8/19/2019    Procedure: CV RIGHT HEART CATH;  Surgeon: Cisco Rausch MD;  Location:  HEART CARDIAC CATH LAB     CV RIGHT HEART CATH N/A 8/21/2019    Procedure: Right Heart Cath;  Surgeon: Ciaran Burton MD;  Location:  HEART CARDIAC CATH LAB     H ABLATION FOCAL AFIB  12/9/11    Left atrial ablation to eliminate atrial fibrillation     IMPLANT AUTOMATIC IMPLANTABLE CARDIOVERTER DEFIBRILLATOR  7/27/12    AICD implantation     TONSILLECTOMY  1964       ALLERGIES:     No Known Allergies    FAMILY HISTORY:  Family History   Problem Relation Age of Onset     Heart Disease Mother         unknown     Obesity Mother      Gastrointestinal Disease Mother         diverticulitis     Diabetes Maternal Grandmother      Diabetes Paternal  Grandmother      Cerebrovascular Disease Paternal Grandmother 94     Diabetes Paternal Grandfather      Cerebrovascular Disease Paternal Grandfather 78     Diabetes Son      C.A.D. Father         CABG age 78     Heart Disease Father         CABG x5     Diabetes Maternal Grandfather      LUNG DISEASE No family hx of      Deep Vein Thrombosis (DVT) No family hx of      Anesthesia Reaction No family hx of      Melanoma No family hx of      Skin Cancer No family hx of        SOCIAL HISTORY:  Social History     Tobacco Use     Smoking status: Former Smoker     Packs/day: 1.00     Years: 40.00     Pack years: 40.00     Types: Cigarettes     Start date: 1975     Last attempt to quit: 2015     Years since quittin.2     Smokeless tobacco: Never Used   Substance Use Topics     Alcohol use: Yes     Alcohol/week: 0.0 standard drinks     Comment: 1 drink per week     Drug use: No       ROS:   A comprehensive 10 point review of systems negative other than as mentioned in HPI.  Exam:  There were no vitals taken for this visit.  GENERAL APPEARANCE: healthy, alert and no distress  HEENT: no icterus, no xanthelasmas, normal pupil size and reaction, normal palate, mucosa moist, no central cyanosis  NECK: supple.  RESPIRATORY: lungs clear to auscultation - no rales, rhonchi or wheezes, no use of accessory muscles, no retractions, respirations are unlabored, normal respiratory rate  CARDIOVASCULAR:regular, S1/S2, no murmur, click or rub, precordium quiet with normal PMI.  ABDOMEN: soft, non tender, bowel sounds normal, non distended.   EXTREMITIES: peripheral pulses normal, no edema, no bruits  NEURO: alert and oriented to person/place/time, normal speech, gait and affect  SKIN: no ecchymoses, no rashes    Labs:  Reviewed.     Procedures:  12/15/16 ECHOCARDIOGRAM:   Interpretation Summary  Mildly (EF 40-45%) reduced left ventricular function is present. Mild  asymmetric septal hypertrophy is present (14 mm). No dynamic  outflow tract  obstruction is present.  Global right ventricular function is normal.  No significant valvular abnormalities are identified.  No pericardial effusion is present.    6/2018 STRESS ECHO:  Interpretation Summary  Submaximal treadmill stress test in a patient with a diagnosis of HCM.     The Ramirez treadmill score is 8 (low risk).  Exercise was stopped due to fatigue.  Normal blood pressure response to exercise.  No ECG evidence of ischemia.  No supraventricular or ventricular arrhythmias. Frequent PVCs noted throughout  the study.     Normal biventricular function with mild asymmetrical septal hypertrophy (12  mm).  No dynamic outflow tract obstruction is present at rest or with exercise.  Normal segmental wall motion at rest and with exercise.  Minimal augmentation in LV function with exercise.     Average functional capacity for age.    Assessment and Plan:   Mr. Meredith is a 63 year old male who has a past medical history significant for HCM diagnosed 2006, VT on Holter July 2012 s/p ICD 7/2012, troponin leak Oct 2013 (angiogram with non-significant CAD, LV gram showed EF 25%, recovered to 60% on TTE Dec 2013), HTN, AFib (CHADSVASC 2) with DCCVs then s/p PVI 12/2011, PVI for persistent AF on 7/28/16, PVI/CTI/CFAE with posterior wall isolation 8/17/18, s/p DCCV 8/7/17, 1/3/18,  5/5/18, 9/27/18, 12/20/2018, 1/16/20,  and s/p right IRSIH on 6/5/2019. He presents today for follow up. He reports feeling fatigued and having some MART. He denies any chest pain/pressures, dizziness, lightheadedness, leg/ankle swelling, PND, orthopnea, palpitations, or syncopal symptoms. He had recurrent AF/AFL and had DCCV on 1/16/20. He recurred back into AFL on 1/29/20 and remains in it. Device interrogation shows normal ICD function. Since ECV on 1/16/20, 569 NSVT and 188 VT-1 monitor zone episodes recorded, AF burden = 85% and appears persistent since 1/26/20.  The available VT-1 episodes display RVR and not a true ventricular  arrhythmia.   Intrinsic rhythm = Atrial flutter with a sensed ventricular response of 102-115 bpm.   AP =<1%.  = 2%.  Patient reports that he has been noticing MART and elevated heart rate with minimal activity. Presenting 12 lead ECG shows AFL Vent Rate 108 bpm, QRS 88 ms, QTc 536 ms.  Current cardiac medications include: Spironolactone, Lipitor, Sotalol, Toprol XL, and Xarelto.     Persistent Atrial Fibrillation:   We discussed in detail with the patient management/treatment options for AF/AFL includin. Stroke Prophylaxis:  CHADSVASC= 2, corresponding to a 2.2% annual stroke / systemic emolism event rate. indicating need for long term oral anticoagulation.  He is on Xarelto. No bleeding issues.   2. Rate Control: Continue Metoprolol.   3. Rhythm Control: He has had a PVI ablation in  with several recurrences requiring DCCV and then repeat PVI in 2016. He had previously failed multaq and is currently on Sotalol.  He has now had some recurrent episodes of AT/AF requiring DCCV on 17 and 1/3/18.  Device check showed AT episode that started 18 and lasted 25 days and self terminated. He then had recurrent AT/AF and had DCCV on 18. He followed up with Dr. Ware recently in clinic and reported having fatigue, MART, and decreased stamina. Device interrogation showed he had recurred back into AT/AF since 18. He underwent a repeat PVI/CTI on 18. He subsequently had DCCV on 18, 2018. Then went back into persistent AF on 3/14/19. His main symptoms are fatigue, MART, and decreased stamina. Unclear if this was related to AF, HCM worsening, or possibly Sotalol. Therefore, we stopped his Sotalol and he underwent RHC in AF, dofetolide loading, DCCV, then repeat RHC in SR. This showed no significant hemodynamic changes in AF vs SR.He had another recurrence of AFL and had DCCV on 20. Then went back into AFL on 20 and remains in it. He does feel much better being on dofetilide.  QTC and renal function stable. He states that he definitely feels much better in sinus and has more energy and less dyspnea. We discussed management options including rate control only, repeat ablation, or alternative AAT (namely amiodarone). Since his recurrences have been an organized atypical AFL, we dicussed could be amenable to ablation. We discussed indications, risks, and benefits. The procedural risk of EP study and ablation were discussed in detail. Risks discussed include: vascular complications, CVA, AVB, pericardial effusion and tamponade, esophageal fistula, PV stenosis. AF ablation has 70% success at 1 year, 50% success at 5 years, and 30% need another ablation.  Even if ablation is successful anticoagulation may be needed lifelong. He would like to try another ablation. We will work to schedule this for him.   4. Risk Factor Management: maintain tight BP control, and GONZALEZ evaluation as indicated.      Hypertrophic cardiomyopathy:  -Start beta blockers  -Encouraged adequate hydration and avoidance of strenous physical activity   -Reinforced exercise recommendations with HCM (e.g. Circ 2014 recommendations: Avoidance of burst exercise, competitive training,weight-lifting)  -Family screening of 1st degree relatives recommended.   - ICD implanted in 7/2012  He will continue to follow with the device clinic per routine. He will follow up with Dr. Ware for HCM. Follow up with EP in 3 months after ablation.     The patient states understanding and is agreeable with plan.   This patient was seen and evaluated with Dr. Cooper. The above note reflects our joint assessment and plan.   KARSON Richardson CNP  Pager: [phone]    EP STAFF NOTE  I have seen and examined the patient as part of a shared visit with HOLLY Richardson NP.  I agree with the note above. I reviewed today's vital signs and medications. I have reviewed and discussed with the advanced practice provider their physical examination, assessment, and  plan   Briefly, recent recurrences in the form of AFL after ablation in 2018, symptomatic  My key history/exam findings are: clear lungs.   The key management decisions made by me: ablation as a first line.    Erik Cooper MD [facility]  Cardiology - Electrophysiology

## 2020-03-13 NOTE — PATIENT INSTRUCTIONS
You were seen today in the Adult Congenital and Cardiovascular Genetics Clinic at the [facility].    Cardiology Providers you saw during your visit:  Mona Ware MD and Erik Cooper MD    Diagnosis:  HCM    Results:  Mona Ware MD and Erik Cooper MD reviewed the results of your EKG and device testing today in clinic.    Recommendations:    1. Continue to eat a heart healthy, low salt diet.  2. Continue to get 20-30 minutes of aerobic activity, 4-5 days per week.  Examples of aerobic activity include walking, running, swimming, cycling, etc.  3. Continue to observe good oral hygiene, with regular dental visits.  4. One week before you ablation, stop taking Xarelto and begin taking Pradaxa. Take Pradaxa for one week before ablation. Continue to take Pradaxa for three weeks after the ablation. At that time, you can resume taking Xarelto.  5. I sent a prescription for Pradaxa 150 mg twice daily for one month.  6. Please have a CPX as soon as possible.    You are scheduled for an Atrial Fibrillation Ablation, at [facility] [place], with Dr. Erik Cooper. The hospital is located at [address] [place] [address] on [place] of the [place].  If you need to cancel this procedure, please call [phone].     **Medication Change: 1 week prior to ablation, stop Eliquis and start Pradaxa 150mg twice a day. You will be given a 30 day supply. Continue taking until you run out, and then switch back to Eliquis. A 30 day voucher has been provided.     Please disregard any automated, reminder phone calls regarding arrival times. Follow the below arrival times.     Pre-Anesthesia Phone Call will occur 1-2 days prior to procedure date.  You do not need to come to the [facility].    Atrial Fibrillation Ablation with Transesophageal Echocardiogram (ALEJANDRA)    1. Please review the attached instructions on showering before your procedure at the end of this letter.  2. Your history and physical  will be completed by our nurse practitioner when you arrive.  3. Please do not eat anything for 8 hours prior to your procedure. You may have sips of water up until 2 hours prior to your arrival.  4. If you are diabetic follow the following instructions: (Contact your diabetes team if you have questions)   * Hold oral diabetic medications and short-acting insulins (aspart, lispro, regular) the morning of the procedure.   * Hold mixed insulins (70/30, 75/25, 50/50) the morning of procedure. Instead, take 66% of NPH (N) component.   * Take 80% of your normal long-acting insulin (glargine/Lantus or levemir/Detemir, degludec/Tresiba) the evening before and/or morning of the procedure.  5. The morning of your procedure, you may take your scheduled medications with a sip of water - continue your blood thinner (warfarin, Pradaxa).  6. If the ALEJANDRA shows a clot in your heart, the procedure will be canceled. If your INR is too low or too high, the procedure may be canceled.  7. You will receive general anesthesia for this procedure.   8. You may stay in the hospital overnight or you may discharge the same day      Post-Procedure Instructions  Care of groin site:    Remove the Band-Aid after 24 hours. If there is minor oozing, apply another Band-aid and remove it after 12 hours.     Do NOT take a bath, use a hot tub, pool, or submerse in water for at least 3 days. You may shower.     It is normal to have a small bruise or lump at the site.    Do not scrub the site.    Do not use lotion or powder near the puncture site for 3 days.    If you start bleeding from the site in your groin: Lie down flat and press firmly on the site. Call your physician immediately, or, come to the emergency room.  Call 911 right away if you have bleeding that is heavy or does not stop.    Call your doctor/provider if:     You have a large or growing hard lump around the site.     The site is red, swollen, hot or tender.     Blood or fluid is draining  from the site.     You have chills or a fever greater than 101 F (38 C).     Your leg or arm turns bluish, feels numb or cool.     You have hives, a rash or unusual itching.    Activity Restrictions    For the first 2 days: Do not stoop or squat. When you cough, sneeze or move your bowels, hold your hand over the puncture site and press gently.    Do not lift more than 10 pounds or exertional activity for 10 days.  - No driving for 24 hours after (with or without general anesthesia).      Follow up appointment- to be scheduled    Please do not hesitate to utilize MyChart or call us if you have any questions or concerns.    Radha Willson,  HOLLY Procedure   [phone]      Showering Before Surgery   Your surgeon has asked you to take 2 showers before surgery.  Why is this important?  It is normal for bacteria (germs) to be on your skin. The skin protects us from these germs. When you have surgery, we cut the skin. Sometimes germs get into the cuts and cause infection (illness caused by germs). By following the instructions below and using special soap, you will lower the number of germs on your skin. This decreases your chance of infection.  Special soap  Buy or get 8 ounces of antiseptic surgical soap called 4% CHG. Common name brands of this soap are Hibiclens and Exidine.   You can find it at your local pharmacy, clinic or retail store. If you have trouble, ask your pharmacist to help you find the right substitute.   A note about shaving:  Do not shave within 12 inches of your incision (surgical cut) area for at least 3 days before surgery. Shaving can make small cuts in the skin. This puts you at a higher risk of infection.  Items you will need for each shower:    1 newly washed towel    4 ounces of one of the above soaps  Follow these instructions:  The evening before surgery   1. Wash your hair and body with your regular shampoo and soap. Make sure you rinse the shampoo and soap from your hair and body.    2. Using clean hands, apply about 2 ounces of soap gently on your skin from the neck to your toes. Use on your groin area last. Do not use this soap on your face or head. If you get any soap in your eyes, ears or mouth, rinse right away.   3. Repeat step 2. It is very important to let the soap stay on your skin for at least 1 minute.   4. Rinse well and dry off using a clean towel.If you feel any tingling, itching or other irritation, rinse right away. It is normal to feel some coolness on the skin after using the antiseptic soap. Your skin may feel a bit dry after the shower, but do not use any lotions, creams or moisturizers. Do not use hair spray or other products in your hair.  5. Dress in freshly washed clothes or pajamas. Use fresh pillowcases and sheets on your bed.    The morning of surgery  1. Wash your hair and body with your regular shampoo and soap. Make sure you rinse the shampoo and soap from your hair and body.   2. Using clean hands, apply about 2 ounces of soap gently on your skin from the neck to your toes. Use on your groin area last. Do not use this soap on your face or head. If you get any soap in your eyes, ears or mouth, rinse right away.   3. Repeat step 2. It is very important to let the soap stay on your skin for at least 1 minute.   4. Rinse well and dry off using a clean towel.If you feel any tingling, itching or other irritation, rinse right away. It is normal to feel some coolness on the skin after using the antiseptic soap. Your skin may feel a bit dry after the shower, but do not use any lotions, creams or moisturizers. Do not use hair spray or other products in your hair.  5. Dress in clean clothes.  If you have any questions about showering or an allergy to CHG soap, please call the Preadmissions Nursing Department at the hospital where you are having your surgery.  [facility] [place] ([place]): [phone]  This phone number will be answered  between the hours of 8:00 a.m. and 6:30 p.m. Monday through Friday.        SBE prophylaxis:   Yes____  No_X___    Lifelong Bacterial Endocarditis Prophylaxis:  YES____  NO____    If YES is checked, follow the recommendations outlined below:  1. Take antibiotic(s) prior to recommended dental procedures and procedures on the respiratory tract or with infected skin, muscle or bones. SBE prophylaxis is not needed for routine GI and  procedures (ie. Colonoscopy or vaginal delivery)  2. Observe good oral hygiene daily, as advised by your dentist. Get regular professional dental care.  3. Keep cuts clean.  4. Infections should be treated promptly.  5. Symptoms of Infective Endocarditis could include: fever lasting more than 4-5 days or a recurrent fever that initially resolves but returns within 1-2 days)      Exercise restrictions:   Yes__X__  No____         If yes, list restrictions:  Must be allowed to rest if fatigued or SOB      Work restrictions:  Yes____  No_X___         If yes, list restrictions:    FASTING CHOLESTEROL was checked in the last 5 years YES_X__  NO___ (2019)  Continue to eat a heart healthy, low salt diet.         ____ Fasting lipid panel order today         ____ No changes in medications          ____ I recommend the following changes in your cholesterol medications.:          ____ Please follow up for cholesterol screening at your primary care physician      Follow-up:  Follow up with Dr. Cooper and Dr. Ware 3 months after ablation.    If you have questions or concerns please contact us at:    Dimple Birch, MSN, RN, CNL    Radhika Mitchell (Scheduling)  Nurse Care Coordinator     Clinic   Adult Congenital and CV Genetics   Adult Congenital and CV Genetic  [facility]   [facility]  (P) [phone]     (P) [phone]  [email or web address]   (F) [phone]        For after hours urgent needs, call [phone] and ask  to speak to the Adult Congenital Physician on call.  Mention Job Code [id number].    For emergencies call 911.    [facility]  Mail Code [id number]  [address] [id number]

## 2020-03-13 NOTE — NURSING NOTE
Cardiac Testing: Patient given instructions regarding cardiopulmonary stress test. Discussed purpose, preparation, procedure and when to expect results reported back to the patient. Patient demonstrated understanding of this information and agreed to call with further questions or concerns.    Diet: Patient instructed regarding a heart healthy diet, including discussion of reduced fat and sodium intake. Patient demonstrated understanding of this information and agreed to call with further questions or concerns.    Labs: Patient was given results of the laboratory testing obtained today. Patient was instructed to return for the next laboratory testing with ablation. Patient demonstrated understanding of this information and agreed to call with further questions or concerns.     Med Reconcile: Reviewed and verified all current medications with the patient. The updated medication list was printed and given to the patient.    New Medication: Patient was educated regarding newly prescribed medication, including discussion of  the indication, administration, side effects, and when to report to MD or RN. Patient demonstrated understanding of this information and agreed to call with further questions or concerns.    Return Appointment: Patient given instructions regarding scheduling next clinic visit. Patient demonstrated understanding of this information and agreed to call with further questions or concerns.    EP Procedure: Patient given instructions regarding  ablation Discussed purpose, preparation, and procedure with the patient. Patient demonstrated understanding of this information and agreed to call with further questions or concerns.     Medication Change: Patient was educated regarding prescribed medication change, including discussion of the indication, administration, side effects, and when to report to MD or RN. Patient demonstrated understanding of this information and agreed to call with further questions or  concerns.    Patient stated he understood all health information given and agreed to call with further questions or concerns.    Dimple Birch, MSN, RN, CNL  Cardiology Care Coordinator  [facility]  [phone]

## 2020-03-13 NOTE — NURSING NOTE
Cardiac Testing: Patient given instructions regarding cardiopulmonary stress test. Discussed purpose, preparation, procedure and when to expect results reported back to the patient. Patient demonstrated understanding of this information and agreed to call with further questions or concerns.    Diet: Patient instructed regarding a heart healthy diet, including discussion of reduced fat and sodium intake. Patient demonstrated understanding of this information and agreed to call with further questions or concerns.    Labs: Patient was given results of the laboratory testing obtained today. Patient was instructed to return for the next laboratory testing with ablation. Patient demonstrated understanding of this information and agreed to call with further questions or concerns.     Med Reconcile: Reviewed and verified all current medications with the patient. The updated medication list was printed and given to the patient.    New Medication: Patient was educated regarding newly prescribed medication, including discussion of  the indication, administration, side effects, and when to report to MD or RN. Patient demonstrated understanding of this information and agreed to call with further questions or concerns.    Return Appointment: Patient given instructions regarding scheduling next clinic visit. Patient demonstrated understanding of this information and agreed to call with further questions or concerns.    EP Procedure: Patient given instructions regarding  ablation Discussed purpose, preparation, and procedure with the patient. Patient demonstrated understanding of this information and agreed to call with further questions or concerns.     Medication Change: Patient was educated regarding prescribed medication change, including discussion of the indication, administration, side effects, and when to report to MD or RN. Patient demonstrated understanding of this information and agreed to call with further questions or  concerns.    Patient stated he understood all health information given and agreed to call with further questions or concerns.    Dimple Birch, MSN, RN, CNL  Cardiology Care Coordinator  [facility]  [phone]

## 2020-03-13 NOTE — NURSING NOTE
Cardiac Testing: Patient given instructions regarding cardiopulmonary stress test. Discussed purpose, preparation, procedure and when to expect results reported back to the patient. Patient demonstrated understanding of this information and agreed to call with further questions or concerns.    Diet: Patient instructed regarding a heart healthy diet, including discussion of reduced fat and sodium intake. Patient demonstrated understanding of this information and agreed to call with further questions or concerns.    Labs: Patient was given results of the laboratory testing obtained today. Patient was instructed to return for the next laboratory testing with ablation. Patient demonstrated understanding of this information and agreed to call with further questions or concerns.     Med Reconcile: Reviewed and verified all current medications with the patient. The updated medication list was printed and given to the patient.    New Medication: Patient was educated regarding newly prescribed medication, including discussion of  the indication, administration, side effects, and when to report to MD or RN. Patient demonstrated understanding of this information and agreed to call with further questions or concerns.    Return Appointment: Patient given instructions regarding scheduling next clinic visit. Patient demonstrated understanding of this information and agreed to call with further questions or concerns.    EP Procedure: Patient given instructions regarding  ablation Discussed purpose, preparation, and procedure with the patient. Patient demonstrated understanding of this information and agreed to call with further questions or concerns.     Medication Change: Patient was educated regarding prescribed medication change, including discussion of the indication, administration, side effects, and when to report to MD or RN. Patient demonstrated understanding of this information and agreed to call with further questions or  concerns.    Patient stated he understood all health information given and agreed to call with further questions or concerns.    Dimple Birch, MSN, RN, CNL  Cardiology Care Coordinator  [facility]  [phone]

## 2020-03-13 NOTE — PATIENT INSTRUCTIONS
It was a pleasure to see you in clinic today.  Please do not hesitate to call with any questions or concerns.  You are scheduled for a remote transmission on 6/16/20.   We look forward to seeing you in clinic at your next device check in 6 months.    Maci Dempsey RN, BSN  Electrophysiology Nurse Clinician  [facility]    During Business Hours Please Call:  [phone]  After Hours Please Call:  [phone] - select option #4 and ask for job code [phone]

## 2020-03-13 NOTE — PROGRESS NOTES
Electrophysiology Clinic Note  HPI:   Mr. Meredith is a 64 year old male who has a past medical history significant for HCM diagnosed 2006, VT on Holter July 2012 s/p ICD 7/2012, troponin leak Oct 2013 (angiogram with non-significant CAD, LV gram showed EF 25%, recovered to 60% on TTE Dec 2013), HTN, AFib (CHADSVASC 2) with DCCVs then s/p PVI 12/2011, PVI for persistent AF on 7/28/16, PVI/CTI/CFAE with posterior wall isolation 8/17/18, s/p DCCV 8/7/17, 1/3/18l,  5/5/18, 9/27/18, 12/20/2018, 1/16/2020, and s/p right IRISH on 6/5/2019. He presents for follow-up.  The patient underwent atrial fibrillation ablation by Dr. Gonzalez at [facility] in 12/2011 and implantation of an ICD in 07/2012 because of ventricular tachycardia. As per patient, he has been in sinus rhythm approximately 1-1/2 years after ablation. The patient was found to have recurrent atrial fibrillation during a preoperative exam in 10/2013 and underwent electrical cardioversion in 11/2013. The patient underwent hip surgery in 01/2014. The patient noticed that he was in atrial fibrillation at a clinic visit in 01/2014.   He was seen in consult by Dr Analilia spain on 2/14/14 for consideration of ablation. At the time the patient was reported to be generally unaware of whether or not he is in sinus rhythm or in atrial fibrillation. He reports daytime somnolence but denies significant dyspnea on exertion, palpitations, irregular beat sensation, presyncope or syncope. In terms of risk factors for stroke, the patient has coronary heart disease, hypertension, but denies history of diabetes, previous myocardial cardiac infarction or heart failure. The patient is on rivaroxaban for stroke prophylaxis and is on diltiazem and metoprolol. He was told that there was no consideration of ablation.   He came to EP clinic for second opinion in 3/3/2014. The patient mentioned this time he is not sure whether his afib is causing fatigue. However, he also attributed  symptoms like headache and head fullness to his afib. He also had a cold around the same time. He still denied chest pain, syncope or presyncope, ICD shocks, MART or SOB. He does however indicate this time that he does feel irregular heart beat at times, along with chronic fatigue, although is not sure if fatigue is linked to afib. We agreed at that time to pursue a cardioversion to see whether he would feel better and we started him on sotalol 80 mg twice a day. He underwent cardioversion on 4/24/2014.  He then had a device transmission showed recurrent AF in 7/2014 which spontaneously converted. We decrease his diltiazem from 240 to 120 mg daily because of fatigue. His fatigue did get better when we decrease his diltiazem.  He did have a 9 day episode of A. Fib starting June 19, 2015, during which the patient felt very tired and no energy.  The heart rate was well controlled.  A. Fib burden was 19% since 5/19/2015, but it is the first episode of A. Fib since at least November 2014.  However at follow up in 2016 AF burden 1%. At follow up in 5/2016 his AF burden= 100% since 2/28/16. He reported some increased fatgiue and decreased stamina since being back in AF. At that visit, he elected to pursue repeat PVI ablation for AF that is refractory to Sotalol. He underwent repeat PVI ablation for persistent AF refractory to AATs on 7/28/16. He had successful re-isolation of all 4 pulmonary veins. He was re-hospitalized a couple days post ablation for SOB assocaited with hypervolemia which resolved with diuresis. He reports feeling well after ablation. He states his energy levels improved with restoration of sinus. Diltiazem was stopped after ablation.  He has now had some recurrent episodes of AT/AF requiring DCCV on 8/7/17 and 1/3/18.  Device check showed AT episode that started 1/28/18 and lasted 25 days and self terminated. He then had recurrent AT/AF and had DCCV on 5/5/18. He followed up with Dr. Ware recently in  clinic and reported having fatigue, MART, and decreased stamina. Device interrogation showed he had recurred back into AT/AF since 5/26/18. He was arranged for EP follow up.     Ep Visit 6/2018: He continues to have fatigue, MART, and decreased stamina. Last stress echo from 6/1/18 showed normal biventricular function with asymmetrical septal hypertrophy and no LVOT obstruction with rest or exercise. He denies any chest pain/pressures, dizziness, lightheadedness, leg/ankle swelling, PND, orthopnea, palpitations, or syncopal symptoms.Presenting 12 lead ECG shows AFL Vent Rate 111 bpm, QRS 80 ms, QTc 489 ms. Current cardiac medications include: Spironolactone, Lipitor, Sotalol, Toprol XL, and Xarelto.     EP Visit 7/12/19: He presents today for follow up. He underwent a repeat PVI/CTI on 8/17/18. Device shows AF has been 100% since 3/14/19. He underwent right TREVIN on 6/5/19. Recovering well after procedure. He reports issues with ongoing fatigue, MART, and decreased stamina. He denies any chest pain/pressures, dizziness, lightheadedness, leg/ankle swelling, PND, orthopnea, palpitations, or syncopal symptoms. Presenting 12 lead ECG shows atypical AFL Vent Rate 73 bpm, QRS 84 ms, QTc 420 ms. Device interrogation shows normal device function. Since 1/18/19, there have been 400 NSVT, 2 other untreated episodes, and 211 AT/AF episodes recorded.  The VT-1 monitored episodes are AF w/RVR with rates in the 140s.  The 2 NSVT episodes available for review last 12 beats in duration with rates of 162-179 bpm.  AF burden = 85% since 1/18/19 and AF appear persistent since approximately mid 3/2019. Intrinsic rhythm = AF @  bpm. AP = 2%.  = 5%. Lead trends appear stable. Current cardiac medications include: Spironolactone, Lipitor, Sotalol, Toprol XL, and Xarelto.     EP Visit 11/8/19: He presents today for follow up. He was arranged for dofetilide loading and had RHC in AF, then DCCV, then repeat RHC in sinus to evaluate if  arrhyhtmia was vs CM vs sotalol was main  of symptoms. RHC was relatively unchanged in AF and sinus. He reports having much better energy levels off Sotalol. He denies any chest pain/pressures, dizziness, lightheadedness, worsening shortness of breath, leg/ankle swelling, PND, orthopnea, palpitations, or syncopal symptoms. Presenting 12 lead ECG shows AP with prolonged AVD Vent Rate 60 bpm,  ms, QRS 86 ms, QTc 408 ms. Device interrogation shows normal ICD function. Since last device interrogation on 7/12/19, there have been 10 AT/AF episodes, most recent on 11/3/19 lasting 7.2 hours.  AF burden since 7/12/19 = 34%.  AF burden graph shows minimal AT/AF episodes recorded since ECV on 8/21/19.  1 asymptomatic VT episode recorded on 9/16 lasting 25 seconds @ 241 bpm received ATP x 1 with successful conversion. Patient states he was ill with URI/fever that day.   34 NSVT detections also recorded with 2 egms available displaying  one 5 beat PAT and one 4 beat NSVT. Intrinsic rhythm = SB @ 50 bpm. AP = 10%.  = 3%.  Estimated battery longevity to CARMEN = 4.5 years. Lead trends appear stable. Current cardiac medications include: Spironolactone, Lipitor, Sotalol, Toprol XL, and Xarelto.     He presents today for follow up. He reports feeling fatigued and having some MART. He denies any chest pain/pressures, dizziness, lightheadedness, leg/ankle swelling, PND, orthopnea, palpitations, or syncopal symptoms. He had recurrent AF/AFL and had DCCV on 1/16/20. He recurred back into AFL on 1/29/20 and remains in it. Device interrogation shows normal ICD function. Since ECV on 1/16/20, 569 NSVT and 188 VT-1 monitor zone episodes recorded, AF burden = 85% and appears persistent since 1/26/20.  The available VT-1 episodes display RVR and not a true ventricular arrhythmia.   Intrinsic rhythm = Atrial flutter with a sensed ventricular response of 102-115 bpm.   AP =<1%.  = 2%.  Patient reports that he has been noticing MART  and elevated heart rate with minimal activity. Presenting 12 lead ECG shows AFL Vent Rate 108 bpm, QRS 88 ms, QTc 536 ms.  Current cardiac medications include: Spironolactone, Lipitor, Sotalol, Toprol XL, and Xarelto.         PAST MEDICAL HISTORY:  Past Medical History:   Diagnosis Date     Atrial fibrillation (H) 12/9/11    failed medication, multiple DC cardioveresions; s/p Left atrial ablation to eliminate atrial fibrillation 12/9/11     Chest pain      CHF (congestive heart failure) (H) 7/30/2016     Coronary artery disease      DJD (degenerative joint disease)      Fatigue 7/15/2019     Hip arthritis 1/15/2014     Hypertension      Hypertrophic cardiomyopathy (H) 10/09     Other abnormal heart sounds      Pacemaker     ICD     Palpitations      Pneumonia, organism unspecified(486)      Prediabetes 7/10/15    A1C 6.5     Status post implantation of automatic cardioverter/defibrillator (AICD)      Ventricular tachycardia (H)        CURRENT MEDICATIONS:  Current Outpatient Medications   Medication Sig Dispense Refill     acetaminophen (TYLENOL) 325 MG tablet Take 325-650 mg by mouth every 6 hours as needed       albuterol (PROAIR HFA/PROVENTIL HFA/VENTOLIN HFA) 108 (90 Base) MCG/ACT inhaler Inhale 2 puffs into the lungs every 6 hours 18 g 0     amoxicillin (AMOXIL) 500 MG tablet Take 4 tablets (2000 mg) by mouth 1 hour before dental procedures. 12 tablet 3     atorvastatin (LIPITOR) 20 MG tablet Take 1 tablet (20 mg) by mouth daily 90 tablet 3     cyanocobalamin (VITAMIN B-12) 100 MCG tablet Take 100 mcg by mouth daily       dofetilide (TIKOSYN) 250 MCG capsule Take 1 capsule (250 mcg) by mouth every 12 hours 180 capsule 3     eplerenone (INSPRA) 50 MG tablet Take 1 tablet (50 mg) by mouth daily 90 tablet 3     furosemide (LASIX) 40 MG tablet Take 1 tablet (40 mg) by mouth daily 90 tablet 2     gabapentin (NEURONTIN) 300 MG capsule Take 2 capsules (600 mg) by mouth 2 times daily 360 capsule 3      guaiFENesin-codeine (ROBITUSSIN AC) 100-10 MG/5ML solution Take 5-10 mLs by mouth every 4 hours as needed for cough 118 mL 0     guaiFENesin-codeine (ROBITUSSIN AC) 100-10 MG/5ML solution Take 5-10 mLs by mouth every 4 hours as needed 240 mL 0     metFORMIN (GLUCOPHAGE-XR) 500 MG 24 hr tablet Take 2 tablets (1,000 mg) by mouth daily (with dinner) Take 2 tablets (1,000 mg) daily (with dinner). 180 tablet 3     metoprolol succinate ER (TOPROL-XL) 50 MG 24 hr tablet TAKE ONE TABLET BY MOUTH EVERY DAY 90 tablet 3     multivitamin, therapeutic (THERA-VIT) TABS Take 1 tablet by mouth daily       XARELTO ANTICOAGULANT 20 MG TABS tablet Take 1 tablet (20 mg) by mouth daily (with dinner) 90 tablet 3     zolpidem (AMBIEN) 10 MG tablet Take 0.5-1 tablets (5-10 mg) by mouth nightly as needed for sleep 90 tablet 0       PAST SURGICAL HISTORY:  Past Surgical History:   Procedure Laterality Date     ANESTHESIA CARDIOVERSION  4/24/2014    Procedure: ANESTHESIA CARDIOVERSION;  Surgeon: Generic Anesthesia Provider;  Location: [facility] OR     ANESTHESIA CARDIOVERSION N/A 5/12/2016    Procedure: ANESTHESIA CARDIOVERSION;  Surgeon: GENERIC ANESTHESIA PROVIDER;  Location: [facility]     ANESTHESIA CARDIOVERSION N/A 8/7/2017    Procedure: ANESTHESIA CARDIOVERSION;  Anesthesia Offsite Coverage Cardioversion @1100;  Surgeon: GENERIC ANESTHESIA PROVIDER;  Location: [facility] OR     ANESTHESIA CARDIOVERSION N/A 1/3/2018    Procedure: ANESTHESIA CARDIOVERSION;  Anesthesia Cardioverion;  Surgeon: GENERIC ANESTHESIA PROVIDER;  Location: [facility]     ANESTHESIA CARDIOVERSION N/A 5/4/2018    Procedure: ANESTHESIA CARDIOVERSION;  Anesthesia Coverage Cardioversion @1400;  Surgeon: GENERIC ANESTHESIA PROVIDER;  Location: [facility]     ANESTHESIA CARDIOVERSION N/A 9/27/2018    Procedure: ANESTHESIA CARDIOVERSION;  Anesthesia Cardioversion @0930;  Surgeon: GENERIC ANESTHESIA PROVIDER;  Location: [facility]     ANESTHESIA CARDIOVERSION N/A 12/20/2018    Procedure: Anesthesia  Coverage Cardioversion @0830;  Surgeon: GENERIC ANESTHESIA PROVIDER;  Location: [facility]     ANESTHESIA CARDIOVERSION N/A 8/21/2019    Procedure: Anesthesia Coverage Cardioversion @0800;  Surgeon: GENERIC ANESTHESIA PROVIDER;  Location: [facility]     ANESTHESIA CARDIOVERSION N/A 1/16/2020    Procedure: ANESTHESIA, FOR CARDIOVERSION;  Surgeon: GENERIC ANESTHESIA PROVIDER;  Location: [facility] OR     ARTHROPLASTY HIP  1/15/2014    Procedure: ARTHROPLASTY HIP;  Left Total Hip Arthroplasty;  Surgeon: Nelson Gaspar MD;  Location: [facility] OR     ARTHROPLASTY HIP Right 6/5/2019    Procedure: Right Total Hip Arthroplasty;  Surgeon: Nelson Gaspar MD;  Location: [facility] OR     CARDIAC SURGERY       COLONOSCOPY N/A 5/18/2018    Procedure: COMBINED COLONOSCOPY, SINGLE OR MULTIPLE BIOPSY/POLYPECTOMY BY BIOPSY;  COLONOSCOPY (PT HAS DEFIBRILLATOR) ;  Surgeon: Mike Nickerson MD;  Location:  GI     CV RIGHT HEART CATH N/A 8/19/2019    Procedure: CV RIGHT HEART CATH;  Surgeon: Cisco Rausch MD;  Location:  HEART CARDIAC CATH LAB     CV RIGHT HEART CATH N/A 8/21/2019    Procedure: Right Heart Cath;  Surgeon: Ciaran Burton MD;  Location:  HEART CARDIAC CATH LAB     H ABLATION FOCAL AFIB  12/9/11    Left atrial ablation to eliminate atrial fibrillation     IMPLANT AUTOMATIC IMPLANTABLE CARDIOVERTER DEFIBRILLATOR  7/27/12    AICD implantation     TONSILLECTOMY  1964       ALLERGIES:     No Known Allergies    FAMILY HISTORY:  Family History   Problem Relation Age of Onset     Heart Disease Mother         unknown     Obesity Mother      Gastrointestinal Disease Mother         diverticulitis     Diabetes Maternal Grandmother      Diabetes Paternal Grandmother      Cerebrovascular Disease Paternal Grandmother 94     Diabetes Paternal Grandfather      Cerebrovascular Disease Paternal Grandfather 78     Diabetes Son      C.A.D. Father         CABG age 78     Heart Disease Father         CABG x5     Diabetes Maternal Grandfather      LUNG  DISEASE No family hx of      Deep Vein Thrombosis (DVT) No family hx of      Anesthesia Reaction No family hx of      Melanoma No family hx of      Skin Cancer No family hx of        SOCIAL HISTORY:  Social History     Tobacco Use     Smoking status: Former Smoker     Packs/day: 1.00     Years: 40.00     Pack years: 40.00     Types: Cigarettes     Start date: 1975     Last attempt to quit: 2015     Years since quittin.2     Smokeless tobacco: Never Used   Substance Use Topics     Alcohol use: Yes     Alcohol/week: 0.0 standard drinks     Comment: 1 drink per week     Drug use: No       ROS:   A comprehensive 10 point review of systems negative other than as mentioned in HPI.  Exam:  There were no vitals taken for this visit.  GENERAL APPEARANCE: healthy, alert and no distress  HEENT: no icterus, no xanthelasmas, normal pupil size and reaction, normal palate, mucosa moist, no central cyanosis  NECK: supple.  RESPIRATORY: lungs clear to auscultation - no rales, rhonchi or wheezes, no use of accessory muscles, no retractions, respirations are unlabored, normal respiratory rate  CARDIOVASCULAR:regular, S1/S2, no murmur, click or rub, precordium quiet with normal PMI.  ABDOMEN: soft, non tender, bowel sounds normal, non distended.   EXTREMITIES: peripheral pulses normal, no edema, no bruits  NEURO: alert and oriented to person/place/time, normal speech, gait and affect  SKIN: no ecchymoses, no rashes    Labs:  Reviewed.     Procedures:  12/15/16 ECHOCARDIOGRAM:   Interpretation Summary  Mildly (EF 40-45%) reduced left ventricular function is present. Mild  asymmetric septal hypertrophy is present (14 mm). No dynamic outflow tract  obstruction is present.  Global right ventricular function is normal.  No significant valvular abnormalities are identified.  No pericardial effusion is present.    2018 STRESS ECHO:  Interpretation Summary  Submaximal treadmill stress test in a patient with a diagnosis of  HCM.     The Duke treadmill score is 8 (low risk).  Exercise was stopped due to fatigue.  Normal blood pressure response to exercise.  No ECG evidence of ischemia.  No supraventricular or ventricular arrhythmias. Frequent PVCs noted throughout  the study.     Normal biventricular function with mild asymmetrical septal hypertrophy (12  mm).  No dynamic outflow tract obstruction is present at rest or with exercise.  Normal segmental wall motion at rest and with exercise.  Minimal augmentation in LV function with exercise.     Average functional capacity for age.    Assessment and Plan:   Mr. Meredith is a 63 year old male who has a past medical history significant for HCM diagnosed 2006, VT on Holter July 2012 s/p ICD 7/2012, troponin leak Oct 2013 (angiogram with non-significant CAD, LV gram showed EF 25%, recovered to 60% on TTE Dec 2013), HTN, AFib (CHADSVASC 2) with DCCVs then s/p PVI 12/2011, PVI for persistent AF on 7/28/16, PVI/CTI/CFAE with posterior wall isolation 8/17/18, s/p DCCV 8/7/17, 1/3/18,  5/5/18, 9/27/18, 12/20/2018, 1/16/20,  and s/p right IRISH on 6/5/2019. He presents today for follow up. He reports feeling fatigued and having some MART. He denies any chest pain/pressures, dizziness, lightheadedness, leg/ankle swelling, PND, orthopnea, palpitations, or syncopal symptoms. He had recurrent AF/AFL and had DCCV on 1/16/20. He recurred back into AFL on 1/29/20 and remains in it. Device interrogation shows normal ICD function. Since ECV on 1/16/20, 569 NSVT and 188 VT-1 monitor zone episodes recorded, AF burden = 85% and appears persistent since 1/26/20.  The available VT-1 episodes display RVR and not a true ventricular arrhythmia.   Intrinsic rhythm = Atrial flutter with a sensed ventricular response of 102-115 bpm.   AP =<1%.  = 2%.  Patient reports that he has been noticing MART and elevated heart rate with minimal activity. Presenting 12 lead ECG shows AFL Vent Rate 108 bpm, QRS 88 ms, QTc 536 ms.   Current cardiac medications include: Spironolactone, Lipitor, Sotalol, Toprol XL, and Xarelto.     Persistent Atrial Fibrillation:   We discussed in detail with the patient management/treatment options for AF/AFL includin. Stroke Prophylaxis:  CHADSVASC= 2, corresponding to a 2.2% annual stroke / systemic emolism event rate. indicating need for long term oral anticoagulation.  He is on Xarelto. No bleeding issues.   2. Rate Control: Continue Metoprolol.   3. Rhythm Control: He has had a PVI ablation in  with several recurrences requiring DCCV and then repeat PVI in 2016. He had previously failed multaq and is currently on Sotalol.  He has now had some recurrent episodes of AT/AF requiring DCCV on 17 and 1/3/18.  Device check showed AT episode that started 18 and lasted 25 days and self terminated. He then had recurrent AT/AF and had DCCV on 18. He followed up with Dr. Ware recently in clinic and reported having fatigue, MART, and decreased stamina. Device interrogation showed he had recurred back into AT/AF since 18. He underwent a repeat PVI/CTI on 18. He subsequently had DCCV on 18, 2018. Then went back into persistent AF on 3/14/19. His main symptoms are fatigue, MART, and decreased stamina. Unclear if this was related to AF, HCM worsening, or possibly Sotalol. Therefore, we stopped his Sotalol and he underwent RHC in AF, dofetolide loading, DCCV, then repeat RHC in SR. This showed no significant hemodynamic changes in AF vs SR.He had another recurrence of AFL and had DCCV on 20. Then went back into AFL on 20 and remains in it. He does feel much better being on dofetilide. QTC and renal function stable. He states that he definitely feels much better in sinus and has more energy and less dyspnea. We discussed management options including rate control only, repeat ablation, or alternative AAT (namely amiodarone). Since his recurrences have been an organized  atypical AFL, we dicussed could be amenable to ablation. We discussed indications, risks, and benefits. The procedural risk of EP study and ablation were discussed in detail. Risks discussed include: vascular complications, CVA, AVB, pericardial effusion and tamponade, esophageal fistula, PV stenosis. AF ablation has 70% success at 1 year, 50% success at 5 years, and 30% need another ablation.  Even if ablation is successful anticoagulation may be needed lifelong. He would like to try another ablation. We will work to schedule this for him.   4. Risk Factor Management: maintain tight BP control, and GONZALEZ evaluation as indicated.      Hypertrophic cardiomyopathy:  -Start beta blockers  -Encouraged adequate hydration and avoidance of strenous physical activity   -Reinforced exercise recommendations with HCM (e.g. Circ 2014 recommendations: Avoidance of burst exercise, competitive training,weight-lifting)  -Family screening of 1st degree relatives recommended.   - ICD implanted in 7/2012  He will continue to follow with the device clinic per routine. He will follow up with Dr. Ware for HCM. Follow up with EP in 3 months after ablation.     The patient states understanding and is agreeable with plan.   This patient was seen and evaluated with Dr. Cooper. The above note reflects our joint assessment and plan.   KARSON Richardson CNP  Pager: [phone]    EP STAFF NOTE  I have seen and examined the patient as part of a shared visit with HOLLY Richardson NP.  I agree with the note above. I reviewed today's vital signs and medications. I have reviewed and discussed with the advanced practice provider their physical examination, assessment, and plan   Briefly, recent recurrences in the form of AFL after ablation in 2018, symptomatic  My key history/exam findings are: clear lungs.   The key management decisions made by me: ablation as a first line.    Erik Cooper MD [facility]  Cardiology - Electrophysiology

## 2020-03-16 ENCOUNTER — HOSPITAL ENCOUNTER (OUTPATIENT)
Dept: CARDIOLOGY | Facility: CLINIC | Age: 65
Discharge: HOME | End: 2020-03-16
Attending: INTERNAL MEDICINE | Admitting: INTERNAL MEDICINE
Payer: COMMERCIAL

## 2020-03-16 DIAGNOSIS — I48.91 ATRIAL FIBRILLATION, UNSPECIFIED TYPE (H): ICD-10-CM

## 2020-03-16 DIAGNOSIS — I48.0 PAROXYSMAL ATRIAL FIBRILLATION (H): ICD-10-CM

## 2020-03-16 DIAGNOSIS — I51.7 HYPERTROPHIC CARDIOMEGALY: ICD-10-CM

## 2020-03-16 DIAGNOSIS — I48.20 CHRONIC ATRIAL FIBRILLATION (H): ICD-10-CM

## 2020-03-16 DIAGNOSIS — I47.20 VENTRICULAR TACHYCARDIA (H): ICD-10-CM

## 2020-03-16 DIAGNOSIS — I42.2 HYPERTROPHIC CARDIOMYOPATHY (H): ICD-10-CM

## 2020-03-16 DIAGNOSIS — I48.0 PAF (PAROXYSMAL ATRIAL FIBRILLATION) (H): ICD-10-CM

## 2020-03-16 PROCEDURE — 94621 CARDIOPULM EXERCISE TESTING: CPT | Mod: 26 | Performed by: INTERNAL MEDICINE

## 2020-03-16 PROCEDURE — 94621 CARDIOPULM EXERCISE TESTING: CPT

## 2020-03-23 DIAGNOSIS — F13.20 SEDATIVE, HYPNOTIC OR ANXIOLYTIC DEPENDENCE (H): ICD-10-CM

## 2020-03-24 RX ORDER — ZOLPIDEM TARTRATE 10 MG/1
TABLET ORAL
Qty: 90 TABLET | Refills: 0 | OUTPATIENT
Start: 2020-03-24

## 2020-03-24 NOTE — TELEPHONE ENCOUNTER
Patient Requested  zolpidem (AMBIEN) 10 MG tablet  Last Filled  12/27/2019  Last Office Visit  12/17/2020     Next Office Visit  Nothing scheduled   Checked  03/24/2020    DX: Chronic Zolpidem use for insomnia     Pharmacy: [facility] - [place], [place] - [address] at 1:40 PM on 3/24/2020.

## 2020-04-12 NOTE — PROGRESS NOTES
HPI:  64 year old male with history of HCM without obstruction (dx 2006, EF 20% improved to 60%), VT s/p ICD 2012, nonobstructive CAD (cath 2013), AF s/p PVI X 3 (2011, 2016, 2018) and DCCV X 10 who since last clinic visit has had recurrence of afib/flutter.  He underwent DCCV in mid-January but reverted back into afib/flutter in late-January.      Today patient reports that he has noted increased fatigue and yanes with decreased exercise tolerance since recurrence of afib/flutter.  He denies any chest pain/pressure, PND, orthopnea, abdominal swelling/distension, palpitations, syncope or edema.  He denies any other problems with his medications and reports compliance.      Past Medical History:   Diagnosis Date     Atrial fibrillation (H) 12/9/11    failed medication, multiple DC cardioveresions; s/p Left atrial ablation to eliminate atrial fibrillation 12/9/11     Chest pain      CHF (congestive heart failure) (H) 7/30/2016     Coronary artery disease      DJD (degenerative joint disease)      Hip arthritis 1/15/2014     Hypertension      Hypertrophic cardiomyopathy (H) 10/09     Other abnormal heart sounds      Pacemaker     ICD     Palpitations      Pneumonia, organism unspecified(486)      Prediabetes 7/10/15    A1C 6.5     Status post implantation of automatic cardioverter/defibrillator (AICD)      Ventricular tachycardia (H)    - HCM diagnosed '06, previously burnt out (EF 20%) now with recovered EF  - h/o VT s/p dual chamber ICD '12  - AF s/p PVI X 2 ('11, '16, '18), h/o DCCV X 10    Meds:  acetaminophen (TYLENOL) 325 MG tablet, Take 325-650 mg by mouth every 6 hours as needed  albuterol (PROAIR HFA/PROVENTIL HFA/VENTOLIN HFA) 108 (90 Base) MCG/ACT inhaler, Inhale 2 puffs into the lungs every 6 hours  amoxicillin (AMOXIL) 500 MG tablet, Take 4 tablets (2000 mg) by mouth 1 hour before dental procedures.  atorvastatin (LIPITOR) 20 MG tablet, Take 1 tablet (20 mg) by mouth daily  cyanocobalamin (VITAMIN B-12) 100  MCG tablet, Take 100 mcg by mouth daily  dofetilide (TIKOSYN) 250 MCG capsule, Take 1 capsule (250 mcg) by mouth every 12 hours  eplerenone (INSPRA) 50 MG tablet, Take 1 tablet (50 mg) by mouth daily  furosemide (LASIX) 40 MG tablet, Take 1 tablet (40 mg) by mouth daily  gabapentin (NEURONTIN) 300 MG capsule, Take 2 capsules (600 mg) by mouth 2 times daily  metFORMIN (GLUCOPHAGE-XR) 500 MG 24 hr tablet, Take 2 tablets (1,000 mg) by mouth daily (with dinner) Take 2 tablets (1,000 mg) daily (with dinner).  metoprolol succinate ER (TOPROL-XL) 50 MG 24 hr tablet, TAKE ONE TABLET BY MOUTH EVERY DAY  multivitamin, therapeutic (THERA-VIT) TABS, Take 1 tablet by mouth daily  XARELTO ANTICOAGULANT 20 MG TABS tablet, Take 1 tablet (20 mg) by mouth daily (with dinner)  zolpidem (AMBIEN) 10 MG tablet, Take 0.5-1 tablets (5-10 mg) by mouth nightly as needed for sleep  dabigatran ANTICOAGULANT (PRADAXA ANTICOAGULANT) 150 MG capsule, Take 1 capsule (150 mg) by mouth 2 times daily Store in original 's bottle or blister pack; use within 120 days of opening.  [] doxycycline hyclate (VIBRAMYCIN) 100 MG capsule, Take 1 capsule (100 mg) by mouth 2 times daily for 10 days  guaiFENesin-codeine (ROBITUSSIN AC) 100-10 MG/5ML solution, Take 5-10 mLs by mouth every 4 hours as needed for cough  guaiFENesin-codeine (ROBITUSSIN AC) 100-10 MG/5ML solution, Take 5-10 mLs by mouth every 4 hours as needed    lidocaine 1% with EPINEPHrine 1:100,000 injection 3 mL        Social History     Socioeconomic History     Marital status:      Spouse name: Not on file     Number of children: Not on file     Years of education: Not on file     Highest education level: Not on file   Occupational History     Occupation:      Employer: [employer]   Social Needs     Financial resource strain: Not on file     Food insecurity:     Worry: Not on file     Inability: Not on file     Transportation needs:     Medical: Not  on file     Non-medical: Not on file   Tobacco Use     Smoking status: Former Smoker     Packs/day: 1.00     Years: 40.00     Pack years: 40.00     Types: Cigarettes     Start date: 1/1/1975     Last attempt to quit: 12/11/2015     Years since quitting: 3.5     Smokeless tobacco: Never Used   Substance and Sexual Activity     Alcohol use: Yes     Alcohol/week: 0.0 oz     Comment: 1 drink per week     Drug use: No     Sexual activity: Yes     Partners: Female   Lifestyle     Physical activity:     Days per week: Not on file     Minutes per session: Not on file     Stress: Not on file   Relationships     Social connections:     Talks on phone: Not on file     Gets together: Not on file     Attends [employer] service: Not on file     Active member of club or organization: Not on file     Attends meetings of clubs or organizations: Not on file     Relationship status: Not on file     Intimate partner violence:     Fear of current or ex partner: Not on file     Emotionally abused: Not on file     Physically abused: Not on file     Forced sexual activity: Not on file   Other Topics Concern      Service Not Asked     Blood Transfusions Not Asked     Caffeine Concern Not Asked     Occupational Exposure Not Asked     Hobby Hazards Not Asked     Sleep Concern Not Asked     Stress Concern Not Asked     Weight Concern Not Asked     Special Diet Not Asked     Back Care Not Asked     Exercise No     Bike Helmet Not Asked     Seat Belt Yes     Self-Exams Not Asked     Parent/sibling w/ CABG, MI or angioplasty before 65F 55M? No   Social History Narrative     Not on file       Family History   Problem Relation Age of Onset     Heart Disease Mother         unknown     Obesity Mother      Gastrointestinal Disease Mother         diverticulitis     Diabetes Maternal Grandmother      Diabetes Paternal Grandmother      Cerebrovascular Disease Paternal Grandmother 94     Diabetes Paternal Grandfather      Cerebrovascular Disease  "Paternal Grandfather 78     Diabetes Son      MARLONAMANUEL. Father         CABG age 78     Heart Disease Father         CABG x5     Diabetes Maternal Grandfather      LUNG DISEASE No family hx of      Deep Vein Thrombosis (DVT) No family hx of      Anesthesia Reaction No family hx of      Melanoma No family hx of      Skin Cancer No family hx of        ROS:   Constitutional: No fever, chills, or sweats. No weight gain/loss.   ENT: No visual disturbance, ear ache, epistaxis, sore throat.   Allergies/Immunologic: Negative.   Respiratory: No cough, hemoptysis.   Cardiovascular: As per HPI.   GI: No nausea, vomiting, hematemesis, melena, or hematochezia.   : No urinary frequency, dysuria, or hematuria.   Integument: Negative.   Psychiatric: Negative.   Neuro: Negative.   Endocrinology: Negative.   Musculoskeletal: Ongoing hip pain      /80 (BP Location: Right arm, Patient Position: Chair, Cuff Size: Adult Regular)   Pulse 108   Ht 1.803 m (5' 11\")   Wt 96.6 kg (213 lb)   SpO2 97%   BMI 29.71 kg/m    General: appears comfortable, alert and articulate  Head: normocephalic, atraumatic  Eyes: anicteric sclera, EOMI  Neck: no adenopathy, 2+ carotids without bruits  Orophyarynx: moist mucosa, no lesions, dentition intact  Heart: regular, S1/S2, 2/6 systolic murmur, no gallop or rub, estimated JVP <10cm  Lungs: clear, no rales or wheezing  Abdomen: soft, non-tender, bowel sounds present, no hepatomegaly  Extremities: no clubbing, cyanosis or edema  Neurological: normal speech and affect, no gross motor deficits      CPX Echo 6/1/2018:  Interpretation Summary  Submaximal treadmill stress test in a patient with a diagnosis of HCM.  The Ramirez treadmill score is 8 (low risk).  Exercise was stopped due to fatigue.  Normal blood pressure response to exercise.  No ECG evidence of ischemia.  No supraventricular or ventricular arrhythmias. Frequent PVCs noted throughout the study.  Normal biventricular function with mild " asymmetrical septal hypertrophy (12mm).  No dynamic outflow tract obstruction is present at rest or with exercise.  Normal segmental wall motion at rest and with exercise.  Minimal augmentation in LV function with exercise.  Average functional capacity for age.         CPX 2/15/2019:        CTA coronary angiogram 5/28/19  IMPRESSION:  1.  No evidence of coronary artery atherosclerosis or stenosis.  2.  Myocardial bridging involving the mid LAD.  3.  Total Agatston score 0 placing the patient in the lowest percentile when compared to age and gender matched control group.    Right heart cath 8/21/19   Time Systolic Diastolic Mean A Wave V Wave EDP Max dp/dt HR   RA Pressures  3:38 PM   12 mmHg    16 mmHg    14 mmHg      61 bpm      RV Pressures  3:38 PM 60 mmHg        16 mmHg     106 bpm      PA Pressures  3:41 PM 60 mmHg    22 mmHg    38 mmHg        69 bpm      PCW Pressures  3:39 PM   30 mmHg    28 mmHg    38 mmHg      74 bpm         Time Hb SAT(%) PO2 Content PA Sat   PA  3:28 PM  63.6 %      63.6 %      Art  3:28 PM  99 %     18.04 mL/dL       Cardiac Output Phase: Baseline      Time TDCO TDCI Tj C.O. Tj C.I. Tj HR   Cardiac Output Results  3:28 PM 3.8 L/min    1.79 L/min/m2    4.41 L/min    2.08 L/min/m2           Echo 12/20/2020  Global and regional left ventricular function is normal with an EF of 55-60%.  Global right ventricular function is normal.  IVC diameter <2.1 cm collapsing >50% with sniff suggests a normal RA pressure  of 3 mmHg.  No pericardial effusion is present.  Mild pulmonary hypertension is present.  No change from prior.           Assessment/Plan:  64 year old male with history of HCM without obstruction (dx 2006, EF 20% improved to 60%), VT s/p ICD 2012, nonobstructive CAD (cath 2013), AF s/p PVI X 3 (2011, 2016, 2018) and DCCV X 10 who presents for ongoing evaluation and management,      # Hypertrophic cardiomyopathy -- previously burnt out with EF 20% ('13), now recovered (EF 60%).   No evidence of LVOT obstruction.  Echo in December with no significant change.    - will obtain CPX now that patient is in afib/flutter to reassess exercise capacity   - continue metoprolol XL 50mg daily   - continue eplerenone 50mg daily  - pt aware of exercise restrictions and family screening recommendations    # Nonobstructive CAD -- 10-30% stenoses on cath '13  - coronary CTA confirmed no significant disease  - continue atorvastatin 20     # HTN -- controlled  - continue metoprolol XL and eplerenone     # Atrial Fibrillation   - continue Xarelto  - continue dofetilide 250mcg  BID   - continue Metoprolol 50 XL daily   - plans for repeat atrial arrhythmia ablation.  Patient seen by EP today.  Please see their note for detailed finding and plans.      Mona Ware MD  Section Head - Advanced Heart Failure, Transplantation and Mechanical Circulatory Support  Director - Adult [facility]  Associate Professor of Medicine, [facility], [place]

## 2020-04-17 ENCOUNTER — TELEPHONE (OUTPATIENT)
Dept: CARDIOLOGY | Facility: CLINIC | Age: 65
End: 2020-04-17

## 2020-04-17 ENCOUNTER — CARE COORDINATION (OUTPATIENT)
Dept: CARDIOLOGY | Facility: CLINIC | Age: 65
End: 2020-04-17

## 2020-04-17 NOTE — TELEPHONE ENCOUNTER
Slivia calling and requesting to see if the providers will refill his Ambien. He mentions that he knows he doesn't see them for this medication but they have refilled it for him before in the past.    He tried to go to his other doctor but they are requesting that he comes in for an appointment and he states no one is taking appointments at this time.    He is currently cutting them in half and  taking 5 mg a day and would like it sent to [facility] order specialty pharmacy.    Please call silvia to let him know if they can refill it or not.

## 2020-04-27 ENCOUNTER — TELEPHONE (OUTPATIENT)
Dept: INTERNAL MEDICINE | Facility: CLINIC | Age: 65
End: 2020-04-27

## 2020-04-27 NOTE — TELEPHONE ENCOUNTER
I spoke with the imaging center. They would like to know if the cancer screen CT ordered by Dr. Guzman is urgent or if it can wait post covid crisis in July. I let her know that I would consult the provider and call her back.     Miriam Lopez, EMT at 3:14 PM on 4/27/2020.

## 2020-04-27 NOTE — TELEPHONE ENCOUNTER
[facility] Call Center    Phone Message    May a detailed message be left on voicemail: yes     Reason for Call: Other: Vince Neri is wanting to get a call back in regards to knowing how urgent the imaging apt is for Patient. Please Advise.      Action Taken: Message routed to:  [facility]    Travel Screening: Not Applicable

## 2020-04-28 DIAGNOSIS — F13.20 SEDATIVE, HYPNOTIC OR ANXIOLYTIC DEPENDENCE (H): ICD-10-CM

## 2020-04-28 RX ORDER — ZOLPIDEM TARTRATE 10 MG/1
5-10 TABLET ORAL
Qty: 90 TABLET | Refills: 0 | OUTPATIENT
Start: 2020-04-28

## 2020-04-28 NOTE — TELEPHONE ENCOUNTER
[facility] Call Center    Phone Message    May a detailed message be left on voicemail: yes     Reason for Call: Medication Refill Request    Has the patient contacted the pharmacy for the refill? Yes   Name of medication being requested: zolpidem (AMBIEN) 10 MG tablet  Provider who prescribed the medication: Dr. Guzman  Pharmacy: [facility] - [facility] [address]    Date medication is needed: As soon as possible pt did not put time frame needed.       Action Taken: Message routed to:  [facility]    Travel Screening: Not Applicable

## 2020-04-28 NOTE — TELEPHONE ENCOUNTER
Patient Requested  zolpidem (AMBIEN) 10 MG tablet  Last Filled  12/27/2019  Last Office Visit  12/17/2020     Next Office Visit  Nothing scheduled   Checked  04/28/2020    DX: Chronic Zolpidem use for insomnia     Pharmacy: [facility] - [place], [place] - [address] at 2:30 PM on 4/28/2020.

## 2020-04-28 NOTE — TELEPHONE ENCOUNTER
I updated scheduling and let them know per Dr. Guzman recommendation that the patient can wait until after covid crisis to get the imaging done, but he should definitely get it done before the year is over. No other questions or concerns, they will help the patient reschedule.   Miriam Lopez, EMT at 9:29 AM on 4/28/2020.

## 2020-04-30 ENCOUNTER — TELEPHONE (OUTPATIENT)
Dept: CARDIOLOGY | Facility: CLINIC | Age: 65
End: 2020-04-30

## 2020-05-01 NOTE — TELEPHONE ENCOUNTER
Reviewed results briefly with Haroon over phone. He confirmed appointment with Dr. Ware, during which she will review the results more thoroughly. Patient verbalized understanding and is in agreement to the plan.

## 2020-05-04 ENCOUNTER — VIRTUAL VISIT (OUTPATIENT)
Dept: INTERNAL MEDICINE | Facility: CLINIC | Age: 65
End: 2020-05-04
Payer: COMMERCIAL

## 2020-05-04 DIAGNOSIS — F51.04 CHRONIC INSOMNIA: Primary | ICD-10-CM

## 2020-05-04 DIAGNOSIS — I48.19 PERSISTENT ATRIAL FIBRILLATION (H): Primary | ICD-10-CM

## 2020-05-04 RX ORDER — ZOLPIDEM TARTRATE 10 MG/1
5 TABLET ORAL
Qty: 30 TABLET | Refills: 0 | Status: SHIPPED | OUTPATIENT
Start: 2020-05-04 | End: 2020-07-13

## 2020-05-04 ASSESSMENT — PAIN SCALES - GENERAL: PAINLEVEL: NO PAIN (0)

## 2020-05-04 NOTE — PATIENT INSTRUCTIONS
Risks of use of sleep medication in older patients:    Older adults have a particularly high risk of adverse effects from hypnotic drugs, including excessive sedation, cognitive impairment, delirium, night wandering, agitation, postoperative confusion, balance problems, and impaired performance of daily activities. An increased risk of falls with severe consequences, including traumatic brain injury and hip fracture, has been observed in association with both benzodiazepines and nonbenzodiazepines such as zolpidem.    In a meta-analysis of 24 randomized trials (2417 patients) that evaluated the impact of pharmacotherapy in adults older than 60 years with insomnia, there was an improvement of sleep quality, total sleep time, and frequency of nighttime awakening. However, the magnitude of these benefits was relatively small compared with the two- to fivefold increase in adverse cognitive or psychomotor events. This suggests that additional caution is necessary when deciding whether pharmacotherapy is indicated for an older patient with insomnia.    Mortality -- Several observational studies have found an association between use or prescription of hypnotic drugs and all-cause mortality and/or cancer.    One of the larger studies suggested that hypnotic drugs (including frequently prescribed agents such as zolpidem and temazepam) were associated with an increased risk of both cancer and death, even at prescription levels of less than 18 doses per year over a 2.5-year duration. Another large retrospective case-control study included over 34,000 patients age 16 years and older first prescribed an anxiolytic or hypnotic drug or both between 1998 and 2001, matched by age, gender, and primary care practice with nearly 70,000 controls. Over an average follow-up period of 7.6 years, prescription of anxiolytic and hypnotic drugs was associated with a twofold increased hazard of death after adjusting for a wide range of  potential confounders, including medical and psychiatric comorbidities, sleep disorders, and other drugs.    Source of information: UpToDate    Patient Education     Treating Insomnia     Learning to relax before bedtime can improve your sleep.   Good sleeping habits are a key part of treatment. If needed, some medicines may help you sleep better at first. Making healthy lifestyle changes and learning to relax can improve your sleep. Treating insomnia takes commitment. But trust that your efforts will pay off. Be sure to talk with your healthcare provider before taking any medicine.  Healthy lifestyle  Your lifestyle affects your health and your sleep. Here are some healthy habits:    Keep a regular sleep schedule. Go to bed and get up at the same time each day.    Exercise regularly. It may help you reduce stress. Avoid strenuous exercise for 2 to 4 hours before bedtime.    Avoid or limit naps, especially in the late afternoon.    Use your bed only for sleep and sex.    Don t spend too much time in bed trying to fall asleep. If you can t fall asleep, get up and do something (no electronics) until you become tired and drowsy.    Avoid or limit caffeine and nicotine for up to 6 hours before bedtime. They can keep you awake at night.     Also avoid alcohol for at least 4 to 6 hours before bedtime. It may help you fall asleep at first. But you will have more awakenings during the night. And your sleep will not be restful.  Before bedtime  To sleep better every night, try these tips:    Have a bedtime routine to let your body and mind know when it s time to sleep.    Think of going to bed as relaxing and enjoyable. Sleep will come sooner.    If your worries don t let you sleep, write them down in a diary. Then close it, and go to bed.    Make sure the room is not too hot or too cold. If it s not dark enough, an eye mask can help. If it s noisy, try using earplugs.    Don't eat a large meal just before bedtime.    Remove  noises, bright lights, TVs, cell phones, and computers from your sleeping environment.    Use a comfortable mattress and pillow.  Learn to relax  Stress, anxiety, and body tension may keep you awake at night. To unwind before bedtime, try a warm bath, meditation, or yoga. Also try the following:    Deep breathing. Sit or lie back in a chair. Take a slow, deep breath. Hold it for 5 counts. Then breathe out slowly through your mouth. Keep doing this until you feel relaxed.    Progressive muscle relaxation. Tense and then relax the muscles in your body as you breathe deeply. Start with your feet and work up your body to your neck and face.  Cognitive Behavioral Therapy (CBT)  CBT is the most effective treatment for long-term insomnia. It tries to address the underlying causes of your sleep problems, including your habits and how you think about sleep.  Individual Therapy  William Encarnacion PsyD,   Insomnia   [facility]    [facility]: [phone]    [facility]: [phone]  [facility]  Visit [email or web address] or call [phone] to find a clinic close to you.  Suggested resources  The resources below may help you relax. This list is for information only. [facility] is not responsible for the quality of services or the actions of any person or organization. There may be a fee to use some of these resources.  Insomnia treatment books  Marce Mind by Johanna Hinton and Janine Rosenthal (2013)  Overcoming Insomnia by Esteban Pérez and Johanna Hinton (2008)  Quiet Your Mind and Get to Sleep: Solutions to Insomnia for Those with Depression, Anxiety or Chronic Pain by Johanna Hinton and Janine Rosenthal (2009)  No More Sleepless Nights by Jas Cody and Marisol Couch (1996)  Say Marce to Insomnia by Ashish Jean (2009)  The Insomnia Workbook by Lindy Washington and Hermes Ham (2009)  The Insomnia Answer by  Landry Virgen and Travis Arriaga (2006)  Stress management and relaxation books  The Relaxation and Stress Reduction Workbook by Oralia Cao, Betsy Brumfield and Cipriano Mcbride (2008)  Stress Management Workbook: Techniques and Self-Assessment Procedures by Marleny Manuel and Willie Flores (1997)  A Mindfulness-Based Stress Reduction Workbook by Moo Villarreal and Julisa Orosco (2010)  The Complete Stress Management Workbook by Williams Ulloa, Didier Ramírez and Ritesh Ji (1996)  Online programs  www.SHUTi.me (pronounced shut eye). There is a fee for this program.   www.sleepIO.com (pronounced sleep ee oh). There is a fee for this program.  Other online resources  Deep breathing and progressive muscle relaxation (PMR):    http://www.Scatter Lab.Voolgo  Meditation:    www.SynthorxrantheDiarize.Voolgo    www.FolioDynamixguided"[employer]meditation"[employer]site.com  Guided imagery:    http://www.Gemfire    http://Fingo.Voolgo  (click on  Resource Library,  then choose  Meditation, Relaxation, Guided Imagery )  Apps for your mobile device:    Autogenic Training Progressive Muscle Relaxation by PlayerTakesAll    Calm: Meditation and Sleep Stories by [employer]    [employer] Timer - Meditation Keke by [employer].  This is not a prescription and these resources are optional. You must pay for any costs when using these resources. Please ask your insurance carrier if you can be reimbursed for these resources. If so, you are responsible for sending the needed details to your insurance carrier. These resources may also be tax deductible as medical expenses. Check with your .  Date Last Reviewed: 5/18/2018  Modifications clinically reviewed by William Encarnacion PsyD, LAITH, CBSM, Director of the Insomnia and Behavioral Sleep Health Program, [facility].    1057-9487 [employer]. [address] [place] [address], [place] [address]. All rights reserved. This information is not intended as a  substitute for professional medical care. Always follow your healthcare professional's instructions. This information has been modified by your health care provider with permission from the publisher.    Patient Education     Zolpidem tablets  Brand Name: Ambien  What is this medicine?  ZOLPIDEM (zole PI dem) is used to treat insomnia. This medicine helps you to fall asleep and sleep through the night.  How should I use this medicine?  Take this medicine by mouth with a glass of water. Follow the directions on the prescription label. It is better to take this medicine on an empty stomach and only when you are ready for bed. Do not take your medicine more often than directed. If you have been taking this medicine for several weeks and suddenly stop taking it, you may get unpleasant withdrawal symptoms. Your doctor or health care professional may want to gradually reduce the dose. Do not stop taking this medicine on your own. Always follow your doctor or health care professional's advice.  A special MedGuide will be given to you by the pharmacist with each prescription and refill. Be sure to read this information carefully each time.  Talk to your pediatrician regarding the use of this medicine in children. Special care may be needed.  What side effects may I notice from receiving this medicine?  Side effects that you should report to your doctor or health care professional as soon as possible:    allergic reactions like skin rash, itching or hives, swelling of the face, lips, or tongue    breathing problems    changes in vision    confusion    depressed mood or other changes in moods or emotions    feeling faint or lightheaded, falls    hallucinations    loss of balance or coordination    loss of memory    numbness or tingling of the tongue    restlessness, excitability, or feelings of anxiety or agitation    signs and symptoms of liver injury like dark yellow or brown urine; general ill feeling or flu-like symptoms;  light-colored stools; loss of appetite; nausea; right upper belly pain; unusually weak or tired; yellowing of the eyes or skin    suicidal thoughts    unusual activities while not fully awake like driving, eating, making phone calls, or sexual activity  Side effects that usually do not require medical attention (report to your doctor or health care professional if they continue or are bothersome):    dizziness    drowsiness the day after you take this medicine    headache  What may interact with this medicine?      alcohol    antihistamines for allergy, cough and cold    certain medicines for anxiety or sleep    certain medicines for depression, like amitriptyline, fluoxetine, sertraline    certain medicines for fungal infections like ketoconazole and itraconazole    certain medicines for seizures like phenobarbital, primidone    ciprofloxacin    dietary supplements for sleep, like valerian or kava kava    general anesthetics like halothane, isoflurane, methoxyflurane, propofol    local anesthetics like lidocaine, pramoxine, tetracaine    medicines that relax muscles for surgery    narcotic medicines for pain    phenothiazines like chlorpromazine, mesoridazine, prochlorperazine, thioridazine    rifampin  What if I miss a dose?  This does not apply. This medicine should only be taken immediately before going to sleep. Do not take double or extra doses.  Where should I keep my medicine?  Keep out of the reach of children. This medicine can be abused. Keep your medicine in a safe place to protect it from theft. Do not share this medicine with anyone. Selling or giving away this medicine is dangerous and against the law.  This medicine may cause accidental overdose and death if taken by other adults, children, or pets. Mix any unused medicine with a substance like cat litter or coffee grounds. Then throw the medicine away in a sealed container like a sealed bag or a coffee can with a lid. Do not use the medicine after  "the expiration date.  Store at room temperature between 20 and 25 degrees C (68 and 77 degrees F).  What should I tell my health care provider before I take this medicine?  They need to know if you have any of these conditions:    depression    history of drug abuse or addiction    if you often drink alcohol    liver disease    lung or breathing disease    myasthenia gravis    sleep apnea    sleep-walking, driving, eating or other activity while not fully awake after taking a sleep medicine    suicidal thoughts, plans, or attempt; a previous suicide attempt by you or a family member    an unusual or allergic reaction to zolpidem, other medicines, foods, dyes, or preservatives    pregnant or trying to get pregnant    breast-feeding  What should I watch for while using this medicine?  Visit your doctor or health care professional for regular checks on your progress. Keep a regular sleep schedule by going to bed at about the same time each night. Avoid caffeine-containing drinks in the evening hours. When sleep medicines are used every night for more than a few weeks, they may stop working. Talk to your doctor if you still have trouble sleeping.  After taking this medicine for sleep, you may get up out of bed while not being fully awake and do an activity that you do not know you are doing. The next morning, you may have no memory of the event. Activities such as driving a car (\"sleep-driving\"), making and eating food, talking on the phone, sexual activity, and sleep-walking have been reported. Serious injuries or death have occurred in rare cases. Stop the medicine. Call your doctor right away if you find out you have done any of these activities. Do not take this medicine if you have used alcohol that evening or before bed or taken another medicine for sleep. The risk of doing these sleep-related activities will be increased.  Wait for at least 8 hours after you take a dose before driving or doing other activities " that require full mental alertness. Do not take this medicine unless you are able to stay in bed for a full night (7 to 8 hours) before you must be active again. You may have a decrease in mental alertness the day after use, even if you feel that you are fully awake. Tell your doctor if you will need to perform activities requiring full alertness, such as driving, the next day. Do not stand or sit up quickly after taking this medicine, especially if you are an older patient. This reduces the risk of dizzy or fainting spells.  If you or your family notice any changes in your behavior, such as new or worsening depression, thoughts of harming yourself, anxiety, other unusual or disturbing thoughts, or memory loss, call your doctor right away.  After you stop taking this medicine, you may have trouble falling asleep. This is called rebound insomnia. This problem usually goes away on its own after 1 or 2 nights.  NOTE:This sheet is a summary. It may not cover all possible information. If you have questions about this medicine, talk to your doctor, pharmacist, or health care provider. Copyright  2019 [employer]    Cognitive behavioral therapy (CBT)  Cognitive behavioral therapy (CBT) teaches you to manage insomnia and other mental health conditions such as anxiety. It does this by helping you understand how you think and act when you re anxious or having trouble sleeping. Research has shown CBT to be a very effective treatment for insomnia. How CBT is run is almost like a class. It involves homework and activities to build skills that teach you to cope insomnia, step by step. It can be done in a group or one-on-one, and often takes place for a set number of sessions. CBT has two main parts:    Cognitive therapy helps you identify the negative, irrational thoughts that occur with your insomnia. You ll learn to replace these with more positive, realistic thoughts.    Behavioral therapy helps you change how you react to  trouble sleeping. You ll learn coping skills and methods for relaxing to help you better deal with trouble sleeping.  Other forms of therapy  Other therapy methods may work better for you than CBT. Or, you may move from CBT to another form of therapy as your treatment needs change. This may mean meeting with a therapist by yourself or in a group. Therapy can also help you work through problems in your life that may be making your insomnia worse.  Getting better takes time  Therapy will help you feel better and teach you skills to help manage insomnia long term. But change doesn t happen right away. It takes a commitment from you. And treatment only works if you learn to face the causes of your insomnia. So, you might feel worse before you feel better. This can sometimes make it hard to stick with it. But remember: Therapy is a very effective treatment. The results will be well worth it.

## 2020-05-04 NOTE — PROGRESS NOTES
"Tele visit    CC:  Ambien discussion    Other health care team members:  Cardiology Dr. GEORGIA Ware  EP Dr. SAVANNAH Cooper  PCP:  Dr. Cande Garcia    HPI:  64 year old gentleman established care in the [facility] on 12/17/19.  PMHx notable for hypertensive cardiomyopathy, VT s/p ICD (on defetilide), a fib s/p cardioversion and multiple ablations (on Xarelto, non obstructive CAD,  Hypertension (on metoprolol and eplerenone), DM (on metformin), chronic insomnia.      On 12/17/19, chronic use (10 years) of Ambien was discussed.  Ambien was refilled (90 day supply) with the caveat that Dr. Garcia would like to discuss ongoing use at the next OV in 3 months.  Plan was to reduce reliance on the medication.  Had discussion about risks.    Patient did not schedule a future visit, and called for a refill on 3/23/20 and was advised to schedule an appointment. No appointment was scheduled.  On 4/17/20 he called his cardiologist for a refill, and surgery referred him back ro Primary Care. On 4/28 he called again for a refill, and we again reminded him to make a follow up appointment.    Regarding his chronic insomnia:  Today we discussed risks of use of hypnotics  including zolpidem) in older patients, cautions regarding use, drug interactions, sleep hygiene, sleep psychology referral and basics of cognitive behavioral therapy.  See the After Visit Summary for details.    Currently taking 5 mg every night .  Does not recall any discussion about risks/benefits.  States \"I need it to sleep, can't shut mind off when I go to bed with all the stress at work\".  Was taking 5 mg years ago, then had to take 10 mg after a while, now tolerating 5 mg for the last month.  \"It knocks me out, which I like\".  Needs to go to bed at 8:00, but natural bedtime is 11:00 p.m.  Natural wake up time is 6:30-6:00 am. Currently wakes up at 5:00.  Hopes to not need the medication after the next several months because he is going to retire in December.  If he " "doesn't take Ambien, he tosses and turns for 2 hours and gets 4 hours of sleep after that.  Massage helps, but hasn't done it in a while..  Took meditation class in [place] years ago, but it \"didn't work\", otherwise has not had any intervention for anxiety/insomnia.  He does think he has anxiety. Has tried, Nyquil but it caused morning hangover feelings. No excessive alcohol use, no hx chem dep.      Current Outpatient Medications   Medication Sig Dispense Refill     acetaminophen (TYLENOL) 325 MG tablet Take 325-650 mg by mouth every 6 hours as needed       albuterol (PROAIR HFA/PROVENTIL HFA/VENTOLIN HFA) 108 (90 Base) MCG/ACT inhaler Inhale 2 puffs into the lungs every 6 hours 18 g 0     amoxicillin (AMOXIL) 500 MG tablet Take 4 tablets (2000 mg) by mouth 1 hour before dental procedures. 12 tablet 3     atorvastatin (LIPITOR) 20 MG tablet Take 1 tablet (20 mg) by mouth daily 90 tablet 3     cyanocobalamin (VITAMIN B-12) 100 MCG tablet Take 100 mcg by mouth daily       dabigatran ANTICOAGULANT (PRADAXA ANTICOAGULANT) 150 MG capsule Take 1 capsule (150 mg) by mouth 2 times daily Store in original 's bottle or blister pack; use within 120 days of opening. 60 capsule 0     dofetilide (TIKOSYN) 250 MCG capsule Take 1 capsule (250 mcg) by mouth every 12 hours 180 capsule 3     eplerenone (INSPRA) 50 MG tablet Take 1 tablet (50 mg) by mouth daily 90 tablet 3     furosemide (LASIX) 40 MG tablet Take 1 tablet (40 mg) by mouth daily 90 tablet 2     gabapentin (NEURONTIN) 300 MG capsule Take 2 capsules (600 mg) by mouth 2 times daily 360 capsule 3     guaiFENesin-codeine (ROBITUSSIN AC) 100-10 MG/5ML solution Take 5-10 mLs by mouth every 4 hours as needed for cough 118 mL 0     guaiFENesin-codeine (ROBITUSSIN AC) 100-10 MG/5ML solution Take 5-10 mLs by mouth every 4 hours as needed 240 mL 0     metFORMIN (GLUCOPHAGE-XR) 500 MG 24 hr tablet Take 2 tablets (1,000 mg) by mouth daily (with dinner) Take 2 tablets (1,000 " "mg) daily (with dinner). 180 tablet 3     metoprolol succinate ER (TOPROL-XL) 50 MG 24 hr tablet TAKE ONE TABLET BY MOUTH EVERY DAY 90 tablet 3     multivitamin, therapeutic (THERA-VIT) TABS Take 1 tablet by mouth daily       XARELTO ANTICOAGULANT 20 MG TABS tablet Take 1 tablet (20 mg) by mouth daily (with dinner) 90 tablet 3     zolpidem (AMBIEN) 10 MG tablet Take 0.5-1 tablets (5-10 mg) by mouth nightly as needed for sleep 90 tablet 0     Stu was seen today for recheck medication.    Diagnoses and all orders for this visit:    Chronic insomnia  -     SLEEP PSYCHOLOGY REFERRAL; Future  -     zolpidem (AMBIEN) 10 MG tablet; Take 0.5 tablets (5 mg) by mouth nightly as needed for sleep Maximum number of tablets #30 in 90 days.  I encouraged attention to sleep hygiene and stress/anxiety reduction.    I advised Haroon to make a follow up appointment with Dr. Ambrosio in 3 months.  I recommend reducing the number of tablets in his next prescription to #15 (or discontinue if he is willing/interested).     This patient is being evaluated via a billable telephone visit; THIS VISIT WAS INITIATED BY THE PT, as AN ALTERNATIVE TO IN PERSON VISIT .       The patient has has been notified of following:      \"This billable telephone visit will be conducted via a call between you and your physician/provider. We have found that certain health care needs can be provided without the need for a physical exam.  This service lets us provide the care you need with a short phone conversation.  If a prescription is necessary we can send it directly to your pharmacy.  If lab work is needed we can place an order for that and you can then stop by our lab to have the test done at a later time. We can also place orders for a limited number of imaging tests if they are deemed urgently necessary.     If during the course of the call the physician/provider feels a telephone visit is not appropriate, you will not be charged for this service.\" "     Person spoken to: Stu Masoud    Due to efforts to reduce the spread of COVID-19 in the clinic, state, nation, telephone visits are encouraged currently. Patient understands that diagnose and advice is limited by the inability to exam him/her/them face-to-face.    Time call initiated:  1:00 pm  Time call ended:  1:22  Total length of call: 22 min    Laury Guzman M.D.  Internal Medicine  [facility]   pager [phone]

## 2020-05-04 NOTE — NURSING NOTE
Chief Complaint   Patient presents with     Recheck Medication     pt would like to discuss med refill for ryan Calloway, TATY, EMT at 12:19 PM on 5/4/2020.

## 2020-05-04 NOTE — Clinical Note
See Dr. Ambrosio in 3 months.  Schedule virtual visit with sleep psychologist, next available.  Dr ARAMBULA

## 2020-05-05 ENCOUNTER — CARE COORDINATION (OUTPATIENT)
Dept: CARDIOLOGY | Facility: CLINIC | Age: 65
End: 2020-05-05

## 2020-05-05 ENCOUNTER — TELEPHONE (OUTPATIENT)
Dept: INTERNAL MEDICINE | Facility: CLINIC | Age: 65
End: 2020-05-05

## 2020-05-05 DIAGNOSIS — Z11.59 ENCOUNTER FOR SCREENING FOR OTHER VIRAL DISEASES: Primary | ICD-10-CM

## 2020-05-07 ENCOUNTER — CARE COORDINATION (OUTPATIENT)
Dept: CARDIOLOGY | Facility: CLINIC | Age: 65
End: 2020-05-07

## 2020-05-09 ENCOUNTER — MYC MEDICAL ADVICE (OUTPATIENT)
Dept: INTERNAL MEDICINE | Facility: CLINIC | Age: 65
End: 2020-05-09

## 2020-05-09 DIAGNOSIS — N52.9 ERECTILE DYSFUNCTION, UNSPECIFIED ERECTILE DYSFUNCTION TYPE: Primary | ICD-10-CM

## 2020-05-11 NOTE — TELEPHONE ENCOUNTER
I am wanting and needing some Viagra. The problem has worsened and I would like to give a low dose of Viagra a try. Please issue a prescription if you cold. I just had an e visit with Dr. Martel last week which should suffice for the visit.

## 2020-05-12 ENCOUNTER — OFFICE VISIT (OUTPATIENT)
Dept: URGENT CARE | Facility: URGENT CARE | Age: 65
End: 2020-05-12
Attending: INTERNAL MEDICINE
Payer: COMMERCIAL

## 2020-05-12 DIAGNOSIS — Z11.59 ENCOUNTER FOR SCREENING FOR OTHER VIRAL DISEASES: ICD-10-CM

## 2020-05-12 PROCEDURE — U0003 INFECTIOUS AGENT DETECTION BY NUCLEIC ACID (DNA OR RNA); SEVERE ACUTE RESPIRATORY SYNDROME CORONAVIRUS 2 (SARS-COV-2) (CORONAVIRUS DISEASE [COVID-19]), AMPLIFIED PROBE TECHNIQUE, MAKING USE OF HIGH THROUGHPUT TECHNOLOGIES AS DESCRIBED BY CMS-2020-01-R: HCPCS | Mod: 90 | Performed by: INTERNAL MEDICINE

## 2020-05-12 PROCEDURE — 99207 ZZC NO BILLABLE SERVICE THIS VISIT: CPT

## 2020-05-12 PROCEDURE — 99000 SPECIMEN HANDLING OFFICE-LAB: CPT | Performed by: INTERNAL MEDICINE

## 2020-05-13 ENCOUNTER — ANESTHESIA EVENT (OUTPATIENT)
Dept: CARDIOLOGY | Facility: CLINIC | Age: 65
End: 2020-05-13
Payer: COMMERCIAL

## 2020-05-13 ENCOUNTER — TELEPHONE (OUTPATIENT)
Dept: CARDIOLOGY | Facility: CLINIC | Age: 65
End: 2020-05-13

## 2020-05-13 LAB
SARS-COV-2 RNA SPEC QL NAA+PROBE: NOT DETECTED
SPECIMEN SOURCE: NORMAL

## 2020-05-13 ASSESSMENT — ENCOUNTER SYMPTOMS: DYSRHYTHMIAS: 1

## 2020-05-13 NOTE — ANESTHESIA PREPROCEDURE EVALUATION
"Anesthesia Pre-Procedure Evaluation    Patient: Stu Meredith   MRN:     [id number] Gender:   male   Age:    64 year old :      1955        Preoperative Diagnosis: A fib ablation   Procedure(s):  EP ABLATION FOCAL AFIB     LABS:  CBC:   Lab Results   Component Value Date    WBC 7.5 2019    WBC 6.6 2019    HGB 14.6 2019    HGB 13.9 2019    HCT 44.5 2019    HCT 43.1 2019     2019     2019     BMP:   Lab Results   Component Value Date     2019     2019    POTASSIUM 4.1 2020    POTASSIUM 4.2 2019    CHLORIDE 106 2019    CHLORIDE 106 2019    CO2 30 2019    CO2 30 2019    BUN 18 2019    BUN 20 2019    CR 1.03 2019    CR 0.93 2019     (H) 2019     (H) 2019     COAGS:   Lab Results   Component Value Date    INR 1.42 (H) 2019     POC:   Lab Results   Component Value Date     (H) 2019     OTHER:   Lab Results   Component Value Date    LACT 1.0 2016    A1C 6.7 (H) 2019    NEENA 9.4 2019    PHOS 2.8 11/10/2008    MAG 2.1 2020    ALBUMIN 3.9 2019    PROTTOTAL 7.6 2019    ALT 30 2019    AST 19 2019    ALKPHOS 94 2019    BILITOTAL 0.7 2019    TSH 1.58 02/15/2019    CRP <2.9 05/10/2019    SED 10 05/10/2019        Preop Vitals    BP Readings from Last 3 Encounters:   20 117/80   20 117/80   20 120/83    Pulse Readings from Last 3 Encounters:   20 108   20 108   20 67      Resp Readings from Last 3 Encounters:   20 14   20 20   19 16    SpO2 Readings from Last 3 Encounters:   20 97%   20 97%   20 95%      Temp Readings from Last 1 Encounters:   19 36.4  C (97.5  F) (Tympanic)    Ht Readings from Last 1 Encounters:   20 1.803 m (5' 11\")      Wt Readings from Last 1 Encounters:   20 96.6 kg " "(213 lb)    Estimated body mass index is 29.71 kg/m  as calculated from the following:    Height as of 3/13/20: 1.803 m (5' 11\").    Weight as of 3/13/20: 96.6 kg (213 lb).     LDA:        Past Medical History:   Diagnosis Date     Atrial fibrillation (H) 12/9/11    failed medication, multiple DC cardioveresions; s/p Left atrial ablation to eliminate atrial fibrillation 12/9/11     Chest pain      CHF (congestive heart failure) (H) 7/30/2016     Coronary artery disease      DJD (degenerative joint disease)      Fatigue 7/15/2019     Hip arthritis 1/15/2014     Hypertension      Hypertrophic cardiomyopathy (H) 10/09     Other abnormal heart sounds      Pacemaker     ICD     Palpitations      Pneumonia, organism unspecified(486)      Prediabetes 7/10/15    A1C 6.5     Status post implantation of automatic cardioverter/defibrillator (AICD)      Ventricular tachycardia (H)       Past Surgical History:   Procedure Laterality Date     ANESTHESIA CARDIOVERSION  4/24/2014    Procedure: ANESTHESIA CARDIOVERSION;  Surgeon: Generic Anesthesia Provider;  Location: [facility]     ANESTHESIA CARDIOVERSION N/A 5/12/2016    Procedure: ANESTHESIA CARDIOVERSION;  Surgeon: GENERIC ANESTHESIA PROVIDER;  Location[facility]     ANESTHESIA CARDIOVERSION N/A 8/7/2017    Procedure: ANESTHESIA CARDIOVERSION;  Anesthesia Offsite Coverage Cardioversion @1100;  Surgeon: GENERIC ANESTHESIA PROVIDER;  Location: [facility]     ANESTHESIA CARDIOVERSION N/A 1/3/2018    Procedure: ANESTHESIA CARDIOVERSION;  Anesthesia Cardioverion;  Surgeon: GENERIC ANESTHESIA PROVIDER;  Location: [facility]     ANESTHESIA CARDIOVERSION N/A 5/4/2018    Procedure: ANESTHESIA CARDIOVERSION;  Anesthesia Coverage Cardioversion @1400;  Surgeon: GENERIC ANESTHESIA PROVIDER;  Location: [facility]     ANESTHESIA CARDIOVERSION N/A 9/27/2018    Procedure: ANESTHESIA CARDIOVERSION;  Anesthesia Cardioversion @0930;  Surgeon: GENERIC ANESTHESIA PROVIDER;  Location[facility]     ANESTHESIA CARDIOVERSION " N/A 12/20/2018    Procedure: Anesthesia Coverage Cardioversion @0830;  Surgeon: GENERIC ANESTHESIA PROVIDER;  Location: [facility]     ANESTHESIA CARDIOVERSION N/A 8/21/2019    Procedure: Anesthesia Coverage Cardioversion @0800;  Surgeon: GENERIC ANESTHESIA PROVIDER;  [facility]     ANESTHESIA CARDIOVERSION N/A 1/16/2020    Procedure: ANESTHESIA, FOR CARDIOVERSION;  Surgeon: GENERIC ANESTHESIA PROVIDER;  Location: [facility]     ARTHROPLASTY HIP  1/15/2014    Procedure: ARTHROPLASTY HIP;  Left Total Hip Arthroplasty;  Surgeon: Nelson Gaspar MD;  Location: [facility]     ARTHROPLASTY HIP Right 6/5/2019    Procedure: Right Total Hip Arthroplasty;  Surgeon: Nelson Gaspar MD;  Location: [facility]     CARDIAC SURGERY       COLONOSCOPY N/A 5/18/2018    Procedure: COMBINED COLONOSCOPY, SINGLE OR MULTIPLE BIOPSY/POLYPECTOMY BY BIOPSY;  COLONOSCOPY (PT HAS DEFIBRILLATOR) ;  Surgeon: Mike Nickerson MD;  Location:  GI     CV RIGHT HEART CATH N/A 8/19/2019    Procedure: CV RIGHT HEART CATH;  Surgeon: Cisco Rausch MD;  Location:  HEART CARDIAC CATH LAB     CV RIGHT HEART CATH N/A 8/21/2019    Procedure: Right Heart Cath;  Surgeon: Ciaran Burton MD;  Location:  HEART CARDIAC CATH LAB     H ABLATION FOCAL AFIB  12/9/11    Left atrial ablation to eliminate atrial fibrillation     IMPLANT AUTOMATIC IMPLANTABLE CARDIOVERTER DEFIBRILLATOR  7/27/12    AICD implantation     TONSILLECTOMY  1964      No Known Allergies     Anesthesia Evaluation     .             ROS/MED HX    ENT/Pulmonary:       Neurologic: Comment: Chronic insomnia      Cardiovascular: Comment: Hypertrophic Cardiomyopathy    (+) hypertension--CAD (nonobstructive 10-30%), --. : . CHF etiology: Hypertrophic cardiomyopathy Last EF: 55-60 . . :ICD . dysrhythmias (V-tach with ICD) a-fib, . Previous cardiac testing Echodate:results:date: results:ECG reviewed date: results: date: results:          METS/Exercise Tolerance:     Hematologic: Comments: Xarelto         Musculoskeletal:         GI/Hepatic:         Renal/Genitourinary:         Endo:         Psychiatric:         Infectious Disease:         Malignancy:         Other:    (+) No chance of pregnancy                        PHYSICAL EXAM:   Mental Status/Neuro: A/A/O   Airway: Facies: Feasible (Anterior airway)  Mallampati: II  Mouth/Opening: Full  TM distance: < 6 cm  Neck ROM: Full   Respiratory:   Resp. Rate: Normal     Resp. Effort: Normal      CV: Rhythm: Afib   Comments:                      Assessment:   ASA SCORE: 3    H&P: History and physical reviewed and following examination; no interval change.    NPO Status: NPO Appropriate     Plan:   Anes. Type:  General   Pre-Medication: None   Induction:  IV (Standard)   Airway: ETT; Oral   Access/Monitoring: PIV; 2nd PIV; A-Line (A-line access and central access by cardiology)   Maintenance: Balanced     Postop Plan:   Postop Pain: Opioids  Postop Sedation/Airway: Not planned  Disposition: Inpatient/Admit     PONV Management:   Adult Risk Factors:, Postop Opioids   Prevention: Ondansetron, Dexamethasone     CONSENT: Direct conversation   Plan and risks discussed with: Patient   Blood Products: Consent Deferred (Minimal Blood Loss)                   Vince Pressley MD

## 2020-05-13 NOTE — TELEPHONE ENCOUNTER
Call complete for pre procedure reminder, travel screen and no visitor policy.  COVID negative.

## 2020-05-14 ENCOUNTER — SURGERY (OUTPATIENT)
Age: 65
End: 2020-05-14
Payer: COMMERCIAL

## 2020-05-14 ENCOUNTER — HOSPITAL ENCOUNTER (OUTPATIENT)
Dept: CARDIOLOGY | Facility: CLINIC | Age: 65
End: 2020-05-14
Attending: NURSE PRACTITIONER | Admitting: INTERNAL MEDICINE
Payer: COMMERCIAL

## 2020-05-14 ENCOUNTER — HOSPITAL ENCOUNTER (OUTPATIENT)
Facility: CLINIC | Age: 65
Discharge: HOME | End: 2020-05-14
Attending: INTERNAL MEDICINE | Admitting: INTERNAL MEDICINE
Payer: COMMERCIAL

## 2020-05-14 ENCOUNTER — ANESTHESIA (OUTPATIENT)
Dept: CARDIOLOGY | Facility: CLINIC | Age: 65
End: 2020-05-14
Payer: COMMERCIAL

## 2020-05-14 ENCOUNTER — PRE VISIT (OUTPATIENT)
Dept: UROLOGY | Facility: CLINIC | Age: 65
End: 2020-05-14

## 2020-05-14 ENCOUNTER — ANCILLARY PROCEDURE (OUTPATIENT)
Dept: CARDIOLOGY | Facility: CLINIC | Age: 65
End: 2020-05-14
Attending: INTERNAL MEDICINE
Payer: COMMERCIAL

## 2020-05-14 VITALS
WEIGHT: 215.61 LBS | BODY MASS INDEX: 30.19 KG/M2 | HEART RATE: 71 BPM | HEIGHT: 71 IN | OXYGEN SATURATION: 95 % | RESPIRATION RATE: 23 BRPM | DIASTOLIC BLOOD PRESSURE: 69 MMHG | SYSTOLIC BLOOD PRESSURE: 106 MMHG | TEMPERATURE: 97.8 F

## 2020-05-14 DIAGNOSIS — I47.19 ATRIAL TACHYCARDIA (H): ICD-10-CM

## 2020-05-14 DIAGNOSIS — I48.91 ATRIAL FIBRILLATION (H): ICD-10-CM

## 2020-05-14 DIAGNOSIS — I48.0 PAROXYSMAL ATRIAL FIBRILLATION (H): ICD-10-CM

## 2020-05-14 DIAGNOSIS — I51.7 HYPERTROPHIC CARDIOMEGALY: ICD-10-CM

## 2020-05-14 DIAGNOSIS — I48.91 ATRIAL FIBRILLATION (H): Primary | Chronic | ICD-10-CM

## 2020-05-14 DIAGNOSIS — R06.02 SOB (SHORTNESS OF BREATH): ICD-10-CM

## 2020-05-14 DIAGNOSIS — I48.0 PAF (PAROXYSMAL ATRIAL FIBRILLATION) (H): ICD-10-CM

## 2020-05-14 DIAGNOSIS — I48.91 ATRIAL FIBRILLATION, UNSPECIFIED TYPE (H): ICD-10-CM

## 2020-05-14 DIAGNOSIS — I47.20 VENTRICULAR TACHYCARDIA (H): ICD-10-CM

## 2020-05-14 DIAGNOSIS — I48.19 PERSISTENT ATRIAL FIBRILLATION (H): ICD-10-CM

## 2020-05-14 LAB
ANION GAP SERPL CALCULATED.3IONS-SCNC: 7 MMOL/L (ref 3–14)
BUN SERPL-MCNC: 21 MG/DL (ref 7–30)
CALCIUM SERPL-MCNC: 9 MG/DL (ref 8.5–10.1)
CHLORIDE SERPL-SCNC: 109 MMOL/L (ref 94–109)
CO2 SERPL-SCNC: 24 MMOL/L (ref 20–32)
CREAT SERPL-MCNC: 1 MG/DL (ref 0.66–1.25)
ERYTHROCYTE [DISTWIDTH] IN BLOOD BY AUTOMATED COUNT: 13.4 % (ref 10–15)
GFR SERPL CREATININE-BSD FRML MDRD: 78 ML/MIN/{1.73_M2}
GLUCOSE BLDC GLUCOMTR-MCNC: 149 MG/DL (ref 70–99)
GLUCOSE SERPL-MCNC: 125 MG/DL (ref 70–99)
HCT VFR BLD AUTO: 46.8 % (ref 40–53)
HGB BLD-MCNC: 15.3 G/DL (ref 13.3–17.7)
KCT BLD-ACNC: 175 SEC (ref 75–150)
KCT BLD-ACNC: 191 SEC (ref 75–150)
KCT BLD-ACNC: 324 SEC (ref 75–150)
KCT BLD-ACNC: 328 SEC (ref 75–150)
KCT BLD-ACNC: 332 SEC (ref 75–150)
KCT BLD-ACNC: 380 SEC (ref 75–150)
KCT BLD-ACNC: 384 SEC (ref 75–150)
MCH RBC QN AUTO: 29.8 PG (ref 26.5–33)
MCHC RBC AUTO-ENTMCNC: 32.7 G/DL (ref 31.5–36.5)
MCV RBC AUTO: 91 FL (ref 78–100)
PLATELET # BLD AUTO: 254 10E9/L (ref 150–450)
POTASSIUM SERPL-SCNC: 4.2 MMOL/L (ref 3.4–5.3)
RBC # BLD AUTO: 5.13 10E12/L (ref 4.4–5.9)
SODIUM SERPL-SCNC: 140 MMOL/L (ref 133–144)
WBC # BLD AUTO: 6.5 10E9/L (ref 4–11)

## 2020-05-14 PROCEDURE — 27210794 ZZH OR GENERAL SUPPLY STERILE: Performed by: INTERNAL MEDICINE

## 2020-05-14 PROCEDURE — 93662 INTRACARDIAC ECG (ICE): CPT | Performed by: INTERNAL MEDICINE

## 2020-05-14 PROCEDURE — 93653 COMPRE EP EVAL TX SVT: CPT | Mod: GC | Performed by: INTERNAL MEDICINE

## 2020-05-14 PROCEDURE — 93462 L HRT CATH TRNSPTL PUNCTURE: CPT | Performed by: INTERNAL MEDICINE

## 2020-05-14 PROCEDURE — C1733 CATH, EP, OTHR THAN COOL-TIP: HCPCS | Performed by: INTERNAL MEDICINE

## 2020-05-14 PROCEDURE — 25000565 ZZH ISOFLURANE, EA 15 MIN: Performed by: INTERNAL MEDICINE

## 2020-05-14 PROCEDURE — C1759 CATH, INTRA ECHOCARDIOGRAPHY: HCPCS | Performed by: INTERNAL MEDICINE

## 2020-05-14 PROCEDURE — 80048 BASIC METABOLIC PNL TOTAL CA: CPT | Performed by: INTERNAL MEDICINE

## 2020-05-14 PROCEDURE — 93312 ECHO TRANSESOPHAGEAL: CPT | Mod: 26 | Performed by: INTERNAL MEDICINE

## 2020-05-14 PROCEDURE — 99207 CARDIAC DEVICE CHECK - INPATIENT: CPT | Mod: 26 | Performed by: INTERNAL MEDICINE

## 2020-05-14 PROCEDURE — C1732 CATH, EP, DIAG/ABL, 3D/VECT: HCPCS | Performed by: INTERNAL MEDICINE

## 2020-05-14 PROCEDURE — 25000128 H RX IP 250 OP 636: Performed by: STUDENT IN AN ORGANIZED HEALTH CARE EDUCATION/TRAINING PROGRAM

## 2020-05-14 PROCEDURE — 25000132 ZZH RX MED GY IP 250 OP 250 PS 637: Performed by: NURSE PRACTITIONER

## 2020-05-14 PROCEDURE — 82962 GLUCOSE BLOOD TEST: CPT

## 2020-05-14 PROCEDURE — 93621 COMP EP EVL L PAC&REC C SINS: CPT | Performed by: INTERNAL MEDICINE

## 2020-05-14 PROCEDURE — 25800030 ZZH RX IP 258 OP 636: Performed by: STUDENT IN AN ORGANIZED HEALTH CARE EDUCATION/TRAINING PROGRAM

## 2020-05-14 PROCEDURE — C1894 INTRO/SHEATH, NON-LASER: HCPCS | Performed by: INTERNAL MEDICINE

## 2020-05-14 PROCEDURE — 25800030 ZZH RX IP 258 OP 636: Performed by: INTERNAL MEDICINE

## 2020-05-14 PROCEDURE — 85347 COAGULATION TIME ACTIVATED: CPT | Mod: 91

## 2020-05-14 PROCEDURE — 37000008 ZZH ANESTHESIA TECHNICAL FEE, 1ST 30 MIN: Performed by: INTERNAL MEDICINE

## 2020-05-14 PROCEDURE — 93005 ELECTROCARDIOGRAM TRACING: CPT

## 2020-05-14 PROCEDURE — 93287 PERI-PX DEVICE EVAL & PRGR: CPT

## 2020-05-14 PROCEDURE — 93655 ICAR CATH ABLTJ DSCRT ARRHYT: CPT | Performed by: INTERNAL MEDICINE

## 2020-05-14 PROCEDURE — 25000128 H RX IP 250 OP 636: Performed by: INTERNAL MEDICINE

## 2020-05-14 PROCEDURE — 37000009 ZZH ANESTHESIA TECHNICAL FEE, EACH ADDTL 15 MIN: Performed by: INTERNAL MEDICINE

## 2020-05-14 PROCEDURE — C1730 CATH, EP, 19 OR FEW ELECT: HCPCS | Performed by: INTERNAL MEDICINE

## 2020-05-14 PROCEDURE — 93653 COMPRE EP EVAL TX SVT: CPT | Performed by: INTERNAL MEDICINE

## 2020-05-14 PROCEDURE — 93287 PERI-PX DEVICE EVAL & PRGR: CPT | Mod: 26 | Performed by: INTERNAL MEDICINE

## 2020-05-14 PROCEDURE — 93613 INTRACARDIAC EPHYS 3D MAPG: CPT | Performed by: INTERNAL MEDICINE

## 2020-05-14 PROCEDURE — 36415 COLL VENOUS BLD VENIPUNCTURE: CPT | Performed by: INTERNAL MEDICINE

## 2020-05-14 PROCEDURE — 93010 ELECTROCARDIOGRAM REPORT: CPT | Mod: 76 | Performed by: INTERNAL MEDICINE

## 2020-05-14 PROCEDURE — 93662 INTRACARDIAC ECG (ICE): CPT | Mod: 26 | Performed by: INTERNAL MEDICINE

## 2020-05-14 PROCEDURE — 40000065 ZZH STATISTIC EKG NON-CHARGEABLE

## 2020-05-14 PROCEDURE — 85027 COMPLETE CBC AUTOMATED: CPT | Performed by: INTERNAL MEDICINE

## 2020-05-14 PROCEDURE — 93613 INTRACARDIAC EPHYS 3D MAPG: CPT | Mod: GC | Performed by: INTERNAL MEDICINE

## 2020-05-14 PROCEDURE — 25000132 ZZH RX MED GY IP 250 OP 250 PS 637: Performed by: STUDENT IN AN ORGANIZED HEALTH CARE EDUCATION/TRAINING PROGRAM

## 2020-05-14 PROCEDURE — 93462 L HRT CATH TRNSPTL PUNCTURE: CPT | Mod: GC | Performed by: INTERNAL MEDICINE

## 2020-05-14 PROCEDURE — 93325 DOPPLER ECHO COLOR FLOW MAPG: CPT

## 2020-05-14 PROCEDURE — C1731 CATH, EP, 20 OR MORE ELEC: HCPCS | Performed by: INTERNAL MEDICINE

## 2020-05-14 PROCEDURE — 93325 DOPPLER ECHO COLOR FLOW MAPG: CPT | Mod: 26 | Performed by: INTERNAL MEDICINE

## 2020-05-14 PROCEDURE — 93320 DOPPLER ECHO COMPLETE: CPT | Mod: 26 | Performed by: INTERNAL MEDICINE

## 2020-05-14 PROCEDURE — 25000125 ZZHC RX 250: Performed by: STUDENT IN AN ORGANIZED HEALTH CARE EDUCATION/TRAINING PROGRAM

## 2020-05-14 RX ORDER — FENTANYL CITRATE 50 UG/ML
25-50 INJECTION, SOLUTION INTRAMUSCULAR; INTRAVENOUS
Status: DISCONTINUED | OUTPATIENT
Start: 2020-05-14 | End: 2020-05-14 | Stop reason: HOSPADM

## 2020-05-14 RX ORDER — METFORMIN HCL 500 MG
1000 TABLET, EXTENDED RELEASE 24 HR ORAL
Status: DISCONTINUED | OUTPATIENT
Start: 2020-05-14 | End: 2020-05-14 | Stop reason: HOSPADM

## 2020-05-14 RX ORDER — FUROSEMIDE 40 MG
40 TABLET ORAL DAILY
Status: DISCONTINUED | OUTPATIENT
Start: 2020-05-14 | End: 2020-05-14 | Stop reason: HOSPADM

## 2020-05-14 RX ORDER — SODIUM CHLORIDE, SODIUM LACTATE, POTASSIUM CHLORIDE, CALCIUM CHLORIDE 600; 310; 30; 20 MG/100ML; MG/100ML; MG/100ML; MG/100ML
INJECTION, SOLUTION INTRAVENOUS CONTINUOUS PRN
Status: DISCONTINUED | OUTPATIENT
Start: 2020-05-14 | End: 2020-05-14

## 2020-05-14 RX ORDER — LABETALOL HYDROCHLORIDE 5 MG/ML
10 INJECTION, SOLUTION INTRAVENOUS
Status: DISCONTINUED | OUTPATIENT
Start: 2020-05-14 | End: 2020-05-14 | Stop reason: HOSPADM

## 2020-05-14 RX ORDER — SODIUM CHLORIDE, SODIUM LACTATE, POTASSIUM CHLORIDE, CALCIUM CHLORIDE 600; 310; 30; 20 MG/100ML; MG/100ML; MG/100ML; MG/100ML
INJECTION, SOLUTION INTRAVENOUS CONTINUOUS
Status: DISCONTINUED | OUTPATIENT
Start: 2020-05-14 | End: 2020-05-14 | Stop reason: HOSPADM

## 2020-05-14 RX ORDER — MEPERIDINE HYDROCHLORIDE 25 MG/ML
12.5 INJECTION INTRAMUSCULAR; INTRAVENOUS; SUBCUTANEOUS
Status: DISCONTINUED | OUTPATIENT
Start: 2020-05-14 | End: 2020-05-14 | Stop reason: HOSPADM

## 2020-05-14 RX ORDER — SODIUM CHLORIDE 9 MG/ML
INJECTION, SOLUTION INTRAVENOUS CONTINUOUS
Status: DISCONTINUED | OUTPATIENT
Start: 2020-05-14 | End: 2020-05-14 | Stop reason: HOSPADM

## 2020-05-14 RX ORDER — NOREPINEPHRINE BITARTRATE 0.06 MG/ML
.03-.4 INJECTION, SOLUTION INTRAVENOUS CONTINUOUS
Status: DISCONTINUED | OUTPATIENT
Start: 2020-05-14 | End: 2020-05-14 | Stop reason: HOSPADM

## 2020-05-14 RX ORDER — ACETAMINOPHEN 325 MG/1
975 TABLET ORAL ONCE
Status: COMPLETED | OUTPATIENT
Start: 2020-05-14 | End: 2020-05-14

## 2020-05-14 RX ORDER — ZOLPIDEM TARTRATE 5 MG/1
5 TABLET ORAL
Status: DISCONTINUED | OUTPATIENT
Start: 2020-05-14 | End: 2020-05-14 | Stop reason: HOSPADM

## 2020-05-14 RX ORDER — HYDROMORPHONE HYDROCHLORIDE 1 MG/ML
.3-.5 INJECTION, SOLUTION INTRAMUSCULAR; INTRAVENOUS; SUBCUTANEOUS EVERY 10 MIN PRN
Status: DISCONTINUED | OUTPATIENT
Start: 2020-05-14 | End: 2020-05-14 | Stop reason: HOSPADM

## 2020-05-14 RX ORDER — EPLERENONE 50 MG/1
50 TABLET, FILM COATED ORAL DAILY
Status: DISCONTINUED | OUTPATIENT
Start: 2020-05-15 | End: 2020-05-14 | Stop reason: HOSPADM

## 2020-05-14 RX ORDER — ONDANSETRON 2 MG/ML
INJECTION INTRAMUSCULAR; INTRAVENOUS PRN
Status: DISCONTINUED | OUTPATIENT
Start: 2020-05-14 | End: 2020-05-14

## 2020-05-14 RX ORDER — CEFAZOLIN SODIUM 2 G/100ML
INJECTION, SOLUTION INTRAVENOUS PRN
Status: DISCONTINUED | OUTPATIENT
Start: 2020-05-14 | End: 2020-05-14

## 2020-05-14 RX ORDER — PROPOFOL 10 MG/ML
INJECTION, EMULSION INTRAVENOUS PRN
Status: DISCONTINUED | OUTPATIENT
Start: 2020-05-14 | End: 2020-05-14

## 2020-05-14 RX ORDER — HEPARIN SODIUM 1000 [USP'U]/ML
INJECTION, SOLUTION INTRAVENOUS; SUBCUTANEOUS PRN
Status: DISCONTINUED | OUTPATIENT
Start: 2020-05-14 | End: 2020-05-14

## 2020-05-14 RX ORDER — GABAPENTIN 300 MG/1
600 CAPSULE ORAL 2 TIMES DAILY
Status: DISCONTINUED | OUTPATIENT
Start: 2020-05-14 | End: 2020-05-14 | Stop reason: HOSPADM

## 2020-05-14 RX ORDER — FENTANYL CITRATE 50 UG/ML
INJECTION, SOLUTION INTRAMUSCULAR; INTRAVENOUS PRN
Status: DISCONTINUED | OUTPATIENT
Start: 2020-05-14 | End: 2020-05-14

## 2020-05-14 RX ORDER — ONDANSETRON 2 MG/ML
4 INJECTION INTRAMUSCULAR; INTRAVENOUS EVERY 30 MIN PRN
Status: DISCONTINUED | OUTPATIENT
Start: 2020-05-14 | End: 2020-05-14 | Stop reason: HOSPADM

## 2020-05-14 RX ORDER — DABIGATRAN ETEXILATE 150 MG/1
150 CAPSULE ORAL 2 TIMES DAILY
Status: DISCONTINUED | OUTPATIENT
Start: 2020-05-14 | End: 2020-05-14 | Stop reason: HOSPADM

## 2020-05-14 RX ORDER — LIDOCAINE 40 MG/G
CREAM TOPICAL
Status: DISCONTINUED | OUTPATIENT
Start: 2020-05-14 | End: 2020-05-14 | Stop reason: HOSPADM

## 2020-05-14 RX ORDER — DOFETILIDE 0.25 MG/1
250 CAPSULE ORAL EVERY 12 HOURS
Status: DISCONTINUED | OUTPATIENT
Start: 2020-05-14 | End: 2020-05-14 | Stop reason: HOSPADM

## 2020-05-14 RX ORDER — FUROSEMIDE 10 MG/ML
INJECTION INTRAMUSCULAR; INTRAVENOUS PRN
Status: DISCONTINUED | OUTPATIENT
Start: 2020-05-14 | End: 2020-05-14

## 2020-05-14 RX ORDER — METHYLPREDNISOLONE SODIUM SUCCINATE 125 MG/2ML
125 INJECTION, POWDER, LYOPHILIZED, FOR SOLUTION INTRAMUSCULAR; INTRAVENOUS ONCE
Status: COMPLETED | OUTPATIENT
Start: 2020-05-14 | End: 2020-05-14

## 2020-05-14 RX ORDER — NALOXONE HYDROCHLORIDE 0.4 MG/ML
.1-.4 INJECTION, SOLUTION INTRAMUSCULAR; INTRAVENOUS; SUBCUTANEOUS
Status: DISCONTINUED | OUTPATIENT
Start: 2020-05-14 | End: 2020-05-14 | Stop reason: HOSPADM

## 2020-05-14 RX ORDER — OXYCODONE AND ACETAMINOPHEN 5; 325 MG/1; MG/1
1 TABLET ORAL EVERY 4 HOURS PRN
Status: DISCONTINUED | OUTPATIENT
Start: 2020-05-14 | End: 2020-05-14 | Stop reason: HOSPADM

## 2020-05-14 RX ORDER — METOPROLOL SUCCINATE 50 MG/1
50 TABLET, EXTENDED RELEASE ORAL DAILY
Status: DISCONTINUED | OUTPATIENT
Start: 2020-05-15 | End: 2020-05-14 | Stop reason: HOSPADM

## 2020-05-14 RX ORDER — PROTAMINE SULFATE 10 MG/ML
INJECTION, SOLUTION INTRAVENOUS PRN
Status: DISCONTINUED | OUTPATIENT
Start: 2020-05-14 | End: 2020-05-14

## 2020-05-14 RX ORDER — DEXAMETHASONE SODIUM PHOSPHATE 4 MG/ML
INJECTION, SOLUTION INTRA-ARTICULAR; INTRALESIONAL; INTRAMUSCULAR; INTRAVENOUS; SOFT TISSUE PRN
Status: DISCONTINUED | OUTPATIENT
Start: 2020-05-14 | End: 2020-05-14

## 2020-05-14 RX ORDER — ONDANSETRON 4 MG/1
4 TABLET, ORALLY DISINTEGRATING ORAL EVERY 30 MIN PRN
Status: DISCONTINUED | OUTPATIENT
Start: 2020-05-14 | End: 2020-05-14 | Stop reason: HOSPADM

## 2020-05-14 RX ORDER — ATORVASTATIN CALCIUM 20 MG/1
20 TABLET, FILM COATED ORAL DAILY
Status: DISCONTINUED | OUTPATIENT
Start: 2020-05-14 | End: 2020-05-14 | Stop reason: HOSPADM

## 2020-05-14 RX ADMIN — ROCURONIUM BROMIDE 20 MG: 10 INJECTION INTRAVENOUS at 12:51

## 2020-05-14 RX ADMIN — CEFAZOLIN 2 G: 10 INJECTION, POWDER, FOR SOLUTION INTRAVENOUS at 08:31

## 2020-05-14 RX ADMIN — MIDAZOLAM 2 MG: 1 INJECTION INTRAMUSCULAR; INTRAVENOUS at 08:23

## 2020-05-14 RX ADMIN — PHENYLEPHRINE HYDROCHLORIDE 100 MCG: 10 INJECTION INTRAVENOUS at 08:37

## 2020-05-14 RX ADMIN — FENTANYL CITRATE 100 MCG: 50 INJECTION, SOLUTION INTRAMUSCULAR; INTRAVENOUS at 08:22

## 2020-05-14 RX ADMIN — ROCURONIUM BROMIDE 50 MG: 10 INJECTION INTRAVENOUS at 10:22

## 2020-05-14 RX ADMIN — ONDANSETRON 4 MG: 2 INJECTION INTRAMUSCULAR; INTRAVENOUS at 13:08

## 2020-05-14 RX ADMIN — FENTANYL CITRATE 50 MCG: 50 INJECTION, SOLUTION INTRAMUSCULAR; INTRAVENOUS at 13:26

## 2020-05-14 RX ADMIN — PHENYLEPHRINE HYDROCHLORIDE 100 MCG: 10 INJECTION INTRAVENOUS at 09:19

## 2020-05-14 RX ADMIN — HEPARIN SODIUM 3000 UNITS: 1000 INJECTION INTRAVENOUS; SUBCUTANEOUS at 11:58

## 2020-05-14 RX ADMIN — CEFAZOLIN 1 G: 10 INJECTION, POWDER, FOR SOLUTION INTRAVENOUS at 12:34

## 2020-05-14 RX ADMIN — FUROSEMIDE 10 MG: 10 INJECTION, SOLUTION INTRAVENOUS at 13:49

## 2020-05-14 RX ADMIN — PHENYLEPHRINE HYDROCHLORIDE 100 MCG: 10 INJECTION INTRAVENOUS at 13:05

## 2020-05-14 RX ADMIN — PHENYLEPHRINE HYDROCHLORIDE 0.2 MCG/KG/MIN: 10 INJECTION INTRAVENOUS at 09:02

## 2020-05-14 RX ADMIN — HEPARIN SODIUM 3000 UNITS: 1000 INJECTION INTRAVENOUS; SUBCUTANEOUS at 12:32

## 2020-05-14 RX ADMIN — PROPOFOL 180 MG: 10 INJECTION, EMULSION INTRAVENOUS at 08:22

## 2020-05-14 RX ADMIN — OXYCODONE HYDROCHLORIDE AND ACETAMINOPHEN 1 TABLET: 5; 325 TABLET ORAL at 16:33

## 2020-05-14 RX ADMIN — PHENYLEPHRINE HYDROCHLORIDE 100 MCG: 10 INJECTION INTRAVENOUS at 12:54

## 2020-05-14 RX ADMIN — ROCURONIUM BROMIDE 30 MG: 10 INJECTION INTRAVENOUS at 11:24

## 2020-05-14 RX ADMIN — HEPARIN SODIUM 4000 UNITS: 1000 INJECTION INTRAVENOUS; SUBCUTANEOUS at 11:36

## 2020-05-14 RX ADMIN — DEXAMETHASONE SODIUM PHOSPHATE 2 MG: 4 INJECTION, SOLUTION INTRA-ARTICULAR; INTRALESIONAL; INTRAMUSCULAR; INTRAVENOUS; SOFT TISSUE at 08:43

## 2020-05-14 RX ADMIN — ACETAMINOPHEN 975 MG: 325 TABLET, FILM COATED ORAL at 07:53

## 2020-05-14 RX ADMIN — PHENYLEPHRINE HYDROCHLORIDE 100 MCG: 10 INJECTION INTRAVENOUS at 08:22

## 2020-05-14 RX ADMIN — SUGAMMADEX 200 MG: 100 INJECTION, SOLUTION INTRAVENOUS at 13:19

## 2020-05-14 RX ADMIN — SODIUM CHLORIDE, POTASSIUM CHLORIDE, SODIUM LACTATE AND CALCIUM CHLORIDE: 600; 310; 30; 20 INJECTION, SOLUTION INTRAVENOUS at 08:15

## 2020-05-14 RX ADMIN — PROPOFOL 30 MG: 10 INJECTION, EMULSION INTRAVENOUS at 13:23

## 2020-05-14 RX ADMIN — HEPARIN SODIUM 10000 UNITS: 1000 INJECTION INTRAVENOUS; SUBCUTANEOUS at 10:21

## 2020-05-14 RX ADMIN — CEFAZOLIN 1 G: 10 INJECTION, POWDER, FOR SOLUTION INTRAVENOUS at 10:33

## 2020-05-14 RX ADMIN — ROCURONIUM BROMIDE 100 MG: 10 INJECTION INTRAVENOUS at 08:22

## 2020-05-14 RX ADMIN — PROTAMINE SULFATE 50 MG: 10 INJECTION, SOLUTION INTRAVENOUS at 13:08

## 2020-05-14 RX ADMIN — SODIUM CHLORIDE, POTASSIUM CHLORIDE, SODIUM LACTATE AND CALCIUM CHLORIDE: 600; 310; 30; 20 INJECTION, SOLUTION INTRAVENOUS at 08:37

## 2020-05-14 RX ADMIN — METHYLPREDNISOLONE 125 MG: 125 INJECTION, POWDER, LYOPHILIZED, FOR SOLUTION INTRAMUSCULAR; INTRAVENOUS at 13:10

## 2020-05-14 RX ADMIN — FUROSEMIDE 10 MG: 10 INJECTION, SOLUTION INTRAVENOUS at 13:08

## 2020-05-14 RX ADMIN — PHENYLEPHRINE HYDROCHLORIDE 100 MCG: 10 INJECTION INTRAVENOUS at 12:02

## 2020-05-14 RX ADMIN — PHENYLEPHRINE HYDROCHLORIDE 100 MCG: 10 INJECTION INTRAVENOUS at 09:07

## 2020-05-14 RX ADMIN — PHENYLEPHRINE HYDROCHLORIDE 100 MCG: 10 INJECTION INTRAVENOUS at 08:50

## 2020-05-14 ASSESSMENT — MIFFLIN-ST. JEOR: SCORE: 1785.13

## 2020-05-14 NOTE — ANESTHESIA CARE TRANSFER NOTE
Patient: Stu Meredith    Procedure(s):  EP ABLATION FOCAL AFIB    Diagnosis: A fib ablation  Diagnosis Additional Information: No value filed.    Anesthesia Type:   General     Note:  Airway :Face Mask  Patient transferred to:PACU  Comments: Patient is Awake, VSS, following commands. Patient is breathing Spontaneously with face mask . Care report given to PACU RN, and notified of assigned Anesthesiology staff to patient for continuity of PACU care.     Vince Fernandez M.D.  Anesthesiology Resident CA-2, PGY-3  Pager [phone]  Handoff Report: Identifed the Patient, Identified the Reponsible Provider, Reviewed the pertinent medical history, Discussed the surgical course, Reviewed Intra-OP anesthesia mangement and issues during anesthesia, Set expectations for post-procedure period and Allowed opportunity for questions and acknowledgement of understanding      Vitals: (Last set prior to Anesthesia Care Transfer)    CRNA VITALS  5/14/2020 1333 - 5/14/2020 1403      5/14/2020             Resp Rate (observed):  (!) 1                Electronically Signed By: Vince Fernandez MD  May 14, 2020  2:03 PM

## 2020-05-14 NOTE — Clinical Note
Potential access sites were evaluated for patency using ultrasound.   The left femoral artery, right femoral vein and left femoral vein were selected. Access was obtained under with Sonosite guidance using a micropuncture 21 guage needle with direct visualization of needle entry.

## 2020-05-14 NOTE — ANESTHESIA POSTPROCEDURE EVALUATION
Anesthesia POST Procedure Evaluation    Patient: Stu Meredith   MRN:     [id number] Gender:   male   Age:    64 year old :      1955        Preoperative Diagnosis: A fib ablation   Procedure(s):  EP ABLATION FOCAL AFIB   Postop Comments: No value filed.     Anesthesia Type: General       Disposition: Admission   Postop Pain Control: Uneventful            Sign Out: Well controlled pain   PONV: No   Neuro/Psych: Uneventful            Sign Out: Acceptable/Baseline neuro status   Airway/Respiratory: Uneventful            Sign Out: Acceptable/Baseline resp. status   CV/Hemodynamics: Uneventful            Sign Out: Acceptable CV status   Other NRE: NONE   DID A NON-ROUTINE EVENT OCCUR? No         Last Anesthesia Record Vitals:  CRNA VITALS  2020 1355 - 2020 1425      2020             NIBP:  103/63    Ht Rate:  71    EKG:  NSR          Last PACU Vitals:  Vitals Value Taken Time   /62 2020  2:20 PM   Temp 36.7  C (98  F) 2020  2:00 PM   Pulse 69 2020  2:20 PM   Resp 19 2020  2:15 PM   SpO2 96 % 2020  2:23 PM   Temp src     NIBP 103/63 2020  2:00 PM   Pulse     SpO2     Resp     Temp     Ht Rate 71 2020  2:00 PM   Temp 2     Vitals shown include unvalidated device data.      Electronically Signed By: Toby William MD, May 14, 2020, 2:25 PM

## 2020-05-14 NOTE — H&P
Electrophysiology Pre-Procedure History and Physical    Stu Meredith MRN# [id number]   Age: 64 year old YOB: 1955      Date of Procedure: 5/14/2020  Location [facility]      Date of Exam 5/14/2020 Facility (Same day)       Home clinic: [facility]  Primary care provider: Sathya Garcia  HPI:  Mr. Meredith is a 64 year old male who has a past medical history significant for HCM diagnosed 2006, VT on Holter July 2012 s/p ICD 7/2012, troponin leak Oct 2013 (angiogram with non-significant CAD, LV gram showed EF 25%, recovered to 60% on TTE Dec 2013), HTN, AFib (CHADSVASC 2) with DCCVs then s/p PVI 12/2011, PVI for persistent AF on 7/28/16, PVI/CTI/CFAE with posterior wall isolation 8/17/18, s/p DCCV 8/7/17, 1/3/18,  5/5/18, 9/27/18, 12/20/2018, 1/16/20,  and s/p right IRISH on 6/5/2019. He has had a PVI ablation in 2011 with several recurrences requiring DCCV and then repeat PVI in July 2016. He had previously failed multaq and is currently on Sotalol.  He has now had some recurrent episodes of AT/AF requiring DCCV on 8/7/17 and 1/3/18.  Device check showed AT episode that started 1/28/18 and lasted 25 days and self terminated. He then had recurrent AT/AF and had DCCV on 5/5/18. He followed up with Dr. Ware recently in clinic and reported having fatigue, MART, and decreased stamina. Device interrogation showed he had recurred back into AT/AF since 5/26/18. He underwent a repeat PVI/CTI on 8/17/18. He subsequently had DCCV on 9/27/18, 12/20/2018. Then went back into persistent AF on 3/14/19. His main symptoms are fatigue, MART, and decreased stamina. Unclear if this was related to AF, HCM worsening, or possibly Sotalol. Therefore, we stopped his Sotalol and he underwent RHC in AF, dofetolide loading, DCCV, then repeat RHC in SR. This showed no significant hemodynamic changes in AF vs SR.He had another recurrence of AFL and had DCCV on 1/16/20. Then  went back into AFL on 1/29/20 and remains in it. He does feel much better being on dofetilide. QTC and renal function stable. He states that he definitely feels much better in sinus and has more energy and less dyspnea. We discussed management options including rate control only, repeat ablation, or alternative AAT (namely amiodarone). Since his recurrences have been an organized atypical AFL, we dicussed could be amenable to ablation. He would like to try another ablation. He was changed to pradaxa prior to ablation.         Active problem list:     Patient Active Problem List    Diagnosis Date Noted     Hypertrophic cardiomegaly 03/13/2020     Priority: Medium     Added automatically from request for surgery [id number]       PAF (paroxysmal atrial fibrillation) (H) 03/13/2020     Priority: Medium     Added automatically from request for surgery [id number]       Atrial fibrillation, unspecified type (H) 03/13/2020     Priority: Medium     Added automatically from request for surgery [id number]       SOB (shortness of breath) 03/13/2020     Priority: Medium     Added automatically from request for surgery [id number]       Paroxysmal atrial fibrillation (H) 03/13/2020     Priority: Medium     Added automatically from request for surgery [id number]       HTN (hypertension) 08/20/2019     Priority: Medium     Encounter for monitoring dofetilide therapy 08/19/2019     Priority: Medium     Atrial tachycardia (H) 07/15/2019     Priority: Medium     Lung nodules 03/25/2019     Priority: Medium     Referred to [facility] by [facility] Team.       S/P ablation of atrial fibrillation 08/16/2018     Priority: Medium     Advanced directives, counseling/discussion 12/26/2017     Priority: Medium     Advance Directive Problem List Overview:   Name Relationship Phone    Primary Health Care Agent            Alternative Health Care Agent          Discussed advance care planning with patient; information given to patient  to review. 12/2/2011          Chronic Zolpidem use for insomnia 09/27/2016     Priority: Medium     CHF (congestive heart failure) (H) 07/30/2016     Priority: Medium     S/P ablation of atrial flutter 07/28/2016     Priority: Medium     Chronic insomnia 01/14/2016     Priority: Medium     Prediabetes 08/08/2014     Priority: Medium     Automatic implantable cardioverter-defibrillator in situ- [employer], dual chamber- NOT dependent 12/03/2013     Priority: Medium     Implanted at [facility] 7/27/12, by Dr. Deleon  Problem list name updated by automated process. Provider to review       Ventricular tachycardia (H)      Priority: Medium     Hypertrophic cardiomyopathy (H)      Priority: Medium     CARDIOVASCULAR SCREENING; LDL GOAL LESS THAN 130 10/31/2010     Priority: Medium     Atrial fibrillation (H)      Priority: Medium            Medications (include herbals and vitamins):      Current Facility-Administered Medications   Medication     norepinephrine (LEVOPHED) 16 mg in  mL infusion         Hunger, Haroon A   Home Medication Instructions JOSE:[id number]    Printed on:05/14/20 1147   Medication Information                      acetaminophen (TYLENOL) 325 MG tablet  Take 325-650 mg by mouth every 6 hours as needed             albuterol (PROAIR HFA/PROVENTIL HFA/VENTOLIN HFA) 108 (90 Base) MCG/ACT inhaler  Inhale 2 puffs into the lungs every 6 hours             amoxicillin (AMOXIL) 500 MG tablet  Take 4 tablets (2000 mg) by mouth 1 hour before dental procedures.             atorvastatin (LIPITOR) 20 MG tablet  Take 1 tablet (20 mg) by mouth daily             cyanocobalamin (VITAMIN B-12) 100 MCG tablet  Take 100 mcg by mouth daily             dabigatran ANTICOAGULANT (PRADAXA ANTICOAGULANT) 150 MG capsule  Take 1 capsule (150 mg) by mouth 2 times daily Store in original 's bottle or blister pack; use within 120 days of opening.             dofetilide (TIKOSYN) 250 MCG capsule  Take 1 capsule (250  mcg) by mouth every 12 hours             eplerenone (INSPRA) 50 MG tablet  Take 1 tablet (50 mg) by mouth daily             furosemide (LASIX) 40 MG tablet  Take 1 tablet (40 mg) by mouth daily             gabapentin (NEURONTIN) 300 MG capsule  Take 2 capsules (600 mg) by mouth 2 times daily             guaiFENesin-codeine (ROBITUSSIN AC) 100-10 MG/5ML solution  Take 5-10 mLs by mouth every 4 hours as needed             guaiFENesin-codeine (ROBITUSSIN AC) 100-10 MG/5ML solution  Take 5-10 mLs by mouth every 4 hours as needed for cough             metFORMIN (GLUCOPHAGE-XR) 500 MG 24 hr tablet  Take 2 tablets (1,000 mg) by mouth daily (with dinner) Take 2 tablets (1,000 mg) daily (with dinner).             metoprolol succinate ER (TOPROL-XL) 50 MG 24 hr tablet  TAKE ONE TABLET BY MOUTH EVERY DAY             multivitamin, therapeutic (THERA-VIT) TABS  Take 1 tablet by mouth daily             XARELTO ANTICOAGULANT 20 MG TABS tablet  Take 1 tablet (20 mg) by mouth daily (with dinner)             zolpidem (AMBIEN) 10 MG tablet  Take 0.5 tablets (5 mg) by mouth nightly as needed for sleep Maximum number of tablets #30 in 90 days.                      Allergies:    No Known Allergies          Social History:     Social History     Tobacco Use     Smoking status: Former Smoker     Packs/day: 1.00     Years: 40.00     Pack years: 40.00     Types: Cigarettes     Start date: 1975     Last attempt to quit: 2015     Years since quittin.4     Smokeless tobacco: Never Used   Substance Use Topics     Alcohol use: Yes     Alcohol/week: 0.0 standard drinks     Comment: 1 drink per week            Physical Exam:   Airway assessment:   Patient is able to open mouth wide  Patient is able to stick out tongue      ENT:   Normocephalic, without obvious abnormality, atraumatic, sinuses nontender on palpation, external ears without lesions, oral pharynx with moist mucous membranes, tonsils without erythema or exudates, gums  normal and good dentition.     Neck:   Supple, symmetrical, trachea midline, no adenopathy, thyroid symmetric, not enlarged and no tenderness, skin normal     Lungs:   No increased work of breathing, good air exchange, clear to auscultation bilaterally, no crackles or wheezing     Cardiovascular:   Normal apical impulse, regular rate and rhythm, normal S1 and S2, no S3 or S4, and no murmur noted             Lab / Radiology Results:     Lab Results   Component Value Date    WBC 7.5 12/17/2019    RBC 4.84 12/17/2019    HGB 14.6 12/17/2019    HCT 44.5 12/17/2019    MCV 92 12/17/2019    RDW 13.3 12/17/2019     12/17/2019      Lab Results   Component Value Date    WBC 7.5 12/17/2019     Lab Results   Component Value Date     12/17/2019     Lab Results   Component Value Date    HGB 14.6 12/17/2019    HCT 44.5 12/17/2019     Lab Results   Component Value Date     12/17/2019    CO2 30 12/17/2019    BUN 18 12/17/2019     Lab Results   Component Value Date     12/17/2019    CO2 30 12/17/2019    BUN 18 12/17/2019     Lab Results   Component Value Date     12/17/2019     Lab Results   Component Value Date    BUN 18 12/17/2019     Lab Results   Component Value Date    TSH 1.58 02/15/2019             Plan:   Patient's active problems diagnostically and therapeutically optimized for the planned procedure. Patient here for AF/AFL ablation. Procedure explained in detail to patient including indications, risks, and benefits. The procedural risk of EP study and ablation were discussed in detail. Risks discussed include: vascular complications, CVA, AVB, pericardial effusion and tamponade, esophageal fistula, PV stenosis. AF ablation has 70% success at 1 year, 50% success at 5 years, and 30% need another ablation.  Even if ablation is successful anticoagulation may be needed lifelong. He states understanding and wishes to procced.     KARSON Richardson CNP  Electrophysiology Consult Service  Pager:  [phone]

## 2020-05-14 NOTE — TELEPHONE ENCOUNTER
Reason for Visit: Consult    Diagnosis: Erectile Dysfunction    Orders/Procedures/Records: in system    Contact Patient: n/a    Rooming Requirements: Video Visit      Gaye Amin LPN  05/14/20  10:26 AM

## 2020-05-14 NOTE — DISCHARGE INSTRUCTIONS
Care of groin site:         Remove the Band-Aid after 24 hours. If there is minor oozing, apply another Band-aid and remove it after 12 hours.          Do NOT take a bath, use a hot tub, pool, or submerse in water for at least 3 days You may shower.          It is normal to have a small bruise or lump at the site.         Do not scrub the site.         Do not use lotion or powder near the puncture site for 3 days.         For the first 2 days: Do not stoop or squat. When you cough, sneeze or move your bowels, hold your hand over the puncture site and press gently.         Do not lift more than 10 pounds or exertional activity for 10 days.      If you start bleeding from the site in your groin:  Lie down flat and press firmly on the site.  Call your physician immediately, or, come to the emergency room.    Call 911 right away if you have bleeding that is heavy or does not stop.     Call your doctor/provider if:         You have a large or growing hard lump around the site.         The site is red, swollen, hot or tender.         Blood or fluid is draining from the site.         You have chills or a fever greater than 101 F (38 C).         Your leg or arm turns bluish, feels numb or cool.         You have hives, a rash or unusual itching.     Cardiovascular Clinic:   [address] [place] [address]. [place] [address]  Your Care Team:  [facility]   Telephone Number     Maci Cooper [phone]   Tang Lilly RN  Dimple Birch RN  [phone]     For scheduling appts or procedures:    Radha Willson   [phone]   For the Device Clinic (Pacemakers and ICD's)   RN's :   Neetu Davey  During business hours: [phone]     After business hours:   [phone]- select option 4 and ask for job code [id number].       As always, Thank you for trusting us with your health care needs

## 2020-05-14 NOTE — PLAN OF CARE
Alert and oriented x 4. Vital signs stable. On room air. Denies pain. Completed bed rest. White removed and voided 150 ml. Doing good, resting in bed. Waiting for diner tray. Plan: Continue care.

## 2020-05-14 NOTE — TELEPHONE ENCOUNTER
MEDICAL RECORDS REQUEST   [facility]  Urology Clinic  [address] [place] [address]  PHONE: [phone]  Fax: [phone]        FUTURE VISIT INFORMATION                                                   Stu Meredith, : 1955 scheduled for future visit at [facility]    APPOINTMENT INFORMATION:    Date: 20 8:40AM    Provider:  Stef Hogan MD    Reason for Visit/Diagnosis: ED    REFERRAL INFORMATION:    Referring provider: N/A    Specialty: N/A    Referring providers clinic:  [facility]    Clinic contact number:  N/A    RECORDS REQUESTED FOR VISIT                                                     NOTES  STATUS/DETAILS   OFFICE NOTE from referring provider  yes   OFFICE NOTE from other specialist  no   DISCHARGE SUMMARY from hospital  no   DISCHARGE REPORT from the ER  no   OPERATIVE REPORT  no   MEDICATION LIST  no     PRE-VISIT CHECKLIST      Record collection complete Yes   Appointment appropriately scheduled           (right time/right provider) Yes   MyChart activation Yes   Questionnaire complete If no, please explain: In process      Completed by: Micki Portillo

## 2020-05-14 NOTE — PLAN OF CARE
Tolerated regular diet. Ambulated in hallways with stand by asist. Did well. PIV removed. Discharged.

## 2020-05-15 ENCOUNTER — APPOINTMENT (OUTPATIENT)
Dept: GENERAL RADIOLOGY | Facility: CLINIC | Age: 65
End: 2020-05-15
Attending: EMERGENCY MEDICINE
Payer: COMMERCIAL

## 2020-05-15 ENCOUNTER — HOSPITAL ENCOUNTER (INPATIENT)
Facility: CLINIC | Age: 65
LOS: 1 days | Discharge: HOME | End: 2020-05-16
Attending: EMERGENCY MEDICINE | Admitting: INTERNAL MEDICINE
Payer: COMMERCIAL

## 2020-05-15 DIAGNOSIS — R50.9 FEVER AND CHILLS: ICD-10-CM

## 2020-05-15 DIAGNOSIS — I50.9 ACUTE CONGESTIVE HEART FAILURE, UNSPECIFIED HEART FAILURE TYPE (H): ICD-10-CM

## 2020-05-15 DIAGNOSIS — J96.01 ACUTE RESPIRATORY FAILURE WITH HYPOXIA (H): ICD-10-CM

## 2020-05-15 LAB
ALBUMIN SERPL-MCNC: 3.7 G/DL (ref 3.4–5)
ALP SERPL-CCNC: 76 U/L (ref 40–150)
ALT SERPL W P-5'-P-CCNC: 39 U/L (ref 0–70)
ANION GAP SERPL CALCULATED.3IONS-SCNC: 5 MMOL/L (ref 3–14)
AST SERPL W P-5'-P-CCNC: 42 U/L (ref 0–45)
BASE EXCESS BLDV CALC-SCNC: 0.1 MMOL/L
BASOPHILS # BLD AUTO: 0 10E9/L (ref 0–0.2)
BASOPHILS NFR BLD AUTO: 0.1 %
BILIRUB SERPL-MCNC: 0.7 MG/DL (ref 0.2–1.3)
BUN SERPL-MCNC: 32 MG/DL (ref 7–30)
CALCIUM SERPL-MCNC: 8.9 MG/DL (ref 8.5–10.1)
CHLORIDE SERPL-SCNC: 106 MMOL/L (ref 94–109)
CO2 SERPL-SCNC: 26 MMOL/L (ref 20–32)
CREAT SERPL-MCNC: 1.12 MG/DL (ref 0.66–1.25)
DIFFERENTIAL METHOD BLD: ABNORMAL
EOSINOPHIL # BLD AUTO: 0 10E9/L (ref 0–0.7)
EOSINOPHIL NFR BLD AUTO: 0.1 %
ERYTHROCYTE [DISTWIDTH] IN BLOOD BY AUTOMATED COUNT: 14 % (ref 10–15)
GFR SERPL CREATININE-BSD FRML MDRD: 68 ML/MIN/{1.73_M2}
GLUCOSE SERPL-MCNC: 169 MG/DL (ref 70–99)
HCO3 BLDV-SCNC: 25 MMOL/L (ref 21–28)
HCT VFR BLD AUTO: 42.7 % (ref 40–53)
HGB BLD-MCNC: 14.2 G/DL (ref 13.3–17.7)
IMM GRANULOCYTES # BLD: 0.1 10E9/L (ref 0–0.4)
IMM GRANULOCYTES NFR BLD: 0.3 %
INTERPRETATION ECG - MUSE: NORMAL
INTERPRETATION ECG - MUSE: NORMAL
LACTATE BLD-SCNC: 2.4 MMOL/L (ref 0.7–2)
LYMPHOCYTES # BLD AUTO: 2.3 10E9/L (ref 0.8–5.3)
LYMPHOCYTES NFR BLD AUTO: 15.7 %
MAGNESIUM SERPL-MCNC: 2.3 MG/DL (ref 1.6–2.3)
MCH RBC QN AUTO: 30.6 PG (ref 26.5–33)
MCHC RBC AUTO-ENTMCNC: 33.3 G/DL (ref 31.5–36.5)
MCV RBC AUTO: 92 FL (ref 78–100)
MDC_IDC_LEAD_IMPLANT_DT: NORMAL
MDC_IDC_LEAD_LOCATION: NORMAL
MDC_IDC_LEAD_MFG: NORMAL
MDC_IDC_LEAD_MODEL: NORMAL
MDC_IDC_LEAD_POLARITY_TYPE: NORMAL
MDC_IDC_LEAD_SERIAL: NORMAL
MDC_IDC_MSMT_BATTERY_DTM: NORMAL
MDC_IDC_MSMT_BATTERY_REMAINING_LONGEVITY: 42 MO
MDC_IDC_MSMT_BATTERY_STATUS: NORMAL
MDC_IDC_MSMT_BATTERY_STATUS: NORMAL
MDC_IDC_MSMT_CAP_CHARGE_DTM: NORMAL
MDC_IDC_MSMT_CAP_CHARGE_DTM: NORMAL
MDC_IDC_MSMT_CAP_CHARGE_ENERGY: 11 J
MDC_IDC_MSMT_CAP_CHARGE_ENERGY: 11 J
MDC_IDC_MSMT_CAP_CHARGE_TIME: 1.91 S
MDC_IDC_MSMT_CAP_CHARGE_TIME: 1.91 S
MDC_IDC_MSMT_CAP_CHARGE_TIME: 10.4
MDC_IDC_MSMT_CAP_CHARGE_TIME: 10.4
MDC_IDC_MSMT_CAP_CHARGE_TYPE: NORMAL
MDC_IDC_MSMT_LEADCHNL_RA_IMPEDANCE_VALUE: 636 OHM
MDC_IDC_MSMT_LEADCHNL_RA_IMPEDANCE_VALUE: 865 OHM
MDC_IDC_MSMT_LEADCHNL_RA_PACING_THRESHOLD_AMPLITUDE: 0.5 V
MDC_IDC_MSMT_LEADCHNL_RA_PACING_THRESHOLD_AMPLITUDE: 0.5 V
MDC_IDC_MSMT_LEADCHNL_RA_PACING_THRESHOLD_PULSEWIDTH: 0.5 MS
MDC_IDC_MSMT_LEADCHNL_RA_PACING_THRESHOLD_PULSEWIDTH: 0.5 MS
MDC_IDC_MSMT_LEADCHNL_RA_SENSING_INTR_AMPL: 11.4 MV
MDC_IDC_MSMT_LEADCHNL_RA_SENSING_INTR_AMPL: 7.9 MV
MDC_IDC_MSMT_LEADCHNL_RV_IMPEDANCE_VALUE: 443 OHM
MDC_IDC_MSMT_LEADCHNL_RV_IMPEDANCE_VALUE: 486 OHM
MDC_IDC_MSMT_LEADCHNL_RV_PACING_THRESHOLD_AMPLITUDE: 0.9 V
MDC_IDC_MSMT_LEADCHNL_RV_PACING_THRESHOLD_AMPLITUDE: 0.9 V
MDC_IDC_MSMT_LEADCHNL_RV_PACING_THRESHOLD_PULSEWIDTH: 0.5 MS
MDC_IDC_MSMT_LEADCHNL_RV_PACING_THRESHOLD_PULSEWIDTH: 0.5 MS
MDC_IDC_MSMT_LEADCHNL_RV_SENSING_INTR_AMPL: 22.2 MV
MDC_IDC_MSMT_LEADCHNL_RV_SENSING_INTR_AMPL: 25 MV
MDC_IDC_PG_IMPLANT_DTM: NORMAL
MDC_IDC_PG_IMPLANT_DTM: NORMAL
MDC_IDC_PG_MFG: NORMAL
MDC_IDC_PG_MFG: NORMAL
MDC_IDC_PG_MODEL: NORMAL
MDC_IDC_PG_MODEL: NORMAL
MDC_IDC_PG_SERIAL: NORMAL
MDC_IDC_PG_SERIAL: NORMAL
MDC_IDC_PG_TYPE: NORMAL
MDC_IDC_PG_TYPE: NORMAL
MDC_IDC_SESS_CLINIC_NAME: NORMAL
MDC_IDC_SESS_CLINIC_NAME: NORMAL
MDC_IDC_SESS_DTM: NORMAL
MDC_IDC_SESS_DTM: NORMAL
MDC_IDC_SESS_TYPE: NORMAL
MDC_IDC_SESS_TYPE: NORMAL
MDC_IDC_SET_BRADY_AT_MODE_SWITCH_MODE: NORMAL
MDC_IDC_SET_BRADY_AT_MODE_SWITCH_MODE: NORMAL
MDC_IDC_SET_BRADY_AT_MODE_SWITCH_RATE: 170 {BEATS}/MIN
MDC_IDC_SET_BRADY_AT_MODE_SWITCH_RATE: 170 {BEATS}/MIN
MDC_IDC_SET_BRADY_HYSTRATE: NORMAL
MDC_IDC_SET_BRADY_LOWRATE: 60 {BEATS}/MIN
MDC_IDC_SET_BRADY_LOWRATE: 60 {BEATS}/MIN
MDC_IDC_SET_BRADY_MAX_SENSOR_RATE: 120 {BEATS}/MIN
MDC_IDC_SET_BRADY_MAX_TRACKING_RATE: 120 {BEATS}/MIN
MDC_IDC_SET_BRADY_MAX_TRACKING_RATE: 120 {BEATS}/MIN
MDC_IDC_SET_BRADY_MODE: NORMAL
MDC_IDC_SET_BRADY_MODE: NORMAL
MDC_IDC_SET_BRADY_PAV_DELAY_HIGH: 260 MS
MDC_IDC_SET_BRADY_PAV_DELAY_HIGH: 260 MS
MDC_IDC_SET_BRADY_PAV_DELAY_LOW: 390 MS
MDC_IDC_SET_BRADY_PAV_DELAY_LOW: 390 MS
MDC_IDC_SET_BRADY_SAV_DELAY_HIGH: 260 MS
MDC_IDC_SET_BRADY_SAV_DELAY_HIGH: 260 MS
MDC_IDC_SET_BRADY_SAV_DELAY_LOW: 390 MS
MDC_IDC_SET_BRADY_SAV_DELAY_LOW: 390 MS
MDC_IDC_SET_LEADCHNL_RA_PACING_AMPLITUDE: 2.4 V
MDC_IDC_SET_LEADCHNL_RA_PACING_AMPLITUDE: 2.4 V
MDC_IDC_SET_LEADCHNL_RA_PACING_CAPTURE_MODE: NORMAL
MDC_IDC_SET_LEADCHNL_RA_PACING_CAPTURE_MODE: NORMAL
MDC_IDC_SET_LEADCHNL_RA_PACING_POLARITY: NORMAL
MDC_IDC_SET_LEADCHNL_RA_PACING_POLARITY: NORMAL
MDC_IDC_SET_LEADCHNL_RA_PACING_PULSEWIDTH: 0.5 MS
MDC_IDC_SET_LEADCHNL_RA_PACING_PULSEWIDTH: 0.5 MS
MDC_IDC_SET_LEADCHNL_RA_SENSING_ADAPTATION_MODE: NORMAL
MDC_IDC_SET_LEADCHNL_RA_SENSING_ADAPTATION_MODE: NORMAL
MDC_IDC_SET_LEADCHNL_RA_SENSING_POLARITY: NORMAL
MDC_IDC_SET_LEADCHNL_RA_SENSING_POLARITY: NORMAL
MDC_IDC_SET_LEADCHNL_RA_SENSING_SENSITIVITY: 0.25 MV
MDC_IDC_SET_LEADCHNL_RA_SENSING_SENSITIVITY: 0.25 MV
MDC_IDC_SET_LEADCHNL_RV_PACING_AMPLITUDE: 2.4 V
MDC_IDC_SET_LEADCHNL_RV_PACING_AMPLITUDE: 2.4 V
MDC_IDC_SET_LEADCHNL_RV_PACING_CAPTURE_MODE: NORMAL
MDC_IDC_SET_LEADCHNL_RV_PACING_CAPTURE_MODE: NORMAL
MDC_IDC_SET_LEADCHNL_RV_PACING_POLARITY: NORMAL
MDC_IDC_SET_LEADCHNL_RV_PACING_POLARITY: NORMAL
MDC_IDC_SET_LEADCHNL_RV_PACING_PULSEWIDTH: 0.5 MS
MDC_IDC_SET_LEADCHNL_RV_PACING_PULSEWIDTH: 0.5 MS
MDC_IDC_SET_LEADCHNL_RV_SENSING_ADAPTATION_MODE: NORMAL
MDC_IDC_SET_LEADCHNL_RV_SENSING_ADAPTATION_MODE: NORMAL
MDC_IDC_SET_LEADCHNL_RV_SENSING_POLARITY: NORMAL
MDC_IDC_SET_LEADCHNL_RV_SENSING_POLARITY: NORMAL
MDC_IDC_SET_LEADCHNL_RV_SENSING_SENSITIVITY: 0.6 MV
MDC_IDC_SET_LEADCHNL_RV_SENSING_SENSITIVITY: 0.6 MV
MDC_IDC_SET_ZONE_DETECTION_INTERVAL: 273 MS
MDC_IDC_SET_ZONE_DETECTION_INTERVAL: 273 MS
MDC_IDC_SET_ZONE_DETECTION_INTERVAL: 333 MS
MDC_IDC_SET_ZONE_DETECTION_INTERVAL: 333 MS
MDC_IDC_SET_ZONE_DETECTION_INTERVAL: 375 MS
MDC_IDC_SET_ZONE_DETECTION_INTERVAL: 375 MS
MDC_IDC_SET_ZONE_TYPE: NORMAL
MDC_IDC_SET_ZONE_VENDOR_TYPE: NORMAL
MDC_IDC_STAT_BRADY_DTM_END: NORMAL
MDC_IDC_STAT_BRADY_DTM_START: NORMAL
MDC_IDC_STAT_BRADY_RA_PERCENT_PACED: 1 %
MDC_IDC_STAT_BRADY_RV_PERCENT_PACED: 1 %
MDC_IDC_STAT_EPISODE_RECENT_COUNT: 0
MDC_IDC_STAT_EPISODE_RECENT_COUNT_DTM_END: NORMAL
MDC_IDC_STAT_EPISODE_RECENT_COUNT_DTM_START: NORMAL
MDC_IDC_STAT_EPISODE_TOTAL_COUNT: 211
MDC_IDC_STAT_EPISODE_TOTAL_COUNT: 211
MDC_IDC_STAT_EPISODE_TOTAL_COUNT: 215
MDC_IDC_STAT_EPISODE_TOTAL_COUNT: 215
MDC_IDC_STAT_EPISODE_TOTAL_COUNT: 6636
MDC_IDC_STAT_EPISODE_TOTAL_COUNT: 6636
MDC_IDC_STAT_EPISODE_TOTAL_COUNT_DTM_END: NORMAL
MDC_IDC_STAT_EPISODE_TYPE: NORMAL
MDC_IDC_STAT_EPISODE_VENDOR_TYPE: NORMAL
MDC_IDC_STAT_TACHYTHERAPY_ATP_DELIVERED_RECENT: 0
MDC_IDC_STAT_TACHYTHERAPY_ATP_DELIVERED_RECENT: 0
MDC_IDC_STAT_TACHYTHERAPY_ATP_DELIVERED_TOTAL: 3
MDC_IDC_STAT_TACHYTHERAPY_ATP_DELIVERED_TOTAL: 3
MDC_IDC_STAT_TACHYTHERAPY_RECENT_DTM_END: NORMAL
MDC_IDC_STAT_TACHYTHERAPY_RECENT_DTM_END: NORMAL
MDC_IDC_STAT_TACHYTHERAPY_RECENT_DTM_START: NORMAL
MDC_IDC_STAT_TACHYTHERAPY_RECENT_DTM_START: NORMAL
MDC_IDC_STAT_TACHYTHERAPY_SHOCKS_ABORTED_RECENT: 0
MDC_IDC_STAT_TACHYTHERAPY_SHOCKS_ABORTED_RECENT: 0
MDC_IDC_STAT_TACHYTHERAPY_SHOCKS_ABORTED_TOTAL: 1
MDC_IDC_STAT_TACHYTHERAPY_SHOCKS_ABORTED_TOTAL: 1
MDC_IDC_STAT_TACHYTHERAPY_SHOCKS_DELIVERED_RECENT: 0
MDC_IDC_STAT_TACHYTHERAPY_SHOCKS_DELIVERED_RECENT: 0
MDC_IDC_STAT_TACHYTHERAPY_SHOCKS_DELIVERED_TOTAL: 1
MDC_IDC_STAT_TACHYTHERAPY_SHOCKS_DELIVERED_TOTAL: 1
MDC_IDC_STAT_TACHYTHERAPY_TOTAL_DTM_END: NORMAL
MDC_IDC_STAT_TACHYTHERAPY_TOTAL_DTM_END: NORMAL
MONOCYTES # BLD AUTO: 0.7 10E9/L (ref 0–1.3)
MONOCYTES NFR BLD AUTO: 4.8 %
NEUTROPHILS # BLD AUTO: 11.7 10E9/L (ref 1.6–8.3)
NEUTROPHILS NFR BLD AUTO: 79 %
NRBC # BLD AUTO: 0 10*3/UL
NRBC BLD AUTO-RTO: 0 /100
NT-PROBNP SERPL-MCNC: 1423 PG/ML (ref 0–900)
PCO2 BLDV: 41 MM HG (ref 40–50)
PH BLDV: 7.4 PH (ref 7.32–7.43)
PLATELET # BLD AUTO: 300 10E9/L (ref 150–450)
PO2 BLDV: 67 MM HG (ref 25–47)
POTASSIUM SERPL-SCNC: 4.2 MMOL/L (ref 3.4–5.3)
PROT SERPL-MCNC: 7.2 G/DL (ref 6.8–8.8)
RBC # BLD AUTO: 4.64 10E12/L (ref 4.4–5.9)
SODIUM SERPL-SCNC: 137 MMOL/L (ref 133–144)
TROPONIN I SERPL-MCNC: 0.53 UG/L (ref 0–0.04)
WBC # BLD AUTO: 14.8 10E9/L (ref 4–11)

## 2020-05-15 PROCEDURE — 93005 ELECTROCARDIOGRAM TRACING: CPT

## 2020-05-15 PROCEDURE — 71045 X-RAY EXAM CHEST 1 VIEW: CPT

## 2020-05-15 PROCEDURE — 83605 ASSAY OF LACTIC ACID: CPT | Performed by: EMERGENCY MEDICINE

## 2020-05-15 PROCEDURE — 99223 1ST HOSP IP/OBS HIGH 75: CPT | Mod: AI | Performed by: INTERNAL MEDICINE

## 2020-05-15 PROCEDURE — 96365 THER/PROPH/DIAG IV INF INIT: CPT

## 2020-05-15 PROCEDURE — 80053 COMPREHEN METABOLIC PANEL: CPT | Performed by: EMERGENCY MEDICINE

## 2020-05-15 PROCEDURE — 99285 EMERGENCY DEPT VISIT HI MDM: CPT | Mod: 25

## 2020-05-15 PROCEDURE — 83735 ASSAY OF MAGNESIUM: CPT | Performed by: EMERGENCY MEDICINE

## 2020-05-15 PROCEDURE — 87040 BLOOD CULTURE FOR BACTERIA: CPT | Performed by: EMERGENCY MEDICINE

## 2020-05-15 PROCEDURE — 82803 BLOOD GASES ANY COMBINATION: CPT | Performed by: EMERGENCY MEDICINE

## 2020-05-15 PROCEDURE — 83880 ASSAY OF NATRIURETIC PEPTIDE: CPT | Performed by: EMERGENCY MEDICINE

## 2020-05-15 PROCEDURE — 12000011 ZZH R&B MS OVERFLOW

## 2020-05-15 PROCEDURE — 85025 COMPLETE CBC W/AUTO DIFF WBC: CPT | Performed by: EMERGENCY MEDICINE

## 2020-05-15 PROCEDURE — 25000128 H RX IP 250 OP 636: Performed by: EMERGENCY MEDICINE

## 2020-05-15 PROCEDURE — 84484 ASSAY OF TROPONIN QUANT: CPT | Performed by: EMERGENCY MEDICINE

## 2020-05-15 PROCEDURE — 96375 TX/PRO/DX INJ NEW DRUG ADDON: CPT

## 2020-05-15 PROCEDURE — 87635 SARS-COV-2 COVID-19 AMP PRB: CPT | Performed by: EMERGENCY MEDICINE

## 2020-05-15 RX ORDER — FUROSEMIDE 10 MG/ML
60 INJECTION INTRAMUSCULAR; INTRAVENOUS ONCE
Status: COMPLETED | OUTPATIENT
Start: 2020-05-15 | End: 2020-05-15

## 2020-05-15 RX ORDER — FUROSEMIDE 10 MG/ML
INJECTION INTRAMUSCULAR; INTRAVENOUS
Status: DISCONTINUED
Start: 2020-05-15 | End: 2020-05-16 | Stop reason: HOSPADM

## 2020-05-15 RX ORDER — PIPERACILLIN SODIUM, TAZOBACTAM SODIUM 4; .5 G/20ML; G/20ML
4.5 INJECTION, POWDER, LYOPHILIZED, FOR SOLUTION INTRAVENOUS ONCE
Status: COMPLETED | OUTPATIENT
Start: 2020-05-15 | End: 2020-05-16

## 2020-05-15 RX ADMIN — PIPERACILLIN SODIUM AND TAZOBACTAM SODIUM 4.5 G: 4; .5 INJECTION, POWDER, LYOPHILIZED, FOR SOLUTION INTRAVENOUS at 23:45

## 2020-05-15 RX ADMIN — FUROSEMIDE 60 MG: 10 INJECTION, SOLUTION INTRAMUSCULAR; INTRAVENOUS at 23:33

## 2020-05-16 ENCOUNTER — APPOINTMENT (OUTPATIENT)
Dept: CARDIOLOGY | Facility: CLINIC | Age: 65
End: 2020-05-16
Attending: INTERNAL MEDICINE
Payer: COMMERCIAL

## 2020-05-16 ENCOUNTER — APPOINTMENT (OUTPATIENT)
Dept: GENERAL RADIOLOGY | Facility: CLINIC | Age: 65
End: 2020-05-16
Attending: INTERNAL MEDICINE
Payer: COMMERCIAL

## 2020-05-16 VITALS
RESPIRATION RATE: 16 BRPM | SYSTOLIC BLOOD PRESSURE: 127 MMHG | WEIGHT: 216.3 LBS | DIASTOLIC BLOOD PRESSURE: 68 MMHG | HEART RATE: 96 BPM | TEMPERATURE: 99 F | BODY MASS INDEX: 30.17 KG/M2 | OXYGEN SATURATION: 95 %

## 2020-05-16 PROBLEM — R06.00 DYSPNEA: Status: ACTIVE | Noted: 2020-05-16

## 2020-05-16 LAB
GLUCOSE BLDC GLUCOMTR-MCNC: 140 MG/DL (ref 70–99)
GLUCOSE BLDC GLUCOMTR-MCNC: 149 MG/DL (ref 70–99)
GLUCOSE BLDC GLUCOMTR-MCNC: 153 MG/DL (ref 70–99)
INTERPRETATION ECG - MUSE: NORMAL
SARS-COV-2 PCR COMMENT: NORMAL
SARS-COV-2 RNA SPEC QL NAA+PROBE: NEGATIVE
SARS-COV-2 RNA SPEC QL NAA+PROBE: NORMAL
SPECIMEN SOURCE: NORMAL
SPECIMEN SOURCE: NORMAL
TROPONIN I SERPL-MCNC: 0.42 UG/L (ref 0–0.04)

## 2020-05-16 PROCEDURE — 84484 ASSAY OF TROPONIN QUANT: CPT | Performed by: INTERNAL MEDICINE

## 2020-05-16 PROCEDURE — 25500064 ZZH RX 255 OP 636: Performed by: INTERNAL MEDICINE

## 2020-05-16 PROCEDURE — 00000146 ZZHCL STATISTIC GLUCOSE BY METER IP

## 2020-05-16 PROCEDURE — 71046 X-RAY EXAM CHEST 2 VIEWS: CPT

## 2020-05-16 PROCEDURE — 93308 TTE F-UP OR LMTD: CPT

## 2020-05-16 PROCEDURE — 21000001 ZZH R&B HEART CARE

## 2020-05-16 PROCEDURE — 93308 TTE F-UP OR LMTD: CPT | Mod: 26 | Performed by: INTERNAL MEDICINE

## 2020-05-16 PROCEDURE — 93321 DOPPLER ECHO F-UP/LMTD STD: CPT | Mod: 26 | Performed by: INTERNAL MEDICINE

## 2020-05-16 PROCEDURE — 99239 HOSP IP/OBS DSCHRG MGMT >30: CPT | Performed by: INTERNAL MEDICINE

## 2020-05-16 PROCEDURE — 25000132 ZZH RX MED GY IP 250 OP 250 PS 637: Performed by: INTERNAL MEDICINE

## 2020-05-16 PROCEDURE — 99222 1ST HOSP IP/OBS MODERATE 55: CPT | Mod: 25 | Performed by: INTERNAL MEDICINE

## 2020-05-16 PROCEDURE — 93325 DOPPLER ECHO COLOR FLOW MAPG: CPT | Mod: 26 | Performed by: INTERNAL MEDICINE

## 2020-05-16 RX ORDER — FUROSEMIDE 10 MG/ML
40 INJECTION INTRAMUSCULAR; INTRAVENOUS ONCE
Status: DISCONTINUED | OUTPATIENT
Start: 2020-05-16 | End: 2020-05-16

## 2020-05-16 RX ORDER — ZOLPIDEM TARTRATE 5 MG/1
5 TABLET ORAL
Status: DISCONTINUED | OUTPATIENT
Start: 2020-05-16 | End: 2020-05-16 | Stop reason: HOSPADM

## 2020-05-16 RX ORDER — NITROGLYCERIN 0.4 MG/1
0.4 TABLET SUBLINGUAL EVERY 5 MIN PRN
Status: DISCONTINUED | OUTPATIENT
Start: 2020-05-16 | End: 2020-05-16 | Stop reason: HOSPADM

## 2020-05-16 RX ORDER — GABAPENTIN 300 MG/1
600 CAPSULE ORAL 2 TIMES DAILY
Status: DISCONTINUED | OUTPATIENT
Start: 2020-05-16 | End: 2020-05-16 | Stop reason: HOSPADM

## 2020-05-16 RX ORDER — ASPIRIN 81 MG/1
81 TABLET ORAL DAILY
Status: DISCONTINUED | OUTPATIENT
Start: 2020-05-17 | End: 2020-05-16 | Stop reason: HOSPADM

## 2020-05-16 RX ORDER — EPLERENONE 25 MG/1
50 TABLET, FILM COATED ORAL DAILY
Status: DISCONTINUED | OUTPATIENT
Start: 2020-05-16 | End: 2020-05-16 | Stop reason: HOSPADM

## 2020-05-16 RX ORDER — FUROSEMIDE 40 MG
40 TABLET ORAL DAILY
Status: DISCONTINUED | OUTPATIENT
Start: 2020-05-16 | End: 2020-05-16

## 2020-05-16 RX ORDER — ACETAMINOPHEN 325 MG/1
650 TABLET ORAL EVERY 4 HOURS PRN
Status: DISCONTINUED | OUTPATIENT
Start: 2020-05-16 | End: 2020-05-16 | Stop reason: HOSPADM

## 2020-05-16 RX ORDER — DOFETILIDE 0.25 MG/1
250 CAPSULE ORAL EVERY 12 HOURS
Status: DISCONTINUED | OUTPATIENT
Start: 2020-05-16 | End: 2020-05-16 | Stop reason: HOSPADM

## 2020-05-16 RX ORDER — POLYETHYLENE GLYCOL 3350 17 G/17G
17 POWDER, FOR SOLUTION ORAL DAILY PRN
Status: DISCONTINUED | OUTPATIENT
Start: 2020-05-16 | End: 2020-05-16 | Stop reason: HOSPADM

## 2020-05-16 RX ORDER — ATORVASTATIN CALCIUM 20 MG/1
20 TABLET, FILM COATED ORAL DAILY
Status: DISCONTINUED | OUTPATIENT
Start: 2020-05-16 | End: 2020-05-16 | Stop reason: HOSPADM

## 2020-05-16 RX ORDER — LIDOCAINE 40 MG/G
CREAM TOPICAL
Status: DISCONTINUED | OUTPATIENT
Start: 2020-05-16 | End: 2020-05-16 | Stop reason: HOSPADM

## 2020-05-16 RX ORDER — ONDANSETRON 4 MG/1
4 TABLET, ORALLY DISINTEGRATING ORAL EVERY 6 HOURS PRN
Status: DISCONTINUED | OUTPATIENT
Start: 2020-05-16 | End: 2020-05-16 | Stop reason: HOSPADM

## 2020-05-16 RX ORDER — ALBUTEROL SULFATE 90 UG/1
2 AEROSOL, METERED RESPIRATORY (INHALATION) EVERY 6 HOURS
Status: DISCONTINUED | OUTPATIENT
Start: 2020-05-16 | End: 2020-05-16 | Stop reason: HOSPADM

## 2020-05-16 RX ORDER — ONDANSETRON 2 MG/ML
4 INJECTION INTRAMUSCULAR; INTRAVENOUS EVERY 6 HOURS PRN
Status: DISCONTINUED | OUTPATIENT
Start: 2020-05-16 | End: 2020-05-16 | Stop reason: HOSPADM

## 2020-05-16 RX ORDER — DABIGATRAN ETEXILATE 150 MG/1
150 CAPSULE ORAL 2 TIMES DAILY
Status: DISCONTINUED | OUTPATIENT
Start: 2020-05-16 | End: 2020-05-16 | Stop reason: HOSPADM

## 2020-05-16 RX ORDER — DEXTROSE MONOHYDRATE 25 G/50ML
25-50 INJECTION, SOLUTION INTRAVENOUS
Status: DISCONTINUED | OUTPATIENT
Start: 2020-05-16 | End: 2020-05-16 | Stop reason: HOSPADM

## 2020-05-16 RX ORDER — NALOXONE HYDROCHLORIDE 0.4 MG/ML
.1-.4 INJECTION, SOLUTION INTRAMUSCULAR; INTRAVENOUS; SUBCUTANEOUS
Status: DISCONTINUED | OUTPATIENT
Start: 2020-05-16 | End: 2020-05-16 | Stop reason: HOSPADM

## 2020-05-16 RX ORDER — PROCHLORPERAZINE MALEATE 5 MG
5 TABLET ORAL EVERY 6 HOURS PRN
Status: DISCONTINUED | OUTPATIENT
Start: 2020-05-16 | End: 2020-05-16 | Stop reason: HOSPADM

## 2020-05-16 RX ORDER — FUROSEMIDE 40 MG
40 TABLET ORAL DAILY
Status: DISCONTINUED | OUTPATIENT
Start: 2020-05-16 | End: 2020-05-16 | Stop reason: HOSPADM

## 2020-05-16 RX ORDER — NICOTINE POLACRILEX 4 MG
15-30 LOZENGE BUCCAL
Status: DISCONTINUED | OUTPATIENT
Start: 2020-05-16 | End: 2020-05-16 | Stop reason: HOSPADM

## 2020-05-16 RX ORDER — PROCHLORPERAZINE 25 MG
12.5 SUPPOSITORY, RECTAL RECTAL EVERY 12 HOURS PRN
Status: DISCONTINUED | OUTPATIENT
Start: 2020-05-16 | End: 2020-05-16 | Stop reason: HOSPADM

## 2020-05-16 RX ORDER — METOPROLOL SUCCINATE 50 MG/1
50 TABLET, EXTENDED RELEASE ORAL DAILY
Status: DISCONTINUED | OUTPATIENT
Start: 2020-05-16 | End: 2020-05-16 | Stop reason: HOSPADM

## 2020-05-16 RX ADMIN — Medication 1 MG: at 02:35

## 2020-05-16 RX ADMIN — ALBUTEROL SULFATE 2 PUFF: 90 AEROSOL, METERED RESPIRATORY (INHALATION) at 02:35

## 2020-05-16 RX ADMIN — ALBUTEROL SULFATE 2 PUFF: 90 AEROSOL, METERED RESPIRATORY (INHALATION) at 08:50

## 2020-05-16 RX ADMIN — ALBUTEROL SULFATE 2 PUFF: 90 AEROSOL, METERED RESPIRATORY (INHALATION) at 13:43

## 2020-05-16 RX ADMIN — FUROSEMIDE 40 MG: 40 TABLET ORAL at 13:42

## 2020-05-16 RX ADMIN — DABIGATRAN ETEXILATE MESYLATE 150 MG: 150 CAPSULE ORAL at 09:03

## 2020-05-16 RX ADMIN — METOPROLOL SUCCINATE 50 MG: 50 TABLET, EXTENDED RELEASE ORAL at 08:49

## 2020-05-16 RX ADMIN — DOFETILIDE 250 MCG: 0.25 CAPSULE ORAL at 09:03

## 2020-05-16 RX ADMIN — GABAPENTIN 600 MG: 300 CAPSULE ORAL at 08:50

## 2020-05-16 RX ADMIN — ACETAMINOPHEN 650 MG: 325 TABLET, FILM COATED ORAL at 02:35

## 2020-05-16 RX ADMIN — EPLERENONE 50 MG: 25 TABLET, FILM COATED ORAL at 09:03

## 2020-05-16 RX ADMIN — HUMAN ALBUMIN MICROSPHERES AND PERFLUTREN 9 ML: 10; .22 INJECTION, SOLUTION INTRAVENOUS at 11:52

## 2020-05-16 ASSESSMENT — ACTIVITIES OF DAILY LIVING (ADL)
DRESS: 0-->INDEPENDENT
AMBULATION: 0-->INDEPENDENT
ADLS_ACUITY_SCORE: 11
FALL_HISTORY_WITHIN_LAST_SIX_MONTHS: NO
BATHING: 0-->INDEPENDENT
RETIRED_EATING: 0-->INDEPENDENT
COGNITION: 0 - NO COGNITION ISSUES REPORTED
RETIRED_COMMUNICATION: 0-->UNDERSTANDS/COMMUNICATES WITHOUT DIFFICULTY
SWALLOWING: 0-->SWALLOWS FOODS/LIQUIDS WITHOUT DIFFICULTY
ADLS_ACUITY_SCORE: 11
ADLS_ACUITY_SCORE: 11
TOILETING: 0-->INDEPENDENT
TRANSFERRING: 0-->INDEPENDENT

## 2020-05-16 NOTE — PROVIDER NOTIFICATION
MD Notification    Notified Person: MD    Notified Person Name: Dr. Chun    Notification Date/Time: 5/16/20; 0932    Notification Interaction: text page    Purpose of Notification: patient transferring to [facility], can you reconcile orders and remove need for negative airflow room?    Orders Received:    Comments:

## 2020-05-16 NOTE — CONSULTS
[facility]    Cardiology Consultation     Date of Admission:  5/15/2020    Assessment & Plan     Mr. Meredith is a 64-year-old male patient with history including hypertension; diabetes mellitus type 2; CHF (HFpEF); atrial arrhythmias with a history of multiple cardioversions and ablations; ventricular tachycardia status post ICD; and osteoarthritis; who recently underwent atrial flutter ablation (05/14/2020); admitted in mild CHF from fluid administration with procedure. Echocardiogram revealed no pericardial effusion. Cardiac enzymes mildly elevated but flat. Feels markedly better after IV lasix. Stable for discharge.    1. Continue current medication regimen  2. Outpatient follow up with EP       Leann Gutierrez MD    Code Status    Full Code    Reason for Consult   CHF, s/p ablation     Primary Care Physician   *Sathya Garcia    Chief Complaint   SOB    History of Present Illness   This is a 64-year-old male with PMH hypertension, diabetes mellitus type 2, CHF, SVT, ventricular tachycardia, status post ICD and osteoarthritis who is s/p aflutter ablation on 5/14. Post procedure he developed clear sputum, low grade fevers, no le edema, orthopnea. Reports getting IVF at the hospital. Denies COVID exposure. COVID test negative at [facility]. Received IV lasix 60 mg once and felt markedly better. TN 0.4 and downtrended. NTproBNP 1400. CXR showed mild interstitial edema, otherwise clear. Patient denies chest pain, nausea, vomiting, diarrhea.     Past Medical History   I have reviewed this patient's medical history and updated it with pertinent information if needed.   Past Medical History:   Diagnosis Date     Atrial fibrillation (H) 12/9/11    failed medication, multiple DC cardioveresions; s/p Left atrial ablation to eliminate atrial fibrillation 12/9/11     Chest pain      CHF (congestive heart failure) (H) 7/30/2016     Coronary artery disease      DJD (degenerative joint disease)      Fatigue  7/15/2019     Hip arthritis 1/15/2014     Hypertension      Hypertrophic cardiomyopathy (H) 10/09     Other abnormal heart sounds      Pacemaker     ICD     Palpitations      Pneumonia, organism unspecified(486)      Prediabetes 7/10/15    A1C 6.5     Status post implantation of automatic cardioverter/defibrillator (AICD)      Ventricular tachycardia (H)        Past Surgical History   I have reviewed this patient's surgical history and updated it with pertinent information if needed.  Past Surgical History:   Procedure Laterality Date     ANESTHESIA CARDIOVERSION  4/24/2014    Procedure: ANESTHESIA CARDIOVERSION;  Surgeon: Generic Anesthesia Provider;  Location: [facility]     ANESTHESIA CARDIOVERSION N/A 5/12/2016    Procedure: ANESTHESIA CARDIOVERSION;  Surgeon: GENERIC ANESTHESIA PROVIDER;  Location: [facility]     ANESTHESIA CARDIOVERSION N/A 8/7/2017    Procedure: ANESTHESIA CARDIOVERSION;  Anesthesia Offsite Coverage Cardioversion @1100;  Surgeon: GENERIC ANESTHESIA PROVIDER;  Location: [facility]     ANESTHESIA CARDIOVERSION N/A 1/3/2018    Procedure: ANESTHESIA CARDIOVERSION;  Anesthesia Cardioverion;  Surgeon: GENERIC ANESTHESIA PROVIDER;  Location: [facility]     ANESTHESIA CARDIOVERSION N/A 5/4/2018    Procedure: ANESTHESIA CARDIOVERSION;  Anesthesia Coverage Cardioversion @1400;  Surgeon: GENERIC ANESTHESIA PROVIDER;  Location: [facility]     ANESTHESIA CARDIOVERSION N/A 9/27/2018    Procedure: ANESTHESIA CARDIOVERSION;  Anesthesia Cardioversion @0930;  Surgeon: GENERIC ANESTHESIA PROVIDER;  Location: [facility]     ANESTHESIA CARDIOVERSION N/A 12/20/2018    Procedure: Anesthesia Coverage Cardioversion @0830;  Surgeon: GENERIC ANESTHESIA PROVIDER;  Location: [facility]     ANESTHESIA CARDIOVERSION N/A 8/21/2019    Procedure: Anesthesia Coverage Cardioversion @0800;  Surgeon: GENERIC ANESTHESIA PROVIDER;  [facility]: [facility]     ANESTHESIA CARDIOVERSION N/A 1/16/2020    Procedure: ANESTHESIA, FOR CARDIOVERSION;  Surgeon: GENERIC ANESTHESIA  PROVIDER;  Location: [facility]     ARTHROPLASTY HIP  1/15/2014    Procedure: ARTHROPLASTY HIP;  Left Total Hip Arthroplasty;  Surgeon: Nelson Gaspar MD;  Location: [facility]     ARTHROPLASTY HIP Right 6/5/2019    Procedure: Right Total Hip Arthroplasty;  Surgeon: Nelson Gaspar MD;  Location: [facility]     CARDIAC SURGERY       COLONOSCOPY N/A 5/18/2018    Procedure: COMBINED COLONOSCOPY, SINGLE OR MULTIPLE BIOPSY/POLYPECTOMY BY BIOPSY;  COLONOSCOPY (PT HAS DEFIBRILLATOR) ;  Surgeon: Mike Nickerson MD;  Location: [facility]     CV RIGHT HEART CATH N/A 8/19/2019    Procedure: CV RIGHT HEART CATH;  Surgeon: Cisco Rausch MD;  Location:  HEART CARDIAC CATH LAB     CV RIGHT HEART CATH N/A 8/21/2019    Procedure: Right Heart Cath;  Surgeon: Ciaran Butron MD;  Location:  HEART CARDIAC CATH LAB     H ABLATION FOCAL AFIB  12/9/11    Left atrial ablation to eliminate atrial fibrillation     IMPLANT AUTOMATIC IMPLANTABLE CARDIOVERTER DEFIBRILLATOR  7/27/12    AICD implantation     TONSILLECTOMY  1964       Prior to Admission Medications   Prior to Admission Medications   Prescriptions Last Dose Informant Patient Reported? Taking?   XARELTO ANTICOAGULANT 20 MG TABS tablet Past Month at ON HOLD- restart when done with 30 days of PRADAXA Self No Yes   Sig: Take 1 tablet (20 mg) by mouth daily (with dinner)   acetaminophen (TYLENOL) 325 MG tablet prn at prn  Self Yes No   Sig: Take 325-650 mg by mouth every 6 hours as needed   albuterol (PROAIR HFA/PROVENTIL HFA/VENTOLIN HFA) 108 (90 Base) MCG/ACT inhaler prn at prn  Self No No   Sig: Inhale 2 puffs into the lungs every 6 hours   amoxicillin (AMOXIL) 500 MG tablet prn at prn dental Self No No   Sig: Take 4 tablets (2000 mg) by mouth 1 hour before dental procedures.   atorvastatin (LIPITOR) 20 MG tablet 5/15/2020 at 1900 Self No Yes   Sig: Take 1 tablet (20 mg) by mouth daily   cyanocobalamin (VITAMIN B-12) 100 MCG tablet 5/15/2020 at AM Self Yes Yes   Sig: Take 100 mcg by  mouth daily   dabigatran ANTICOAGULANT (PRADAXA ANTICOAGULANT) 150 MG capsule 5/15/2020 at 1900 (completes 30 days then back to Xarelto) Self No Yes   Sig: Take 1 capsule (150 mg) by mouth 2 times daily Store in original 's bottle or blister pack; use within 120 days of opening.   dofetilide (TIKOSYN) 250 MCG capsule 5/15/2020 at 1900 Self No Yes   Sig: Take 1 capsule (250 mcg) by mouth every 12 hours   eplerenone (INSPRA) 50 MG tablet 5/15/2020 at AM Self No Yes   Sig: Take 1 tablet (50 mg) by mouth daily   furosemide (LASIX) 40 MG tablet 5/15/2020 at AM Self No Yes   Sig: Take 1 tablet (40 mg) by mouth daily   gabapentin (NEURONTIN) 300 MG capsule 5/15/2020 at 1900 Self No Yes   Sig: Take 2 capsules (600 mg) by mouth 2 times daily   metFORMIN (GLUCOPHAGE-XR) 500 MG 24 hr tablet 5/15/2020 at 1900 Self No Yes   Sig: Take 2 tablets (1,000 mg) by mouth daily (with dinner) Take 2 tablets (1,000 mg) daily (with dinner).   metoprolol succinate ER (TOPROL-XL) 50 MG 24 hr tablet 5/15/2020 at AM Self No Yes   Sig: TAKE ONE TABLET BY MOUTH EVERY DAY   multivitamin, therapeutic (THERA-VIT) TABS 5/15/2020 at AM Self Yes Yes   Sig: Take 1 tablet by mouth daily   zolpidem (AMBIEN) 10 MG tablet Past Week at HS Self No Yes   Sig: Take 0.5 tablets (5 mg) by mouth nightly as needed for sleep Maximum number of tablets #30 in 90 days.      Facility-Administered Medications: None     Allergies   No Known Allergies    Social History   I have reviewed this patient's social history and updated it with pertinent information if needed. Stu Meredith  reports that he quit smoking about 4 years ago. His smoking use included cigarettes. He started smoking about 45 years ago. He has a 40.00 pack-year smoking history. He has never used smokeless tobacco. He reports current alcohol use. He reports that he does not use drugs.    Family History   I have reviewed this patient's family history and updated it with pertinent information  if needed.   Family History   Problem Relation Age of Onset     Heart Disease Mother         unknown     Obesity Mother      Gastrointestinal Disease Mother         diverticulitis     Diabetes Maternal Grandmother      Diabetes Paternal Grandmother      Cerebrovascular Disease Paternal Grandmother 94     Diabetes Paternal Grandfather      Cerebrovascular Disease Paternal Grandfather 78     Diabetes Son      C.A.D. Father         CABG age 78     Heart Disease Father         CABG x5     Diabetes Maternal Grandfather      LUNG DISEASE No family hx of      Deep Vein Thrombosis (DVT) No family hx of      Anesthesia Reaction No family hx of      Melanoma No family hx of      Skin Cancer No family hx of        Review of Systems   The 10 point Review of Systems is negative other than noted in the HPI or here.     Physical Exam   Temp: 98.6  F (37  C) Temp src: Oral BP: 137/68 Pulse: 96 Heart Rate: 67 Resp: 16 SpO2: 90 % O2 Device: None (Room air) Oxygen Delivery: 2 LPM  Vital Signs with Ranges  Temp:  [98.6  F (37  C)-99.1  F (37.3  C)] 98.6  F (37  C)  Pulse:  [] 96  Heart Rate:  [67-95] 67  Resp:  [16-28] 16  BP: (129-158)/(68-96) 137/68  SpO2:  [88 %-99 %] 90 %  216 lbs 4.8 oz    Constitutional: Awake, alert, cooperative, no apparent distress.  Eyes: Conjunctiva and pupils examined and normal.  HEENT: Moist mucous membranes, normal dentition.  Respiratory: Clear to auscultation bilaterally, no crackles or wheezing.  Cardiovascular: Regular rate and rhythm, normal S1 and S2, and no murmur noted.  GI: Soft, non-distended, non-tender, normal bowel sounds.  Lymph/Hematologic: No anterior cervical or supraclavicular adenopathy.  Skin: No rashes, no cyanosis, no edema.  Musculoskeletal: No joint swelling, erythema or tenderness.  Neurologic: Cranial nerves 2-12 intact, normal strength and sensation.  Psychiatric: Alert, oriented to person, place and time, no obvious anxiety or depression.     Data   Results for orders  placed or performed during the hospital encounter of 05/15/20 (from the past 24 hour(s))   EKG 12 lead   Result Value Ref Range    Interpretation ECG Click View Image link to view waveform and result    CBC with platelets differential   Result Value Ref Range    WBC 14.8 (H) 4.0 - 11.0 10e9/L    RBC Count 4.64 4.4 - 5.9 10e12/L    Hemoglobin 14.2 13.3 - 17.7 g/dL    Hematocrit 42.7 40.0 - 53.0 %    MCV 92 78 - 100 fl    MCH 30.6 26.5 - 33.0 pg    MCHC 33.3 31.5 - 36.5 g/dL    RDW 14.0 10.0 - 15.0 %    Platelet Count 300 150 - 450 10e9/L    Diff Method Automated Method     % Neutrophils 79.0 %    % Lymphocytes 15.7 %    % Monocytes 4.8 %    % Eosinophils 0.1 %    % Basophils 0.1 %    % Immature Granulocytes 0.3 %    Nucleated RBCs 0 0 /100    Absolute Neutrophil 11.7 (H) 1.6 - 8.3 10e9/L    Absolute Lymphocytes 2.3 0.8 - 5.3 10e9/L    Absolute Monocytes 0.7 0.0 - 1.3 10e9/L    Absolute Eosinophils 0.0 0.0 - 0.7 10e9/L    Absolute Basophils 0.0 0.0 - 0.2 10e9/L    Abs Immature Granulocytes 0.1 0 - 0.4 10e9/L    Absolute Nucleated RBC 0.0    Comprehensive metabolic panel   Result Value Ref Range    Sodium 137 133 - 144 mmol/L    Potassium 4.2 3.4 - 5.3 mmol/L    Chloride 106 94 - 109 mmol/L    Carbon Dioxide 26 20 - 32 mmol/L    Anion Gap 5 3 - 14 mmol/L    Glucose 169 (H) 70 - 99 mg/dL    Urea Nitrogen 32 (H) 7 - 30 mg/dL    Creatinine 1.12 0.66 - 1.25 mg/dL    GFR Estimate 68 >60 mL/min/[1.73_m2]    GFR Estimate If Black 79 >60 mL/min/[1.73_m2]    Calcium 8.9 8.5 - 10.1 mg/dL    Bilirubin Total 0.7 0.2 - 1.3 mg/dL    Albumin 3.7 3.4 - 5.0 g/dL    Protein Total 7.2 6.8 - 8.8 g/dL    Alkaline Phosphatase 76 40 - 150 U/L    ALT 39 0 - 70 U/L    AST 42 0 - 45 U/L   Magnesium   Result Value Ref Range    Magnesium 2.3 1.6 - 2.3 mg/dL   Blood gas venous   Result Value Ref Range    Ph Venous 7.40 7.32 - 7.43 pH    PCO2 Venous 41 40 - 50 mm Hg    PO2 Venous 67 (H) 25 - 47 mm Hg    Bicarbonate Venous 25 21 - 28 mmol/L    Base  Excess Venous 0.1 mmol/L   Troponin I   Result Value Ref Range    Troponin I ES 0.525 (HH) 0.000 - 0.045 ug/L   Nt probnp inpatient   Result Value Ref Range    N-Terminal Pro BNP Inpatient 1,423 (H) 0 - 900 pg/mL   Lactic acid whole blood   Result Value Ref Range    Lactic Acid 2.4 (H) 0.7 - 2.0 mmol/L   XR Chest Port 1 View    Narrative    EXAM: XR CHEST PORT 1 VW  LOCATION: [facility]  DATE/TIME: 5/15/2020 9:43 PM    INDICATION: Dyspnea. Short of breath.  COMPARISON: None.      Impression    IMPRESSION: Heart is enlarged. Interstitial prominence suspicious for interstitial edema. Left sided permanent transvenous pacemaker with cardiac electrodes overlying right atrium and right ventricle. No pneumothorax. The right lower lung demonstrates a   3.3 x 2.1 cm masslike opacity which could be due to alveolar edema, infiltrate or nodular mass. Recommend follow-up after medical therapy. Hyperinflation. COPD. No displaced fracture demonstrated. Monitor electrodes.   Blood culture    Specimen: Blood    Right Arm   Result Value Ref Range    Specimen Description Blood Right Arm     Culture Micro No growth after 12 hours    Symptomatic COVID-19 Virus (Coronavirus) by PCR Nasopharyngeal    Specimen: Nasopharyngeal   Result Value Ref Range    COVID-19 Virus PCR to [facility] - Source Nasopharyngeal     COVID-19 Virus PCR to [facility] - Result       Test received-See reflex to IDDL test SARS CoV2 (COVID-19) Virus RT-PCR   SARS-CoV-2 COVID-19 Virus (Coronavirus) RT-PCR Nasopharyngeal    Specimen: Nasopharyngeal   Result Value Ref Range    SARS-CoV-2 Virus Specimen Source Nasopharyngeal     SARS-CoV-2 PCR Result NEGATIVE     SARS-CoV-2 PCR Comment       This automated, real-time RT-PCR assay by [email or web address] on the [employer] has   been given Emergency Use Authorization (EUA) for the in vitro qualitative detection of RNA   from the SARS-CoV2 virus in nasopharyngeal swabs in viral transport medium from patients    with signs and symptoms of infection who are suspected of COVID-19. The performance is   unknown in asymptomatic patients.     Blood culture    Specimen: Blood    Left Hand   Result Value Ref Range    Specimen Description Blood Left Hand     Culture Micro No growth after 9 hours    Glucose by meter   Result Value Ref Range    Glucose 140 (H) 70 - 99 mg/dL   Troponin I   Result Value Ref Range    Troponin I ES 0.419 (HH) 0.000 - 0.045 ug/L   Glucose by meter   Result Value Ref Range    Glucose 153 (H) 70 - 99 mg/dL   Glucose by meter   Result Value Ref Range    Glucose 149 (H) 70 - 99 mg/dL   XR Chest 2 Views    Narrative    CHEST TWO VIEWS   2020 9:54 AM     HISTORY: Follow up alveolar edema vs ?mass.    COMPARISON: Chest x-ray 5/15/2020.      Impression    IMPRESSION: Two views of the chest are performed. The rounded opacity  right lower lung seen on prior exam has likely resolved and is not  appreciated on the current images. Lungs are otherwise clear with mild  persistent interstitial changes. Heart is normal in size. No  pneumothorax. Trace pleural effusions are noted on the lateral view.  Implantable cardiac device and both leads are unchanged in position.    ANURAG OLIVA MD   Echocardiogram Limited    Narrative    [id number]  [id number]  [id number]  [id number]^SO^ZACHARY^[facility]  Echocardiography Laboratory  [address] [place] [address]        Name: MANUEL WASHINGTON  MRN: [id number]  : 1955  Study Date: 2020 10:08 AM  Age: 64 yrs  Gender: Male  Patient Location: [facility]  Reason For Study: CHF  Ordering Physician: ZACHARY RIZZO  Referring Physician: Sathya Garcia  Performed By: Delma Cain     BSA: 2.2 m2  Height: 71 in  Weight: 216 lb  HR: 84  BP: 137/68 mmHg  _____________________________________________________________________________  __        Procedure  Limited Portable Echo Adult. Optison (NDC #[id number]) given  intravenously.  _____________________________________________________________________________  __        Interpretation Summary     Left ventricular systolic function is mildly reduced.  The visual ejection fraction is estimated at 45-50%.  Right ventricular systolic pressure is elevated, consistent with moderate  pulmonary hypertension.  The right ventricular systolic function is normal.  The right ventricle is normal size.  Compared to recent ALEJANDRA, EF again mildly reduced. RVSP increased compared to  prior TTE from .  _____________________________________________________________________________  __        Left Ventricle  Study not optimal to assess wall thickness. Left ventricular systolic function  is mildly reduced. The visual ejection fraction is estimated at 45-50%. There  is mild global hypokinesia of the left ventricle.     Right Ventricle  The right ventricle is normal size. There is mild right ventricular  hypertrophy. The right ventricular systolic function is normal. There is a  pacemaker lead in the right ventricle.     Mitral Valve  There is mild (1+) mitral regurgitation.     Tricuspid Valve  There is mild to moderate (1-2+) tricuspid regurgitation. The right  ventricular systolic pressure is approximated at 52.8 mmHg plus the right  atrial pressure. Right ventricular systolic pressure is elevated, consistent  with moderate pulmonary hypertension.        Aortic Valve  There is trace aortic regurgitation. No aortic stenosis is present.     Vessels  The aortic root is normal size.     Pericardium  There is no pericardial effusion.  _____________________________________________________________________________  __     MMode/2D Measurements & Calculations  Ao root diam: 2.7 cm  asc Aorta Diam: 2.8 cm  LVOT diam: 2.1 cm  LVOT area: 3.4 cm2        Doppler Measurements & Calculations  LV V1 max P.1 mmHg  LV V1 max: 101.5 cm/sec  LV V1 VTI: 18.4 cm  SV(LVOT): 62.4 ml  SI(LVOT): 28.7 ml/m2  PA acc time:  0.08 sec  TR max ashley: 363.2 cm/sec  TR max P.8 mmHg              _____________________________________________________________________________  __        Report approved by: Jeancarlos Avalos 2020 12:19 PM

## 2020-05-16 NOTE — PHARMACY-ADMISSION MEDICATION HISTORY
Pharmacy Medication History  Admission medication history interview status for the 5/15/2020  admission is complete. See EPIC admission navigator for prior to admission medications     Medication history sources: Patient and Surescripts  Medication history source reliability: Good  Adherence assessment: Good    Significant changes made to the medication list:  None      Additional medication history information:   Pradaxa for now until 30 days (one bottle) complete then back to Xarelto long term- temp switched for Pradaxa for ablation on 5/14/2020    Medication reconciliation completed by provider prior to medication history? Yes    Time spent in this activity: 25 min      Prior to Admission medications    Medication Sig Last Dose Taking? Auth Provider   atorvastatin (LIPITOR) 20 MG tablet Take 1 tablet (20 mg) by mouth daily 5/15/2020 at 1900 Yes Laury Guzman MD   cyanocobalamin (VITAMIN B-12) 100 MCG tablet Take 100 mcg by mouth daily 5/15/2020 at AM Yes Unknown, Entered By History   dabigatran ANTICOAGULANT (PRADAXA ANTICOAGULANT) 150 MG capsule Take 1 capsule (150 mg) by mouth 2 times daily Store in original 's bottle or blister pack; use within 120 days of opening. 5/15/2020 at 1900 (completes 30 days then back to Xarelto) Yes Erik Cooper MD   dofetilide (TIKOSYN) 250 MCG capsule Take 1 capsule (250 mcg) by mouth every 12 hours 5/15/2020 at 1900 Yes Maci Trevino APRN CNP   eplerenone (INSPRA) 50 MG tablet Take 1 tablet (50 mg) by mouth daily 5/15/2020 at AM Yes Mona Ware MD   furosemide (LASIX) 40 MG tablet Take 1 tablet (40 mg) by mouth daily 5/15/2020 at AM Yes Mona Ware MD   gabapentin (NEURONTIN) 300 MG capsule Take 2 capsules (600 mg) by mouth 2 times daily 5/15/2020 at 1900 Yes Laury Guzman MD   metFORMIN (GLUCOPHAGE-XR) 500 MG 24 hr tablet Take 2 tablets (1,000 mg) by mouth daily (with dinner) Take 2 tablets (1,000 mg) daily (with dinner).  5/15/2020 at 1900 Yes Laury Guzman MD   metoprolol succinate ER (TOPROL-XL) 50 MG 24 hr tablet TAKE ONE TABLET BY MOUTH EVERY DAY 5/15/2020 at AM Yes Erik Cooper MD   multivitamin, therapeutic (THERA-VIT) TABS Take 1 tablet by mouth daily 5/15/2020 at AM Yes Reported, Patient   XARELTO ANTICOAGULANT 20 MG TABS tablet Take 1 tablet (20 mg) by mouth daily (with dinner) Past Month at ON HOLD- restart when done with 30 days of PRADAXA Yes Mona Ware MD   zolpidem (AMBIEN) 10 MG tablet Take 0.5 tablets (5 mg) by mouth nightly as needed for sleep Maximum number of tablets #30 in 90 days. Past Week at HS Yes Laury Guzman MD   acetaminophen (TYLENOL) 325 MG tablet Take 325-650 mg by mouth every 6 hours as needed prn at prn   Reported, Patient   albuterol (PROAIR HFA/PROVENTIL HFA/VENTOLIN HFA) 108 (90 Base) MCG/ACT inhaler Inhale 2 puffs into the lungs every 6 hours prn at prn   Diane Peguero MD   amoxicillin (AMOXIL) 500 MG tablet Take 4 tablets (2000 mg) by mouth 1 hour before dental procedures. prn at prn dental  Nelson Gaspar MD

## 2020-05-16 NOTE — PROGRESS NOTES
[facility]    Medicine Progress Note - Hospitalist Service        Date of Admission:  5/15/2020  9:26 PM    Assessment & Plan:   Mr. Meredith is a 64-year-old male patient with history including hypertension; diabetes mellitus type 2; CHF (HFpEF); atrial arrhythmias with a history of multiple cardioversions and ablations; ventricular tachycardia status post ICD; and osteoarthritis; who recently underwent atrial flutter ablation (05/14/2020); who presents with dyspnea and found to be hypoxic with acute respiratory failure with imaging and other clinical findings suggestive of CHF.      Acute congestive heart failure exacerbation (HFpEF), suspect related to volume overload related to cardiac procedure (05/14/2020).   Troponin elevation, suspect demand ischemia (type 2 non-ST elevation myocardial infarction) related to recent cardiac procedure.  Hypertension (benign essential).  * The patient had similar volume overload/CHF after prior EP procedures.    * On initial evaluation, the patient was afebrile, but tachycardic and hypoxic in the high 80s on room air.  EKG did not show any acute ischemic changes.  Laboratory evaluation revealed elevated white count of 14.8.  N-terminal proBNP of 1423 and troponin 0.525.   He had CXR that showed findings suggestive of interstitial edema.    -the patient was given a dose of 60 mg IV Lasix. Good diuretic response.   -weaned off O2   -Await limited echocardiogram.  -Tn flat ~0.5  -Continue prior to admission eplerenone and metoprolol XL.    -Repeat CXR- rounded opacity right lower lung seen on prior exam has likely resolved, still has interstitial changes  -Lasix 40 mg IV X1 now   -Monitor I's and O's and daily weights.    -Await Cardiology eval.      COVID-19 evaluation, low risk.   Of note, the patient was asymptomatically screened for COVID-19 on 05/12, which was negative.  He is being checked again, given the above presentation, but I feel this is low risk as  "there is more likely an alternative explanation.    -negative, discontinue respiratory precautions      Atrial arrhythmias.   * The patient has history of atrial fibrillation, atrial flutter as well as atrial tachycardia.  History of multiple failed DC cardioversions and history of ablations in the past.  Underwent left atrial flutter ablation on 05/14/2020.   * Appears to be in sinus rhythm at this time.  -continue prior to admission dabigatran, dofetilide and metoprolol.      Diabetes mellitus type 2.    Hemoglobin A1c 6.7 in 12/2019.   -Hold prior to admission metformin for now.   -sliding scale correction scale.   -Low-carbohydrate diet.      Hyperlipidemia.   - Continue atorvastatin.        Diet: Low Consistent CHO Diet     DVT Prophylaxis: pradaxa   White Catheter: not present  Code Status: Full Code     Disposition Plan    Expected discharge: Later today or in AM pending cardiology eval  Entered: Jarett Chun MD 05/16/2020, 12:06 PM        The patient's care was discussed with the Bedside Nurse and Patient.    Jarett Chun MD  Hospitalist Service  [facility]    ______________________________________________________________________    Interval History   Dyspnea much better. Off O2. Afebrile, COVID-negative    Data reviewed today: I reviewed all medications, new labs and imaging results over the last 24 hours. I personally reviewed no images or EKG's today.    Physical Exam   Vital signs:  Temp: 99  F (37.2  C) Temp src: Oral BP: 127/68 Pulse: 96 Heart Rate: 80 Resp: 16 SpO2: 95 % O2 Device: None (Room air) Oxygen Delivery: 2 LPM   Weight: 98.1 kg (216 lb 4.8 oz)  Estimated body mass index is 30.17 kg/m  as calculated from the following:    Height as of 5/14/20: 1.803 m (5' 11\").    Weight as of this encounter: 98.1 kg (216 lb 4.8 oz).      Wt Readings from Last 2 Encounters:   05/16/20 98.1 kg (216 lb 4.8 oz)   05/14/20 97.8 kg (215 lb 9.8 oz)       Gen: AAOX3, NAD  Resp: MARIA VICTORIA B/L, " normal WOB, no crackles, no wheezes  CVS: RRR, no murmur  Abd/GI: Soft, non-tender. BS- normoactive.    Skin: Warm, dry no rashes  MSK:  no pedal edema  Neuro- CN- intact. No focal deficits.        Data   Recent Labs   Lab 05/16/20  0300 05/15/20  2156 05/14/20  0801   WBC  --  14.8* 6.5   HGB  --  14.2 15.3   MCV  --  92 91   PLT  --  300 254   NA  --  137 140   POTASSIUM  --  4.2 4.2   CHLORIDE  --  106 109   CO2  --  26 24   BUN  --  32* 21   CR  --  1.12 1.00   ANIONGAP  --  5 7   NEENA  --  8.9 9.0   GLC  --  169* 125*   ALBUMIN  --  3.7  --    PROTTOTAL  --  7.2  --    BILITOTAL  --  0.7  --    ALKPHOS  --  76  --    ALT  --  39  --    AST  --  42  --    TROPI 0.419* 0.525*  --        Recent Results (from the past 24 hour(s))   XR Chest Port 1 View    Narrative    EXAM: XR CHEST PORT 1 VW  LOCATION: [facility]  DATE/TIME: 5/15/2020 9:43 PM    INDICATION: Dyspnea. Short of breath.  COMPARISON: None.      Impression    IMPRESSION: Heart is enlarged. Interstitial prominence suspicious for interstitial edema. Left sided permanent transvenous pacemaker with cardiac electrodes overlying right atrium and right ventricle. No pneumothorax. The right lower lung demonstrates a   3.3 x 2.1 cm masslike opacity which could be due to alveolar edema, infiltrate or nodular mass. Recommend follow-up after medical therapy. Hyperinflation. COPD. No displaced fracture demonstrated. Monitor electrodes.   XR Chest 2 Views    Narrative    CHEST TWO VIEWS   5/16/2020 9:54 AM     HISTORY: Follow up alveolar edema vs ?mass.    COMPARISON: Chest x-ray 5/15/2020.      Impression    IMPRESSION: Two views of the chest are performed. The rounded opacity  right lower lung seen on prior exam has likely resolved and is not  appreciated on the current images. Lungs are otherwise clear with mild  persistent interstitial changes. Heart is normal in size. No  pneumothorax. Trace pleural effusions are noted on the lateral  view.  Implantable cardiac device and both leads are unchanged in position.    ANURAG OLIVA MD     Medications     - MEDICATION INSTRUCTIONS -       - MEDICATION INSTRUCTIONS -         albuterol  2 puff Inhalation Q6H     [START ON 5/17/2020] aspirin  81 mg Oral Daily     atorvastatin  20 mg Oral Daily     dabigatran ANTICOAGULANT  150 mg Oral BID     dofetilide  250 mcg Oral Q12H     eplerenone  50 mg Oral Daily     gabapentin  600 mg Oral BID     insulin aspart  1-3 Units Subcutaneous TID AC     insulin aspart  1-3 Units Subcutaneous At Bedtime     metoprolol succinate ER  50 mg Oral Daily     sodium chloride (PF)  3 mL Intracatheter Q8H

## 2020-05-16 NOTE — ED NOTES
Attempted ICD interrogation. Interrogator was not picking up the ICD so the reading did not work. MD informed.

## 2020-05-16 NOTE — ED TRIAGE NOTES
SOB and cough, denies CP or known fever. Heart ablasion yesterday. Similar symptoms after ablation in past.

## 2020-05-16 NOTE — PROVIDER NOTIFICATION
"MD Notification    Notified Person: MD    Notified Person Name: Giovanni Billingsley     Notification Date/Time:  5/16/20 at 0220    Notification Interaction: AMCOM    Purpose of Notification: \"pt takes ambien at night. can you order from home meds?\"     Orders Received: prn ambien ordered per provider     Comments:      "

## 2020-05-16 NOTE — PLAN OF CARE
COVID negative. Pt A&O, VSS. On RA. Denies pain or SOB at this time. LS diminished, but clear. S/p ablation 5/14, bilat groin sites, ecchymotic, CDI. CMS intact. Tele SR 1st deg AVB occ PVCs. Trop 0.525 and 0.419. BG this am 149, needs coverage, awaiting insulin pen. IV SL. I&O's. Received lasix in ED. Having cxray completed, will transfer to heart center after testing. Report given to oncoming nurse.

## 2020-05-16 NOTE — ED NOTES
[facility]  ED Nurse Handoff Report    ED Chief complaint: Shortness of Breath (SOB and cough, denies CP or known fever. Heart ablasion yesterday. Similar symptoms after ablation in past. )      ED Diagnosis:   Final diagnoses:   Acute congestive heart failure, unspecified heart failure type (H)   Acute respiratory failure with hypoxia (H)   Fever and chills       Code Status: see admitting MD    Allergies: No Known Allergies    Patient Story: 64 year old male presents to the ED by private vehicle with SOB and cough.   Focused Assessment:  Pt had a heart ablation performed yesterday and states that the SOB and cough are the exact reaction he had the last time he had a heart ablation. Pt sating O2 at 86% on room air.     Treatments and/or interventions provided: lasix 60 mg, zosyn 4.5 g, labs, chest XR, EKG, on 4L O2 via NC  Patient's response to treatments and/or interventions: good, pt O2 sat is 95% on 4L O2 via NC    To be done/followed up on inpatient unit:  nothing noted at this time    Does this patient have any cognitive concerns?: none    Activity level - Baseline/Home:  Independent  Activity Level - Current:   Independent    Patient's Preferred language: English   Needed?: No    Isolation: None and Other: rule out COVID-19  Infection: Not Applicable  Rule out COVID-19  Bariatric?: No    Vital Signs:   Vitals:    05/15/20 2132 05/15/20 2334 05/15/20 2335   BP: (!) 158/96 133/71    Pulse: 103 89    Resp: 20  21   Temp: 99.1  F (37.3  C)     TempSrc: Oral     SpO2: (!) 88%  99%   Weight: 98.9 kg (218 lb)         Cardiac Rhythm:     Was the PSS-3 completed:   Yes  What interventions are required if any?               Family Comments: none  OBS brochure/video discussed/provided to patient/family: No              Name of person given brochure if not patient: na              Relationship to patient: na    For the majority of the shift this patient's behavior was Green.   Behavioral  interventions performed were none.    ED NURSE PHONE NUMBER: [id number]

## 2020-05-16 NOTE — H&P
Admitted:     05/15/2020      CHIEF COMPLAINT:  Dyspnea.      HISTORY OF PRESENT ILLNESS:  Mr. Meredith is a 64-year-old male patient with history noted below, including hypertension, diabetes mellitus type 2, CHF, atrial arrhythmias, ventricular tachycardia, status post ICD and osteoarthritis who presents with the above symptoms.  Of note is that the patient has history of atrial tachyarrhythmias and has had multiple cardioversions and ablations for atrial fibrillation and flutter as well as a history of atrial tachycardia.  Yesterday, at the [facility], on 05/14, he did undergo atrial flutter ablation.  He did require intubation for the procedure and did have COVID-19 screening on 05/12, which was negative.      In the above context, the patient today started developing more shortness of breath.  He had a cough.  Apparently, he had a low-grade temperature, according to his wife who measured it and the patient does not know what the value was then.  Of note is that he has a history of volume overload after previous EP procedures and this felt similar (including cough).  He was very dyspneic and as such, he came into [facility] for further evaluation.      The patient was seen in the ER by Dr. Puri.  He had vital signs checked that showed temperature 99.1 degrees, heart rate 100, blood pressure 150/96, respiratory rate 20, O2 saturations 88% on room air.  He had laboratory evaluation that was notable for N-terminal proBNP was elevated at 1423 and troponin 0.525.  CBC showed white count of 14.8.  He had an EKG that showed sinus tachycardia without acute ischemic changes.  X-ray was done and showed interstitial prominence, suspicious for interstitial edema, also right lower lobe demonstrated a 3.3x2.1 cm mass-like opacity, which could be due to alveolar edema, infiltrate or nodule or mass and recommended followup after medical therapy.  Also, overall given clinical findings, he has been ordered a dose of  Lasix.  He is being screened again for COVID-19.  Given his issues, request for admission was made.      Currently, the patient does feel much better simply after being placed on oxygen.  Denies any chest pain.  No abdominal pain or bloody stools, nausea or vomiting.  No diarrhea.      PAST MEDICAL HISTORY:   1.  Hypertension.   2.  Diabetes mellitus type 2.  Hemoglobin A1c 6.7 in 12/2019.   3.  Congestive heart failure.  History of left ventricular EF in the 20s in 2013.  Most recent echocardiogram in 05/2020 showed left ventricular EF 40-45% and RV function was okay.   4.  Atrial arrhythmias.  History of atrial fibrillation, atrial flutter as well as atrial tachycardia.  He has had history of multiple DC cardioversions and has a history of ablations in the past.  Most recently, he underwent left atrial flutter ablation on 05/14/2020 at [facility].     5.  Ventricular tachycardia.  Status post permanent pacemaker ICD in 2012.   6.  Hyperlipidemia.    7.  Osteoarthritis.    8.  Coronary artery disease, without history of intervention and therefore, I suspect nonobstructive disease.     Past Medical History:   Diagnosis Date     Atrial fibrillation (H) 12/9/11    failed medication, multiple DC cardioveresions; s/p Left atrial ablation to eliminate atrial fibrillation 12/9/11     Chest pain      CHF (congestive heart failure) (H) 7/30/2016     Coronary artery disease      DJD (degenerative joint disease)      Fatigue 7/15/2019     Hip arthritis 1/15/2014     Hypertension      Hypertrophic cardiomyopathy (H) 10/09     Other abnormal heart sounds      Pacemaker     ICD     Palpitations      Pneumonia, organism unspecified(486)      Prediabetes 7/10/15    A1C 6.5     Status post implantation of automatic cardioverter/defibrillator (AICD)      Ventricular tachycardia (H)           PAST SURGICAL HISTORY:  Past Surgical History:   Procedure Laterality Date     ANESTHESIA CARDIOVERSION  4/24/2014     Procedure: ANESTHESIA CARDIOVERSION;  Surgeon: Generic Anesthesia Provider;  Location: [facility] OR     ANESTHESIA CARDIOVERSION N/A 5/12/2016    Procedure: ANESTHESIA CARDIOVERSION;  Surgeon: GENERIC ANESTHESIA PROVIDER;  Location: [facility] OR     ANESTHESIA CARDIOVERSION N/A 8/7/2017    Procedure: ANESTHESIA CARDIOVERSION;  Anesthesia Offsite Coverage Cardioversion @1100;  Surgeon: GENERIC ANESTHESIA PROVIDER;  Location: [facility] OR     ANESTHESIA CARDIOVERSION N/A 1/3/2018    Procedure: ANESTHESIA CARDIOVERSION;  Anesthesia Cardioverion;  Surgeon: GENERIC ANESTHESIA PROVIDER;  Location: [facility] OR     ANESTHESIA CARDIOVERSION N/A 5/4/2018    Procedure: ANESTHESIA CARDIOVERSION;  Anesthesia Coverage Cardioversion @1400;  Surgeon: GENERIC ANESTHESIA PROVIDER;  Location: [facility] OR     ANESTHESIA CARDIOVERSION N/A 9/27/2018    Procedure: ANESTHESIA CARDIOVERSION;  Anesthesia Cardioversion @0930;  Surgeon: GENERIC ANESTHESIA PROVIDER;  Location: [facility] OR     ANESTHESIA CARDIOVERSION N/A 12/20/2018    Procedure: Anesthesia Coverage Cardioversion @0830;  Surgeon: GENERIC ANESTHESIA PROVIDER;  Location: [facility] OR     ANESTHESIA CARDIOVERSION N/A 8/21/2019    Procedure: Anesthesia Coverage Cardioversion @0800;  Surgeon: GENERIC ANESTHESIA PROVIDER;  Location: [facility] OR     ANESTHESIA CARDIOVERSION N/A 1/16/2020    Procedure: ANESTHESIA, FOR CARDIOVERSION;  Surgeon: GENERIC ANESTHESIA PROVIDER;  Location: [facility] OR     ARTHROPLASTY HIP  1/15/2014    Procedure: ARTHROPLASTY HIP;  Left Total Hip Arthroplasty;  Surgeon: Nelson Gaspar MD;  Location: [facility] OR     ARTHROPLASTY HIP Right 6/5/2019    Procedure: Right Total Hip Arthroplasty;  Surgeon: Nelson Gaspar MD;  Location: [facility] OR     CARDIAC SURGERY       COLONOSCOPY N/A 5/18/2018    Procedure: COMBINED COLONOSCOPY, SINGLE OR MULTIPLE BIOPSY/POLYPECTOMY BY BIOPSY;  COLONOSCOPY (PT HAS DEFIBRILLATOR) ;  Surgeon: Mike Nickerson MD;  Location:  GI     CV RIGHT HEART CATH N/A 8/19/2019    Procedure: CV RIGHT  HEART CATH;  Surgeon: Cisco Rausch MD;  Location:  HEART CARDIAC CATH LAB     CV RIGHT HEART CATH N/A 8/21/2019    Procedure: Right Heart Cath;  Surgeon: Ciaran Burton MD;  Location:  HEART CARDIAC CATH LAB     H ABLATION FOCAL AFIB  12/9/11    Left atrial ablation to eliminate atrial fibrillation     IMPLANT AUTOMATIC IMPLANTABLE CARDIOVERTER DEFIBRILLATOR  7/27/12    AICD implantation     TONSILLECTOMY  1964          ALLERGIES:  NO KNOWN DRUG ALLERGIES.      HOME MEDICATIONS:   Prior to Admission Medications   Prescriptions Last Dose Informant Patient Reported? Taking?   XARELTO ANTICOAGULANT 20 MG TABS tablet   No No   Sig: Take 1 tablet (20 mg) by mouth daily (with dinner)   acetaminophen (TYLENOL) 325 MG tablet  Self Yes No   Sig: Take 325-650 mg by mouth every 6 hours as needed   albuterol (PROAIR HFA/PROVENTIL HFA/VENTOLIN HFA) 108 (90 Base) MCG/ACT inhaler   No No   Sig: Inhale 2 puffs into the lungs every 6 hours   amoxicillin (AMOXIL) 500 MG tablet  Self No No   Sig: Take 4 tablets (2000 mg) by mouth 1 hour before dental procedures.   atorvastatin (LIPITOR) 20 MG tablet   No No   Sig: Take 1 tablet (20 mg) by mouth daily   cyanocobalamin (VITAMIN B-12) 100 MCG tablet  Self Yes No   Sig: Take 100 mcg by mouth daily   dabigatran ANTICOAGULANT (PRADAXA ANTICOAGULANT) 150 MG capsule   No No   Sig: Take 1 capsule (150 mg) by mouth 2 times daily Store in original 's bottle or blister pack; use within 120 days of opening.   dofetilide (TIKOSYN) 250 MCG capsule   No No   Sig: Take 1 capsule (250 mcg) by mouth every 12 hours   doxycycline hyclate (VIBRAMYCIN) 100 MG capsule   No No   Sig: Take 1 capsule (100 mg) by mouth 2 times daily for 10 days   eplerenone (INSPRA) 50 MG tablet   No No   Sig: Take 1 tablet (50 mg) by mouth daily   furosemide (LASIX) 40 MG tablet   No No   Sig: Take 1 tablet (40 mg) by mouth daily   gabapentin (NEURONTIN) 300 MG capsule   No No   Sig: Take 2 capsules (600  mg) by mouth 2 times daily   guaiFENesin-codeine (ROBITUSSIN AC) 100-10 MG/5ML solution   No No   Sig: Take 5-10 mLs by mouth every 4 hours as needed   guaiFENesin-codeine (ROBITUSSIN AC) 100-10 MG/5ML solution   No No   Sig: Take 5-10 mLs by mouth every 4 hours as needed for cough   metFORMIN (GLUCOPHAGE-XR) 500 MG 24 hr tablet   No No   Sig: Take 2 tablets (1,000 mg) by mouth daily (with dinner) Take 2 tablets (1,000 mg) daily (with dinner).   metoprolol succinate ER (TOPROL-XL) 50 MG 24 hr tablet   No No   Sig: TAKE ONE TABLET BY MOUTH EVERY DAY   multivitamin, therapeutic (THERA-VIT) TABS  Self Yes No   Sig: Take 1 tablet by mouth daily   zolpidem (AMBIEN) 10 MG tablet   No No   Sig: Take 0.5 tablets (5 mg) by mouth nightly as needed for sleep Maximum number of tablets #30 in 90 days.      Facility-Administered Medications Last Administration Doses Remaining   lidocaine 1% with EPINEPHrine 1:100,000 injection 3 mL None recorded 1             SOCIAL HISTORY:  The patient is .  He does not have any children.  He currently works for a [employer] and formerly was an .  He smoked 1 pack per day for 40 years but quit in 2017.  He drinks alcohol about once a week.      FAMILY HISTORY:  Reviewed and noncontributory.      REVIEW OF SYSTEMS:  As in HPI, otherwise 10 points negative.      PHYSICAL EXAMINATION:   VITAL SIGNS:  Temperature 98.1 degrees, heart rate 103, blood pressure 158/96, respiratory rate 20, O2 saturation 98% on room air.   GENERAL:  This is a pleasant 64-year-old male patient sitting in bed.  Conversant and friendly.   HEENT:  Pupils equal, round, reactive.  No scleral icterus or conjunctival injection.  Oropharynx reveals no gross erythema.   NECK:  No bruits, JVD or adenopathy.   HEART:  Tachycardic and regular, without murmurs, rubs, gallops.   LUNGS:  Diminished at the bases, no crackles, few expiratory wheezes.   ABDOMEN:  Soft, nontender, nondistended.  Bowel sounds,  no femoral bruits.   EXTREMITIES:  No significant pitting edema.   NEUROLOGIC:  Reveals no gross focal motor or sensory deficits.      LABORATORY DATA AND IMAGING:  Results for orders placed or performed during the hospital encounter of 05/15/20   XR Chest Port 1 View     Status: None    Narrative    EXAM: XR CHEST PORT 1 VW  LOCATION: [facility]  DATE/TIME: 5/15/2020 9:43 PM    INDICATION: Dyspnea. Short of breath.  COMPARISON: None.      Impression    IMPRESSION: Heart is enlarged. Interstitial prominence suspicious for interstitial edema. Left sided permanent transvenous pacemaker with cardiac electrodes overlying right atrium and right ventricle. No pneumothorax. The right lower lung demonstrates a   3.3 x 2.1 cm masslike opacity which could be due to alveolar edema, infiltrate or nodular mass. Recommend follow-up after medical therapy. Hyperinflation. COPD. No displaced fracture demonstrated. Monitor electrodes.   CBC with platelets differential     Status: Abnormal   Result Value Ref Range    WBC 14.8 (H) 4.0 - 11.0 10e9/L    RBC Count 4.64 4.4 - 5.9 10e12/L    Hemoglobin 14.2 13.3 - 17.7 g/dL    Hematocrit 42.7 40.0 - 53.0 %    MCV 92 78 - 100 fl    MCH 30.6 26.5 - 33.0 pg    MCHC 33.3 31.5 - 36.5 g/dL    RDW 14.0 10.0 - 15.0 %    Platelet Count 300 150 - 450 10e9/L    Diff Method Automated Method     % Neutrophils 79.0 %    % Lymphocytes 15.7 %    % Monocytes 4.8 %    % Eosinophils 0.1 %    % Basophils 0.1 %    % Immature Granulocytes 0.3 %    Nucleated RBCs 0 0 /100    Absolute Neutrophil 11.7 (H) 1.6 - 8.3 10e9/L    Absolute Lymphocytes 2.3 0.8 - 5.3 10e9/L    Absolute Monocytes 0.7 0.0 - 1.3 10e9/L    Absolute Eosinophils 0.0 0.0 - 0.7 10e9/L    Absolute Basophils 0.0 0.0 - 0.2 10e9/L    Abs Immature Granulocytes 0.1 0 - 0.4 10e9/L    Absolute Nucleated RBC 0.0    Comprehensive metabolic panel     Status: Abnormal   Result Value Ref Range    Sodium 137 133 - 144 mmol/L    Potassium 4.2 3.4 -  5.3 mmol/L    Chloride 106 94 - 109 mmol/L    Carbon Dioxide 26 20 - 32 mmol/L    Anion Gap 5 3 - 14 mmol/L    Glucose 169 (H) 70 - 99 mg/dL    Urea Nitrogen 32 (H) 7 - 30 mg/dL    Creatinine 1.12 0.66 - 1.25 mg/dL    GFR Estimate 68 >60 mL/min/[1.73_m2]    GFR Estimate If Black 79 >60 mL/min/[1.73_m2]    Calcium 8.9 8.5 - 10.1 mg/dL    Bilirubin Total 0.7 0.2 - 1.3 mg/dL    Albumin 3.7 3.4 - 5.0 g/dL    Protein Total 7.2 6.8 - 8.8 g/dL    Alkaline Phosphatase 76 40 - 150 U/L    ALT 39 0 - 70 U/L    AST 42 0 - 45 U/L   Magnesium     Status: None   Result Value Ref Range    Magnesium 2.3 1.6 - 2.3 mg/dL   Blood gas venous     Status: Abnormal   Result Value Ref Range    Ph Venous 7.40 7.32 - 7.43 pH    PCO2 Venous 41 40 - 50 mm Hg    PO2 Venous 67 (H) 25 - 47 mm Hg    Bicarbonate Venous 25 21 - 28 mmol/L    Base Excess Venous 0.1 mmol/L   Troponin I     Status: Abnormal   Result Value Ref Range    Troponin I ES 0.525 (HH) 0.000 - 0.045 ug/L   Nt probnp inpatient     Status: Abnormal   Result Value Ref Range    N-Terminal Pro BNP Inpatient 1,423 (H) 0 - 900 pg/mL   Lactic acid whole blood     Status: Abnormal   Result Value Ref Range    Lactic Acid 2.4 (H) 0.7 - 2.0 mmol/L       Recent Results (from the past 24 hour(s))   XR Chest Port 1 View    Narrative    EXAM: XR CHEST PORT 1 VW  LOCATION: [facility]  DATE/TIME: 5/15/2020 9:43 PM    INDICATION: Dyspnea. Short of breath.  COMPARISON: None.      Impression    IMPRESSION: Heart is enlarged. Interstitial prominence suspicious for interstitial edema. Left sided permanent transvenous pacemaker with cardiac electrodes overlying right atrium and right ventricle. No pneumothorax. The right lower lung demonstrates a   3.3 x 2.1 cm masslike opacity which could be due to alveolar edema, infiltrate or nodular mass. Recommend follow-up after medical therapy. Hyperinflation. COPD. No displaced fracture demonstrated. Monitor electrodes.         EKG, which I  personally reviewed, showed sinus tachycardia without acute ischemic changes.      ASSESSMENT AND PLAN:    Mr. Meredith is a 64-year-old male patient with history including hypertension; diabetes mellitus type 2; CHF (HFpEF); atrial arrhythmias with a history of multiple cardioversions and ablations; ventricular tachycardia status post ICD; and osteoarthritis; who recently underwent atrial flutter ablation (05/14/2020); who presents with dyspnea and found to be hypoxic with acute respiratory failure with imaging and other clinical findings suggestive of CHF.     Acute congestive heart failure exacerbation (HFpEF), suspect related to volume overload related to cardiac procedure (05/14/2020).   Troponin elevation, suspect demand ischemia (type 2 non-ST elevation myocardial infarction) related to recent cardiac procedure.  Hypertension (benign essential).  * The patient had similar volume overload/CHF after prior EP procedures.    * On initial evaluation, the patient was afebrile, but tachycardic and hypoxic in the high 80s on room air.  EKG did not show any acute ischemic changes.  Laboratory evaluation revealed elevated white count of 14.8.  N-terminal proBNP of 1423 and troponin 0.525.   He had CXR that showed findings suggestive of interstitial edema.    - At this time, we will diurese; the patient will be given a dose of 60 mg IV Lasix.    - We will monitor response and consider another dose of Lasix in the morning.   - Continue oxygen, wean off as able.   - We will check limited echocardiogram.  - Follow troponins.  - Continue prior to admission eplerenone and metoprolol XL.    - Hold prior to admission oral Lasix.   - Monitor I's and O's and daily weights.    - We will ask Cardiology to see the patient and I appreciate their help.      COVID-19 evaluation, low risk.   Of note, the patient was asymptomatically screened for COVID-19 on 05/12, which was negative.  He is being checked again, given the above  presentation, but I feel this is low risk as there is more likely an alternative explanation.    - At this time, we will all wear a mask and will be on contact and droplet precautions.   - If test is negative, then isolation can be discontinued and he can transfer to [facility].     Atrial arrhythmias.   * The patient has history of atrial fibrillation, atrial flutter as well as atrial tachycardia.  History of multiple failed DC cardioversions and history of ablations in the past.  Underwent left atrial flutter ablation on 2020.   * Appears to be in sinus rhythm at this time.  - At this time, we will continue prior to admission dabigatran, dofetilide and metoprolol.     Diabetes mellitus type 2.    Hemoglobin A1c 6.7 in 2019.   - Hold prior to admission metformin for now.   - I will order insulin correction scale.   - Low-carbohydrate diet.     Hyperlipidemia.   - Continue atorvastatin.     Prophylaxis.  - Pneumoboots and ambulation.   - The patient is on dabigatran.      CODE STATUS:  Full code.         ZACHARY RIZZO JR., MD             D: 2020   T: 2020   MT: KB      Name:     MANUEL WASHINGTON   MRN:      [id number]        Account:      [id number]   :      1955        Admitted:     05/15/2020                   Document: [id number]       cc: CIERRA BARRAGAN MD

## 2020-05-16 NOTE — H&P
INTERNAL MEDICINE H&P    H&P dictated:  #[id number]    Giovanni Billingsley Jr., MD  [phone] (p)

## 2020-05-16 NOTE — DISCHARGE SUMMARY
[facility]    Discharge Summary  Hospitalist    Date of Admission:  5/15/2020  Date of Discharge:  5/16/2020  Discharging Provider: Jarett Chun MD    Discharge Diagnoses     Acute congestive heart failure exacerbation (HFpEF), suspect related to volume overload related to cardiac procedure (05/14/2020).   Troponin elevation, suspect demand ischemia (type 2 non-ST elevation myocardial infarction) related to recent cardiac procedure.  Hypertension (benign essential).  Hx of Atrial arrhythmias including A-fib and A-flutter.   Diabetes mellitus type 2.    Hyperlipidemia.     Hospital Course:    Mr. Meredith is a 64-year-old male patient with history including hypertension; diabetes mellitus type 2; CHF (HFpEF); atrial arrhythmias with a history of multiple cardioversions and ablations; ventricular tachycardia status post ICD; and osteoarthritis; who recently underwent atrial flutter ablation (05/14/2020); who presents with dyspnea and found to be hypoxic with acute respiratory failure with imaging and other clinical findings suggestive of CHF.      Acute congestive heart failure exacerbation (HFpEF), suspect related to volume overload related to cardiac procedure (05/14/2020).   Troponin elevation, suspect demand ischemia (type 2 non-ST elevation myocardial infarction) related to recent cardiac procedure.  Hypertension (benign essential).  * The patient had similar volume overload/CHF after prior EP procedures.    * On initial evaluation, the patient was afebrile, but tachycardic and hypoxic in the high 80s on room air.  EKG did not show any acute ischemic changes.  Laboratory evaluation revealed elevated white count of 14.8.  N-terminal proBNP of 1423 and troponin 0.525.   He had CXR that showed findings suggestive of interstitial edema.    -the patient was given a dose of 60 mg IV Lasix. Good diuretic response.   -weaned off O2   -limited echocardiogram showed mildly reduced EF @45-50%  -Tn flat  ~0.5  -Repeat CXR- rounded opacity right lower lung seen on prior exam has likely resolved  -Continue PTA lasix  -evaluated by Cardiology- no further w/u recommended  -Doing much better. OK for discharge.  -Continue PTA lasix @ 40mg daily      COVID-19 evaluation, low risk.   Of note, the patient was asymptomatically screened for COVID-19 on 05/12, which was negative.  He is being checked again, given the above presentation, but I feel this is low risk as there is more likely an alternative explanation.    -negative     Atrial arrhythmias.   * The patient has history of atrial fibrillation, atrial flutter as well as atrial tachycardia.  History of multiple failed DC cardioversions and history of ablations in the past.  Underwent left atrial flutter ablation on 05/14/2020.   * Appears to be in sinus rhythm at this time.  -continue prior to admission dabigatran, dofetilide and metoprolol.      Diabetes mellitus type 2.    Hemoglobin A1c 6.7 in 12/2019.   -continue PTA regimen     Hyperlipidemia.   -Continue atorvastatin.           Jarett Chun MD    Significant Results and Procedures   See below    Pending Results     Unresulted Labs Ordered in the Past 30 Days of this Admission     Date and Time Order Name Status Description    5/15/2020 2237 Blood culture Preliminary     5/15/2020 2237 Blood culture Preliminary           Code Status   Full Code       Primary Care Physician   Sathya Garcia    Physical Exam   Temp: 99  F (37.2  C) Temp src: Oral BP: 127/68 Pulse: 96 Heart Rate: 80 Resp: 16 SpO2: 95 % O2 Device: None (Room air) Oxygen Delivery: 2 LPM    Constitutional: AAOX3, NAD  Respiratory: CTA B/L, Normal WOB  Cardiovascular: RRR, No murmur  GI: Soft, Non- tender, BS- normoactive  Neuro: CN- grossly intact     Discharge Disposition   Discharged to home  Condition at discharge: Stable    Consultations This Hospital Stay   CARDIOLOGY IP CONSULT  SMOKING CESSATION PROGRAM IP CONSULT    Time Spent on this  Encounter   I, Jarett Chun MD, personally saw the patient today and spent less than or equal to 30 minutes discharging this patient.    Discharge Orders   No discharge procedures on file.  Discharge Medications   Current Discharge Medication List      CONTINUE these medications which have NOT CHANGED    Details   atorvastatin (LIPITOR) 20 MG tablet Take 1 tablet (20 mg) by mouth daily  Qty: 90 tablet, Refills: 3    Associated Diagnoses: CARDIOVASCULAR SCREENING; LDL GOAL LESS THAN 130      cyanocobalamin (VITAMIN B-12) 100 MCG tablet Take 100 mcg by mouth daily      dabigatran ANTICOAGULANT (PRADAXA ANTICOAGULANT) 150 MG capsule Take 1 capsule (150 mg) by mouth 2 times daily Store in original 's bottle or blister pack; use within 120 days of opening.  Qty: 60 capsule, Refills: 0    Associated Diagnoses: Hypertrophic cardiomegaly; Ventricular tachycardia (H); PAF (paroxysmal atrial fibrillation) (H); Atrial fibrillation, unspecified type (H); Atrial tachycardia (H); SOB (shortness of breath); Paroxysmal atrial fibrillation (H)      dofetilide (TIKOSYN) 250 MCG capsule Take 1 capsule (250 mcg) by mouth every 12 hours  Qty: 180 capsule, Refills: 3    Associated Diagnoses: Persistent atrial fibrillation; Hypertrophic cardiomyopathy (H)      eplerenone (INSPRA) 50 MG tablet Take 1 tablet (50 mg) by mouth daily  Qty: 90 tablet, Refills: 3    Comments: Patient has failed spironolactone due to gynecomastia and breast tenderness.  Associated Diagnoses: Hypertrophic cardiomyopathy (H)      furosemide (LASIX) 40 MG tablet Take 1 tablet (40 mg) by mouth daily  Qty: 90 tablet, Refills: 2    Associated Diagnoses: Hypertrophic cardiomyopathy (H)      gabapentin (NEURONTIN) 300 MG capsule Take 2 capsules (600 mg) by mouth 2 times daily  Qty: 360 capsule, Refills: 3    Associated Diagnoses: Neuropathy      metFORMIN (GLUCOPHAGE-XR) 500 MG 24 hr tablet Take 2 tablets (1,000 mg) by mouth daily (with dinner) Take 2  tablets (1,000 mg) daily (with dinner).  Qty: 180 tablet, Refills: 3    Associated Diagnoses: Type 2 diabetes mellitus without complication, without long-term current use of insulin (H)      metoprolol succinate ER (TOPROL-XL) 50 MG 24 hr tablet TAKE ONE TABLET BY MOUTH EVERY DAY  Qty: 90 tablet, Refills: 3    Associated Diagnoses: HOCM (hypertrophic obstructive cardiomyopathy) (H)      multivitamin, therapeutic (THERA-VIT) TABS Take 1 tablet by mouth daily      XARELTO ANTICOAGULANT 20 MG TABS tablet Take 1 tablet (20 mg) by mouth daily (with dinner)  Qty: 90 tablet, Refills: 3    Associated Diagnoses: Chronic atrial fibrillation      zolpidem (AMBIEN) 10 MG tablet Take 0.5 tablets (5 mg) by mouth nightly as needed for sleep Maximum number of tablets #30 in 90 days.  Qty: 30 tablet, Refills: 0    Associated Diagnoses: Chronic insomnia      acetaminophen (TYLENOL) 325 MG tablet Take 325-650 mg by mouth every 6 hours as needed      albuterol (PROAIR HFA/PROVENTIL HFA/VENTOLIN HFA) 108 (90 Base) MCG/ACT inhaler Inhale 2 puffs into the lungs every 6 hours  Qty: 18 g, Refills: 0    Comments: Pharmacy may dispense brand covered by insurance (Proair, or proventil or ventolin or generic albuterol inhaler)  Associated Diagnoses: Bronchitis      amoxicillin (AMOXIL) 500 MG tablet Take 4 tablets (2000 mg) by mouth 1 hour before dental procedures.  Qty: 12 tablet, Refills: 3    Associated Diagnoses: History of total left hip arthroplasty           Allergies   No Known Allergies  Data   Most Recent 3 CBC's:  Recent Labs   Lab Test 05/15/20  2156 05/14/20  0801 12/17/19  1300   WBC 14.8* 6.5 7.5   HGB 14.2 15.3 14.6   MCV 92 91 92    254 270      Most Recent 3 BMP's:  Recent Labs   Lab Test 05/15/20  2156 05/14/20  0801 01/16/20  1239 12/17/19  1300    140  --  140   POTASSIUM 4.2 4.2 4.1 4.2   CHLORIDE 106 109  --  106   CO2 26 24  --  30   BUN 32* 21  --  18   CR 1.12 1.00  --  1.03   ANIONGAP 5 7  --  5   NEENA 8.9  9.0  --  9.4   * 125*  --  104*     Most Recent 2 LFT's:  Recent Labs   Lab Test 05/15/20  2156 12/17/19  1300   AST 42 19   ALT 39 30   ALKPHOS 76 94   BILITOTAL 0.7 0.7     Most Recent INR's and Anticoagulation Dosing History:  Anticoagulation Dose History     Recent Dosing and Labs Latest Ref Rng & Units 11/6/2013 7/28/2016 8/16/2018 9/27/2018 12/20/2018 8/21/2019 8/22/2019    INR 0.86 - 1.14 1.25(H) 1.67(H) 1.53(H) 1.79(H) 1.24(H) 1.16(H) 1.42(H)    INR 0.86 - 1.14 - - - - - - -        Most Recent 3 Troponin's:  Recent Labs   Lab Test 05/16/20  0300 05/15/20  2156 07/31/16  0655   TROPI 0.419* 0.525* 0.247*     Most Recent Cholesterol Panel:  Recent Labs   Lab Test 05/10/19  1350   CHOL 142   LDL 68   HDL 54   TRIG 102     Most Recent 6 Bacteria Isolates From Any Culture (See EPIC Reports for Culture Details):  Recent Labs   Lab Test 05/15/20  2331 05/15/20  2251   CULT No growth after 1 hour No growth after 4 hours     Most Recent TSH, T4 and A1c Labs:  Recent Labs   Lab Test 12/17/19  1300  02/15/19  0740   TSH  --   --  1.58   A1C 6.7*   < > 7.0*    < > = values in this interval not displayed.       Results for orders placed or performed during the hospital encounter of 05/15/20   XR Chest Port 1 View    Narrative    EXAM: XR CHEST PORT 1 VW  LOCATION: [facility]  DATE/TIME: 5/15/2020 9:43 PM    INDICATION: Dyspnea. Short of breath.  COMPARISON: None.      Impression    IMPRESSION: Heart is enlarged. Interstitial prominence suspicious for interstitial edema. Left sided permanent transvenous pacemaker with cardiac electrodes overlying right atrium and right ventricle. No pneumothorax. The right lower lung demonstrates a   3.3 x 2.1 cm masslike opacity which could be due to alveolar edema, infiltrate or nodular mass. Recommend follow-up after medical therapy. Hyperinflation. COPD. No displaced fracture demonstrated. Monitor electrodes.   XR Chest 2 Views    Narrative    CHEST TWO VIEWS    2020 9:54 AM     HISTORY: Follow up alveolar edema vs ?mass.    COMPARISON: Chest x-ray 5/15/2020.      Impression    IMPRESSION: Two views of the chest are performed. The rounded opacity  right lower lung seen on prior exam has likely resolved and is not  appreciated on the current images. Lungs are otherwise clear with mild  persistent interstitial changes. Heart is normal in size. No  pneumothorax. Trace pleural effusions are noted on the lateral view.  Implantable cardiac device and both leads are unchanged in position.    ANURAG OLIVA MD   Echocardiogram Limited    Narrative    [id number]  [id number]  [id number]  [id number]^SO^ZACHARY^MYRIAM           [facility]  Echocardiography Laboratory  [address] [place] [address]        Name: MANUEL WASHINGTON  MRN: [id number]  : 1955  Study Date: 2020 10:08 AM  Age: 64 yrs  Gender: Male  Patient Location: [facility]  Reason For Study: CHF  Ordering Physician: ZACHARY RIZZO  Referring Physician: Sathya Garcia  Performed By: Delma Cain     BSA: 2.2 m2  Height: 71 in  Weight: 216 lb  HR: 84  BP: 137/68 mmHg  _____________________________________________________________________________  __        Procedure  Limited Portable Echo Adult. Optison (NDC #[id number]) given intravenously.  _____________________________________________________________________________  __        Interpretation Summary     Left ventricular systolic function is mildly reduced.  The visual ejection fraction is estimated at 45-50%.  Right ventricular systolic pressure is elevated, consistent with moderate  pulmonary hypertension.  The right ventricular systolic function is normal.  The right ventricle is normal size.  Compared to recent ALEJANDRA, EF again mildly reduced. RVSP increased compared to  prior TTE from .  _____________________________________________________________________________  __        Left Ventricle  Study not optimal to assess wall  thickness. Left ventricular systolic function  is mildly reduced. The visual ejection fraction is estimated at 45-50%. There  is mild global hypokinesia of the left ventricle.     Right Ventricle  The right ventricle is normal size. There is mild right ventricular  hypertrophy. The right ventricular systolic function is normal. There is a  pacemaker lead in the right ventricle.     Mitral Valve  There is mild (1+) mitral regurgitation.     Tricuspid Valve  There is mild to moderate (1-2+) tricuspid regurgitation. The right  ventricular systolic pressure is approximated at 52.8 mmHg plus the right  atrial pressure. Right ventricular systolic pressure is elevated, consistent  with moderate pulmonary hypertension.        Aortic Valve  There is trace aortic regurgitation. No aortic stenosis is present.     Vessels  The aortic root is normal size.     Pericardium  There is no pericardial effusion.  _____________________________________________________________________________  __     MMode/2D Measurements & Calculations  Ao root diam: 2.7 cm  asc Aorta Diam: 2.8 cm  LVOT diam: 2.1 cm  LVOT area: 3.4 cm2        Doppler Measurements & Calculations  LV V1 max P.1 mmHg  LV V1 max: 101.5 cm/sec  LV V1 VTI: 18.4 cm  SV(LVOT): 62.4 ml  SI(LVOT): 28.7 ml/m2  PA acc time: 0.08 sec  TR max ashley: 363.2 cm/sec  TR max P.8 mmHg              _____________________________________________________________________________  __        Report approved by: Jeancarlos Avalos 2020 12:19 PM

## 2020-05-16 NOTE — PROVIDER NOTIFICATION
"MD Notification    Notified Person: MD    Notified Person Name: Giovanni Billingsley     Notification Date/Time: 5/16/2020 at 0435    Notification Interaction: AMCOM    Purpose of Notification:  \"Can you put in a diet order please? Also FYI latest trop at 0.419 and trending down.\"    Orders Received: Low consistent carb diet ordered per provider     Comments:        "

## 2020-05-16 NOTE — ED PROVIDER NOTES
History     Chief Complaint:  Shortness of Breath    HPI   Stu Meredith is a 64 year old male who presents with shortness of breath that has been progressing over the past day.  He had a ablation for atrial flutter done yesterday at [facility].  He had an ablation several years ago and had similar problems with shortness of breath afterwards.  He states he takes Lasix on a daily basis.  The problem he had several years ago was fluid overload and he did require admission.  He is normally not on oxygen.  He reports that he was tested for COVID Tuesday, 4 days ago, prior to his procedure and was negative.  Over the past day he has had a low-grade fever.  He states it was about 100.  He does not know the exact amount.  He has been significantly short of breath and it was worsening significantly so he came to the emergency department.  He is normally anticoagulated with Xarelto.  He denies any nausea, vomiting, diarrhea, abdominal pain, change in his sense of smell or taste.    Allergies:  No known drug allergies    Medications:    Albuterol inhaler  Atorvastatin  Vitamin B12  Tikosyn  Inspra  Lasix  Gabapentin  Metformin  Metoprolol   Multivitamin  Xarelto  Ambien     Past Medical History:    Paroxysmal a-fib  CHF  CAD  DJD  HTN  Hypertrophic cardiomyopathy   Pneumonia  Insomnia  Pulmonary nodules    Past Surgical History:    Pacemaker placement  AICD placement   Bilateral IRISH  Right heart cath x2  A-fib ablation   Tonsillectomy     Family History:    Heart disease: diverticulitis: mother  Diabetes: son  CAD s/p CABG: father    Social History:  Smoking status: Former smoker, Quit 2015  Substance use: No  Alcohol use: Yes  Presents to ED alone    Marital Status:   [2]     Review of Systems  As noted per HPI.  Remainder of a 10 point review of systems was negative.      Physical Exam     Patient Vitals for the past 24 hrs:   BP Temp Temp src Pulse Resp SpO2 Weight   05/15/20 2132 (!) 158/96  99.1  F (37.3  C) Oral 103 20 (!) 88 % 98.9 kg (218 lb)     Physical Exam  General: Well-nourished, appears very short of breath and is saturating about 85% on room air  Eyes: PERRL, conjunctivae pink no scleral icterus or conjunctival injection  ENT:  Moist mucus membranes, posterior oropharynx clear without erythema or exudates  Respiratory:  Lungs diminished throughout.  No definite crackles, rubs or wheezes.  However exam is limited by the isolation stethoscope quality.  He is tachypneic with increased respiratory effort and hypoxic requiring oxygen.  CV: Normal rate and rhythm, no murmurs/rubs/gallops  GI:  Abdomen soft and non-distended.  Normoactive BS.  No tenderness, guarding or rebound  Skin: Warm, dry.  No rashes or petechiae  Musculoskeletal: No peripheral edema or calf tenderness  Neuro: Alert and oriented to person/place/time  Psychiatric: Normal affect    Emergency Department Course     ECG (21:43:32):  Rate 104 bpm. SD interval 242. QRS duration 88. QT/QTc 322/423. P-R-T axes 53 50 89. Sinus tachycardia with 1st degree AV block with premature atrial complexes with aberrant conduction. Low voltage QRS. T wave abnormality, consider lateral ischemia. Abnormal ECG. Interpreted at 2151 by Mariposa Puri MD.    Imaging:  Radiographic findings were communicated with the patient who voiced understanding of the findings.    XR Chest Port 1 View  IMPRESSION: Heart is enlarged. Interstitial prominence suspicious for interstitial edema. Left sided permanent transvenous pacemaker with cardiac electrodes overlying right atrium and right ventricle. No pneumothorax. The right lower lung demonstrates a 3.3 x 2.1 cm masslike opacity which could be due to alveolar edema, infiltrate or nodular mass. Recommend follow-up after medical therapy. Hyperinflation. COPD. No displaced fracture demonstrated. Monitor electrodes. As read by Radiology.     Laboratory:  Laboratory findings were communicated with the patient who voiced  understanding of the findings.    Symptomatic COVID-19 Virus (Coronavirus) PCR: In process     Blood gas venous (2156): pH 7.40, PCO2 41, PO2 67(H), Bicarbonate 25, Base excess    Troponin I (2156): 0.525(HH)  Nt Pro BNP: 1,423(H)    Magnesium: 2.3  Lactic acid (2215): 2.4(H)    CBC: WBC 14.8(H), o/w WNL (HGB 14.2, )  CMP: Glucose 169(H), BUN 32(H), o/w WNL (Creatinine 1.12)     Blood cultures x2: In process     Interventions:  Lasix 60mg IV  Zosyn 4.5g IV    Emergency Department Course:  Past medical records, nursing notes, and vitals reviewed.    The patient was placed on continuous cardiac monitoring and pulse oximetry.    2139: I performed an exam of the patient as documented above.     2143: EKG obtained in the ED, see results above.     2156: IV was inserted and blood was drawn for laboratory testing, results above.    The patient had a portable chest x-ray while in the emergency department, results above.     2300: I rechecked the patient and discussed the results of his workup thus far.     2321: I spoke to Dr. Billingsley of the hospitalist service who accepts the patient for admission.     Patient's pacemaker was interrogated.     Findings and plan explained to the patient who consents to admission. Discussed the patient with Dr. Billingsley, who will admit the patient to a [facility] bed for further monitoring, evaluation, and treatment.    I personally reviewed the laboratory results with the patient and answered all related questions prior to admission.     Impression & Plan      CMS Diagnoses: I do not believe that the patient has sepsis or severe sepsis.  I suspect his lactic acid level elevation is actually due to the hypoxemia and increased work of breathing prior to arrival.  He has signs of acute pulmonary edema.  IV fluids were thus not given.    Covid-19  Stu Meredith was evaluated during a global COVID-19 pandemic, which necessitated consideration that the patient might be at risk for infection with  the SARS-CoV-2 virus that causes COVID-19.   Applicable protocols for evaluation were followed during the patient's care. COVID-19 was considered as part of the patient's evaluation. The plan for testing is: a test was obtained during this visit.    Medical Decision Making:  Stu Meredith is a 64 year old male comes today with acute hypoxic respiratory failure.  He had an ablation yesterday and I suspect fluid overload of the cause.  He had this with a previous ablation.  Chest x-ray is consistent with congestive heart failure.  Given that he reported fever and chills and he was intubated for the procedure, I did treat him with antibiotics for possible nosocomial pneumonia.  He was also tested for COVID though I have lower suspicion that this represents COVID.  He is doing okay on oxygen by nasal cannula.  He does not require intubation or BiPAP at this point.  We gave him 60 mg of Lasix.  He will be admitted to the hospital for further treatment and monitoring for improvement.    Diagnosis:    ICD-10-CM   1. Acute congestive heart failure, unspecified heart failure type (H)  I50.9   2. Acute respiratory failure with hypoxia (H)  J96.01   3. Fever and chills  R50.9     Disposition: Patient admitted to a [facility] bed by Dr. Analilia Rodriguez  5/15/2020    EMERGENCY DEPARTMENT    Scribe Disclosure:  Delma HICKMAN, am serving as a scribe at 9:39 PM on 5/15/2020 to document services personally performed by Mariposa Puri MD based on my observations and the provider's statements to me.        Mariposa Puri MD  05/16/20 0027

## 2020-05-16 NOTE — PROGRESS NOTES
RECEIVING UNIT ED HANDOFF REVIEW    ED Nurse Handoff Report was reviewed by: Randee Liang RN on May 16, 2020 at 12:32 AM

## 2020-05-16 NOTE — ED NOTES
Pt reports feeling much better at this time. Pt switched to 4L O2 via NC. Will continue to monitor respiratory status.

## 2020-05-16 NOTE — PLAN OF CARE
Alert and oriented x 4. Covid negative. Headache controlled with tylenol. Dry cough present. Denies chest pain, shortness of breath, nausea/vomiting at this time. Tele showing SR with 1st degree HB with PVC. Titrated off O2 and now on RA. Independent in the room. PIV saline locked. Troponins trending down. Urinating frequently. No BM yet. Bilateral groin sites from previous ablation WNL and open to air. Waiting on cardiology to visit in am and [place] [facility] [place]

## 2020-05-18 ENCOUNTER — TELEPHONE (OUTPATIENT)
Dept: INTERNAL MEDICINE | Facility: CLINIC | Age: 65
End: 2020-05-18

## 2020-05-18 ENCOUNTER — TELEPHONE (OUTPATIENT)
Dept: CARDIOLOGY | Facility: CLINIC | Age: 65
End: 2020-05-18

## 2020-05-18 NOTE — TELEPHONE ENCOUNTER
Called Haroon and left voicemail to call back to discuss his concerns.  Provided call back number to discuss.    Tang Lilly, RN  Cardiology RN Care Coordinator  [phone]

## 2020-05-18 NOTE — TELEPHONE ENCOUNTER
Patient called today with 2 requests-    1. For the forms he sent - he said to put the date that he will be out until the 5th.  2. He wants a call from someone today to discuss his recent ED visit.

## 2020-05-19 NOTE — TELEPHONE ENCOUNTER
"Called to speak with Haroon after his ablation last week and his ED visit. He was frustrated that he had a volume overload experience again after this procedure. He notes, however, that he is doing much better: his cough is better, no edema, and denies fever. He notes that his groin site does have a bruise but it is healing. \"I'm doing better everyday.\"    Discussed that we would work on his paperwork this week and be in touch. Patient verbalized understanding and is in agreement to the plan.  "

## 2020-05-21 ENCOUNTER — VIRTUAL VISIT (OUTPATIENT)
Dept: UROLOGY | Facility: CLINIC | Age: 65
End: 2020-05-21
Payer: COMMERCIAL

## 2020-05-21 ENCOUNTER — PRE VISIT (OUTPATIENT)
Dept: UROLOGY | Facility: CLINIC | Age: 65
End: 2020-05-21

## 2020-05-21 DIAGNOSIS — N52.9 ERECTILE DYSFUNCTION, UNSPECIFIED ERECTILE DYSFUNCTION TYPE: ICD-10-CM

## 2020-05-21 RX ORDER — SOTALOL HYDROCHLORIDE 120 MG/1
TABLET ORAL
Refills: 1 | COMMUNITY
Start: 2019-06-04 | End: 2020-10-18 | Stop reason: ALTCHOICE

## 2020-05-21 RX ORDER — SPIRONOLACTONE 50 MG/1
TABLET, FILM COATED ORAL
Refills: 0 | COMMUNITY
Start: 2019-11-04 | End: 2020-05-21 | Stop reason: SINTOL

## 2020-05-21 RX ORDER — SILDENAFIL 100 MG/1
50-100 TABLET, FILM COATED ORAL DAILY PRN
Qty: 30 TABLET | Refills: 12 | Status: SHIPPED | OUTPATIENT
Start: 2020-05-21

## 2020-05-21 NOTE — PROGRESS NOTES
"Stu Meredith is a 64 year old male who is being evaluated via a billable video visit.      The patient has been notified of following:     \"This video visit will be conducted via a call between you and your physician/provider. We have found that certain health care needs can be provided without the need for an in-person physical exam.  This service lets us provide the care you need with a video conversation.  If a prescription is necessary we can send it directly to your pharmacy.  If lab work is needed we can place an order for that and you can then stop by our lab to have the test done at a later time.    Video visits are billed at different rates depending on your insurance coverage.  Please reach out to your insurance provider with any questions.    If during the course of the call the physician/provider feels a video visit is not appropriate, you will not be charged for this service.\"    Patient has given verbal consent for Video visit? Yes    How would you like to obtain your AVS? Derian    Patient would like the video invitation sent by: Send to e-mail at: [email or web address]    Will anyone else be joining your video visit? No        Video-Visit Details    Type of service:  Video Visit    Video Start Time: 8:41 AM  Video End Time: 8:57 AM    Originating Location (pt. Location): Home    Distant Location (provider location):  [facility] UROLOGY AND [facility] FOR PROSTATE AND UROLOGIC CANCERS     Platform used for Video Visit: CMGE      I am seeing Stu Meredith in consultation from Laury Guzman for evaluation of erectile dysfunction.    HPI:  Stu Meredith is a 64 year old male is a very nice man with complaints of erectile dysfunction for the last 6 months or so.  He has a history of genetic cardiomyopathy, and has history of afib status-post ablation and has an aicd in place.    ED/Vascular disease risk factors:  HTN:   No  Hyperlipidemia: no  Smoking: not currently.  DM: " pre-DM  Cardiovascular disease: None known.  He does have caridomyopathy- genetic.  Meds associated with ED that he's taking: bblocker, he is NOT taking sharon at this time.  Penile Plaques or curvature:  none.     Lab Results   Component Value Date    PSA 4.06 09/27/2019    PSA 6.00 12/26/2017    PSA 3.67 12/10/2015    PSA 2.66 07/20/2012    PSA 1.57 11/10/2008    PSA 1.76 09/13/2007          REVIEW OF SYSTEMS:  General: negative  Skin: negative  Eyes: negative  Ears/Nose/Throat: negative  Respiratory: negative  Cardiovascular: negative  Gastrointestinal: negative  Genitourinary: see HPI  Musculoskeletal: negative  Neurologic: negative  Psychiatric: negative  Hematologic/Lymphatic/Immunologic: negative  Endocrine: negative    PAST MEDICAL HX:  Past Medical History:   Diagnosis Date     Atrial fibrillation (H) 12/9/11    failed medication, multiple DC cardioveresions; s/p Left atrial ablation to eliminate atrial fibrillation 12/9/11     Chest pain      CHF (congestive heart failure) (H) 7/30/2016     Coronary artery disease      DJD (degenerative joint disease)      Fatigue 7/15/2019     Hip arthritis 1/15/2014     Hypertension      Hypertrophic cardiomyopathy (H) 10/09     Other abnormal heart sounds      Pacemaker     ICD     Palpitations      Pneumonia, organism unspecified(486)      Prediabetes 7/10/15    A1C 6.5     Status post implantation of automatic cardioverter/defibrillator (AICD)      Ventricular tachycardia (H)        PAST SURG HX:  Past Surgical History:   Procedure Laterality Date     ANESTHESIA CARDIOVERSION  4/24/2014    Procedure: ANESTHESIA CARDIOVERSION;  Surgeon: Generic Anesthesia Provider;  [facility]     ANESTHESIA CARDIOVERSION N/A 5/12/2016    Procedure: ANESTHESIA CARDIOVERSION;  Surgeon: GENERIC ANESTHESIA PROVIDER;  [facility]     ANESTHESIA CARDIOVERSION N/A 8/7/2017    Procedure: ANESTHESIA CARDIOVERSION;  Anesthesia Offsite Coverage Cardioversion @1100;  Surgeon: GENERIC  ANESTHESIA PROVIDER;  [facility]     ANESTHESIA CARDIOVERSION N/A 1/3/2018    Procedure: ANESTHESIA CARDIOVERSION;  Anesthesia Cardioverion;  Surgeon: GENERIC ANESTHESIA PROVIDER;  [facility]     ANESTHESIA CARDIOVERSION N/A 5/4/2018    Procedure: ANESTHESIA CARDIOVERSION;  Anesthesia Coverage Cardioversion @1400;  Surgeon: GENERIC ANESTHESIA PROVIDER;  [facility]     ANESTHESIA CARDIOVERSION N/A 9/27/2018    Procedure: ANESTHESIA CARDIOVERSION;  Anesthesia Cardioversion @0930;  Surgeon: GENERIC ANESTHESIA PROVIDER;  [facility]     ANESTHESIA CARDIOVERSION N/A 12/20/2018    Procedure: Anesthesia Coverage Cardioversion @0830;  Surgeon: GENERIC ANESTHESIA PROVIDER;  [facility]     ANESTHESIA CARDIOVERSION N/A 8/21/2019    Procedure: Anesthesia Coverage Cardioversion @0800;  Surgeon: GENERIC ANESTHESIA PROVIDER;  [facility]     ANESTHESIA CARDIOVERSION N/A 1/16/2020    Procedure: ANESTHESIA, FOR CARDIOVERSION;  Surgeon: GENERIC ANESTHESIA PROVIDER;  [facility]     ARTHROPLASTY HIP  1/15/2014    Procedure: ARTHROPLASTY HIP;  Left Total Hip Arthroplasty;  Surgeon: Nelson Gaspar MD;  [facility]     ARTHROPLASTY HIP Right 6/5/2019    Procedure: Right Total Hip Arthroplasty;  Surgeon: Nelson Gaspar MD;  [facility]     CARDIAC SURGERY       COLONOSCOPY N/A 5/18/2018    Procedure: COMBINED COLONOSCOPY, SINGLE OR MULTIPLE BIOPSY/POLYPECTOMY BY BIOPSY;  COLONOSCOPY (PT HAS DEFIBRILLATOR) ;  Surgeon: Mike Nickerson MD;  Location:  GI     CV RIGHT HEART CATH N/A 8/19/2019    Procedure: CV RIGHT HEART CATH;  Surgeon: Cisco Rausch MD;  Location:  HEART CARDIAC CATH LAB     CV RIGHT HEART CATH N/A 8/21/2019    Procedure: Right Heart Cath;  Surgeon: Ciaran Burton MD;  Location:  HEART CARDIAC CATH LAB     EP ABLATION FOCAL AFIB N/A 5/14/2020    Procedure: EP ABLATION FOCAL AFIB;  Surgeon: Erik Cooper MD;  Location:  HEART CARDIAC CATH LAB     H ABLATION FOCAL  AFIB  11    Left atrial ablation to eliminate atrial fibrillation     IMPLANT AUTOMATIC IMPLANTABLE CARDIOVERTER DEFIBRILLATOR  12    AICD implantation     TONSILLECTOMY  1964        FAMILY HX:  Family History   Problem Relation Age of Onset     Heart Disease Mother         unknown     Obesity Mother      Gastrointestinal Disease Mother         diverticulitis     Diabetes Maternal Grandmother      Diabetes Paternal Grandmother      Cerebrovascular Disease Paternal Grandmother 94     Diabetes Paternal Grandfather      Cerebrovascular Disease Paternal Grandfather 78     Diabetes Son      C.A.D. Father         CABG age 78     Heart Disease Father         CABG x5     Diabetes Maternal Grandfather      LUNG DISEASE No family hx of      Deep Vein Thrombosis (DVT) No family hx of      Anesthesia Reaction No family hx of      Melanoma No family hx of      Skin Cancer No family hx of        SOCIAL HX:  Social History     Tobacco Use     Smoking status: Former Smoker     Packs/day: 1.00     Years: 40.00     Pack years: 40.00     Types: Cigarettes     Start date: 1975     Last attempt to quit: 2015     Years since quittin.4     Smokeless tobacco: Never Used   Substance Use Topics     Alcohol use: Yes     Alcohol/week: 0.0 standard drinks     Comment: 1 drink per week     Drug use: No       MEDICATIONS:  Current Outpatient Medications   Medication Sig     acetaminophen (TYLENOL) 325 MG tablet Take 325-650 mg by mouth every 6 hours as needed     albuterol (PROAIR HFA/PROVENTIL HFA/VENTOLIN HFA) 108 (90 Base) MCG/ACT inhaler Inhale 2 puffs into the lungs every 6 hours     amoxicillin (AMOXIL) 500 MG tablet Take 4 tablets (2000 mg) by mouth 1 hour before dental procedures.     atorvastatin (LIPITOR) 20 MG tablet Take 1 tablet (20 mg) by mouth daily     cyanocobalamin (VITAMIN B-12) 100 MCG tablet Take 100 mcg by mouth daily     dabigatran ANTICOAGULANT (PRADAXA ANTICOAGULANT) 150 MG capsule Take 1 capsule  (150 mg) by mouth 2 times daily Store in original 's bottle or blister pack; use within 120 days of opening.     dofetilide (TIKOSYN) 250 MCG capsule Take 1 capsule (250 mcg) by mouth every 12 hours     eplerenone (INSPRA) 50 MG tablet Take 1 tablet (50 mg) by mouth daily     furosemide (LASIX) 40 MG tablet Take 1 tablet (40 mg) by mouth daily     gabapentin (NEURONTIN) 300 MG capsule Take 2 capsules (600 mg) by mouth 2 times daily     metFORMIN (GLUCOPHAGE-XR) 500 MG 24 hr tablet Take 2 tablets (1,000 mg) by mouth daily (with dinner) Take 2 tablets (1,000 mg) daily (with dinner).     metoprolol succinate ER (TOPROL-XL) 50 MG 24 hr tablet TAKE ONE TABLET BY MOUTH EVERY DAY     multivitamin, therapeutic (THERA-VIT) TABS Take 1 tablet by mouth daily     sotalol (BETAPACE) 120 MG tablet      spironolactone (ALDACTONE) 50 MG tablet      XARELTO ANTICOAGULANT 20 MG TABS tablet Take 1 tablet (20 mg) by mouth daily (with dinner)     zolpidem (AMBIEN) 10 MG tablet Take 0.5 tablets (5 mg) by mouth nightly as needed for sleep Maximum number of tablets #30 in 90 days.     No current facility-administered medications for this visit.        ALLERGIES:  Patient has no known allergies.      GENERAL PHYSICAL EXAM:   Exam  General- Alert, oriented, nad.  Pleasant and conversant.  Eyes- anicteric, EOMI.  Resps- normal, non-labored.  No cough  Abdomen-  nondistended.   exam- deferred.   Neurological - no tremors  Skin - no discoloration/ lesions noted  Psychiatric - no anxiety, alert & oriented.       The rest of a comprehensive physical examination is deferred due to PHE (public health emergency) video visit restrictions.      Imaging/labs:  Lab Results   Component Value Date    CR 1.12 05/15/2020    CR 1.00 05/14/2020    CR 1.03 12/17/2019     Lab Results   Component Value Date    PSA 4.06 09/27/2019    PSA 6.00 12/26/2017    PSA 3.67 12/10/2015    PSA 2.66 07/20/2012    PSA 1.57 11/10/2008    PSA 1.76 09/13/2007          ASSESSMENT:     Erectile dysfunction     Prostate cancer screening- PSA was within age-adjusted normal range on last check.    PLAN:    Viagra prescription.  Discussed side effects and how to take.  Start with 50mg dose.  I discussed that ED is a predictor for cardiovascular disease .  I advised he continue optimizing correctable risk factors for vascular disease where applicable.         Future testosterone and prolactin labs.  I will send him results on MyChart.    Follow-up with Dr. Miller for PSA follow-up.      Copied cc to Consulting provider Thomas         Thank-you for the kind consultation.  Stef Hogan MD     Urological Surgeon

## 2020-05-21 NOTE — LETTER
"5/21/2020       RE: Stu Meredith  [address] [facility] [id number]     Dear Colleague,    Thank you for referring your patient, Stu Meredith, to the [facility] UROLOGY AND [place] FOR PROSTATE AND UROLOGIC CANCERS at [facility]. Please see a copy of my visit note below.    Stu Meredith is a 64 year old male who is being evaluated via a billable video visit.      The patient has been notified of following:     \"This video visit will be conducted via a call between you and your physician/provider. We have found that certain health care needs can be provided without the need for an in-person physical exam.  This service lets us provide the care you need with a video conversation.  If a prescription is necessary we can send it directly to your pharmacy.  If lab work is needed we can place an order for that and you can then stop by our lab to have the test done at a later time.    Video visits are billed at different rates depending on your insurance coverage.  Please reach out to your insurance provider with any questions.    If during the course of the call the physician/provider feels a video visit is not appropriate, you will not be charged for this service.\"    Patient has given verbal consent for Video visit? Yes    How would you like to obtain your AVS? [facility]    Patient would like the video invitation sent by: Send to e-mail at: [email or web address]    Will anyone else be joining your video visit? No        Video-Visit Details    Type of service:  Video Visit    Video Start Time: 8:41 AM  Video End Time: 8:57 AM    Originating Location (pt. Location): Home    Distant Location (provider location):  [facility] UROLOGY AND [place] FOR PROSTATE AND UROLOGIC CANCERS     Platform used for Video Visit: [employer]      I am seeing Stu Meredith in consultation from Laury Guzman for evaluation of erectile dysfunction.    HPI:  Stu Meredith is a 64 year old " male is a very nice man with complaints of erectile dysfunction for the last 6 months or so.  He has a history of genetic cardiomyopathy, and has history of afib status-post ablation and has an aicd in place.    ED/Vascular disease risk factors:  HTN:   No  Hyperlipidemia: no  Smoking: not currently.  DM: pre-DM  Cardiovascular disease: None known.  He does have caridomyopathy- genetic.  Meds associated with ED that he's taking: bblocker, he is NOT taking sharon at this time.  Penile Plaques or curvature:  none.     Lab Results   Component Value Date    PSA 4.06 09/27/2019    PSA 6.00 12/26/2017    PSA 3.67 12/10/2015    PSA 2.66 07/20/2012    PSA 1.57 11/10/2008    PSA 1.76 09/13/2007          REVIEW OF SYSTEMS:  General: negative  Skin: negative  Eyes: negative  Ears/Nose/Throat: negative  Respiratory: negative  Cardiovascular: negative  Gastrointestinal: negative  Genitourinary: see HPI  Musculoskeletal: negative  Neurologic: negative  Psychiatric: negative  Hematologic/Lymphatic/Immunologic: negative  Endocrine: negative    PAST MEDICAL HX:  Past Medical History:   Diagnosis Date     Atrial fibrillation (H) 12/9/11    failed medication, multiple DC cardioveresions; s/p Left atrial ablation to eliminate atrial fibrillation 12/9/11     Chest pain      CHF (congestive heart failure) (H) 7/30/2016     Coronary artery disease      DJD (degenerative joint disease)      Fatigue 7/15/2019     Hip arthritis 1/15/2014     Hypertension      Hypertrophic cardiomyopathy (H) 10/09     Other abnormal heart sounds      Pacemaker     ICD     Palpitations      Pneumonia, organism unspecified(486)      Prediabetes 7/10/15    A1C 6.5     Status post implantation of automatic cardioverter/defibrillator (AICD)      Ventricular tachycardia (H)        PAST SURG HX:  Past Surgical History:   Procedure Laterality Date     ANESTHESIA CARDIOVERSION  4/24/2014    Procedure: ANESTHESIA CARDIOVERSION;  Surgeon: Generic Anesthesia Provider;   [facility]     ANESTHESIA CARDIOVERSION N/A 5/12/2016    Procedure: ANESTHESIA CARDIOVERSION;  Surgeon: GENERIC ANESTHESIA PROVIDER;  [facility]     ANESTHESIA CARDIOVERSION N/A 8/7/2017    Procedure: ANESTHESIA CARDIOVERSION;  Anesthesia Offsite Coverage Cardioversion @1100;  Surgeon: GENERIC ANESTHESIA PROVIDER;  [facility]     ANESTHESIA CARDIOVERSION N/A 1/3/2018    Procedure: ANESTHESIA CARDIOVERSION;  Anesthesia Cardioverion;  Surgeon: GENERIC ANESTHESIA PROVIDER;  [facility]     ANESTHESIA CARDIOVERSION N/A 5/4/2018    Procedure: ANESTHESIA CARDIOVERSION;  Anesthesia Coverage Cardioversion @1400;  Surgeon: GENERIC ANESTHESIA PROVIDER;  [facility]     ANESTHESIA CARDIOVERSION N/A 9/27/2018    Procedure: ANESTHESIA CARDIOVERSION;  Anesthesia Cardioversion @0930;  Surgeon: GENERIC ANESTHESIA PROVIDER;  [facility]     ANESTHESIA CARDIOVERSION N/A 12/20/2018    Procedure: Anesthesia Coverage Cardioversion @0830;  Surgeon: GENERIC ANESTHESIA PROVIDER;  [facility]     ANESTHESIA CARDIOVERSION N/A 8/21/2019    Procedure: Anesthesia Coverage Cardioversion @0800;  Surgeon: GENERIC ANESTHESIA PROVIDER;  [facility]     ANESTHESIA CARDIOVERSION N/A 1/16/2020    Procedure: ANESTHESIA, FOR CARDIOVERSION;  Surgeon: GENERIC ANESTHESIA PROVIDER;  [facility]     ARTHROPLASTY HIP  1/15/2014    Procedure: ARTHROPLASTY HIP;  Left Total Hip Arthroplasty;  Surgeon: Nelson Gaspar MD;  [facility]     ARTHROPLASTY HIP Right 6/5/2019    Procedure: Right Total Hip Arthroplasty;  Surgeon: Nelson Gaspar MD;  [facility]     CARDIAC SURGERY       COLONOSCOPY N/A 5/18/2018    Procedure: COMBINED COLONOSCOPY, SINGLE OR MULTIPLE BIOPSY/POLYPECTOMY BY BIOPSY;  COLONOSCOPY (PT HAS DEFIBRILLATOR) ;  Surgeon: Mike Nickerson MD;  Location:  GI     CV RIGHT HEART CATH N/A 8/19/2019    Procedure: CV RIGHT HEART CATH;  Surgeon: Cisco Rausch MD;  Location:  HEART CARDIAC CATH LAB      CV RIGHT HEART CATH N/A 2019    Procedure: Right Heart Cath;  Surgeon: Ciaran Burton MD;  [facility]     EP ABLATION FOCAL AFIB N/A 2020    Procedure: EP ABLATION FOCAL AFIB;  Surgeon: Erik Cooper MD;  Location:  HEART CARDIAC CATH LAB     H ABLATION FOCAL AFIB  11    Left atrial ablation to eliminate atrial fibrillation     IMPLANT AUTOMATIC IMPLANTABLE CARDIOVERTER DEFIBRILLATOR  12    AICD implantation     TONSILLECTOMY  1964        FAMILY HX:  Family History   Problem Relation Age of Onset     Heart Disease Mother         unknown     Obesity Mother      Gastrointestinal Disease Mother         diverticulitis     Diabetes Maternal Grandmother      Diabetes Paternal Grandmother      Cerebrovascular Disease Paternal Grandmother 94     Diabetes Paternal Grandfather      Cerebrovascular Disease Paternal Grandfather 78     Diabetes Son      C.A.D. Father         CABG age 78     Heart Disease Father         CABG x5     Diabetes Maternal Grandfather      LUNG DISEASE No family hx of      Deep Vein Thrombosis (DVT) No family hx of      Anesthesia Reaction No family hx of      Melanoma No family hx of      Skin Cancer No family hx of        SOCIAL HX:  Social History     Tobacco Use     Smoking status: Former Smoker     Packs/day: 1.00     Years: 40.00     Pack years: 40.00     Types: Cigarettes     Start date: 1975     Last attempt to quit: 2015     Years since quittin.4     Smokeless tobacco: Never Used   Substance Use Topics     Alcohol use: Yes     Alcohol/week: 0.0 standard drinks     Comment: 1 drink per week     Drug use: No       MEDICATIONS:  Current Outpatient Medications   Medication Sig     acetaminophen (TYLENOL) 325 MG tablet Take 325-650 mg by mouth every 6 hours as needed     albuterol (PROAIR HFA/PROVENTIL HFA/VENTOLIN HFA) 108 (90 Base) MCG/ACT inhaler Inhale 2 puffs into the lungs every 6 hours     amoxicillin (AMOXIL) 500 MG  tablet Take 4 tablets (2000 mg) by mouth 1 hour before dental procedures.     atorvastatin (LIPITOR) 20 MG tablet Take 1 tablet (20 mg) by mouth daily     cyanocobalamin (VITAMIN B-12) 100 MCG tablet Take 100 mcg by mouth daily     dabigatran ANTICOAGULANT (PRADAXA ANTICOAGULANT) 150 MG capsule Take 1 capsule (150 mg) by mouth 2 times daily Store in original 's bottle or blister pack; use within 120 days of opening.     dofetilide (TIKOSYN) 250 MCG capsule Take 1 capsule (250 mcg) by mouth every 12 hours     eplerenone (INSPRA) 50 MG tablet Take 1 tablet (50 mg) by mouth daily     furosemide (LASIX) 40 MG tablet Take 1 tablet (40 mg) by mouth daily     gabapentin (NEURONTIN) 300 MG capsule Take 2 capsules (600 mg) by mouth 2 times daily     metFORMIN (GLUCOPHAGE-XR) 500 MG 24 hr tablet Take 2 tablets (1,000 mg) by mouth daily (with dinner) Take 2 tablets (1,000 mg) daily (with dinner).     metoprolol succinate ER (TOPROL-XL) 50 MG 24 hr tablet TAKE ONE TABLET BY MOUTH EVERY DAY     multivitamin, therapeutic (THERA-VIT) TABS Take 1 tablet by mouth daily     sotalol (BETAPACE) 120 MG tablet      spironolactone (ALDACTONE) 50 MG tablet      XARELTO ANTICOAGULANT 20 MG TABS tablet Take 1 tablet (20 mg) by mouth daily (with dinner)     zolpidem (AMBIEN) 10 MG tablet Take 0.5 tablets (5 mg) by mouth nightly as needed for sleep Maximum number of tablets #30 in 90 days.     No current facility-administered medications for this visit.        ALLERGIES:  Patient has no known allergies.      GENERAL PHYSICAL EXAM:   Exam  General- Alert, oriented, nad.  Pleasant and conversant.  Eyes- anicteric, EOMI.  Resps- normal, non-labored.  No cough  Abdomen-  nondistended.   exam- deferred.   Neurological - no tremors  Skin - no discoloration/ lesions noted  Psychiatric - no anxiety, alert & oriented.       The rest of a comprehensive physical examination is deferred due to PHE (public health emergency) video visit  restrictions.      Imaging/labs:  Lab Results   Component Value Date    CR 1.12 05/15/2020    CR 1.00 05/14/2020    CR 1.03 12/17/2019     Lab Results   Component Value Date    PSA 4.06 09/27/2019    PSA 6.00 12/26/2017    PSA 3.67 12/10/2015    PSA 2.66 07/20/2012    PSA 1.57 11/10/2008    PSA 1.76 09/13/2007         ASSESSMENT:     Erectile dysfunction     Prostate cancer screening- PSA was within age-adjusted normal range on last check.    PLAN:    Viagra prescription.  Discussed side effects and how to take.  Start with 50mg dose.  I discussed that ED is a predictor for cardiovascular disease .  I advised he continue optimizing correctable risk factors for vascular disease where applicable.         Future testosterone and prolactin labs.  I will send him results on e-Tag.    Follow-up with Dr. Miller for PSA follow-up.      Copied cc to Consulting provider Thomas         Thank-you for the kind consultation.  Stef Hogan MD     Urological Surgeon     Again, thank you for allowing me to participate in the care of your patient.      Sincerely,    Stef Hogan MD

## 2020-05-21 NOTE — NURSING NOTE
Chief Complaint   Patient presents with     Consult For     Erectile dysfunction     Maisha Bonner, CMA

## 2020-05-21 NOTE — PATIENT INSTRUCTIONS
Please take medication as prescribed. Please schedule labs, Dr. Hogan will send you information through [employer] with results. Follow up with Dr. Miller for PSA follow up.     It was a pleasure meeting with you today.  Thank you for allowing me and my team the privilege of caring for you today.  YOU are the reason we are here, and I truly hope we provided you with the excellent service you deserve.  Please let us know if there is anything else we can do for you so that we can be sure you are leaving completely satisfied with your care experience.        Rocael Shepard, EMT

## 2020-05-22 LAB
BACTERIA SPEC CULT: NO GROWTH
BACTERIA SPEC CULT: NO GROWTH
SPECIMEN SOURCE: NORMAL
SPECIMEN SOURCE: NORMAL

## 2020-06-04 ENCOUNTER — ANCILLARY PROCEDURE (OUTPATIENT)
Dept: CARDIOLOGY | Facility: CLINIC | Age: 65
End: 2020-06-04
Attending: INTERNAL MEDICINE
Payer: COMMERCIAL

## 2020-06-04 DIAGNOSIS — I47.20 VENTRICULAR TACHYCARDIA (H): ICD-10-CM

## 2020-06-04 LAB
MDC_IDC_EPISODE_DTM: NORMAL
MDC_IDC_EPISODE_DURATION: 16 S
MDC_IDC_EPISODE_DURATION: 6 S
MDC_IDC_EPISODE_DURATION: 7 S
MDC_IDC_EPISODE_DURATION: 8 S
MDC_IDC_EPISODE_DURATION: 9 S
MDC_IDC_EPISODE_ID: NORMAL
MDC_IDC_EPISODE_TYPE: NORMAL
MDC_IDC_LEAD_IMPLANT_DT: NORMAL
MDC_IDC_LEAD_IMPLANT_DT: NORMAL
MDC_IDC_LEAD_LOCATION: NORMAL
MDC_IDC_LEAD_LOCATION: NORMAL
MDC_IDC_LEAD_MFG: NORMAL
MDC_IDC_LEAD_MFG: NORMAL
MDC_IDC_LEAD_MODEL: NORMAL
MDC_IDC_LEAD_MODEL: NORMAL
MDC_IDC_LEAD_POLARITY_TYPE: NORMAL
MDC_IDC_LEAD_POLARITY_TYPE: NORMAL
MDC_IDC_LEAD_SERIAL: NORMAL
MDC_IDC_LEAD_SERIAL: NORMAL
MDC_IDC_MSMT_BATTERY_DTM: NORMAL
MDC_IDC_MSMT_BATTERY_REMAINING_LONGEVITY: 48 MO
MDC_IDC_MSMT_BATTERY_REMAINING_PERCENTAGE: 55 %
MDC_IDC_MSMT_BATTERY_STATUS: NORMAL
MDC_IDC_MSMT_CAP_CHARGE_DTM: NORMAL
MDC_IDC_MSMT_CAP_CHARGE_DTM: NORMAL
MDC_IDC_MSMT_CAP_CHARGE_ENERGY: 11 J
MDC_IDC_MSMT_CAP_CHARGE_TIME: 1.9 S
MDC_IDC_MSMT_CAP_CHARGE_TIME: 10.4 S
MDC_IDC_MSMT_CAP_CHARGE_TYPE: NORMAL
MDC_IDC_MSMT_CAP_CHARGE_TYPE: NORMAL
MDC_IDC_MSMT_LEADCHNL_RA_IMPEDANCE_VALUE: 732 OHM
MDC_IDC_MSMT_LEADCHNL_RV_IMPEDANCE_VALUE: 443 OHM
MDC_IDC_PG_IMPLANT_DTM: NORMAL
MDC_IDC_PG_MFG: NORMAL
MDC_IDC_PG_MODEL: NORMAL
MDC_IDC_PG_SERIAL: NORMAL
MDC_IDC_PG_TYPE: NORMAL
MDC_IDC_SESS_CLINIC_NAME: NORMAL
MDC_IDC_SESS_DTM: NORMAL
MDC_IDC_SESS_TYPE: NORMAL
MDC_IDC_SET_BRADY_AT_MODE_SWITCH_MODE: NORMAL
MDC_IDC_SET_BRADY_AT_MODE_SWITCH_RATE: 170 {BEATS}/MIN
MDC_IDC_SET_BRADY_LOWRATE: 60 {BEATS}/MIN
MDC_IDC_SET_BRADY_MAX_SENSOR_RATE: 120 {BEATS}/MIN
MDC_IDC_SET_BRADY_MAX_TRACKING_RATE: 120 {BEATS}/MIN
MDC_IDC_SET_BRADY_MODE: NORMAL
MDC_IDC_SET_BRADY_PAV_DELAY_HIGH: 260 MS
MDC_IDC_SET_BRADY_PAV_DELAY_LOW: 390 MS
MDC_IDC_SET_BRADY_SAV_DELAY_HIGH: 260 MS
MDC_IDC_SET_BRADY_SAV_DELAY_LOW: 390 MS
MDC_IDC_SET_LEADCHNL_RA_PACING_AMPLITUDE: 2.4 V
MDC_IDC_SET_LEADCHNL_RA_PACING_POLARITY: NORMAL
MDC_IDC_SET_LEADCHNL_RA_PACING_PULSEWIDTH: 0.5 MS
MDC_IDC_SET_LEADCHNL_RA_SENSING_ADAPTATION_MODE: NORMAL
MDC_IDC_SET_LEADCHNL_RA_SENSING_POLARITY: NORMAL
MDC_IDC_SET_LEADCHNL_RA_SENSING_SENSITIVITY: 0.25 MV
MDC_IDC_SET_LEADCHNL_RV_PACING_AMPLITUDE: 2.4 V
MDC_IDC_SET_LEADCHNL_RV_PACING_POLARITY: NORMAL
MDC_IDC_SET_LEADCHNL_RV_PACING_PULSEWIDTH: 0.5 MS
MDC_IDC_SET_LEADCHNL_RV_SENSING_ADAPTATION_MODE: NORMAL
MDC_IDC_SET_LEADCHNL_RV_SENSING_POLARITY: NORMAL
MDC_IDC_SET_LEADCHNL_RV_SENSING_SENSITIVITY: 0.6 MV
MDC_IDC_SET_ZONE_DETECTION_INTERVAL: 273 MS
MDC_IDC_SET_ZONE_DETECTION_INTERVAL: 333 MS
MDC_IDC_SET_ZONE_DETECTION_INTERVAL: 375 MS
MDC_IDC_SET_ZONE_TYPE: NORMAL
MDC_IDC_SET_ZONE_VENDOR_TYPE: NORMAL
MDC_IDC_STAT_AT_BURDEN_PERCENT: 1 %
MDC_IDC_STAT_BRADY_DTM_END: NORMAL
MDC_IDC_STAT_BRADY_DTM_START: NORMAL
MDC_IDC_STAT_BRADY_RA_PERCENT_PACED: 21 %
MDC_IDC_STAT_BRADY_RV_PERCENT_PACED: 0 %
MDC_IDC_STAT_EPISODE_RECENT_COUNT: 0
MDC_IDC_STAT_EPISODE_RECENT_COUNT: 1
MDC_IDC_STAT_EPISODE_RECENT_COUNT: 11
MDC_IDC_STAT_EPISODE_RECENT_COUNT_DTM_END: NORMAL
MDC_IDC_STAT_EPISODE_RECENT_COUNT_DTM_START: NORMAL
MDC_IDC_STAT_EPISODE_TYPE: NORMAL
MDC_IDC_STAT_EPISODE_VENDOR_TYPE: NORMAL
MDC_IDC_STAT_TACHYTHERAPY_ATP_DELIVERED_RECENT: 0
MDC_IDC_STAT_TACHYTHERAPY_ATP_DELIVERED_TOTAL: 3
MDC_IDC_STAT_TACHYTHERAPY_RECENT_DTM_END: NORMAL
MDC_IDC_STAT_TACHYTHERAPY_RECENT_DTM_START: NORMAL
MDC_IDC_STAT_TACHYTHERAPY_SHOCKS_ABORTED_RECENT: 0
MDC_IDC_STAT_TACHYTHERAPY_SHOCKS_ABORTED_TOTAL: 1
MDC_IDC_STAT_TACHYTHERAPY_SHOCKS_DELIVERED_RECENT: 0
MDC_IDC_STAT_TACHYTHERAPY_SHOCKS_DELIVERED_TOTAL: 1
MDC_IDC_STAT_TACHYTHERAPY_TOTAL_DTM_END: NORMAL
MDC_IDC_STAT_TACHYTHERAPY_TOTAL_DTM_START: NORMAL

## 2020-06-04 PROCEDURE — 93296 REM INTERROG EVL PM/IDS: CPT | Mod: ZF

## 2020-06-04 PROCEDURE — 93295 DEV INTERROG REMOTE 1/2/MLT: CPT | Performed by: INTERNAL MEDICINE

## 2020-06-05 ENCOUNTER — VIRTUAL VISIT (OUTPATIENT)
Dept: CARDIOLOGY | Facility: CLINIC | Age: 65
End: 2020-06-05
Attending: INTERNAL MEDICINE
Payer: COMMERCIAL

## 2020-06-05 DIAGNOSIS — I48.91 ATRIAL FIBRILLATION, UNSPECIFIED TYPE (H): ICD-10-CM

## 2020-06-05 DIAGNOSIS — I47.20 VENTRICULAR TACHYCARDIA (H): Primary | ICD-10-CM

## 2020-06-05 DIAGNOSIS — I42.2 HYPERTROPHIC CARDIOMYOPATHY (H): ICD-10-CM

## 2020-06-05 PROCEDURE — 99214 OFFICE O/P EST MOD 30 MIN: CPT | Mod: 95 | Performed by: INTERNAL MEDICINE

## 2020-06-05 ASSESSMENT — PAIN SCALES - GENERAL: PAINLEVEL: NO PAIN (0)

## 2020-06-05 NOTE — NURSING NOTE
Diet: Patient instructed regarding a heart healthy diet, including discussion of reduced fat and sodium intake. Patient demonstrated understanding of this information and agreed to call with further questions or concerns.    Labs: Patient was given results of the laboratory testing obtained today. Patient was instructed to return for the next laboratory testing in one week. Patient demonstrated understanding of this information and agreed to call with further questions or concerns.     Med Reconcile: Reviewed and verified all current medications with the patient. The updated medication list was printed and given to the patient.  Return Appointment: Patient given instructions regarding scheduling next clinic visit. Patient demonstrated understanding of this information and agreed to call with further questions or concerns.    Medication Change: If feeling great, keep medicationsPatient was educated regarding prescribed medication change, including discussion of the indication, administration, side effects, and when to report to MD or RN. Patient demonstrated understanding of this information and agreed to call with further questions or concerns.    Patient stated he understood all health information given and agreed to call with further questions or concerns.    Dimple Birch, MSN, RN, CNL  Cardiology Care Coordinator  [facility]  [phone]

## 2020-06-05 NOTE — LETTER
6/5/2020      RE: Stu Meredith  [address] [facility] [address]       Dear Colleague,    Thank you for the opportunity to participate in the care of your patient, Stu Meredith, at the [facility]. Please see a copy of my visit note below.    Stu Meredith is a 64 year old male who is being evaluated via a billable video visit.        HPI:  64 year old male with history of HCM without obstruction (dx 2006, EF 20% improved to 60%), VT s/p ICD 2012, nonobstructive CAD (cath 2013), AF s/p PVI X 3 (2011, 2016, 2018) and multiple DCCV and recent ablation presents for ongoing evaluation and management.  Today patient complains of ongoing sob and yanes.  He states that he has yanes at 100-300 feet.  He notes increase in yanes with bending and notes difficulty taking a deep breath.  He also notes abdominal distention/fullness and early satiety.  He denies any pnd or edema.  He notes only rare palpitations 3-4 brief episodes since ablation lasting less than 10 seconds.  Overall he reports he is feeling about the same since his ablation.  He denies any problems with his medications and reports compliance.      Past Medical History:   Diagnosis Date     Atrial fibrillation (H) 12/9/11    failed medication, multiple DC cardioveresions; s/p Left atrial ablation to eliminate atrial fibrillation 12/9/11     Chest pain      CHF (congestive heart failure) (H) 7/30/2016     Coronary artery disease      DJD (degenerative joint disease)      Hip arthritis 1/15/2014     Hypertension      Hypertrophic cardiomyopathy (H) 10/09     Other abnormal heart sounds      Pacemaker     ICD     Palpitations      Pneumonia, organism unspecified(486)      Prediabetes 7/10/15    A1C 6.5     Status post implantation of automatic cardioverter/defibrillator (AICD)      Ventricular tachycardia (H)    - HCM diagnosed '06, previously burnt out (EF 20%) now with recovered EF  - h/o VT  s/p dual chamber ICD '12  - AF s/p PVI X 2 ('11, '16, '18), h/o DCCV X 10    Meds:  acetaminophen (TYLENOL) 325 MG tablet, Take 325-650 mg by mouth every 6 hours as needed  albuterol (PROAIR HFA/PROVENTIL HFA/VENTOLIN HFA) 108 (90 Base) MCG/ACT inhaler, Inhale 2 puffs into the lungs every 6 hours  amoxicillin (AMOXIL) 500 MG tablet, Take 4 tablets (2000 mg) by mouth 1 hour before dental procedures.  atorvastatin (LIPITOR) 20 MG tablet, Take 1 tablet (20 mg) by mouth daily  cyanocobalamin (VITAMIN B-12) 100 MCG tablet, Take 100 mcg by mouth daily  dabigatran ANTICOAGULANT (PRADAXA ANTICOAGULANT) 150 MG capsule, Take 1 capsule (150 mg) by mouth 2 times daily Store in original 's bottle or blister pack; use within 120 days of opening.  dofetilide (TIKOSYN) 250 MCG capsule, Take 1 capsule (250 mcg) by mouth every 12 hours  eplerenone (INSPRA) 50 MG tablet, Take 1 tablet (50 mg) by mouth daily  furosemide (LASIX) 40 MG tablet, Take 1 tablet (40 mg) by mouth daily  gabapentin (NEURONTIN) 300 MG capsule, Take 2 capsules (600 mg) by mouth 2 times daily  metFORMIN (GLUCOPHAGE-XR) 500 MG 24 hr tablet, Take 2 tablets (1,000 mg) by mouth daily (with dinner) Take 2 tablets (1,000 mg) daily (with dinner).  metoprolol succinate ER (TOPROL-XL) 50 MG 24 hr tablet, TAKE ONE TABLET BY MOUTH EVERY DAY  multivitamin, therapeutic (THERA-VIT) TABS, Take 1 tablet by mouth daily  sildenafil (VIAGRA) 100 MG tablet, Take 0.5-1 tablets ( mg) by mouth daily as needed (take 1 hour before intercourse)  sotalol (BETAPACE) 120 MG tablet,   zolpidem (AMBIEN) 10 MG tablet, Take 0.5 tablets (5 mg) by mouth nightly as needed for sleep Maximum number of tablets #30 in 90 days.  XARELTO ANTICOAGULANT 20 MG TABS tablet, Take 1 tablet (20 mg) by mouth daily (with dinner) (Patient not taking: Reported on 6/5/2020)    No current facility-administered medications on file prior to visit.   not taking sotalol      Social History      Socioeconomic History     Marital status:      Spouse name: Not on file     Number of children: Not on file     Years of education: Not on file     Highest education level: Not on file   Occupational History     Occupation:      Employer: [employer]   Social Needs     Financial resource strain: Not on file     Food insecurity:     Worry: Not on file     Inability: Not on file     Transportation needs:     Medical: Not on file     Non-medical: Not on file   Tobacco Use     Smoking status: Former Smoker     Packs/day: 1.00     Years: 40.00     Pack years: 40.00     Types: Cigarettes     Start date: 1/1/1975     Last attempt to quit: 12/11/2015     Years since quitting: 3.5     Smokeless tobacco: Never Used   Substance and Sexual Activity     Alcohol use: Yes     Alcohol/week: 0.0 oz     Comment: 1 drink per week     Drug use: No     Sexual activity: Yes     Partners: Female   Lifestyle     Physical activity:     Days per week: Not on file     Minutes per session: Not on file     Stress: Not on file   Relationships     Social connections:     Talks on phone: Not on file     Gets together: Not on file     Attends Sabianism service: Not on file     Active member of club or organization: Not on file     Attends meetings of clubs or organizations: Not on file     Relationship status: Not on file     Intimate partner violence:     Fear of current or ex partner: Not on file     Emotionally abused: Not on file     Physically abused: Not on file     Forced sexual activity: Not on file   Other Topics Concern      Service Not Asked     Blood Transfusions Not Asked     Caffeine Concern Not Asked     Occupational Exposure Not Asked     Hobby Hazards Not Asked     Sleep Concern Not Asked     Stress Concern Not Asked     Weight Concern Not Asked     Special Diet Not Asked     Back Care Not Asked     Exercise No     Bike Helmet Not Asked     Seat Belt Yes     Self-Exams Not Asked      Parent/sibling w/ CABG, MI or angioplasty before 65F 55M? No   Social History Narrative     Not on file       Family History   Problem Relation Age of Onset     Heart Disease Mother         unknown     Obesity Mother      Gastrointestinal Disease Mother         diverticulitis     Diabetes Maternal Grandmother      Diabetes Paternal Grandmother      Cerebrovascular Disease Paternal Grandmother 94     Diabetes Paternal Grandfather      Cerebrovascular Disease Paternal Grandfather 78     Diabetes Son      C.A.D. Father         CABG age 78     Heart Disease Father         CABG x5     Diabetes Maternal Grandfather      LUNG DISEASE No family hx of      Deep Vein Thrombosis (DVT) No family hx of      Anesthesia Reaction No family hx of      Melanoma No family hx of      Skin Cancer No family hx of        ROS:   Constitutional: No fever, chills, or sweats.    ENT: No visual disturbance, ear ache, epistaxis, sore throat.   Allergies/Immunologic: Negative.   Respiratory: No cough, hemoptysis.   Cardiovascular: As per HPI.   GI: No nausea, vomiting, hematemesis, melena, or hematochezia.   : No urinary frequency, dysuria, or hematuria.   Integument: Negative.   Psychiatric: Negative.   Neuro: Negative.   Endocrinology: Negative.   Musculoskeletal: Chronic hip pain      There were no vitals taken for this visit.  There were no vitals taken for this visit. given virtual visit  Constitutional - WDWN, no acute distress   Eyes - no redness or discharge, nonicteric sclera  Respiratory - nonlabored breathing, able to speak in full sentences without difficulty  CV: no visible edema of visualized upper extremities.  Neurological - alert and oriented, speech fluent/appropriate  PSYCH: cooperative, affect appropriate  DERM: no rashes on visualized face/neck/upper extremities     The rest of a comprehensive physical examination is deferred due to PHE (public health emergency) video visit restrictions        CPX Echo  6/1/2018:  Interpretation Summary  Submaximal treadmill stress test in a patient with a diagnosis of HCM.  The Ramirez treadmill score is 8 (low risk).  Exercise was stopped due to fatigue.  Normal blood pressure response to exercise.  No ECG evidence of ischemia.  No supraventricular or ventricular arrhythmias. Frequent PVCs noted throughout the study.  Normal biventricular function with mild asymmetrical septal hypertrophy (12mm).  No dynamic outflow tract obstruction is present at rest or with exercise.  Normal segmental wall motion at rest and with exercise.  Minimal augmentation in LV function with exercise.  Average functional capacity for age.         CPX 2/15/2019:        CTA coronary angiogram 5/28/19  IMPRESSION:  1.  No evidence of coronary artery atherosclerosis or stenosis.  2.  Myocardial bridging involving the mid LAD.  3.  Total Agatston score 0 placing the patient in the lowest percentile when compared to age and gender matched control group.    Right heart cath 8/21/19   Time Systolic Diastolic Mean A Wave V Wave EDP Max dp/dt HR   RA Pressures  3:38 PM   12 mmHg    16 mmHg    14 mmHg      61 bpm      RV Pressures  3:38 PM 60 mmHg        16 mmHg     106 bpm      PA Pressures  3:41 PM 60 mmHg    22 mmHg    38 mmHg        69 bpm      PCW Pressures  3:39 PM   30 mmHg    28 mmHg    38 mmHg      74 bpm         Time Hb SAT(%) PO2 Content PA Sat   PA  3:28 PM  63.6 %      63.6 %      Art  3:28 PM  99 %     18.04 mL/dL       Cardiac Output Phase: Baseline      Time TDCO TDCI Tj C.O. Tj C.I. Tj HR   Cardiac Output Results  3:28 PM 3.8 L/min    1.79 L/min/m2    4.41 L/min    2.08 L/min/m2           Echo 12/20/2019  Global and regional left ventricular function is normal with an EF of 55-60%.  Global right ventricular function is normal.  IVC diameter <2.1 cm collapsing >50% with sniff suggests a normal RA pressure  of 3 mmHg.  No pericardial effusion is present.  Mild pulmonary hypertension is  present.  No change from prior.     Echo 5/2020  Left ventricular systolic function is mildly reduced.  The visual ejection fraction is estimated at 45-50%.  Right ventricular systolic pressure is elevated, consistent with moderate  pulmonary hypertension.  The right ventricular systolic function is normal.  The right ventricle is normal size.  Compared to recent ALEJANDRA, EF again mildly reduced. RVSP increased compared to  prior TTE from 12/19.      Assessment/Plan:  64 year old male with history of HCM without obstruction (dx 2006, EF 20% improved to 60%), VT s/p ICD 2012, nonobstructive CAD (cath 2013), AF s/p PVI X 3 (2011, 2016, 2018) and multiple DCCV and recent ablation presents for ongoing evaluation and management.    # Hypertrophic cardiomyopathy with no evidence of LVOT obstruction.-- previously burnt out with EF 20% ('13) recovered (EF 60%) but last echo after recent ablation revealed reduction in LVEF 45-50%.  - patient with symptoms of volume overload.  Will increase lasix to 40mg qam and 20mg qpm.  Will f/u with patient next week to ensure improvement in symptoms      - continue metoprolol XL 50mg daily   - continue eplerenone 50mg daily  - pt aware of exercise restrictions and family screening recommendations    # Nonobstructive CAD -- 10-30% stenoses on cath '13  - coronary CTA confirmed no significant disease  - continue atorvastatin 20     # HTN -- controlled  - continue metoprolol XL and eplerenone     # Atrial arrhythmias s/p recent ablation   - continue Xarelto  - continue dofetilide 250mcg  BID   - continue Metoprolol 50 XL daily   - followed by EP    Follow-up:  Obtain labs in 10-14 days.  Follow with me and Dr. Cooper in September 2020 with labs and device prior.    Mona Ware MD  Section Head - Advanced Heart Failure, Transplantation and Mechanical Circulatory Support  Director - Adult Congenital and Cardiovascular Genetics Center  Associate Professor of Medicine, [employer]   [place]    Please do not hesitate to contact me if you have any questions/concerns.     Sincerely,     Mona Ware MD

## 2020-06-05 NOTE — PATIENT INSTRUCTIONS
You were seen today in the Adult Congenital and Cardiovascular Genetics Clinic at the [facility].    Cardiology Providers you saw during your visit:  Mona Waer MD    Diagnosis:  HCM    Results:  Mona Ware MD reviewed the results of your device testing today in clinic.    Recommendations:    1. Continue to eat a heart healthy, low salt diet.  2. Continue to get 20-30 minutes of aerobic activity, 4-5 days per week.  Examples of aerobic activity include walking, running, swimming, cycling, etc.  3. Continue to observe good oral hygiene, with regular dental visits.  4. Increase Lasix to 40 mg in the morning and 20 mg in the evening. Please let us know how you are feeling next week. Depending on how you are feeling, we may increase your medication.  5. Please have labs (BMP and BNP) the week of Yaima 15.  6. We will send you your completed paperwork.    SBE prophylaxis:   Yes____  No_X___    Lifelong Bacterial Endocarditis Prophylaxis:  YES____  NO____    If YES is checked, follow the recommendations outlined below:  1. Take antibiotic(s) prior to recommended dental procedures and procedures on the respiratory tract or with infected skin, muscle or bones. SBE prophylaxis is not needed for routine GI and  procedures (ie. Colonoscopy or vaginal delivery)  2. Observe good oral hygiene daily, as advised by your dentist. Get regular professional dental care.  3. Keep cuts clean.  4. Infections should be treated promptly.  5. Symptoms of Infective Endocarditis could include: fever lasting more than 4-5 days or a recurrent fever that initially resolves but returns within 1-2 days)    Exercise restrictions:   Yes__X__  No____         If yes, list restrictions:  Must be allowed to rest if fatigued or SOB    Work restrictions:  Yes____  No_X___         If yes, list restrictions:    FASTING CHOLESTEROL was checked in the last 5 years YES__X_  NO___ (2019)  Continue to eat a heart healthy, low salt diet.          ____ Fasting lipid panel order today         ____ No changes in medications          ____ I recommend the following changes in your cholesterol medications.:          ____ Please follow up for cholesterol screening at your primary care physician      Follow-up:  Follow up with Dr. Ware and Dr. Cooper in September 2020 with labs and device prior.    If you have questions or concerns please contact us at:    Dimple Birch, MSN, RN, CNL    Radhika Mitchell (Scheduling)  Nurse Care Coordinator     Clinic   Adult Congenital and CV Genetics   Adult Congenital and CV Genetic  [facility]   [facility]  (P) [phone]     (P) [phone]  [email or web address]   (F) [phone]        For after hours urgent needs, call [phone] and ask to speak to the Adult Congenital Physician on call.  Mention Job Code [id number].    For emergencies call [phone].    [facility]   Clin[facility] and [facility]  Mail Code [id number]  [address] [place]  [address]

## 2020-06-15 DIAGNOSIS — I48.91 ATRIAL FIBRILLATION (H): ICD-10-CM

## 2020-06-15 DIAGNOSIS — I47.20 VENTRICULAR TACHYCARDIA (H): ICD-10-CM

## 2020-06-15 DIAGNOSIS — I42.2 HYPERTROPHIC CARDIOMYOPATHY (H): ICD-10-CM

## 2020-06-15 LAB
ANION GAP SERPL CALCULATED.3IONS-SCNC: 3 MMOL/L (ref 3–14)
BUN SERPL-MCNC: 17 MG/DL (ref 7–30)
CALCIUM SERPL-MCNC: 9.1 MG/DL (ref 8.5–10.1)
CHLORIDE SERPL-SCNC: 108 MMOL/L (ref 94–109)
CO2 SERPL-SCNC: 28 MMOL/L (ref 20–32)
CREAT SERPL-MCNC: 1.07 MG/DL (ref 0.66–1.25)
GFR SERPL CREATININE-BSD FRML MDRD: 72 ML/MIN/{1.73_M2}
GLUCOSE SERPL-MCNC: 120 MG/DL (ref 70–99)
NT-PROBNP SERPL-MCNC: 1073 PG/ML (ref 0–125)
POTASSIUM SERPL-SCNC: 4.3 MMOL/L (ref 3.4–5.3)
SODIUM SERPL-SCNC: 139 MMOL/L (ref 133–144)

## 2020-06-15 PROCEDURE — 83880 ASSAY OF NATRIURETIC PEPTIDE: CPT | Performed by: INTERNAL MEDICINE

## 2020-06-15 PROCEDURE — 80048 BASIC METABOLIC PNL TOTAL CA: CPT | Performed by: INTERNAL MEDICINE

## 2020-06-15 PROCEDURE — 36415 COLL VENOUS BLD VENIPUNCTURE: CPT | Performed by: INTERNAL MEDICINE

## 2020-06-17 ENCOUNTER — CARE COORDINATION (OUTPATIENT)
Dept: CARDIOLOGY | Facility: CLINIC | Age: 65
End: 2020-06-17

## 2020-06-23 NOTE — PROGRESS NOTES
Called to discuss paperwork needs with Haroon. Agreed that I will follow up with Dr. Ware on Friday when we are in clinic together, have the forms completed, and be in touch with him. Patient verbalized understanding and is in agreement to the plan.

## 2020-06-26 NOTE — PROGRESS NOTES
Reviewed paperwork with Gilma Caputo fax to the [facility]. Patient verbalized understanding and is in agreement to the plan.

## 2020-07-07 DIAGNOSIS — F51.04 CHRONIC INSOMNIA: ICD-10-CM

## 2020-07-08 RX ORDER — ZOLPIDEM TARTRATE 10 MG/1
TABLET ORAL
Qty: 30 TABLET | Refills: 0 | OUTPATIENT
Start: 2020-07-08

## 2020-07-08 NOTE — TELEPHONE ENCOUNTER
Patient Requested  zolpidem (AMBIEN) 10 MG tablet  Last Filled  05/04/2020  Last Office Visit  12/17/2020     Next Office Visit  Nothing scheduled   Checked  07/08/2020    DX: Chronic Zolpidem use for insomnia     Pharmacy: [facility] - [place], [place] - [address] at 10:30 AM on 7/8/2020.

## 2020-07-09 ENCOUNTER — ALLIED HEALTH/NURSE VISIT (OUTPATIENT)
Dept: NURSING | Facility: CLINIC | Age: 65
End: 2020-07-09
Payer: COMMERCIAL

## 2020-07-09 DIAGNOSIS — Z23 NEED FOR VACCINATION: Primary | ICD-10-CM

## 2020-07-09 PROCEDURE — 90471 IMMUNIZATION ADMIN: CPT

## 2020-07-09 PROCEDURE — 90750 HZV VACC RECOMBINANT IM: CPT

## 2020-07-09 PROCEDURE — 99207 ZZC NO CHARGE LOS: CPT

## 2020-07-09 NOTE — NURSING NOTE
Prior to immunization administration, verified patients identity using patient s name and date of birth. Please see Immunization Activity for additional information.     Screening Questionnaire for Adult Immunization    Are you sick today?   No   Do you have allergies to medications, food, a vaccine component or latex?   No   Have you ever had a serious reaction after receiving a vaccination?   No   Do you have a long-term health problem with heart, lung, kidney, or metabolic disease (e.g., diabetes), asthma, a blood disorder, no spleen, complement component deficiency, a cochlear implant, or a spinal fluid leak?  Are you on long-term aspirin therapy?   No   Do you have cancer, leukemia, HIV/AIDS, or any other immune system problem?   No   Do you have a parent, brother, or sister with an immune system problem?   No   In the past 3 months, have you taken medications that affect  your immune system, such as prednisone, other steroids, or anticancer drugs; drugs for the treatment of rheumatoid arthritis, Crohn s disease, or psoriasis; or have you had radiation treatments?   No   Have you had a seizure, or a brain or other nervous system problem?   No   During the past year, have you received a transfusion of blood or blood    products, or been given immune (gamma) globulin or antiviral drug?   No   For women: Are you pregnant or is there a chance you could become       pregnant during the next month?   No   Have you received any vaccinations in the past 4 weeks?   No     Immunization questionnaire answers were all negative.        Per orders of Dr. Ziegler, injection of Shingles was given by Ngoc Lopez LPN. Patient instructed to remain in clinic for 15 minutes afterwards, and to report any adverse reaction to me immediately.       Screening performed by Ngoc Lopez LPN on 7/9/2020 at 2:35 PM.

## 2020-07-13 ENCOUNTER — OFFICE VISIT (OUTPATIENT)
Dept: FAMILY MEDICINE | Facility: CLINIC | Age: 65
End: 2020-07-13
Payer: COMMERCIAL

## 2020-07-13 VITALS
TEMPERATURE: 97.6 F | RESPIRATION RATE: 14 BRPM | SYSTOLIC BLOOD PRESSURE: 138 MMHG | WEIGHT: 210 LBS | BODY MASS INDEX: 29.29 KG/M2 | DIASTOLIC BLOOD PRESSURE: 78 MMHG | HEART RATE: 60 BPM | OXYGEN SATURATION: 96 %

## 2020-07-13 DIAGNOSIS — R05.9 COUGH: Primary | ICD-10-CM

## 2020-07-13 DIAGNOSIS — E11.9 TYPE 2 DIABETES MELLITUS WITHOUT COMPLICATION, WITHOUT LONG-TERM CURRENT USE OF INSULIN (H): ICD-10-CM

## 2020-07-13 DIAGNOSIS — Z96.642 HISTORY OF TOTAL LEFT HIP ARTHROPLASTY: ICD-10-CM

## 2020-07-13 DIAGNOSIS — F51.04 CHRONIC INSOMNIA: ICD-10-CM

## 2020-07-13 LAB — HBA1C MFR BLD: 5.9 % (ref 0–5.6)

## 2020-07-13 PROCEDURE — 82043 UR ALBUMIN QUANTITATIVE: CPT | Performed by: PHYSICIAN ASSISTANT

## 2020-07-13 PROCEDURE — 36415 COLL VENOUS BLD VENIPUNCTURE: CPT | Performed by: PHYSICIAN ASSISTANT

## 2020-07-13 PROCEDURE — 83036 HEMOGLOBIN GLYCOSYLATED A1C: CPT | Performed by: PHYSICIAN ASSISTANT

## 2020-07-13 PROCEDURE — 99214 OFFICE O/P EST MOD 30 MIN: CPT | Performed by: PHYSICIAN ASSISTANT

## 2020-07-13 PROCEDURE — 80061 LIPID PANEL: CPT | Performed by: PHYSICIAN ASSISTANT

## 2020-07-13 RX ORDER — AMOXICILLIN 500 MG/1
TABLET, FILM COATED ORAL
Qty: 12 TABLET | Refills: 3 | Status: SHIPPED | OUTPATIENT
Start: 2020-07-13 | End: 2021-07-27

## 2020-07-13 RX ORDER — ZOLPIDEM TARTRATE 10 MG/1
5 TABLET ORAL
Qty: 90 TABLET | Refills: 0 | Status: SHIPPED | OUTPATIENT
Start: 2020-07-13 | End: 2020-12-16

## 2020-07-13 RX ORDER — DOXYCYCLINE HYCLATE 100 MG
100 TABLET ORAL 2 TIMES DAILY
Qty: 14 TABLET | Refills: 0 | Status: SHIPPED | OUTPATIENT
Start: 2020-07-13 | End: 2020-08-03

## 2020-07-13 NOTE — TELEPHONE ENCOUNTER
RN send med refill for amoxicillin per standing order.    RN notified patient via [employer].    Jo-Ann Lora RN

## 2020-07-13 NOTE — PROGRESS NOTES
Subjective     Stu Meredith is a 64 year old male who presents to clinic today for the following health issues:    HPI   Diabetes Follow-up      How often are you checking your blood sugar? Not at all    What concerns do you have today about your diabetes? None     Do you have any of these symptoms? (Select all that apply)  Numbness in feet and Burning in feet    Have you had a diabetic eye exam in the last 12 months? Due for one    BP Readings from Last 2 Encounters:   07/13/20 138/78   05/16/20 127/68     Hemoglobin A1C (%)   Date Value   12/17/2019 6.7 (H)   05/10/2019 6.8 (H)     LDL Cholesterol Calculated (mg/dL)   Date Value   05/10/2019 68   12/26/2017 67               Hyperlipidemia Follow-Up      Are you regularly taking any medication or supplement to lower your cholesterol?   Yes- lipitor    Are you having muscle aches or other side effects that you think could be caused by your cholesterol lowering medication?  No    Heart Failure Follow-up  Are you experiencing any shortness of breath? Yes, with activity only   How would you describe your shortness of breath?  Same as usual        Are you experiencing any swelling in your legs or feet?  Yes-last week but maybe because of weather    Are you using more pillows than usual? No    Do you cough at night?  Yes-mild if any but notices wheezing    Do you check your weight daily?  No    Have you had a weight change recently?  no    Are you having any of the following side effects from your medications? (Select all that apply)  Fatigue and Cough    Since your last visit, how many times have you gone to the cardiologist, urgent care, emergency room, or hospital because of your heart failure?   None      How many servings of fruits and vegetables do you eat daily?  0-1    On average, how many sweetened beverages do you drink each day (Examples: soda, juice, sweet tea, etc.  Do NOT count diet or artificially sweetened beverages)?   0    How many days per week do  you exercise enough to make your heart beat faster? 3 or less    How many minutes a day do you exercise enough to make your heart beat faster? 20 - 29    How many days per week do you miss taking your medication? 0    Cough-2 months after ablation 5/14/2020, coughing up white mucus, s.o.b, no chest pain, but does wheeze,   Recent Labs   Lab Test 06/15/20  0928 05/15/20  2156  12/17/19  1300  05/10/19  1350 02/15/19  0740  12/26/17  0852  07/31/16  0655  07/01/15  1526   A1C  --   --   --  6.7*  --  6.8* 7.0*  --   --   --   --    < >  --    LDL  --   --   --   --   --  68  --   --  67  --  41   < >  --    HDL  --   --   --   --   --  54  --   --  60  --  56   < >  --    TRIG  --   --   --   --   --  102  --   --  137  --  67   < >  --    ALT  --  39  --  30  --   --  38   < > 38  --   --    < > 73*   CR 1.07 1.12   < > 1.03   < >  --  0.87   < > 1.01   < > 0.82   < > 0.98   GFRESTIMATED 72 68   < > 76   < >  --  >90   < > 75   < > >90  Non  GFR Calc     < > 78   GFRESTBLACK 84 79   < > 88   < >  --  >90   < > >90   < > >90  African American GFR Calc     < > >90   GFR Calc     POTASSIUM 4.3 4.2   < > 4.2   < >  --  4.5   < > 4.4   < > 3.8   < > 4.2   TSH  --   --   --   --   --   --  1.58  --   --   --   --   --  1.47    < > = values in this interval not displayed.      BP Readings from Last 3 Encounters:   07/13/20 138/78   05/16/20 127/68   05/14/20 106/69    Wt Readings from Last 3 Encounters:   07/13/20 95.3 kg (210 lb)   05/16/20 98.1 kg (216 lb 4.8 oz)   05/14/20 97.8 kg (215 lb 9.8 oz)                      Reviewed and updated as needed this visit by Provider  Tobacco  Allergies  Meds  Problems  Med Hx  Surg Hx  Fam Hx         Additional complaints: None    HPI additional notes: Haroon presents today with   Chief Complaint   Patient presents with     Cough   Cardiology appointment in Sept.         Review of Systems   C: Negative for fever, chills, recent change in  weight  Skin: Negative for worrisome rashes or lesions  ENT: Negative for ear, mouth and throat problems  Resp: POSITIVE for cough occasionally productive with  SOB and wheezing  CV: Negative for chest pain or peripheral edema  GI: Negative for nausea, abdominal pain, heartburn, or change in bowel habits  MS: Negative for significant arthralgias or myalgias  P: Negative for changes in mood or affect  ROS all other systems negative.        Objective    /78   Pulse 60   Temp 97.6  F (36.4  C) (Tympanic)   Resp 14   Wt 95.3 kg (210 lb)   SpO2 96%   BMI 29.29 kg/m    Body mass index is 29.29 kg/m .  Physical Exam   GENERAL: healthy, alert, in no acute distress  EYES: Grossly normal to inspection, EOMI, PERRL  HENT: Ear canals normal; TMs pearly gray without effusion. Nasal mucosa moist without edema or discharge. Oral mucous membranes moist, no lesions or ulcerations. Pharynx pink.  Uvula midline.  No postnasal drainage. Sinuses non-tender to palpation.  NECK: Non-tender, no adenopathy.  RESP: no rales or rhonchi, expiratory wheezes bilateral and cough with deep inspiration   CV: regular rate and rhythm, normal S1 S2. No peripheral edema.  SKIN: no suspicious lesions, no rashes  PSYCH: Alert and oriented times 3;  Able to articulate logical thoughts. Affect is normal.    Diagnostic test results:  Pending        Assessment & Plan         ICD-10-CM    1. Cough  R05 doxycycline hyclate (VIBRA-TABS) 100 MG tablet     CANCELED: Spirometry, Breathing Capacity: Normal Order, Clinic Performed   2. Type 2 diabetes mellitus without complication, without long-term current use of insulin (H)  E11.9 Lipid panel reflex to direct LDL Non-fasting     Hemoglobin A1c     Albumin Random Urine Quantitative with Creat Ratio   3. Chronic insomnia  F51.04 zolpidem (AMBIEN) 10 MG tablet     Refill on ambien.  Try doxycycline for cough, if not improving in one week will need spirometry, can use albuterol at home for wheezing.  Will  update diabetic labs today.    Please see patient instructions for treatment details.    Return in about 1 week (around 7/20/2020) for Recheck if not improving, phone call to clinic.    Sarah Thomas PA-C  [facility]

## 2020-07-14 LAB
CHOLEST SERPL-MCNC: 150 MG/DL
CREAT UR-MCNC: 55 MG/DL
HDLC SERPL-MCNC: 51 MG/DL
LDLC SERPL CALC-MCNC: 79 MG/DL
MICROALBUMIN UR-MCNC: <5 MG/L
MICROALBUMIN/CREAT UR: NORMAL MG/G CR (ref 0–17)
NONHDLC SERPL-MCNC: 99 MG/DL
TRIGL SERPL-MCNC: 99 MG/DL

## 2020-07-14 NOTE — RESULT ENCOUNTER NOTE
Tony Patiño,    I just wanted to let you know that your lab results have been reviewed and are attached.    - Your lab results look great; everything is normal.  Your A1c looks wonderful.    Please let me know if you have any questions and have a great week!    Sincerely,    Julia Thomas PA-C    Family Medicine  [facility]  [address]  [place] [address]  [phone] (p)

## 2020-07-22 ENCOUNTER — ANCILLARY PROCEDURE (OUTPATIENT)
Dept: CT IMAGING | Facility: CLINIC | Age: 65
End: 2020-07-22
Attending: INTERNAL MEDICINE
Payer: COMMERCIAL

## 2020-07-22 ENCOUNTER — VIRTUAL VISIT (OUTPATIENT)
Dept: PULMONOLOGY | Facility: CLINIC | Age: 65
End: 2020-07-22
Attending: INTERNAL MEDICINE
Payer: COMMERCIAL

## 2020-07-22 DIAGNOSIS — J20.9 ACUTE BRONCHITIS WITH SYMPTOMS > 10 DAYS: Primary | ICD-10-CM

## 2020-07-22 DIAGNOSIS — R91.8 LUNG NODULES: ICD-10-CM

## 2020-07-22 DIAGNOSIS — Z87.891 FORMER SMOKER, STOPPED SMOKING IN DISTANT PAST: ICD-10-CM

## 2020-07-22 RX ORDER — PREDNISONE 20 MG/1
20 TABLET ORAL DAILY
Qty: 5 TABLET | Refills: 0 | Status: SHIPPED | OUTPATIENT
Start: 2020-07-22 | End: 2020-08-03

## 2020-07-22 NOTE — LETTER
7/22/2020       RE: Stu Meredith  [address] [id number]     Dear Colleague,    Thank you for referring your patient, Stu Meredith, to the [facility] at [facility]. Please see a copy of my visit note below.    Stu Meredith is a 64 year old male who is being evaluated via a billable telephone visit.          [facility] Cancer Care Nodule Clinic Follow Up Visit    Reason for Visit  Stu Meredith is a 64 year old male who is followed for abnormal lung cancer screening exam  Pulmonary HPI    Recently had catheter ablation for AF complicated by pulmonary edema, hospitalization postop. Since then he has had a cough with white phlegm, he recently completed a course of doxycycline which made some partial improvement. He has had annual episodes of bronchitis, he now has wheezing. It's not normal for him to have wheezing.  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    Other active medical problems include:   - atrial fibrillation s/p ablations    - hypertrophic cardiomyopathy    Exposure history: Denies asbestos or radon exposure   TB risk factors: No  Prior Imaging:Yes  Constitutional Symptoms: No  Personal history of cancer:No  Up to date on age-appropriate cancer screening:Yes    ROS Pulmonary  Dyspnea: No, Cough: Yes, Chest pain: No, Wheezing: No, Sputum Production: No, Hemoptysis: No  A complete ROS was otherwise negative except as noted in the HPI.  The patient was seen and examined by Andreia Julien MD   Current Outpatient Medications   Medication     acetaminophen (TYLENOL) 325 MG tablet     albuterol (PROAIR HFA/PROVENTIL HFA/VENTOLIN HFA) 108 (90 Base) MCG/ACT inhaler     atorvastatin (LIPITOR) 20 MG tablet     metFORMIN (GLUCOPHAGE-XR) 500 MG 24 hr tablet     metoprolol succinate (TOPROL-XL) 50 MG 24 hr tablet      multivitamin, therapeutic (THERA-VIT) TABS     sotalol (BETAPACE) 120 MG tablet     spironolactone (ALDACTONE) 50 MG tablet     XARELTO 20 MG TABS tablet     zolpidem (AMBIEN) 10 MG tablet       No Known Allergies  Social History     Socioeconomic History     Marital status:      Spouse name: Not on file     Number of children: Not on file     Years of education: Not on file     Highest education level: Not on file   Occupational History     Occupation:      Employer: [employer]   Social Needs     Financial resource strain: Not on file     Food insecurity     Worry: Not on file     Inability: Not on file     Transportation needs     Medical: Not on file     Non-medical: Not on file   Tobacco Use     Smoking status: Former Smoker     Packs/day: 1.00     Years: 40.00     Pack years: 40.00     Types: Cigarettes     Start date: 1975     Last attempt to quit: 2015     Years since quittin.6     Smokeless tobacco: Never Used   Substance and Sexual Activity     Alcohol use: Yes     Alcohol/week: 0.0 standard drinks     Comment: very minimal     Drug use: No     Sexual activity: Yes     Partners: Female   Lifestyle     Physical activity     Days per week: Not on file     Minutes per session: Not on file     Stress: Not on file   Relationships     Social connections     Talks on phone: Not on file     Gets together: Not on file     Attends Sikh service: Not on file     Active member of club or organization: Not on file     Attends meetings of clubs or organizations: Not on file     Relationship status: Not on file     Intimate partner violence     Fear of current or ex partner: Not on file     Emotionally abused: Not on file     Physically abused: Not on file     Forced sexual activity: Not on file   Other Topics Concern      Service Not Asked     Blood Transfusions Not Asked     Caffeine Concern Not Asked     Occupational Exposure Not Asked     Hobby Hazards Not Asked      Sleep Concern Not Asked     Stress Concern Not Asked     Weight Concern Not Asked     Special Diet Not Asked     Back Care Not Asked     Exercise No     Bike Helmet Not Asked     Seat Belt Yes     Self-Exams Not Asked     Parent/sibling w/ CABG, MI or angioplasty before 65F 55M? No   Social History Narrative     Not on file     Past Medical History:   Diagnosis Date     Atrial fibrillation (H) 12/9/11    failed medication, multiple DC cardioveresions; s/p Left atrial ablation to eliminate atrial fibrillation 12/9/11     Chest pain      CHF (congestive heart failure) (H) 7/30/2016     Coronary artery disease      DJD (degenerative joint disease)      Fatigue 7/15/2019     Hip arthritis 1/15/2014     Hypertension      Hypertrophic cardiomyopathy (H) 10/09     Other abnormal heart sounds      Pacemaker     ICD     Palpitations      Pneumonia, organism unspecified(486)      Prediabetes 7/10/15    A1C 6.5     Status post implantation of automatic cardioverter/defibrillator (AICD)      Type 2 diabetes mellitus without complication, without long-term current use of insulin (H) 7/13/2020     Ventricular tachycardia (H)      Past Surgical History:   Procedure Laterality Date     ANESTHESIA CARDIOVERSION  4/24/2014    Procedure: ANESTHESIA CARDIOVERSION;  Surgeon: Generic Anesthesia Provider;  [facility]     ANESTHESIA CARDIOVERSION N/A 5/12/2016    Procedure: ANESTHESIA CARDIOVERSION;  Surgeon: GENERIC ANESTHESIA PROVIDER;  [facility]     ANESTHESIA CARDIOVERSION N/A 8/7/2017    Procedure: ANESTHESIA CARDIOVERSION;  Anesthesia Offsite Coverage Cardioversion @1100;  Surgeon: GENERIC ANESTHESIA PROVIDER;  [facility]     ANESTHESIA CARDIOVERSION N/A 1/3/2018    Procedure: ANESTHESIA CARDIOVERSION;  Anesthesia Cardioverion;  Surgeon: GENERIC ANESTHESIA PROVIDER;  [facility]     ANESTHESIA CARDIOVERSION N/A 5/4/2018    Procedure: ANESTHESIA CARDIOVERSION;  Anesthesia Coverage Cardioversion @1400;  Surgeon:  GENERIC ANESTHESIA PROVIDER;  [facility]     ANESTHESIA CARDIOVERSION N/A 9/27/2018    Procedure: ANESTHESIA CARDIOVERSION;  Anesthesia Cardioversion @0930;  Surgeon: GENERIC ANESTHESIA PROVIDER;  [facility]     ANESTHESIA CARDIOVERSION N/A 12/20/2018    Procedure: Anesthesia Coverage Cardioversion @0830;  Surgeon: GENERIC ANESTHESIA PROVIDER;  [facility]     ANESTHESIA CARDIOVERSION N/A 8/21/2019    Procedure: Anesthesia Coverage Cardioversion @0800;  Surgeon: GENERIC ANESTHESIA PROVIDER;  [facility]     ANESTHESIA CARDIOVERSION N/A 1/16/2020    Procedure: ANESTHESIA, FOR CARDIOVERSION;  Surgeon: GENERIC ANESTHESIA PROVIDER;  [facility]     ARTHROPLASTY HIP  1/15/2014    Procedure: ARTHROPLASTY HIP;  Left Total Hip Arthroplasty;  Surgeon: Nelson Gaspar MD;  [facility]     ARTHROPLASTY HIP Right 6/5/2019    Procedure: Right Total Hip Arthroplasty;  Surgeon: Nelson Gaspar MD;  [facility]     CARDIAC SURGERY       COLONOSCOPY N/A 5/18/2018    Procedure: COMBINED COLONOSCOPY, SINGLE OR MULTIPLE BIOPSY/POLYPECTOMY BY BIOPSY;  COLONOSCOPY (PT HAS DEFIBRILLATOR) ;  Surgeon: Mike iNckerson MD;  Location:  GI     CV RIGHT HEART CATH N/A 8/19/2019    Procedure: CV RIGHT HEART CATH;  Surgeon: Cisco Rausch MD;  Location:  HEART CARDIAC CATH LAB     CV RIGHT HEART CATH N/A 8/21/2019    Procedure: Right Heart Cath;  Surgeon: Ciaran Burton MD;  Location:  HEART CARDIAC CATH LAB     EP ABLATION FOCAL AFIB N/A 5/14/2020    Procedure: EP ABLATION FOCAL AFIB;  Surgeon: Erik Cooper MD;  Location:  HEART CARDIAC CATH LAB     H ABLATION FOCAL AFIB  12/9/11    Left atrial ablation to eliminate atrial fibrillation     IMPLANT AUTOMATIC IMPLANTABLE CARDIOVERTER DEFIBRILLATOR  7/27/12    AICD implantation     TONSILLECTOMY  1964     Family History   Problem Relation Age of Onset     Heart Disease Mother         unknown     Obesity Mother      Gastrointestinal Disease Mother          diverticulitis     Diabetes Maternal Grandmother      Diabetes Paternal Grandmother      Cerebrovascular Disease Paternal Grandmother 94     Diabetes Paternal Grandfather      Cerebrovascular Disease Paternal Grandfather 78     Diabetes Son      C.A.D. Father         CABG age 78     Heart Disease Father         CABG x5     Diabetes Maternal Grandfather      LUNG DISEASE No family hx of      Deep Vein Thrombosis (DVT) No family hx of      Anesthesia Reaction No family hx of      Melanoma No family hx of      Skin Cancer No family hx of      Exam:   There were no vitals taken for this visit.  Results:  - My interpretation of the images relevant for this visit includes: CT chest today images reviewed and compared to priors in July, May, March 2019.  The right upper lobe nodule appears approximately stable, compared to the most recent CT there is a 2 mm increase in the short axis, but compared to early 2019 CTs, it is pretty similar in size and appearance.  - My interpretation of the PFT's relevant for this visit includes: Normal in 2013      Assessment and plan: Haroon is a 64-year-old male being evaluated for abnormal lung cancer screening CT.  Lung nodule - seen on screening CT, unfortunately he was just getting over bronchitis and received antibiotic therapy about 10 days prior to the CT.   Follow up CT in about 6 months.  If elective CTs are being deferred at that time, would probably be okay to defer this one.    Acute bronchitis-he has a history of approximately annual episodes of bronchitis.  He had partial improvement with recent antibiotics but continues to have some cough and wheezing.  -I am sending a prescription for 5-day course of prednisone to help him completely recover from this bronchitis.         Again, thank you for allowing me to participate in the care of your patient.      Sincerely,    Andreia Julien MD

## 2020-07-22 NOTE — PROGRESS NOTES
"Stu Meredith is a 64 year old male who is being evaluated via a billable telephone visit.      The patient has been notified of following:     \"This telephone visit will be conducted via a call between you and your physician/provider. We have found that certain health care needs can be provided without the need for a physical exam.  This service lets us provide the care you need with a short phone conversation.  If a prescription is necessary we can send it directly to your pharmacy.  If lab work is needed we can place an order for that and you can then stop by our lab to have the test done at a later time.    Telephone visits are billed at different rates depending on your insurance coverage. During this emergency period, for some insurers they may be billed the same as an in-person visit.  Please reach out to your insurance provider with any questions.    If during the course of the call the physician/provider feels a telephone visit is not appropriate, you will not be charged for this service.\"    Patient has given verbal consent for Telephone visit?  Yes    What phone number would you like to be contacted at? [phone]    How would you like to obtain your AVS? Derian Hirsch LPN    Phone call duration: 15 minutes    Andreia Julien MD    [facility] Cancer Care Nodule Clinic Follow Up Visit    Reason for Visit  Stu Meredith is a 64 year old male who is followed for abnormal lung cancer screening exam  Pulmonary HPI    Recently had catheter ablation for AF complicated by pulmonary edema, hospitalization postop. Since then he has had a cough with white phlegm, he recently completed a course of doxycycline which made some partial improvement. He has had annual episodes of bronchitis, he now has wheezing. It's not normal for him to have wheezing.  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    Other active medical problems include:   - atrial fibrillation s/p ablations    - hypertrophic cardiomyopathy    Exposure history: Denies asbestos or radon exposure   TB risk factors: No  Prior Imaging:Yes  Constitutional Symptoms: No  Personal history of cancer:No  Up to date on age-appropriate cancer screening:Yes    ROS Pulmonary  Dyspnea: No, Cough: Yes, Chest pain: No, Wheezing: No, Sputum Production: No, Hemoptysis: No  A complete ROS was otherwise negative except as noted in the HPI.  The patient was seen and examined by Andreia Julien MD   Current Outpatient Medications   Medication     acetaminophen (TYLENOL) 325 MG tablet     albuterol (PROAIR HFA/PROVENTIL HFA/VENTOLIN HFA) 108 (90 Base) MCG/ACT inhaler     atorvastatin (LIPITOR) 20 MG tablet     metFORMIN (GLUCOPHAGE-XR) 500 MG 24 hr tablet     metoprolol succinate (TOPROL-XL) 50 MG 24 hr tablet     multivitamin, therapeutic (THERA-VIT) TABS     sotalol (BETAPACE) 120 MG tablet     spironolactone (ALDACTONE) 50 MG tablet     XARELTO 20 MG TABS tablet     zolpidem (AMBIEN) 10 MG tablet       No Known Allergies  Social History     Socioeconomic History     Marital status:      Spouse name: Not on file     Number of children: Not on file     Years of education: Not on file     Highest education level: Not on file   Occupational History     Occupation:      Employer: [employer]   Social Needs     Financial resource strain: Not on file     Food insecurity     Worry: Not on file     Inability: Not on file     Transportation needs     Medical: Not on file     Non-medical: Not on file   Tobacco Use     Smoking status: Former Smoker     Packs/day: 1.00     Years: 40.00     Pack years: 40.00     Types: Cigarettes     Start date: 1975     Last attempt to quit: 2015     Years since quittin.6     Smokeless tobacco: Never Used   Substance and Sexual Activity     Alcohol use: Yes     Alcohol/week: 0.0 standard  drinks     Comment: very minimal     Drug use: No     Sexual activity: Yes     Partners: Female   Lifestyle     Physical activity     Days per week: Not on file     Minutes per session: Not on file     Stress: Not on file   Relationships     Social connections     Talks on phone: Not on file     Gets together: Not on file     Attends Advent service: Not on file     Active member of club or organization: Not on file     Attends meetings of clubs or organizations: Not on file     Relationship status: Not on file     Intimate partner violence     Fear of current or ex partner: Not on file     Emotionally abused: Not on file     Physically abused: Not on file     Forced sexual activity: Not on file   Other Topics Concern      Service Not Asked     Blood Transfusions Not Asked     Caffeine Concern Not Asked     Occupational Exposure Not Asked     Hobby Hazards Not Asked     Sleep Concern Not Asked     Stress Concern Not Asked     Weight Concern Not Asked     Special Diet Not Asked     Back Care Not Asked     Exercise No     Bike Helmet Not Asked     Seat Belt Yes     Self-Exams Not Asked     Parent/sibling w/ CABG, MI or angioplasty before 65F 55M? No   Social History Narrative     Not on file     Past Medical History:   Diagnosis Date     Atrial fibrillation (H) 12/9/11    failed medication, multiple DC cardioveresions; s/p Left atrial ablation to eliminate atrial fibrillation 12/9/11     Chest pain      CHF (congestive heart failure) (H) 7/30/2016     Coronary artery disease      DJD (degenerative joint disease)      Fatigue 7/15/2019     Hip arthritis 1/15/2014     Hypertension      Hypertrophic cardiomyopathy (H) 10/09     Other abnormal heart sounds      Pacemaker     ICD     Palpitations      Pneumonia, organism unspecified(486)      Prediabetes 7/10/15    A1C 6.5     Status post implantation of automatic cardioverter/defibrillator (AICD)      Type 2 diabetes mellitus without complication, without  long-term current use of insulin (H) 7/13/2020     Ventricular tachycardia (H)      Past Surgical History:   Procedure Laterality Date     ANESTHESIA CARDIOVERSION  4/24/2014    Procedure: ANESTHESIA CARDIOVERSION;  Surgeon: Generic Anesthesia Provider;  [facility]     ANESTHESIA CARDIOVERSION N/A 5/12/2016    Procedure: ANESTHESIA CARDIOVERSION;  Surgeon: GENERIC ANESTHESIA PROVIDER;  [facility]     ANESTHESIA CARDIOVERSION N/A 8/7/2017    Procedure: ANESTHESIA CARDIOVERSION;  Anesthesia Offsite Coverage Cardioversion @1100;  Surgeon: GENERIC ANESTHESIA PROVIDER;  [facility]     ANESTHESIA CARDIOVERSION N/A 1/3/2018    Procedure: ANESTHESIA CARDIOVERSION;  Anesthesia Cardioverion;  Surgeon: GENERIC ANESTHESIA PROVIDER;  [facility]     ANESTHESIA CARDIOVERSION N/A 5/4/2018    Procedure: ANESTHESIA CARDIOVERSION;  Anesthesia Coverage Cardioversion @1400;  Surgeon: GENERIC ANESTHESIA PROVIDER;  [facility]     ANESTHESIA CARDIOVERSION N/A 9/27/2018    Procedure: ANESTHESIA CARDIOVERSION;  Anesthesia Cardioversion @0930;  Surgeon: GENERIC ANESTHESIA PROVIDER;  [facility]     ANESTHESIA CARDIOVERSION N/A 12/20/2018    Procedure: Anesthesia Coverage Cardioversion @0830;  Surgeon: GENERIC ANESTHESIA PROVIDER;  [facility]     ANESTHESIA CARDIOVERSION N/A 8/21/2019    Procedure: Anesthesia Coverage Cardioversion @0800;  Surgeon: GENERIC ANESTHESIA PROVIDER;  [facility]     ANESTHESIA CARDIOVERSION N/A 1/16/2020    Procedure: ANESTHESIA, FOR CARDIOVERSION;  Surgeon: GENERIC ANESTHESIA PROVIDER;  [facility]     ARTHROPLASTY HIP  1/15/2014    Procedure: ARTHROPLASTY HIP;  Left Total Hip Arthroplasty;  Surgeon: Nelson Gaspar MD;  [facility]     ARTHROPLASTY HIP Right 6/5/2019    Procedure: Right Total Hip Arthroplasty;  Surgeon: Nelson Gaspar MD;  [facility]     CARDIAC SURGERY       COLONOSCOPY N/A 5/18/2018    Procedure: COMBINED COLONOSCOPY, SINGLE OR MULTIPLE  BIOPSY/POLYPECTOMY BY BIOPSY;  COLONOSCOPY (PT HAS DEFIBRILLATOR) ;  Surgeon: Mike Nickerson MD;  Location:  GI     CV RIGHT HEART CATH N/A 8/19/2019    Procedure: CV RIGHT HEART CATH;  Surgeon: Cisco Rausch MD;  Location:  HEART CARDIAC CATH LAB     CV RIGHT HEART CATH N/A 8/21/2019    Procedure: Right Heart Cath;  Surgeon: Ciaran Burton MD;  Location:  HEART CARDIAC CATH LAB     EP ABLATION FOCAL AFIB N/A 5/14/2020    Procedure: EP ABLATION FOCAL AFIB;  Surgeon: Erik Cooper MD;  Location:  HEART CARDIAC CATH LAB     H ABLATION FOCAL AFIB  12/9/11    Left atrial ablation to eliminate atrial fibrillation     IMPLANT AUTOMATIC IMPLANTABLE CARDIOVERTER DEFIBRILLATOR  7/27/12    AICD implantation     TONSILLECTOMY  1964     Family History   Problem Relation Age of Onset     Heart Disease Mother         unknown     Obesity Mother      Gastrointestinal Disease Mother         diverticulitis     Diabetes Maternal Grandmother      Diabetes Paternal Grandmother      Cerebrovascular Disease Paternal Grandmother 94     Diabetes Paternal Grandfather      Cerebrovascular Disease Paternal Grandfather 78     Diabetes Son      C.A.D. Father         CABG age 78     Heart Disease Father         CABG x5     Diabetes Maternal Grandfather      LUNG DISEASE No family hx of      Deep Vein Thrombosis (DVT) No family hx of      Anesthesia Reaction No family hx of      Melanoma No family hx of      Skin Cancer No family hx of      Exam:   There were no vitals taken for this visit.  Results:  - My interpretation of the images relevant for this visit includes: CT chest today images reviewed and compared to priors in July, May, March 2019.  The right upper lobe nodule appears approximately stable, compared to the most recent CT there is a 2 mm increase in the short axis, but compared to early 2019 CTs, it is pretty similar in size and appearance.  - My interpretation of the PFT's relevant for this visit includes: Normal in  2013      Assessment and plan: Haroon is a 64-year-old male being evaluated for abnormal lung cancer screening CT.  Lung nodule - seen on screening CT, unfortunately he was just getting over bronchitis and received antibiotic therapy about 10 days prior to the CT.   Follow up CT in about 6 months.  If elective CTs are being deferred at that time, would probably be okay to defer this one.    Acute bronchitis-he has a history of approximately annual episodes of bronchitis.  He had partial improvement with recent antibiotics but continues to have some cough and wheezing.  -I am sending a prescription for 5-day course of prednisone to help him completely recover from this bronchitis.

## 2020-07-27 ENCOUNTER — MYC REFILL (OUTPATIENT)
Dept: CARDIOLOGY | Facility: CLINIC | Age: 65
End: 2020-07-27

## 2020-07-27 DIAGNOSIS — R91.8 PULMONARY NODULES: Primary | ICD-10-CM

## 2020-07-27 DIAGNOSIS — I42.2 HYPERTROPHIC CARDIOMYOPATHY (H): ICD-10-CM

## 2020-07-27 RX ORDER — FUROSEMIDE 40 MG
40 TABLET ORAL DAILY
Qty: 90 TABLET | Refills: 2 | Status: CANCELLED | OUTPATIENT
Start: 2020-07-27

## 2020-07-27 NOTE — TELEPHONE ENCOUNTER
6/5/2020  [facility]       furosemide (LASIX) 40 MG tablet   Last Written Prescription Date:  2/29/20  Last Fill Quantity: 90,   # refills: 2    Routed because: per my chart pt states dose increased to 1.5 tabs. Not on med list. Thanks.

## 2020-07-28 ENCOUNTER — CARE COORDINATION (OUTPATIENT)
Dept: CARDIOLOGY | Facility: CLINIC | Age: 65
End: 2020-07-28

## 2020-07-28 RX ORDER — FUROSEMIDE 40 MG
40 TABLET ORAL DAILY
Qty: 90 TABLET | Refills: 3 | Status: SHIPPED | OUTPATIENT
Start: 2020-07-28 | End: 2021-08-30

## 2020-07-28 RX ORDER — FUROSEMIDE 20 MG
20 TABLET ORAL DAILY
Qty: 90 TABLET | Refills: 3 | Status: SHIPPED | OUTPATIENT
Start: 2020-07-28 | End: 2021-08-30

## 2020-07-28 NOTE — TELEPHONE ENCOUNTER
Called patient to notify him we were refilling prescription per Dr. Ware's 6/5/20 note, stating 40 mg each AM and 20 mg each PM. Provided callback number.

## 2020-08-03 ENCOUNTER — OFFICE VISIT (OUTPATIENT)
Dept: FAMILY MEDICINE | Facility: CLINIC | Age: 65
End: 2020-08-03
Payer: COMMERCIAL

## 2020-08-03 VITALS
WEIGHT: 213 LBS | RESPIRATION RATE: 16 BRPM | HEART RATE: 81 BPM | OXYGEN SATURATION: 97 % | DIASTOLIC BLOOD PRESSURE: 68 MMHG | TEMPERATURE: 97.6 F | BODY MASS INDEX: 29.71 KG/M2 | SYSTOLIC BLOOD PRESSURE: 122 MMHG

## 2020-08-03 DIAGNOSIS — L57.0 ACTINIC KERATOSIS: Primary | ICD-10-CM

## 2020-08-03 PROCEDURE — 99207 ZZC NO CHARGE LOS: CPT | Performed by: PHYSICIAN ASSISTANT

## 2020-08-03 PROCEDURE — 17000 DESTRUCT PREMALG LESION: CPT | Performed by: PHYSICIAN ASSISTANT

## 2020-08-03 RX ORDER — TRIAMCINOLONE ACETONIDE 1 MG/G
CREAM TOPICAL
Qty: 30 G | Refills: 1 | Status: CANCELLED | OUTPATIENT
Start: 2020-08-03

## 2020-08-03 RX ORDER — FLUOROURACIL 50 MG/G
CREAM TOPICAL 2 TIMES DAILY
Qty: 40 G | Refills: 1 | Status: CANCELLED | OUTPATIENT
Start: 2020-08-03

## 2020-08-03 RX ORDER — SILVER SULFADIAZINE 10 MG/G
CREAM TOPICAL 2 TIMES DAILY
Qty: 85 G | Refills: 0 | Status: CANCELLED | OUTPATIENT
Start: 2020-08-03 | End: 2020-08-10

## 2020-08-03 NOTE — PATIENT INSTRUCTIONS
Patient Education     Understanding Actinic Keratosis    Actinic keratosis is a skin growth caused by sun damage. These growths are not cancer, but they may develop into skin cancer (precancerous). Many people get these growths, especially as they age. This is because of cumulative sun exposure over years. Multiple growths are called actinic keratoses.  What causes actinic keratosis?  Sun damage causes actinic keratosis. These growths usually appear on skin that is most often exposed to the sun, such as the face, ears, or back of the hands. People who easily sunburn are likely to develop actinic keratoses. Actinic keratoses often appear later in life from cumulative, extensive sun exposure.  What are the symptoms of actinic keratosis?  Actinic keratosis growths may be described as:    Rough, like sandpaper    Wart-like    Scaly or scabby    More easily felt than seen  They may appear singly or in clusters. They may start out as red patches, and the skin around them may be discolored.  Actinic keratoses usually are not painful. For some people they may make the skin feel tender.  How is actinic keratosis treated?  Because actinic keratoses may develop into skin cancer, a healthcare provider should look at them. Your healthcare provider may choose to remove one or more of these growths. It may be examined under a microscope. This is called a biopsy. You may also wish to have actinic keratoses removed if they bother you. They can be removed in several ways:    By using a medicine on the skin such as 5 fluorouracil or imiquimod    By removing them with a scalpel    By freezing them with liquid nitrogen  How can I prevent actinic keratoses?  Avoiding sun damage to your skin is the best way to prevent actinic keratoses. Here are some ways to protect your skin:    Use sunscreen with an SPF of 30 or higher on exposed skin when you are outside.    Wear a hat to protect your face and scalp. Consider wearing clothing that  covers your arms and legs.    Avoid the sun in the middle of the day, when sunlight is most direct.    Be aware of how long you have been out in the sun. Reapply sunscreen according to package directions.    Do not use tanning beds.  What are the complications of actinic keratosis?  Actinic keratoses are a sign of skin damage. They may become cancerous. It s a good idea to ask your healthcare provider to check new skin growths. Report any skin problem that concerns you.  When should I call my healthcare provider?  Call your healthcare provider right away if any of these occur:    You have an actinic keratosis sore that does not respond to treatment    You have an actinic keratosis sore that does not heal within a few weeks, or heals and then comes back    You have a skin growth that is changing in size, shape, or color    You have a skin growth that looks different on one side from the other    You have a skin growth that is not the same color throughout   Date Last Reviewed: 5/1/2016 2000-2019 [employer]. [address] [place] [address], [place] [id number]. All rights reserved. This information is not intended as a substitute for professional medical care. Always follow your healthcare professional's instructions.

## 2020-08-03 NOTE — PROGRESS NOTES
Subjective     Stu Meredith is a 64 year old male who presents to clinic today for the following health issues:    HPI       Derm problem      Duration: 6 months    Description (location/character/radiation): pt has a spot on his forehead, right side, some pain when touched and sometimes scabs over    Intensity:  mild    Accompanying signs and symptoms: see above    History (similar episodes/previous evaluation): None    Precipitating or alleviating factors: None    Therapies tried and outcome: None       Reviewed and updated as needed this visit by Provider  Tobacco  Allergies  Meds  Problems  Med Hx  Surg Hx  Fam Hx         Additional complaints:     HPI additional notes: Haroon presents today with   Chief Complaint   Patient presents with     Derm Problem   scabbing lesion on right temple that he picks at and it returns.  History of sun exposure.  Has had lesions frozen off in the past.       Review of Systems   C: Negative for fever, chills, recent change in weight  Skin: POSITIVE for lesion. Negative for warmth, swelling or redness  ENT: Negative for ear, mouth and throat problems  MS: Negative for significant arthralgias or myalgias  P: Negative for changes in mood or affect      Objective    /68   Pulse 81   Temp 97.6  F (36.4  C) (Tympanic)   Resp 16   Wt 96.6 kg (213 lb)   SpO2 97%   BMI 29.71 kg/m    Body mass index is 29.71 kg/m .  Physical Exam   GENERAL: healthy, alert, in no acute distress  SKIN: 3 mm erythematous irregular shaped keratotic lesion on right temple  PSYCH: Alert and oriented times 3;  Able to articulate logical thoughts. Affect is normal.    Diagnostic Test Results:  none         Assessment & Plan         ICD-10-CM    1. Actinic keratosis  L57.0 DESTRUCT PREMALIGNANT LESION, FIRST   Discussed cryotherapy vs topical, due to localized lesion, cryotherapy is preferred.     Liquid nitrogen was applied for 3 seconds x3  to the skin lesions. The expected  blistering/scabbing reaction was explained. Patient is not to pick at the areas. Patient reminded to expect hypopigmented scars from the procedure. Return in 2-3 weeks for repeat treatment if lesions fail to fully resolve.        Please see patient instructions for treatment details.    Return in about 3 weeks (around 8/24/2020) for Cryotherapy if needed..    Sarah Thomas PA-C  [facility]

## 2020-08-10 ENCOUNTER — TELEPHONE (OUTPATIENT)
Dept: CARDIOLOGY | Facility: CLINIC | Age: 65
End: 2020-08-10

## 2020-08-10 NOTE — TELEPHONE ENCOUNTER
Haroon left VM requesting a call back to discuss his hours for work. Per pt the 20 hrs a week is not working. They had him working 25 hrs last week and have him working almost 40 this week.

## 2020-08-13 NOTE — TELEPHONE ENCOUNTER
Called Haroon to discuss his concerns. He noted it was not a good time to talk. Will call back later.

## 2020-08-13 NOTE — TELEPHONE ENCOUNTER
Called Haroon to discuss. Patient is having a difficult time managing stress of working. He is having a difficult time limiting it to 20 hours and is anxious about going back into Af[employer]. Discussed his concerns. Will make note of them and discuss in more detail with Dr. Ware, to be followed up at 9/11 appointment. Instructed patient to call back if he experiences symptoms. Patient verbalized understanding and is in agreement to the plan.

## 2020-09-09 ENCOUNTER — ANCILLARY PROCEDURE (OUTPATIENT)
Dept: CARDIOLOGY | Facility: CLINIC | Age: 65
End: 2020-09-09
Attending: INTERNAL MEDICINE
Payer: COMMERCIAL

## 2020-09-09 DIAGNOSIS — I42.2 HYPERTROPHIC CARDIOMYOPATHY (H): ICD-10-CM

## 2020-09-09 PROCEDURE — 93296 REM INTERROG EVL PM/IDS: CPT | Mod: ZF

## 2020-09-09 PROCEDURE — 93295 DEV INTERROG REMOTE 1/2/MLT: CPT | Performed by: INTERNAL MEDICINE

## 2020-09-09 NOTE — PROGRESS NOTES
Chief complaint: Follow-up for HCM    HPI:   64 year old male with history of HCM without obstruction (dx 2006, EF 20% improved to 45-50%), VT s/p ICD 2012, nonobstructive CAD (cath 2013), AF s/p PVI X 3 (2011, 2016, 2018) and multiple DCCV and recent ablation presents for ongoing evaluation and management.     Today patient complains of ongoing sob and mart.  He states that he has mart at 100-300 feet.  He notes increase in mart with bending and notes difficulty taking a deep breath. 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 No new abdominal bloating. No frequent palpitations - once in a while 5-10 seconds of palpitations.    He also notes abdominal distention/fullness and early satiety.  He denies any pnd or edema.  He notes only rare palpitations 3-4 brief episodes since ablation lasting less than 10 seconds.  Overall he reports he is feeling about the same since his ablation.  He denies any problems with his medications and reports compliance. BP at home is usually 110-120/60s.    PAST MEDICAL HISTORY:  Past Medical History:   Diagnosis Date     Atrial fibrillation (H) 12/9/11    failed medication, multiple DC cardioveresions; s/p Left atrial ablation to eliminate atrial fibrillation 12/9/11     Chest pain      CHF (congestive heart failure) (H) 7/30/2016     Coronary artery disease      DJD (degenerative joint disease)      Fatigue 7/15/2019     Hip arthritis 1/15/2014     Hypertension      Hypertrophic cardiomyopathy (H) 10/09     Other abnormal heart sounds      Pacemaker     ICD     Palpitations      Pneumonia, organism unspecified(486)      Prediabetes 7/10/15    A1C 6.5     Status post implantation of automatic cardioverter/defibrillator (AICD)      Type 2 diabetes mellitus without complication, without long-term current use of insulin (H) 7/13/2020     Ventricular tachycardia (H)        CURRENT MEDICATIONS:  Current Outpatient Medications   Medication Sig Dispense Refill     acetaminophen (TYLENOL) 325 MG tablet  Take 325-650 mg by mouth every 6 hours as needed       albuterol (PROAIR HFA/PROVENTIL HFA/VENTOLIN HFA) 108 (90 Base) MCG/ACT inhaler Inhale 2 puffs into the lungs every 6 hours 18 g 0     amoxicillin (AMOXIL) 500 MG tablet Take 4 tablets (2000 mg) by mouth 1 hour before dental procedures. 12 tablet 3     atorvastatin (LIPITOR) 20 MG tablet Take 1 tablet (20 mg) by mouth daily 90 tablet 3     cyanocobalamin (VITAMIN B-12) 100 MCG tablet Take 100 mcg by mouth daily       dabigatran ANTICOAGULANT (PRADAXA ANTICOAGULANT) 150 MG capsule Take 1 capsule (150 mg) by mouth 2 times daily Store in original 's bottle or blister pack; use within 120 days of opening. 60 capsule 0     dofetilide (TIKOSYN) 250 MCG capsule Take 1 capsule (250 mcg) by mouth every 12 hours 180 capsule 3     eplerenone (INSPRA) 50 MG tablet Take 1 tablet (50 mg) by mouth daily 90 tablet 3     furosemide (LASIX) 20 MG tablet Take 1 tablet (20 mg) by mouth daily In PM 90 tablet 3     furosemide (LASIX) 40 MG tablet Take 1 tablet (40 mg) by mouth daily In AM 90 tablet 3     gabapentin (NEURONTIN) 300 MG capsule Take 2 capsules (600 mg) by mouth 2 times daily 360 capsule 3     metFORMIN (GLUCOPHAGE-XR) 500 MG 24 hr tablet Take 2 tablets (1,000 mg) by mouth daily (with dinner) Take 2 tablets (1,000 mg) daily (with dinner). 180 tablet 3     metoprolol succinate ER (TOPROL-XL) 50 MG 24 hr tablet TAKE ONE TABLET BY MOUTH EVERY DAY 90 tablet 3     multivitamin, therapeutic (THERA-VIT) TABS Take 1 tablet by mouth daily       sildenafil (VIAGRA) 100 MG tablet Take 0.5-1 tablets ( mg) by mouth daily as needed (take 1 hour before intercourse) 30 tablet 12     sotalol (BETAPACE) 120 MG tablet   1     zolpidem (AMBIEN) 10 MG tablet Take 0.5 tablets (5 mg) by mouth nightly as needed for sleep 90 tablet 0       PAST SURGICAL HISTORY:  Past Surgical History:   Procedure Laterality Date     ANESTHESIA CARDIOVERSION  4/24/2014    Procedure: ANESTHESIA  CARDIOVERSION;  Surgeon: Generic Anesthesia Provider;  Location: [facility]     ANESTHESIA CARDIOVERSION N/A 5/12/2016    Procedure: ANESTHESIA CARDIOVERSION;  Surgeon: GENERIC ANESTHESIA PROVIDER;  Location: [facility]     ANESTHESIA CARDIOVERSION N/A 8/7/2017    Procedure: ANESTHESIA CARDIOVERSION;  Anesthesia Offsite Coverage Cardioversion @1100;  Surgeon: GENERIC ANESTHESIA PROVIDER;  Location: [facility]     ANESTHESIA CARDIOVERSION N/A 1/3/2018    Procedure: ANESTHESIA CARDIOVERSION;  Anesthesia Cardioverion;  Surgeon: GENERIC ANESTHESIA PROVIDER;  Location: [facility]     ANESTHESIA CARDIOVERSION N/A 5/4/2018    Procedure: ANESTHESIA CARDIOVERSION;  Anesthesia Coverage Cardioversion @1400;  Surgeon: GENERIC ANESTHESIA PROVIDER;  Location: [facility]     ANESTHESIA CARDIOVERSION N/A 9/27/2018    Procedure: ANESTHESIA CARDIOVERSION;  Anesthesia Cardioversion @0930;  Surgeon: GENERIC ANESTHESIA PROVIDER;  Location: [facility]     ANESTHESIA CARDIOVERSION N/A 12/20/2018    Procedure: Anesthesia Coverage Cardioversion @0830;  Surgeon: GENERIC ANESTHESIA PROVIDER;  Location: [facility]     ANESTHESIA CARDIOVERSION N/A 8/21/2019    Procedure: Anesthesia Coverage Cardioversion @0800;  Surgeon: GENERIC ANESTHESIA PROVIDER;  Location: [facility]     ANESTHESIA CARDIOVERSION N/A 1/16/2020    Procedure: ANESTHESIA, FOR CARDIOVERSION;  Surgeon: GENERIC ANESTHESIA PROVIDER;  Location: [facility]     ARTHROPLASTY HIP  1/15/2014    Procedure: ARTHROPLASTY HIP;  Left Total Hip Arthroplasty;  Surgeon: Nelson Gaspar MD;  Location: [facility]     ARTHROPLASTY HIP Right 6/5/2019    Procedure: Right Total Hip Arthroplasty;  Surgeon: Nelson Gaspar MD;  Location: [facility]     CARDIAC SURGERY       COLONOSCOPY N/A 5/18/2018    Procedure: COMBINED COLONOSCOPY, SINGLE OR MULTIPLE BIOPSY/POLYPECTOMY BY BIOPSY;  COLONOSCOPY (PT HAS DEFIBRILLATOR) ;  Surgeon: Mike Nickerson MD;  Location:  GI     CV RIGHT HEART CATH N/A 8/19/2019    Procedure: CV RIGHT HEART CATH;  Surgeon:  Cisco Rausch MD;  Location:  HEART CARDIAC CATH LAB     CV RIGHT HEART CATH N/A 2019    Procedure: Right Heart Cath;  Surgeon: Ciaran Burton MD;  Location:  HEART CARDIAC CATH LAB     EP ABLATION FOCAL AFIB N/A 2020    Procedure: EP ABLATION FOCAL AFIB;  Surgeon: Erik Cooper MD;  Location:  HEART CARDIAC CATH LAB     H ABLATION FOCAL AFIB  11    Left atrial ablation to eliminate atrial fibrillation     IMPLANT AUTOMATIC IMPLANTABLE CARDIOVERTER DEFIBRILLATOR  12    AICD implantation     TONSILLECTOMY  1964       ALLERGIES:   No Known Allergies    FAMILY HISTORY:  Family History   Problem Relation Age of Onset     Heart Disease Mother         unknown     Obesity Mother      Gastrointestinal Disease Mother         diverticulitis     Diabetes Maternal Grandmother      Diabetes Paternal Grandmother      Cerebrovascular Disease Paternal Grandmother 94     Diabetes Paternal Grandfather      Cerebrovascular Disease Paternal Grandfather 78     Diabetes Son      C.A.D. Father         CABG age 78     Heart Disease Father         CABG x5     Diabetes Maternal Grandfather      LUNG DISEASE No family hx of      Deep Vein Thrombosis (DVT) No family hx of      Anesthesia Reaction No family hx of      Melanoma No family hx of      Skin Cancer No family hx of        SOCIAL HISTORY:  Social History     Tobacco Use     Smoking status: Former Smoker     Packs/day: 1.00     Years: 40.00     Pack years: 40.00     Types: Cigarettes     Start date: 1975     Last attempt to quit: 2015     Years since quittin.7     Smokeless tobacco: Never Used   Substance Use Topics     Alcohol use: Yes     Alcohol/week: 0.0 standard drinks     Comment: very minimal     Drug use: No       ROS:   A comprehensive 14 point review of systems is negative other than as mentioned in HPI.    Exam:  BP (!) 145/81 (BP Location: Right arm, Patient Position: Chair, Cuff Size: Adult Regular)   Pulse 70   Ht 1.803 m (5'  "11\")   Wt 97.1 kg (214 lb)   SpO2 97%   BMI 29.85 kg/m    GENERAL APPEARANCE: healthy, alert and no distress  EYES: no icterus, no xanthelasmas  ENT: normal palate, mucosa moist, no central cyanosis  NECK: JVP not elevated  RESPIRATORY: lungs clear to auscultation - no rales, rhonchi or wheezes, no use of accessory muscles, no retractions, respirations are unlabored, normal respiratory rate  CARDIOVASCULAR: regular rhythm, normal S1 with physiologic split S2, no S3 or S4 and no murmur, click or rub.  GI: soft, non tender, bowel sounds normal,no abdominal bruits  EXTREMITIES: no edema, no bruits  NEURO: alert and oriented to person/place/time, normal speech, gait and affect  VASC: Radial, dorsalis pedis and posterior tibialis pulses 2+ bilaterally.  SKIN: no ecchymoses, no rashes.  PSYCH: cooperative, affect appropriate.     Labs:  Reviewed.       CPX Echo 6/1/2018:  Interpretation Summary  Submaximal treadmill stress test in a patient with a diagnosis of HCM.  The Ramirez treadmill score is 8 (low risk).  Exercise was stopped due to fatigue.  Normal blood pressure response to exercise.  No ECG evidence of ischemia.  No supraventricular or ventricular arrhythmias. Frequent PVCs noted throughout the study.  Normal biventricular function with mild asymmetrical septal hypertrophy (12mm).  No dynamic outflow tract obstruction is present at rest or with exercise.  Normal segmental wall motion at rest and with exercise.  Minimal augmentation in LV function with exercise.  Average functional capacity for age.           CPX 2/15/2019:          CTA coronary angiogram 5/28/19  IMPRESSION:  1.  No evidence of coronary artery atherosclerosis or stenosis.  2.  Myocardial bridging involving the mid LAD.  3.  Total Agatston score 0 placing the patient in the lowest percentile when compared to age and gender matched control group.     Right heart cath 8/21/19    Time Systolic Diastolic Mean A Wave V Wave EDP Max dp/dt HR   RA " "Pressures  3:38 PM     12 mmHg    16 mmHg    14 mmHg        61 bpm      RV Pressures  3:38 PM 60 mmHg            16 mmHg      106 bpm      PA Pressures  3:41 PM 60 mmHg    22 mmHg    38 mmHg            69 bpm      PCW Pressures  3:39 PM     30 mmHg    28 mmHg    38 mmHg        74 bpm           Time Hb SAT(%) PO2 Content PA Sat   PA  3:28 PM   63.6 %        63.6 %      Art  3:28 PM   99 %      18.04 mL/dL        Cardiac Output Phase: Baseline        Time TDCO TDCI Tj C.O. Tj C.I. Tj HR   Cardiac Output Results  3:28 PM 3.8 L/min    1.79 L/min/m2    4.41 L/min    2.08 L/min/m2              Echo 12/20/2019  Global and regional left ventricular function is normal with an EF of 55-60%.  Global right ventricular function is normal.  IVC diameter <2.1 cm collapsing >50% with sniff suggests a normal RA pressure  of 3 mmHg.  No pericardial effusion is present.  Mild pulmonary hypertension is present.  No change from prior.     Echo 5/2020  Left ventricular systolic function is mildly reduced.  The visual ejection fraction is estimated at 45-50%.  Right ventricular systolic pressure is elevated, consistent with moderate  pulmonary hypertension.  The right ventricular systolic function is normal.  The right ventricle is normal size.  Compared to recent ALEJANDRA, EF again mildly reduced. RVSP increased compared to  prior TTE from 12/19.    9/9/20 Device Interrogation  In clinic device appointment converted to remote device appointment to minimize COVID19 exposure for high risk patient populations. Remote ICD transmission received and reviewed. Device transmission sent per MD orders. Patient has a [employer] Dual lead ICD. Normal ICD function. 15 NSVT episodes recorded, 2 EGM for review lasting 2 - 2.5 seconds @ 172 - 182 bpm. 1 \"other untreated\" episode recorded lasting 13 seconds @ 193 bpm. 5 AT/AF episodes recorded lasting 4 sec to 4 minutes. Patient is on Pradaxa. Presenting EGM = AP/VS @ 60 bpm. AP = 26%.  = " 0%.Estimated battery longevity to CARMEN = 3.5 years. Lead trends appear stable. Patient has an appointment 9/11/20 with Dr. Ware. Plan for patient to send a remote transmission in 3 months and return to clinic in 6 months. GRAY Avalos RN, BSN.      Remote ICD transmission      Assessment and Plan:   64 year old male with history of HCM without obstruction (dx 2006, EF 20% improved to 60%), VT s/p ICD 2012, nonobstructive CAD (cath 2013), AF s/p PVI X 3 (2011, 2016, 2018) and multiple DCCV and recent ablation presents for ongoing evaluation and management.     # Hypertrophic cardiomyopathy with no evidence of LVOT obstruction.-- previously burnt out with EF 20% ('13) recovered (EF 60%) but last echo after recent ablation revealed reduction in LVEF 45-50%.   -Compensated and euvolemic on exam, thus continue lasix to 40mg qam and 20mg qpm.  - continue metoprolol XL 50mg daily   - continue eplerenone 50mg daily  - pt aware of exercise restrictions and family screening recommendations    #Non-sustained VT:  Likely related to NICM. Patient denies any symptoms. Longest run was 13 seconds at 193 BPM.     # Nonobstructive CAD -- 10-30% stenoses on cath '13  - coronary CTA confirmed no significant disease  - continue atorvastatin 20      # HTN -- controlled  - continue metoprolol XL and eplerenone      # Atrial arrhythmias s/p recent ablation   - continue Xarelto  - continue dofetilide 250mcg  BID   - continue Metoprolol 50 XL daily   - followed by EP     Follow-up: 6 month      The patient states understanding and is agreeable with plan.     Plan discussed with Dr. Ware.    Landry Solo M.D.  Cardiology Fellow        I have reviewed today's vital signs, notes, medications, labs and imaging. I have also seen and examined the patient and agree with the findings and plan as outlined above.    Mona Ware MD  Section Head - Advanced Heart Failure, Transplantation and Mechanical Circulatory Support  Director - [facility]  Associate Professor of Medicine, [facility]

## 2020-09-11 ENCOUNTER — OFFICE VISIT (OUTPATIENT)
Dept: CARDIOLOGY | Facility: CLINIC | Age: 65
End: 2020-09-11
Attending: INTERNAL MEDICINE
Payer: COMMERCIAL

## 2020-09-11 VITALS
BODY MASS INDEX: 29.96 KG/M2 | HEART RATE: 70 BPM | HEIGHT: 71 IN | DIASTOLIC BLOOD PRESSURE: 81 MMHG | WEIGHT: 214 LBS | SYSTOLIC BLOOD PRESSURE: 145 MMHG | OXYGEN SATURATION: 97 %

## 2020-09-11 DIAGNOSIS — I42.2 HYPERTROPHIC CARDIOMYOPATHY (H): ICD-10-CM

## 2020-09-11 DIAGNOSIS — J98.4 RESTRICTIVE AIRWAY DISEASE: Primary | ICD-10-CM

## 2020-09-11 DIAGNOSIS — I48.91 ATRIAL FIBRILLATION, UNSPECIFIED TYPE (H): ICD-10-CM

## 2020-09-11 DIAGNOSIS — I47.20 VENTRICULAR TACHYCARDIA (H): ICD-10-CM

## 2020-09-11 DIAGNOSIS — Z95.810 AUTOMATIC IMPLANTABLE CARDIOVERTER-DEFIBRILLATOR IN SITU: Chronic | ICD-10-CM

## 2020-09-11 DIAGNOSIS — N52.9 ERECTILE DYSFUNCTION, UNSPECIFIED ERECTILE DYSFUNCTION TYPE: ICD-10-CM

## 2020-09-11 DIAGNOSIS — I42.2 HYPERTROPHIC CARDIOMYOPATHY (H): Chronic | ICD-10-CM

## 2020-09-11 DIAGNOSIS — I47.20 VENTRICULAR TACHYCARDIA (H): Primary | Chronic | ICD-10-CM

## 2020-09-11 LAB
ANION GAP SERPL CALCULATED.3IONS-SCNC: 7 MMOL/L (ref 3–14)
BUN SERPL-MCNC: 14 MG/DL (ref 7–30)
CALCIUM SERPL-MCNC: 9.5 MG/DL (ref 8.5–10.1)
CHLORIDE SERPL-SCNC: 108 MMOL/L (ref 94–109)
CO2 SERPL-SCNC: 28 MMOL/L (ref 20–32)
CREAT SERPL-MCNC: 1.1 MG/DL (ref 0.66–1.25)
GFR SERPL CREATININE-BSD FRML MDRD: 70 ML/MIN/{1.73_M2}
GLUCOSE SERPL-MCNC: 130 MG/DL (ref 70–99)
POTASSIUM SERPL-SCNC: 4 MMOL/L (ref 3.4–5.3)
PROLACTIN SERPL-MCNC: 9 UG/L (ref 2–18)
SODIUM SERPL-SCNC: 143 MMOL/L (ref 133–144)

## 2020-09-11 PROCEDURE — 84146 ASSAY OF PROLACTIN: CPT | Performed by: INTERNAL MEDICINE

## 2020-09-11 PROCEDURE — 84403 ASSAY OF TOTAL TESTOSTERONE: CPT | Performed by: INTERNAL MEDICINE

## 2020-09-11 PROCEDURE — 36415 COLL VENOUS BLD VENIPUNCTURE: CPT | Performed by: INTERNAL MEDICINE

## 2020-09-11 PROCEDURE — 80048 BASIC METABOLIC PNL TOTAL CA: CPT | Performed by: INTERNAL MEDICINE

## 2020-09-11 PROCEDURE — G0463 HOSPITAL OUTPT CLINIC VISIT: HCPCS | Mod: ZF

## 2020-09-11 PROCEDURE — 99214 OFFICE O/P EST MOD 30 MIN: CPT | Mod: 25 | Performed by: INTERNAL MEDICINE

## 2020-09-11 RX ORDER — FLUTICASONE PROPIONATE AND SALMETEROL 113; 14 UG/1; UG/1
1 POWDER, METERED RESPIRATORY (INHALATION) 2 TIMES DAILY
Qty: 2 INHALER | Refills: 3 | Status: SHIPPED | OUTPATIENT
Start: 2020-09-11 | End: 2021-03-03

## 2020-09-11 RX ORDER — FLUTICASONE PROPIONATE 50 MCG
1 SPRAY, SUSPENSION (ML) NASAL DAILY
Qty: 11.1 ML | Refills: 1 | Status: SHIPPED | OUTPATIENT
Start: 2020-09-11 | End: 2020-09-11 | Stop reason: ALTCHOICE

## 2020-09-11 ASSESSMENT — MIFFLIN-ST. JEOR: SCORE: 1777.83

## 2020-09-11 ASSESSMENT — PAIN SCALES - GENERAL: PAINLEVEL: NO PAIN (0)

## 2020-09-11 NOTE — PROGRESS NOTES
Electrophysiology Clinic Note  HPI:   Mr. Meredith is a 64 year old male who has a past medical history significant for HCM diagnosed 2006, VT on Holter July 2012 s/p ICD 7/2012, troponin leak Oct 2013 (angiogram with non-significant CAD, LV gram showed EF 25%, recovered to 60% on TTE Dec 2013), HTN, AFib (CHADSVASC 2) with DCCVs then s/p PVI 12/2011, PVI for persistent AF on 7/28/16, PVI/CTI/CFAE with posterior wall isolation 8/17/18, s/p DCCV 8/7/17, 1/3/18,  5/5/18, 9/27/18, 12/20/2018, 1/16/2020, s/p LA AFL ablation 5/14/20, and s/p right IRISH on 6/5/2019. He presents for follow-up.  The patient underwent atrial fibrillation ablation by Dr. Gonzalez at [facility] in 12/2011 and implantation of an ICD in 07/2012 because of ventricular tachycardia. As per patient, he has been in sinus rhythm approximately 1-1/2 years after ablation. The patient was found to have recurrent atrial fibrillation during a preoperative exam in 10/2013 and underwent electrical cardioversion in 11/2013. The patient underwent hip surgery in 01/2014. The patient noticed that he was in atrial fibrillation at a clinic visit in 01/2014.   He was seen in consult by Dr Analilia spain on 2/14/14 for consideration of ablation. At the time the patient was reported to be generally unaware of whether or not he is in sinus rhythm or in atrial fibrillation. He reports daytime somnolence but denies significant dyspnea on exertion, palpitations, irregular beat sensation, presyncope or syncope. In terms of risk factors for stroke, the patient has coronary heart disease, hypertension, but denies history of diabetes, previous myocardial cardiac infarction or heart failure. The patient is on rivaroxaban for stroke prophylaxis and is on diltiazem and metoprolol. He was told that there was no consideration of ablation.   He came to EP clinic for second opinion in 3/3/2014. The patient mentioned this time he is not sure whether his afib is causing fatigue.  However, he also attributed symptoms like headache and head fullness to his afib. He also had a cold around the same time. He still denied chest pain, syncope or presyncope, ICD shocks, MART or SOB. He does however indicate this time that he does feel irregular heart beat at times, along with chronic fatigue, although is not sure if fatigue is linked to afib. We agreed at that time to pursue a cardioversion to see whether he would feel better and we started him on sotalol 80 mg twice a day. He underwent cardioversion on 4/24/2014.  He then had a device transmission showed recurrent AF in 7/2014 which spontaneously converted. We decrease his diltiazem from 240 to 120 mg daily because of fatigue. His fatigue did get better when we decrease his diltiazem.  He did have a 9 day episode of A. Fib starting June 19, 2015, during which the patient felt very tired and no energy.  The heart rate was well controlled.  A. Fib burden was 19% since 5/19/2015, but it is the first episode of A. Fib since at least November 2014.  However at follow up in 2016 AF burden 1%. At follow up in 5/2016 his AF burden= 100% since 2/28/16. He reported some increased fatgiue and decreased stamina since being back in AF. At that visit, he elected to pursue repeat PVI ablation for AF that is refractory to Sotalol. He underwent repeat PVI ablation for persistent AF refractory to AATs on 7/28/16. He had successful re-isolation of all 4 pulmonary veins. He was re-hospitalized a couple days post ablation for SOB assocaited with hypervolemia which resolved with diuresis. He reports feeling well after ablation. He states his energy levels improved with restoration of sinus. Diltiazem was stopped after ablation.  He has now had some recurrent episodes of AT/AF requiring DCCV on 8/7/17 and 1/3/18.  Device check showed AT episode that started 1/28/18 and lasted 25 days and self terminated. He then had recurrent AT/AF and had DCCV on 5/5/18. He followed up  with Dr. Ware recently in clinic and reported having fatigue, MART, and decreased stamina. Device interrogation showed he had recurred back into AT/AF since 5/26/18. He was arranged for EP follow up.     Ep Visit 6/2018: He continues to have fatigue, MART, and decreased stamina. Last stress echo from 6/1/18 showed normal biventricular function with asymmetrical septal hypertrophy and no LVOT obstruction with rest or exercise. He denies any chest pain/pressures, dizziness, lightheadedness, leg/ankle swelling, PND, orthopnea, palpitations, or syncopal symptoms.Presenting 12 lead ECG shows AFL Vent Rate 111 bpm, QRS 80 ms, QTc 489 ms. Current cardiac medications include: Spironolactone, Lipitor, Sotalol, Toprol XL, and Xarelto.     EP Visit 7/12/19: He presents today for follow up. He underwent a repeat PVI/CTI on 8/17/18. Device shows AF has been 100% since 3/14/19. He underwent right TREVIN on 6/5/19. Recovering well after procedure. He reports issues with ongoing fatigue, MART, and decreased stamina. He denies any chest pain/pressures, dizziness, lightheadedness, leg/ankle swelling, PND, orthopnea, palpitations, or syncopal symptoms. Presenting 12 lead ECG shows atypical AFL Vent Rate 73 bpm, QRS 84 ms, QTc 420 ms. Device interrogation shows normal device function. Since 1/18/19, there have been 400 NSVT, 2 other untreated episodes, and 211 AT/AF episodes recorded.  The VT-1 monitored episodes are AF w/RVR with rates in the 140s.  The 2 NSVT episodes available for review last 12 beats in duration with rates of 162-179 bpm.  AF burden = 85% since 1/18/19 and AF appear persistent since approximately mid 3/2019. Intrinsic rhythm = AF @  bpm. AP = 2%.  = 5%. Lead trends appear stable. Current cardiac medications include: Spironolactone, Lipitor, Sotalol, Toprol XL, and Xarelto.     EP Visit 11/8/19: He presents today for follow up. He was arranged for dofetilide loading and had RHC in AF, then DCCV, then repeat RHC  in sinus to evaluate if arrhyhtmia was vs CM vs sotalol was main  of symptoms. RHC was relatively unchanged in AF and sinus. He reports having much better energy levels off Sotalol. He denies any chest pain/pressures, dizziness, lightheadedness, worsening shortness of breath, leg/ankle swelling, PND, orthopnea, palpitations, or syncopal symptoms. Presenting 12 lead ECG shows AP with prolonged AVD Vent Rate 60 bpm,  ms, QRS 86 ms, QTc 408 ms. Device interrogation shows normal ICD function. Since last device interrogation on 7/12/19, there have been 10 AT/AF episodes, most recent on 11/3/19 lasting 7.2 hours.  AF burden since 7/12/19 = 34%.  AF burden graph shows minimal AT/AF episodes recorded since ECV on 8/21/19.  1 asymptomatic VT episode recorded on 9/16 lasting 25 seconds @ 241 bpm received ATP x 1 with successful conversion. Patient states he was ill with URI/fever that day.   34 NSVT detections also recorded with 2 egms available displaying  one 5 beat PAT and one 4 beat NSVT. Intrinsic rhythm = SB @ 50 bpm. AP = 10%.  = 3%.  Estimated battery longevity to CARMEN = 4.5 years. Lead trends appear stable. Current cardiac medications include: Spironolactone, Lipitor, Sotalol, Toprol XL, and Xarelto.     EP Visit 3/13/20: He presents today for follow up. He reports feeling fatigued and having some MART. He denies any chest pain/pressures, dizziness, lightheadedness, leg/ankle swelling, PND, orthopnea, palpitations, or syncopal symptoms. He had recurrent AF/AFL and had DCCV on 1/16/20. He recurred back into AFL on 1/29/20 and remains in it. Device interrogation shows normal ICD function. Since ECV on 1/16/20, 569 NSVT and 188 VT-1 monitor zone episodes recorded, AF burden = 85% and appears persistent since 1/26/20.  The available VT-1 episodes display RVR and not a true ventricular arrhythmia.   Intrinsic rhythm = Atrial flutter with a sensed ventricular response of 102-115 bpm.   AP =<1%.  = 2%.   "Patient reports that he has been noticing MART and elevated heart rate with minimal activity. Presenting 12 lead ECG shows AFL Vent Rate 108 bpm, QRS 88 ms, QTc 536 ms.  Current cardiac medications include: Spironolactone, Lipitor, Sotalol, Toprol XL, and Xarelto.     He presents today for follow up. He underwent ablation on LA AFL on 5/14/20. Groin sites well healed. He reports feeling improved energy after ablation. He still has some MART unchanged. He denies any chest pain/pressures, dizziness, lightheadedness, worsening shortness of breath, leg/ankle swelling, PND, orthopnea, palpitations, or syncopal symptoms. Last echo from 5/2020 showed LVEF 45-50%, normal RV size/function, and moderate pulmonary HTN. Device interrogation shows normal ICD function. 15 NSVT episodes recorded, 2 EGM for review lasting 2 - 2.5 seconds @ 172 - 182 bpm. 1 \"other untreated\" episode recorded lasting 13 seconds @ 193 bpm. 5 AT/AF episodes recorded lasting 4 sec to 4 minutes. Patient is on Pradaxa. Presenting EGM = AP/VS @ 60 bpm. AP = 26%.  = 0%.Estimated battery longevity to CARMEN = 3.5 years. Lead trends appear stable. Current cardiac medications include: Spironolactone, Lipitor, Sotalol, Toprol XL, and Xarelto.     PAST MEDICAL HISTORY:  Past Medical History:   Diagnosis Date     Atrial fibrillation (H) 12/9/11    failed medication, multiple DC cardioveresions; s/p Left atrial ablation to eliminate atrial fibrillation 12/9/11     Chest pain      CHF (congestive heart failure) (H) 7/30/2016     Coronary artery disease      DJD (degenerative joint disease)      Fatigue 7/15/2019     Hip arthritis 1/15/2014     Hypertension      Hypertrophic cardiomyopathy (H) 10/09     Other abnormal heart sounds      Pacemaker     ICD     Palpitations      Pneumonia, organism unspecified(486)      Prediabetes 7/10/15    A1C 6.5     Status post implantation of automatic cardioverter/defibrillator (AICD)      Type 2 diabetes mellitus without " complication, without long-term current use of insulin (H) 7/13/2020     Ventricular tachycardia (H)        CURRENT MEDICATIONS:  Current Outpatient Medications   Medication Sig Dispense Refill     acetaminophen (TYLENOL) 325 MG tablet Take 325-650 mg by mouth every 6 hours as needed       albuterol (PROAIR HFA/PROVENTIL HFA/VENTOLIN HFA) 108 (90 Base) MCG/ACT inhaler Inhale 2 puffs into the lungs every 6 hours 18 g 0     amoxicillin (AMOXIL) 500 MG tablet Take 4 tablets (2000 mg) by mouth 1 hour before dental procedures. 12 tablet 3     atorvastatin (LIPITOR) 20 MG tablet Take 1 tablet (20 mg) by mouth daily 90 tablet 3     cyanocobalamin (VITAMIN B-12) 100 MCG tablet Take 100 mcg by mouth daily       dabigatran ANTICOAGULANT (PRADAXA ANTICOAGULANT) 150 MG capsule Take 1 capsule (150 mg) by mouth 2 times daily Store in original 's bottle or blister pack; use within 120 days of opening. 60 capsule 0     dofetilide (TIKOSYN) 250 MCG capsule Take 1 capsule (250 mcg) by mouth every 12 hours 180 capsule 3     eplerenone (INSPRA) 50 MG tablet Take 1 tablet (50 mg) by mouth daily 90 tablet 3     furosemide (LASIX) 20 MG tablet Take 1 tablet (20 mg) by mouth daily In PM 90 tablet 3     furosemide (LASIX) 40 MG tablet Take 1 tablet (40 mg) by mouth daily In AM 90 tablet 3     gabapentin (NEURONTIN) 300 MG capsule Take 2 capsules (600 mg) by mouth 2 times daily 360 capsule 3     metFORMIN (GLUCOPHAGE-XR) 500 MG 24 hr tablet Take 2 tablets (1,000 mg) by mouth daily (with dinner) Take 2 tablets (1,000 mg) daily (with dinner). 180 tablet 3     metoprolol succinate ER (TOPROL-XL) 50 MG 24 hr tablet TAKE ONE TABLET BY MOUTH EVERY DAY 90 tablet 3     multivitamin, therapeutic (THERA-VIT) TABS Take 1 tablet by mouth daily       sildenafil (VIAGRA) 100 MG tablet Take 0.5-1 tablets ( mg) by mouth daily as needed (take 1 hour before intercourse) 30 tablet 12     sotalol (BETAPACE) 120 MG tablet   1     zolpidem  (AMBIEN) 10 MG tablet Take 0.5 tablets (5 mg) by mouth nightly as needed for sleep 90 tablet 0       PAST SURGICAL HISTORY:  Past Surgical History:   Procedure Laterality Date     ANESTHESIA CARDIOVERSION  4/24/2014    Procedure: ANESTHESIA CARDIOVERSION;  Surgeon: Generic Anesthesia Provider;  Location: [facility] OR     ANESTHESIA CARDIOVERSION N/A 5/12/2016    Procedure: ANESTHESIA CARDIOVERSION;  Surgeon: GENERIC ANESTHESIA PROVIDER;  Location: [facility] OR     ANESTHESIA CARDIOVERSION N/A 8/7/2017    Procedure: ANESTHESIA CARDIOVERSION;  Anesthesia Offsite Coverage Cardioversion @1100;  Surgeon: GENERIC ANESTHESIA PROVIDER;  Location: [facility] OR     ANESTHESIA CARDIOVERSION N/A 1/3/2018    Procedure: ANESTHESIA CARDIOVERSION;  Anesthesia Cardioverion;  Surgeon: GENERIC ANESTHESIA PROVIDER;  Location: [facility] OR     ANESTHESIA CARDIOVERSION N/A 5/4/2018    Procedure: ANESTHESIA CARDIOVERSION;  Anesthesia Coverage Cardioversion @1400;  Surgeon: GENERIC ANESTHESIA PROVIDER;  Location: [facility] OR     ANESTHESIA CARDIOVERSION N/A 9/27/2018    Procedure: ANESTHESIA CARDIOVERSION;  Anesthesia Cardioversion @0930;  Surgeon: GENERIC ANESTHESIA PROVIDER;  Location: [facility] OR     ANESTHESIA CARDIOVERSION N/A 12/20/2018    Procedure: Anesthesia Coverage Cardioversion @0830;  Surgeon: GENERIC ANESTHESIA PROVIDER;  Location: [facility] OR     ANESTHESIA CARDIOVERSION N/A 8/21/2019    Procedure: Anesthesia Coverage Cardioversion @0800;  Surgeon: GENERIC ANESTHESIA PROVIDER;  Location: [facility] OR     ANESTHESIA CARDIOVERSION N/A 1/16/2020    Procedure: ANESTHESIA, FOR CARDIOVERSION;  Surgeon: GENERIC ANESTHESIA PROVIDER;  Location: [facility] OR     ARTHROPLASTY HIP  1/15/2014    Procedure: ARTHROPLASTY HIP;  Left Total Hip Arthroplasty;  Surgeon: Nelson Gaspar MD;  Location: [facility] OR     ARTHROPLASTY HIP Right 6/5/2019    Procedure: Right Total Hip Arthroplasty;  Surgeon: Nelson Gaspar MD;  Location: [facility] OR     CARDIAC SURGERY       COLONOSCOPY N/A 5/18/2018    Procedure:  COMBINED COLONOSCOPY, SINGLE OR MULTIPLE BIOPSY/POLYPECTOMY BY BIOPSY;  COLONOSCOPY (PT HAS DEFIBRILLATOR) ;  Surgeon: Mike Nickerson MD;  Location:  GI     CV RIGHT HEART CATH N/A 2019    Procedure: CV RIGHT HEART CATH;  Surgeon: Cisco Rausch MD;  Location:  HEART CARDIAC CATH LAB     CV RIGHT HEART CATH N/A 2019    Procedure: Right Heart Cath;  Surgeon: Ciaran Burton MD;  Location:  HEART CARDIAC CATH LAB     EP ABLATION FOCAL AFIB N/A 2020    Procedure: EP ABLATION FOCAL AFIB;  Surgeon: Erik Cooper MD;  Location:  HEART CARDIAC CATH LAB     H ABLATION FOCAL AFIB  11    Left atrial ablation to eliminate atrial fibrillation     IMPLANT AUTOMATIC IMPLANTABLE CARDIOVERTER DEFIBRILLATOR  12    AICD implantation     TONSILLECTOMY  1964       ALLERGIES:     No Known Allergies    FAMILY HISTORY:  Family History   Problem Relation Age of Onset     Heart Disease Mother         unknown     Obesity Mother      Gastrointestinal Disease Mother         diverticulitis     Diabetes Maternal Grandmother      Diabetes Paternal Grandmother      Cerebrovascular Disease Paternal Grandmother 94     Diabetes Paternal Grandfather      Cerebrovascular Disease Paternal Grandfather 78     Diabetes Son      C.A.D. Father         CABG age 78     Heart Disease Father         CABG x5     Diabetes Maternal Grandfather      LUNG DISEASE No family hx of      Deep Vein Thrombosis (DVT) No family hx of      Anesthesia Reaction No family hx of      Melanoma No family hx of      Skin Cancer No family hx of        SOCIAL HISTORY:  Social History     Tobacco Use     Smoking status: Former Smoker     Packs/day: 1.00     Years: 40.00     Pack years: 40.00     Types: Cigarettes     Start date: 1975     Last attempt to quit: 2015     Years since quittin.7     Smokeless tobacco: Never Used   Substance Use Topics     Alcohol use: Yes     Alcohol/week: 0.0 standard drinks     Comment: very minimal      "Drug use: No       ROS:   A comprehensive 10 point review of systems negative other than as mentioned in HPI.  Exam:  BP (!) 145/81 (BP Location: Right arm, Patient Position: Chair, Cuff Size: Adult Regular)   Pulse 70   Ht 1.803 m (5' 11\")   Wt 97.1 kg (214 lb)   SpO2 97%   BMI 29.85 kg/m  GENERAL APPEARANCE: healthy, alert and no distress  HEENT: no icterus, no xanthelasmas, normal pupil size and reaction, normal palate, mucosa moist, no central cyanosis  NECK: supple.  RESPIRATORY: lungs clear to auscultation - no rales, rhonchi or wheezes, no use of accessory muscles, no retractions, respirations are unlabored, normal respiratory rate  CARDIOVASCULAR:regular, S1/S2, no murmur, click or rub, precordium quiet with normal PMI.  ABDOMEN: soft, non tender, bowel sounds normal, non distended.   EXTREMITIES: peripheral pulses normal, no edema, no bruits  NEURO: alert and oriented to person/place/time, normal speech, gait and affect  SKIN: no ecchymoses, no rashes    Labs:  Reviewed.     Procedures:  12/15/16 ECHOCARDIOGRAM:   Interpretation Summary  Mildly (EF 40-45%) reduced left ventricular function is present. Mild  asymmetric septal hypertrophy is present (14 mm). No dynamic outflow tract  obstruction is present.  Global right ventricular function is normal.  No significant valvular abnormalities are identified.  No pericardial effusion is present.    6/2018 STRESS ECHO:  Interpretation Summary  Submaximal treadmill stress test in a patient with a diagnosis of HCM.     The Ramirez treadmill score is 8 (low risk).  Exercise was stopped due to fatigue.  Normal blood pressure response to exercise.  No ECG evidence of ischemia.  No supraventricular or ventricular arrhythmias. Frequent PVCs noted throughout  the study.     Normal biventricular function with mild asymmetrical septal hypertrophy (12  mm).  No dynamic outflow tract obstruction is present at rest or with exercise.  Normal segmental wall motion at rest and " "with exercise.  Minimal augmentation in LV function with exercise.     Average functional capacity for age.    5/2020 ECHOCARDIOGRAM:     Left ventricular systolic function is mildly reduced.  The visual ejection fraction is estimated at 45-50%.  Right ventricular systolic pressure is elevated, consistent with moderate  pulmonary hypertension.  The right ventricular systolic function is normal.  The right ventricle is normal size.  Compared to recent ALEJANDRA, EF again mildly reduced. RVSP increased compared to  prior TTE from 12/19.    Assessment and Plan:   Mr. Meredith is a 64 year old male who has a past medical history significant for HCM diagnosed 2006, VT on Holter July 2012 s/p ICD 7/2012, troponin leak Oct 2013 (angiogram with non-significant CAD, LV gram showed EF 25%, recovered to 60% on TTE Dec 2013), HTN, AFib (CHADSVASC 2) with DCCVs then s/p PVI 12/2011, PVI for persistent AF on 7/28/16, PVI/CTI/CFAE with posterior wall isolation 8/17/18, s/p DCCV 8/7/17, 1/3/18,  5/5/18, 9/27/18, 12/20/2018, 1/16/2020, s/p LA AFL ablation 5/14/20, and s/p right IRISH on 6/5/2019. He presents today for follow up. He underwent ablation on LA AFL on 5/14/20. Groin sites well healed. He reports feeling improved energy after ablation. He still has some MART unchanged. He denies any chest pain/pressures, dizziness, lightheadedness, worsening shortness of breath, leg/ankle swelling, PND, orthopnea, palpitations, or syncopal symptoms. Last echo from 5/2020 showed LVEF 45-50%, normal RV size/function, and moderate pulmonary HTN. Device interrogation shows normal ICD function. 15 NSVT episodes recorded, 2 EGM for review lasting 2 - 2.5 seconds @ 172 - 182 bpm. 1 \"other untreated\" episode recorded lasting 13 seconds @ 193 bpm. 5 AT/AF episodes recorded lasting 4 sec to 4 minutes. Patient is on Pradaxa. Presenting EGM = AP/VS @ 60 bpm. AP = 26%.  = 0%.Estimated battery longevity to CARMEN = 3.5 years. Lead trends appear stable. Current " cardiac medications include: Spironolactone, Lipitor, Sotalol, Toprol XL, and Xarelto.     Persistent Atrial Fibrillation:   We discussed in detail with the patient management/treatment options for AF/AFL includin. Stroke Prophylaxis:  CHADSVASC= 2, corresponding to a 2.2% annual stroke / systemic emolism event rate. indicating need for long term oral anticoagulation.  He is on Xarelto. No bleeding issues.   2. Rate Control: Continue Metoprolol.   3. Rhythm Control: He has had a PVI ablation in  with several recurrences requiring DCCV and then repeat PVI in 2016. He had previously failed multaq and is currently on Sotalol.  He has now had some recurrent episodes of AT/AF requiring DCCV on 17 and 1/3/18.  Device check showed AT episode that started 18 and lasted 25 days and self terminated. He then had recurrent AT/AF and had DCCV on 18. He followed up with Dr. Ware recently in clinic and reported having fatigue, MART, and decreased stamina. Device interrogation showed he had recurred back into AT/AF since 18. He underwent a repeat PVI/CTI on 18. He subsequently had DCCV on 18, 2018. Then went back into persistent AF on 3/14/19. His main symptoms are fatigue, MART, and decreased stamina. Unclear if this was related to AF, HCM worsening, or possibly Sotalol. Therefore, we stopped his Sotalol and he underwent RHC in AF, dofetolide loading, DCCV, then repeat RHC in SR. This showed no significant hemodynamic changes in AF vs SR.He had another recurrence of AFL and had DCCV on 20. Then went back into AFL on 20 and remains in it. He does feel much better being on dofetilide. QTC and renal function stable. He recurred back into an atypical AF and we elected to pursue another ablation and had LA AFL ablation on 20. He is doing well thus far without recurrences thus far.   4. Risk Factor Management: maintain tight BP control, and GONZALEZ evaluation as indicated.       Hypertrophic cardiomyopathy:  -Continue beta blockers  -Encouraged adequate hydration and avoidance of strenous physical activity   -Reinforced exercise recommendations with HCM (e.g. Circ 2014 recommendations: Avoidance of burst exercise, competitive training,weight-lifting)  -Family screening of 1st degree relatives recommended.   - ICD implanted in 7/2012  He will continue to follow with the device clinic per routine. He will follow up with Dr. Ware for HCM. Follow up with EP in 1 year.     The patient states understanding and is agreeable with plan.   This patient was seen and evaluated with Dr. Cooper. The above note reflects our joint assessment and plan.   KARSON Richardson CNP  Pager: [phone]    EP STAFF NOTE  I have seen and examined the patient as part of a shared visit with HOLLY Richardson NP.  I agree with the note above. I reviewed today's vital signs and medications. I have reviewed and discussed with the advanced practice provider their physical examination, assessment, and plan   Briefly, very low AF burden, doing well after ablation  My key history/exam findings are: RRR.   The key management decisions made by me: f/u 1 year.    Erik Cooper MD [facility]  Cardiology - Electrophysiology

## 2020-09-11 NOTE — NURSING NOTE
Chief Complaint   Patient presents with     Follow Up     Return Genetics     Vitals were taken and medications were reconciled.    Tomi Lawler CMA    9:26 AM

## 2020-09-11 NOTE — NURSING NOTE
Diet: Patient instructed regarding a heart healthy diet, including discussion of reduced fat and sodium intake. Patient demonstrated understanding of this information and agreed to call with further questions or concerns.    Labs: Patient was given results of the laboratory testing obtained today. Patient was instructed to return for the next laboratory testing in 4-6 months. Patient demonstrated understanding of this information and agreed to call with further questions or concerns.     Med Reconcile: Reviewed and verified all current medications with the patient. The updated medication list was printed and given to the patient.    New Medication: Patient was educated regarding newly prescribed medication, including discussion of  the indication, administration, side effects, and when to report to MD or RN. Patient demonstrated understanding of this information and agreed to call with further questions or concerns.    Return Appointment: Patient given instructions regarding scheduling next clinic visit. Patient demonstrated understanding of this information and agreed to call with further questions or concerns.    Patient stated he understood all health information given and agreed to call with further questions or concerns.    Dimple Birch, MSN, RN, CNL  Cardiology Care Coordinator  [facility]  [phone]

## 2020-09-11 NOTE — PATIENT INSTRUCTIONS
You were seen today in the Adult Congenital and Cardiovascular Genetics Clinic at the [facility].    Cardiology Providers you saw during your visit:  Mona Ware MD and Erik Cooper MD    Diagnosis:  HCM    Results:  Mona Ware MD and Erik Cooper MD reviewed the results of your device and lab testing today in clinic.    Recommendations:    1. Continue to eat a heart healthy, low salt diet.  2. Continue to get 20-30 minutes of aerobic activity, 4-5 days per week.  Examples of aerobic activity include walking, running, swimming, cycling, etc.  3. Continue to observe good oral hygiene, with regular dental visits.  4. We will now pursue full disability. We will update your disability paperwork.  5. We sent a prescription for Fluticasone.    SBE prophylaxis:   Yes____  No_X___    Lifelong Bacterial Endocarditis Prophylaxis:  YES____  NO____    If YES is checked, follow the recommendations outlined below:  1. Take antibiotic(s) prior to recommended dental procedures and procedures on the respiratory tract or with infected skin, muscle or bones. SBE prophylaxis is not needed for routine GI and  procedures (ie. Colonoscopy or vaginal delivery)  2. Observe good oral hygiene daily, as advised by your dentist. Get regular professional dental care.  3. Keep cuts clean.  4. Infections should be treated promptly.  5. Symptoms of Infective Endocarditis could include: fever lasting more than 4-5 days or a recurrent fever that initially resolves but returns within 1-2 days)      Exercise restrictions:   Yes__X__  No____         If yes, list restrictions:  Must be allowed to rest if fatigued or SOB      Work restrictions:  Yes____  No_X___         If yes, list restrictions:    FASTING CHOLESTEROL was checked in the last 5 years YES_X__  NO___ (2019)  Continue to eat a heart healthy, low salt diet.         ____ Fasting lipid panel order today         ____ No changes in medications          ____ I recommend the  following changes in your cholesterol medications.:          ____ Please follow up for cholesterol screening at your primary care physician      Follow-up:  Follow up with Dr. Ware in 4-6 months with device and labs. Follow up with Dr. Cooper in one year.    If you have questions or concerns please contact us at:    Dimple Birch, MSN, RN, CNL    Radhika Mitchell (Scheduling)  Nurse Care Coordinator     Clinic   Adult Congenital and CV Genetics   Adult Congenital and CV Genetic  [facility]   [facility]  (P) [phone]     (P) [phone]  [email or web address]   (F) [phone]        For after hours urgent needs, call [phone] and ask to speak to the Adult Congenital Physician on call.  Mention Job Code 0401.    For emergencies call 911.    [facility]  [facility]  Mail Code [id number]  [address] [place]  [address]

## 2020-09-11 NOTE — LETTER
9/11/2020      RE: Stu Meredith  [address] [facility] [address]       Dear Colleague,    Thank you for the opportunity to participate in the care of your patient, Stu Meredith, at the [facility] HEART [facility] at [facility]. Please see a copy of my visit note below.    Electrophysiology Clinic Note  HPI:   Mr. Meredith is a 64 year old male who has a past medical history significant for HCM diagnosed 2006, VT on Holter July 2012 s/p ICD 7/2012, troponin leak Oct 2013 (angiogram with non-significant CAD, LV gram showed EF 25%, recovered to 60% on TTE Dec 2013), HTN, AFib (CHADSVASC 2) with DCCVs then s/p PVI 12/2011, PVI for persistent AF on 7/28/16, PVI/CTI/CFAE with posterior wall isolation 8/17/18, s/p DCCV 8/7/17, 1/3/18,  5/5/18, 9/27/18, 12/20/2018, 1/16/2020, s/p LA AFL ablation 5/14/20, and s/p right IRISH on 6/5/2019. He presents for follow-up.  The patient underwent atrial fibrillation ablation by Dr. Gonzalez at [facility] in 12/2011 and implantation of an ICD in 07/2012 because of ventricular tachycardia. As per patient, he has been in sinus rhythm approximately 1-1/2 years after ablation. The patient was found to have recurrent atrial fibrillation during a preoperative exam in 10/2013 and underwent electrical cardioversion in 11/2013. The patient underwent hip surgery in 01/2014. The patient noticed that he was in atrial fibrillation at a clinic visit in 01/2014.   He was seen in consult by Dr Analilia spain on 2/14/14 for consideration of ablation. At the time the patient was reported to be generally unaware of whether or not he is in sinus rhythm or in atrial fibrillation. He reports daytime somnolence but denies significant dyspnea on exertion, palpitations, irregular beat sensation, presyncope or syncope. In terms of risk factors for stroke, the patient has coronary heart disease, hypertension, but denies history of diabetes, previous myocardial  cardiac infarction or heart failure. The patient is on rivaroxaban for stroke prophylaxis and is on diltiazem and metoprolol. He was told that there was no consideration of ablation.   He came to EP clinic for second opinion in 3/3/2014. The patient mentioned this time he is not sure whether his afib is causing fatigue. However, he also attributed symptoms like headache and head fullness to his afib. He also had a cold around the same time. He still denied chest pain, syncope or presyncope, ICD shocks, MART or SOB. He does however indicate this time that he does feel irregular heart beat at times, along with chronic fatigue, although is not sure if fatigue is linked to afib. We agreed at that time to pursue a cardioversion to see whether he would feel better and we started him on sotalol 80 mg twice a day. He underwent cardioversion on 4/24/2014.  He then had a device transmission showed recurrent AF in 7/2014 which spontaneously converted. We decrease his diltiazem from 240 to 120 mg daily because of fatigue. His fatigue did get better when we decrease his diltiazem.  He did have a 9 day episode of A. Fib starting June 19, 2015, during which the patient felt very tired and no energy.  The heart rate was well controlled.  A. Fib burden was 19% since 5/19/2015, but it is the first episode of A. Fib since at least November 2014.  However at follow up in 2016 AF burden 1%. At follow up in 5/2016 his AF burden= 100% since 2/28/16. He reported some increased fatgiue and decreased stamina since being back in AF. At that visit, he elected to pursue repeat PVI ablation for AF that is refractory to Sotalol. He underwent repeat PVI ablation for persistent AF refractory to AATs on 7/28/16. He had successful re-isolation of all 4 pulmonary veins. He was re-hospitalized a couple days post ablation for SOB assocaited with hypervolemia which resolved with diuresis. He reports feeling well after ablation. He states his energy levels  improved with restoration of sinus. Diltiazem was stopped after ablation.  He has now had some recurrent episodes of AT/AF requiring DCCV on 8/7/17 and 1/3/18.  Device check showed AT episode that started 1/28/18 and lasted 25 days and self terminated. He then had recurrent AT/AF and had DCCV on 5/5/18. He followed up with Dr. Ware recently in clinic and reported having fatigue, MART, and decreased stamina. Device interrogation showed he had recurred back into AT/AF since 5/26/18. He was arranged for EP follow up.     Ep Visit 6/2018: He continues to have fatigue, MART, and decreased stamina. Last stress echo from 6/1/18 showed normal biventricular function with asymmetrical septal hypertrophy and no LVOT obstruction with rest or exercise. He denies any chest pain/pressures, dizziness, lightheadedness, leg/ankle swelling, PND, orthopnea, palpitations, or syncopal symptoms.Presenting 12 lead ECG shows AFL Vent Rate 111 bpm, QRS 80 ms, QTc 489 ms. Current cardiac medications include: Spironolactone, Lipitor, Sotalol, Toprol XL, and Xarelto.     EP Visit 7/12/19: He presents today for follow up. He underwent a repeat PVI/CTI on 8/17/18. Device shows AF has been 100% since 3/14/19. He underwent right TREVIN on 6/5/19. Recovering well after procedure. He reports issues with ongoing fatigue, MART, and decreased stamina. He denies any chest pain/pressures, dizziness, lightheadedness, leg/ankle swelling, PND, orthopnea, palpitations, or syncopal symptoms. Presenting 12 lead ECG shows atypical AFL Vent Rate 73 bpm, QRS 84 ms, QTc 420 ms. Device interrogation shows normal device function. Since 1/18/19, there have been 400 NSVT, 2 other untreated episodes, and 211 AT/AF episodes recorded.  The VT-1 monitored episodes are AF w/RVR with rates in the 140s.  The 2 NSVT episodes available for review last 12 beats in duration with rates of 162-179 bpm.  AF burden = 85% since 1/18/19 and AF appear persistent since approximately mid  3/2019. Intrinsic rhythm = AF @  bpm. AP = 2%.  = 5%. Lead trends appear stable. Current cardiac medications include: Spironolactone, Lipitor, Sotalol, Toprol XL, and Xarelto.     EP Visit 11/8/19: He presents today for follow up. He was arranged for dofetilide loading and had RHC in AF, then DCCV, then repeat RHC in sinus to evaluate if arrhyhtmia was vs CM vs sotalol was main  of symptoms. RHC was relatively unchanged in AF and sinus. He reports having much better energy levels off Sotalol. He denies any chest pain/pressures, dizziness, lightheadedness, worsening shortness of breath, leg/ankle swelling, PND, orthopnea, palpitations, or syncopal symptoms. Presenting 12 lead ECG shows AP with prolonged AVD Vent Rate 60 bpm,  ms, QRS 86 ms, QTc 408 ms. Device interrogation shows normal ICD function. Since last device interrogation on 7/12/19, there have been 10 AT/AF episodes, most recent on 11/3/19 lasting 7.2 hours.  AF burden since 7/12/19 = 34%.  AF burden graph shows minimal AT/AF episodes recorded since ECV on 8/21/19.  1 asymptomatic VT episode recorded on 9/16 lasting 25 seconds @ 241 bpm received ATP x 1 with successful conversion. Patient states he was ill with URI/fever that day.   34 NSVT detections also recorded with 2 egms available displaying  one 5 beat PAT and one 4 beat NSVT. Intrinsic rhythm = SB @ 50 bpm. AP = 10%.  = 3%.  Estimated battery longevity to CARMEN = 4.5 years. Lead trends appear stable. Current cardiac medications include: Spironolactone, Lipitor, Sotalol, Toprol XL, and Xarelto.     EP Visit 3/13/20: He presents today for follow up. He reports feeling fatigued and having some MART. He denies any chest pain/pressures, dizziness, lightheadedness, leg/ankle swelling, PND, orthopnea, palpitations, or syncopal symptoms. He had recurrent AF/AFL and had DCCV on 1/16/20. He recurred back into AFL on 1/29/20 and remains in it. Device interrogation shows normal ICD function.  "Since ECV on 1/16/20, 569 NSVT and 188 VT-1 monitor zone episodes recorded, AF burden = 85% and appears persistent since 1/26/20.  The available VT-1 episodes display RVR and not a true ventricular arrhythmia.   Intrinsic rhythm = Atrial flutter with a sensed ventricular response of 102-115 bpm.   AP =<1%.  = 2%.  Patient reports that he has been noticing MART and elevated heart rate with minimal activity. Presenting 12 lead ECG shows AFL Vent Rate 108 bpm, QRS 88 ms, QTc 536 ms.  Current cardiac medications include: Spironolactone, Lipitor, Sotalol, Toprol XL, and Xarelto.     He presents today for follow up. He underwent ablation on LA AFL on 5/14/20. Groin sites well healed. He reports feeling improved energy after ablation. He still has some MART unchanged. He denies any chest pain/pressures, dizziness, lightheadedness, worsening shortness of breath, leg/ankle swelling, PND, orthopnea, palpitations, or syncopal symptoms. Last echo from 5/2020 showed LVEF 45-50%, normal RV size/function, and moderate pulmonary HTN. Device interrogation shows normal ICD function. 15 NSVT episodes recorded, 2 EGM for review lasting 2 - 2.5 seconds @ 172 - 182 bpm. 1 \"other untreated\" episode recorded lasting 13 seconds @ 193 bpm. 5 AT/AF episodes recorded lasting 4 sec to 4 minutes. Patient is on Pradaxa. Presenting EGM = AP/VS @ 60 bpm. AP = 26%.  = 0%.Estimated battery longevity to CARMEN = 3.5 years. Lead trends appear stable. Current cardiac medications include: Spironolactone, Lipitor, Sotalol, Toprol XL, and Xarelto.     PAST MEDICAL HISTORY:  Past Medical History:   Diagnosis Date     Atrial fibrillation (H) 12/9/11    failed medication, multiple DC cardioveresions; s/p Left atrial ablation to eliminate atrial fibrillation 12/9/11     Chest pain      CHF (congestive heart failure) (H) 7/30/2016     Coronary artery disease      DJD (degenerative joint disease)      Fatigue 7/15/2019     Hip arthritis 1/15/2014     Hypertension "      Hypertrophic cardiomyopathy (H) 10/09     Other abnormal heart sounds      Pacemaker     ICD     Palpitations      Pneumonia, organism unspecified(486)      Prediabetes 7/10/15    A1C 6.5     Status post implantation of automatic cardioverter/defibrillator (AICD)      Type 2 diabetes mellitus without complication, without long-term current use of insulin (H) 7/13/2020     Ventricular tachycardia (H)        CURRENT MEDICATIONS:  Current Outpatient Medications   Medication Sig Dispense Refill     acetaminophen (TYLENOL) 325 MG tablet Take 325-650 mg by mouth every 6 hours as needed       albuterol (PROAIR HFA/PROVENTIL HFA/VENTOLIN HFA) 108 (90 Base) MCG/ACT inhaler Inhale 2 puffs into the lungs every 6 hours 18 g 0     amoxicillin (AMOXIL) 500 MG tablet Take 4 tablets (2000 mg) by mouth 1 hour before dental procedures. 12 tablet 3     atorvastatin (LIPITOR) 20 MG tablet Take 1 tablet (20 mg) by mouth daily 90 tablet 3     cyanocobalamin (VITAMIN B-12) 100 MCG tablet Take 100 mcg by mouth daily       dabigatran ANTICOAGULANT (PRADAXA ANTICOAGULANT) 150 MG capsule Take 1 capsule (150 mg) by mouth 2 times daily Store in original 's bottle or blister pack; use within 120 days of opening. 60 capsule 0     dofetilide (TIKOSYN) 250 MCG capsule Take 1 capsule (250 mcg) by mouth every 12 hours 180 capsule 3     eplerenone (INSPRA) 50 MG tablet Take 1 tablet (50 mg) by mouth daily 90 tablet 3     furosemide (LASIX) 20 MG tablet Take 1 tablet (20 mg) by mouth daily In PM 90 tablet 3     furosemide (LASIX) 40 MG tablet Take 1 tablet (40 mg) by mouth daily In AM 90 tablet 3     gabapentin (NEURONTIN) 300 MG capsule Take 2 capsules (600 mg) by mouth 2 times daily 360 capsule 3     metFORMIN (GLUCOPHAGE-XR) 500 MG 24 hr tablet Take 2 tablets (1,000 mg) by mouth daily (with dinner) Take 2 tablets (1,000 mg) daily (with dinner). 180 tablet 3     metoprolol succinate ER (TOPROL-XL) 50 MG 24 hr tablet TAKE ONE TABLET  BY MOUTH EVERY DAY 90 tablet 3     multivitamin, therapeutic (THERA-VIT) TABS Take 1 tablet by mouth daily       sildenafil (VIAGRA) 100 MG tablet Take 0.5-1 tablets ( mg) by mouth daily as needed (take 1 hour before intercourse) 30 tablet 12     sotalol (BETAPACE) 120 MG tablet   1     zolpidem (AMBIEN) 10 MG tablet Take 0.5 tablets (5 mg) by mouth nightly as needed for sleep 90 tablet 0       PAST SURGICAL HISTORY:  Past Surgical History:   Procedure Laterality Date     ANESTHESIA CARDIOVERSION  4/24/2014    Procedure: ANESTHESIA CARDIOVERSION;  Surgeon: Generic Anesthesia Provider;  Location: [facility] OR     ANESTHESIA CARDIOVERSION N/A 5/12/2016    Procedure: ANESTHESIA CARDIOVERSION;  Surgeon: GENERIC ANESTHESIA PROVIDER;  Location: [facility] OR     ANESTHESIA CARDIOVERSION N/A 8/7/2017    Procedure: ANESTHESIA CARDIOVERSION;  Anesthesia Offsite Coverage Cardioversion @1100;  Surgeon: GENERIC ANESTHESIA PROVIDER;  Location: [facility] OR     ANESTHESIA CARDIOVERSION N/A 1/3/2018    Procedure: ANESTHESIA CARDIOVERSION;  Anesthesia Cardioverion;  Surgeon: GENERIC ANESTHESIA PROVIDER;  Location: [facility] OR     ANESTHESIA CARDIOVERSION N/A 5/4/2018    Procedure: ANESTHESIA CARDIOVERSION;  Anesthesia Coverage Cardioversion @1400;  Surgeon: GENERIC ANESTHESIA PROVIDER;  Location: [facility] OR     ANESTHESIA CARDIOVERSION N/A 9/27/2018    Procedure: ANESTHESIA CARDIOVERSION;  Anesthesia Cardioversion @0930;  Surgeon: GENERIC ANESTHESIA PROVIDER;  Location: [facility] OR     ANESTHESIA CARDIOVERSION N/A 12/20/2018    Procedure: Anesthesia Coverage Cardioversion @0830;  Surgeon: GENERIC ANESTHESIA PROVIDER;  Location: [facility]     ANESTHESIA CARDIOVERSION N/A 8/21/2019    Procedure: Anesthesia Coverage Cardioversion @0800;  Surgeon: GENERIC ANESTHESIA PROVIDER;  Location: [facility]     ANESTHESIA CARDIOVERSION N/A 1/16/2020    Procedure: ANESTHESIA, FOR CARDIOVERSION;  Surgeon: GENERIC ANESTHESIA PROVIDER;  Location: [facility] OR     ARTHROPLASTY HIP  1/15/2014     Procedure: ARTHROPLASTY HIP;  Left Total Hip Arthroplasty;  Surgeon: Nelson Gaspar MD;  Location: [facility] OR     ARTHROPLASTY HIP Right 6/5/2019    Procedure: Right Total Hip Arthroplasty;  Surgeon: Nelson Gaspar MD;  Location: [facility] OR     CARDIAC SURGERY       COLONOSCOPY N/A 5/18/2018    Procedure: COMBINED COLONOSCOPY, SINGLE OR MULTIPLE BIOPSY/POLYPECTOMY BY BIOPSY;  COLONOSCOPY (PT HAS DEFIBRILLATOR) ;  Surgeon: Mike Nickerson MD;  Location:  GI     CV RIGHT HEART CATH N/A 8/19/2019    Procedure: CV RIGHT HEART CATH;  Surgeon: Cisco Rausch MD;  Location:  HEART CARDIAC CATH LAB     CV RIGHT HEART CATH N/A 8/21/2019    Procedure: Right Heart Cath;  Surgeon: Ciaran Burton MD;  Location:  HEART CARDIAC CATH LAB     EP ABLATION FOCAL AFIB N/A 5/14/2020    Procedure: EP ABLATION FOCAL AFIB;  Surgeon: Erik Cooper MD;  Location:  HEART CARDIAC CATH LAB     H ABLATION FOCAL AFIB  12/9/11    Left atrial ablation to eliminate atrial fibrillation     IMPLANT AUTOMATIC IMPLANTABLE CARDIOVERTER DEFIBRILLATOR  7/27/12    AICD implantation     TONSILLECTOMY  1964       ALLERGIES:     No Known Allergies    FAMILY HISTORY:  Family History   Problem Relation Age of Onset     Heart Disease Mother         unknown     Obesity Mother      Gastrointestinal Disease Mother         diverticulitis     Diabetes Maternal Grandmother      Diabetes Paternal Grandmother      Cerebrovascular Disease Paternal Grandmother 94     Diabetes Paternal Grandfather      Cerebrovascular Disease Paternal Grandfather 78     Diabetes Son      C.A.D. Father         CABG age 78     Heart Disease Father         CABG x5     Diabetes Maternal Grandfather      LUNG DISEASE No family hx of      Deep Vein Thrombosis (DVT) No family hx of      Anesthesia Reaction No family hx of      Melanoma No family hx of      Skin Cancer No family hx of        SOCIAL HISTORY:  Social History     Tobacco Use     Smoking status: Former Smoker     Packs/day:  "1.00     Years: 40.00     Pack years: 40.00     Types: Cigarettes     Start date: 1975     Last attempt to quit: 2015     Years since quittin.7     Smokeless tobacco: Never Used   Substance Use Topics     Alcohol use: Yes     Alcohol/week: 0.0 standard drinks     Comment: very minimal     Drug use: No       ROS:   A comprehensive 10 point review of systems negative other than as mentioned in HPI.  Exam:  BP (!) 145/81 (BP Location: Right arm, Patient Position: Chair, Cuff Size: Adult Regular)   Pulse 70   Ht 1.803 m (5' 11\")   Wt 97.1 kg (214 lb)   SpO2 97%   BMI 29.85 kg/m  GENERAL APPEARANCE: healthy, alert and no distress  HEENT: no icterus, no xanthelasmas, normal pupil size and reaction, normal palate, mucosa moist, no central cyanosis  NECK: supple.  RESPIRATORY: lungs clear to auscultation - no rales, rhonchi or wheezes, no use of accessory muscles, no retractions, respirations are unlabored, normal respiratory rate  CARDIOVASCULAR:regular, S1/S2, no murmur, click or rub, precordium quiet with normal PMI.  ABDOMEN: soft, non tender, bowel sounds normal, non distended.   EXTREMITIES: peripheral pulses normal, no edema, no bruits  NEURO: alert and oriented to person/place/time, normal speech, gait and affect  SKIN: no ecchymoses, no rashes    Labs:  Reviewed.     Procedures:  12/15/16 ECHOCARDIOGRAM:   Interpretation Summary  Mildly (EF 40-45%) reduced left ventricular function is present. Mild  asymmetric septal hypertrophy is present (14 mm). No dynamic outflow tract  obstruction is present.  Global right ventricular function is normal.  No significant valvular abnormalities are identified.  No pericardial effusion is present.    2018 STRESS ECHO:  Interpretation Summary  Submaximal treadmill stress test in a patient with a diagnosis of HCM.     The Ramirez treadmill score is 8 (low risk).  Exercise was stopped due to fatigue.  Normal blood pressure response to exercise.  No ECG evidence of " ischemia.  No supraventricular or ventricular arrhythmias. Frequent PVCs noted throughout  the study.     Normal biventricular function with mild asymmetrical septal hypertrophy (12  mm).  No dynamic outflow tract obstruction is present at rest or with exercise.  Normal segmental wall motion at rest and with exercise.  Minimal augmentation in LV function with exercise.     Average functional capacity for age.    5/2020 ECHOCARDIOGRAM:     Left ventricular systolic function is mildly reduced.  The visual ejection fraction is estimated at 45-50%.  Right ventricular systolic pressure is elevated, consistent with moderate  pulmonary hypertension.  The right ventricular systolic function is normal.  The right ventricle is normal size.  Compared to recent ALEJANDRA, EF again mildly reduced. RVSP increased compared to  prior TTE from 12/19.    Assessment and Plan:   Mr. Meredith is a 64 year old male who has a past medical history significant for HCM diagnosed 2006, VT on Holter July 2012 s/p ICD 7/2012, troponin leak Oct 2013 (angiogram with non-significant CAD, LV gram showed EF 25%, recovered to 60% on TTE Dec 2013), HTN, AFib (CHADSVASC 2) with DCCVs then s/p PVI 12/2011, PVI for persistent AF on 7/28/16, PVI/CTI/CFAE with posterior wall isolation 8/17/18, s/p DCCV 8/7/17, 1/3/18,  5/5/18, 9/27/18, 12/20/2018, 1/16/2020, s/p LA AFL ablation 5/14/20, and s/p right IRISH on 6/5/2019. He presents today for follow up. He underwent ablation on LA AFL on 5/14/20. Groin sites well healed. He reports feeling improved energy after ablation. He still has some MART unchanged. He denies any chest pain/pressures, dizziness, lightheadedness, worsening shortness of breath, leg/ankle swelling, PND, orthopnea, palpitations, or syncopal symptoms. Last echo from 5/2020 showed LVEF 45-50%, normal RV size/function, and moderate pulmonary HTN. Device interrogation shows normal ICD function. 15 NSVT episodes recorded, 2 EGM for review lasting 2 - 2.5  "seconds @ 172 - 182 bpm. 1 \"other untreated\" episode recorded lasting 13 seconds @ 193 bpm. 5 AT/AF episodes recorded lasting 4 sec to 4 minutes. Patient is on Pradaxa. Presenting EGM = AP/VS @ 60 bpm. AP = 26%.  = 0%.Estimated battery longevity to CARMEN = 3.5 years. Lead trends appear stable. Current cardiac medications include: Spironolactone, Lipitor, Sotalol, Toprol XL, and Xarelto.     Persistent Atrial Fibrillation:   We discussed in detail with the patient management/treatment options for AF/AFL includin. Stroke Prophylaxis:  CHADSVASC= 2, corresponding to a 2.2% annual stroke / systemic emolism event rate. indicating need for long term oral anticoagulation.  He is on Xarelto. No bleeding issues.   2. Rate Control: Continue Metoprolol.   3. Rhythm Control: He has had a PVI ablation in  with several recurrences requiring DCCV and then repeat PVI in 2016. He had previously failed multaq and is currently on Sotalol.  He has now had some recurrent episodes of AT/AF requiring DCCV on 17 and 1/3/18.  Device check showed AT episode that started 18 and lasted 25 days and self terminated. He then had recurrent AT/AF and had DCCV on 18. He followed up with Dr. Ware recently in clinic and reported having fatigue, MART, and decreased stamina. Device interrogation showed he had recurred back into AT/AF since 18. He underwent a repeat PVI/CTI on 18. He subsequently had DCCV on 18, 2018. Then went back into persistent AF on 3/14/19. His main symptoms are fatigue, MART, and decreased stamina. Unclear if this was related to AF, HCM worsening, or possibly Sotalol. Therefore, we stopped his Sotalol and he underwent RHC in AF, dofetolide loading, DCCV, then repeat RHC in SR. This showed no significant hemodynamic changes in AF vs SR.He had another recurrence of AFL and had DCCV on 20. Then went back into AFL on 20 and remains in it. He does feel much better being on " dofetilide. QTC and renal function stable. He recurred back into an atypical AF and we elected to pursue another ablation and had LA AFL ablation on 5/14/20. He is doing well thus far without recurrences thus far.   4. Risk Factor Management: maintain tight BP control, and GONZALEZ evaluation as indicated.      Hypertrophic cardiomyopathy:  -Continue beta blockers  -Encouraged adequate hydration and avoidance of strenous physical activity   -Reinforced exercise recommendations with HCM (e.g. Circ 2014 recommendations: Avoidance of burst exercise, competitive training,weight-lifting)  -Family screening of 1st degree relatives recommended.   - ICD implanted in 7/2012  He will continue to follow with the device clinic per routine. He will follow up with Dr. Ware for HCM. Follow up with EP in 1 year.     The patient states understanding and is agreeable with plan.   This patient was seen and evaluated with Dr. Cooper. The above note reflects our joint assessment and plan.   KARSON Richardson CNP  Pager: [phone]    EP STAFF NOTE  I have seen and examined the patient as part of a shared visit with HOLLY Richardson NP.  I agree with the note above. I reviewed today's vital signs and medications. I have reviewed and discussed with the advanced practice provider their physical examination, assessment, and plan   Briefly, very low AF burden, doing well after ablation  My key history/exam findings are: RRR.   The key management decisions made by me: f/u 1 year.    Erik Cooper MD [facility]  Cardiology - Electrophysiology      Please do not hesitate to contact me if you have any questions/concerns.     Sincerely,     Erik Cooper MD

## 2020-09-11 NOTE — LETTER
9/11/2020      RE: Stu Meredith  [address] [facility] [address]       Dear Colleague,    Thank you for the opportunity to participate in the care of your patient, Stu Meredith, at the [facility] HEART [facility]. Please see a copy of my visit note below.    Chief complaint: Follow-up for HCM    HPI:   64 year old male with history of HCM without obstruction (dx 2006, EF 20% improved to 45-50%), VT s/p ICD 2012, nonobstructive CAD (cath 2013), AF s/p PVI X 3 (2011, 2016, 2018) and multiple DCCV and recent ablation presents for ongoing evaluation and management.     Today patient complains of ongoing sob and mart.  He states that he has mart at 100-300 feet.  He notes increase in mart with bending and notes difficulty taking a deep breath. 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 No new abdominal bloating. No frequent palpitations - once in a while 5-10 seconds of palpitations.    He also notes abdominal distention/fullness and early satiety.  He denies any pnd or edema.  He notes only rare palpitations 3-4 brief episodes since ablation lasting less than 10 seconds.  Overall he reports he is feeling about the same since his ablation.  He denies any problems with his medications and reports compliance. BP at home is usually 110-120/60s.    PAST MEDICAL HISTORY:  Past Medical History:   Diagnosis Date     Atrial fibrillation (H) 12/9/11    failed medication, multiple DC cardioveresions; s/p Left atrial ablation to eliminate atrial fibrillation 12/9/11     Chest pain      CHF (congestive heart failure) (H) 7/30/2016     Coronary artery disease      DJD (degenerative joint disease)      Fatigue 7/15/2019     Hip arthritis 1/15/2014     Hypertension      Hypertrophic cardiomyopathy (H) 10/09     Other abnormal heart sounds      Pacemaker     ICD     Palpitations      Pneumonia, organism unspecified(486)      Prediabetes 7/10/15    A1C 6.5     Status post implantation  of automatic cardioverter/defibrillator (AICD)      Type 2 diabetes mellitus without complication, without long-term current use of insulin (H) 7/13/2020     Ventricular tachycardia (H)        CURRENT MEDICATIONS:  Current Outpatient Medications   Medication Sig Dispense Refill     acetaminophen (TYLENOL) 325 MG tablet Take 325-650 mg by mouth every 6 hours as needed       albuterol (PROAIR HFA/PROVENTIL HFA/VENTOLIN HFA) 108 (90 Base) MCG/ACT inhaler Inhale 2 puffs into the lungs every 6 hours 18 g 0     amoxicillin (AMOXIL) 500 MG tablet Take 4 tablets (2000 mg) by mouth 1 hour before dental procedures. 12 tablet 3     atorvastatin (LIPITOR) 20 MG tablet Take 1 tablet (20 mg) by mouth daily 90 tablet 3     cyanocobalamin (VITAMIN B-12) 100 MCG tablet Take 100 mcg by mouth daily       dabigatran ANTICOAGULANT (PRADAXA ANTICOAGULANT) 150 MG capsule Take 1 capsule (150 mg) by mouth 2 times daily Store in original 's bottle or blister pack; use within 120 days of opening. 60 capsule 0     dofetilide (TIKOSYN) 250 MCG capsule Take 1 capsule (250 mcg) by mouth every 12 hours 180 capsule 3     eplerenone (INSPRA) 50 MG tablet Take 1 tablet (50 mg) by mouth daily 90 tablet 3     furosemide (LASIX) 20 MG tablet Take 1 tablet (20 mg) by mouth daily In PM 90 tablet 3     furosemide (LASIX) 40 MG tablet Take 1 tablet (40 mg) by mouth daily In AM 90 tablet 3     gabapentin (NEURONTIN) 300 MG capsule Take 2 capsules (600 mg) by mouth 2 times daily 360 capsule 3     metFORMIN (GLUCOPHAGE-XR) 500 MG 24 hr tablet Take 2 tablets (1,000 mg) by mouth daily (with dinner) Take 2 tablets (1,000 mg) daily (with dinner). 180 tablet 3     metoprolol succinate ER (TOPROL-XL) 50 MG 24 hr tablet TAKE ONE TABLET BY MOUTH EVERY DAY 90 tablet 3     multivitamin, therapeutic (THERA-VIT) TABS Take 1 tablet by mouth daily       sildenafil (VIAGRA) 100 MG tablet Take 0.5-1 tablets ( mg) by mouth daily as needed (take 1 hour before  intercourse) 30 tablet 12     sotalol (BETAPACE) 120 MG tablet   1     zolpidem (AMBIEN) 10 MG tablet Take 0.5 tablets (5 mg) by mouth nightly as needed for sleep 90 tablet 0       PAST SURGICAL HISTORY:  Past Surgical History:   Procedure Laterality Date     ANESTHESIA CARDIOVERSION  4/24/2014    Procedure: ANESTHESIA CARDIOVERSION;  Surgeon: Generic Anesthesia Provider;  Location: [facility]     ANESTHESIA CARDIOVERSION N/A 5/12/2016    Procedure: ANESTHESIA CARDIOVERSION;  Surgeon: GENERIC ANESTHESIA PROVIDER;  [facility]     ANESTHESIA CARDIOVERSION N/A 8/7/2017    Procedure: ANESTHESIA CARDIOVERSION;  Anesthesia Offsite Coverage Cardioversion @1100;  Surgeon: GENERIC ANESTHESIA PROVIDER;  [facility]     ANESTHESIA CARDIOVERSION N/A 1/3/2018    Procedure: ANESTHESIA CARDIOVERSION;  Anesthesia Cardioverion;  Surgeon: GENERIC ANESTHESIA PROVIDER;  [facility]     ANESTHESIA CARDIOVERSION N/A 5/4/2018    Procedure: ANESTHESIA CARDIOVERSION;  Anesthesia Coverage Cardioversion @1400;  Surgeon: GENERIC ANESTHESIA PROVIDER;  Location: [facility]     ANESTHESIA CARDIOVERSION N/A 9/27/2018    Procedure: ANESTHESIA CARDIOVERSION;  Anesthesia Cardioversion @0930;  Surgeon: GENERIC ANESTHESIA PROVIDER;  [facility]     ANESTHESIA CARDIOVERSION N/A 12/20/2018    Procedure: Anesthesia Coverage Cardioversion @0830;  Surgeon: GENERIC ANESTHESIA PROVIDER;  [facility]     ANESTHESIA CARDIOVERSION N/A 8/21/2019    Procedure: Anesthesia Coverage Cardioversion @0800;  Surgeon: GENERIC ANESTHESIA PROVIDER;  [facility]     ANESTHESIA CARDIOVERSION N/A 1/16/2020    Procedure: ANESTHESIA, FOR CARDIOVERSION;  Surgeon: GENERIC ANESTHESIA PROVIDER;  Location: [facility]     ARTHROPLASTY HIP  1/15/2014    Procedure: ARTHROPLASTY HIP;  Left Total Hip Arthroplasty;  Surgeon: Nelson Gaspar MD;  Location: [facility]     ARTHROPLASTY HIP Right 6/5/2019    Procedure: Right Total Hip Arthroplasty;  Surgeon: Nelson Gaspar MD;   Location: [facility] OR     CARDIAC SURGERY       COLONOSCOPY N/A 2018    Procedure: COMBINED COLONOSCOPY, SINGLE OR MULTIPLE BIOPSY/POLYPECTOMY BY BIOPSY;  COLONOSCOPY (PT HAS DEFIBRILLATOR) ;  Surgeon: Mike Nickerson MD;  Location:  GI     CV RIGHT HEART CATH N/A 2019    Procedure: CV RIGHT HEART CATH;  Surgeon: Cisco Rausch MD;  Location:  HEART CARDIAC CATH LAB     CV RIGHT HEART CATH N/A 2019    Procedure: Right Heart Cath;  Surgeon: Ciaran Burton MD;  Location:  HEART CARDIAC CATH LAB     EP ABLATION FOCAL AFIB N/A 2020    Procedure: EP ABLATION FOCAL AFIB;  Surgeon: Erik Cooper MD;  Location:  HEART CARDIAC CATH LAB     H ABLATION FOCAL AFIB  11    Left atrial ablation to eliminate atrial fibrillation     IMPLANT AUTOMATIC IMPLANTABLE CARDIOVERTER DEFIBRILLATOR  12    AICD implantation     TONSILLECTOMY  1964       ALLERGIES:   No Known Allergies    FAMILY HISTORY:  Family History   Problem Relation Age of Onset     Heart Disease Mother         unknown     Obesity Mother      Gastrointestinal Disease Mother         diverticulitis     Diabetes Maternal Grandmother      Diabetes Paternal Grandmother      Cerebrovascular Disease Paternal Grandmother 94     Diabetes Paternal Grandfather      Cerebrovascular Disease Paternal Grandfather 78     Diabetes Son      C.A.D. Father         CABG age 78     Heart Disease Father         CABG x5     Diabetes Maternal Grandfather      LUNG DISEASE No family hx of      Deep Vein Thrombosis (DVT) No family hx of      Anesthesia Reaction No family hx of      Melanoma No family hx of      Skin Cancer No family hx of        SOCIAL HISTORY:  Social History     Tobacco Use     Smoking status: Former Smoker     Packs/day: 1.00     Years: 40.00     Pack years: 40.00     Types: Cigarettes     Start date: 1975     Last attempt to quit: 2015     Years since quittin.7     Smokeless tobacco: Never Used   Substance Use Topics      "Alcohol use: Yes     Alcohol/week: 0.0 standard drinks     Comment: very minimal     Drug use: No       ROS:   A comprehensive 14 point review of systems is negative other than as mentioned in HPI.    Exam:  BP (!) 145/81 (BP Location: Right arm, Patient Position: Chair, Cuff Size: Adult Regular)   Pulse 70   Ht 1.803 m (5' 11\")   Wt 97.1 kg (214 lb)   SpO2 97%   BMI 29.85 kg/m    GENERAL APPEARANCE: healthy, alert and no distress  EYES: no icterus, no xanthelasmas  ENT: normal palate, mucosa moist, no central cyanosis  NECK: JVP not elevated  RESPIRATORY: lungs clear to auscultation - no rales, rhonchi or wheezes, no use of accessory muscles, no retractions, respirations are unlabored, normal respiratory rate  CARDIOVASCULAR: regular rhythm, normal S1 with physiologic split S2, no S3 or S4 and no murmur, click or rub.  GI: soft, non tender, bowel sounds normal,no abdominal bruits  EXTREMITIES: no edema, no bruits  NEURO: alert and oriented to person/place/time, normal speech, gait and affect  VASC: Radial, dorsalis pedis and posterior tibialis pulses 2+ bilaterally.  SKIN: no ecchymoses, no rashes.  PSYCH: cooperative, affect appropriate.     Labs:  Reviewed.       CPX Echo 6/1/2018:  Interpretation Summary  Submaximal treadmill stress test in a patient with a diagnosis of HCM.  The Ramirez treadmill score is 8 (low risk).  Exercise was stopped due to fatigue.  Normal blood pressure response to exercise.  No ECG evidence of ischemia.  No supraventricular or ventricular arrhythmias. Frequent PVCs noted throughout the study.  Normal biventricular function with mild asymmetrical septal hypertrophy (12mm).  No dynamic outflow tract obstruction is present at rest or with exercise.  Normal segmental wall motion at rest and with exercise.  Minimal augmentation in LV function with exercise.  Average functional capacity for age.           CPX 2/15/2019:          CTA coronary angiogram 5/28/19  IMPRESSION:  1.  No evidence " of coronary artery atherosclerosis or stenosis.  2.  Myocardial bridging involving the mid LAD.  3.  Total Agatston score 0 placing the patient in the lowest percentile when compared to age and gender matched control group.     Right heart cath 8/21/19    Time Systolic Diastolic Mean A Wave V Wave EDP Max dp/dt HR   RA Pressures  3:38 PM     12 mmHg    16 mmHg    14 mmHg        61 bpm      RV Pressures  3:38 PM 60 mmHg            16 mmHg      106 bpm      PA Pressures  3:41 PM 60 mmHg    22 mmHg    38 mmHg            69 bpm      PCW Pressures  3:39 PM     30 mmHg    28 mmHg    38 mmHg        74 bpm           Time Hb SAT(%) PO2 Content PA Sat   PA  3:28 PM   63.6 %        63.6 %      Art  3:28 PM   99 %      18.04 mL/dL        Cardiac Output Phase: Baseline        Time TDCO TDCI Tj C.O. Tj C.I. Tj HR   Cardiac Output Results  3:28 PM 3.8 L/min    1.79 L/min/m2    4.41 L/min    2.08 L/min/m2              Echo 12/20/2019  Global and regional left ventricular function is normal with an EF of 55-60%.  Global right ventricular function is normal.  IVC diameter <2.1 cm collapsing >50% with sniff suggests a normal RA pressure  of 3 mmHg.  No pericardial effusion is present.  Mild pulmonary hypertension is present.  No change from prior.     Echo 5/2020  Left ventricular systolic function is mildly reduced.  The visual ejection fraction is estimated at 45-50%.  Right ventricular systolic pressure is elevated, consistent with moderate  pulmonary hypertension.  The right ventricular systolic function is normal.  The right ventricle is normal size.  Compared to recent ALEJANDRA, EF again mildly reduced. RVSP increased compared to  prior TTE from 12/19.    9/9/20 Device Interrogation  In clinic device appointment converted to remote device appointment to minimize COVID19 exposure for high risk patient populations. Remote ICD transmission received and reviewed. Device transmission sent per MD orders. Patient has a [employer]  "Scientific Dual lead ICD. Normal ICD function. 15 NSVT episodes recorded, 2 EGM for review lasting 2 - 2.5 seconds @ 172 - 182 bpm. 1 \"other untreated\" episode recorded lasting 13 seconds @ 193 bpm. 5 AT/AF episodes recorded lasting 4 sec to 4 minutes. Patient is on Pradaxa. Presenting EGM = AP/VS @ 60 bpm. AP = 26%.  = 0%.Estimated battery longevity to CARMEN = 3.5 years. Lead trends appear stable. Patient has an appointment 9/11/20 with Dr. Ware. Plan for patient to send a remote transmission in 3 months and return to clinic in 6 months. GRAY Avalos RN, BSN.      Remote ICD transmission      Assessment and Plan:   64 year old male with history of HCM without obstruction (dx 2006, EF 20% improved to 60%), VT s/p ICD 2012, nonobstructive CAD (cath 2013), AF s/p PVI X 3 (2011, 2016, 2018) and multiple DCCV and recent ablation presents for ongoing evaluation and management.     # Hypertrophic cardiomyopathy with no evidence of LVOT obstruction.-- previously burnt out with EF 20% ('13) recovered (EF 60%) but last echo after recent ablation revealed reduction in LVEF 45-50%.   -Compensated and euvolemic on exam, thus continue lasix to 40mg qam and 20mg qpm.  - continue metoprolol XL 50mg daily   - continue eplerenone 50mg daily  - pt aware of exercise restrictions and family screening recommendations    #Non-sustained VT:  Likely related to NICM. Patient denies any symptoms. Longest run was 13 seconds at 193 BPM.     # Nonobstructive CAD -- 10-30% stenoses on cath '13  - coronary CTA confirmed no significant disease  - continue atorvastatin 20      # HTN -- controlled  - continue metoprolol XL and eplerenone      # Atrial arrhythmias s/p recent ablation   - continue Xarelto  - continue dofetilide 250mcg  BID   - continue Metoprolol 50 XL daily   - followed by EP     Follow-up: 6 month      The patient states understanding and is agreeable with plan.     Plan discussed with Dr. Ware.    Landry Solo M.D.  Cardiology " Fellow      I have reviewed today's vital signs, notes, medications, labs and imaging. I have also seen and examined the patient and agree with the findings and plan as outlined above.    Mona Ware MD  Section Head - Advanced Heart Failure, Transplantation and Mechanical Circulatory Support  Director - Adult Congenital and Cardiovascular Genetics Center  Associate Professor of Medicine, [facility]      Please do not hesitate to contact me if you have any questions/concerns.     Sincerely,     Mona Ware MD

## 2020-09-11 NOTE — NURSING NOTE
Diet: Patient instructed regarding a heart healthy diet, including discussion of reduced fat and sodium intake. Patient demonstrated understanding of this information and agreed to call with further questions or concerns.    Labs: Patient was given results of the laboratory testing obtained today. Patient was instructed to return for the next laboratory testing in 4-6 months. Patient demonstrated understanding of this information and agreed to call with further questions or concerns.     Med Reconcile: Reviewed and verified all current medications with the patient. The updated medication list was printed and given to the patient.    New Medication: Patient was educated regarding newly prescribed medication, including discussion of  the indication, administration, side effects, and when to report to MD or RN. Patient demonstrated understanding of this information and agreed to call with further questions or concerns.    Return Appointment: Patient given instructions regarding scheduling next clinic visit. Patient demonstrated understanding of this information and agreed to call with further questions or concerns.    Patient stated he understood all health information given and agreed to call with further questions or concerns.    Dimple Birch, MSN, RN, CNL  Cardiology Care Coordinator  [facility]  [phone]

## 2020-09-11 NOTE — PATIENT INSTRUCTIONS
You were seen today in the Adult Congenital and Cardiovascular Genetics Clinic at the [facility].    Cardiology Providers you saw during your visit:  Mona Ware MD and Erik Cooper MD    Diagnosis:  HCM    Results:  Mona Ware MD and Erik Cooper MD reviewed the results of your device and lab testing today in clinic.    Recommendations:    1. Continue to eat a heart healthy, low salt diet.  2. Continue to get 20-30 minutes of aerobic activity, 4-5 days per week.  Examples of aerobic activity include walking, running, swimming, cycling, etc.  3. Continue to observe good oral hygiene, with regular dental visits.  4. We will now pursue full disability. We will update your disability paperwork.  5. We sent a prescription for Fluticasone.    SBE prophylaxis:   Yes____  No_X___    Lifelong Bacterial Endocarditis Prophylaxis:  YES____  NO____    If YES is checked, follow the recommendations outlined below:  1. Take antibiotic(s) prior to recommended dental procedures and procedures on the respiratory tract or with infected skin, muscle or bones. SBE prophylaxis is not needed for routine GI and  procedures (ie. Colonoscopy or vaginal delivery)  2. Observe good oral hygiene daily, as advised by your dentist. Get regular professional dental care.  3. Keep cuts clean.  4. Infections should be treated promptly.  5. Symptoms of Infective Endocarditis could include: fever lasting more than 4-5 days or a recurrent fever that initially resolves but returns within 1-2 days)      Exercise restrictions:   Yes__X__  No____         If yes, list restrictions:  Must be allowed to rest if fatigued or SOB      Work restrictions:  Yes____  No_X___         If yes, list restrictions:    FASTING CHOLESTEROL was checked in the last 5 years YES_X__  NO___ (2019)  Continue to eat a heart healthy, low salt diet.         ____ Fasting lipid panel order today         ____ No changes in medications          ____ I recommend the  following changes in your cholesterol medications.:          ____ Please follow up for cholesterol screening at your primary care physician      Follow-up:  Follow up with Dr. Ware in 4-6 months with device and labs. Follow up with Dr. Cooper in one year.    If you have questions or concerns please contact us at:    Dimple Birch, MSN, RN, CNL    Radhika Mitchell (Scheduling)  Nurse Care Coordinator     Clinic   Adult Congenital and CV Genetics   Adult Congenital and CV Genetic  [facility]   [facility]  (P) [phone]     (P) [phone]  [email or web address]   (F) [phone]        For after hours urgent needs, call [phone] and ask to speak to the Adult Congenital Physician on call.  Mention Job Code 0401.    For emergencies call 911.    [facility]  [facility]   Clinics and Surgery Center  Mail Code [id number]  [address] [place]  [address]

## 2020-09-12 LAB — TESTOST SERPL-MCNC: 409 NG/DL (ref 240–950)

## 2020-09-16 LAB
MDC_IDC_EPISODE_DTM: NORMAL
MDC_IDC_EPISODE_DURATION: 1 S
MDC_IDC_EPISODE_DURATION: 10 S
MDC_IDC_EPISODE_DURATION: 10 S
MDC_IDC_EPISODE_DURATION: 2 S
MDC_IDC_EPISODE_DURATION: 244 S
MDC_IDC_EPISODE_DURATION: 26 S
MDC_IDC_EPISODE_DURATION: 3 S
MDC_IDC_EPISODE_DURATION: 7 S
MDC_IDC_EPISODE_DURATION: 8 S
MDC_IDC_EPISODE_DURATION: 9 S
MDC_IDC_EPISODE_ID: NORMAL
MDC_IDC_EPISODE_TYPE: NORMAL
MDC_IDC_EPISODE_VENDOR_TYPE: NORMAL
MDC_IDC_LEAD_IMPLANT_DT: NORMAL
MDC_IDC_LEAD_IMPLANT_DT: NORMAL
MDC_IDC_LEAD_LOCATION: NORMAL
MDC_IDC_LEAD_LOCATION: NORMAL
MDC_IDC_LEAD_MFG: NORMAL
MDC_IDC_LEAD_MFG: NORMAL
MDC_IDC_LEAD_MODEL: NORMAL
MDC_IDC_LEAD_MODEL: NORMAL
MDC_IDC_LEAD_POLARITY_TYPE: NORMAL
MDC_IDC_LEAD_POLARITY_TYPE: NORMAL
MDC_IDC_LEAD_SERIAL: NORMAL
MDC_IDC_LEAD_SERIAL: NORMAL
MDC_IDC_MSMT_BATTERY_DTM: NORMAL
MDC_IDC_MSMT_BATTERY_REMAINING_LONGEVITY: 42 MO
MDC_IDC_MSMT_BATTERY_REMAINING_PERCENTAGE: 53 %
MDC_IDC_MSMT_BATTERY_STATUS: NORMAL
MDC_IDC_MSMT_CAP_CHARGE_DTM: NORMAL
MDC_IDC_MSMT_CAP_CHARGE_DTM: NORMAL
MDC_IDC_MSMT_CAP_CHARGE_ENERGY: 11 J
MDC_IDC_MSMT_CAP_CHARGE_TIME: 1.9 S
MDC_IDC_MSMT_CAP_CHARGE_TIME: 10.6 S
MDC_IDC_MSMT_CAP_CHARGE_TYPE: NORMAL
MDC_IDC_MSMT_CAP_CHARGE_TYPE: NORMAL
MDC_IDC_MSMT_LEADCHNL_RA_IMPEDANCE_VALUE: 654 OHM
MDC_IDC_MSMT_LEADCHNL_RA_PACING_THRESHOLD_AMPLITUDE: 0.5 V
MDC_IDC_MSMT_LEADCHNL_RA_PACING_THRESHOLD_PULSEWIDTH: 0.5 MS
MDC_IDC_MSMT_LEADCHNL_RV_IMPEDANCE_VALUE: 450 OHM
MDC_IDC_MSMT_LEADCHNL_RV_PACING_THRESHOLD_AMPLITUDE: 0.9 V
MDC_IDC_MSMT_LEADCHNL_RV_PACING_THRESHOLD_PULSEWIDTH: 0.5 MS
MDC_IDC_PG_IMPLANT_DTM: NORMAL
MDC_IDC_PG_MFG: NORMAL
MDC_IDC_PG_MODEL: NORMAL
MDC_IDC_PG_SERIAL: NORMAL
MDC_IDC_PG_TYPE: NORMAL
MDC_IDC_SESS_CLINIC_NAME: NORMAL
MDC_IDC_SESS_DTM: NORMAL
MDC_IDC_SESS_TYPE: NORMAL
MDC_IDC_SET_BRADY_AT_MODE_SWITCH_MODE: NORMAL
MDC_IDC_SET_BRADY_AT_MODE_SWITCH_RATE: 170 {BEATS}/MIN
MDC_IDC_SET_BRADY_LOWRATE: 60 {BEATS}/MIN
MDC_IDC_SET_BRADY_MAX_SENSOR_RATE: 120 {BEATS}/MIN
MDC_IDC_SET_BRADY_MAX_TRACKING_RATE: 120 {BEATS}/MIN
MDC_IDC_SET_BRADY_MODE: NORMAL
MDC_IDC_SET_BRADY_PAV_DELAY_HIGH: 260 MS
MDC_IDC_SET_BRADY_PAV_DELAY_LOW: 390 MS
MDC_IDC_SET_BRADY_SAV_DELAY_HIGH: 260 MS
MDC_IDC_SET_BRADY_SAV_DELAY_LOW: 390 MS
MDC_IDC_SET_LEADCHNL_RA_PACING_AMPLITUDE: 2.4 V
MDC_IDC_SET_LEADCHNL_RA_PACING_POLARITY: NORMAL
MDC_IDC_SET_LEADCHNL_RA_PACING_PULSEWIDTH: 0.5 MS
MDC_IDC_SET_LEADCHNL_RA_SENSING_ADAPTATION_MODE: NORMAL
MDC_IDC_SET_LEADCHNL_RA_SENSING_POLARITY: NORMAL
MDC_IDC_SET_LEADCHNL_RA_SENSING_SENSITIVITY: 0.25 MV
MDC_IDC_SET_LEADCHNL_RV_PACING_AMPLITUDE: 2.4 V
MDC_IDC_SET_LEADCHNL_RV_PACING_POLARITY: NORMAL
MDC_IDC_SET_LEADCHNL_RV_PACING_PULSEWIDTH: 0.5 MS
MDC_IDC_SET_LEADCHNL_RV_SENSING_ADAPTATION_MODE: NORMAL
MDC_IDC_SET_LEADCHNL_RV_SENSING_POLARITY: NORMAL
MDC_IDC_SET_LEADCHNL_RV_SENSING_SENSITIVITY: 0.6 MV
MDC_IDC_SET_ZONE_DETECTION_INTERVAL: 273 MS
MDC_IDC_SET_ZONE_DETECTION_INTERVAL: 333 MS
MDC_IDC_SET_ZONE_DETECTION_INTERVAL: 375 MS
MDC_IDC_SET_ZONE_TYPE: NORMAL
MDC_IDC_SET_ZONE_VENDOR_TYPE: NORMAL
MDC_IDC_STAT_BRADY_DTM_END: NORMAL
MDC_IDC_STAT_BRADY_DTM_START: NORMAL
MDC_IDC_STAT_BRADY_RA_PERCENT_PACED: 26 %
MDC_IDC_STAT_BRADY_RV_PERCENT_PACED: 0 %
MDC_IDC_STAT_EPISODE_RECENT_COUNT: 0
MDC_IDC_STAT_EPISODE_RECENT_COUNT: 1
MDC_IDC_STAT_EPISODE_RECENT_COUNT: 26
MDC_IDC_STAT_EPISODE_RECENT_COUNT: 6
MDC_IDC_STAT_EPISODE_RECENT_COUNT_DTM_END: NORMAL
MDC_IDC_STAT_EPISODE_RECENT_COUNT_DTM_START: NORMAL
MDC_IDC_STAT_EPISODE_TYPE: NORMAL
MDC_IDC_STAT_EPISODE_VENDOR_TYPE: NORMAL
MDC_IDC_STAT_TACHYTHERAPY_ATP_DELIVERED_RECENT: 0
MDC_IDC_STAT_TACHYTHERAPY_ATP_DELIVERED_TOTAL: 3
MDC_IDC_STAT_TACHYTHERAPY_RECENT_DTM_END: NORMAL
MDC_IDC_STAT_TACHYTHERAPY_RECENT_DTM_START: NORMAL
MDC_IDC_STAT_TACHYTHERAPY_SHOCKS_ABORTED_RECENT: 0
MDC_IDC_STAT_TACHYTHERAPY_SHOCKS_ABORTED_TOTAL: 1
MDC_IDC_STAT_TACHYTHERAPY_SHOCKS_DELIVERED_RECENT: 0
MDC_IDC_STAT_TACHYTHERAPY_SHOCKS_DELIVERED_TOTAL: 1
MDC_IDC_STAT_TACHYTHERAPY_TOTAL_DTM_END: NORMAL
MDC_IDC_STAT_TACHYTHERAPY_TOTAL_DTM_START: NORMAL

## 2020-09-25 DIAGNOSIS — I42.1 HOCM (HYPERTROPHIC OBSTRUCTIVE CARDIOMYOPATHY) (H): Primary | ICD-10-CM

## 2020-09-29 RX ORDER — METOPROLOL SUCCINATE 50 MG/1
50 TABLET, EXTENDED RELEASE ORAL DAILY
Qty: 90 TABLET | Refills: 3 | Status: SHIPPED | OUTPATIENT
Start: 2020-09-29 | End: 2021-09-07

## 2020-09-30 ENCOUNTER — TELEPHONE (OUTPATIENT)
Dept: CARDIOLOGY | Facility: CLINIC | Age: 65
End: 2020-09-30

## 2020-09-30 NOTE — TELEPHONE ENCOUNTER
Claims department calling in today in regards to Haroon's disability claim. They mention that he indicated that he will not be returning back to work and they would like to follow up in regards to permanent disability.     [phone]

## 2020-10-06 ENCOUNTER — TELEPHONE (OUTPATIENT)
Dept: CARDIOLOGY | Facility: CLINIC | Age: 65
End: 2020-10-06

## 2020-10-06 NOTE — TELEPHONE ENCOUNTER
Haroon left VM stating that we need to fill out the [employer] disability form that was sent yesterday and return it to them as he will start missing pay days if we don't.    Pt requesting call back to discuss.

## 2020-10-12 ENCOUNTER — OFFICE VISIT (OUTPATIENT)
Dept: DERMATOLOGY | Facility: CLINIC | Age: 65
End: 2020-10-12
Payer: COMMERCIAL

## 2020-10-12 DIAGNOSIS — Z85.828 HISTORY OF BASAL CELL CARCINOMA: ICD-10-CM

## 2020-10-12 DIAGNOSIS — L57.0 ACTINIC KERATOSIS: Primary | ICD-10-CM

## 2020-10-12 DIAGNOSIS — L82.1 SEBORRHEIC KERATOSIS: ICD-10-CM

## 2020-10-12 DIAGNOSIS — D48.5 NEOPLASM OF UNCERTAIN BEHAVIOR OF SKIN OF NECK: ICD-10-CM

## 2020-10-12 DIAGNOSIS — L28.0 LICHEN SIMPLEX CHRONICUS: ICD-10-CM

## 2020-10-12 PROCEDURE — 17110 DESTRUCTION B9 LES UP TO 14: CPT | Performed by: PHYSICIAN ASSISTANT

## 2020-10-12 PROCEDURE — 17000 DESTRUCT PREMALG LESION: CPT | Mod: 59 | Performed by: PHYSICIAN ASSISTANT

## 2020-10-12 PROCEDURE — 99213 OFFICE O/P EST LOW 20 MIN: CPT | Mod: 25 | Performed by: PHYSICIAN ASSISTANT

## 2020-10-12 PROCEDURE — 88305 TISSUE EXAM BY PATHOLOGIST: CPT | Performed by: PATHOLOGY

## 2020-10-12 PROCEDURE — 11102 TANGNTL BX SKIN SINGLE LES: CPT | Mod: 59 | Performed by: PHYSICIAN ASSISTANT

## 2020-10-12 ASSESSMENT — PAIN SCALES - GENERAL: PAINLEVEL: MILD PAIN (2)

## 2020-10-12 NOTE — NURSING NOTE
Chief Complaint   Patient presents with     Derm Problem     Haroon is here about his right forehead, left neck, upper chest, and left thumb.      Suzy Avelar LPN

## 2020-10-12 NOTE — PATIENT INSTRUCTIONS

## 2020-10-12 NOTE — LETTER
10/12/2020       RE: Stu Meredith  [address] [facility] [id number]     Dear Colleague,    Thank you for referring your patient, Stu Meredith, to the [facility] [place] at [facility]. Please see a copy of my visit note below.    [facility] Dermatology Note      1.Dermatology Problem List:  1. History of NMSC  -BCC of Left central chest s/p excision 07/17/19,   -BCC left lateral neck, bx 10/12/20 plan for MMS  2. Centrofacial rosacea w/telangectasias  3. Benign skin findings: seborrheic keratoses, dermatofibromas, solar lentigines  4. Lower cutaneous lip solar lentigines  5. Prurigo nodule vs Lichen simplex chronicus left lateral thenar eminence   -s/p cryo 12/20/19, resolved 1/23/2020, repeat   6. Actinic keratosis right upper forehead. S/p cryotherapy 8/3/20 and 10/12/20    CC:   Chief Complaint   Patient presents with     Derm Problem     Haroon is here about his right forehead, left neck, upper chest, and left thumb.          Encounter Date: Oct 12, 2020    History of Present Illness:  Mr. Stu Meredith is a 64 year old male who presents as a follow-up for a spot check. He was last seen 1/23/20 when the prurigo nodule on his left thumb was resolved and the patient was reassured the basal cell excision on his chest had negative margins. He has a few specific spots he would like examined. He defers a full skin check today. He has a spot on his right upper forehead that was treated with cryotherapy 8/3/20 and did not go away. He would like this re-examined.     He has a spot on the left side of his neck that does not heal. It is wet at times. It has been like that for several months. He has a hard times seeing it. Additionally he has a rough spot near his excision sites on his chest. It does not hurt, bleed or grow.     Lastly, he has an area on his thumb that has been treated with ILK, steroid tape and  cryotherapy. It went away with the cryotherapy in December of 2019 and is interested in this treatment again.     He is feeling well, No other skin concerns.     Past Medical History:   Patient Active Problem List   Diagnosis     Hypertrophic cardiomyopathy (H)     Advanced directives, counseling/discussion     Ventricular tachycardia (H)     Automatic implantable cardioverter-defibrillator in situ- Mogi, dual chamber- NOT dependent     Prediabetes     S/P ablation of atrial flutter     CHF (congestive heart failure) (H)     Chronic Zolpidem use for insomnia     S/P ablation of atrial fibrillation     Lung nodules     Atrial tachycardia (H)     Encounter for monitoring dofetilide therapy     HTN (hypertension)     Hypertrophic cardiomegaly     SOB (shortness of breath)     Paroxysmal atrial fibrillation (H)     Dyspnea     Type 2 diabetes mellitus without complication, without long-term current use of insulin (H)     Chronic insomnia     Past Medical History:   Diagnosis Date     Atrial fibrillation (H) 12/9/11    failed medication, multiple DC cardioveresions; s/p Left atrial ablation to eliminate atrial fibrillation 12/9/11     Chest pain      CHF (congestive heart failure) (H) 7/30/2016     Coronary artery disease      DJD (degenerative joint disease)      Fatigue 7/15/2019     Hip arthritis 1/15/2014     Hypertension      Hypertrophic cardiomyopathy (H) 10/09     Other abnormal heart sounds      Pacemaker     ICD     Palpitations      Pneumonia, organism unspecified(486)      Prediabetes 7/10/15    A1C 6.5     Status post implantation of automatic cardioverter/defibrillator (AICD)      Type 2 diabetes mellitus without complication, without long-term current use of insulin (H) 7/13/2020     Ventricular tachycardia (H)      Past Surgical History:   Procedure Laterality Date     ANESTHESIA CARDIOVERSION  4/24/2014    Procedure: ANESTHESIA CARDIOVERSION;  Surgeon: Generic Anesthesia Provider;  Location:  [facility] OR     ANESTHESIA CARDIOVERSION N/A 5/12/2016    Procedure: ANESTHESIA CARDIOVERSION;  Surgeon: GENERIC ANESTHESIA PROVIDER;  Location: [facility] OR     ANESTHESIA CARDIOVERSION N/A 8/7/2017    Procedure: ANESTHESIA CARDIOVERSION;  Anesthesia Offsite Coverage Cardioversion @1100;  Surgeon: GENERIC ANESTHESIA PROVIDER;  Location: [facility] OR     ANESTHESIA CARDIOVERSION N/A 1/3/2018    Procedure: ANESTHESIA CARDIOVERSION;  Anesthesia Cardioverion;  Surgeon: GENERIC ANESTHESIA PROVIDER;  Location: [facility] OR     ANESTHESIA CARDIOVERSION N/A 5/4/2018    Procedure: ANESTHESIA CARDIOVERSION;  Anesthesia Coverage Cardioversion @1400;  Surgeon: GENERIC ANESTHESIA PROVIDER;  Location: [facility] OR     ANESTHESIA CARDIOVERSION N/A 9/27/2018    Procedure: ANESTHESIA CARDIOVERSION;  Anesthesia Cardioversion @0930;  Surgeon: GENERIC ANESTHESIA PROVIDER;  Location: [facility]     ANESTHESIA CARDIOVERSION N/A 12/20/2018    Procedure: Anesthesia Coverage Cardioversion @0830;  Surgeon: GENERIC ANESTHESIA PROVIDER;  Location: [facility]     ANESTHESIA CARDIOVERSION N/A 8/21/2019    Procedure: Anesthesia Coverage Cardioversion @0800;  Surgeon: GENERIC ANESTHESIA PROVIDER;  Location: [facility]     ANESTHESIA CARDIOVERSION N/A 1/16/2020    Procedure: ANESTHESIA, FOR CARDIOVERSION;  Surgeon: GENERIC ANESTHESIA PROVIDER;  Location: [facility]     ARTHROPLASTY HIP  1/15/2014    Procedure: ARTHROPLASTY HIP;  Left Total Hip Arthroplasty;  Surgeon: Nelson Gaspar MD;  Location: [facility] OR     ARTHROPLASTY HIP Right 6/5/2019    Procedure: Right Total Hip Arthroplasty;  Surgeon: Nelson Gaspar MD;  Location: [facility] OR     CARDIAC SURGERY       COLONOSCOPY N/A 5/18/2018    Procedure: COMBINED COLONOSCOPY, SINGLE OR MULTIPLE BIOPSY/POLYPECTOMY BY BIOPSY;  COLONOSCOPY (PT HAS DEFIBRILLATOR) ;  Surgeon: Mike Nickerson MD;  Location:  GI     CV RIGHT HEART CATH N/A 8/19/2019    Procedure: CV RIGHT HEART CATH;  Surgeon: Cisco Rausch MD;  Location:  HEART CARDIAC CATH LAB     CV RIGHT  HEART CATH N/A 8/21/2019    Procedure: Right Heart Cath;  Surgeon: Ciaran Burton MD;  Location:  HEART CARDIAC CATH LAB     EP ABLATION FOCAL AFIB N/A 5/14/2020    Procedure: EP ABLATION FOCAL AFIB;  Surgeon: Erik Cooper MD;  Location:  HEART CARDIAC CATH LAB     H ABLATION FOCAL AFIB  12/9/11    Left atrial ablation to eliminate atrial fibrillation     IMPLANT AUTOMATIC IMPLANTABLE CARDIOVERTER DEFIBRILLATOR  7/27/12    AICD implantation     TONSILLECTOMY  1964       Social History:  Patient reports that he quit smoking about 4 years ago. His smoking use included cigarettes. He started smoking about 45 years ago. He has a 40.00 pack-year smoking history. He has never used smokeless tobacco. He reports current alcohol use. He reports that he does not use drugs. He enjoys traveling to warm places. He rides motorcycles.   Family History:  Family History   Problem Relation Age of Onset     Heart Disease Mother         unknown     Obesity Mother      Gastrointestinal Disease Mother         diverticulitis     Diabetes Maternal Grandmother      Diabetes Paternal Grandmother      Cerebrovascular Disease Paternal Grandmother 94     Diabetes Paternal Grandfather      Cerebrovascular Disease Paternal Grandfather 78     Diabetes Son      C.A.D. Father         CABG age 78     Heart Disease Father         CABG x5     Diabetes Maternal Grandfather      LUNG DISEASE No family hx of      Deep Vein Thrombosis (DVT) No family hx of      Anesthesia Reaction No family hx of      Melanoma No family hx of      Skin Cancer No family hx of        Medications:  Current Outpatient Medications   Medication Sig Dispense Refill     acetaminophen (TYLENOL) 325 MG tablet Take 325-650 mg by mouth every 6 hours as needed       amoxicillin (AMOXIL) 500 MG tablet Take 4 tablets (2000 mg) by mouth 1 hour before dental procedures. 12 tablet 3     atorvastatin (LIPITOR) 20 MG tablet Take 1 tablet (20 mg) by mouth daily 90 tablet 3      cyanocobalamin (VITAMIN B-12) 100 MCG tablet Take 100 mcg by mouth daily       dofetilide (TIKOSYN) 250 MCG capsule Take 1 capsule (250 mcg) by mouth every 12 hours 180 capsule 3     eplerenone (INSPRA) 50 MG tablet Take 1 tablet (50 mg) by mouth daily 90 tablet 3     fluticasone-salmeterol (AIRDUO RESPICLICK) 113-14 MCG/ACT inhaler Inhale 1 puff into the lungs 2 times daily 2 Inhaler 3     furosemide (LASIX) 20 MG tablet Take 1 tablet (20 mg) by mouth daily In PM 90 tablet 3     furosemide (LASIX) 40 MG tablet Take 1 tablet (40 mg) by mouth daily In AM 90 tablet 3     gabapentin (NEURONTIN) 300 MG capsule Take 2 capsules (600 mg) by mouth 2 times daily 360 capsule 3     metFORMIN (GLUCOPHAGE-XR) 500 MG 24 hr tablet Take 2 tablets (1,000 mg) by mouth daily (with dinner) Take 2 tablets (1,000 mg) daily (with dinner). 180 tablet 3     metoprolol succinate ER (TOPROL-XL) 50 MG 24 hr tablet Take 1 tablet (50 mg) by mouth daily 90 tablet 3     multivitamin, therapeutic (THERA-VIT) TABS Take 1 tablet by mouth daily       sildenafil (VIAGRA) 100 MG tablet Take 0.5-1 tablets ( mg) by mouth daily as needed (take 1 hour before intercourse) 30 tablet 12     zolpidem (AMBIEN) 10 MG tablet Take 0.5 tablets (5 mg) by mouth nightly as needed for sleep 90 tablet 0     albuterol (PROAIR HFA/PROVENTIL HFA/VENTOLIN HFA) 108 (90 Base) MCG/ACT inhaler Inhale 2 puffs into the lungs every 6 hours 18 g 0     dabigatran ANTICOAGULANT (PRADAXA ANTICOAGULANT) 150 MG capsule Take 1 capsule (150 mg) by mouth 2 times daily Store in original 's bottle or blister pack; use within 120 days of opening. 60 capsule 0     sotalol (BETAPACE) 120 MG tablet   1     No Known Allergies      Review of Systems:  -Skin/Heme New Pt: The patient admits to frequent sun exposure in his past. The patient denies excessive scarring or problems healing except as per HPI. The patient denies excessive bleeding.  -Constitutional: Otherwise feeling well  today, in usual state of health.  -Skin: As above in HPI. No additional skin concerns.    Physical exam:  Vitals: There were no vitals taken for this visit.  GEN: This is a well developed, well-nourished male in no acute distress, in a pleasant mood.    SKIN: Sun-exposed skin, which includes the head/face, neck, both arms, digits, and/or nails as well as the upper chest was examined.   -Brock skin type: II. Sun tanned skin.   -There is an erythematous macule with overyling adherent scale on the right upper forehead.  There is a pink ulcerated papule on the left lateral neck, 5 mm  -There is no erythema, telangectasias, nodularity, or pigmentation on the left central chest. There is a tan waxy stuck on appearing papule adjacent to this scar. .  -There is a skin colored linear plaque on the left lateral thenar eminence   -No other lesions of concern on areas examined.     Labs:  None    Impression/Plan:  1. Neoplasm of uncertain behavior on the left lateral neck. The differential diagnosis includes BCC vs SCC vs other. Will perform a shave biopsy to determine management.   - Shave biopsy:  After discussion of benefits and risks including but not limited to bleeding/bruising, pain/swelling, infection, scar, incomplete removal, nerve damage/numbness, recurrence, and non-diagnostic biopsy, written consent, verbal consent and photographs were obtained. Time-out was performed. The area was cleaned with isopropyl alcohol. 0.5ml of 1% lidocaine with 1:100,000 epinephrine was injected to obtain adequate anesthesia. A shave biopsy was performed. Hemostasis was achieved with aluminium chloride. Vaseline and a sterile dressing were applied. The patient tolerated the procedure and no complications were noted. The patient was provided with verbal and written post care instructions.  - This returned as a basal cell carcinoma, superficial and nodular, extending to the deep margin. He was referred to dermatology surgery for  Moh's surgery.     2. Actinic keratosis, right upper forehead  - Cryotherapy procedure note: After verbal consent and discussion of risks and benefits including but no limited to dyspigmentation/scar, blister, and pain, one was treated with 1-2mm freeze border for 2 cycles with liquid nitrogen. Post cryotherapy instructions were provided.  - If not resolved, will consider bx at f/u.     3. Lichen simplex chronicus on the left lateral thenar eminence, hx of resolution with cryotherapy. Due to clinical symptoms, will perform this again today.    Cryotherapy procedure note (performed by faculty): After verbal consent and discussion of risks and benefits including but no limited to dyspigmentation/scar, blister, infection, recurrence, one was treated with 1-2mm freeze border for 2 cycles with liquid nitrogen. Post cryotherapy instructions were provided.     4. History of nonmelanoma skin cancer, no clincial evidence of recurrence  - Sun precaution was advised including the use of sun screens of SPF 30 or higher, sun protective clothing, and avoidance of tanning beds.  - Patient will return within 3 months for a repeat FULL SKIN EXAM.     5. Seborrheic keratosis, non irritated on the central chest  - Seborrheic keratosis: Discussed benign nature of lesions.      CC No referring provider defined for this encounter. on close of this encounter.  Follow-up in 3 months, earlier for new or changing lesions.       Staff Involved:  Staff Only    All risks, benefits and alternatives were discussed with patient.  Patient is in agreement and understands the assessment and plan.  All questions were answered.  Sun Screen Education was given.   Return to Clinic in 3 months or sooner as needed.   Dayna Riley PA-C

## 2020-10-12 NOTE — PROGRESS NOTES
[facility] Dermatology Note      1.Dermatology Problem List:  1. History of NMSC  -BCC of Left central chest s/p excision 07/17/19,   -BCC left lateral neck, bx 10/12/20 plan for MMS  2. Centrofacial rosacea w/telangectasias  3. Benign skin findings: seborrheic keratoses, dermatofibromas, solar lentigines  4. Lower cutaneous lip solar lentigines  5. Prurigo nodule vs Lichen simplex chronicus left lateral thenar eminence   -s/p cryo 12/20/19, resolved 1/23/2020, repeat   6. Actinic keratosis right upper forehead. S/p cryotherapy 8/3/20 and 10/12/20    CC:   Chief Complaint   Patient presents with     Derm Problem     Haroon is here about his right forehead, left neck, upper chest, and left thumb.          Encounter Date: Oct 12, 2020    History of Present Illness:  Mr. Stu Meredith is a 64 year old male who presents as a follow-up for a spot check. He was last seen 1/23/20 when the prurigo nodule on his left thumb was resolved and the patient was reassured the basal cell excision on his chest had negative margins. He has a few specific spots he would like examined. He defers a full skin check today. He has a spot on his right upper forehead that was treated with cryotherapy 8/3/20 and did not go away. He would like this re-examined.     He has a spot on the left side of his neck that does not heal. It is wet at times. It has been like that for several months. He has a hard times seeing it. Additionally he has a rough spot near his excision sites on his chest. It does not hurt, bleed or grow.     Lastly, he has an area on his thumb that has been treated with ILK, steroid tape and cryotherapy. It went away with the cryotherapy in December of 2019 and is interested in this treatment again.     He is feeling well, No other skin concerns.     Past Medical History:   Patient Active Problem List   Diagnosis     Hypertrophic cardiomyopathy (H)     Advanced directives, counseling/discussion      Ventricular tachycardia (H)     Automatic implantable cardioverter-defibrillator in situ- [facility], dual chamber- NOT dependent     Prediabetes     S/P ablation of atrial flutter     CHF (congestive heart failure) (H)     Chronic Zolpidem use for insomnia     S/P ablation of atrial fibrillation     Lung nodules     Atrial tachycardia (H)     Encounter for monitoring dofetilide therapy     HTN (hypertension)     Hypertrophic cardiomegaly     SOB (shortness of breath)     Paroxysmal atrial fibrillation (H)     Dyspnea     Type 2 diabetes mellitus without complication, without long-term current use of insulin (H)     Chronic insomnia     Past Medical History:   Diagnosis Date     Atrial fibrillation (H) 12/9/11    failed medication, multiple DC cardioveresions; s/p Left atrial ablation to eliminate atrial fibrillation 12/9/11     Chest pain      CHF (congestive heart failure) (H) 7/30/2016     Coronary artery disease      DJD (degenerative joint disease)      Fatigue 7/15/2019     Hip arthritis 1/15/2014     Hypertension      Hypertrophic cardiomyopathy (H) 10/09     Other abnormal heart sounds      Pacemaker     ICD     Palpitations      Pneumonia, organism unspecified(486)      Prediabetes 7/10/15    A1C 6.5     Status post implantation of automatic cardioverter/defibrillator (AICD)      Type 2 diabetes mellitus without complication, without long-term current use of insulin (H) 7/13/2020     Ventricular tachycardia (H)      Past Surgical History:   Procedure Laterality Date     ANESTHESIA CARDIOVERSION  4/24/2014    Procedure: ANESTHESIA CARDIOVERSION;  Surgeon: Generic Anesthesia Provider;  Location: [facility] OR     ANESTHESIA CARDIOVERSION N/A 5/12/2016    Procedure: ANESTHESIA CARDIOVERSION;  Surgeon: GENERIC ANESTHESIA PROVIDER;  Location: [facility] OR     ANESTHESIA CARDIOVERSION N/A 8/7/2017    Procedure: ANESTHESIA CARDIOVERSION;  Anesthesia Offsite Coverage Cardioversion @1100;  Surgeon: GENERIC ANESTHESIA  PROVIDER;  Location: [facility] OR     ANESTHESIA CARDIOVERSION N/A 1/3/2018    Procedure: ANESTHESIA CARDIOVERSION;  Anesthesia Cardioverion;  Surgeon: GENERIC ANESTHESIA PROVIDER;  Location: [facility] OR     ANESTHESIA CARDIOVERSION N/A 5/4/2018    Procedure: ANESTHESIA CARDIOVERSION;  Anesthesia Coverage Cardioversion @1400;  Surgeon: GENERIC ANESTHESIA PROVIDER;  Location: [facility] OR     ANESTHESIA CARDIOVERSION N/A 9/27/2018    Procedure: ANESTHESIA CARDIOVERSION;  Anesthesia Cardioversion @0930;  Surgeon: GENERIC ANESTHESIA PROVIDER;  Location: [facility] OR     ANESTHESIA CARDIOVERSION N/A 12/20/2018    Procedure: Anesthesia Coverage Cardioversion @0830;  Surgeon: GENERIC ANESTHESIA PROVIDER;  Location: [facility]     ANESTHESIA CARDIOVERSION N/A 8/21/2019    Procedure: Anesthesia Coverage Cardioversion @0800;  Surgeon: GENERIC ANESTHESIA PROVIDER;  Location: [facility]     ANESTHESIA CARDIOVERSION N/A 1/16/2020    Procedure: ANESTHESIA, FOR CARDIOVERSION;  Surgeon: GENERIC ANESTHESIA PROVIDER;  Location: [facility] OR     ARTHROPLASTY HIP  1/15/2014    Procedure: ARTHROPLASTY HIP;  Left Total Hip Arthroplasty;  Surgeon: Nelson Gaspar MD;  Location: [facility] OR     ARTHROPLASTY HIP Right 6/5/2019    Procedure: Right Total Hip Arthroplasty;  Surgeon: Nelson Gaspar MD;  Location: [facility] OR     CARDIAC SURGERY       COLONOSCOPY N/A 5/18/2018    Procedure: COMBINED COLONOSCOPY, SINGLE OR MULTIPLE BIOPSY/POLYPECTOMY BY BIOPSY;  COLONOSCOPY (PT HAS DEFIBRILLATOR) ;  Surgeon: Mike Nickerson MD;  Location:  GI     CV RIGHT HEART CATH N/A 8/19/2019    Procedure: CV RIGHT HEART CATH;  Surgeon: Cisco Rausch MD;  Location:  HEART CARDIAC CATH LAB     CV RIGHT HEART CATH N/A 8/21/2019    Procedure: Right Heart Cath;  Surgeon: Ciaran Burton MD;  Location:  HEART CARDIAC CATH LAB     EP ABLATION FOCAL AFIB N/A 5/14/2020    Procedure: EP ABLATION FOCAL AFIB;  Surgeon: Erik Cooper MD;  Location:  HEART CARDIAC CATH LAB     H ABLATION FOCAL AFIB   12/9/11    Left atrial ablation to eliminate atrial fibrillation     IMPLANT AUTOMATIC IMPLANTABLE CARDIOVERTER DEFIBRILLATOR  7/27/12    AICD implantation     TONSILLECTOMY  1964       Social History:  Patient reports that he quit smoking about 4 years ago. His smoking use included cigarettes. He started smoking about 45 years ago. He has a 40.00 pack-year smoking history. He has never used smokeless tobacco. He reports current alcohol use. He reports that he does not use drugs. He enjoys traveling to warm places. He rides motorcycles.   Family History:  Family History   Problem Relation Age of Onset     Heart Disease Mother         unknown     Obesity Mother      Gastrointestinal Disease Mother         diverticulitis     Diabetes Maternal Grandmother      Diabetes Paternal Grandmother      Cerebrovascular Disease Paternal Grandmother 94     Diabetes Paternal Grandfather      Cerebrovascular Disease Paternal Grandfather 78     Diabetes Son      C.A.D. Father         CABG age 78     Heart Disease Father         CABG x5     Diabetes Maternal Grandfather      LUNG DISEASE No family hx of      Deep Vein Thrombosis (DVT) No family hx of      Anesthesia Reaction No family hx of      Melanoma No family hx of      Skin Cancer No family hx of        Medications:  Current Outpatient Medications   Medication Sig Dispense Refill     acetaminophen (TYLENOL) 325 MG tablet Take 325-650 mg by mouth every 6 hours as needed       amoxicillin (AMOXIL) 500 MG tablet Take 4 tablets (2000 mg) by mouth 1 hour before dental procedures. 12 tablet 3     atorvastatin (LIPITOR) 20 MG tablet Take 1 tablet (20 mg) by mouth daily 90 tablet 3     cyanocobalamin (VITAMIN B-12) 100 MCG tablet Take 100 mcg by mouth daily       dofetilide (TIKOSYN) 250 MCG capsule Take 1 capsule (250 mcg) by mouth every 12 hours 180 capsule 3     eplerenone (INSPRA) 50 MG tablet Take 1 tablet (50 mg) by mouth daily 90 tablet 3     fluticasone-salmeterol (AIRDUO  RESPICLICK) 113-14 MCG/ACT inhaler Inhale 1 puff into the lungs 2 times daily 2 Inhaler 3     furosemide (LASIX) 20 MG tablet Take 1 tablet (20 mg) by mouth daily In PM 90 tablet 3     furosemide (LASIX) 40 MG tablet Take 1 tablet (40 mg) by mouth daily In AM 90 tablet 3     gabapentin (NEURONTIN) 300 MG capsule Take 2 capsules (600 mg) by mouth 2 times daily 360 capsule 3     metFORMIN (GLUCOPHAGE-XR) 500 MG 24 hr tablet Take 2 tablets (1,000 mg) by mouth daily (with dinner) Take 2 tablets (1,000 mg) daily (with dinner). 180 tablet 3     metoprolol succinate ER (TOPROL-XL) 50 MG 24 hr tablet Take 1 tablet (50 mg) by mouth daily 90 tablet 3     multivitamin, therapeutic (THERA-VIT) TABS Take 1 tablet by mouth daily       sildenafil (VIAGRA) 100 MG tablet Take 0.5-1 tablets ( mg) by mouth daily as needed (take 1 hour before intercourse) 30 tablet 12     zolpidem (AMBIEN) 10 MG tablet Take 0.5 tablets (5 mg) by mouth nightly as needed for sleep 90 tablet 0     albuterol (PROAIR HFA/PROVENTIL HFA/VENTOLIN HFA) 108 (90 Base) MCG/ACT inhaler Inhale 2 puffs into the lungs every 6 hours 18 g 0     dabigatran ANTICOAGULANT (PRADAXA ANTICOAGULANT) 150 MG capsule Take 1 capsule (150 mg) by mouth 2 times daily Store in original 's bottle or blister pack; use within 120 days of opening. 60 capsule 0     sotalol (BETAPACE) 120 MG tablet   1     No Known Allergies      Review of Systems:  -Skin/Heme New Pt: The patient admits to frequent sun exposure in his past. The patient denies excessive scarring or problems healing except as per HPI. The patient denies excessive bleeding.  -Constitutional: Otherwise feeling well today, in usual state of health.  -Skin: As above in HPI. No additional skin concerns.    Physical exam:  Vitals: There were no vitals taken for this visit.  GEN: This is a well developed, well-nourished male in no acute distress, in a pleasant mood.    SKIN: Sun-exposed skin, which includes the  head/face, neck, both arms, digits, and/or nails as well as the upper chest was examined.   -Brock skin type: II. Sun tanned skin.   -There is an erythematous macule with overyling adherent scale on the right upper forehead.  There is a pink ulcerated papule on the left lateral neck, 5 mm  -There is no erythema, telangectasias, nodularity, or pigmentation on the left central chest. There is a tan waxy stuck on appearing papule adjacent to this scar. .  -There is a skin colored linear plaque on the left lateral thenar eminence   -No other lesions of concern on areas examined.     Labs:  None    Impression/Plan:  1. Neoplasm of uncertain behavior on the left lateral neck. The differential diagnosis includes BCC vs SCC vs other. Will perform a shave biopsy to determine management.   - Shave biopsy:  After discussion of benefits and risks including but not limited to bleeding/bruising, pain/swelling, infection, scar, incomplete removal, nerve damage/numbness, recurrence, and non-diagnostic biopsy, written consent, verbal consent and photographs were obtained. Time-out was performed. The area was cleaned with isopropyl alcohol. 0.5ml of 1% lidocaine with 1:100,000 epinephrine was injected to obtain adequate anesthesia. A shave biopsy was performed. Hemostasis was achieved with aluminium chloride. Vaseline and a sterile dressing were applied. The patient tolerated the procedure and no complications were noted. The patient was provided with verbal and written post care instructions.  - This returned as a basal cell carcinoma, superficial and nodular, extending to the deep margin. He was referred to dermatology surgery for Moh's surgery.     2. Actinic keratosis, right upper forehead  - Cryotherapy procedure note: After verbal consent and discussion of risks and benefits including but no limited to dyspigmentation/scar, blister, and pain, one was treated with 1-2mm freeze border for 2 cycles with liquid nitrogen. Post  cryotherapy instructions were provided.  - If not resolved, will consider bx at f/u.     3. Lichen simplex chronicus on the left lateral thenar eminence, hx of resolution with cryotherapy. Due to clinical symptoms, will perform this again today.    Cryotherapy procedure note (performed by faculty): After verbal consent and discussion of risks and benefits including but no limited to dyspigmentation/scar, blister, infection, recurrence, one was treated with 1-2mm freeze border for 2 cycles with liquid nitrogen. Post cryotherapy instructions were provided.     4. History of nonmelanoma skin cancer, no clincial evidence of recurrence  - Sun precaution was advised including the use of sun screens of SPF 30 or higher, sun protective clothing, and avoidance of tanning beds.  - Patient will return within 3 months for a repeat FULL SKIN EXAM.     5. Seborrheic keratosis, non irritated on the central chest  - Seborrheic keratosis: Discussed benign nature of lesions.      CC No referring provider defined for this encounter. on close of this encounter.  Follow-up in 3 months, earlier for new or changing lesions.       Staff Involved:  Staff Only    All risks, benefits and alternatives were discussed with patient.  Patient is in agreement and understands the assessment and plan.  All questions were answered.  Sun Screen Education was given.   Return to Clinic in 3 months or sooner as needed.   Dayna Riley PA-C

## 2020-10-13 LAB — COPATH REPORT: NORMAL

## 2020-10-17 ENCOUNTER — ANCILLARY PROCEDURE (OUTPATIENT)
Dept: CARDIOLOGY | Facility: CLINIC | Age: 65
End: 2020-10-17
Attending: INTERNAL MEDICINE
Payer: COMMERCIAL

## 2020-10-17 DIAGNOSIS — I42.2 HYPERTROPHIC CARDIOMYOPATHY (H): ICD-10-CM

## 2020-10-17 PROCEDURE — 99207 CARDIAC DEVICE CHECK - REMOTE: CPT | Performed by: INTERNAL MEDICINE

## 2020-10-20 ENCOUNTER — OFFICE VISIT (OUTPATIENT)
Dept: DERMATOLOGY | Facility: CLINIC | Age: 65
End: 2020-10-20
Payer: COMMERCIAL

## 2020-10-20 VITALS — DIASTOLIC BLOOD PRESSURE: 82 MMHG | SYSTOLIC BLOOD PRESSURE: 134 MMHG | HEART RATE: 66 BPM

## 2020-10-20 DIAGNOSIS — D48.5 NEOPLASM OF UNCERTAIN BEHAVIOR OF SKIN OF NECK: ICD-10-CM

## 2020-10-20 DIAGNOSIS — C44.41 BASAL CELL CARCINOMA OF NECK: Primary | ICD-10-CM

## 2020-10-20 DIAGNOSIS — C44.41 BASAL CELL CARCINOMA (BCC) OF LEFT SIDE OF NECK: ICD-10-CM

## 2020-10-20 LAB
MDC_IDC_EPISODE_DTM: NORMAL
MDC_IDC_EPISODE_DURATION: 6 S
MDC_IDC_EPISODE_DURATION: 6 S
MDC_IDC_EPISODE_DURATION: 7 S
MDC_IDC_EPISODE_DURATION: 8 S
MDC_IDC_EPISODE_DURATION: 9 S
MDC_IDC_EPISODE_ID: NORMAL
MDC_IDC_EPISODE_TYPE: NORMAL
MDC_IDC_LEAD_IMPLANT_DT: NORMAL
MDC_IDC_LEAD_IMPLANT_DT: NORMAL
MDC_IDC_LEAD_LOCATION: NORMAL
MDC_IDC_LEAD_LOCATION: NORMAL
MDC_IDC_LEAD_MFG: NORMAL
MDC_IDC_LEAD_MFG: NORMAL
MDC_IDC_LEAD_MODEL: NORMAL
MDC_IDC_LEAD_MODEL: NORMAL
MDC_IDC_LEAD_POLARITY_TYPE: NORMAL
MDC_IDC_LEAD_POLARITY_TYPE: NORMAL
MDC_IDC_LEAD_SERIAL: NORMAL
MDC_IDC_LEAD_SERIAL: NORMAL
MDC_IDC_MSMT_BATTERY_DTM: NORMAL
MDC_IDC_MSMT_BATTERY_REMAINING_LONGEVITY: 42 MO
MDC_IDC_MSMT_BATTERY_REMAINING_PERCENTAGE: 51 %
MDC_IDC_MSMT_BATTERY_STATUS: NORMAL
MDC_IDC_MSMT_CAP_CHARGE_DTM: NORMAL
MDC_IDC_MSMT_CAP_CHARGE_DTM: NORMAL
MDC_IDC_MSMT_CAP_CHARGE_ENERGY: 11 J
MDC_IDC_MSMT_CAP_CHARGE_TIME: 1.9 S
MDC_IDC_MSMT_CAP_CHARGE_TIME: 10.7 S
MDC_IDC_MSMT_CAP_CHARGE_TYPE: NORMAL
MDC_IDC_MSMT_CAP_CHARGE_TYPE: NORMAL
MDC_IDC_MSMT_LEADCHNL_RA_IMPEDANCE_VALUE: 702 OHM
MDC_IDC_MSMT_LEADCHNL_RV_IMPEDANCE_VALUE: 491 OHM
MDC_IDC_PG_IMPLANT_DTM: NORMAL
MDC_IDC_PG_MFG: NORMAL
MDC_IDC_PG_MODEL: NORMAL
MDC_IDC_PG_SERIAL: NORMAL
MDC_IDC_PG_TYPE: NORMAL
MDC_IDC_SESS_CLINIC_NAME: NORMAL
MDC_IDC_SESS_DTM: NORMAL
MDC_IDC_SESS_TYPE: NORMAL
MDC_IDC_SET_BRADY_AT_MODE_SWITCH_MODE: NORMAL
MDC_IDC_SET_BRADY_AT_MODE_SWITCH_RATE: 170 {BEATS}/MIN
MDC_IDC_SET_BRADY_LOWRATE: 60 {BEATS}/MIN
MDC_IDC_SET_BRADY_MAX_SENSOR_RATE: 120 {BEATS}/MIN
MDC_IDC_SET_BRADY_MAX_TRACKING_RATE: 120 {BEATS}/MIN
MDC_IDC_SET_BRADY_MODE: NORMAL
MDC_IDC_SET_BRADY_PAV_DELAY_HIGH: 260 MS
MDC_IDC_SET_BRADY_PAV_DELAY_LOW: 390 MS
MDC_IDC_SET_BRADY_SAV_DELAY_HIGH: 260 MS
MDC_IDC_SET_BRADY_SAV_DELAY_LOW: 390 MS
MDC_IDC_SET_LEADCHNL_RA_PACING_AMPLITUDE: 2.4 V
MDC_IDC_SET_LEADCHNL_RA_PACING_POLARITY: NORMAL
MDC_IDC_SET_LEADCHNL_RA_PACING_PULSEWIDTH: 0.5 MS
MDC_IDC_SET_LEADCHNL_RA_SENSING_ADAPTATION_MODE: NORMAL
MDC_IDC_SET_LEADCHNL_RA_SENSING_POLARITY: NORMAL
MDC_IDC_SET_LEADCHNL_RA_SENSING_SENSITIVITY: 0.25 MV
MDC_IDC_SET_LEADCHNL_RV_PACING_AMPLITUDE: 2.4 V
MDC_IDC_SET_LEADCHNL_RV_PACING_POLARITY: NORMAL
MDC_IDC_SET_LEADCHNL_RV_PACING_PULSEWIDTH: 0.5 MS
MDC_IDC_SET_LEADCHNL_RV_SENSING_ADAPTATION_MODE: NORMAL
MDC_IDC_SET_LEADCHNL_RV_SENSING_POLARITY: NORMAL
MDC_IDC_SET_LEADCHNL_RV_SENSING_SENSITIVITY: 0.6 MV
MDC_IDC_SET_ZONE_DETECTION_INTERVAL: 273 MS
MDC_IDC_SET_ZONE_DETECTION_INTERVAL: 333 MS
MDC_IDC_SET_ZONE_DETECTION_INTERVAL: 375 MS
MDC_IDC_SET_ZONE_TYPE: NORMAL
MDC_IDC_SET_ZONE_VENDOR_TYPE: NORMAL
MDC_IDC_STAT_AT_BURDEN_PERCENT: 0 %
MDC_IDC_STAT_BRADY_DTM_END: NORMAL
MDC_IDC_STAT_BRADY_DTM_START: NORMAL
MDC_IDC_STAT_BRADY_RA_PERCENT_PACED: 27 %
MDC_IDC_STAT_BRADY_RV_PERCENT_PACED: 0 %
MDC_IDC_STAT_EPISODE_RECENT_COUNT: 0
MDC_IDC_STAT_EPISODE_RECENT_COUNT: 1
MDC_IDC_STAT_EPISODE_RECENT_COUNT: 13
MDC_IDC_STAT_EPISODE_RECENT_COUNT_DTM_END: NORMAL
MDC_IDC_STAT_EPISODE_RECENT_COUNT_DTM_START: NORMAL
MDC_IDC_STAT_EPISODE_TYPE: NORMAL
MDC_IDC_STAT_EPISODE_VENDOR_TYPE: NORMAL
MDC_IDC_STAT_TACHYTHERAPY_ATP_DELIVERED_RECENT: 0
MDC_IDC_STAT_TACHYTHERAPY_ATP_DELIVERED_TOTAL: 3
MDC_IDC_STAT_TACHYTHERAPY_RECENT_DTM_END: NORMAL
MDC_IDC_STAT_TACHYTHERAPY_RECENT_DTM_START: NORMAL
MDC_IDC_STAT_TACHYTHERAPY_SHOCKS_ABORTED_RECENT: 0
MDC_IDC_STAT_TACHYTHERAPY_SHOCKS_ABORTED_TOTAL: 1
MDC_IDC_STAT_TACHYTHERAPY_SHOCKS_DELIVERED_RECENT: 0
MDC_IDC_STAT_TACHYTHERAPY_SHOCKS_DELIVERED_TOTAL: 1
MDC_IDC_STAT_TACHYTHERAPY_TOTAL_DTM_END: NORMAL
MDC_IDC_STAT_TACHYTHERAPY_TOTAL_DTM_START: NORMAL

## 2020-10-20 PROCEDURE — 13132 CMPLX RPR F/C/C/M/N/AX/G/H/F: CPT | Mod: GC | Performed by: DERMATOLOGY

## 2020-10-20 PROCEDURE — 17311 MOHS 1 STAGE H/N/HF/G: CPT | Mod: GC | Performed by: DERMATOLOGY

## 2020-10-20 ASSESSMENT — PAIN SCALES - GENERAL: PAINLEVEL: NO PAIN (0)

## 2020-10-20 NOTE — LETTER
10/20/2020       RE: Stu Meredith  [address] [facility] [address]     Dear Colleague,    Thank you for referring your patient, Stu Meredith, to the [facility] DERMATOLOGIC SURGERY CLINIC [employer]. Please see a copy of my visit note below.    [facility] Mohs Dermatologic Surgery Procedure Note      Date of Service:  Oct 20, 2020  Surgery: Mohs micrographic surgery    Case 1  Repair Type: Complex linear repair  Repair Size: 4.5 cm  Suture Material: 4-0 Monocryl and 5-0 Fast absorbing gut  Tumor Type: BCC  Location: left lateral neck  Derm-Path Accession #: [id number]   PreOp Size: 1.5 x 1.1 cm  PostOp Size: 2.5 x 2.0 cm  Mohs Accession #:   Level of Defect: Fat      Procedure:  We discussed the principles of treatment and most likely complications including scarring, bleeding, infection, swelling, pain, crusting, nerve damage, large wound,  incomplete excision, wound dehiscence,  nerve damage, recurrence, and a second procedure may be recommended to obtain the best cosmetic or functional result.    Informed consent was obtained and the patient underwent the procedure as follows:  The patient was placed supine on the operating table.  The cancer was identified, outlined with a marker, and verified by the patient.  The entire surgical field was prepped with chlorhexidine.  The surgical site was anesthetized using 1% lidocaine with epinephrine.    The area of clinically apparent tumor was debulked. The layer of tissue was then surgically excised using a #15 blade and was then transferred onto a specimen sheet maintaining the orientation of the specimen. Hemostasis was obtained using electrocoagulation. The wound site was then covered with a dressing while the tissue samples were processed for examination.    The excised tissue was transported to the [facility] histology laboratory maintaining the tissue orientation.  The  tissue specimen was relaxed so that the entire surgical margin was in a a single horizontal plane for sectioning and inked for precise mapping.  A precise reference map was drawn to reflect the sectioning of the specimen, colored inking of the margins, and orientation on the patient. The tissue was processed using horizontal sectioning of the base and continuous peripheral margins.  The histopathologic sections were reviewed in conjunction with the reference map.    Total blocks: 1    Total slides:  3    There were no cancer cells visualized on examination, therefore Mohs surgery was complete.      REPAIR:     A complex layered linear closure was selected as the procedure which would maximally preserve both function and cosmesis and for the following reasons: 1) the defect was widely undermined and 2) wound size, depth, tension, and location.     After the excision of the tumor, the area was extensively and carefully undermined using blunt Metzenbaum scissors. Hemostasis was obtained with spot electrocautery and ligation of vessels where necessary. The subcutaneous and dermal layers were then closed with 4-0 Monocryl sutures. The epidermis was then carefully approximated along the length of the wound using 5-0 Fast absorbing gut simple running sutures.     Estimated blood loss was less than 10 ml for all surgical sites. A sterile pressure dressing was applied and wound care instructions, with a written handout, were given. The patient was discharged from the [facility] alert and ambulatory.    Virtual follow up in 2 weeks, if needed    Dr. Sourav Choe was present for the entire Mohs surgery procedure and immediately available for the entire reconstruction and was physicially present for the key portions of the procedure.    Alvarado Red MD  PGY-[id number]    Micrographic Surgery and Dermatologic Oncology Fellow  October 20, 2020                    Photo placed into patients chart note for further reference.        Attending attestation:  I was present for key elements of the procedure and immediately available for all other portions of the procedure.  I have reviewed the note and edited it as necessary.    Sourav Choe M.D.  Professor  Director of Dermatologic Surgery  Department of Dermatology  [facility]    Dermatology Surgery Clinic  [facility]  [address] [place], [place] [id number]

## 2020-10-20 NOTE — PATIENT INSTRUCTIONS
Wound Care Instructions  I will experience scar, altered skin color, bleeding, swelling, pain, crusting and redness. I may experience altered sensation. Risks are excessive bleeding, infection, muscle weakness, thick (hypertrophic or keloidal) scar, and recurrence,. A second procedure may be recommended to obtain the best cosmetic or functional result.  Possible complications of any surgical procedure are bleeding, infection, scarring, alteration in skin color and sensation, muscle weakness in the area, wound dehiscence or seperation, or recurrence of the lesion or disease. On occasion, after healing, a secondary procedure or revision may be recommended in order to obtain the best cosmetic or functional result.   After your surgery, a pressure bandage will be placed over the area that has sutures. This will help prevent bleeding.   For the First 48 hours After Surgery:  1. Leave the pressure bandage on and keep it dry. If it should come loose, you may retape it, but do not take it off.  2. Relax and take it easy. Do not do any vigorous exercise, heavy lifting, or bending forward. This could cause the wound to bleed.  3. Post-operative pain is usually mild. You may take plain or extra strength Tylenol every 4 hours as needed (do not take more than 4,000mg in one day). Do not take any medicine that contains aspirin, ibuprofen or motrin unless you have been recommended these by a doctor.  Avoid alcohol and vitamin E as these may increase your tendency to bleed.  4. You may put an ice pack around the bandaged area for 20 minutes every 2-3 hours. This may help reduce swelling, bruising, and pain. Make sure the ice pack is waterproof so that the pressure bandage does not get wet.   5. You may see a small amount of drainage or blood on your pressure bandage. This is normal. However, if drainage or bleeding continues or saturates the bandage, you will need to apply firm pressure over the bandage with a washcloth for 15  minutes. If bleeding continues after applying pressure for 15 minutes then go to the nearest emergency room.  48 Hours After Surgery  Carefully remove the bandage and start daily wound care and dressing changes. You may also now shower and get the wound wet. Wash wound with a mild soap and water.  Use caution when washing the wound. Be gentle and do not let the forceful shower stream hit the wound directly.  PAT dry.  Daily Wound Care:  1. Wash wound with a mild soap and water.  Use caution when washing the wound, be gentle and do not let the forceful shower stream hit the wound directly.  2. PAT DRY.  3. Apply Vaseline (from a new container or tube) over the suture line with a Q-tip. It is very important to keep the wound continuously moist, as wounds heal best in a moist environment.  4.  Keep the site covered until sutures are removed, you can cover it with a Telfa (non-stick) dressing and tape or a band-aid.    5. If you are unable to keep wound covered, you must apply Vaseline every 2 - 3 hours (while awake) to ensure it is being kept moist for optimal healing. A dressing overnight is recommended to keep the area moist.   Call Us If:  1. You have pain that is not controlled with Tylenol.  2. You have signs or symptoms of an infection, such as: fever over 100 degrees F, redness, warmth, or foul-smelling or yellow/creamy drainage from the wound.  Who should I call with questions?    [facility]: [phone]     [facility]: [phone]    For urgent needs outside of business hours call the [facility] at [phone] and ask for the dermatology resident on call        Wound Care Instructions  I will experience scar, altered skin color, bleeding, swelling, pain, crusting and redness. I may experience altered sensation. Risks are excessive bleeding, infection, muscle weakness, thick (hypertrophic or keloidal) scar, and recurrence,. A second  procedure may be recommended to obtain the best cosmetic or functional result.  Possible complications of any surgical procedure are bleeding, infection, scarring, alteration in skin color and sensation, muscle weakness in the area, wound dehiscence or seperation, or recurrence of the lesion or disease. On occasion, after healing, a secondary procedure or revision may be recommended in order to obtain the best cosmetic or functional result.   After your surgery, a pressure bandage will be placed over the area that has sutures. This will help prevent bleeding.   For the First 48 hours After Surgery:  6. Leave the pressure bandage on and keep it dry. If it should come loose, you may retape it, but do not take it off.  7. Relax and take it easy. Do not do any vigorous exercise, heavy lifting, or bending forward. This could cause the wound to bleed.  8. Post-operative pain is usually mild. You may take plain or extra strength Tylenol every 4 hours as needed (do not take more than 4,000mg in one day). Do not take any medicine that contains aspirin, ibuprofen or motrin unless you have been recommended these by a doctor.  Avoid alcohol and vitamin E as these may increase your tendency to bleed.  9. You may put an ice pack around the bandaged area for 20 minutes every 2-3 hours. This may help reduce swelling, bruising, and pain. Make sure the ice pack is waterproof so that the pressure bandage does not get wet.   10. You may see a small amount of drainage or blood on your pressure bandage. This is normal. However, if drainage or bleeding continues or saturates the bandage, you will need to apply firm pressure over the bandage with a washcloth for 15 minutes. If bleeding continues after applying pressure for 15 minutes then go to the nearest emergency room.  48 Hours After Surgery  Carefully remove the bandage and start daily wound care and dressing changes. You may also now shower and get the wound wet. Wash wound with a  mild soap and water.  Use caution when washing the wound. Be gentle and do not let the forceful shower stream hit the wound directly.  PAT dry.  Daily Wound Care:  6. Wash wound with a mild soap and water.  Use caution when washing the wound, be gentle and do not let the forceful shower stream hit the wound directly.  7. PAT DRY.  8. Apply Vaseline (from a new container or tube) over the suture line with a Q-tip. It is very important to keep the wound continuously moist, as wounds heal best in a moist environment.  9.  Keep the site covered until sutures are removed, you can cover it with a Telfa (non-stick) dressing and tape or a band-aid.    10. If you are unable to keep wound covered, you must apply Vaseline every 2 - 3 hours (while awake) to ensure it is being kept moist for optimal healing. A dressing overnight is recommended to keep the area moist.   Call Us If:  3. You have pain that is not controlled with Tylenol.  4. You have signs or symptoms of an infection, such as: fever over 100 degrees F, redness, warmth, or foul-smelling or yellow/creamy drainage from the wound.  Who should I call with questions?    [facility]: [phone]     [facility]: [phone]    For urgent needs outside of business hours call the [facility] at [phone] and ask for the dermatology resident on call

## 2020-10-20 NOTE — NURSING NOTE
Chief Complaint   Patient presents with     Derm Problem     L lateral neck BCC     Maddy Mai, EMT

## 2020-10-20 NOTE — PROGRESS NOTES
[facility] Dermatologic Surgery Procedure Note      Date of Service:  Oct 20, 2020  Surgery: Mohs micrographic surgery    Case 1  Repair Type: Complex linear repair  Repair Size: 4.5 cm  Suture Material: 4-0 Monocryl and 5-0 Fast absorbing gut  Tumor Type: BCC  Location: left lateral neck  Derm-Path Accession #: [id number]   PreOp Size: 1.5 x 1.1 cm  PostOp Size: 2.5 x 2.0 cm  Mohs Accession #:   Level of Defect: Fat      Procedure:  We discussed the principles of treatment and most likely complications including scarring, bleeding, infection, swelling, pain, crusting, nerve damage, large wound,  incomplete excision, wound dehiscence,  nerve damage, recurrence, and a second procedure may be recommended to obtain the best cosmetic or functional result.    Informed consent was obtained and the patient underwent the procedure as follows:  The patient was placed supine on the operating table.  The cancer was identified, outlined with a marker, and verified by the patient.  The entire surgical field was prepped with chlorhexidine.  The surgical site was anesthetized using 1% lidocaine with epinephrine.    The area of clinically apparent tumor was debulked. The layer of tissue was then surgically excised using a #15 blade and was then transferred onto a specimen sheet maintaining the orientation of the specimen. Hemostasis was obtained using electrocoagulation. The wound site was then covered with a dressing while the tissue samples were processed for examination.    The excised tissue was transported to the Mohs histology laboratory maintaining the tissue orientation.  The tissue specimen was relaxed so that the entire surgical margin was in a a single horizontal plane for sectioning and inked for precise mapping.  A precise reference map was drawn to reflect the sectioning of the specimen, colored inking of the margins, and orientation on the patient. The tissue was processed using  horizontal sectioning of the base and continuous peripheral margins.  The histopathologic sections were reviewed in conjunction with the reference map.    Total blocks: 1    Total slides:  3    There were no cancer cells visualized on examination, therefore Mohs surgery was complete.      REPAIR:     A complex layered linear closure was selected as the procedure which would maximally preserve both function and cosmesis and for the following reasons: 1) the defect was widely undermined and 2) wound size, depth, tension, and location.     After the excision of the tumor, the area was extensively and carefully undermined using blunt Metzenbaum scissors. Hemostasis was obtained with spot electrocautery and ligation of vessels where necessary. The subcutaneous and dermal layers were then closed with 4-0 Monocryl sutures. The epidermis was then carefully approximated along the length of the wound using 5-0 Fast absorbing gut simple running sutures.     Estimated blood loss was less than 10 ml for all surgical sites. A sterile pressure dressing was applied and wound care instructions, with a written handout, were given. The patient was [facility] alert and ambulatory.    Virtual follow up in 2 weeks, if needed    Dr. Sourav Choe was present for the entire Mohs surgery procedure and immediately available for the entire reconstruction and was physicially present for the key portions of the procedure.    Alvarado Red MD  PGY-6    Micrographic Surgery and Dermatologic Oncology Fellow  October 20, 2020                    Photo placed into patients chart note for further reference.       Attending attestation:  I was present for key elements of the procedure and immediately available for all other portions of the procedure.  I have reviewed the note and edited it as necessary.    Sourav Choe M.D.  Professor  Director of Dermatologic Surgery  Department of Dermatology  [facility]  [place]    Dermatology Surgery Clinic  [facility]  [address] [place] [address]

## 2020-11-10 ENCOUNTER — MYC MEDICAL ADVICE (OUTPATIENT)
Dept: FAMILY MEDICINE | Facility: CLINIC | Age: 65
End: 2020-11-10

## 2020-12-06 ENCOUNTER — HEALTH MAINTENANCE LETTER (OUTPATIENT)
Age: 65
End: 2020-12-06

## 2020-12-14 DIAGNOSIS — Z98.890 S/P ABLATION OF ATRIAL FLUTTER: Chronic | ICD-10-CM

## 2020-12-14 DIAGNOSIS — Z95.810 AUTOMATIC IMPLANTABLE CARDIOVERTER-DEFIBRILLATOR IN SITU: Primary | Chronic | ICD-10-CM

## 2020-12-14 DIAGNOSIS — Z86.79 S/P ABLATION OF ATRIAL FLUTTER: Chronic | ICD-10-CM

## 2020-12-14 DIAGNOSIS — I42.2 HYPERTROPHIC CARDIOMYOPATHY (H): ICD-10-CM

## 2020-12-14 DIAGNOSIS — I48.19 PERSISTENT ATRIAL FIBRILLATION (H): Chronic | ICD-10-CM

## 2020-12-14 DIAGNOSIS — G62.9 NEUROPATHY: ICD-10-CM

## 2020-12-15 DIAGNOSIS — F51.04 CHRONIC INSOMNIA: ICD-10-CM

## 2020-12-15 NOTE — TELEPHONE ENCOUNTER
gabapentin (NEURONTIN) 300 MG capsule      Last Written Prescription Date:  12/17/2019  Last Fill Quantity: 360,   # refills: 3  Last Office Visit : 5/4/2020  Future Office visit:  none    Routing refill request to provider for review/approval because:  Medication not on the [facility] refill protocol.

## 2020-12-16 RX ORDER — ZOLPIDEM TARTRATE 10 MG/1
TABLET ORAL
Qty: 90 TABLET | Refills: 0 | Status: SHIPPED | OUTPATIENT
Start: 2020-12-16 | End: 2021-06-07

## 2020-12-16 RX ORDER — GABAPENTIN 300 MG/1
600 CAPSULE ORAL 2 TIMES DAILY
Qty: 120 CAPSULE | Refills: 0 | Status: SHIPPED | OUTPATIENT
Start: 2020-12-16 | End: 2021-01-06

## 2020-12-16 NOTE — TELEPHONE ENCOUNTER
Routing refill request to provider for review/approval because:  Drug not on the [facility] refill protocol   zolpidem (AMBIEN) 10 MG tablet 90 tablet 0 7/13/2020  No   Sig - Route: Take 0.5 tablets (5 mg) by mouth nightly as needed for sleep - Oral   Sent to pharmacy as: Zolpidem Tartrate 10 MG Oral Tablet (AMBIEN)   Class: E-Prescribe   Order: [id number]   E-Prescribing Status: Receipt confirmed by pharmacy (7/13/2020 11:10 AM CDT)       Patient has not been seen in over a year. Patient does not have PCP in chart. Please advise if you would like appointment with patient at this time.     Silvia Khan RN Flex

## 2020-12-17 ENCOUNTER — MYC MEDICAL ADVICE (OUTPATIENT)
Dept: CARDIOLOGY | Facility: CLINIC | Age: 65
End: 2020-12-17

## 2020-12-17 DIAGNOSIS — G62.9 NEUROPATHY: ICD-10-CM

## 2020-12-17 RX ORDER — EPLERENONE 50 MG/1
TABLET, FILM COATED ORAL
Qty: 90 TABLET | Refills: 2 | Status: SHIPPED | OUTPATIENT
Start: 2020-12-17 | End: 2021-09-07

## 2020-12-17 NOTE — TELEPHONE ENCOUNTER
DOFETILIDE 250MCG CAPS      Last Written Prescription Date:  12-22-19  Last Fill Quantity: 180,   # refills: 3  Last Office Visit : 9-11-20 ( Kenneth)  Future Office visit:  12-22-20 ( Chaz)    Routing refill request to provider for review/approval because:  Med not on protocol

## 2020-12-17 NOTE — TELEPHONE ENCOUNTER
XARELTO 20MG TABS      Last Written Prescription Date: not on med list  (Discontinued None Sarah Thomas PA-C 7/9/20 [id number] )    Last Office Visit : 9-11-20  Future Office visit:  12-22-20    Last Clinic note: 9-11-20 Dr. Ware  # Atrial arrhythmias s/p recent ablation   - continue Xarelto        Creatinine Clearance of: 90.71 calculated using:    Serum Creatinine  of: 1.1 from: 9-11-20  Weight of: 97.1 kg from: 9-11-20    Routing refill request to provider for review/approval because:  Drug not active on patient's medication list    EPLERENONE 50MG TABS  RF    90 tab:2 RF

## 2020-12-18 DIAGNOSIS — Z98.890 S/P ABLATION OF ATRIAL FLUTTER: Chronic | ICD-10-CM

## 2020-12-18 DIAGNOSIS — Z95.810 AUTOMATIC IMPLANTABLE CARDIOVERTER-DEFIBRILLATOR IN SITU: Chronic | ICD-10-CM

## 2020-12-18 DIAGNOSIS — Z86.79 S/P ABLATION OF ATRIAL FLUTTER: Chronic | ICD-10-CM

## 2020-12-18 DIAGNOSIS — I42.2 HYPERTROPHIC CARDIOMYOPATHY (H): ICD-10-CM

## 2020-12-18 DIAGNOSIS — I48.19 PERSISTENT ATRIAL FIBRILLATION (H): Chronic | ICD-10-CM

## 2020-12-18 RX ORDER — RIVAROXABAN 20 MG/1
TABLET, FILM COATED ORAL
Qty: 90 TABLET | Refills: 3 | Status: SHIPPED | OUTPATIENT
Start: 2020-12-18 | End: 2021-09-07

## 2020-12-18 RX ORDER — DOFETILIDE 0.25 MG/1
250 CAPSULE ORAL 2 TIMES DAILY
Qty: 180 CAPSULE | Refills: 3 | Status: SHIPPED | OUTPATIENT
Start: 2020-12-18

## 2020-12-19 RX ORDER — GABAPENTIN 300 MG/1
CAPSULE ORAL
Qty: 360 CAPSULE | Refills: 2 | OUTPATIENT
Start: 2020-12-19

## 2020-12-21 ENCOUNTER — OFFICE VISIT (OUTPATIENT)
Dept: DERMATOLOGY | Facility: CLINIC | Age: 65
End: 2020-12-21
Payer: COMMERCIAL

## 2020-12-21 DIAGNOSIS — Z12.83 SKIN CANCER SCREENING: Primary | ICD-10-CM

## 2020-12-21 DIAGNOSIS — D48.5 NEOPLASM OF UNCERTAIN BEHAVIOR OF SKIN: ICD-10-CM

## 2020-12-21 DIAGNOSIS — L57.0 ACTINIC KERATOSIS: ICD-10-CM

## 2020-12-21 DIAGNOSIS — L28.0 LICHEN SIMPLEX CHRONICUS: ICD-10-CM

## 2020-12-21 PROCEDURE — 17000 DESTRUCT PREMALG LESION: CPT | Mod: XS | Performed by: PHYSICIAN ASSISTANT

## 2020-12-21 PROCEDURE — 17003 DESTRUCT PREMALG LES 2-14: CPT | Mod: XS | Performed by: PHYSICIAN ASSISTANT

## 2020-12-21 PROCEDURE — 17110 DESTRUCTION B9 LES UP TO 14: CPT | Performed by: PHYSICIAN ASSISTANT

## 2020-12-21 PROCEDURE — 11102 TANGNTL BX SKIN SINGLE LES: CPT | Mod: XS | Performed by: PHYSICIAN ASSISTANT

## 2020-12-21 PROCEDURE — 99214 OFFICE O/P EST MOD 30 MIN: CPT | Mod: 25 | Performed by: PHYSICIAN ASSISTANT

## 2020-12-21 PROCEDURE — 88305 TISSUE EXAM BY PATHOLOGIST: CPT | Performed by: PATHOLOGY

## 2020-12-21 ASSESSMENT — PAIN SCALES - GENERAL: PAINLEVEL: NO PAIN (0)

## 2020-12-21 NOTE — LETTER
12/21/2020       RE: Stu Meredith  [address] [facility] [address]     Dear Colleague,    Thank you for referring your patient, Stu Meredith, to the [facility] [place] [facility]. Please see a copy of my visit note below.    [facility] Dermatology Note      1.Dermatology Problem List:  1. History of NMSC  -BCC of Left central chest s/p excision 07/17/19,   -BCC left lateral neck, bx MMS 10/20/20  -SCC right forearm, bx 12/21/20,  2. Centrofacial rosacea w/telangectasias  3. Benign skin findings: seborrheic keratoses, dermatofibromas, solar lentigines  4. Lower cutaneous lip solar lentigines  5. Prurigo nodule vs Lichen simplex chronicus left lateral thenar eminence   -s/p cryo 12/20/19, resolved 1/23/2020, repeat 10/12/20, 12/21/20  6. Actinic keratosis right upper forehead, resolved. S/p cryotherapy 8/3/20 and 10/12/20    CC:   Chief Complaint   Patient presents with     Skin Check     Haroon is here today for a skin check. He is concerned about a spot on his right arm and right lower back.         Encounter Date: Dec 21, 2020    History of Present Illness:  Mr. Stu Meredith is a 65 year old male who presents as a follow-up for a skin cancer screening. He was last seen by me for 10/12/20 when he had a biopsy on his left left lateral neck and subsequently had Moh's surgery on 10/20/20 with Dr Choe. He had an actinic keratosis treated with cryotherapy on his right upper forehead and prurigo nodule on his left thumb. He states the AK is gone and the prurigo nodule on his thumb is almost gone. He would like cryotherapy again. He also has a bump on his left thigh that he finds himself picking at. He would like treatment of this as well.   He was last seen 1/23/20 when the prurigo nodule on his left thumb was resolved and the patient was reassured the basal cell excision on his chest had negative margins. He  has a few specific spots he would like examined. He defers a full skin check today. He has a spot on his right upper forehead that was treated with cryotherapy 8/3/20 and did not go away. He would like this re-examined.     He has a spot on the left side of his neck that does not heal. It is wet at times. It has been like that for several months. He has a hard times seeing it. Additionally he has a rough spot near his excision sites on his chest. It does not hurt, bleed or grow.     Lastly, he has an area on his thumb that has been treated with ILK, steroid tape and cryotherapy. It went away with the cryotherapy in December of 2019 and is interested in this treatment again.     He is feeling well, No other skin concerns.     Past Medical History:   Patient Active Problem List   Diagnosis     Hypertrophic cardiomyopathy (H)     Advanced directives, counseling/discussion     Ventricular tachycardia (H)     Automatic implantable cardioverter-defibrillator in situ- [place], dual chamber- NOT dependent     Prediabetes     S/P ablation of atrial flutter     CHF (congestive heart failure) (H)     Chronic Zolpidem use for insomnia     S/P ablation of atrial fibrillation     Lung nodules     Atrial tachycardia (H)     Encounter for monitoring dofetilide therapy     HTN (hypertension)     Hypertrophic cardiomegaly     SOB (shortness of breath)     Paroxysmal atrial fibrillation (H)     Dyspnea     Type 2 diabetes mellitus without complication, without long-term current use of insulin (H)     Chronic insomnia     Past Medical History:   Diagnosis Date     Atrial fibrillation (H) 12/9/11    failed medication, multiple DC cardioveresions; s/p Left atrial ablation to eliminate atrial fibrillation 12/9/11     Chest pain      CHF (congestive heart failure) (H) 7/30/2016     Coronary artery disease      DJD (degenerative joint disease)      Fatigue 7/15/2019     Hip arthritis 1/15/2014     Hypertension      Hypertrophic  cardiomyopathy (H) 10/09     Other abnormal heart sounds      Pacemaker     ICD     Palpitations      Pneumonia, organism unspecified(486)      Prediabetes 7/10/15    A1C 6.5     Status post implantation of automatic cardioverter/defibrillator (AICD)      Type 2 diabetes mellitus without complication, without long-term current use of insulin (H) 7/13/2020     Ventricular tachycardia (H)      Past Surgical History:   Procedure Laterality Date     ANESTHESIA CARDIOVERSION  4/24/2014    Procedure: ANESTHESIA CARDIOVERSION;  Surgeon: Generic Anesthesia Provider;  Location: [facility]     ANESTHESIA CARDIOVERSION N/A 5/12/2016    Procedure: ANESTHESIA CARDIOVERSION;  Surgeon: GENERIC ANESTHESIA PROVIDER;  Location: [facility]     ANESTHESIA CARDIOVERSION N/A 8/7/2017    Procedure: ANESTHESIA CARDIOVERSION;  Anesthesia Offsite Coverage Cardioversion @1100;  Surgeon: GENERIC ANESTHESIA PROVIDER;  Location: [facility]     ANESTHESIA CARDIOVERSION N/A 1/3/2018    Procedure: ANESTHESIA CARDIOVERSION;  Anesthesia Cardioverion;  Surgeon: GENERIC ANESTHESIA PROVIDER;  Location: [facility]     ANESTHESIA CARDIOVERSION N/A 5/4/2018    Procedure: ANESTHESIA CARDIOVERSION;  Anesthesia Coverage Cardioversion @1400;  Surgeon: GENERIC ANESTHESIA PROVIDER;  Location: [facility]     ANESTHESIA CARDIOVERSION N/A 9/27/2018    Procedure: ANESTHESIA CARDIOVERSION;  Anesthesia Cardioversion @0930;  Surgeon: GENERIC ANESTHESIA PROVIDER;  Location: [facility]     ANESTHESIA CARDIOVERSION N/A 12/20/2018    Procedure: Anesthesia Coverage Cardioversion @0830;  Surgeon: GENERIC ANESTHESIA PROVIDER;  Location: [facility]     ANESTHESIA CARDIOVERSION N/A 8/21/2019    Procedure: Anesthesia Coverage Cardioversion @0800;  Surgeon: GENERIC ANESTHESIA PROVIDER;  Location: [facility]     ANESTHESIA CARDIOVERSION N/A 1/16/2020    Procedure: ANESTHESIA, FOR CARDIOVERSION;  Surgeon: GENERIC ANESTHESIA PROVIDER;  Location: [facility]     ARTHROPLASTY HIP  1/15/2014    Procedure: ARTHROPLASTY HIP;  Left  Total Hip Arthroplasty;  Surgeon: Nelson Gaspar MD;  Location: [facility] OR     ARTHROPLASTY HIP Right 6/5/2019    Procedure: Right Total Hip Arthroplasty;  Surgeon: Nelson Gaspar MD;  Location: [facility] OR     CARDIAC SURGERY       COLONOSCOPY N/A 5/18/2018    Procedure: COMBINED COLONOSCOPY, SINGLE OR MULTIPLE BIOPSY/POLYPECTOMY BY BIOPSY;  COLONOSCOPY (PT HAS DEFIBRILLATOR) ;  Surgeon: Mike Nickerson MD;  Location: [facility]     CV RIGHT HEART CATH N/A 8/19/2019    Procedure: CV RIGHT HEART CATH;  Surgeon: Cisco Rausch MD;  Location:  HEART CARDIAC CATH LAB     CV RIGHT HEART CATH N/A 8/21/2019    Procedure: Right Heart Cath;  Surgeon: Ciaran Burton MD;  Location:  HEART CARDIAC CATH LAB     EP ABLATION FOCAL AFIB N/A 5/14/2020    Procedure: EP ABLATION FOCAL AFIB;  Surgeon: Erik Cooper MD;  Location: [facility] CARDIAC CATH LAB     H ABLATION FOCAL AFIB  12/9/11    Left atrial ablation to eliminate atrial fibrillation     IMPLANT AUTOMATIC IMPLANTABLE CARDIOVERTER DEFIBRILLATOR  7/27/12    AICD implantation     TONSILLECTOMY  1964       Social History:  Patient reports that he quit smoking about 5 years ago. His smoking use included cigarettes. He started smoking about 46 years ago. He has a 40.00 pack-year smoking history. He has never used smokeless tobacco. He reports current alcohol use. He reports that he does not use drugs. He enjoys traveling to warm places. He rides motorcycles.   Family History:  Family History   Problem Relation Age of Onset     Heart Disease Mother         unknown     Obesity Mother      Gastrointestinal Disease Mother         diverticulitis     Diabetes Maternal Grandmother      Diabetes Paternal Grandmother      Cerebrovascular Disease Paternal Grandmother 94     Diabetes Paternal Grandfather      Cerebrovascular Disease Paternal Grandfather 78     Diabetes Son      C.A.D. Father         CABG age 78     Heart Disease Father         CABG x5     Diabetes Maternal Grandfather       LUNG DISEASE No family hx of      Deep Vein Thrombosis (DVT) No family hx of      Anesthesia Reaction No family hx of      Melanoma No family hx of      Skin Cancer No family hx of        Medications:  Current Outpatient Medications   Medication Sig Dispense Refill     acetaminophen (TYLENOL) 325 MG tablet Take 325-650 mg by mouth every 6 hours as needed       amoxicillin (AMOXIL) 500 MG tablet Take 4 tablets (2000 mg) by mouth 1 hour before dental procedures. 12 tablet 3     atorvastatin (LIPITOR) 20 MG tablet Take 1 tablet (20 mg) by mouth daily 90 tablet 3     cyanocobalamin (VITAMIN B-12) 100 MCG tablet Take 100 mcg by mouth daily       dofetilide (TIKOSYN) 250 MCG capsule Take 1 capsule (250 mcg) by mouth 2 times daily 180 capsule 3     eplerenone (INSPRA) 50 MG tablet TAKE ONE TABLET BY MOUTH ONCE DAILY 90 tablet 2     fluticasone-salmeterol (AIRDUO RESPICLICK) 113-14 MCG/ACT inhaler Inhale 1 puff into the lungs 2 times daily 2 Inhaler 3     furosemide (LASIX) 20 MG tablet Take 1 tablet (20 mg) by mouth daily In PM 90 tablet 3     furosemide (LASIX) 40 MG tablet Take 1 tablet (40 mg) by mouth daily In AM 90 tablet 3     gabapentin (NEURONTIN) 300 MG capsule Take 2 capsules (600 mg) by mouth 2 times daily Schedule appointment in your clinic for review of medication refills. 120 capsule 0     metFORMIN (GLUCOPHAGE-XR) 500 MG 24 hr tablet Take 2 tablets (1,000 mg) by mouth daily (with dinner) Take 2 tablets (1,000 mg) daily (with dinner). 180 tablet 3     metoprolol succinate ER (TOPROL-XL) 50 MG 24 hr tablet Take 1 tablet (50 mg) by mouth daily 90 tablet 3     multivitamin, therapeutic (THERA-VIT) TABS Take 1 tablet by mouth daily       sildenafil (VIAGRA) 100 MG tablet Take 0.5-1 tablets ( mg) by mouth daily as needed (take 1 hour before intercourse) (Patient not taking: Reported on 12/22/2020) 30 tablet 12     XARELTO ANTICOAGULANT 20 MG TABS tablet TAKE ONE TABLET BY MOUTH EVERY DAY WITH DINNER 90  tablet 3     zolpidem (AMBIEN) 10 MG tablet TAKE HALF TABLET BY MOUTH  NIGHTLY AS NEEDED FOR SLEEP 90 tablet 0     No Known Allergies      Review of Systems:  -Skin/Heme New Pt: The patient admits to frequent sun exposure in his past. The patient denies excessive scarring or problems healing except as per HPI. The patient denies excessive bleeding.  -Constitutional: Otherwise feeling well today, in usual state of health.  -Skin: As above in HPI. No additional skin concerns.    Physical exam:  Vitals: There were no vitals taken for this visit.  GEN: This is a well developed, well-nourished male in no acute distress, in a pleasant mood.    SKIN: Full skin, which includes the head/face, both arms, chest, back, abdomen,both legs, genitalia and/or groin buttocks, digits and/or nails, was examined. Significant for:   -Brock skin type: II. Sun tanned skin.   -There is an erythematous macule with overyling adherent scale on the vertex scalp x 1 and the left forearm x 1.   There is a 5 mm pink scaly tender papule on the right forearm   -There is no erythema, telangectasias, nodularity, or pigmentation on the left central chest. There is a tan waxy stuck on appearing papule adjacent to this scar. .  -There is a faint skin colored linear plaque on the left lateral thenar eminence   -There are a few firm tan/flesh colored papule that dimples with lateral pressure on the extremities with a lichenified papule on the left thigh  -No other lesions of concern on areas examined.     Labs:  None    Impression/Plan:  1. Neoplasm of uncertain behavior on the right forearm. The differential diagnosis includes BCC vs SCC vs other. Will perform a shave biopsy to determine management.   - Shave biopsy:  After discussion of benefits and risks including but not limited to bleeding/bruising, pain/swelling, infection, scar, incomplete removal, nerve damage/numbness, recurrence, and non-diagnostic biopsy, written consent, verbal consent  and photographs were obtained. Time-out was performed. The area was cleaned with isopropyl alcohol. 0.5ml of 1% lidocaine with 1:100,000 epinephrine was injected to obtain adequate anesthesia. A shave biopsy was performed. Hemostasis was achieved with aluminium chloride. Vaseline and a sterile dressing were applied. The patient tolerated the procedure and no complications were noted. The patient was provided with verbal and written post care instructions.  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 He was referred to dermatology surgery for Moh's surgery.     2. Actinic keratosis, vertex scalp and left forearm  - Cryotherapy procedure note: After verbal consent and discussion of risks and benefits including but no limited to dyspigmentation/scar, blister, and pain, one was treated with 1-2mm freeze border for 2 cycles with liquid nitrogen. Post cryotherapy instructions were provided.    Return if these do not resolve.       3. Lichen simplex chronicus on the left lateral thenar eminence, hx of resolution with cryotherapy. Due to clinical symptoms, will perform this again today as not completely gone but significant improvement.    Cryotherapy procedure note (performed by faculty): After verbal consent and discussion of risks and benefits including but no limited to dyspigmentation/scar, blister, infection, recurrence, one was treated with 1-2mm freeze border for 2 cycles with liquid nitrogen. Post cryotherapy instructions were provided.     4. History of nonmelanoma skin cancer, no clincial evidence of recurrence  - Sun precaution was advised including the use of sun screens of SPF 30 or higher, sun protective clothing, and avoidance of tanning beds.      5. Seborrheic keratosis, non irritated on the central chest and trunk and extremities  - Seborrheic keratosis: Discussed benign nature of lesions.    6. Dermatofibromas, extremities with a lichenified one on the  left anterior thigh.  -Cryotherapy procedure note (performed by faculty): After verbal consent and discussion of risks and benefits including but no limited to dyspigmentation/scar, blister, infection, recurrence, one was treated with 1-2mm freeze border for 2 cycles with liquid nitrogen. Post cryotherapy instructions were provided.       CC No referring provider defined for this encounter. on close of this encounter.  Follow-up annually, earlier for new or changing lesions.       Staff Involved:  Staff Only    All risks, benefits and alternatives were discussed with patient.  Patient is in agreement and understands the assessment and plan.  All questions were answered.  Sun Screen Education was given.   Return to Clinic annually or sooner as needed.   Dayna Riley PA-C

## 2020-12-21 NOTE — NURSING NOTE
Dermatology Rooming Note    Stu Meredith's goals for this visit include:   Chief Complaint   Patient presents with     Skin Check     Haroon is here today for a skin check. He is concerned about a spot on his right arm and right lower back.     Aleida Guzman, [facility]

## 2020-12-21 NOTE — PATIENT INSTRUCTIONS
Wound Care After a Biopsy    What is a skin biopsy?  A skin biopsy allows the doctor to examine a very small piece of tissue under the microscope to determine the diagnosis and the best treatment for the skin condition. A local anesthetic (numbing medicine)  is injected with a very small needle into the skin area to be tested. A small piece of skin is taken from the area. Sometimes a suture (stitch) is used.     What are the risks of a skin biopsy?  I will experience scar, bleeding, swelling, pain, crusting and redness. I may experience incomplete removal or recurrence. Risks of this procedure are excessive bleeding, bruising, infection, nerve damage, numbness, thick (hypertrophic or keloidal) scar and non-diagnostic biopsy.    How should I care for my wound for the first 24 hours?    Keep the wound dry and covered for 24 hours    If it bleeds, hold direct pressure on the area for 15 minutes. If bleeding does not stop then go to the emergency room    Avoid strenuous exercise the first 1-2 days or as your doctor instructs you    How should I care for the wound after 24 hours?    After 24 hours, remove the bandage    You may bathe or shower as normal    If you had a scalp biopsy, you can shampoo as usual and can use shower water to clean the biopsy site daily    Clean the wound twice a day with gentle soap and water    Do not scrub, be gentle    Apply white petroleum/Vaseline after cleaning the wound with a cotton swab or a clean finger, and keep the site covered with a Bandaid /bandage. Bandages are not necessary with a scalp biopsy    If you are unable to cover the site with a Bandaid /bandage, re-apply ointment 2-3 times a day to keep the site moist. Moisture will help with healing    Avoid strenuous activity for first 1-2 days    Avoid lakes, rivers, pools, and oceans until the stitches are removed or the site is healed    How do I clean my wound?    Wash hands thoroughly with soap or use hand  before all  wound care    Clean the wound with gentle soap and water    Apply white petroleum/Vaseline  to wound after it is clean    Replace the Bandaid /bandage to keep the wound covered for the first few days or as instructed by your doctor    If you had a scalp biopsy, warm shower water to the area on a daily basis should suffice    What should I use to clean my wound?     Cotton-tipped applicators (Qtips )    White petroleum jelly (Vaseline ). Use a clean new container and use Q-tips to apply.    Bandaids   as needed    Gentle soap     How should I care for my wound long term?    Do not get your wound dirty    Keep up with wound care for one week or until the area is healed.    A small scab will form and fall off by itself when the area is completely healed. The area will be red and will become pink in color as it heals. Sun protection is very important for how your scar will turn out. Sunscreen with an SPF 30 or greater is recommended once the area is healed.    If you have stitches, stitches need to be removed in 14 days. You may return to our clinic for this or you may have it done locally at your doctor s office.    You should have some soreness but it should be mild and slowly go away over several days. Talk to your doctor about using tylenol for pain,    When should I call my doctor?  If you have increased:     Pain or swelling    Pus or drainage (clear or slightly yellow drainage is ok)    Temperature over 100F    Spreading redness or warmth around wound    When will I hear about my results?  The biopsy results can take 2-3 weeks to come back. The clinic will call you with the results, send you a [employer]t message, or have you schedule a follow-up clinic or phone time to discuss the results. Contact our clinics if you do not hear from us in 3 weeks.     Who should I call with questions?    [facility]: [phone]     [facility]: [phone]    For  urgent needs outside of business hours call the [facility] at [phone] and ask for the dermatology resident on call        Cryotherapy    What is it?    Use of a very cold liquid, such as liquid nitrogen, to freeze and destroy abnormal skin cells that need to be removed    What should I expect?    Tenderness and redness    A small blister that might grow and fill with dark purple blood. There may be crusting.    More than one treatment may be needed if the lesions do not go away.    How do I care for the treated area?    Gently wash the area with your hands when bathing.    Use a thin layer of Vaseline to help with healing. You may use a Band-Aid.     The area should heal within 7-10 days and may leave behind a pink or lighter color.     Do not use an antibiotic or Neosporin ointment.     You may take acetaminophen (Tylenol) for pain.     Call your Doctor if you have:    Severe pain    Signs of infection (warmth, redness, cloudy yellow drainage, and or a bad smell)    Questions or concerns    Who should I call with questions?       [facility]: [phone]       [facility]: [phone]       For urgent needs outside of business hours call the [facility] at [phone]        and ask for the dermatology resident on call

## 2020-12-21 NOTE — NURSING NOTE
Lidocaine-epinephrine 1-1:006963 % injection   1mL once for one use, starting 12/21/2020 ending 12/21/2020,  2mL disp, R-0, injection  Injected by Aleida Guzman CMA

## 2020-12-22 ENCOUNTER — ANCILLARY PROCEDURE (OUTPATIENT)
Dept: CT IMAGING | Facility: CLINIC | Age: 65
End: 2020-12-22
Attending: INTERNAL MEDICINE
Payer: COMMERCIAL

## 2020-12-22 ENCOUNTER — ANCILLARY PROCEDURE (OUTPATIENT)
Dept: CARDIOLOGY | Facility: CLINIC | Age: 65
End: 2020-12-22
Attending: INTERNAL MEDICINE
Payer: COMMERCIAL

## 2020-12-22 VITALS
SYSTOLIC BLOOD PRESSURE: 114 MMHG | HEIGHT: 71 IN | HEART RATE: 60 BPM | OXYGEN SATURATION: 96 % | WEIGHT: 218 LBS | DIASTOLIC BLOOD PRESSURE: 72 MMHG | BODY MASS INDEX: 30.52 KG/M2

## 2020-12-22 DIAGNOSIS — I47.20 VENTRICULAR TACHYCARDIA (H): ICD-10-CM

## 2020-12-22 DIAGNOSIS — I42.2 HYPERTROPHIC CARDIOMYOPATHY (H): ICD-10-CM

## 2020-12-22 DIAGNOSIS — I42.2 HYPERTROPHIC CARDIOMYOPATHY (H): Chronic | ICD-10-CM

## 2020-12-22 DIAGNOSIS — I48.91 ATRIAL FIBRILLATION, UNSPECIFIED TYPE (H): ICD-10-CM

## 2020-12-22 DIAGNOSIS — I47.20 VENTRICULAR TACHYCARDIA (H): Chronic | ICD-10-CM

## 2020-12-22 DIAGNOSIS — R91.8 LUNG NODULES: ICD-10-CM

## 2020-12-22 DIAGNOSIS — Z95.810 AUTOMATIC IMPLANTABLE CARDIOVERTER-DEFIBRILLATOR IN SITU: Chronic | ICD-10-CM

## 2020-12-22 LAB
ANION GAP SERPL CALCULATED.3IONS-SCNC: 6 MMOL/L (ref 3–14)
BUN SERPL-MCNC: 20 MG/DL (ref 7–30)
CALCIUM SERPL-MCNC: 9.2 MG/DL (ref 8.5–10.1)
CHLORIDE SERPL-SCNC: 106 MMOL/L (ref 94–109)
CO2 SERPL-SCNC: 30 MMOL/L (ref 20–32)
CREAT SERPL-MCNC: 1.07 MG/DL (ref 0.66–1.25)
GFR SERPL CREATININE-BSD FRML MDRD: 72 ML/MIN/{1.73_M2}
GLUCOSE SERPL-MCNC: 122 MG/DL (ref 70–99)
POTASSIUM SERPL-SCNC: 4.1 MMOL/L (ref 3.4–5.3)
SODIUM SERPL-SCNC: 141 MMOL/L (ref 133–144)

## 2020-12-22 PROCEDURE — 80048 BASIC METABOLIC PNL TOTAL CA: CPT | Performed by: PATHOLOGY

## 2020-12-22 PROCEDURE — 93283 PRGRMG EVAL IMPLANTABLE DFB: CPT | Performed by: INTERNAL MEDICINE

## 2020-12-22 PROCEDURE — 71250 CT THORAX DX C-: CPT | Mod: GC | Performed by: RADIOLOGY

## 2020-12-22 PROCEDURE — 36415 COLL VENOUS BLD VENIPUNCTURE: CPT | Performed by: PATHOLOGY

## 2020-12-22 PROCEDURE — G0463 HOSPITAL OUTPT CLINIC VISIT: HCPCS

## 2020-12-22 PROCEDURE — 99214 OFFICE O/P EST MOD 30 MIN: CPT | Performed by: INTERNAL MEDICINE

## 2020-12-22 RX ORDER — DOFETILIDE 0.25 MG/1
CAPSULE ORAL
Qty: 180 CAPSULE | Refills: 2 | OUTPATIENT
Start: 2020-12-22

## 2020-12-22 RX ORDER — RIVAROXABAN 20 MG/1
TABLET, FILM COATED ORAL
Qty: 90 TABLET | Refills: 1 | OUTPATIENT
Start: 2020-12-22

## 2020-12-22 ASSESSMENT — MIFFLIN-ST. JEOR: SCORE: 1790.97

## 2020-12-22 ASSESSMENT — PAIN SCALES - GENERAL: PAINLEVEL: NO PAIN (0)

## 2020-12-22 NOTE — TELEPHONE ENCOUNTER
DOFETILIDE 250MCG CAPS  dofetilide (TIKOSYN) 250 MCG capsule 180 capsule 3 12/18/2020  No   Sig - Route: Take 1 capsule (250 mcg) by mouth 2 times daily - Oral   Sent to pharmacy as: Dofetilide 250 MCG Oral Capsule (TIKOSYN)   Class: E-Prescribe   Order: [id number]   E-Prescribing Status: Receipt confirmed by pharmacy (12/18/2020  1:19 PM CST)   Printout Tracking    External Result Report   Pharmacy    [facility] - [place], [place] - [address] CAR Urias RN  Central Triage Red Flags/Med Refills

## 2020-12-22 NOTE — TELEPHONE ENCOUNTER
XARELTO 20MG TABS   XARELTO ANTICOAGULANT 20 MG TABS tablet 90 tablet 3 12/18/2020  Yes   Sig: TAKE ONE TABLET BY MOUTH EVERY DAY WITH DINNER   Sent to pharmacy as: Xarelto 20 MG Oral Tablet   Class: E-Prescribe   Order: [id number]   E-Prescribing Status: Receipt confirmed by pharmacy (12/18/2020  1:21 PM CST)   Printout Tracking    External Result Report   Pharmacy    [facility] - [place], [place] - [address] Urias RN  Central Triage Red Flags/Med Refills

## 2020-12-22 NOTE — PATIENT INSTRUCTIONS
It was a pleasure to see you in clinic today.  Please do not hesitate to call with any questions or concerns.  You are scheduled for a remote transmission on 3/23/2021.  We look forward to seeing you in clinic at your next device check in 6 months.    Tamica Courtney, RN, MS, CCRN  Electrophysiology Nurse Clinician  [facility]    During Business Hours Please Call:  [phone]  After Hours Please Call:  [phone] - select option #4 and ask for job code [phone]

## 2020-12-22 NOTE — LETTER
12/22/2020      RE: Stu Meredith  [address]  [facility] [id number]       HPI:   65 year old male with history of HCM without obstruction (dx 2006, EF 20% improved to 45-50%), VT s/p ICD 2012, nonobstructive CAD (cath 2013), AF s/p PVI X 3 (2011, 2016, 2018) and multiple DCCV and repeated ablations presents for ongoing evaluation and management.  Today patient reports that he has been feeling fairly well since last visit.  He reports that overall he continues to feel better with maintenance of since rhythm but still has some exercise intolerance and yanes.  He denies any chest pain or pressure, sob, orthopnea, pnd, palpitations, syncope/presyncope or vamsi.  He continues to have some episodic abdominal distention/fullness and early satiety.    He denies any problems with his medications and reports compliance.       PAST MEDICAL HISTORY:  Past Medical History:   Diagnosis Date     Atrial fibrillation (H) 12/9/11    failed medication, multiple DC cardioveresions; s/p Left atrial ablation to eliminate atrial fibrillation 12/9/11     Chest pain      CHF (congestive heart failure) (H) 7/30/2016     Coronary artery disease      DJD (degenerative joint disease)      Fatigue 7/15/2019     Hip arthritis 1/15/2014     Hypertension      Hypertrophic cardiomyopathy (H) 10/09     Other abnormal heart sounds      Pacemaker     ICD     Palpitations      Pneumonia, organism unspecified(486)      Prediabetes 7/10/15    A1C 6.5     Status post implantation of automatic cardioverter/defibrillator (AICD)      Type 2 diabetes mellitus without complication, without long-term current use of insulin (H) 7/13/2020     Ventricular tachycardia (H)        CURRENT MEDICATIONS:  Current Outpatient Medications   Medication Sig Dispense Refill     acetaminophen (TYLENOL) 325 MG tablet Take 325-650 mg by mouth every 6 hours as needed       amoxicillin (AMOXIL) 500 MG tablet Take 4 tablets (2000 mg) by mouth 1 hour before dental  procedures. 12 tablet 3     atorvastatin (LIPITOR) 20 MG tablet Take 1 tablet (20 mg) by mouth daily 90 tablet 3     cyanocobalamin (VITAMIN B-12) 100 MCG tablet Take 100 mcg by mouth daily       dofetilide (TIKOSYN) 250 MCG capsule Take 1 capsule (250 mcg) by mouth 2 times daily 180 capsule 3     eplerenone (INSPRA) 50 MG tablet TAKE ONE TABLET BY MOUTH ONCE DAILY 90 tablet 2     fluticasone-salmeterol (AIRDUO RESPICLICK) 113-14 MCG/ACT inhaler Inhale 1 puff into the lungs 2 times daily 2 Inhaler 3     furosemide (LASIX) 20 MG tablet Take 1 tablet (20 mg) by mouth daily In PM 90 tablet 3     furosemide (LASIX) 40 MG tablet Take 1 tablet (40 mg) by mouth daily In AM 90 tablet 3     metFORMIN (GLUCOPHAGE-XR) 500 MG 24 hr tablet Take 2 tablets (1,000 mg) by mouth daily (with dinner) Take 2 tablets (1,000 mg) daily (with dinner). 180 tablet 3     metoprolol succinate ER (TOPROL-XL) 50 MG 24 hr tablet Take 1 tablet (50 mg) by mouth daily 90 tablet 3     multivitamin, therapeutic (THERA-VIT) TABS Take 1 tablet by mouth daily       XARELTO ANTICOAGULANT 20 MG TABS tablet TAKE ONE TABLET BY MOUTH EVERY DAY WITH DINNER 90 tablet 3     zolpidem (AMBIEN) 10 MG tablet TAKE HALF TABLET BY MOUTH  NIGHTLY AS NEEDED FOR SLEEP 90 tablet 0     gabapentin (NEURONTIN) 300 MG capsule Take 2 capsules (600 mg) by mouth 2 times daily 360 capsule 3     sildenafil (VIAGRA) 100 MG tablet Take 0.5-1 tablets ( mg) by mouth daily as needed (take 1 hour before intercourse) 30 tablet 12       PAST SURGICAL HISTORY:  Past Surgical History:   Procedure Laterality Date     ANESTHESIA CARDIOVERSION  4/24/2014    Procedure: ANESTHESIA CARDIOVERSION;  Surgeon: Generic Anesthesia Provider;  Location: [facility]     ANESTHESIA CARDIOVERSION N/A 5/12/2016    Procedure: ANESTHESIA CARDIOVERSION;  Surgeon: GENERIC ANESTHESIA PROVIDER;  Location: [facility]     ANESTHESIA CARDIOVERSION N/A 8/7/2017    Procedure: ANESTHESIA CARDIOVERSION;  Anesthesia Offsite  Coverage Cardioversion @1100;  Surgeon: GENERIC ANESTHESIA PROVIDER;  Location: [facility]     ANESTHESIA CARDIOVERSION N/A 1/3/2018    Procedure: ANESTHESIA CARDIOVERSION;  Anesthesia Cardioverion;  Surgeon: GENERIC ANESTHESIA PROVIDER;  Location: [facility]     ANESTHESIA CARDIOVERSION N/A 5/4/2018    Procedure: ANESTHESIA CARDIOVERSION;  Anesthesia Coverage Cardioversion @1400;  Surgeon: GENERIC ANESTHESIA PROVIDER;  Location: [facility]     ANESTHESIA CARDIOVERSION N/A 9/27/2018    Procedure: ANESTHESIA CARDIOVERSION;  Anesthesia Cardioversion @0930;  Surgeon: GENERIC ANESTHESIA PROVIDER;  Location: [facility]     ANESTHESIA CARDIOVERSION N/A 12/20/2018    Procedure: Anesthesia Coverage Cardioversion @0830;  Surgeon: GENERIC ANESTHESIA PROVIDER;  Location: [facility]     ANESTHESIA CARDIOVERSION N/A 8/21/2019    Procedure: Anesthesia Coverage Cardioversion @0800;  Surgeon: GENERIC ANESTHESIA PROVIDER;  Location: [facility]     ANESTHESIA CARDIOVERSION N/A 1/16/2020    Procedure: ANESTHESIA, FOR CARDIOVERSION;  Surgeon: GENERIC ANESTHESIA PROVIDER;  Location: [facility]     ARTHROPLASTY HIP  1/15/2014    Procedure: ARTHROPLASTY HIP;  Left Total Hip Arthroplasty;  Surgeon: Nelson Gaspar MD;  Location: [facility]     ARTHROPLASTY HIP Right 6/5/2019    Procedure: Right Total Hip Arthroplasty;  Surgeon: Nelson Gaspar MD;  Location: [facility]     CARDIAC SURGERY       COLONOSCOPY N/A 5/18/2018    Procedure: COMBINED COLONOSCOPY, SINGLE OR MULTIPLE BIOPSY/POLYPECTOMY BY BIOPSY;  COLONOSCOPY (PT HAS DEFIBRILLATOR) ;  Surgeon: Mike Nickerson MD;  Location:  GI     CV RIGHT HEART CATH MEASUREMENTS RECORDED N/A 8/19/2019    Procedure: CV RIGHT HEART CATH;  Surgeon: Cisco Rausch MD;  Location:  HEART CARDIAC CATH LAB     CV RIGHT HEART CATH MEASUREMENTS RECORDED N/A 8/21/2019    Procedure: Right Heart Cath;  Surgeon: Ciaran Burton MD;  Location:  HEART CARDIAC CATH LAB     EP ABLATION FOCAL AFIB N/A 5/14/2020    Procedure: EP ABLATION FOCAL  "AFIB;  Surgeon: Erik Cooper MD;  Location:  HEART CARDIAC CATH LAB     H ABLATION FOCAL AFIB  11    Left atrial ablation to eliminate atrial fibrillation     IMPLANT AUTOMATIC IMPLANTABLE CARDIOVERTER DEFIBRILLATOR  12    AICD implantation     TONSILLECTOMY  1964       ALLERGIES:   No Known Allergies    FAMILY HISTORY:  Family History   Problem Relation Age of Onset     Heart Disease Mother         unknown     Obesity Mother      Gastrointestinal Disease Mother         diverticulitis     Diabetes Maternal Grandmother      Diabetes Paternal Grandmother      Cerebrovascular Disease Paternal Grandmother 94     Diabetes Paternal Grandfather      Cerebrovascular Disease Paternal Grandfather 78     Diabetes Son      C.A.D. Father         CABG age 78     Heart Disease Father         CABG x5     Diabetes Maternal Grandfather      LUNG DISEASE No family hx of      Deep Vein Thrombosis (DVT) No family hx of      Anesthesia Reaction No family hx of      Melanoma No family hx of      Skin Cancer No family hx of        SOCIAL HISTORY:  Social History     Tobacco Use     Smoking status: Former Smoker     Packs/day: 1.00     Years: 40.00     Pack years: 40.00     Types: Cigarettes     Start date: 1975     Quit date: 2015     Years since quittin.1     Smokeless tobacco: Never Used   Substance Use Topics     Alcohol use: Yes     Alcohol/week: 0.0 standard drinks     Comment: very minimal     Drug use: No       ROS:   A comprehensive 14 point review of systems is negative other than as mentioned in HPI.    Exam:  /72 (BP Location: Right arm, Patient Position: Chair, Cuff Size: Adult Large)   Pulse 60   Ht 1.803 m (5' 11\")   Wt 98.9 kg (218 lb)   SpO2 96%   BMI 30.40 kg/m    GENERAL APPEARANCE: healthy, alert and no distress  EYES: no icterus, no xanthelasmas  ENT: normal palate, mucosa moist, no central cyanosis  NECK: supple, 2+ carotids  RESPIRATORY: lungs clear to auscultation " bilaterally  CARDIOVASCULAR: regular rhythm, normal S1 and S2, no S3 or S4 and no murmur, click or rub.  JVD 8-9cm  GI: soft, non tender, bowel sounds normal,no abdominal bruits  EXTREMITIES: no edema,   NEURO: alert and oriented to person/place/time, normal speech, gait and affect  VASC: Radial, dorsalis pedis pulses 2+ bilaterally.  PSYCH: cooperative, affect appropriate.     Labs:      CPX Echo 6/1/2018:  Interpretation Summary  Submaximal treadmill stress test in a patient with a diagnosis of HCM.  The Ramirez treadmill score is 8 (low risk).  Exercise was stopped due to fatigue.  Normal blood pressure response to exercise.  No ECG evidence of ischemia.  No supraventricular or ventricular arrhythmias. Frequent PVCs noted throughout the study.  Normal biventricular function with mild asymmetrical septal hypertrophy (12mm).  No dynamic outflow tract obstruction is present at rest or with exercise.  Normal segmental wall motion at rest and with exercise.  Minimal augmentation in LV function with exercise.  Average functional capacity for age.           CPX 2/15/2019:          CTA coronary angiogram 5/28/19  IMPRESSION:  1.  No evidence of coronary artery atherosclerosis or stenosis.  2.  Myocardial bridging involving the mid LAD.  3.  Total Agatston score 0 placing the patient in the lowest percentile when compared to age and gender matched control group.     Right heart cath 8/21/19    Time Systolic Diastolic Mean A Wave V Wave EDP Max dp/dt HR   RA Pressures  3:38 PM     12 mmHg    16 mmHg    14 mmHg        61 bpm      RV Pressures  3:38 PM 60 mmHg            16 mmHg      106 bpm      PA Pressures  3:41 PM 60 mmHg    22 mmHg    38 mmHg            69 bpm      PCW Pressures  3:39 PM     30 mmHg    28 mmHg    38 mmHg        74 bpm           Time Hb SAT(%) PO2 Content PA Sat   PA  3:28 PM   63.6 %        63.6 %      Art  3:28 PM   99 %      18.04 mL/dL        Cardiac Output Phase: Baseline        Time TDCO TDCI Tj  C.O. Tj C.I. Tj HR   Cardiac Output Results  3:28 PM 3.8 L/min    1.79 L/min/m2    4.41 L/min    2.08 L/min/m2              Echo 12/20/2019  Global and regional left ventricular function is normal with an EF of 55-60%.  Global right ventricular function is normal.  IVC diameter <2.1 cm collapsing >50% with sniff suggests a normal RA pressure  of 3 mmHg.  No pericardial effusion is present.  Mild pulmonary hypertension is present.  No change from prior.     Echo 5/2020  Left ventricular systolic function is mildly reduced.  The visual ejection fraction is estimated at 45-50%.  Right ventricular systolic pressure is elevated, consistent with moderate  pulmonary hypertension.  The right ventricular systolic function is normal.  The right ventricle is normal size.  Compared to recent ALEJANDRA, EF again mildly reduced. RVSP increased compared to  prior TTE from 12/19.    Device Interrogation today  Normal ICD function. 17 NSVT episodes recorded since last remote transmission on 10/17/2020 - 3 - 14 sec, 140-176 bpm. 3 AT/AF episodes recorded since 10/17/2020 - 3-5 seconds. Intrinsic rhythm = NSR with 1st degree AVB @ 76 bpm. AP = 30%.  = <1%. Estimated battery longevity to CARMEN = 3.5 years. Lead trends appear stable.      Assessment and Plan:   65 year old male with history of HCM without obstruction (dx 2006, EF 20% improved to 60%), VT s/p ICD 2012, nonobstructive CAD (cath 2013), AF s/p PVI X 3 (2011, 2016, 2018) and multiple DCCV and recurrent ablations presents for ongoing evaluation and management.     # Hypertrophic cardiomyopathy with no evidence of LVOT obstruction.-- previously burnt out with EF 20% ('13) recovered (EF 60%) but last echo after recent ablation revealed reduction in LVEF 45-50%.   - NYHA class 3, HF stage C  - Compensated and euvolemic on exam, thus continue eplerenone 50mg daily and lasix to 40mg qam and 20mg qpm.  - continue metoprolol XL 50mg daily   - ACEI/ARB/ARNI: not indicated given LVEF >  "40%  - pt aware of exercise restrictions and family screening recommendations    #Non-sustained VT:  - Likely related to HCM. Patient denies any symptoms. Longest run was 14 seconds. Continue metoprolol xl  - followed by EP     # Nonobstructive CAD -- 10-30% stenoses on cath '13; unremarkable coronary CT \"19  - coronary CTA confirmed no significant disease  - continue atorvastatin and metoprolol XL     # HTN -- controlled  - continue metoprolol XL and eplerenone      # Atrial arrhythmias s/p recent ablation   - continue Xarelto  - continue dofetilide 250mcg  BID   - continue Metoprolol 50 XL daily   - followed by EP     Follow-up: April 2021 with labs prior. Will be happy to see sooner if change in clinical status or new questions/concerns arise.      Mona Ware MD  Section Head - Advanced Heart Failure, Transplantation and Mechanical Circulatory Support  Director - [facility]  Associate Professor of Medicine, [facility]          "

## 2020-12-22 NOTE — LETTER
12/22/2020      RE: Stu Meredith  [address] [facility] [address]       Dear Colleague,    Thank you for the opportunity to participate in the care of your patient, Stu Meredith, at the [facility]. Please see a copy of my visit note below.    HPI:   65 year old male with history of HCM without obstruction (dx 2006, EF 20% improved to 45-50%), VT s/p ICD 2012, nonobstructive CAD (cath 2013), AF s/p PVI X 3 (2011, 2016, 2018) and multiple DCCV and repeated ablations presents for ongoing evaluation and management.  Today patient reports that he has been feeling fairly well since last visit.  He reports that overall he continues to feel better with maintenance of since rhythm but still has some exercise intolerance and yanes.  He denies any chest pain or pressure, sob, orthopnea, pnd, palpitations, syncope/presyncope or vamsi.  He continues to have some episodic abdominal distention/fullness and early satiety.    He denies any problems with his medications and reports compliance.       PAST MEDICAL HISTORY:  Past Medical History:   Diagnosis Date     Atrial fibrillation (H) 12/9/11    failed medication, multiple DC cardioveresions; s/p Left atrial ablation to eliminate atrial fibrillation 12/9/11     Chest pain      CHF (congestive heart failure) (H) 7/30/2016     Coronary artery disease      DJD (degenerative joint disease)      Fatigue 7/15/2019     Hip arthritis 1/15/2014     Hypertension      Hypertrophic cardiomyopathy (H) 10/09     Other abnormal heart sounds      Pacemaker     ICD     Palpitations      Pneumonia, organism unspecified(486)      Prediabetes 7/10/15    A1C 6.5     Status post implantation of automatic cardioverter/defibrillator (AICD)      Type 2 diabetes mellitus without complication, without long-term current use of insulin (H) 7/13/2020     Ventricular tachycardia (H)        CURRENT MEDICATIONS:  Current  Outpatient Medications   Medication Sig Dispense Refill     acetaminophen (TYLENOL) 325 MG tablet Take 325-650 mg by mouth every 6 hours as needed       amoxicillin (AMOXIL) 500 MG tablet Take 4 tablets (2000 mg) by mouth 1 hour before dental procedures. 12 tablet 3     atorvastatin (LIPITOR) 20 MG tablet Take 1 tablet (20 mg) by mouth daily 90 tablet 3     cyanocobalamin (VITAMIN B-12) 100 MCG tablet Take 100 mcg by mouth daily       dofetilide (TIKOSYN) 250 MCG capsule Take 1 capsule (250 mcg) by mouth 2 times daily 180 capsule 3     eplerenone (INSPRA) 50 MG tablet TAKE ONE TABLET BY MOUTH ONCE DAILY 90 tablet 2     fluticasone-salmeterol (AIRDUO RESPICLICK) 113-14 MCG/ACT inhaler Inhale 1 puff into the lungs 2 times daily 2 Inhaler 3     furosemide (LASIX) 20 MG tablet Take 1 tablet (20 mg) by mouth daily In PM 90 tablet 3     furosemide (LASIX) 40 MG tablet Take 1 tablet (40 mg) by mouth daily In AM 90 tablet 3     metFORMIN (GLUCOPHAGE-XR) 500 MG 24 hr tablet Take 2 tablets (1,000 mg) by mouth daily (with dinner) Take 2 tablets (1,000 mg) daily (with dinner). 180 tablet 3     metoprolol succinate ER (TOPROL-XL) 50 MG 24 hr tablet Take 1 tablet (50 mg) by mouth daily 90 tablet 3     multivitamin, therapeutic (THERA-VIT) TABS Take 1 tablet by mouth daily       XARELTO ANTICOAGULANT 20 MG TABS tablet TAKE ONE TABLET BY MOUTH EVERY DAY WITH DINNER 90 tablet 3     zolpidem (AMBIEN) 10 MG tablet TAKE HALF TABLET BY MOUTH  NIGHTLY AS NEEDED FOR SLEEP 90 tablet 0     gabapentin (NEURONTIN) 300 MG capsule Take 2 capsules (600 mg) by mouth 2 times daily 360 capsule 3     sildenafil (VIAGRA) 100 MG tablet Take 0.5-1 tablets ( mg) by mouth daily as needed (take 1 hour before intercourse) 30 tablet 12       PAST SURGICAL HISTORY:  Past Surgical History:   Procedure Laterality Date     ANESTHESIA CARDIOVERSION  4/24/2014    Procedure: ANESTHESIA CARDIOVERSION;  Surgeon: Generic Anesthesia Provider;  Location:  OR      ANESTHESIA CARDIOVERSION N/A 5/12/2016    Procedure: ANESTHESIA CARDIOVERSION;  Surgeon: GENERIC ANESTHESIA PROVIDER;  [facility]: [facility]     ANESTHESIA CARDIOVERSION N/A 8/7/2017    Procedure: ANESTHESIA CARDIOVERSION;  Anesthesia Offsite Coverage Cardioversion @1100;  Surgeon: GENERIC ANESTHESIA PROVIDER;  [facility]     ANESTHESIA CARDIOVERSION N/A 1/3/2018    Procedure: ANESTHESIA CARDIOVERSION;  Anesthesia Cardioverion;  Surgeon: GENERIC ANESTHESIA PROVIDER;  [facility]     ANESTHESIA CARDIOVERSION N/A 5/4/2018    Procedure: ANESTHESIA CARDIOVERSION;  Anesthesia Coverage Cardioversion @1400;  Surgeon: GENERIC ANESTHESIA PROVIDER;  [facility]     ANESTHESIA CARDIOVERSION N/A 9/27/2018    Procedure: ANESTHESIA CARDIOVERSION;  Anesthesia Cardioversion @0930;  Surgeon: GENERIC ANESTHESIA PROVIDER;  [facility]     ANESTHESIA CARDIOVERSION N/A 12/20/2018    Procedure: Anesthesia Coverage Cardioversion @0830;  Surgeon: GENERIC ANESTHESIA PROVIDER;  [facility]     ANESTHESIA CARDIOVERSION N/A 8/21/2019    Procedure: Anesthesia Coverage Cardioversion @0800;  Surgeon: GENERIC ANESTHESIA PROVIDER;  [facility]     ANESTHESIA CARDIOVERSION N/A 1/16/2020    Procedure: ANESTHESIA, FOR CARDIOVERSION;  Surgeon: GENERIC ANESTHESIA PROVIDER;  [facility]     ARTHROPLASTY HIP  1/15/2014    Procedure: ARTHROPLASTY HIP;  Left Total Hip Arthroplasty;  Surgeon: Nelson Gaspar MD;  Location: [facility] OR     ARTHROPLASTY HIP Right 6/5/2019    Procedure: Right Total Hip Arthroplasty;  Surgeon: Nelson Gaspar MD;  Location: [facility] OR     CARDIAC SURGERY       COLONOSCOPY N/A 5/18/2018    Procedure: COMBINED COLONOSCOPY, SINGLE OR MULTIPLE BIOPSY/POLYPECTOMY BY BIOPSY;  COLONOSCOPY (PT HAS DEFIBRILLATOR) ;  Surgeon: Mike Nickerson MD;  Location:  GI     CV RIGHT HEART CATH MEASUREMENTS RECORDED N/A 8/19/2019    Procedure: CV RIGHT HEART CATH;  Surgeon: Cisco Rausch MD;  Location:  HEART CARDIAC CATH  "LAB     CV RIGHT HEART CATH MEASUREMENTS RECORDED N/A 2019    Procedure: Right Heart Cath;  Surgeon: Ciaran Burton MD;  [facility] CARDIAC CATH LAB     EP ABLATION FOCAL AFIB N/A 2020    Procedure: EP ABLATION FOCAL AFIB;  Surgeon: Erik Cooper MD;  [facility] CARDIAC CATH LAB     H ABLATION FOCAL AFIB  11    Left atrial ablation to eliminate atrial fibrillation     IMPLANT AUTOMATIC IMPLANTABLE CARDIOVERTER DEFIBRILLATOR  12    AICD implantation     TONSILLECTOMY  1964       ALLERGIES:   No Known Allergies    FAMILY HISTORY:  Family History   Problem Relation Age of Onset     Heart Disease Mother         unknown     Obesity Mother      Gastrointestinal Disease Mother         diverticulitis     Diabetes Maternal Grandmother      Diabetes Paternal Grandmother      Cerebrovascular Disease Paternal Grandmother 94     Diabetes Paternal Grandfather      Cerebrovascular Disease Paternal Grandfather 78     Diabetes Son      C.A.D. Father         CABG age 78     Heart Disease Father         CABG x5     Diabetes Maternal Grandfather      LUNG DISEASE No family hx of      Deep Vein Thrombosis (DVT) No family hx of      Anesthesia Reaction No family hx of      Melanoma No family hx of      Skin Cancer No family hx of        SOCIAL HISTORY:  Social History     Tobacco Use     Smoking status: Former Smoker     Packs/day: 1.00     Years: 40.00     Pack years: 40.00     Types: Cigarettes     Start date: 1975     Quit date: 2015     Years since quittin.1     Smokeless tobacco: Never Used   Substance Use Topics     Alcohol use: Yes     Alcohol/week: 0.0 standard drinks     Comment: very minimal     Drug use: No       ROS:   A comprehensive 14 point review of systems is negative other than as mentioned in HPI.    Exam:  /72 (BP Location: Right arm, Patient Position: Chair, Cuff Size: Adult Large)   Pulse 60   Ht 1.803 m (5' 11\")   Wt 98.9 kg (218 lb)   SpO2 96%   " BMI 30.40 kg/m    GENERAL APPEARANCE: healthy, alert and no distress  EYES: no icterus, no xanthelasmas  ENT: normal palate, mucosa moist, no central cyanosis  NECK: supple, 2+ carotids  RESPIRATORY: lungs clear to auscultation bilaterally  CARDIOVASCULAR: regular rhythm, normal S1 and S2, no S3 or S4 and no murmur, click or rub.  JVD 8-9cm  GI: soft, non tender, bowel sounds normal,no abdominal bruits  EXTREMITIES: no edema,   NEURO: alert and oriented to person/place/time, normal speech, gait and affect  VASC: Radial, dorsalis pedis pulses 2+ bilaterally.  PSYCH: cooperative, affect appropriate.     Labs:      CPX Echo 6/1/2018:  Interpretation Summary  Submaximal treadmill stress test in a patient with a diagnosis of HCM.  The Ramirez treadmill score is 8 (low risk).  Exercise was stopped due to fatigue.  Normal blood pressure response to exercise.  No ECG evidence of ischemia.  No supraventricular or ventricular arrhythmias. Frequent PVCs noted throughout the study.  Normal biventricular function with mild asymmetrical septal hypertrophy (12mm).  No dynamic outflow tract obstruction is present at rest or with exercise.  Normal segmental wall motion at rest and with exercise.  Minimal augmentation in LV function with exercise.  Average functional capacity for age.           CPX 2/15/2019:          CTA coronary angiogram 5/28/19  IMPRESSION:  1.  No evidence of coronary artery atherosclerosis or stenosis.  2.  Myocardial bridging involving the mid LAD.  3.  Total Agatston score 0 placing the patient in the lowest percentile when compared to age and gender matched control group.     Right heart cath 8/21/19    Time Systolic Diastolic Mean A Wave V Wave EDP Max dp/dt HR   RA Pressures  3:38 PM     12 mmHg    16 mmHg    14 mmHg        61 bpm      RV Pressures  3:38 PM 60 mmHg            16 mmHg      106 bpm      PA Pressures  3:41 PM 60 mmHg    22 mmHg    38 mmHg            69 bpm      PCW Pressures  3:39 PM     30 mmHg     28 mmHg    38 mmHg        74 bpm           Time Hb SAT(%) PO2 Content PA Sat   PA  3:28 PM   63.6 %        63.6 %      Art  3:28 PM   99 %      18.04 mL/dL        Cardiac Output Phase: Baseline        Time TDCO TDCI Tj C.O. Tj C.I. Tj HR   Cardiac Output Results  3:28 PM 3.8 L/min    1.79 L/min/m2    4.41 L/min    2.08 L/min/m2              Echo 12/20/2019  Global and regional left ventricular function is normal with an EF of 55-60%.  Global right ventricular function is normal.  IVC diameter <2.1 cm collapsing >50% with sniff suggests a normal RA pressure  of 3 mmHg.  No pericardial effusion is present.  Mild pulmonary hypertension is present.  No change from prior.     Echo 5/2020  Left ventricular systolic function is mildly reduced.  The visual ejection fraction is estimated at 45-50%.  Right ventricular systolic pressure is elevated, consistent with moderate  pulmonary hypertension.  The right ventricular systolic function is normal.  The right ventricle is normal size.  Compared to recent ALEJANDRA, EF again mildly reduced. RVSP increased compared to  prior TTE from 12/19.    Device Interrogation today  Normal ICD function. 17 NSVT episodes recorded since last remote transmission on 10/17/2020 - 3 - 14 sec, 140-176 bpm. 3 AT/AF episodes recorded since 10/17/2020 - 3-5 seconds. Intrinsic rhythm = NSR with 1st degree AVB @ 76 bpm. AP = 30%.  = <1%. Estimated battery longevity to CARMEN = 3.5 years. Lead trends appear stable.      Assessment and Plan:   65 year old male with history of HCM without obstruction (dx 2006, EF 20% improved to 60%), VT s/p ICD 2012, nonobstructive CAD (cath 2013), AF s/p PVI X 3 (2011, 2016, 2018) and multiple DCCV and recurrent ablations presents for ongoing evaluation and management.     # Hypertrophic cardiomyopathy with no evidence of LVOT obstruction.-- previously burnt out with EF 20% ('13) recovered (EF 60%) but last echo after recent ablation revealed reduction in LVEF  "45-50%.   - NYHA class 3, HF stage C  - Compensated and euvolemic on exam, thus continue eplerenone 50mg daily and lasix to 40mg qam and 20mg qpm.  - continue metoprolol XL 50mg daily   - ACEI/ARB/ARNI: not indicated given LVEF > 40%  - pt aware of exercise restrictions and family screening recommendations    #Non-sustained VT:  - Likely related to HCM. Patient denies any symptoms. Longest run was 14 seconds. Continue metoprolol xl  - followed by EP     # Nonobstructive CAD -- 10-30% stenoses on cath '13; unremarkable coronary CT \"19  - coronary CTA confirmed no significant disease  - continue atorvastatin and metoprolol XL     # HTN -- controlled  - continue metoprolol XL and eplerenone      # Atrial arrhythmias s/p recent ablation   - continue Xarelto  - continue dofetilide 250mcg  BID   - continue Metoprolol 50 XL daily   - followed by EP     Follow-up: April 2021 with labs prior. Will be happy to see sooner if change in clinical status or new questions/concerns arise.      Mona Ware MD  Section Head - Advanced Heart Failure, Transplantation and Mechanical Circulatory Support  Director - Adult Congenital and Cardiovascular Genetics Center  Associate Professor of Medicine, [facility]              Please do not hesitate to contact me if you have any questions/concerns.     Sincerely,     Mona Ware MD    "

## 2020-12-22 NOTE — NURSING NOTE
Diet: Patient instructed regarding a heart healthy diet, including discussion of reduced fat and sodium intake. Patient demonstrated understanding of this information and agreed to call with further questions or concerns.    Labs: Patient was given results of the laboratory testing obtained today. Patient was instructed to return for the next laboratory testing in April 2021. Patient demonstrated understanding of this information and agreed to call with further questions or concerns.     Med Reconcile: Reviewed and verified all current medications with the patient. The updated medication list was printed and given to the patient.    Return Appointment: Patient given instructions regarding scheduling next clinic visit. Patient demonstrated understanding of this information and agreed to call with further questions or concerns.    Patient stated he understood all health information given and agreed to call with further questions or concerns.    Dimple Birch, MSN, RN, CNL  Cardiology Care Coordinator  [facility]  [phone]

## 2020-12-22 NOTE — PATIENT INSTRUCTIONS
You were seen today in the Adult Congenital and Cardiovascular Genetics Clinic at the [facility].    Cardiology Providers you saw during your visit:  Mona Ware MD    Diagnosis:  HCM    Results:  No testing today.    Recommendations:    1. Continue to eat a heart healthy, low salt diet.  2. Continue to get 20-30 minutes of aerobic activity, 4-5 days per week.  Examples of aerobic activity include walking, running, swimming, cycling, etc.  3. Continue to observe good oral hygiene, with regular dental visits.  4. No changes today.    SBE prophylaxis:   Yes____  No__X__    Lifelong Bacterial Endocarditis Prophylaxis:  YES____  NO____    If YES is checked, follow the recommendations outlined below:  1. Take antibiotic(s) prior to recommended dental procedures and procedures on the respiratory tract or with infected skin, muscle or bones. SBE prophylaxis is not needed for routine GI and  procedures (ie. Colonoscopy or vaginal delivery)  2. Observe good oral hygiene daily, as advised by your dentist. Get regular professional dental care.  3. Keep cuts clean.  4. Infections should be treated promptly.  5. Symptoms of Infective Endocarditis could include: fever lasting more than 4-5 days or a recurrent fever that initially resolves but returns within 1-2 days)      Exercise restrictions:   Yes__X__  No____         If yes, list restrictions:  Must be allowed to rest if fatigued or SOB      Work restrictions:  Yes____  No_X___         If yes, list restrictions:    FASTING CHOLESTEROL was checked in the last 5 years YES_X__  NO___ (2019)  Continue to eat a heart healthy, low salt diet.         ____ Fasting lipid panel order today         ____ No changes in medications          ____ I recommend the following changes in your cholesterol medications.:          ____ Please follow up for cholesterol screening at your primary care physician      Follow-up:  Follow up in April 2021 with Dr. Ware with labs prior.      If you have questions or concerns please contact us at:    Dimple Birch, MSN, RN, CNL    Radhika Mitchell (Scheduling)  Nurse Care Coordinator     Clinic   Adult Congenital and CV Genetics   Adult Congenital and CV Genetic  [facility]   [facility]  (P) [phone]     (P) [phone]  [email or web address]   (F) [phone]        For after hours urgent needs, call [phone] and ask to speak to the Adult Congenital Physician on call.  Mention Job Code 0401.    For emergencies call 911.    [facility]  Mail Code [id number]  [address] [place]  [address]

## 2020-12-23 ENCOUNTER — VIRTUAL VISIT (OUTPATIENT)
Dept: SURGERY | Facility: CLINIC | Age: 65
End: 2020-12-23
Attending: CLINICAL NURSE SPECIALIST
Payer: COMMERCIAL

## 2020-12-23 DIAGNOSIS — R91.8 LUNG NODULES: Primary | ICD-10-CM

## 2020-12-23 LAB — COPATH REPORT: NORMAL

## 2020-12-23 PROCEDURE — 99213 OFFICE O/P EST LOW 20 MIN: CPT | Mod: 95 | Performed by: CLINICAL NURSE SPECIALIST

## 2020-12-23 NOTE — LETTER
"    12/23/2020         RE: Stu Meredith  [address] [facility] [address]        Dear Colleague,    Thank you for referring your patient, Stu Meredith, to the [facility]. Please see a copy of my visit note below.    Stu Meredith is a 65 year old male who is being evaluated via a billable telephone visit.      The patient has been notified of following:     \"This telephone visit will be conducted via a call between you and your physician/provider. We have found that certain health care needs can be provided without the need for a physical exam.  This service lets us provide the care you need with a short phone conversation.  If a prescription is necessary we can send it directly to your pharmacy.  If lab work is needed we can place an order for that and you can then stop by our lab to have the test done at a later time.    Telephone visits are billed at different rates depending on your insurance coverage. During this emergency period, for some insurers they may be billed the same as an in-person visit.  Please reach out to your insurance provider with any questions.    If during the course of the call the physician/provider feels a telephone visit is not appropriate, you will not be charged for this service.\"    Patient has given verbal consent for Telephone visit?  Yes    What phone number would you like to be contacted at? [phone]    How would you like to obtain your AVS? Derian Cardenas [facility]      THORACIC SURGERY FOLLOW UP VISIT    Dear Dr. Julien,  I spoke by telephone to . Stu Meredith in follow-up today. The clinical summary follows:     PREOP DIAGNOSIS   Abnormal lung screening CT, nodules  INTERVAL STUDIES  Chest CT 12/22/2020  IMPRESSION:   1. Unchanged pulmonary nodules compared to prior exam. This would  correlate to a lung rads category 2. Recommend return to annual  screening CT.  2. Mild diffuse hepatic " steatosis.     SUBJECTIVE  I have been dealing with some cardiac dysrhythmias, having multiple ablations for AF.   He states the last one was 7 months ago and he has remained in a sinus rhythm.  In addition, he has had fluid overload/CHF causing some shortness of breath and remains on diuretics.     He denies other respiratory illness, fever, chills.    IMPRESSION (R91.8) Lung nodules      Haroon is a 64 yo male whom I am seeing for a 6 month follow-up after an abnormal lung cancer screening CT.       Tobacco history:   Smoked 1 ppd x 35 years, quit 2010.    PLAN  I spent a total of 30 minutes with Mr. Stu Meredith, more than 50% of which were spent in counseling, coordination of care, and face-to-face time. I reviewed the plan as follows:  Continue with Lung Cancer Screening CT in 1 year and follow-up with Dr. Andreia Julien.  Necessary Tests & Appointments: LCSCT in 1 year  Pain Control Plan: N/A  Anticoagulation Plan: N/A  Smoking Cessation: N/A  All questions were answered and the patient and present family were in agreement with the plan.  I appreciate the opportunity to participate in the care of your patient and will keep you updated.  Sincerely,  KARSON Finney, CNS

## 2020-12-23 NOTE — PROGRESS NOTES
"Stu Meredith is a 65 year old male who is being evaluated via a billable telephone visit.      The patient has been notified of following:     \"This telephone visit will be conducted via a call between you and your physician/provider. We have found that certain health care needs can be provided without the need for a physical exam.  This service lets us provide the care you need with a short phone conversation.  If a prescription is necessary we can send it directly to your pharmacy.  If lab work is needed we can place an order for that and you can then stop by our lab to have the test done at a later time.    Telephone visits are billed at different rates depending on your insurance coverage. During this emergency period, for some insurers they may be billed the same as an in-person visit.  Please reach out to your insurance provider with any questions.    If during the course of the call the physician/provider feels a telephone visit is not appropriate, you will not be charged for this service.\"    Patient has given verbal consent for Telephone visit?  Yes    What phone number would you like to be contacted at? [phone]    How would you like to obtain your AVS? Derian Cardenas [facility]      THORACIC SURGERY FOLLOW UP VISIT    Dear Dr. Julien,  I spoke by telephone to Mr. Stu Meredith in follow-up today. The clinical summary follows:     PREOP DIAGNOSIS   Abnormal lung screening CT, nodules  INTERVAL STUDIES  Chest CT 12/22/2020  IMPRESSION:   1. Unchanged pulmonary nodules compared to prior exam. This would  correlate to a lung rads category 2. Recommend return to annual  screening CT.  2. Mild diffuse hepatic steatosis.     SUBJECTIVE  I have been dealing with some cardiac dysrhythmias, having multiple ablations for AF.   He states the last one was 7 months ago and he has remained in a sinus rhythm.  In addition, he has had fluid overload/CHF causing some shortness of breath and remains on " diuretics.     He denies other respiratory illness, fever, chills.    IMPRESSION (R91.8) Lung nodules      Haroon is a 64 yo male whom I am seeing for a 6 month follow-up after an abnormal lung cancer screening CT.       Tobacco history:   Smoked 1 ppd x 35 years, quit 2010.    PLAN  I spent a total of 30 minutes with Mr. Stu Meredith, more than 50% of which were spent in counseling, coordination of care, and face-to-face time. I reviewed the plan as follows:  Continue with Lung Cancer Screening CT in 1 year and follow-up with Dr. Andreia Julien.  Necessary Tests & Appointments: LCSCT in 1 year  Pain Control Plan: N/A  Anticoagulation Plan: N/A  Smoking Cessation: N/A  All questions were answered and the patient and present family were in agreement with the plan.  I appreciate the opportunity to participate in the care of your patient and will keep you updated.  Sincerely,  KARSON Finney, CNS

## 2020-12-23 NOTE — PROGRESS NOTES
[facility] Dermatology Note      1.Dermatology Problem List:  1. History of NMSC  -BCC of Left central chest s/p excision 07/17/19,   -BCC left lateral neck, bx MMS 10/20/20  -SCC right forearm, bx 12/21/20,  2. Centrofacial rosacea w/telangectasias  3. Benign skin findings: seborrheic keratoses, dermatofibromas, solar lentigines  4. Lower cutaneous lip solar lentigines  5. Prurigo nodule vs Lichen simplex chronicus left lateral thenar eminence   -s/p cryo 12/20/19, resolved 1/23/2020, repeat 10/12/20, 12/21/20  6. Actinic keratosis right upper forehead, resolved. S/p cryotherapy 8/3/20 and 10/12/20    CC:   Chief Complaint   Patient presents with     Skin Check     Haroon is here today for a skin check. He is concerned about a spot on his right arm and right lower back.         Encounter Date: Dec 21, 2020    History of Present Illness:  Mr. Stu Meredith is a 65 year old male who presents as a follow-up for a skin cancer screening. He was last seen by me for 10/12/20 when he had a biopsy on his left left lateral neck and subsequently had Moh's surgery on 10/20/20 with Dr Choe. He had an actinic keratosis treated with cryotherapy on his right upper forehead and prurigo nodule on his left thumb. He states the AK is gone and the prurigo nodule on his thumb is almost gone. He would like cryotherapy again. He also has a bump on his left thigh that he finds himself picking at. He would like treatment of this as well.   He was last seen 1/23/20 when the prurigo nodule on his left thumb was resolved and the patient was reassured the basal cell excision on his chest had negative margins. He has a few specific spots he would like examined. He defers a full skin check today. He has a spot on his right upper forehead that was treated with cryotherapy 8/3/20 and did not go away. He would like this re-examined.     He has a spot on the left side of his neck that does not heal. It is wet at times. It has  been like that for several months. He has a hard times seeing it. Additionally he has a rough spot near his excision sites on his chest. It does not hurt, bleed or grow.     Lastly, he has an area on his thumb that has been treated with ILK, steroid tape and cryotherapy. It went away with the cryotherapy in December of 2019 and is interested in this treatment again.     He is feeling well, No other skin concerns.     Past Medical History:   Patient Active Problem List   Diagnosis     Hypertrophic cardiomyopathy (H)     Advanced directives, counseling/discussion     Ventricular tachycardia (H)     Automatic implantable cardioverter-defibrillator in situ- [employer], dual chamber- NOT dependent     Prediabetes     S/P ablation of atrial flutter     CHF (congestive heart failure) (H)     Chronic Zolpidem use for insomnia     S/P ablation of atrial fibrillation     Lung nodules     Atrial tachycardia (H)     Encounter for monitoring dofetilide therapy     HTN (hypertension)     Hypertrophic cardiomegaly     SOB (shortness of breath)     Paroxysmal atrial fibrillation (H)     Dyspnea     Type 2 diabetes mellitus without complication, without long-term current use of insulin (H)     Chronic insomnia     Past Medical History:   Diagnosis Date     Atrial fibrillation (H) 12/9/11    failed medication, multiple DC cardioveresions; s/p Left atrial ablation to eliminate atrial fibrillation 12/9/11     Chest pain      CHF (congestive heart failure) (H) 7/30/2016     Coronary artery disease      DJD (degenerative joint disease)      Fatigue 7/15/2019     Hip arthritis 1/15/2014     Hypertension      Hypertrophic cardiomyopathy (H) 10/09     Other abnormal heart sounds      Pacemaker     ICD     Palpitations      Pneumonia, organism unspecified(486)      Prediabetes 7/10/15    A1C 6.5     Status post implantation of automatic cardioverter/defibrillator (AICD)      Type 2 diabetes mellitus without complication, without  long-term current use of insulin (H) 7/13/2020     Ventricular tachycardia (H)      Past Surgical History:   Procedure Laterality Date     ANESTHESIA CARDIOVERSION  4/24/2014    Procedure: ANESTHESIA CARDIOVERSION;  Surgeon: Generic Anesthesia Provider;  Location: [facility] OR     ANESTHESIA CARDIOVERSION N/A 5/12/2016    Procedure: ANESTHESIA CARDIOVERSION;  Surgeon: GENERIC ANESTHESIA PROVIDER;  Location: [facility] OR     ANESTHESIA CARDIOVERSION N/A 8/7/2017    Procedure: ANESTHESIA CARDIOVERSION;  Anesthesia Offsite Coverage Cardioversion @1100;  Surgeon: GENERIC ANESTHESIA PROVIDER;  Location: [facility] OR     ANESTHESIA CARDIOVERSION N/A 1/3/2018    Procedure: ANESTHESIA CARDIOVERSION;  Anesthesia Cardioverion;  Surgeon: GENERIC ANESTHESIA PROVIDER;  Location: [facility] OR     ANESTHESIA CARDIOVERSION N/A 5/4/2018    Procedure: ANESTHESIA CARDIOVERSION;  Anesthesia Coverage Cardioversion @1400;  Surgeon: GENERIC ANESTHESIA PROVIDER;  Location: [facility] OR     ANESTHESIA CARDIOVERSION N/A 9/27/2018    Procedure: ANESTHESIA CARDIOVERSION;  Anesthesia Cardioversion @0930;  Surgeon: GENERIC ANESTHESIA PROVIDER;  Location: [facility]     ANESTHESIA CARDIOVERSION N/A 12/20/2018    Procedure: Anesthesia Coverage Cardioversion @0830;  Surgeon: GENERIC ANESTHESIA PROVIDER;  Location: [facility]     ANESTHESIA CARDIOVERSION N/A 8/21/2019    Procedure: Anesthesia Coverage Cardioversion @0800;  Surgeon: GENERIC ANESTHESIA PROVIDER;  Location: [facility]     ANESTHESIA CARDIOVERSION N/A 1/16/2020    Procedure: ANESTHESIA, FOR CARDIOVERSION;  Surgeon: GENERIC ANESTHESIA PROVIDER;  Location: [facility] OR     ARTHROPLASTY HIP  1/15/2014    Procedure: ARTHROPLASTY HIP;  Left Total Hip Arthroplasty;  Surgeon: Nelson Gaspar MD;  Location: [facility] OR     ARTHROPLASTY HIP Right 6/5/2019    Procedure: Right Total Hip Arthroplasty;  Surgeon: Nelson Gaspar MD;  Location: [facility] OR     CARDIAC SURGERY       COLONOSCOPY N/A 5/18/2018    Procedure: COMBINED COLONOSCOPY, SINGLE OR MULTIPLE  BIOPSY/POLYPECTOMY BY BIOPSY;  COLONOSCOPY (PT HAS DEFIBRILLATOR) ;  Surgeon: Mike Nickerson MD;  Location:  GI     CV RIGHT HEART CATH N/A 8/19/2019    Procedure: CV RIGHT HEART CATH;  Surgeon: Cisco Rausch MD;  Location:  HEART CARDIAC CATH LAB     CV RIGHT HEART CATH N/A 8/21/2019    Procedure: Right Heart Cath;  Surgeon: Ciaran Burton MD;  Location:  HEART CARDIAC CATH LAB     EP ABLATION FOCAL AFIB N/A 5/14/2020    Procedure: EP ABLATION FOCAL AFIB;  Surgeon: Erik Cooper MD;  Location:  HEART CARDIAC CATH LAB     H ABLATION FOCAL AFIB  12/9/11    Left atrial ablation to eliminate atrial fibrillation     IMPLANT AUTOMATIC IMPLANTABLE CARDIOVERTER DEFIBRILLATOR  7/27/12    AICD implantation     TONSILLECTOMY  1964       Social History:  Patient reports that he quit smoking about 5 years ago. His smoking use included cigarettes. He started smoking about 46 years ago. He has a 40.00 pack-year smoking history. He has never used smokeless tobacco. He reports current alcohol use. He reports that he does not use drugs. He enjoys traveling to warm places. He rides motorcycles.   Family History:  Family History   Problem Relation Age of Onset     Heart Disease Mother         unknown     Obesity Mother      Gastrointestinal Disease Mother         diverticulitis     Diabetes Maternal Grandmother      Diabetes Paternal Grandmother      Cerebrovascular Disease Paternal Grandmother 94     Diabetes Paternal Grandfather      Cerebrovascular Disease Paternal Grandfather 78     Diabetes Son      C.A.D. Father         CABG age 78     Heart Disease Father         CABG x5     Diabetes Maternal Grandfather      LUNG DISEASE No family hx of      Deep Vein Thrombosis (DVT) No family hx of      Anesthesia Reaction No family hx of      Melanoma No family hx of      Skin Cancer No family hx of        Medications:  Current Outpatient Medications   Medication Sig Dispense Refill     acetaminophen (TYLENOL) 325 MG tablet  Take 325-650 mg by mouth every 6 hours as needed       amoxicillin (AMOXIL) 500 MG tablet Take 4 tablets (2000 mg) by mouth 1 hour before dental procedures. 12 tablet 3     atorvastatin (LIPITOR) 20 MG tablet Take 1 tablet (20 mg) by mouth daily 90 tablet 3     cyanocobalamin (VITAMIN B-12) 100 MCG tablet Take 100 mcg by mouth daily       dofetilide (TIKOSYN) 250 MCG capsule Take 1 capsule (250 mcg) by mouth 2 times daily 180 capsule 3     eplerenone (INSPRA) 50 MG tablet TAKE ONE TABLET BY MOUTH ONCE DAILY 90 tablet 2     fluticasone-salmeterol (AIRDUO RESPICLICK) 113-14 MCG/ACT inhaler Inhale 1 puff into the lungs 2 times daily 2 Inhaler 3     furosemide (LASIX) 20 MG tablet Take 1 tablet (20 mg) by mouth daily In PM 90 tablet 3     furosemide (LASIX) 40 MG tablet Take 1 tablet (40 mg) by mouth daily In AM 90 tablet 3     gabapentin (NEURONTIN) 300 MG capsule Take 2 capsules (600 mg) by mouth 2 times daily Schedule appointment in your clinic for review of medication refills. 120 capsule 0     metFORMIN (GLUCOPHAGE-XR) 500 MG 24 hr tablet Take 2 tablets (1,000 mg) by mouth daily (with dinner) Take 2 tablets (1,000 mg) daily (with dinner). 180 tablet 3     metoprolol succinate ER (TOPROL-XL) 50 MG 24 hr tablet Take 1 tablet (50 mg) by mouth daily 90 tablet 3     multivitamin, therapeutic (THERA-VIT) TABS Take 1 tablet by mouth daily       sildenafil (VIAGRA) 100 MG tablet Take 0.5-1 tablets ( mg) by mouth daily as needed (take 1 hour before intercourse) (Patient not taking: Reported on 12/22/2020) 30 tablet 12     XARELTO ANTICOAGULANT 20 MG TABS tablet TAKE ONE TABLET BY MOUTH EVERY DAY WITH DINNER 90 tablet 3     zolpidem (AMBIEN) 10 MG tablet TAKE HALF TABLET BY MOUTH  NIGHTLY AS NEEDED FOR SLEEP 90 tablet 0     No Known Allergies      Review of Systems:  -Skin/Heme New Pt: The patient admits to frequent sun exposure in his past. The patient denies excessive scarring or problems healing except as per HPI.  The patient denies excessive bleeding.  -Constitutional: Otherwise feeling well today, in usual state of health.  -Skin: As above in HPI. No additional skin concerns.    Physical exam:  Vitals: There were no vitals taken for this visit.  GEN: This is a well developed, well-nourished male in no acute distress, in a pleasant mood.    SKIN: Full skin, which includes the head/face, both arms, chest, back, abdomen,both legs, genitalia and/or groin buttocks, digits and/or nails, was examined. Significant for:   -Brock skin type: II. Sun tanned skin.   -There is an erythematous macule with overyling adherent scale on the vertex scalp x 1 and the left forearm x 1.   There is a 5 mm pink scaly tender papule on the right forearm   -There is no erythema, telangectasias, nodularity, or pigmentation on the left central chest. There is a tan waxy stuck on appearing papule adjacent to this scar. .  -There is a faint skin colored linear plaque on the left lateral thenar eminence   -There are a few firm tan/flesh colored papule that dimples with lateral pressure on the extremities with a lichenified papule on the left thigh  -No other lesions of concern on areas examined.     Labs:  None    Impression/Plan:  1. Neoplasm of uncertain behavior on the right forearm. The differential diagnosis includes BCC vs SCC vs other. Will perform a shave biopsy to determine management.   - Shave biopsy:  After discussion of benefits and risks including but not limited to bleeding/bruising, pain/swelling, infection, scar, incomplete removal, nerve damage/numbness, recurrence, and non-diagnostic biopsy, written consent, verbal consent and photographs were obtained. Time-out was performed. The area was cleaned with isopropyl alcohol. 0.5ml of 1% lidocaine with 1:100,000 epinephrine was injected to obtain adequate anesthesia. A shave biopsy was performed. Hemostasis was achieved with aluminium chloride. Vaseline and a sterile dressing were  applied. The patient tolerated the procedure and no complications were noted. The patient was provided with verbal and written post care instructions.  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 He was referred to dermatology surgery for Moh's surgery.     2. Actinic keratosis, vertex scalp and left forearm  - Cryotherapy procedure note: After verbal consent and discussion of risks and benefits including but no limited to dyspigmentation/scar, blister, and pain, one was treated with 1-2mm freeze border for 2 cycles with liquid nitrogen. Post cryotherapy instructions were provided.    Return if these do not resolve.       3. Lichen simplex chronicus on the left lateral thenar eminence, hx of resolution with cryotherapy. Due to clinical symptoms, will perform this again today as not completely gone but significant improvement.    Cryotherapy procedure note (performed by faculty): After verbal consent and discussion of risks and benefits including but no limited to dyspigmentation/scar, blister, infection, recurrence, one was treated with 1-2mm freeze border for 2 cycles with liquid nitrogen. Post cryotherapy instructions were provided.     4. History of nonmelanoma skin cancer, no clincial evidence of recurrence  - Sun precaution was advised including the use of sun screens of SPF 30 or higher, sun protective clothing, and avoidance of tanning beds.      5. Seborrheic keratosis, non irritated on the central chest and trunk and extremities  - Seborrheic keratosis: Discussed benign nature of lesions.    6. Dermatofibromas, extremities with a lichenified one on the left anterior thigh.  -Cryotherapy procedure note (performed by faculty): After verbal consent and discussion of risks and benefits including but no limited to dyspigmentation/scar, blister, infection, recurrence, one was treated with 1-2mm freeze border for 2 cycles with liquid nitrogen. Post cryotherapy  instructions were provided.       CC No referring provider defined for this encounter. on close of this encounter.  Follow-up annually, earlier for new or changing lesions.       Staff Involved:  Staff Only    All risks, benefits and alternatives were discussed with patient.  Patient is in agreement and understands the assessment and plan.  All questions were answered.  Sun Screen Education was given.   Return to Clinic annually or sooner as needed.   Dayna Riley PA-C                     \

## 2020-12-28 LAB
MDC_IDC_LEAD_IMPLANT_DT: NORMAL
MDC_IDC_LEAD_IMPLANT_DT: NORMAL
MDC_IDC_LEAD_LOCATION: NORMAL
MDC_IDC_LEAD_LOCATION: NORMAL
MDC_IDC_LEAD_MFG: NORMAL
MDC_IDC_LEAD_MFG: NORMAL
MDC_IDC_LEAD_MODEL: NORMAL
MDC_IDC_LEAD_MODEL: NORMAL
MDC_IDC_LEAD_POLARITY_TYPE: NORMAL
MDC_IDC_LEAD_POLARITY_TYPE: NORMAL
MDC_IDC_LEAD_SERIAL: NORMAL
MDC_IDC_LEAD_SERIAL: NORMAL
MDC_IDC_MSMT_BATTERY_REMAINING_LONGEVITY: 42 MO
MDC_IDC_MSMT_BATTERY_STATUS: NORMAL
MDC_IDC_MSMT_CAP_CHARGE_DTM: NORMAL
MDC_IDC_MSMT_CAP_CHARGE_ENERGY: 11 J
MDC_IDC_MSMT_CAP_CHARGE_TIME: 1.91 S
MDC_IDC_MSMT_CAP_CHARGE_TIME: 10.74 S
MDC_IDC_MSMT_CAP_CHARGE_TYPE: NORMAL
MDC_IDC_MSMT_CAP_CHARGE_TYPE: NORMAL
MDC_IDC_MSMT_LEADCHNL_RA_IMPEDANCE_VALUE: 702 OHM
MDC_IDC_MSMT_LEADCHNL_RA_PACING_THRESHOLD_AMPLITUDE: 0.5 V
MDC_IDC_MSMT_LEADCHNL_RA_PACING_THRESHOLD_PULSEWIDTH: 0.5 MS
MDC_IDC_MSMT_LEADCHNL_RA_SENSING_INTR_AMPL: 7.5 MV
MDC_IDC_MSMT_LEADCHNL_RV_IMPEDANCE_VALUE: 483 OHM
MDC_IDC_MSMT_LEADCHNL_RV_PACING_THRESHOLD_AMPLITUDE: 0.9 V
MDC_IDC_MSMT_LEADCHNL_RV_PACING_THRESHOLD_PULSEWIDTH: 0.5 MS
MDC_IDC_MSMT_LEADCHNL_RV_SENSING_INTR_AMPL: 22.4 MV
MDC_IDC_PG_IMPLANT_DTM: NORMAL
MDC_IDC_PG_MFG: NORMAL
MDC_IDC_PG_MODEL: NORMAL
MDC_IDC_PG_SERIAL: NORMAL
MDC_IDC_PG_TYPE: NORMAL
MDC_IDC_SESS_CLINIC_NAME: NORMAL
MDC_IDC_SESS_DTM: NORMAL
MDC_IDC_SESS_TYPE: NORMAL
MDC_IDC_SET_BRADY_AT_MODE_SWITCH_MODE: NORMAL
MDC_IDC_SET_BRADY_AT_MODE_SWITCH_RATE: 170 {BEATS}/MIN
MDC_IDC_SET_BRADY_LOWRATE: 60 {BEATS}/MIN
MDC_IDC_SET_BRADY_MAX_SENSOR_RATE: 120 {BEATS}/MIN
MDC_IDC_SET_BRADY_MAX_TRACKING_RATE: 120 {BEATS}/MIN
MDC_IDC_SET_BRADY_MODE: NORMAL
MDC_IDC_SET_BRADY_PAV_DELAY_HIGH: 260 MS
MDC_IDC_SET_BRADY_PAV_DELAY_LOW: 390 MS
MDC_IDC_SET_BRADY_SAV_DELAY_HIGH: 260 MS
MDC_IDC_SET_BRADY_SAV_DELAY_LOW: 390 MS
MDC_IDC_SET_LEADCHNL_RA_PACING_AMPLITUDE: 2.4 V
MDC_IDC_SET_LEADCHNL_RA_PACING_CAPTURE_MODE: NORMAL
MDC_IDC_SET_LEADCHNL_RA_PACING_POLARITY: NORMAL
MDC_IDC_SET_LEADCHNL_RA_PACING_PULSEWIDTH: 0.5 MS
MDC_IDC_SET_LEADCHNL_RA_SENSING_ADAPTATION_MODE: NORMAL
MDC_IDC_SET_LEADCHNL_RA_SENSING_POLARITY: NORMAL
MDC_IDC_SET_LEADCHNL_RA_SENSING_SENSITIVITY: 0.25 MV
MDC_IDC_SET_LEADCHNL_RV_PACING_AMPLITUDE: 2.4 V
MDC_IDC_SET_LEADCHNL_RV_PACING_CAPTURE_MODE: NORMAL
MDC_IDC_SET_LEADCHNL_RV_PACING_POLARITY: NORMAL
MDC_IDC_SET_LEADCHNL_RV_PACING_PULSEWIDTH: 0.5 MS
MDC_IDC_SET_LEADCHNL_RV_SENSING_ADAPTATION_MODE: NORMAL
MDC_IDC_SET_LEADCHNL_RV_SENSING_POLARITY: NORMAL
MDC_IDC_SET_LEADCHNL_RV_SENSING_SENSITIVITY: 0.6 MV
MDC_IDC_SET_ZONE_DETECTION_INTERVAL: 273 MS
MDC_IDC_SET_ZONE_DETECTION_INTERVAL: 333 MS
MDC_IDC_SET_ZONE_DETECTION_INTERVAL: 375 MS
MDC_IDC_SET_ZONE_TYPE: NORMAL
MDC_IDC_SET_ZONE_VENDOR_TYPE: NORMAL
MDC_IDC_STAT_AT_BURDEN_PERCENT: 1 %
MDC_IDC_STAT_BRADY_RA_PERCENT_PACED: 31 %
MDC_IDC_STAT_BRADY_RV_PERCENT_PACED: 1 %
MDC_IDC_STAT_EPISODE_RECENT_COUNT: 0
MDC_IDC_STAT_EPISODE_RECENT_COUNT: 0
MDC_IDC_STAT_EPISODE_RECENT_COUNT: 17
MDC_IDC_STAT_EPISODE_RECENT_COUNT: 3
MDC_IDC_STAT_EPISODE_RECENT_COUNT_DTM_END: NORMAL
MDC_IDC_STAT_EPISODE_RECENT_COUNT_DTM_START: NORMAL
MDC_IDC_STAT_EPISODE_TOTAL_COUNT: 211
MDC_IDC_STAT_EPISODE_TOTAL_COUNT: 215
MDC_IDC_STAT_EPISODE_TOTAL_COUNT: 6692
MDC_IDC_STAT_EPISODE_TOTAL_COUNT_DTM_END: NORMAL
MDC_IDC_STAT_EPISODE_TYPE: NORMAL
MDC_IDC_STAT_EPISODE_VENDOR_TYPE: NORMAL
MDC_IDC_STAT_TACHYTHERAPY_ATP_DELIVERED_RECENT: 0
MDC_IDC_STAT_TACHYTHERAPY_ATP_DELIVERED_TOTAL: 3
MDC_IDC_STAT_TACHYTHERAPY_RECENT_DTM_END: NORMAL
MDC_IDC_STAT_TACHYTHERAPY_RECENT_DTM_START: NORMAL
MDC_IDC_STAT_TACHYTHERAPY_SHOCKS_ABORTED_RECENT: 0
MDC_IDC_STAT_TACHYTHERAPY_SHOCKS_ABORTED_TOTAL: 1
MDC_IDC_STAT_TACHYTHERAPY_SHOCKS_DELIVERED_RECENT: 0
MDC_IDC_STAT_TACHYTHERAPY_SHOCKS_DELIVERED_TOTAL: 1
MDC_IDC_STAT_TACHYTHERAPY_TOTAL_DTM_END: NORMAL

## 2021-01-03 DIAGNOSIS — G62.9 NEUROPATHY: ICD-10-CM

## 2021-01-06 ENCOUNTER — OFFICE VISIT (OUTPATIENT)
Dept: DERMATOLOGY | Facility: CLINIC | Age: 66
End: 2021-01-06
Payer: COMMERCIAL

## 2021-01-06 VITALS — DIASTOLIC BLOOD PRESSURE: 80 MMHG | SYSTOLIC BLOOD PRESSURE: 137 MMHG | HEART RATE: 60 BPM

## 2021-01-06 DIAGNOSIS — D48.5 NEOPLASM OF UNCERTAIN BEHAVIOR OF SKIN: ICD-10-CM

## 2021-01-06 DIAGNOSIS — G62.9 NEUROPATHY: ICD-10-CM

## 2021-01-06 DIAGNOSIS — D04.61 SQUAMOUS CELL CARCINOMA IN SITU (SCCIS) OF SKIN OF RIGHT FOREARM: Primary | ICD-10-CM

## 2021-01-06 PROCEDURE — 12032 INTMD RPR S/A/T/EXT 2.6-7.5: CPT | Mod: XS | Performed by: DERMATOLOGY

## 2021-01-06 PROCEDURE — 88305 TISSUE EXAM BY PATHOLOGIST: CPT | Performed by: DERMATOLOGY

## 2021-01-06 PROCEDURE — 11603 EXC TR-EXT MAL+MARG 2.1-3 CM: CPT | Mod: XS | Performed by: DERMATOLOGY

## 2021-01-06 PROCEDURE — 17110 DESTRUCTION B9 LES UP TO 14: CPT | Mod: GC | Performed by: DERMATOLOGY

## 2021-01-06 RX ORDER — GABAPENTIN 300 MG/1
600 CAPSULE ORAL 2 TIMES DAILY
Qty: 120 CAPSULE | Refills: 0 | Status: SHIPPED | OUTPATIENT
Start: 2021-01-06 | End: 2021-01-06

## 2021-01-06 RX ORDER — GABAPENTIN 300 MG/1
600 CAPSULE ORAL 2 TIMES DAILY
Qty: 360 CAPSULE | Refills: 3 | Status: SHIPPED | OUTPATIENT
Start: 2021-01-06 | End: 2021-04-09

## 2021-01-06 ASSESSMENT — PAIN SCALES - GENERAL: PAINLEVEL: NO PAIN (0)

## 2021-01-06 NOTE — PROGRESS NOTES
DERMATOLOGY EXCISION PROCEDURE NOTE    Dermatology Problem List:  # Hx NMSC  - SCC R, forearm, bx 12/21/20 s/p excision 1/6/21  - BCC left lateral neck, bx MMS 10/20/20  - BCC of Left central chest s/p excision 07/17/19  # Centrofacial rosacea w/telangectasias  # Benign skin findings: SK, DF, solar lentigines  # Lower cutaneous lip solar lentigines   # Prurigo nodule vs lichen simplex chronicus left lateral thenar eminence   - LN2 12/20/19, resolved 1/23/2020, repeat 10/12/20, 12/21/20  # AK, LN2   - R upper forehead  - R forearm    NAME OF PROCEDURE: Excision intermediate layered linear closure  Staff surgeon: Dr. Choe  Resident: Yusuf  Scrub Nurse: Suzy Avelar    PRE-OPERATIVE DIAGNOSIS:  Squamous cell carcinoma  POST-OPERATIVE DIAGNOSIS: same   FINAL EXCISION SIZE(DEFECT SIZE): 2.8 cm, with 4 mm margin   FINAL REPAIR LENGTH: 5.0 cm     INDICATIONS: This patient presented with a 2.8 cm Squamous cell carcinoma of the right forearm. Excision was indicated. We discussed the principles of treatment and most likely complications including scarring, bleeding, infection, incomplete excision, wound dehiscence, pain, nerve damage, and recurrence. Informed consent was obtained and the patient underwent the procedure as follows:    PROCEDURE: The patient was taken to the operative suite. Time-out was performed.  The treatment area was anesthetized with 1% lidocaine and epinephrine (1:100,000). The area was prepped with Chlorhexidine and rinsed with sterile saline and draped with sterile towels. The lesion was delineated and excised down to subcutaneous fat in a fusiform manner. Hemostasis was obtained by electrocoagulation.     REPAIR: An intermediate layered linear closure was selected as the procedure which would maximally preserve both function and cosmesis.    After the excision of the tumor, the area was carefully undermined. Hemostasis was obtained with electrocoagulation.  Closure was oriented so that the wound was  in the patient's natural skin tension lines. The subcutaneous and dermal layers were then closed with 4-0 Monocryl sutures. The epidermis was then carefully approximated along the length of the wound using 5-0 fast absorbing gut sutures.     The final wound length was 5.0 cm. A total of 6 ml of anesthesia was administered for all surgical sites. Estimated blood loss was less than 10 ml for all surgical sites. A sterile pressure dressing was applied and wound care instructions, with a written handout, were given. The [facility] and ambulatory.    Follow-up as needed for wound evaluation.     Anatomic Pathology Results: pending      Patient mentioned two crusted lesions, one on left forearm appeared a few weeks ago, not painful or bleeding, one on right temple that was treated before with liquid nitrogen, lesion on left thigh treated a few weeks ago and has not healed completely yet  Exam reveals pink hyperkeratotic papule on right forearm, rough pink papule on the right temple, resolving brown papule on the left anterior thigh    Cryotherapy procedure note  - location: right upper forehead, left forearm  - number of lesions treated: 2  After verbal consent and discussion of risks and benefits including but no limited to dyspigmentation/scar, blister, and pain, lesions treated with 1-2mm freeze border for 2 cycles with liquid nitrogen, post cryotherapy instructions provided      Staff Involved:    Nat Goldberg MD  Dermatology Resident  [facility]

## 2021-01-06 NOTE — TELEPHONE ENCOUNTER
GABAPENTIN 300MG CAPS      Last Written Prescription Date:  12/16/20  Last Fill Quantity: 120,   # refills: 0  Last Office Visit : 5/4/20  Future Office visit:  None scheduled    Routing refill request to provider for review/approval because:  Drug not on the [facility], [facility] or [facility] refill protocol

## 2021-01-06 NOTE — NURSING NOTE
Chief Complaint   Patient presents with     Derm Problem     SCC right forearm      Suzy Avelar LPN

## 2021-01-06 NOTE — PATIENT INSTRUCTIONS
Wound care    I will experience scar, bleeding, swelling, pain, crusting and redness. I may experience incomplete removal or recurrence. Risks are bleeding, bruising, infection, nerve damage, & large wound. A second procedure may be recommended to obtain the best cosmetic or functional result. A three month office visit with Surgeon is recommended for scar evaluation.     Caring for your skin after surgery    After your surgery, a pressure bandage will be placed over the area that has stitches. This will prevent bleeding. Please follow these instructions over the next 1 to 2 weeks. Following this regimen will help to prevent complications as your wound heals.     For the first 48 hours after your surgery:      Leave the pressure dressing on and keep it dry. If it should come loose, you may re-tape it, but do not take it off.    Relax and take it easy. Do not do any vigorous exercise or heavy lifting. This could cause the wound to bleed.    Post-Operative pain is usually mild. You may take plain or extra-strength Tylenol (acetaminophen) (do not take more than 4,000mg in one day) every 4 hours as needed.     Do not take any medicine that contains aspirin, ibuprofen or motrin unless you have been recommended these by a doctor.      Avoid alcohol and vitamin E as these may increase your tendency to bleed.     You may put an ice pack around the bandaged area for 20 minutes at a time as needed. This may help reduce swelling, bruising, and pain. Make sure the ice pack is waterproof so that the pressure bandage doesn t get wet.    If the wound is on the face try you sleep with your head elevated. Either in a recliner or propped up in bed, this will decrease swelling around the eyes.     You may see a small amount of drainage or blood on your pressure bandage. This is normal. However:  o If drainage or bleeding continues or saturates the bandage, you will need to apply firm pressure over the bandage with a piece of gauze for  15 minutes.  o If bleeding continues after applying pressure for 15 minutes, apply an ice pack to the bandaged area for 15 minutes.  o If bleeding still continues, call our office or go to the nearest emergency room.    Remove pressure dressing 48 hours after surgery on right arm :      Carefully remove the pressure bandage. If it seems sticky or too difficult to get off, you may need to soak it off in the shower.    After the pressure dressing is removed, you may shower and get the wound wet. However, Do Not let the forceful stream of the shower hit the wound directly.    Follow these wound care and dressing change instructions:  o  You may allow water to run over the site. Take a clean wash cloth wet with soapy warm water and gently pat the suture site to help remove any crust or drainage.   o Do Not rub or scrub the site    o After site is clean pat dry and apply a thin layer of Vaseline ointment  over the suture site with a cotton swab.  o Cover the suture site with Telfa (non-stick) dressing. You may tape a piece of gauze over the Telfa for extra protection if you wish.  o Continue the wound care and dressing changes every day until you come back to have your stitches taken out. Or if you have dissolving stitches please continue wound care for one week.   o Dissolving stitches, if you have been told your stitches are dissolving they should dissolve in one week. If the stiches do not dissolve in one week you can use a Qtip with hydrogen peroxide on it and roll it along the suture line to help the stitches dissolve and come out. Please give us a call if stitches are still in after one week.     What to expect:      The first couple of days your wound may be tender and may bleed slightly when doing wound care.    There may be swelling and bruising around the wound, especially if it is near the eyes. For your comfort, you may apply ice or cold compresses to the bruises after your have removed the pressure  bandage.    The area around your wound may be numb for several weeks or even months.    You may experience periodic sharp pain or mild itching around the wound as it heals.     The suture line will look dark pink at first and the edges of the wound will be reddened. This will lighten up each day.      When to call us:      You have bleeding that will not stop after applying pressure and ice.    You have pain that is not controlled with Tylenol (acetaminophen.)    You have signs or symptoms of an infection such as:  o Fever over 100 degrees Fahrenheit  o Redness, warmth or foul-smelling drainage from the wound  o If you have any questions, or are not sure how to take care of the wound.    Phone numbers:    During business hours (M-F 8:00-4:30 p.m.)  [facility]-  [phone] Option 1 appt. desk  [phone]  Option 3 nurse triage line  ---------------------------------------------------------  Evenings/Weekends/Holidays  Hospital - [phone]   TTY for hearing zahxxfdd-[phone]  *Ask  to page dermatologist on-call  Emergency Hmyj-[phone]  TTY for hearing impaired- [phone]      Cryotherapy    What is it?    Use of a very cold liquid, such as liquid nitrogen, to freeze and destroy abnormal skin cells that need to be removed    What should I expect?    Tenderness and redness    A small blister that might grow and fill with dark purple blood. There may be crusting.    More than one treatment may be needed if the lesions do not go away.    How do I care for the treated area?    Gently wash the area with your hands when bathing.    Use a thin layer of Vaseline to help with healing. You may use a Band-Aid.     The area should heal within 7-10 days and may leave behind a pink or lighter color.     Do not use an antibiotic or Neosporin ointment.     You may take acetaminophen (Tylenol) for pain.     Call your Doctor if you have:    Severe pain    Signs of infection  (warmth, redness, cloudy yellow drainage, and or a bad smell)    Questions or concerns    Who should I call with questions?       [facility]: [phone]       [facility]: [phone]       For urgent needs outside of business hours call the [facility] at [phone]        and ask for the dermatology resident on call

## 2021-01-06 NOTE — LETTER
1/6/2021       RE: Stu Meredith  [address] [facility] [address]     Dear Colleague,    Thank you for referring your patient, Stu Meredith, to the [facility] [place] [facility]. Please see a copy of my visit note below.    DERMATOLOGY EXCISION PROCEDURE NOTE    Dermatology Problem List:  # Hx NMSC  - SCC R, forearm, bx 12/21/20 s/p excision 1/6/21  - BCC left lateral neck, bx MMS 10/20/20  - BCC of Left central chest s/p excision 07/17/19  # Centrofacial rosacea w/telangectasias  # Benign skin findings: SK, DF, solar lentigines  # Lower cutaneous lip solar lentigines   # Prurigo nodule vs lichen simplex chronicus left lateral thenar eminence   - LN2 12/20/19, resolved 1/23/2020, repeat 10/12/20, 12/21/20  # AK, LN2   - R upper forehead  - R forearm    NAME OF PROCEDURE: Excision intermediate layered linear closure  Staff surgeon: Dr. Choe  Resident: Yusuf  Scrub Nurse: Suzy Avelar    PRE-OPERATIVE DIAGNOSIS:  Squamous cell carcinoma  POST-OPERATIVE DIAGNOSIS: same   FINAL EXCISION SIZE(DEFECT SIZE): 2.8 cm, with 4 mm margin   FINAL REPAIR LENGTH: 5.0 cm     INDICATIONS: This patient presented with a 2.8 cm Squamous cell carcinoma of the right forearm. Excision was indicated. We discussed the principles of treatment and most likely complications including scarring, bleeding, infection, incomplete excision, wound dehiscence, pain, nerve damage, and recurrence. Informed consent was obtained and the patient underwent the procedure as follows:    PROCEDURE: The patient was taken to the operative suite. Time-out was performed.  The treatment area was anesthetized with 1% lidocaine and epinephrine (1:100,000). The area was prepped with Chlorhexidine and rinsed with sterile saline and draped with sterile towels. The lesion was delineated and excised down to subcutaneous fat in a fusiform manner. Hemostasis was obtained by  electrocoagulation.     REPAIR: An intermediate layered linear closure was selected as the procedure which would maximally preserve both function and cosmesis.    After the excision of the tumor, the area was carefully undermined. Hemostasis was obtained with electrocoagulation.  Closure was oriented so that the wound was in the patient's natural skin tension lines. The subcutaneous and dermal layers were then closed with 4-0 Monocryl sutures. The epidermis was then carefully approximated along the length of the wound using 5-0 fast absorbing gut sutures.     The final wound length was 5.0 cm. A total of 6 ml of anesthesia was administered for all surgical sites. Estimated blood loss was less than 10 ml for all surgical sites. A sterile pressure dressing was applied and wound care instructions, with a written handout, were given. The patient was discharged from the [facility] alert and ambulatory.    Follow-up as needed for wound evaluation.     Anatomic Pathology Results: pending      Patient mentioned two crusted lesions, one on left forearm appeared a few weeks ago, not painful or bleeding, one on right temple that was treated before with liquid nitrogen, lesion on left thigh treated a few weeks ago and has not healed completely yet  Exam reveals pink hyperkeratotic papule on right forearm, rough pink papule on the right temple, resolving brown papule on the left anterior thigh    Cryotherapy procedure note  - location: right upper forehead, left forearm  - number of lesions treated: 2  After verbal consent and discussion of risks and benefits including but no limited to dyspigmentation/scar, blister, and pain, lesions treated with 1-2mm freeze border for 2 cycles with liquid nitrogen, post cryotherapy instructions provided      Staff Involved:    Nat Goldberg MD  Dermatology Resident  [facility]                    Attestation signed by Sourav Choe MD at 1/13/2021  9:35 AM:    Attending  attestation:  I was present for key elements of the procedure and immediately available for all other portions of the procedure.  I have reviewed the note and edited it as necessary.    Sourav Choe M.D.  Professor  Director of Dermatologic Surgery  Department of Dermatology  [facility]    Dermatology Surgery Clinic  [facility] [place] [phone]

## 2021-01-10 LAB — COPATH REPORT: NORMAL

## 2021-01-12 ENCOUNTER — CARE COORDINATION (OUTPATIENT)
Dept: CARDIOLOGY | Facility: CLINIC | Age: 66
End: 2021-01-12

## 2021-01-24 NOTE — PROGRESS NOTES
HPI:   65 year old male with history of HCM without obstruction (dx 2006, EF 20% improved to 45-50%), VT s/p ICD 2012, nonobstructive CAD (cath 2013), AF s/p PVI X 3 (2011, 2016, 2018) and multiple DCCV and repeated ablations presents for ongoing evaluation and management.  Today patient reports that he has been feeling fairly well since last visit.  He reports that overall he continues to feel better with maintenance of since rhythm but still has some exercise intolerance and yanes.  He denies any chest pain or pressure, sob, orthopnea, pnd, palpitations, syncope/presyncope or vamsi.  He continues to have some episodic abdominal distention/fullness and early satiety.    He denies any problems with his medications and reports compliance.       PAST MEDICAL HISTORY:  Past Medical History:   Diagnosis Date     Atrial fibrillation (H) 12/9/11    failed medication, multiple DC cardioveresions; s/p Left atrial ablation to eliminate atrial fibrillation 12/9/11     Chest pain      CHF (congestive heart failure) (H) 7/30/2016     Coronary artery disease      DJD (degenerative joint disease)      Fatigue 7/15/2019     Hip arthritis 1/15/2014     Hypertension      Hypertrophic cardiomyopathy (H) 10/09     Other abnormal heart sounds      Pacemaker     ICD     Palpitations      Pneumonia, organism unspecified(486)      Prediabetes 7/10/15    A1C 6.5     Status post implantation of automatic cardioverter/defibrillator (AICD)      Type 2 diabetes mellitus without complication, without long-term current use of insulin (H) 7/13/2020     Ventricular tachycardia (H)        CURRENT MEDICATIONS:  Current Outpatient Medications   Medication Sig Dispense Refill     acetaminophen (TYLENOL) 325 MG tablet Take 325-650 mg by mouth every 6 hours as needed       amoxicillin (AMOXIL) 500 MG tablet Take 4 tablets (2000 mg) by mouth 1 hour before dental procedures. 12 tablet 3     atorvastatin (LIPITOR) 20 MG tablet Take 1 tablet (20 mg) by mouth  daily 90 tablet 3     cyanocobalamin (VITAMIN B-12) 100 MCG tablet Take 100 mcg by mouth daily       dofetilide (TIKOSYN) 250 MCG capsule Take 1 capsule (250 mcg) by mouth 2 times daily 180 capsule 3     eplerenone (INSPRA) 50 MG tablet TAKE ONE TABLET BY MOUTH ONCE DAILY 90 tablet 2     fluticasone-salmeterol (AIRDUO RESPICLICK) 113-14 MCG/ACT inhaler Inhale 1 puff into the lungs 2 times daily 2 Inhaler 3     furosemide (LASIX) 20 MG tablet Take 1 tablet (20 mg) by mouth daily In PM 90 tablet 3     furosemide (LASIX) 40 MG tablet Take 1 tablet (40 mg) by mouth daily In AM 90 tablet 3     metFORMIN (GLUCOPHAGE-XR) 500 MG 24 hr tablet Take 2 tablets (1,000 mg) by mouth daily (with dinner) Take 2 tablets (1,000 mg) daily (with dinner). 180 tablet 3     metoprolol succinate ER (TOPROL-XL) 50 MG 24 hr tablet Take 1 tablet (50 mg) by mouth daily 90 tablet 3     multivitamin, therapeutic (THERA-VIT) TABS Take 1 tablet by mouth daily       XARELTO ANTICOAGULANT 20 MG TABS tablet TAKE ONE TABLET BY MOUTH EVERY DAY WITH DINNER 90 tablet 3     zolpidem (AMBIEN) 10 MG tablet TAKE HALF TABLET BY MOUTH  NIGHTLY AS NEEDED FOR SLEEP 90 tablet 0     gabapentin (NEURONTIN) 300 MG capsule Take 2 capsules (600 mg) by mouth 2 times daily 360 capsule 3     sildenafil (VIAGRA) 100 MG tablet Take 0.5-1 tablets ( mg) by mouth daily as needed (take 1 hour before intercourse) 30 tablet 12       PAST SURGICAL HISTORY:  Past Surgical History:   Procedure Laterality Date     ANESTHESIA CARDIOVERSION  4/24/2014    Procedure: ANESTHESIA CARDIOVERSION;  Surgeon: Generic Anesthesia Provider;  [facility]: [facility]     ANESTHESIA CARDIOVERSION N/A 5/12/2016    Procedure: ANESTHESIA CARDIOVERSION;  Surgeon: GENERIC ANESTHESIA PROVIDER;  [facility]     ANESTHESIA CARDIOVERSION N/A 8/7/2017    Procedure: ANESTHESIA CARDIOVERSION;  Anesthesia Offsite Coverage Cardioversion @1100;  Surgeon: GENERIC ANESTHESIA PROVIDER;  Location:  OR      ANESTHESIA CARDIOVERSION N/A 1/3/2018    Procedure: ANESTHESIA CARDIOVERSION;  Anesthesia Cardioverion;  Surgeon: GENERIC ANESTHESIA PROVIDER;  [facility]     ANESTHESIA CARDIOVERSION N/A 5/4/2018    Procedure: ANESTHESIA CARDIOVERSION;  Anesthesia Coverage Cardioversion @1400;  Surgeon: GENERIC ANESTHESIA PROVIDER;  [facility]: [facility]     ANESTHESIA CARDIOVERSION N/A 9/27/2018    Procedure: ANESTHESIA CARDIOVERSION;  Anesthesia Cardioversion @0930;  Surgeon: GENERIC ANESTHESIA PROVIDER;  [facility]: [facility]     ANESTHESIA CARDIOVERSION N/A 12/20/2018    Procedure: Anesthesia Coverage Cardioversion @0830;  Surgeon: GENERIC ANESTHESIA PROVIDER;  [facility]: [facility]     ANESTHESIA CARDIOVERSION N/A 8/21/2019    Procedure: Anesthesia Coverage Cardioversion @0800;  Surgeon: GENERIC ANESTHESIA PROVIDER;  [facility]: [facility]     ANESTHESIA CARDIOVERSION N/A 1/16/2020    Procedure: ANESTHESIA, FOR CARDIOVERSION;  Surgeon: GENERIC ANESTHESIA PROVIDER;  Location: [facility]     ARTHROPLASTY HIP  1/15/2014    Procedure: ARTHROPLASTY HIP;  Left Total Hip Arthroplasty;  Surgeon: Nelson Gaspar MD;  Location: [facility]     ARTHROPLASTY HIP Right 6/5/2019    Procedure: Right Total Hip Arthroplasty;  Surgeon: Nelson Gaspar MD;  Location: [facility]     CARDIAC SURGERY       COLONOSCOPY N/A 5/18/2018    Procedure: COMBINED COLONOSCOPY, SINGLE OR MULTIPLE BIOPSY/POLYPECTOMY BY BIOPSY;  COLONOSCOPY (PT HAS DEFIBRILLATOR) ;  Surgeon: Mike Nickerson MD;  Location:  GI     CV RIGHT HEART CATH MEASUREMENTS RECORDED N/A 8/19/2019    Procedure: CV RIGHT HEART CATH;  Surgeon: Cisco Rausch MD;  Location:  HEART CARDIAC CATH LAB     CV RIGHT HEART CATH MEASUREMENTS RECORDED N/A 8/21/2019    Procedure: Right Heart Cath;  Surgeon: Ciaran Burton MD;  Location:  HEART CARDIAC CATH LAB     EP ABLATION FOCAL AFIB N/A 5/14/2020    Procedure: EP ABLATION FOCAL AFIB;  Surgeon: Erik Cooper MD;  Location:  HEART CARDIAC CATH LAB     H ABLATION  "FOCAL AFIB  11    Left atrial ablation to eliminate atrial fibrillation     IMPLANT AUTOMATIC IMPLANTABLE CARDIOVERTER DEFIBRILLATOR  12    AICD implantation     TONSILLECTOMY  1964       ALLERGIES:   No Known Allergies    FAMILY HISTORY:  Family History   Problem Relation Age of Onset     Heart Disease Mother         unknown     Obesity Mother      Gastrointestinal Disease Mother         diverticulitis     Diabetes Maternal Grandmother      Diabetes Paternal Grandmother      Cerebrovascular Disease Paternal Grandmother 94     Diabetes Paternal Grandfather      Cerebrovascular Disease Paternal Grandfather 78     Diabetes Son      C.A.D. Father         CABG age 78     Heart Disease Father         CABG x5     Diabetes Maternal Grandfather      LUNG DISEASE No family hx of      Deep Vein Thrombosis (DVT) No family hx of      Anesthesia Reaction No family hx of      Melanoma No family hx of      Skin Cancer No family hx of        SOCIAL HISTORY:  Social History     Tobacco Use     Smoking status: Former Smoker     Packs/day: 1.00     Years: 40.00     Pack years: 40.00     Types: Cigarettes     Start date: 1975     Quit date: 2015     Years since quittin.1     Smokeless tobacco: Never Used   Substance Use Topics     Alcohol use: Yes     Alcohol/week: 0.0 standard drinks     Comment: very minimal     Drug use: No       ROS:   A comprehensive 14 point review of systems is negative other than as mentioned in HPI.    Exam:  /72 (BP Location: Right arm, Patient Position: Chair, Cuff Size: Adult Large)   Pulse 60   Ht 1.803 m (5' 11\")   Wt 98.9 kg (218 lb)   SpO2 96%   BMI 30.40 kg/m    GENERAL APPEARANCE: healthy, alert and no distress  EYES: no icterus, no xanthelasmas  ENT: normal palate, mucosa moist, no central cyanosis  NECK: supple, 2+ carotids  RESPIRATORY: lungs clear to auscultation bilaterally  CARDIOVASCULAR: regular rhythm, normal S1 and S2, no S3 or S4 and no murmur, click or " rub.  JVD 8-9cm  GI: soft, non tender, bowel sounds normal,no abdominal bruits  EXTREMITIES: no edema,   NEURO: alert and oriented to person/place/time, normal speech, gait and affect  VASC: Radial, dorsalis pedis pulses 2+ bilaterally.  PSYCH: cooperative, affect appropriate.     Labs:      CPX Echo 6/1/2018:  Interpretation Summary  Submaximal treadmill stress test in a patient with a diagnosis of HCM.  The Ramirez treadmill score is 8 (low risk).  Exercise was stopped due to fatigue.  Normal blood pressure response to exercise.  No ECG evidence of ischemia.  No supraventricular or ventricular arrhythmias. Frequent PVCs noted throughout the study.  Normal biventricular function with mild asymmetrical septal hypertrophy (12mm).  No dynamic outflow tract obstruction is present at rest or with exercise.  Normal segmental wall motion at rest and with exercise.  Minimal augmentation in LV function with exercise.  Average functional capacity for age.           CPX 2/15/2019:          CTA coronary angiogram 5/28/19  IMPRESSION:  1.  No evidence of coronary artery atherosclerosis or stenosis.  2.  Myocardial bridging involving the mid LAD.  3.  Total Agatston score 0 placing the patient in the lowest percentile when compared to age and gender matched control group.     Right heart cath 8/21/19    Time Systolic Diastolic Mean A Wave V Wave EDP Max dp/dt HR   RA Pressures  3:38 PM     12 mmHg    16 mmHg    14 mmHg        61 bpm      RV Pressures  3:38 PM 60 mmHg            16 mmHg      106 bpm      PA Pressures  3:41 PM 60 mmHg    22 mmHg    38 mmHg            69 bpm      PCW Pressures  3:39 PM     30 mmHg    28 mmHg    38 mmHg        74 bpm           Time Hb SAT(%) PO2 Content PA Sat   PA  3:28 PM   63.6 %        63.6 %      Art  3:28 PM   99 %      18.04 mL/dL        Cardiac Output Phase: Baseline        Time TDCO TDCI Tj C.O. Tj C.I. Tj HR   Cardiac Output Results  3:28 PM 3.8 L/min    1.79 L/min/m2    4.41 L/min     2.08 L/min/m2              Echo 12/20/2019  Global and regional left ventricular function is normal with an EF of 55-60%.  Global right ventricular function is normal.  IVC diameter <2.1 cm collapsing >50% with sniff suggests a normal RA pressure  of 3 mmHg.  No pericardial effusion is present.  Mild pulmonary hypertension is present.  No change from prior.     Echo 5/2020  Left ventricular systolic function is mildly reduced.  The visual ejection fraction is estimated at 45-50%.  Right ventricular systolic pressure is elevated, consistent with moderate  pulmonary hypertension.  The right ventricular systolic function is normal.  The right ventricle is normal size.  Compared to recent ALEJANDRA, EF again mildly reduced. RVSP increased compared to  prior TTE from 12/19.    Device Interrogation today  Normal ICD function. 17 NSVT episodes recorded since last remote transmission on 10/17/2020 - 3 - 14 sec, 140-176 bpm. 3 AT/AF episodes recorded since 10/17/2020 - 3-5 seconds. Intrinsic rhythm = NSR with 1st degree AVB @ 76 bpm. AP = 30%.  = <1%. Estimated battery longevity to CARMEN = 3.5 years. Lead trends appear stable.      Assessment and Plan:   65 year old male with history of HCM without obstruction (dx 2006, EF 20% improved to 60%), VT s/p ICD 2012, nonobstructive CAD (cath 2013), AF s/p PVI X 3 (2011, 2016, 2018) and multiple DCCV and recurrent ablations presents for ongoing evaluation and management.     # Hypertrophic cardiomyopathy with no evidence of LVOT obstruction.-- previously burnt out with EF 20% ('13) recovered (EF 60%) but last echo after recent ablation revealed reduction in LVEF 45-50%.   - NYHA class 3, HF stage C  - Compensated and euvolemic on exam, thus continue eplerenone 50mg daily and lasix to 40mg qam and 20mg qpm.  - continue metoprolol XL 50mg daily   - ACEI/ARB/ARNI: not indicated given LVEF > 40%  - pt aware of exercise restrictions and family screening recommendations    #Non-sustained  "VT:  - Likely related to HCM. Patient denies any symptoms. Longest run was 14 seconds. Continue metoprolol xl  - followed by EP     # Nonobstructive CAD -- 10-30% stenoses on cath '13; unremarkable coronary CT \"19  - coronary CTA confirmed no significant disease  - continue atorvastatin and metoprolol XL     # HTN -- controlled  - continue metoprolol XL and eplerenone      # Atrial arrhythmias s/p recent ablation   - continue Xarelto  - continue dofetilide 250mcg  BID   - continue Metoprolol 50 XL daily   - followed by EP     Follow-up: April 2021 with labs prior. Will be happy to see sooner if change in clinical status or new questions/concerns arise.      Mona Ware MD  Section Head - Advanced Heart Failure, Transplantation and Mechanical Circulatory Support  Director - Adult Congenital and Cardiovascular Genetics Center  Associate Professor of Medicine, [facility]          "

## 2021-02-20 ENCOUNTER — HEALTH MAINTENANCE LETTER (OUTPATIENT)
Age: 66
End: 2021-02-20

## 2021-02-22 DIAGNOSIS — J98.4 RESTRICTIVE AIRWAY DISEASE: ICD-10-CM

## 2021-02-22 DIAGNOSIS — I42.2 HYPERTROPHIC CARDIOMYOPATHY (H): ICD-10-CM

## 2021-02-22 DIAGNOSIS — I47.20 VENTRICULAR TACHYCARDIA (H): ICD-10-CM

## 2021-02-22 DIAGNOSIS — I48.91 ATRIAL FIBRILLATION, UNSPECIFIED TYPE (H): ICD-10-CM

## 2021-02-22 DIAGNOSIS — E11.9 TYPE 2 DIABETES MELLITUS WITHOUT COMPLICATION, WITHOUT LONG-TERM CURRENT USE OF INSULIN (H): ICD-10-CM

## 2021-02-23 ENCOUNTER — TELEPHONE (OUTPATIENT)
Dept: CARDIOLOGY | Facility: CLINIC | Age: 66
End: 2021-02-23

## 2021-02-23 DIAGNOSIS — E78.5 DYSLIPIDEMIA: Primary | ICD-10-CM

## 2021-02-23 NOTE — TELEPHONE ENCOUNTER
Haroon is moving to [facility] near [facility] and is wondering if our providers know of anyone he should establish care with down there.

## 2021-02-24 RX ORDER — METFORMIN HCL 500 MG
1000 TABLET, EXTENDED RELEASE 24 HR ORAL
Qty: 180 TABLET | Refills: 0 | Status: SHIPPED | OUTPATIENT
Start: 2021-02-24 | End: 2021-04-10

## 2021-02-24 NOTE — TELEPHONE ENCOUNTER
Last Clinic Visit: 5/4/20 recommended 3 month follow up, no visits scheduled.  Overdue for A1c.  90 day refill provided per protocol, routed to clinic

## 2021-02-24 NOTE — TELEPHONE ENCOUNTER
FLUTICASONE-SALMETEROL 113-14 AEPB      Last Written Prescription Date:  9-11-20  Last Fill Quantity: 2 inh,   # refills: 3  Last Office Visit : 12-22-20  Future Office visit:  4-9-21    Routing refill request to provider for review/approval because:  Med not on cardiology protocol

## 2021-03-03 RX ORDER — FLUTICASONE PROPIONATE AND SALMETEROL 113; 14 UG/1; UG/1
POWDER, METERED RESPIRATORY (INHALATION)
Qty: 2 EACH | Refills: 3 | Status: SHIPPED | OUTPATIENT
Start: 2021-03-03 | End: 2022-04-22

## 2021-03-03 NOTE — TELEPHONE ENCOUNTER
Date: 3/3/2021    Time of Call: 9:16 AM     Diagnosis:  HCM     [ TORB ] Ordering provider: Dr. Ware  Order: OK to refill until patient establishes care in [place]     Order received by: ROD Musa     Follow-up/additional notes: Refill sent.

## 2021-03-09 ENCOUNTER — IMMUNIZATION (OUTPATIENT)
Dept: NURSING | Facility: CLINIC | Age: 66
End: 2021-03-09
Payer: COMMERCIAL

## 2021-03-09 PROCEDURE — 0011A PR COVID VAC MODERNA 100 MCG/0.5 ML IM: CPT

## 2021-03-09 PROCEDURE — 91301 PR COVID VAC MODERNA 100 MCG/0.5 ML IM: CPT

## 2021-03-25 ENCOUNTER — DOCUMENTATION ONLY (OUTPATIENT)
Dept: CARE COORDINATION | Facility: CLINIC | Age: 66
End: 2021-03-25

## 2021-04-05 ENCOUNTER — OFFICE VISIT (OUTPATIENT)
Dept: DERMATOLOGY | Facility: CLINIC | Age: 66
End: 2021-04-05
Payer: COMMERCIAL

## 2021-04-05 DIAGNOSIS — L57.0 AK (ACTINIC KERATOSIS): Primary | ICD-10-CM

## 2021-04-05 DIAGNOSIS — B07.9 VERRUCA: ICD-10-CM

## 2021-04-05 DIAGNOSIS — L28.0 LICHEN SIMPLEX CHRONICUS: ICD-10-CM

## 2021-04-05 PROCEDURE — 99212 OFFICE O/P EST SF 10 MIN: CPT | Mod: 25 | Performed by: PHYSICIAN ASSISTANT

## 2021-04-05 PROCEDURE — 17000 DESTRUCT PREMALG LESION: CPT | Mod: XS | Performed by: PHYSICIAN ASSISTANT

## 2021-04-05 PROCEDURE — 17110 DESTRUCTION B9 LES UP TO 14: CPT | Performed by: PHYSICIAN ASSISTANT

## 2021-04-05 ASSESSMENT — PAIN SCALES - GENERAL: PAINLEVEL: NO PAIN (0)

## 2021-04-05 NOTE — LETTER
4/5/2021       RE: Stu Meredith  [address]  [facility] [address]     Dear Colleague,    Thank you for referring your patient, Stu Meredith, to the [facility] [place] at [facility]. Please see a copy of my visit note below.    [facility] Dermatology Note  Encounter Date: Apr 5, 2021  Office Visit      Dermatology Problem List:  # Hx NMSC  - SCC R, forearm, bx 12/21/20 s/p excision 1/6/21  - BCC left lateral neck, bx MMS 10/20/20  - BCC of Left central chest s/p excision 07/17/19  # Centrofacial rosacea w/telangectasias  # Benign skin findings: SK, DF, solar lentigines  # Lower cutaneous lip solar lentigines   # Prurigo nodule vs lichen simplex chronicus left lateral thenar eminence   - LN2 12/20/19, resolved 1/23/2020, repeat 10/12/20, 12/21/20, 4/5/21  # AK, LN2     Social: Moving to [place]. .   ____________________________________________    Assessment & Plan:  #  Prurigo nodule vs lichen simplex chronicus left lateral thenar eminence.   - Cryotherapy performed today (see procedure note(s) below).     # Verruca vulgaris mid back x 1   - Cryotherapy performed today (see procedure note(s) below).     # Actinic keratosis R lateral forehead x1  - Cryotherapy performed today (see procedure note(s) below).     # History of nonmelanoma skin cancer, no clincial evidence of recurrence on R forearm, L lateral neck and L central chest.   - Monitor: Patient to continue monitoring at home and will contact the clinic for any changes.  - Sun protection: Counseled SPF30+ sunscreen, UPF clothing, sun avoidance, tanning bed avoidance.  - Continue with bi-annual skin exams - patient to reestablish care in [place] as he is moving in a few months     Procedures Performed:   - Cryotherapy procedure note, location(s): mid back x1 (wart), R lateral forehead x 1 (AK), L thenar eminence (PN). After verbal consent and  discussion of risks and benefits including, but not limited to, dyspigmentation/scar, blister, and pain, 3 lesion(s) was(were) treated with 1-2 mm freeze border for 1-2 cycles with liquid nitrogen. Post cryotherapy instructions were provided.     Follow-up: 6 month(s) in-person, or earlier for new or changing lesions    Staff:     All risks, benefits and alternatives were discussed with patient.  Patient is in agreement and understands the assessment and plan.  All questions were answered.    Eliza Motta PA-C, MPAS  [facility] [place]: Phone: [phone], Fax: [phone]  [facility]: Phone: [phone],  Fax: [phone]  ____________________________________________    CC: Derm Problem (spot on hand and spots on back of concern.)    HPI:  Mr. Stu Meredith is a 65 year old male who presents today as a return patient for a FBSE. Notes thumb is itching again, requests cryo to it. Also notes a few scaly, itching areas on the back. Tells me today that he is moving to [place] in the next few months. He otherwise has no areas of concern.     Patient is otherwise feeling well, without additional concerns.    Labs:  None    Physical Exam:  Vitals: There were no vitals taken for this visit.  SKIN: Waist-up skin, which includes the head/face, neck, both arms, chest, back, abdomen, digits and/or nails was examined.   - L thenar eminence - hypertrophic, light pink patch  - small verrucous papule on the mid back, 3mm  - Brock's skin type II, less than 100 nevi  - There is an erythematous macule with overyling adherent scale on the R forehead x1.   - No other lesions of concern on areas examined.     Medications:  Current Outpatient Medications   Medication     acetaminophen (TYLENOL) 325 MG tablet     amoxicillin (AMOXIL) 500 MG tablet     atorvastatin (LIPITOR) 20 MG tablet     cyanocobalamin (VITAMIN B-12) 100 MCG tablet      dofetilide (TIKOSYN) 250 MCG capsule     eplerenone (INSPRA) 50 MG tablet     fluticasone-salmeterol (AIRDUO RESPICLICK) 113-14 MCG/ACT inhaler     furosemide (LASIX) 20 MG tablet     furosemide (LASIX) 40 MG tablet     gabapentin (NEURONTIN) 300 MG capsule     metFORMIN (GLUCOPHAGE-XR) 500 MG 24 hr tablet     metoprolol succinate ER (TOPROL-XL) 50 MG 24 hr tablet     multivitamin, therapeutic (THERA-VIT) TABS     sildenafil (VIAGRA) 100 MG tablet     XARELTO ANTICOAGULANT 20 MG TABS tablet     zolpidem (AMBIEN) 10 MG tablet     No current facility-administered medications for this visit.       Past Medical/Surgical History:   Patient Active Problem List   Diagnosis     Hypertrophic cardiomyopathy (H)     Advanced directives, counseling/discussion     Ventricular tachycardia (H)     Automatic implantable cardioverter-defibrillator in situ- [employer], dual chamber- NOT dependent     Prediabetes     S/P ablation of atrial flutter     CHF (congestive heart failure) (H)     Chronic Zolpidem use for insomnia     S/P ablation of atrial fibrillation     Lung nodules     Atrial tachycardia (H)     Encounter for monitoring dofetilide therapy     HTN (hypertension)     Hypertrophic cardiomegaly     SOB (shortness of breath)     Paroxysmal atrial fibrillation (H)     Dyspnea     Type 2 diabetes mellitus without complication, without long-term current use of insulin (H)     Chronic insomnia     Past Medical History:   Diagnosis Date     Atrial fibrillation (H) 12/9/11    failed medication, multiple DC cardioveresions; s/p Left atrial ablation to eliminate atrial fibrillation 12/9/11     Chest pain      CHF (congestive heart failure) (H) 7/30/2016     Coronary artery disease      DJD (degenerative joint disease)      Fatigue 7/15/2019     Hip arthritis 1/15/2014     Hypertension      Hypertrophic cardiomyopathy (H) 10/09     Other abnormal heart sounds      Pacemaker     ICD     Palpitations      Pneumonia, organism  unspecified(486)      Prediabetes 7/10/15    A1C 6.5     Status post implantation of automatic cardioverter/defibrillator (AICD)      Type 2 diabetes mellitus without complication, without long-term current use of insulin (H) 7/13/2020     Ventricular tachycardia (H)        CC Dr. Paredes on close of this encounter.

## 2021-04-05 NOTE — NURSING NOTE
Dermatology Rooming Note    Stu Meredith's goals for this visit include:   Chief Complaint   Patient presents with     Derm Problem     spot on hand and spots on back of concern.     Lisset Thakkar, CMA

## 2021-04-05 NOTE — PROGRESS NOTES
[facility] Dermatology Note  Encounter Date: Apr 5, 2021  Office Visit      Dermatology Problem List:  # Hx NMSC  - SCC R, forearm, bx 12/21/20 s/p excision 1/6/21  - BCC left lateral neck, bx MMS 10/20/20  - BCC of Left central chest s/p excision 07/17/19  # Centrofacial rosacea w/telangectasias  # Benign skin findings: SK, DF, solar lentigines  # Lower cutaneous lip solar lentigines   # Prurigo nodule vs lichen simplex chronicus left lateral thenar eminence   - LN2 12/20/19, resolved 1/23/2020, repeat 10/12/20, 12/21/20, 4/5/21  # AK, LN2     Social: Moving to [place]. .   ____________________________________________    Assessment & Plan:  #  Prurigo nodule vs lichen simplex chronicus left lateral thenar eminence.   - Cryotherapy performed today (see procedure note(s) below).     # Verruca vulgaris mid back x 1   - Cryotherapy performed today (see procedure note(s) below).     # Actinic keratosis R lateral forehead x1  - Cryotherapy performed today (see procedure note(s) below).     # History of nonmelanoma skin cancer, no clincial evidence of recurrence on R forearm, L lateral neck and L central chest.   - Monitor: Patient to continue monitoring at home and will contact the clinic for any changes.  - Sun protection: Counseled SPF30+ sunscreen, UPF clothing, sun avoidance, tanning bed avoidance.  - Continue with bi-annual skin exams - patient to reestablish care in [place] as he is moving in a few months     Procedures Performed:   - Cryotherapy procedure note, location(s): mid back x1 (wart), R lateral forehead x 1 (AK), L thenar eminence (PN). After verbal consent and discussion of risks and benefits including, but not limited to, dyspigmentation/scar, blister, and pain, 3 lesion(s) was(were) treated with 1-2 mm freeze border for 1-2 cycles with liquid nitrogen. Post cryotherapy instructions were provided.     Follow-up: 6 month(s) in-person, or earlier for new or changing lesions    Staff:      All risks, benefits and alternatives were discussed with patient.  Patient is in agreement and understands the assessment and plan.  All questions were answered.    Eliza Motta PA-C, MPAS  [facility] [place]: Phone: [phone], Fax: [phone]  [facility]: Phone: [phone],  Fax: [phone]  ____________________________________________    CC: Derm Problem (spot on hand and spots on back of concern.)    HPI:  Mr. Stu Meredith is a 65 year old male who presents today as a return patient for a FBSE. Notes thumb is itching again, requests cryo to it. Also notes a few scaly, itching areas on the back. Tells me today that he is moving to [place] in the next few months. He otherwise has no areas of concern.     Patient is otherwise feeling well, without additional concerns.    Labs:  None    Physical Exam:  Vitals: There were no vitals taken for this visit.  SKIN: Waist-up skin, which includes the head/face, neck, both arms, chest, back, abdomen, digits and/or nails was examined.   - L thenar eminence - hypertrophic, light pink patch  - small verrucous papule on the mid back, 3mm  - Brock's skin type II, less than 100 nevi  - There is an erythematous macule with overyling adherent scale on the R forehead x1.   - No other lesions of concern on areas examined.     Medications:  Current Outpatient Medications   Medication     acetaminophen (TYLENOL) 325 MG tablet     amoxicillin (AMOXIL) 500 MG tablet     atorvastatin (LIPITOR) 20 MG tablet     cyanocobalamin (VITAMIN B-12) 100 MCG tablet     dofetilide (TIKOSYN) 250 MCG capsule     eplerenone (INSPRA) 50 MG tablet     fluticasone-salmeterol (AIRDUO RESPICLICK) 113-14 MCG/ACT inhaler     furosemide (LASIX) 20 MG tablet     furosemide (LASIX) 40 MG tablet     gabapentin (NEURONTIN) 300 MG capsule     metFORMIN (GLUCOPHAGE-XR) 500 MG 24 hr tablet     metoprolol succinate ER  (TOPROL-XL) 50 MG 24 hr tablet     multivitamin, therapeutic (THERA-VIT) TABS     sildenafil (VIAGRA) 100 MG tablet     XARELTO ANTICOAGULANT 20 MG TABS tablet     zolpidem (AMBIEN) 10 MG tablet     No current facility-administered medications for this visit.       Past Medical/Surgical History:   Patient Active Problem List   Diagnosis     Hypertrophic cardiomyopathy (H)     Advanced directives, counseling/discussion     Ventricular tachycardia (H)     Automatic implantable cardioverter-defibrillator in situ- [employer], dual chamber- NOT dependent     Prediabetes     S/P ablation of atrial flutter     CHF (congestive heart failure) (H)     Chronic Zolpidem use for insomnia     S/P ablation of atrial fibrillation     Lung nodules     Atrial tachycardia (H)     Encounter for monitoring dofetilide therapy     HTN (hypertension)     Hypertrophic cardiomegaly     SOB (shortness of breath)     Paroxysmal atrial fibrillation (H)     Dyspnea     Type 2 diabetes mellitus without complication, without long-term current use of insulin (H)     Chronic insomnia     Past Medical History:   Diagnosis Date     Atrial fibrillation (H) 12/9/11    failed medication, multiple DC cardioveresions; s/p Left atrial ablation to eliminate atrial fibrillation 12/9/11     Chest pain      CHF (congestive heart failure) (H) 7/30/2016     Coronary artery disease      DJD (degenerative joint disease)      Fatigue 7/15/2019     Hip arthritis 1/15/2014     Hypertension      Hypertrophic cardiomyopathy (H) 10/09     Other abnormal heart sounds      Pacemaker     ICD     Palpitations      Pneumonia, organism unspecified(486)      Prediabetes 7/10/15    A1C 6.5     Status post implantation of automatic cardioverter/defibrillator (AICD)      Type 2 diabetes mellitus without complication, without long-term current use of insulin (H) 7/13/2020     Ventricular tachycardia (H)        CC Dr. Paredes on close of this encounter.

## 2021-04-06 ENCOUNTER — TELEPHONE (OUTPATIENT)
Dept: INTERNAL MEDICINE | Facility: CLINIC | Age: 66
End: 2021-04-06

## 2021-04-06 ENCOUNTER — IMMUNIZATION (OUTPATIENT)
Dept: NURSING | Facility: CLINIC | Age: 66
End: 2021-04-06
Attending: INTERNAL MEDICINE
Payer: COMMERCIAL

## 2021-04-06 DIAGNOSIS — E11.9 TYPE 2 DIABETES MELLITUS WITHOUT COMPLICATION, WITHOUT LONG-TERM CURRENT USE OF INSULIN (H): Primary | ICD-10-CM

## 2021-04-06 DIAGNOSIS — Z00.00 ROUTINE ADULT HEALTH MAINTENANCE: ICD-10-CM

## 2021-04-06 PROCEDURE — 91301 PR COVID VAC MODERNA 100 MCG/0.5 ML IM: CPT

## 2021-04-06 PROCEDURE — 0012A PR COVID VAC MODERNA 100 MCG/0.5 ML IM: CPT

## 2021-04-06 NOTE — TELEPHONE ENCOUNTER
Called patient.  A1C and Lipid lab order placed for Friday, 9 AM lab appointment.  Patient will need to fast for 10-12 hours.  Patient gave verbal understanding.        Hermes Portillo CMA ([facility]) at 2:29 PM on 4/6/2021

## 2021-04-06 NOTE — TELEPHONE ENCOUNTER
[facility] Call Center    Phone Message    May a detailed message be left on voicemail: yes     Reason for Call: Other: Pt requesting that orders for labs that he would be having done for his physical that is scheduled for this friday 4/9 at 1:00pm with Tammie Ana be put in ahead of time so he can have them done at his labs appt that he has scheduled for earlier that morning on 4/9     Action Taken: Message routed to:  [facility] ([facility]): ioana

## 2021-04-09 ENCOUNTER — OFFICE VISIT (OUTPATIENT)
Dept: INTERNAL MEDICINE | Facility: CLINIC | Age: 66
End: 2021-04-09
Payer: COMMERCIAL

## 2021-04-09 ENCOUNTER — OFFICE VISIT (OUTPATIENT)
Dept: CARDIOLOGY | Facility: CLINIC | Age: 66
End: 2021-04-09
Attending: INTERNAL MEDICINE
Payer: COMMERCIAL

## 2021-04-09 VITALS
DIASTOLIC BLOOD PRESSURE: 73 MMHG | SYSTOLIC BLOOD PRESSURE: 125 MMHG | HEART RATE: 61 BPM | BODY MASS INDEX: 29.57 KG/M2 | OXYGEN SATURATION: 96 % | WEIGHT: 212 LBS

## 2021-04-09 VITALS
BODY MASS INDEX: 29.57 KG/M2 | WEIGHT: 212 LBS | HEART RATE: 67 BPM | OXYGEN SATURATION: 95 % | DIASTOLIC BLOOD PRESSURE: 84 MMHG | SYSTOLIC BLOOD PRESSURE: 135 MMHG

## 2021-04-09 VITALS
HEIGHT: 71 IN | HEART RATE: 67 BPM | BODY MASS INDEX: 29.68 KG/M2 | OXYGEN SATURATION: 95 % | SYSTOLIC BLOOD PRESSURE: 135 MMHG | WEIGHT: 212 LBS | DIASTOLIC BLOOD PRESSURE: 84 MMHG

## 2021-04-09 DIAGNOSIS — E11.42 TYPE 2 DIABETES, CONTROLLED, WITH PERIPHERAL NEUROPATHY (H): ICD-10-CM

## 2021-04-09 DIAGNOSIS — Z00.00 ROUTINE ADULT HEALTH MAINTENANCE: ICD-10-CM

## 2021-04-09 DIAGNOSIS — G62.9 NEUROPATHY: ICD-10-CM

## 2021-04-09 DIAGNOSIS — I42.2 HYPERTROPHIC CARDIOMYOPATHY (H): ICD-10-CM

## 2021-04-09 DIAGNOSIS — Z13.6 CARDIOVASCULAR SCREENING; LDL GOAL LESS THAN 130: ICD-10-CM

## 2021-04-09 DIAGNOSIS — I47.20 VENTRICULAR TACHYCARDIA (H): ICD-10-CM

## 2021-04-09 DIAGNOSIS — E11.9 TYPE 2 DIABETES MELLITUS WITHOUT COMPLICATION, WITHOUT LONG-TERM CURRENT USE OF INSULIN (H): ICD-10-CM

## 2021-04-09 DIAGNOSIS — I42.2 OTHER HYPERTROPHIC CARDIOMYOPATHY (H): Primary | ICD-10-CM

## 2021-04-09 DIAGNOSIS — Z00.00 ROUTINE HISTORY AND PHYSICAL EXAMINATION OF ADULT: Primary | ICD-10-CM

## 2021-04-09 DIAGNOSIS — I48.91 ATRIAL FIBRILLATION, UNSPECIFIED TYPE (H): ICD-10-CM

## 2021-04-09 DIAGNOSIS — I50.9 CONGESTIVE HEART FAILURE, UNSPECIFIED HF CHRONICITY, UNSPECIFIED HEART FAILURE TYPE (H): Chronic | ICD-10-CM

## 2021-04-09 LAB
ALBUMIN SERPL-MCNC: 3.5 G/DL (ref 3.4–5)
ALP SERPL-CCNC: 96 U/L (ref 40–150)
ALT SERPL W P-5'-P-CCNC: 29 U/L (ref 0–70)
ANION GAP SERPL CALCULATED.3IONS-SCNC: 6 MMOL/L (ref 3–14)
AST SERPL W P-5'-P-CCNC: 20 U/L (ref 0–45)
BILIRUB SERPL-MCNC: 0.9 MG/DL (ref 0.2–1.3)
BUN SERPL-MCNC: 17 MG/DL (ref 7–30)
CALCIUM SERPL-MCNC: 9.1 MG/DL (ref 8.5–10.1)
CHLORIDE SERPL-SCNC: 109 MMOL/L (ref 94–109)
CHOLEST SERPL-MCNC: 151 MG/DL
CO2 SERPL-SCNC: 24 MMOL/L (ref 20–32)
CREAT SERPL-MCNC: 1.02 MG/DL (ref 0.66–1.25)
GFR SERPL CREATININE-BSD FRML MDRD: 76 ML/MIN/{1.73_M2}
GLUCOSE SERPL-MCNC: 116 MG/DL (ref 70–99)
HBA1C MFR BLD: 6.3 % (ref 0–5.6)
HDLC SERPL-MCNC: 51 MG/DL
LDLC SERPL CALC-MCNC: 80 MG/DL
NONHDLC SERPL-MCNC: 100 MG/DL
POTASSIUM SERPL-SCNC: 4.4 MMOL/L (ref 3.4–5.3)
PROT SERPL-MCNC: 7.2 G/DL (ref 6.8–8.8)
SODIUM SERPL-SCNC: 140 MMOL/L (ref 133–144)
TRIGL SERPL-MCNC: 100 MG/DL

## 2021-04-09 PROCEDURE — 99214 OFFICE O/P EST MOD 30 MIN: CPT | Mod: 27 | Performed by: INTERNAL MEDICINE

## 2021-04-09 PROCEDURE — 80053 COMPREHEN METABOLIC PANEL: CPT | Performed by: PATHOLOGY

## 2021-04-09 PROCEDURE — 80061 LIPID PANEL: CPT | Performed by: PATHOLOGY

## 2021-04-09 PROCEDURE — G0463 HOSPITAL OUTPT CLINIC VISIT: HCPCS | Mod: 25

## 2021-04-09 PROCEDURE — 93283 PRGRMG EVAL IMPLANTABLE DFB: CPT | Performed by: INTERNAL MEDICINE

## 2021-04-09 PROCEDURE — 99397 PER PM REEVAL EST PAT 65+ YR: CPT | Mod: 24 | Performed by: NURSE PRACTITIONER

## 2021-04-09 PROCEDURE — 83036 HEMOGLOBIN GLYCOSYLATED A1C: CPT | Performed by: PATHOLOGY

## 2021-04-09 PROCEDURE — 36415 COLL VENOUS BLD VENIPUNCTURE: CPT | Performed by: PATHOLOGY

## 2021-04-09 PROCEDURE — 99214 OFFICE O/P EST MOD 30 MIN: CPT | Mod: 25 | Performed by: INTERNAL MEDICINE

## 2021-04-09 PROCEDURE — G0463 HOSPITAL OUTPT CLINIC VISIT: HCPCS

## 2021-04-09 PROCEDURE — 93005 ELECTROCARDIOGRAM TRACING: CPT

## 2021-04-09 RX ORDER — GABAPENTIN 300 MG/1
900 CAPSULE ORAL 2 TIMES DAILY
Qty: 360 CAPSULE | Refills: 2 | Status: SHIPPED | OUTPATIENT
Start: 2021-04-09

## 2021-04-09 RX ORDER — ATORVASTATIN CALCIUM 20 MG/1
20 TABLET, FILM COATED ORAL DAILY
Qty: 90 TABLET | Refills: 3 | Status: SHIPPED | OUTPATIENT
Start: 2021-04-09 | End: 2022-04-22

## 2021-04-09 ASSESSMENT — MIFFLIN-ST. JEOR: SCORE: 1763.76

## 2021-04-09 ASSESSMENT — PAIN SCALES - GENERAL
PAINLEVEL: NO PAIN (0)
PAINLEVEL: NO PAIN (0)

## 2021-04-09 NOTE — PROGRESS NOTES
HPI:   65 year old male with history of HCM without obstruction (dx 2006, EF 20% improved to 45-50%), VT s/p ICD 2012, nonobstructive CAD (cath 2013), AF s/p PVI X 3 (2011, 2016, 2018) and multiple DCCV and repeated ablations presents for ongoing evaluation and management. Last seen in clinic in Dec '20. Today patient reports that he has been feeling fairly well since last visit.  He will be moving to [facility] in July of this year and plans to establish care with cardiology there.  He notes no chest pain and no change in chronic dyspnea on exertion.  Also notes no presyncope or syncope, lower extremity swelling or weight gain.  Has rare palpitations that occur approximately once per week primarily in the evening and last for minutes before resolution.  Notes he can walk up 1 flight of stairs with stable chronic dyspnea on exertion.  Checks blood pressures daily at home, blood pressures in the evening typically run 110-115 systolic and higher in the morning, SBP ~135.       PAST MEDICAL HISTORY:  Past Medical History:   Diagnosis Date     Atrial fibrillation (H) 12/9/11    failed medication, multiple DC cardioveresions; s/p Left atrial ablation to eliminate atrial fibrillation 12/9/11     Chest pain      CHF (congestive heart failure) (H) 7/30/2016     Coronary artery disease      DJD (degenerative joint disease)      Fatigue 7/15/2019     Hip arthritis 1/15/2014     Hypertension      Hypertrophic cardiomyopathy (H) 10/09     Other abnormal heart sounds      Pacemaker     ICD     Palpitations      Pneumonia, organism unspecified(486)      Prediabetes 7/10/15    A1C 6.5     Status post implantation of automatic cardioverter/defibrillator (AICD)      Type 2 diabetes mellitus without complication, without long-term current use of insulin (H) 7/13/2020     Ventricular tachycardia (H)        CURRENT MEDICATIONS:  Current Outpatient Medications   Medication Sig Dispense Refill     acetaminophen (TYLENOL) 325 MG  tablet Take 325-650 mg by mouth every 6 hours as needed       amoxicillin (AMOXIL) 500 MG tablet Take 4 tablets (2000 mg) by mouth 1 hour before dental procedures. 12 tablet 3     atorvastatin (LIPITOR) 20 MG tablet Take 1 tablet (20 mg) by mouth daily 90 tablet 3     cyanocobalamin (VITAMIN B-12) 100 MCG tablet Take 100 mcg by mouth daily       dofetilide (TIKOSYN) 250 MCG capsule Take 1 capsule (250 mcg) by mouth 2 times daily 180 capsule 3     eplerenone (INSPRA) 50 MG tablet TAKE ONE TABLET BY MOUTH ONCE DAILY 90 tablet 2     fluticasone-salmeterol (AIRDUO RESPICLICK) 113-14 MCG/ACT inhaler INHALE ONE PUFF INTO THE LUNGS TWICE A DAY 2 each 3     furosemide (LASIX) 20 MG tablet Take 1 tablet (20 mg) by mouth daily In PM 90 tablet 3     furosemide (LASIX) 40 MG tablet Take 1 tablet (40 mg) by mouth daily In AM 90 tablet 3     gabapentin (NEURONTIN) 300 MG capsule Take 2 capsules (600 mg) by mouth 2 times daily 360 capsule 3     metFORMIN (GLUCOPHAGE-XR) 500 MG 24 hr tablet Take 2 tablets (1,000 mg) by mouth daily (with dinner) For additional refills, please schedule an appointment at [phone] 180 tablet 0     metoprolol succinate ER (TOPROL-XL) 50 MG 24 hr tablet Take 1 tablet (50 mg) by mouth daily 90 tablet 3     multivitamin, therapeutic (THERA-VIT) TABS Take 1 tablet by mouth daily       sildenafil (VIAGRA) 100 MG tablet Take 0.5-1 tablets ( mg) by mouth daily as needed (take 1 hour before intercourse) 30 tablet 12     XARELTO ANTICOAGULANT 20 MG TABS tablet TAKE ONE TABLET BY MOUTH EVERY DAY WITH DINNER 90 tablet 3     zolpidem (AMBIEN) 10 MG tablet TAKE HALF TABLET BY MOUTH  NIGHTLY AS NEEDED FOR SLEEP 90 tablet 0       PAST SURGICAL HISTORY:  Past Surgical History:   Procedure Laterality Date     ANESTHESIA CARDIOVERSION  4/24/2014    Procedure: ANESTHESIA CARDIOVERSION;  Surgeon: Generic Anesthesia Provider;  Location: [facility]     ANESTHESIA CARDIOVERSION N/A 5/12/2016    Procedure: ANESTHESIA  CARDIOVERSION;  Surgeon: GENERIC ANESTHESIA PROVIDER;  Location: [facility]     ANESTHESIA CARDIOVERSION N/A 8/7/2017    Procedure: ANESTHESIA CARDIOVERSION;  Anesthesia Offsite Coverage Cardioversion @1100;  Surgeon: GENERIC ANESTHESIA PROVIDER;  Location: [facility]     ANESTHESIA CARDIOVERSION N/A 1/3/2018    Procedure: ANESTHESIA CARDIOVERSION;  Anesthesia Cardioverion;  Surgeon: GENERIC ANESTHESIA PROVIDER;  Location: [facility]     ANESTHESIA CARDIOVERSION N/A 5/4/2018    Procedure: ANESTHESIA CARDIOVERSION;  Anesthesia Coverage Cardioversion @1400;  Surgeon: GENERIC ANESTHESIA PROVIDER;  Location: [facility]     ANESTHESIA CARDIOVERSION N/A 9/27/2018    Procedure: ANESTHESIA CARDIOVERSION;  Anesthesia Cardioversion @0930;  Surgeon: GENERIC ANESTHESIA PROVIDER;  Location: [facility]     ANESTHESIA CARDIOVERSION N/A 12/20/2018    Procedure: Anesthesia Coverage Cardioversion @0830;  Surgeon: GENERIC ANESTHESIA PROVIDER;  Location: [facility]     ANESTHESIA CARDIOVERSION N/A 8/21/2019    Procedure: Anesthesia Coverage Cardioversion @0800;  Surgeon: GENERIC ANESTHESIA PROVIDER;  Location: [facility]     ANESTHESIA CARDIOVERSION N/A 1/16/2020    Procedure: ANESTHESIA, FOR CARDIOVERSION;  Surgeon: GENERIC ANESTHESIA PROVIDER;  Location: [facility]     ARTHROPLASTY HIP  1/15/2014    Procedure: ARTHROPLASTY HIP;  Left Total Hip Arthroplasty;  Surgeon: Nelson Gaspar MD;  Location: [facility]     ARTHROPLASTY HIP Right 6/5/2019    Procedure: Right Total Hip Arthroplasty;  Surgeon: Nelson Gaspar MD;  Location: [facility]     CARDIAC SURGERY       COLONOSCOPY N/A 5/18/2018    Procedure: COMBINED COLONOSCOPY, SINGLE OR MULTIPLE BIOPSY/POLYPECTOMY BY BIOPSY;  COLONOSCOPY (PT HAS DEFIBRILLATOR) ;  Surgeon: Mike Nickerson MD;  Location:  GI     CV RIGHT HEART CATH MEASUREMENTS RECORDED N/A 8/19/2019    Procedure: CV RIGHT HEART CATH;  Surgeon: Cisco Rausch MD;  Location:  HEART CARDIAC CATH LAB     CV RIGHT HEART CATH MEASUREMENTS RECORDED N/A 8/21/2019     "Procedure: Right Heart Cath;  Surgeon: Ciaran Burton MD;  Location:  HEART CARDIAC CATH LAB     EP ABLATION FOCAL AFIB N/A 2020    Procedure: EP ABLATION FOCAL AFIB;  Surgeon: Erik Cooper MD;  Location:  HEART CARDIAC CATH LAB     H ABLATION FOCAL AFIB  11    Left atrial ablation to eliminate atrial fibrillation     IMPLANT AUTOMATIC IMPLANTABLE CARDIOVERTER DEFIBRILLATOR  12    AICD implantation     TONSILLECTOMY  1964       ALLERGIES:   No Known Allergies    FAMILY HISTORY:  Family History   Problem Relation Age of Onset     Heart Disease Mother         unknown     Obesity Mother      Gastrointestinal Disease Mother         diverticulitis     Diabetes Maternal Grandmother      Diabetes Paternal Grandmother      Cerebrovascular Disease Paternal Grandmother 94     Diabetes Paternal Grandfather      Cerebrovascular Disease Paternal Grandfather 78     Diabetes Son      C.A.D. Father         CABG age 78     Heart Disease Father         CABG x5     Diabetes Maternal Grandfather      LUNG DISEASE No family hx of      Deep Vein Thrombosis (DVT) No family hx of      Anesthesia Reaction No family hx of      Melanoma No family hx of      Skin Cancer No family hx of        SOCIAL HISTORY:  Social History     Tobacco Use     Smoking status: Former Smoker     Packs/day: 1.00     Years: 40.00     Pack years: 40.00     Types: Cigarettes     Start date: 1975     Quit date: 2015     Years since quittin.3     Smokeless tobacco: Never Used   Substance Use Topics     Alcohol use: Yes     Alcohol/week: 0.0 standard drinks     Comment: very minimal     Drug use: No       ROS:   A comprehensive 14 point review of systems is negative other than as mentioned in HPI.    Exam:  /84   Pulse 67   Ht 1.803 m (5' 11\")   Wt 96.2 kg (212 lb)   SpO2 95%   BMI 29.57 kg/m     manual blood pressure checked in clinic today 122/78  GENERAL APPEARANCE: healthy, alert and no distress  EYES: no icterus, " no xanthelasmas  ENT: normal palate, mucosa moist, no central cyanosis  NECK: supple, 2+ carotids  RESPIRATORY: lungs clear to auscultation bilaterally  CARDIOVASCULAR: regular rhythm, normal S1 and S2, no S3 or S4 and no murmur, click or rub.  JVD 8-9cm  GI: soft, non tender, bowel sounds normal,no abdominal bruits  EXTREMITIES: no edema,   NEURO: alert and oriented to person/place/time, normal speech, gait and affect  VASC: Radial, dorsalis pedis pulses 2+ bilaterally.  PSYCH: cooperative, affect appropriate.     Labs:  Orders Only on 04/09/2021   Component Date Value Ref Range Status     Sodium 04/09/2021 140  133 - 144 mmol/L Final     Potassium 04/09/2021 4.4  3.4 - 5.3 mmol/L Final     Chloride 04/09/2021 109  94 - 109 mmol/L Final     Carbon Dioxide 04/09/2021 24  20 - 32 mmol/L Final     Anion Gap 04/09/2021 6  3 - 14 mmol/L Final     Glucose 04/09/2021 116* 70 - 99 mg/dL Final     Urea Nitrogen 04/09/2021 17  7 - 30 mg/dL Final     Creatinine 04/09/2021 1.02  0.66 - 1.25 mg/dL Final     GFR Estimate 04/09/2021 76  >60 mL/min/[1.73_m2] Final    Comment: Non  GFR Calc  Starting 12/18/2018, serum creatinine based estimated GFR (eGFR) will be   calculated using the Chronic Kidney Disease Epidemiology Collaboration   (CKD-EPI) equation.       GFR Estimate If Black 04/09/2021 88  >60 mL/min/[1.73_m2] Final    Comment:  GFR Calc  Starting 12/18/2018, serum creatinine based estimated GFR (eGFR) will be   calculated using the Chronic Kidney Disease Epidemiology Collaboration   (CKD-EPI) equation.       Calcium 04/09/2021 9.1  8.5 - 10.1 mg/dL Final     Bilirubin Total 04/09/2021 0.9  0.2 - 1.3 mg/dL Final     Albumin 04/09/2021 3.5  3.4 - 5.0 g/dL Final     Protein Total 04/09/2021 7.2  6.8 - 8.8 g/dL Final     Alkaline Phosphatase 04/09/2021 96  40 - 150 U/L Final     ALT 04/09/2021 29  0 - 70 U/L Final     AST 04/09/2021 20  0 - 45 U/L Final     Cholesterol 04/09/2021 151  <200  mg/dL Final     Triglycerides 04/09/2021 100  <150 mg/dL Final     HDL Cholesterol 04/09/2021 51  >39 mg/dL Final     LDL Cholesterol Calculated 04/09/2021 80  <100 mg/dL Final    Desirable:       <100 mg/dl     Non HDL Cholesterol 04/09/2021 100  <130 mg/dL Final     Hemoglobin A1C 04/09/2021 6.3* 0 - 5.6 % Final    Comment: Normal <5.7% Prediabetes 5.7-6.4%  Diabetes 6.5% or higher - adopted from ADA   consensus guidelines.           CPX Echo 6/1/2018:  Interpretation Summary  Submaximal treadmill stress test in a patient with a diagnosis of HCM.  The Ramirez treadmill score is 8 (low risk).  Exercise was stopped due to fatigue.  Normal blood pressure response to exercise.  No ECG evidence of ischemia.  No supraventricular or ventricular arrhythmias. Frequent PVCs noted throughout the study.  Normal biventricular function with mild asymmetrical septal hypertrophy (12mm).  No dynamic outflow tract obstruction is present at rest or with exercise.  Normal segmental wall motion at rest and with exercise.  Minimal augmentation in LV function with exercise.  Average functional capacity for age.           CPX 2/15/2019:          CTA coronary angiogram 5/28/19  IMPRESSION:  1.  No evidence of coronary artery atherosclerosis or stenosis.  2.  Myocardial bridging involving the mid LAD.  3.  Total Agatston score 0 placing the patient in the lowest percentile when compared to age and gender matched control group.     Right heart cath 8/21/19    Time Systolic Diastolic Mean A Wave V Wave EDP Max dp/dt HR   RA Pressures  3:38 PM     12 mmHg    16 mmHg    14 mmHg        61 bpm      RV Pressures  3:38 PM 60 mmHg            16 mmHg      106 bpm      PA Pressures  3:41 PM 60 mmHg    22 mmHg    38 mmHg            69 bpm      PCW Pressures  3:39 PM     30 mmHg    28 mmHg    38 mmHg        74 bpm           Time Hb SAT(%) PO2 Content PA Sat   PA  3:28 PM   63.6 %        63.6 %      Art  3:28 PM   99 %      18.04 mL/dL        Cardiac  Output Phase: Baseline        Time TDCO TDCI Tj C.O. Tj C.I. Tj HR   Cardiac Output Results  3:28 PM 3.8 L/min    1.79 L/min/m2    4.41 L/min    2.08 L/min/m2              Echo 12/20/2019  Global and regional left ventricular function is normal with an EF of 55-60%.  Global right ventricular function is normal.  IVC diameter <2.1 cm collapsing >50% with sniff suggests a normal RA pressure  of 3 mmHg.  No pericardial effusion is present.  Mild pulmonary hypertension is present.  No change from prior.     Echo 5/2020  Left ventricular systolic function is mildly reduced.  The visual ejection fraction is estimated at 45-50%.  Right ventricular systolic pressure is elevated, consistent with moderate  pulmonary hypertension.  The right ventricular systolic function is normal.  The right ventricle is normal size.  Compared to recent ALEJANDRA, EF again mildly reduced. RVSP increased compared to  prior TTE from 12/19.    Device Interrogation Dec '20  Normal ICD function. 17 NSVT episodes recorded since last remote transmission on 10/17/2020 - 3 - 14 sec, 140-176 bpm. 3 AT/AF episodes recorded since 10/17/2020 - 3-5 seconds. Intrinsic rhythm = NSR with 1st degree AVB @ 76 bpm. AP = 30%.  = <1%. Estimated battery longevity to CARMEN = 3.5 years. Lead trends appear stable.      Assessment and Plan:   65 year old male with history of HCM without obstruction (dx 2006, EF 20% improved to 60%), VT s/p ICD 2012, nonobstructive CAD (cath 2013), AF s/p PVI X 3 (2011, 2016, 2018) and multiple DCCV and recurrent ablations presents for ongoing evaluation and management.     # Hypertrophic cardiomyopathy with no evidence of LVOT obstruction.-- previously burnt out with EF 20% ('13) recovered (EF 60%) but last echo after recent ablation revealed reduction in LVEF 45-50%.   - NYHA class 3, HF stage C  - Compensated and euvolemic on exam, thus continue eplerenone 50mg daily and lasix to 40mg qam and 20mg qpm prn.  - continue metoprolol XL  "50mg daily   - ACEI/ARB/ARNI: not indicated given LVEF > 40%  - pt aware of exercise restrictions and family screening recommendations  - he will establish care with cardiology in [place]    #Non-sustained VT:  - Likely related to HCM. Patient denies any symptoms. Longest run was 14 seconds. Continue metoprolol xl  - followed by EP, device check today    # Nonobstructive CAD -- 10-30% stenoses on cath '13; unremarkable coronary CT \"19, no angina  - coronary CTA confirmed no significant disease  - continue atorvastatin and metoprolol XL     # HTN -- controlled (manual blood pressure checked in clinic today 122/78)  - continue metoprolol XL and eplerenone      # Atrial arrhythmias s/p recent ablation   - continue Xarelto  - continue dofetilide 250mcg  BID   - continue Metoprolol 50 XL daily   - followed by EP    # DMII  -SGLT2 inhibitor would be reasonable to add to current diabetes regimen as this may help with volume retention, weight loss and blood pressure control to some extent.  Will defer this to clinical team managing diabetes.      Follow-up: He will establish care with cardiology/EP in [place].  Alternatively he could see congenital cardiology at [facility] every other year if he desires.    Staffed with Dr. Ware. I appreciate the chance to help with Mr. Meredith's care. Please let me know if you have any questions or concerns.    Pravin Garnica MD  PGY-5, Cardiology Fellow       I have reviewed today's vital signs, notes, medications, labs and imaging. I have also seen and examined the patient and agree with the findings and plan as outlined above.    Mona Ware MD  Section Head - Advanced Heart Failure, Transplantation and Mechanical Circulatory Support  Director - [facility]  Associate Professor of Medicine, [facility]    I spent 30 minutes in care of the patient today including reviewing recent today's labs, examination and discussion of testing " results and care recommendations with patient and documentation

## 2021-04-09 NOTE — PROGRESS NOTES
Electrophysiology Clinic Note  HPI:   Mr. Meredith is a 65 year old male who has a past medical history significant for HCM diagnosed 2006, VT on Holter July 2012 s/p ICD 7/2012, troponin leak Oct 2013 (angiogram with non-significant CAD, LV gram showed EF 25%, recovered to 60% on TTE Dec 2013), HTN, AFib (CHADSVASC 2) with DCCVs then s/p PVI 12/2011, PVI for persistent AF on 7/28/16, PVI/CTI/CFAE with posterior wall isolation 8/17/18, s/p DCCV 8/7/17, 1/3/18,  5/5/18, 9/27/18, 12/20/2018, 1/16/2020, s/p LA AFL ablation 5/14/20, and s/p right IRISH on 6/5/2019. He presents for follow-up.  The patient underwent atrial fibrillation ablation by Dr. Gonzalez at [facility] in 12/2011 and implantation of an ICD in 07/2012 because of ventricular tachycardia. As per patient, he has been in sinus rhythm approximately 1-1/2 years after ablation. The patient was found to have recurrent atrial fibrillation during a preoperative exam in 10/2013 and underwent electrical cardioversion in 11/2013. The patient underwent hip surgery in 01/2014. The patient noticed that he was in atrial fibrillation at a clinic visit in 01/2014.   He was seen in consult by Dr Analilia spain on 2/14/14 for consideration of ablation. At the time the patient was reported to be generally unaware of whether or not he is in sinus rhythm or in atrial fibrillation. He reports daytime somnolence but denies significant dyspnea on exertion, palpitations, irregular beat sensation, presyncope or syncope. In terms of risk factors for stroke, the patient has coronary heart disease, hypertension, but denies history of diabetes, previous myocardial cardiac infarction or heart failure. The patient is on rivaroxaban for stroke prophylaxis and is on diltiazem and metoprolol. He was told that there was no consideration of ablation.   He came to EP clinic for second opinion in 3/3/2014. The patient mentioned this time he is not sure whether his afib is causing fatigue.  However, he also attributed symptoms like headache and head fullness to his afib. He also had a cold around the same time. He still denied chest pain, syncope or presyncope, ICD shocks, MART or SOB. He does however indicate this time that he does feel irregular heart beat at times, along with chronic fatigue, although is not sure if fatigue is linked to afib. We agreed at that time to pursue a cardioversion to see whether he would feel better and we started him on sotalol 80 mg twice a day. He underwent cardioversion on 4/24/2014.  He then had a device transmission showed recurrent AF in 7/2014 which spontaneously converted. We decrease his diltiazem from 240 to 120 mg daily because of fatigue. His fatigue did get better when we decrease his diltiazem.  He did have a 9 day episode of A. Fib starting June 19, 2015, during which the patient felt very tired and no energy.  The heart rate was well controlled.  A. Fib burden was 19% since 5/19/2015, but it is the first episode of A. Fib since at least November 2014.  However at follow up in 2016 AF burden 1%. At follow up in 5/2016 his AF burden= 100% since 2/28/16. He reported some increased fatgiue and decreased stamina since being back in AF. At that visit, he elected to pursue repeat PVI ablation for AF that is refractory to Sotalol. He underwent repeat PVI ablation for persistent AF refractory to AATs on 7/28/16. He had successful re-isolation of all 4 pulmonary veins. He was re-hospitalized a couple days post ablation for SOB assocaited with hypervolemia which resolved with diuresis. He reports feeling well after ablation. He states his energy levels improved with restoration of sinus. Diltiazem was stopped after ablation.  He has now had some recurrent episodes of AT/AF requiring DCCV on 8/7/17 and 1/3/18.  Device check showed AT episode that started 1/28/18 and lasted 25 days and self terminated. He then had recurrent AT/AF and had DCCV on 5/5/18. He followed up  with Dr. Ware recently in clinic and reported having fatigue, MART, and decreased stamina. Device interrogation showed he had recurred back into AT/AF since 5/26/18. He was arranged for EP follow up.     Ep Visit 6/2018: He continues to have fatigue, MART, and decreased stamina. Last stress echo from 6/1/18 showed normal biventricular function with asymmetrical septal hypertrophy and no LVOT obstruction with rest or exercise. He denies any chest pain/pressures, dizziness, lightheadedness, leg/ankle swelling, PND, orthopnea, palpitations, or syncopal symptoms.Presenting 12 lead ECG shows AFL Vent Rate 111 bpm, QRS 80 ms, QTc 489 ms. Current cardiac medications include: Spironolactone, Lipitor, Sotalol, Toprol XL, and Xarelto.     EP Visit 7/12/19: He presents today for follow up. He underwent a repeat PVI/CTI on 8/17/18. Device shows AF has been 100% since 3/14/19. He underwent right TREVIN on 6/5/19. Recovering well after procedure. He reports issues with ongoing fatigue, MART, and decreased stamina. He denies any chest pain/pressures, dizziness, lightheadedness, leg/ankle swelling, PND, orthopnea, palpitations, or syncopal symptoms. Presenting 12 lead ECG shows atypical AFL Vent Rate 73 bpm, QRS 84 ms, QTc 420 ms. Device interrogation shows normal device function. Since 1/18/19, there have been 400 NSVT, 2 other untreated episodes, and 211 AT/AF episodes recorded.  The VT-1 monitored episodes are AF w/RVR with rates in the 140s.  The 2 NSVT episodes available for review last 12 beats in duration with rates of 162-179 bpm.  AF burden = 85% since 1/18/19 and AF appear persistent since approximately mid 3/2019. Intrinsic rhythm = AF @  bpm. AP = 2%.  = 5%. Lead trends appear stable. Current cardiac medications include: Spironolactone, Lipitor, Sotalol, Toprol XL, and Xarelto.     EP Visit 11/8/19: He presents today for follow up. He was arranged for dofetilide loading and had RHC in AF, then DCCV, then repeat RHC  in sinus to evaluate if arrhyhtmia was vs CM vs sotalol was main  of symptoms. RHC was relatively unchanged in AF and sinus. He reports having much better energy levels off Sotalol. He denies any chest pain/pressures, dizziness, lightheadedness, worsening shortness of breath, leg/ankle swelling, PND, orthopnea, palpitations, or syncopal symptoms. Presenting 12 lead ECG shows AP with prolonged AVD Vent Rate 60 bpm,  ms, QRS 86 ms, QTc 408 ms. Device interrogation shows normal ICD function. Since last device interrogation on 7/12/19, there have been 10 AT/AF episodes, most recent on 11/3/19 lasting 7.2 hours.  AF burden since 7/12/19 = 34%.  AF burden graph shows minimal AT/AF episodes recorded since ECV on 8/21/19.  1 asymptomatic VT episode recorded on 9/16 lasting 25 seconds @ 241 bpm received ATP x 1 with successful conversion. Patient states he was ill with URI/fever that day.   34 NSVT detections also recorded with 2 egms available displaying  one 5 beat PAT and one 4 beat NSVT. Intrinsic rhythm = SB @ 50 bpm. AP = 10%.  = 3%.  Estimated battery longevity to CARMEN = 4.5 years. Lead trends appear stable. Current cardiac medications include: Spironolactone, Lipitor, dofetilide, Toprol XL, and Xarelto.     EP Visit 3/13/20: He presents today for follow up. He reports feeling fatigued and having some MART. He denies any chest pain/pressures, dizziness, lightheadedness, leg/ankle swelling, PND, orthopnea, palpitations, or syncopal symptoms. He had recurrent AF/AFL and had DCCV on 1/16/20. He recurred back into AFL on 1/29/20 and remains in it. Device interrogation shows normal ICD function. Since ECV on 1/16/20, 569 NSVT and 188 VT-1 monitor zone episodes recorded, AF burden = 85% and appears persistent since 1/26/20.  The available VT-1 episodes display RVR and not a true ventricular arrhythmia.   Intrinsic rhythm = Atrial flutter with a sensed ventricular response of 102-115 bpm.   AP =<1%.  = 2%.   "Patient reports that he has been noticing MART and elevated heart rate with minimal activity. Presenting 12 lead ECG shows AFL Vent Rate 108 bpm, QRS 88 ms, QTc 536 ms.  Current cardiac medications include: Spironolactone, Lipitor, Sotalol, Toprol XL, and Xarelto.     EP Visit 9/11/20: He presents today for follow up. He underwent ablation on LA AFL on 5/14/20. Groin sites well healed. He reports feeling improved energy after ablation. He still has some MART unchanged. He denies any chest pain/pressures, dizziness, lightheadedness, worsening shortness of breath, leg/ankle swelling, PND, orthopnea, palpitations, or syncopal symptoms. Last echo from 5/2020 showed LVEF 45-50%, normal RV size/function, and moderate pulmonary HTN. Device interrogation shows normal ICD function. 15 NSVT episodes recorded, 2 EGM for review lasting 2 - 2.5 seconds @ 172 - 182 bpm. 1 \"other untreated\" episode recorded lasting 13 seconds @ 193 bpm. 5 AT/AF episodes recorded lasting 4 sec to 4 minutes. Patient is on Pradaxa. Presenting EGM = AP/VS @ 60 bpm. AP = 26%.  = 0%.Estimated battery longevity to CARMEN = 3.5 years. Lead trends appear stable. Current cardiac medications include: Spironolactone, Lipitor, dofetilide, Toprol XL, and Xarelto.     He presents today for follow up. He reports feeling well. He denies any chest pain/pressures, dizziness, lightheadedness, worsening shortness of breath, leg/ankle swelling, PND, orthopnea, palpitations, or syncopal symptoms. Device interrogation shows normal device function, stable lead parameters, QP 49%,  <1%, 6 ATR up to 2 minutes, and 14 NSVT up to 2 seconds. Presenting 12 lead ECG shows AP Vent Rate 60 bpm,  ms, QRS 96 ms, QTc 464 ms. He reports he will be moving to [place] this Summer. Current cardiac medications include: Spironolactone, Lipitor, dofetilide, Toprol XL, and Xarelto.    PAST MEDICAL HISTORY:  Past Medical History:   Diagnosis Date     Atrial fibrillation (H) 12/9/11    failed " medication, multiple DC cardioveresions; s/p Left atrial ablation to eliminate atrial fibrillation 12/9/11     Chest pain      CHF (congestive heart failure) (H) 7/30/2016     Coronary artery disease      DJD (degenerative joint disease)      Fatigue 7/15/2019     Hip arthritis 1/15/2014     Hypertension      Hypertrophic cardiomyopathy (H) 10/09     Other abnormal heart sounds      Pacemaker     ICD     Palpitations      Pneumonia, organism unspecified(486)      Prediabetes 7/10/15    A1C 6.5     Status post implantation of automatic cardioverter/defibrillator (AICD)      Type 2 diabetes mellitus without complication, without long-term current use of insulin (H) 7/13/2020     Ventricular tachycardia (H)        CURRENT MEDICATIONS:  Current Outpatient Medications   Medication Sig Dispense Refill     acetaminophen (TYLENOL) 325 MG tablet Take 325-650 mg by mouth every 6 hours as needed       amoxicillin (AMOXIL) 500 MG tablet Take 4 tablets (2000 mg) by mouth 1 hour before dental procedures. 12 tablet 3     atorvastatin (LIPITOR) 20 MG tablet Take 1 tablet (20 mg) by mouth daily 90 tablet 3     cyanocobalamin (VITAMIN B-12) 100 MCG tablet Take 100 mcg by mouth daily       dofetilide (TIKOSYN) 250 MCG capsule Take 1 capsule (250 mcg) by mouth 2 times daily 180 capsule 3     eplerenone (INSPRA) 50 MG tablet TAKE ONE TABLET BY MOUTH ONCE DAILY 90 tablet 2     fluticasone-salmeterol (AIRDUO RESPICLICK) 113-14 MCG/ACT inhaler INHALE ONE PUFF INTO THE LUNGS TWICE A DAY 2 each 3     furosemide (LASIX) 20 MG tablet Take 1 tablet (20 mg) by mouth daily In PM 90 tablet 3     furosemide (LASIX) 40 MG tablet Take 1 tablet (40 mg) by mouth daily In AM 90 tablet 3     gabapentin (NEURONTIN) 300 MG capsule Take 2 capsules (600 mg) by mouth 2 times daily 360 capsule 3     metFORMIN (GLUCOPHAGE-XR) 500 MG 24 hr tablet Take 2 tablets (1,000 mg) by mouth daily (with dinner) For additional refills, please schedule an appointment at  [phone] 180 tablet 0     metoprolol succinate ER (TOPROL-XL) 50 MG 24 hr tablet Take 1 tablet (50 mg) by mouth daily 90 tablet 3     multivitamin, therapeutic (THERA-VIT) TABS Take 1 tablet by mouth daily       sildenafil (VIAGRA) 100 MG tablet Take 0.5-1 tablets ( mg) by mouth daily as needed (take 1 hour before intercourse) 30 tablet 12     XARELTO ANTICOAGULANT 20 MG TABS tablet TAKE ONE TABLET BY MOUTH EVERY DAY WITH DINNER 90 tablet 3     zolpidem (AMBIEN) 10 MG tablet TAKE HALF TABLET BY MOUTH  NIGHTLY AS NEEDED FOR SLEEP 90 tablet 0       PAST SURGICAL HISTORY:  Past Surgical History:   Procedure Laterality Date     ANESTHESIA CARDIOVERSION  4/24/2014    Procedure: ANESTHESIA CARDIOVERSION;  Surgeon: Generic Anesthesia Provider;  Location: [facility]     ANESTHESIA CARDIOVERSION N/A 5/12/2016    Procedure: ANESTHESIA CARDIOVERSION;  Surgeon: GENERIC ANESTHESIA PROVIDER;  Location: [facility]     ANESTHESIA CARDIOVERSION N/A 8/7/2017    Procedure: ANESTHESIA CARDIOVERSION;  Anesthesia Offsite Coverage Cardioversion @1100;  Surgeon: GENERIC ANESTHESIA PROVIDER;  Location: [facility] OR     ANESTHESIA CARDIOVERSION N/A 1/3/2018    Procedure: ANESTHESIA CARDIOVERSION;  Anesthesia Cardioverion;  Surgeon: GENERIC ANESTHESIA PROVIDER;  Location: [facility]     ANESTHESIA CARDIOVERSION N/A 5/4/2018    Procedure: ANESTHESIA CARDIOVERSION;  Anesthesia Coverage Cardioversion @1400;  Surgeon: GENERIC ANESTHESIA PROVIDER;  Location: [facility]     ANESTHESIA CARDIOVERSION N/A 9/27/2018    Procedure: ANESTHESIA CARDIOVERSION;  Anesthesia Cardioversion @0930;  Surgeon: GENERIC ANESTHESIA PROVIDER;  Location: [facility]     ANESTHESIA CARDIOVERSION N/A 12/20/2018    Procedure: Anesthesia Coverage Cardioversion @0830;  Surgeon: GENERIC ANESTHESIA PROVIDER;  Location: [facility]     ANESTHESIA CARDIOVERSION N/A 8/21/2019    Procedure: Anesthesia Coverage Cardioversion @0800;  Surgeon: GENERIC ANESTHESIA PROVIDER;  Location: [facility]     ANESTHESIA  CARDIOVERSION N/A 1/16/2020    Procedure: ANESTHESIA, FOR CARDIOVERSION;  Surgeon: GENERIC ANESTHESIA PROVIDER;  Location: [facility]     ARTHROPLASTY HIP  1/15/2014    Procedure: ARTHROPLASTY HIP;  Left Total Hip Arthroplasty;  Surgeon: Nelson Gaspar MD;  Location: [facility]     ARTHROPLASTY HIP Right 6/5/2019    Procedure: Right Total Hip Arthroplasty;  Surgeon: Nelson Gaspar MD;  Location: [facility] OR     CARDIAC SURGERY       COLONOSCOPY N/A 5/18/2018    Procedure: COMBINED COLONOSCOPY, SINGLE OR MULTIPLE BIOPSY/POLYPECTOMY BY BIOPSY;  COLONOSCOPY (PT HAS DEFIBRILLATOR) ;  Surgeon: Mike Nickerson MD;  Location:  GI     CV RIGHT HEART CATH MEASUREMENTS RECORDED N/A 8/19/2019    Procedure: CV RIGHT HEART CATH;  Surgeon: Cisco Rausch MD;  Location:  HEART CARDIAC CATH LAB     CV RIGHT HEART CATH MEASUREMENTS RECORDED N/A 8/21/2019    Procedure: Right Heart Cath;  Surgeon: Ciaran Burton MD;  Location:  HEART CARDIAC CATH LAB     EP ABLATION FOCAL AFIB N/A 5/14/2020    Procedure: EP ABLATION FOCAL AFIB;  Surgeon: Erik Cooper MD;  Location:  HEART CARDIAC CATH LAB     H ABLATION FOCAL AFIB  12/9/11    Left atrial ablation to eliminate atrial fibrillation     IMPLANT AUTOMATIC IMPLANTABLE CARDIOVERTER DEFIBRILLATOR  7/27/12    AICD implantation     TONSILLECTOMY  1964       ALLERGIES:     No Known Allergies    FAMILY HISTORY:  Family History   Problem Relation Age of Onset     Heart Disease Mother         unknown     Obesity Mother      Gastrointestinal Disease Mother         diverticulitis     Diabetes Maternal Grandmother      Diabetes Paternal Grandmother      Cerebrovascular Disease Paternal Grandmother 94     Diabetes Paternal Grandfather      Cerebrovascular Disease Paternal Grandfather 78     Diabetes Son      C.A.D. Father         CABG age 78     Heart Disease Father         CABG x5     Diabetes Maternal Grandfather      LUNG DISEASE No family hx of      Deep Vein Thrombosis (DVT) No family hx of       Anesthesia Reaction No family hx of      Melanoma No family hx of      Skin Cancer No family hx of        SOCIAL HISTORY:  Social History     Tobacco Use     Smoking status: Former Smoker     Packs/day: 1.00     Years: 40.00     Pack years: 40.00     Types: Cigarettes     Start date: 1975     Quit date: 2015     Years since quittin.3     Smokeless tobacco: Never Used   Substance Use Topics     Alcohol use: Yes     Alcohol/week: 0.0 standard drinks     Comment: very minimal     Drug use: No       ROS:   A comprehensive 10 point review of systems negative other than as mentioned in HPI.  Exam:  /84   Pulse 67   Wt 96.2 kg (212 lb)   SpO2 95%   BMI 29.57 kg/m    GENERAL APPEARANCE: healthy, alert and no distress  HEENT: no icterus, no xanthelasmas, normal pupil size and reaction, normal palate, mucosa moist, no central cyanosis  NECK: supple.  RESPIRATORY: lungs clear to auscultation - no rales, rhonchi or wheezes, no use of accessory muscles, no retractions, respirations are unlabored, normal respiratory rate  CARDIOVASCULAR:regular, S1/S2, no murmur, click or rub, precordium quiet with normal PMI.  ABDOMEN: soft, non tender, bowel sounds normal, non distended.   EXTREMITIES: peripheral pulses normal, no edema, no bruits  NEURO: alert and oriented to person/place/time, normal speech, gait and affect  SKIN: no ecchymoses, no rashes    Labs:  Reviewed.     Procedures:  12/15/16 ECHOCARDIOGRAM:   Interpretation Summary  Mildly (EF 40-45%) reduced left ventricular function is present. Mild  asymmetric septal hypertrophy is present (14 mm). No dynamic outflow tract  obstruction is present.  Global right ventricular function is normal.  No significant valvular abnormalities are identified.  No pericardial effusion is present.    2018 STRESS ECHO:  Interpretation Summary  Submaximal treadmill stress test in a patient with a diagnosis of HCM.     The Ramirez treadmill score is 8 (low  risk).  Exercise was stopped due to fatigue.  Normal blood pressure response to exercise.  No ECG evidence of ischemia.  No supraventricular or ventricular arrhythmias. Frequent PVCs noted throughout  the study.     Normal biventricular function with mild asymmetrical septal hypertrophy (12  mm).  No dynamic outflow tract obstruction is present at rest or with exercise.  Normal segmental wall motion at rest and with exercise.  Minimal augmentation in LV function with exercise.     Average functional capacity for age.    5/2020 ECHOCARDIOGRAM:     Left ventricular systolic function is mildly reduced.  The visual ejection fraction is estimated at 45-50%.  Right ventricular systolic pressure is elevated, consistent with moderate  pulmonary hypertension.  The right ventricular systolic function is normal.  The right ventricle is normal size.  Compared to recent ALEJANDRA, EF again mildly reduced. RVSP increased compared to  prior TTE from 12/19.    Assessment and Plan:   Mr. Meredith is a 65 year old male who has a past medical history significant for HCM diagnosed 2006, VT on Holter July 2012 s/p ICD 7/2012, troponin leak Oct 2013 (angiogram with non-significant CAD, LV gram showed EF 25%, recovered to 60% on TTE Dec 2013), HTN, AFib (CHADSVASC 2) with DCCVs then s/p PVI 12/2011, PVI for persistent AF on 7/28/16, PVI/CTI/CFAE with posterior wall isolation 8/17/18, s/p DCCV 8/7/17, 1/3/18,  5/5/18, 9/27/18, 12/20/2018, 1/16/2020, s/p LA AFL ablation 5/14/20, and s/p right IRISH on 6/5/2019. He presents today for follow up. He reports feeling well. He denies any chest pain/pressures, dizziness, lightheadedness, worsening shortness of breath, leg/ankle swelling, PND, orthopnea, palpitations, or syncopal symptoms. Device interrogation shows normal device function, stable lead parameters, QP 49%,  <1%, 6 ATR up to 2 minutes, and 14 NSVT up to 2 seconds. Presenting 12 lead ECG shows AP Vent Rate 60 bpm,  ms, QRS 96 ms, QTc 464  msMicheal He reports he will be moving to [place] this Summer. Current cardiac medications include: Spironolactone, Lipitor, dofetilide, Toprol XL, and Xarelto.    Persistent Atrial Fibrillation:   We discussed in detail with the patient management/treatment options for AF/AFL includin. Stroke Prophylaxis:  CHADSVASC= 2, corresponding to a 2.2% annual stroke / systemic emolism event rate. and he also has HCM indicating need for long term oral anticoagulation.  He is on Xarelto. No bleeding issues.   2. Rate Control: Continue Metoprolol.   3. Rhythm Control: He has had a PVI ablation in  with several recurrences requiring DCCV and then repeat PVI in 2016. He had previously failed multaq and Sotalol.  He had some recurrent episodes of AT/AF requiring DCCV on 17 and 1/3/18.  Device check showed AT episode that started 18 and lasted 25 days and self terminated. He then had recurrent AT/AF and had DCCV on 18. He then recurred back into AT/AF since 18. He underwent a repeat PVI/CTI on 18. He subsequently had DCCV on 18, 2018. Then went back into persistent AF on 3/14/19. His main symptoms are fatigue, MART, and decreased stamina. Unclear if this was related to AF, HCM worsening, or possibly Sotalol. Therefore, we stopped his Sotalol and he underwent RHC in AF, dofetolide loading, DCCV, then repeat RHC in SR. This showed no significant hemodynamic changes in AF vs SR.He had another recurrence of AFL and had DCCV on 20. Then went back into AFL on 20. He does feel much better from a fatigue standpoint being on dofetilide. QTC and renal function stable. He recurred back into an atypical AF and we elected to pursue another ablation and had LA AFL ablation on 20. He is doing well thus far without recurrences thus far. Given high scar burden, we have elected to keep him on AAT.   4. Risk Factor Management: maintain tight BP control, and GONZALEZ evaluation as indicated.       Hypertrophic cardiomyopathy:  -Continue beta blockers  -Encouraged adequate hydration and avoidance of strenous physical activity   -Reinforced exercise recommendations with HCM (e.g. Circ 2014 recommendations: Avoidance of burst exercise, competitive training,weight-lifting)  -Family screening of 1st degree relatives recommended.   - ICD implanted in 7/2012  He will be moving to [place] and will be establishing with a team there. We advised to follow up with EP in 1 year.     The patient states understanding and is agreeable with plan.   This patient was seen and evaluated with Dr. Cooper. The above note reflects our joint assessment and plan.   KARSON Richardson CNP  Pager: [phone]    EP STAFF NOTE  I have seen and examined the patient as part of a shared visit with HOLLY Richardson NP.  I agree with the note above. I reviewed today's vital signs and medications. I have reviewed and discussed with the advanced practice provider their physical examination, assessment, and plan   Briefly, HCM, persistent AF s/p ablation, now doing very well  My key history/exam findings are: RRR.   The key management decisions made by me: f/u as needed, moving to [place].    Erik Cooper MD [facility]  Cardiology - Electrophysiology

## 2021-04-09 NOTE — PROGRESS NOTES
SUBJECTIVE:  Stu Meredith is a 65 year old male with pmh of   Past Medical History:   Diagnosis Date     Atrial fibrillation (H) 12/9/11    failed medication, multiple DC cardioveresions; s/p Left atrial ablation to eliminate atrial fibrillation 12/9/11     Chest pain      CHF (congestive heart failure) (H) 7/30/2016     Coronary artery disease      DJD (degenerative joint disease)      Fatigue 7/15/2019     Hip arthritis 1/15/2014     Hypertension      Hypertrophic cardiomyopathy (H) 10/09     Other abnormal heart sounds      Pacemaker     ICD     Palpitations      Pneumonia, organism unspecified(486)      Prediabetes 7/10/15    A1C 6.5     Status post implantation of automatic cardioverter/defibrillator (AICD)      Type 2 diabetes mellitus without complication, without long-term current use of insulin (H) 7/13/2020     Ventricular tachycardia (H)      Who comes in for preventive examination today.     PMHx notable for hypertensive cardiomyopathy, VT s/p ICD (on defetilide), a fib s/p cardioversion and multiple ablations (on Xarelto, non obstructive CAD, Hypertension (on metoprolol and eplerenone), DM (on metformin), chronic insomnia.    Saw Dr. Ware this morning for hypertrophic cardiomyopathy and Dr. Cooper in EP Cards for VT and afib. Dr. Ware had recommended SGLT2 inhibitor for his CHF/DM.     Diabetes--A1c at goal, 6.3 today. Is currently on Metformin 1000 mg. Does not check blood sugars.  Hx peripheral neuropathy from toes to forefoot, mostly a throbbing. Wears Hernandez shoes with an elastic band, can feel like this is cutting off circulation, and hurts/throbs if he wears these too long. Taking Gabapentin 600 mg BID, increased to 900 mg BID last week, thought it was helping a little bit. No wounds or ulcers on the feet.    Ridge in thumb nails. Wonders about B12 deficiency. Already takes a supplement. No nail trauma.  Saw Eliza Motta in Dermatology on 4/5/21, hx SCC & BCC (see notes), had LN2 for  AK.    Had diabetic eye exam last . No concerning findings.    Chronic insomnia--Hasn't been able to get off Ambien, has been on for >10 years. Has been advised on the risks of hypnotics in previous visits and has been advised to reduce use, advised to see Sleep Medicine Clinic, work on sleep medicine, CBT. Has not seen sleep medicine Requests a 6 month supply.    Activity--doesn't walk or much exercise d/t foot pain, hip replacement and winter. Has been active getting house ready for sale, cleaning every room in the house, staying active with packing.    Cough and SOB, not using Airduo as regularly. Have avoided albuterol d/t tachycardia risks with his cardiac history.    Retired. On long term disability d/t heart disease and bilat hip replacements. Moving to [place], [place] in July.    Medications and allergies were reviewed by me today.     Family History   Problem Relation Age of Onset     Heart Disease Mother         unknown     Obesity Mother      Gastrointestinal Disease Mother         diverticulitis     Diabetes Maternal Grandmother      Diabetes Paternal Grandmother      Cerebrovascular Disease Paternal Grandmother 94     Diabetes Paternal Grandfather      Cerebrovascular Disease Paternal Grandfather 78     Diabetes Son      C.A.D. Father         CABG age 78     Heart Disease Father         CABG x5     Diabetes Maternal Grandfather      LUNG DISEASE No family hx of      Deep Vein Thrombosis (DVT) No family hx of      Anesthesia Reaction No family hx of      Melanoma No family hx of      Skin Cancer No family hx of        Social History     Tobacco Use     Smoking status: Former Smoker     Packs/day: 1.00     Years: 40.00     Pack years: 40.00     Types: Cigarettes     Start date: 1975     Quit date: 2015     Years since quittin.3     Smokeless tobacco: Never Used   Substance Use Topics     Alcohol use: Yes     Alcohol/week: 0.0 standard drinks     Comment: very minimal     Drug  use: No     Review Of Systems  Constitutional: no fevers, chills, night sweats or unintentional weight change   Eyes: no vision change, diplopia or red eyes   Ears, Nose, Mouth, Throat: no tinnitus or hearing change, no epistaxis or nasal discharge, no oral lesions, throat clear   Cardiovascular: no chest pain, no pain with walking, no orthopnea or PND, +occasional palpitations, no syncope/presyncope, weight stable   Respiratory: stable chronic dyspnea, unchanged, no productive cough  GI: no nausea, vomiting, diarrhea or constipation, no abdominal pain   : no change in urine, no dysuria or hematuria, no sexual dysfunction   Musculoskeletal: no joint or muscle pain or swelling   Integumentary: no concerning lesions or moles   Neuro: no loss of strength or sensation, no numbness or tingling, no tremor, no dizziness, no headache   Endo: no polyuria or polydipsia, no temperature intolerance   Psych: no depression or anxiety, +sleep problems      OBJECTIVE:  /73 (BP Location: Right arm, Patient Position: Sitting, Cuff Size: Adult Large)   Pulse 61   Wt 96.2 kg (212 lb)   SpO2 96%   BMI 29.57 kg/m      GENERAL APPEARANCE: healthy, alert and no distress  EYES: Eyes grossly normal to inspection, conjunctivae and sclerae normal and lids and lashes normal  HENT: ear canals and TM's normal and nose and mouth without ulcers or lesions  NECK: no adenopathy, no asymmetry, masses, and thyroid normal to palpation, L lateral scar  RESP: lungs clear to auscultation - no rales, rhonchi or wheezes  CV: regular rates and rhythm, normal S1 S2, no S3 or S4 and no murmur, click or rub,   LYMPHATICS: no cervical adenopathy  GI: soft, nontender, without hepatosplenomegaly or masses and bowel sounds normal  MS: extremities normal- no gross deformities noted  SKIN: no suspicious lesions or rashes  NEURO: Normal strength and tone, mentation intact, speech normal and DTR symmetrically normal in lower extremities  PSYCH: mentation  appears normal and affect normal/bright  Foot Exam:  normal DP and PT pulses, no trophic changes or ulcerative lesions, sensation intact to light touch throughout, reduced monofilament over distal toes    ASSESSMENT/PLAN:  Pt is a 65 year old male here for preventive examination    1. Neuropathy  Will increase Gabapentin to 900 mg BID.   - gabapentin (NEURONTIN) 300 MG capsule; Take 3 capsules (900 mg) by mouth 2 times daily  Dispense: 360 capsule; Refill: 2    2. Type 2 diabetes, controlled, with peripheral neuropathy (H)  Diabetes well controlled on Metformin 1000 mg daily.   - [place] FOOT EXAM NO CHARGE  - empagliflozin (JARDIANCE) 10 MG TABS tablet; Take 1 tablet (10 mg) by mouth daily  Dispense: 90 tablet; Refill: 1    3. CARDIOVASCULAR SCREENING; LDL GOAL LESS THAN 130  Refill provided, lipid panel done this morning, cholesterol well controlled.   - atorvastatin (LIPITOR) 20 MG tablet; Take 1 tablet (20 mg) by mouth daily  Dispense: 90 tablet; Refill: 3    4. Congestive heart failure, unspecified HF chronicity, unspecified heart failure type (H)  SGLT2 inhibitor indicated for CHF, discussed this can reduce risk of hospitalization for HF and help reduce risk of cardiovascular death. Reviewed mechanism of action, potential side effects, slow position changes, monitor for s/s hypotension, hypoglycemia or UTI. Discussed rare but possible increased risk for amputation, help prevent this by continuing with good foot skin care, wearing shoes, wound/ulcer prevention.  - empagliflozin (JARDIANCE) 10 MG TABS tablet; Take 1 tablet (10 mg) by mouth daily  Dispense: 90 tablet; Refill: 1    5. Type 2 diabetes mellitus without complication, without long-term current use of insulin (H)  Refill provided.  - metFORMIN (GLUCOPHAGE-XR) 500 MG 24 hr tablet; Take 2 tablets (1,000 mg) by mouth daily (with dinner)  Dispense: 180 tablet; Refill: 3    6. Routine history and physical examination of adult  Continue working on healthy  lifestyle with increased physical activity as tolerated, nutritious diet with good portion control, focusing on lean proteins and vegetable/fruit intake, stress management and safety. Recommend establishing new PCP once he is in [place].     FOLLOW UP: If not improving or if worsening    Colorectal screening not indicated--done in 2018, polyps removed, advised repeat colonoscopy in 5 years (due May 2023)  Osteoporosis screening not indicated.    Immunizations reviewed--Covid vaccine completed (Moderna, 3/9 and 4/6/21)  Labs ordered  None--done earlier today.       KARSON Hdz CNP

## 2021-04-09 NOTE — NURSING NOTE
Diet: Patient instructed regarding a heart healthy diet, including discussion of reduced fat and sodium intake. Patient demonstrated understanding of this information and agreed to call with further questions or concerns.     Med Reconcile: Reviewed and verified all current medications with the patient. The updated medication list was printed and given to the patient.    Return Appointment: Patient given instructions regarding scheduling next clinic visit. Patient demonstrated understanding of this information and agreed to call with further questions or concerns.    Patient stated he understood all health information given and agreed to call with further questions or concerns.    Dimple Birch, MSN, RN, CNL  Cardiology Care Coordinator  [facility] [place] [facility]  [phone]

## 2021-04-09 NOTE — PATIENT INSTRUCTIONS
Patient Education     Jardiance Oral Tablet 10 mg   Uses  For diabetes.  Instructions  This medicine may be taken with or without food.  It is very important that you take the medicine at about the same time every day. It will work best if you do this.  Store at room temperature in a dry place. Do not keep in the bathroom.  Keep the medicine away from heat and light.  Drink plenty of water while on this medicine.  Tell your doctor if you have severe or persistent sweating, diarrhea or vomiting. These can increase your risk of a serious side effect.  It is important that you keep taking each dose of this medicine on time even if you are feeling well.  If you forget to take a dose on time, take it as soon as you remember. If it is almost time for the next dose, do not take the missed dose. Return to your normal dosing schedule. Do not take 2 doses of this medicine at one time.  Please tell your doctor and pharmacist about all the medicines you take. Include both prescription and over-the-counter medicines. Also tell them about any vitamins, herbal medicines, or anything else you take for your health.  If your symptoms do not improve or they worsen while on this medicine, contact your doctor.  This medicine may cause low blood sugar. It is very important to have regular meals and exercise regularly as instructed by your doctor. Notify your doctor if you experience symptoms of low blood sugar.  Symptoms of low blood sugar may include nausea, shaking, sweating, cold skin, fast heartbeat, hunger, and irritability.  It is very important that you follow your doctor's instructions for all blood tests.  It is very important that you keep all appointments for medical exams and tests while on this medicine.  Cautions  Tell your doctor and pharmacist if you ever had an allergic reaction to a medicine. Symptoms of an allergic reaction can include trouble breathing, skin rash, itching, swelling, or severe dizziness.  This medicine  is associated with a rare but very serious medical condition. Please speak with your doctor about symptoms you should look out for while on this medicine. Notify your doctor immediately if you develop those symptoms.  Some patients taking this medicine have experienced serious side effects. Please speak with your doctor to understand the risks and benefits associated with this medicine.  Do not use the medication any more than instructed.  This medicine may cause dizziness or fainting, especially after exercising or in hot weather. Be very careful when standing or sitting up quickly.  Your ability to stay alert or to react quickly may be impaired by this medicine. Do not drive or operate machinery until you know how this medicine will affect you.  Please check with your doctor before drinking alcohol while on this medicine.  If you drink more than a few alcoholic beverages each day, ask your doctor whether you should be on this medicine.  Avoid becoming overheated during exercise or other activities. Try to stay cool in hot weather.  Tell the doctor or pharmacist if you are pregnant, planning to be pregnant, or breastfeeding.  Do not breastfeed while on this medicine.  Ask your pharmacist if this medicine can interact with any of your other medicines. Be sure to tell them about all the medicines you take.  Please tell all your doctors and dentists that you are on this medicine before they provide care.  Do not start or stop any other medicines without first speaking to your doctor or pharmacist.  Do not share this medicine with anyone who has not been prescribed this medicine.  This medicine can cause serious side effects in some patients. Important information from the [place] Food and Drug Administration (FDA) is available from your pharmacist. Please review it carefully with your pharmacist to understand the risks associated with this medicine.  Always refill this medicine before it runs out.  Side Effects  The  following is a list of some common side effects from this medicine. Please speak with your doctor about what you should do if you experience these or other side effects.    dizziness    increased urinary frequency    waking up at night to urinate  Call your doctor or get medical help right away if you notice any of these more serious side effects:    blurry vision    dry mouth    fainting    fast or irregular heart beats    nausea    feeling of numbness or tingling in your hands and feet    shakiness    shortness of breath    stomach pain    thirst    unusual or unexplained tiredness or weakness    difficulty or discomfort urinating    blood in urine    vaginal itching or discharge    vaginal itching or yeast infection    vomiting    yeast infection of the penis (redness, itching, swelling or discharge)  A few people may have an allergic reactions to this medicine. Symptoms can include difficulty breathing, skin rash, itching, swelling, or severe dizziness. If you notice any of these symptoms, seek medical help quickly.  Extra  Please speak with your doctor, nurse, or pharmacist if you have any questions about this medicine.  [email or web address]  IMPORTANT NOTE: This document tells you briefly how to take your medicine, but it does not tell you all there is to know about it.Your doctor or pharmacist may give you other documents about your medicine. Please talk to them if you have any questions.Always follow their advice. There is a more complete description of this medicine available in English.Scan this code on your smartphone or tablet or use the web address below. You can also ask your pharmacist for a printout. If you have any questions, please ask your pharmacist.     2021 [employer].

## 2021-04-09 NOTE — PATIENT INSTRUCTIONS
You were seen today in the Adult Congenital and Cardiovascular Genetics Clinic at the [facility].    Cardiology Providers you saw during your visit:  Mona Ware MD and Erik Cooper MD    Diagnosis:  HCM    Results:  Mona Ware MD and Erik Cooper MD reviewed the results of your EKG and lab testing today in clinic.    Recommendations:    1. Continue to eat a heart healthy, low salt diet.  2. Continue to get 20-30 minutes of aerobic activity, 4-5 days per week.  Examples of aerobic activity include walking, running, swimming, cycling, etc.  3. Continue to observe good oral hygiene, with regular dental visits.  4. We recommend adding a SGLT-2 inhibitor for your diabetes control. Examples are Faxiga and Jardiance.    SBE prophylaxis:   Yes____  No__X__    Lifelong Bacterial Endocarditis Prophylaxis:  YES____  NO____    If YES is checked, follow the recommendations outlined below:  1. Take antibiotic(s) prior to recommended dental procedures and procedures on the respiratory tract or with infected skin, muscle or bones. SBE prophylaxis is not needed for routine GI and  procedures (ie. Colonoscopy or vaginal delivery)  2. Observe good oral hygiene daily, as advised by your dentist. Get regular professional dental care.  3. Keep cuts clean.  4. Infections should be treated promptly.  5. Symptoms of Infective Endocarditis could include: fever lasting more than 4-5 days or a recurrent fever that initially resolves but returns within 1-2 days)    Exercise restrictions:   Yes__X__  No____         If yes, list restrictions:  Must be allowed to rest if fatigued or SOB    Work restrictions:  Yes____  No_X___         If yes, list restrictions:    FASTING CHOLESTEROL was checked in the last 5 years YES_X_  NO___ (2019)  Continue to eat a heart healthy, low salt diet.         ____ Fasting lipid panel order today         ____ No changes in medications          ____ I recommend the following changes in your  cholesterol medications.:          ____ Please follow up for cholesterol screening at your primary care physician    Follow-up:  We will help you find a [place] provider. If needed, we will be happy to see you.     If you have questions or concerns please contact us at:    Dimpel Birch, MSN, RN, CNL    Radhika Mitchell (Scheduling)  Nurse Care Coordinator     Clinic   Adult Congenital and CV Genetics   Adult Congenital and CV Genetic  [facility]  (P) [phone]     (P) [phone]  [email or web address]   (F) [phone]        For after hours urgent needs, call [phone] and ask to speak to the Adult Congenital Physician on call.  Mention Job Code 0401.    For emergencies call 911.    [facility]   Clinics and [facility] Center  Mail Code [id number]  [address] [place]  [address]

## 2021-04-09 NOTE — NURSING NOTE
Diet: Patient instructed regarding a heart healthy diet, including discussion of reduced fat and sodium intake. Patient demonstrated understanding of this information and agreed to call with further questions or concerns.     Med Reconcile: Reviewed and verified all current medications with the patient. The updated medication list was printed and given to the patient.    Return Appointment: Patient given instructions regarding scheduling next clinic visit. Patient demonstrated understanding of this information and agreed to call with further questions or concerns.    Patient stated he understood all health information given and agreed to call with further questions or concerns.    Dimple Birch, MSN, RN, CNL  Cardiology Care Coordinator  [facility]  [phone]

## 2021-04-09 NOTE — LETTER
4/9/2021      RE: Stu Meredith  [address] [facility] [id number]       Dear Colleague,    Thank you for the opportunity to participate in the care of your patient, Stu Meredith, at the [facility] [place] [facility]. Please see a copy of my visit note below.    Electrophysiology Clinic Note  HPI:   Mr. Meredith is a 65 year old male who has a past medical history significant for HCM diagnosed 2006, VT on Holter July 2012 s/p ICD 7/2012, troponin leak Oct 2013 (angiogram with non-significant CAD, LV gram showed EF 25%, recovered to 60% on TTE Dec 2013), HTN, AFib (CHADSVASC 2) with DCCVs then s/p PVI 12/2011, PVI for persistent AF on 7/28/16, PVI/CTI/CFAE with posterior wall isolation 8/17/18, s/p DCCV 8/7/17, 1/3/18,  5/5/18, 9/27/18, 12/20/2018, 1/16/2020, s/p LA AFL ablation 5/14/20, and s/p right IRISH on 6/5/2019. He presents for follow-up.  The patient underwent atrial fibrillation ablation by Dr. Gonzalez at [facility] in 12/2011 and implantation of an ICD in 07/2012 because of ventricular tachycardia. As per patient, he has been in sinus rhythm approximately 1-1/2 years after ablation. The patient was found to have recurrent atrial fibrillation during a preoperative exam in 10/2013 and underwent electrical cardioversion in 11/2013. The patient underwent hip surgery in 01/2014. The patient noticed that he was in atrial fibrillation at a clinic visit in 01/2014.   He was seen in consult by Dr Analilia spain on 2/14/14 for consideration of ablation. At the time the patient was reported to be generally unaware of whether or not he is in sinus rhythm or in atrial fibrillation. He reports daytime somnolence but denies significant dyspnea on exertion, palpitations, irregular beat sensation, presyncope or syncope. In terms of risk factors for stroke, the patient has coronary heart disease, hypertension, but denies history of diabetes,  previous myocardial cardiac infarction or heart failure. The patient is on rivaroxaban for stroke prophylaxis and is on diltiazem and metoprolol. He was told that there was no consideration of ablation.   He came to EP clinic for second opinion in 3/3/2014. The patient mentioned this time he is not sure whether his afib is causing fatigue. However, he also attributed symptoms like headache and head fullness to his afib. He also had a cold around the same time. He still denied chest pain, syncope or presyncope, ICD shocks, MART or SOB. He does however indicate this time that he does feel irregular heart beat at times, along with chronic fatigue, although is not sure if fatigue is linked to afib. We agreed at that time to pursue a cardioversion to see whether he would feel better and we started him on sotalol 80 mg twice a day. He underwent cardioversion on 4/24/2014.  He then had a device transmission showed recurrent AF in 7/2014 which spontaneously converted. We decrease his diltiazem from 240 to 120 mg daily because of fatigue. His fatigue did get better when we decrease his diltiazem.  He did have a 9 day episode of A. Fib starting June 19, 2015, during which the patient felt very tired and no energy.  The heart rate was well controlled.  A. Fib burden was 19% since 5/19/2015, but it is the first episode of A. Fib since at least November 2014.  However at follow up in 2016 AF burden 1%. At follow up in 5/2016 his AF burden= 100% since 2/28/16. He reported some increased fatgiue and decreased stamina since being back in AF. At that visit, he elected to pursue repeat PVI ablation for AF that is refractory to Sotalol. He underwent repeat PVI ablation for persistent AF refractory to AATs on 7/28/16. He had successful re-isolation of all 4 pulmonary veins. He was re-hospitalized a couple days post ablation for SOB assocaited with hypervolemia which resolved with diuresis. He reports feeling well after ablation. He  states his energy levels improved with restoration of sinus. Diltiazem was stopped after ablation.  He has now had some recurrent episodes of AT/AF requiring DCCV on 8/7/17 and 1/3/18.  Device check showed AT episode that started 1/28/18 and lasted 25 days and self terminated. He then had recurrent AT/AF and had DCCV on 5/5/18. He followed up with Dr. Ware recently in clinic and reported having fatigue, MART, and decreased stamina. Device interrogation showed he had recurred back into AT/AF since 5/26/18. He was arranged for EP follow up.     Ep Visit 6/2018: He continues to have fatigue, MART, and decreased stamina. Last stress echo from 6/1/18 showed normal biventricular function with asymmetrical septal hypertrophy and no LVOT obstruction with rest or exercise. He denies any chest pain/pressures, dizziness, lightheadedness, leg/ankle swelling, PND, orthopnea, palpitations, or syncopal symptoms.Presenting 12 lead ECG shows AFL Vent Rate 111 bpm, QRS 80 ms, QTc 489 ms. Current cardiac medications include: Spironolactone, Lipitor, Sotalol, Toprol XL, and Xarelto.     EP Visit 7/12/19: He presents today for follow up. He underwent a repeat PVI/CTI on 8/17/18. Device shows AF has been 100% since 3/14/19. He underwent right TREVIN on 6/5/19. Recovering well after procedure. He reports issues with ongoing fatigue, MART, and decreased stamina. He denies any chest pain/pressures, dizziness, lightheadedness, leg/ankle swelling, PND, orthopnea, palpitations, or syncopal symptoms. Presenting 12 lead ECG shows atypical AFL Vent Rate 73 bpm, QRS 84 ms, QTc 420 ms. Device interrogation shows normal device function. Since 1/18/19, there have been 400 NSVT, 2 other untreated episodes, and 211 AT/AF episodes recorded.  The VT-1 monitored episodes are AF w/RVR with rates in the 140s.  The 2 NSVT episodes available for review last 12 beats in duration with rates of 162-179 bpm.  AF burden = 85% since 1/18/19 and AF appear persistent  since approximately mid 3/2019. Intrinsic rhythm = AF @  bpm. AP = 2%.  = 5%. Lead trends appear stable. Current cardiac medications include: Spironolactone, Lipitor, Sotalol, Toprol XL, and Xarelto.     EP Visit 11/8/19: He presents today for follow up. He was arranged for dofetilide loading and had RHC in AF, then DCCV, then repeat RHC in sinus to evaluate if arrhyhtmia was vs CM vs sotalol was main  of symptoms. RHC was relatively unchanged in AF and sinus. He reports having much better energy levels off Sotalol. He denies any chest pain/pressures, dizziness, lightheadedness, worsening shortness of breath, leg/ankle swelling, PND, orthopnea, palpitations, or syncopal symptoms. Presenting 12 lead ECG shows AP with prolonged AVD Vent Rate 60 bpm,  ms, QRS 86 ms, QTc 408 ms. Device interrogation shows normal ICD function. Since last device interrogation on 7/12/19, there have been 10 AT/AF episodes, most recent on 11/3/19 lasting 7.2 hours.  AF burden since 7/12/19 = 34%.  AF burden graph shows minimal AT/AF episodes recorded since ECV on 8/21/19.  1 asymptomatic VT episode recorded on 9/16 lasting 25 seconds @ 241 bpm received ATP x 1 with successful conversion. Patient states he was ill with URI/fever that day.   34 NSVT detections also recorded with 2 egms available displaying  one 5 beat PAT and one 4 beat NSVT. Intrinsic rhythm = SB @ 50 bpm. AP = 10%.  = 3%.  Estimated battery longevity to CARMEN = 4.5 years. Lead trends appear stable. Current cardiac medications include: Spironolactone, Lipitor, dofetilide, Toprol XL, and Xarelto.     EP Visit 3/13/20: He presents today for follow up. He reports feeling fatigued and having some MART. He denies any chest pain/pressures, dizziness, lightheadedness, leg/ankle swelling, PND, orthopnea, palpitations, or syncopal symptoms. He had recurrent AF/AFL and had DCCV on 1/16/20. He recurred back into AFL on 1/29/20 and remains in it. Device interrogation  "shows normal ICD function. Since ECV on 1/16/20, 569 NSVT and 188 VT-1 monitor zone episodes recorded, AF burden = 85% and appears persistent since 1/26/20.  The available VT-1 episodes display RVR and not a true ventricular arrhythmia.   Intrinsic rhythm = Atrial flutter with a sensed ventricular response of 102-115 bpm.   AP =<1%.  = 2%.  Patient reports that he has been noticing MART and elevated heart rate with minimal activity. Presenting 12 lead ECG shows AFL Vent Rate 108 bpm, QRS 88 ms, QTc 536 ms.  Current cardiac medications include: Spironolactone, Lipitor, Sotalol, Toprol XL, and Xarelto.     EP Visit 9/11/20: He presents today for follow up. He underwent ablation on LA AFL on 5/14/20. Groin sites well healed. He reports feeling improved energy after ablation. He still has some MART unchanged. He denies any chest pain/pressures, dizziness, lightheadedness, worsening shortness of breath, leg/ankle swelling, PND, orthopnea, palpitations, or syncopal symptoms. Last echo from 5/2020 showed LVEF 45-50%, normal RV size/function, and moderate pulmonary HTN. Device interrogation shows normal ICD function. 15 NSVT episodes recorded, 2 EGM for review lasting 2 - 2.5 seconds @ 172 - 182 bpm. 1 \"other untreated\" episode recorded lasting 13 seconds @ 193 bpm. 5 AT/AF episodes recorded lasting 4 sec to 4 minutes. Patient is on Pradaxa. Presenting EGM = AP/VS @ 60 bpm. AP = 26%.  = 0%.Estimated battery longevity to CARMEN = 3.5 years. Lead trends appear stable. Current cardiac medications include: Spironolactone, Lipitor, dofetilide, Toprol XL, and Xarelto.     He presents today for follow up. He reports feeling well. He denies any chest pain/pressures, dizziness, lightheadedness, worsening shortness of breath, leg/ankle swelling, PND, orthopnea, palpitations, or syncopal symptoms. Device interrogation shows normal device function, stable lead parameters, QP 49%,  <1%, 6 ATR up to 2 minutes, and 14 NSVT up to 2 " seconds. Presenting 12 lead ECG shows AP Vent Rate 60 bpm,  ms, QRS 96 ms, QTc 464 ms. He reports he will be moving to [place] this Summer. Current cardiac medications include: Spironolactone, Lipitor, dofetilide, Toprol XL, and Xarelto.    PAST MEDICAL HISTORY:  Past Medical History:   Diagnosis Date     Atrial fibrillation (H) 12/9/11    failed medication, multiple DC cardioveresions; s/p Left atrial ablation to eliminate atrial fibrillation 12/9/11     Chest pain      CHF (congestive heart failure) (H) 7/30/2016     Coronary artery disease      DJD (degenerative joint disease)      Fatigue 7/15/2019     Hip arthritis 1/15/2014     Hypertension      Hypertrophic cardiomyopathy (H) 10/09     Other abnormal heart sounds      Pacemaker     ICD     Palpitations      Pneumonia, organism unspecified(486)      Prediabetes 7/10/15    A1C 6.5     Status post implantation of automatic cardioverter/defibrillator (AICD)      Type 2 diabetes mellitus without complication, without long-term current use of insulin (H) 7/13/2020     Ventricular tachycardia (H)        CURRENT MEDICATIONS:  Current Outpatient Medications   Medication Sig Dispense Refill     acetaminophen (TYLENOL) 325 MG tablet Take 325-650 mg by mouth every 6 hours as needed       amoxicillin (AMOXIL) 500 MG tablet Take 4 tablets (2000 mg) by mouth 1 hour before dental procedures. 12 tablet 3     atorvastatin (LIPITOR) 20 MG tablet Take 1 tablet (20 mg) by mouth daily 90 tablet 3     cyanocobalamin (VITAMIN B-12) 100 MCG tablet Take 100 mcg by mouth daily       dofetilide (TIKOSYN) 250 MCG capsule Take 1 capsule (250 mcg) by mouth 2 times daily 180 capsule 3     eplerenone (INSPRA) 50 MG tablet TAKE ONE TABLET BY MOUTH ONCE DAILY 90 tablet 2     fluticasone-salmeterol (AIRDUO RESPICLICK) 113-14 MCG/ACT inhaler INHALE ONE PUFF INTO THE LUNGS TWICE A DAY 2 each 3     furosemide (LASIX) 20 MG tablet Take 1 tablet (20 mg) by mouth daily In PM 90 tablet 3      furosemide (LASIX) 40 MG tablet Take 1 tablet (40 mg) by mouth daily In AM 90 tablet 3     gabapentin (NEURONTIN) 300 MG capsule Take 2 capsules (600 mg) by mouth 2 times daily 360 capsule 3     metFORMIN (GLUCOPHAGE-XR) 500 MG 24 hr tablet Take 2 tablets (1,000 mg) by mouth daily (with dinner) For additional refills, please schedule an appointment at [phone] 180 tablet 0     metoprolol succinate ER (TOPROL-XL) 50 MG 24 hr tablet Take 1 tablet (50 mg) by mouth daily 90 tablet 3     multivitamin, therapeutic (THERA-VIT) TABS Take 1 tablet by mouth daily       sildenafil (VIAGRA) 100 MG tablet Take 0.5-1 tablets ( mg) by mouth daily as needed (take 1 hour before intercourse) 30 tablet 12     XARELTO ANTICOAGULANT 20 MG TABS tablet TAKE ONE TABLET BY MOUTH EVERY DAY WITH DINNER 90 tablet 3     zolpidem (AMBIEN) 10 MG tablet TAKE HALF TABLET BY MOUTH  NIGHTLY AS NEEDED FOR SLEEP 90 tablet 0       PAST SURGICAL HISTORY:  Past Surgical History:   Procedure Laterality Date     ANESTHESIA CARDIOVERSION  4/24/2014    Procedure: ANESTHESIA CARDIOVERSION;  Surgeon: Generic Anesthesia Provider;  Location: [facility]     ANESTHESIA CARDIOVERSION N/A 5/12/2016    Procedure: ANESTHESIA CARDIOVERSION;  Surgeon: GENERIC ANESTHESIA PROVIDER;  Location: [facility] OR     ANESTHESIA CARDIOVERSION N/A 8/7/2017    Procedure: ANESTHESIA CARDIOVERSION;  Anesthesia Offsite Coverage Cardioversion @1100;  Surgeon: GENERIC ANESTHESIA PROVIDER;  Location: [facility] OR     ANESTHESIA CARDIOVERSION N/A 1/3/2018    Procedure: ANESTHESIA CARDIOVERSION;  Anesthesia Cardioverion;  Surgeon: GENERIC ANESTHESIA PROVIDER;  Location: [facility]     ANESTHESIA CARDIOVERSION N/A 5/4/2018    Procedure: ANESTHESIA CARDIOVERSION;  Anesthesia Coverage Cardioversion @1400;  Surgeon: GENERIC ANESTHESIA PROVIDER;  Location: [facility]     ANESTHESIA CARDIOVERSION N/A 9/27/2018    Procedure: ANESTHESIA CARDIOVERSION;  Anesthesia Cardioversion @0930;  Surgeon: GENERIC ANESTHESIA  PROVIDER;  Location: [facility]     ANESTHESIA CARDIOVERSION N/A 12/20/2018    Procedure: Anesthesia Coverage Cardioversion @0830;  Surgeon: GENERIC ANESTHESIA PROVIDER;  Location: [facility]     ANESTHESIA CARDIOVERSION N/A 8/21/2019    Procedure: Anesthesia Coverage Cardioversion @0800;  Surgeon: GENERIC ANESTHESIA PROVIDER;  Location: [facility]     ANESTHESIA CARDIOVERSION N/A 1/16/2020    Procedure: ANESTHESIA, FOR CARDIOVERSION;  Surgeon: GENERIC ANESTHESIA PROVIDER;  Location: [facility]     ARTHROPLASTY HIP  1/15/2014    Procedure: ARTHROPLASTY HIP;  Left Total Hip Arthroplasty;  Surgeon: Nelson Gaspar MD;  Location: [facility] OR     ARTHROPLASTY HIP Right 6/5/2019    Procedure: Right Total Hip Arthroplasty;  Surgeon: Nelson Gaspar MD;  Location: [facility] OR     CARDIAC SURGERY       COLONOSCOPY N/A 5/18/2018    Procedure: COMBINED COLONOSCOPY, SINGLE OR MULTIPLE BIOPSY/POLYPECTOMY BY BIOPSY;  COLONOSCOPY (PT HAS DEFIBRILLATOR) ;  Surgeon: Mike Nickerson MD;  Location:  GI     CV RIGHT HEART CATH MEASUREMENTS RECORDED N/A 8/19/2019    Procedure: CV RIGHT HEART CATH;  Surgeon: Cisco Rausch MD;  Location:  HEART CARDIAC CATH LAB     CV RIGHT HEART CATH MEASUREMENTS RECORDED N/A 8/21/2019    Procedure: Right Heart Cath;  Surgeon: Ciaran Burton MD;  Location:  HEART CARDIAC CATH LAB     EP ABLATION FOCAL AFIB N/A 5/14/2020    Procedure: EP ABLATION FOCAL AFIB;  Surgeon: Erik Cooper MD;  Location:  HEART CARDIAC CATH LAB     H ABLATION FOCAL AFIB  12/9/11    Left atrial ablation to eliminate atrial fibrillation     IMPLANT AUTOMATIC IMPLANTABLE CARDIOVERTER DEFIBRILLATOR  7/27/12    AICD implantation     TONSILLECTOMY  1964       ALLERGIES:     No Known Allergies    FAMILY HISTORY:  Family History   Problem Relation Age of Onset     Heart Disease Mother         unknown     Obesity Mother      Gastrointestinal Disease Mother         diverticulitis     Diabetes Maternal Grandmother      Diabetes Paternal Grandmother       Cerebrovascular Disease Paternal Grandmother 94     Diabetes Paternal Grandfather      Cerebrovascular Disease Paternal Grandfather 78     Diabetes Son      C.A.D. Father         CABG age 78     Heart Disease Father         CABG x5     Diabetes Maternal Grandfather      LUNG DISEASE No family hx of      Deep Vein Thrombosis (DVT) No family hx of      Anesthesia Reaction No family hx of      Melanoma No family hx of      Skin Cancer No family hx of        SOCIAL HISTORY:  Social History     Tobacco Use     Smoking status: Former Smoker     Packs/day: 1.00     Years: 40.00     Pack years: 40.00     Types: Cigarettes     Start date: 1975     Quit date: 2015     Years since quittin.3     Smokeless tobacco: Never Used   Substance Use Topics     Alcohol use: Yes     Alcohol/week: 0.0 standard drinks     Comment: very minimal     Drug use: No       ROS:   A comprehensive 10 point review of systems negative other than as mentioned in HPI.  Exam:  /84   Pulse 67   Wt 96.2 kg (212 lb)   SpO2 95%   BMI 29.57 kg/m    GENERAL APPEARANCE: healthy, alert and no distress  HEENT: no icterus, no xanthelasmas, normal pupil size and reaction, normal palate, mucosa moist, no central cyanosis  NECK: supple.  RESPIRATORY: lungs clear to auscultation - no rales, rhonchi or wheezes, no use of accessory muscles, no retractions, respirations are unlabored, normal respiratory rate  CARDIOVASCULAR:regular, S1/S2, no murmur, click or rub, precordium quiet with normal PMI.  ABDOMEN: soft, non tender, bowel sounds normal, non distended.   EXTREMITIES: peripheral pulses normal, no edema, no bruits  NEURO: alert and oriented to person/place/time, normal speech, gait and affect  SKIN: no ecchymoses, no rashes    Labs:  Reviewed.     Procedures:  12/15/16 ECHOCARDIOGRAM:   Interpretation Summary  Mildly (EF 40-45%) reduced left ventricular function is present. Mild  asymmetric septal hypertrophy is present (14 mm). No  dynamic outflow tract  obstruction is present.  Global right ventricular function is normal.  No significant valvular abnormalities are identified.  No pericardial effusion is present.    6/2018 STRESS ECHO:  Interpretation Summary  Submaximal treadmill stress test in a patient with a diagnosis of HCM.     The Ramirez treadmill score is 8 (low risk).  Exercise was stopped due to fatigue.  Normal blood pressure response to exercise.  No ECG evidence of ischemia.  No supraventricular or ventricular arrhythmias. Frequent PVCs noted throughout  the study.     Normal biventricular function with mild asymmetrical septal hypertrophy (12  mm).  No dynamic outflow tract obstruction is present at rest or with exercise.  Normal segmental wall motion at rest and with exercise.  Minimal augmentation in LV function with exercise.     Average functional capacity for age.    5/2020 ECHOCARDIOGRAM:     Left ventricular systolic function is mildly reduced.  The visual ejection fraction is estimated at 45-50%.  Right ventricular systolic pressure is elevated, consistent with moderate  pulmonary hypertension.  The right ventricular systolic function is normal.  The right ventricle is normal size.  Compared to recent ALEJANDRA, EF again mildly reduced. RVSP increased compared to  prior TTE from 12/19.    Assessment and Plan:   Mr. Meredith is a 65 year old male who has a past medical history significant for HCM diagnosed 2006, VT on Holter July 2012 s/p ICD 7/2012, troponin leak Oct 2013 (angiogram with non-significant CAD, LV gram showed EF 25%, recovered to 60% on TTE Dec 2013), HTN, AFib (CHADSVASC 2) with DCCVs then s/p PVI 12/2011, PVI for persistent AF on 7/28/16, PVI/CTI/CFAE with posterior wall isolation 8/17/18, s/p DCCV 8/7/17, 1/3/18,  5/5/18, 9/27/18, 12/20/2018, 1/16/2020, s/p LA AFL ablation 5/14/20, and s/p right IRISH on 6/5/2019. He presents today for follow up. He reports feeling well. He denies any chest pain/pressures, dizziness,  lightheadedness, worsening shortness of breath, leg/ankle swelling, PND, orthopnea, palpitations, or syncopal symptoms. Device interrogation shows normal device function, stable lead parameters, QP 49%,  <1%, 6 ATR up to 2 minutes, and 14 NSVT up to 2 seconds. Presenting 12 lead ECG shows AP Vent Rate 60 bpm,  ms, QRS 96 ms, QTc 464 ms. He reports he will be moving to [place] this Summer. Current cardiac medications include: Spironolactone, Lipitor, dofetilide, Toprol XL, and Xarelto.    Persistent Atrial Fibrillation:   We discussed in detail with the patient management/treatment options for AF/AFL includin. Stroke Prophylaxis:  CHADSVASC= 2, corresponding to a 2.2% annual stroke / systemic emolism event rate. and he also has HCM indicating need for long term oral anticoagulation.  He is on Xarelto. No bleeding issues.   2. Rate Control: Continue Metoprolol.   3. Rhythm Control: He has had a PVI ablation in  with several recurrences requiring DCCV and then repeat PVI in 2016. He had previously failed multaq and Sotalol.  He had some recurrent episodes of AT/AF requiring DCCV on 17 and 1/3/18.  Device check showed AT episode that started 18 and lasted 25 days and self terminated. He then had recurrent AT/AF and had DCCV on 18. He then recurred back into AT/AF since 18. He underwent a repeat PVI/CTI on 18. He subsequently had DCCV on 18, 2018. Then went back into persistent AF on 3/14/19. His main symptoms are fatigue, MART, and decreased stamina. Unclear if this was related to AF, HCM worsening, or possibly Sotalol. Therefore, we stopped his Sotalol and he underwent RHC in AF, dofetolide loading, DCCV, then repeat RHC in SR. This showed no significant hemodynamic changes in AF vs SR.He had another recurrence of AFL and had DCCV on 20. Then went back into AFL on 20. He does feel much better from a fatigue standpoint being on dofetilide. QTC and renal  function stable. He recurred back into an atypical AF and we elected to pursue another ablation and had LA AFL ablation on 5/14/20. He is doing well thus far without recurrences thus far. Given high scar burden, we have elected to keep him on AAT.   4. Risk Factor Management: maintain tight BP control, and GONZALEZ evaluation as indicated.      Hypertrophic cardiomyopathy:  -Continue beta blockers  -Encouraged adequate hydration and avoidance of strenous physical activity   -Reinforced exercise recommendations with HCM (e.g. Circ 2014 recommendations: Avoidance of burst exercise, competitive training,weight-lifting)  -Family screening of 1st degree relatives recommended.   - ICD implanted in 7/2012  He will be moving to [place] and will be establishing with a team there. We advised to follow up with EP in 1 year.     The patient states understanding and is agreeable with plan.   This patient was seen and evaluated with Dr. Cooper. The above note reflects our joint assessment and plan.   KARSON Richardson CNP  Pager: [phone]    EP STAFF NOTE  I have seen and examined the patient as part of a shared visit with HOLLY Richardson NP.  I agree with the note above. I reviewed today's vital signs and medications. I have reviewed and discussed with the advanced practice provider their physical examination, assessment, and plan   Briefly, HCM, persistent AF s/p ablation, now doing very well  My key history/exam findings are: RRR.   The key management decisions made by me: f/u as needed, moving to [place].    Erik Cooper MD [facility]  Cardiology - Electrophysiology

## 2021-04-09 NOTE — NURSING NOTE
Chief Complaint   Patient presents with     Follow Up     65 year old male with history of hypertrophic cardiomyopathy presenting for follow up.      Vitals were taken and medications were reconciled. EKG was performed    Aye YANG  9:30 AM

## 2021-04-09 NOTE — PATIENT INSTRUCTIONS
You were seen today in the Adult Congenital and Cardiovascular Genetics Clinic at the [facility].    Cardiology Providers you saw during your visit:  Mona Ware MD and Erik Cooper MD    Diagnosis:  HCM    Results:  Mona Ware MD and Erik Cooper MD reviewed the results of your EKG and lab testing today in clinic.    Recommendations:    1. Continue to eat a heart healthy, low salt diet.  2. Continue to get 20-30 minutes of aerobic activity, 4-5 days per week.  Examples of aerobic activity include walking, running, swimming, cycling, etc.  3. Continue to observe good oral hygiene, with regular dental visits.  4. We recommend adding a SGLT 2 inhibitor for your diabetes control. Examples are Faxiga and Jardiance.    SBE prophylaxis:   Yes____  No__X__    Lifelong Bacterial Endocarditis Prophylaxis:  YES____  NO____    If YES is checked, follow the recommendations outlined below:  1. Take antibiotic(s) prior to recommended dental procedures and procedures on the respiratory tract or with infected skin, muscle or bones. SBE prophylaxis is not needed for routine GI and  procedures (ie. Colonoscopy or vaginal delivery)  2. Observe good oral hygiene daily, as advised by your dentist. Get regular professional dental care.  3. Keep cuts clean.  4. Infections should be treated promptly.  5. Symptoms of Infective Endocarditis could include: fever lasting more than 4-5 days or a recurrent fever that initially resolves but returns within 1-2 days)    Exercise restrictions:   Yes__X__  No____         If yes, list restrictions:  Must be allowed to rest if fatigued or SOB    Work restrictions:  Yes____  No_X___         If yes, list restrictions:    FASTING CHOLESTEROL was checked in the last 5 years YES_X_  NO___ (2019)  Continue to eat a heart healthy, low salt diet.         ____ Fasting lipid panel order today         ____ No changes in medications          ____ I recommend the following changes in your  cholesterol medications.:          ____ Please follow up for cholesterol screening at your primary care physician    Follow-up:  We will help you find a [place] provider. If needed, we will be happy to see you.     If you have questions or concerns please contact us at:    Dimple Birch, MSN, RN, CNL    Radhika Mitchell (Scheduling)  Nurse Care Coordinator     Clinic   Adult Congenital and CV Genetics   Adult Congenital and CV Genetic  [facility] [place] [facility]  (P) [phone]     (P) [phone]  [email or web address]   (F) [phone]        For after hours urgent needs, call [phone] and ask to speak to the Adult Congenital Physician on call.  Mention Job Code [id number].    For emergencies call 911.    [facility] [place] [facility]   Clin[facility] and [facility]  Mail Code [id number]   [place]  [address]

## 2021-04-09 NOTE — PATIENT INSTRUCTIONS
It was a pleasure to see you in clinic today. Please do not hesitate to call with any questions or concerns. You are scheduled for a remote ICD transmission on 7/28/2021. This will happen automatically in the night. We will call in 1-2 business days to discuss the results with you.     Lindy Avalos, RN  Electrophysiology Nurse Clinician  [facility]  During business hours call:  [phone]  After business hours please call: [phone]- select option #4 and ask for job code [phone].

## 2021-04-09 NOTE — LETTER
4/9/2021      RE: Stu Meredith  [address] [facility] [address]       Dear Colleague,    Thank you for the opportunity to participate in the care of your patient, Stu Meredith, at the [facility] HEART CLINIC [facility]. Please see a copy of my visit note below.    HPI:   65 year old male with history of HCM without obstruction (dx 2006, EF 20% improved to 45-50%), VT s/p ICD 2012, nonobstructive CAD (cath 2013), AF s/p PVI X 3 (2011, 2016, 2018) and multiple DCCV and repeated ablations presents for ongoing evaluation and management. Last seen in clinic in Dec '20. Today patient reports that he has been feeling fairly well since last visit.  He will be moving to [facility] in July of this year and plans to establish care with cardiology there.  He notes no chest pain and no change in chronic dyspnea on exertion.  Also notes no presyncope or syncope, lower extremity swelling or weight gain.  Has rare palpitations that occur approximately once per week primarily in the evening and last for minutes before resolution.  Notes he can walk up 1 flight of stairs with stable chronic dyspnea on exertion.  Checks blood pressures daily at home, blood pressures in the evening typically run 110-115 systolic and higher in the morning, SBP ~135.       PAST MEDICAL HISTORY:  Past Medical History:   Diagnosis Date     Atrial fibrillation (H) 12/9/11    failed medication, multiple DC cardioveresions; s/p Left atrial ablation to eliminate atrial fibrillation 12/9/11     Chest pain      CHF (congestive heart failure) (H) 7/30/2016     Coronary artery disease      DJD (degenerative joint disease)      Fatigue 7/15/2019     Hip arthritis 1/15/2014     Hypertension      Hypertrophic cardiomyopathy (H) 10/09     Other abnormal heart sounds      Pacemaker     ICD     Palpitations      Pneumonia, organism unspecified(486)      Prediabetes 7/10/15    A1C  6.5     Status post implantation of automatic cardioverter/defibrillator (AICD)      Type 2 diabetes mellitus without complication, without long-term current use of insulin (H) 7/13/2020     Ventricular tachycardia (H)        CURRENT MEDICATIONS:  Current Outpatient Medications   Medication Sig Dispense Refill     acetaminophen (TYLENOL) 325 MG tablet Take 325-650 mg by mouth every 6 hours as needed       amoxicillin (AMOXIL) 500 MG tablet Take 4 tablets (2000 mg) by mouth 1 hour before dental procedures. 12 tablet 3     atorvastatin (LIPITOR) 20 MG tablet Take 1 tablet (20 mg) by mouth daily 90 tablet 3     cyanocobalamin (VITAMIN B-12) 100 MCG tablet Take 100 mcg by mouth daily       dofetilide (TIKOSYN) 250 MCG capsule Take 1 capsule (250 mcg) by mouth 2 times daily 180 capsule 3     eplerenone (INSPRA) 50 MG tablet TAKE ONE TABLET BY MOUTH ONCE DAILY 90 tablet 2     fluticasone-salmeterol (AIRDUO RESPICLICK) 113-14 MCG/ACT inhaler INHALE ONE PUFF INTO THE LUNGS TWICE A DAY 2 each 3     furosemide (LASIX) 20 MG tablet Take 1 tablet (20 mg) by mouth daily In PM 90 tablet 3     furosemide (LASIX) 40 MG tablet Take 1 tablet (40 mg) by mouth daily In AM 90 tablet 3     gabapentin (NEURONTIN) 300 MG capsule Take 2 capsules (600 mg) by mouth 2 times daily 360 capsule 3     metFORMIN (GLUCOPHAGE-XR) 500 MG 24 hr tablet Take 2 tablets (1,000 mg) by mouth daily (with dinner) For additional refills, please schedule an appointment at [phone] 180 tablet 0     metoprolol succinate ER (TOPROL-XL) 50 MG 24 hr tablet Take 1 tablet (50 mg) by mouth daily 90 tablet 3     multivitamin, therapeutic (THERA-VIT) TABS Take 1 tablet by mouth daily       sildenafil (VIAGRA) 100 MG tablet Take 0.5-1 tablets ( mg) by mouth daily as needed (take 1 hour before intercourse) 30 tablet 12     XARELTO ANTICOAGULANT 20 MG TABS tablet TAKE ONE TABLET BY MOUTH EVERY DAY WITH DINNER 90 tablet 3     zolpidem (AMBIEN) 10 MG tablet TAKE HALF  TABLET BY MOUTH  NIGHTLY AS NEEDED FOR SLEEP 90 tablet 0       PAST SURGICAL HISTORY:  Past Surgical History:   Procedure Laterality Date     ANESTHESIA CARDIOVERSION  4/24/2014    Procedure: ANESTHESIA CARDIOVERSION;  Surgeon: Generic Anesthesia Provider;  Location: [facility]     ANESTHESIA CARDIOVERSION N/A 5/12/2016    Procedure: ANESTHESIA CARDIOVERSION;  Surgeon: GENERIC ANESTHESIA PROVIDER;  [facility]     ANESTHESIA CARDIOVERSION N/A 8/7/2017    Procedure: ANESTHESIA CARDIOVERSION;  Anesthesia Offsite Coverage Cardioversion @1100;  Surgeon: GENERIC ANESTHESIA PROVIDER;  [facility]     ANESTHESIA CARDIOVERSION N/A 1/3/2018    Procedure: ANESTHESIA CARDIOVERSION;  Anesthesia Cardioverion;  Surgeon: GENERIC ANESTHESIA PROVIDER;  [facility]: [facility]     ANESTHESIA CARDIOVERSION N/A 5/4/2018    Procedure: ANESTHESIA CARDIOVERSION;  Anesthesia Coverage Cardioversion @1400;  Surgeon: GENERIC ANESTHESIA PROVIDER;  Location: [facility]     ANESTHESIA CARDIOVERSION N/A 9/27/2018    Procedure: ANESTHESIA CARDIOVERSION;  Anesthesia Cardioversion @0930;  Surgeon: GENERIC ANESTHESIA PROVIDER;  [facility]: [facility]     ANESTHESIA CARDIOVERSION N/A 12/20/2018    Procedure: Anesthesia Coverage Cardioversion @0830;  Surgeon: GENERIC ANESTHESIA PROVIDER;  [facility]: [facility]     ANESTHESIA CARDIOVERSION N/A 8/21/2019    Procedure: Anesthesia Coverage Cardioversion @0800;  Surgeon: GENERIC ANESTHESIA PROVIDER;  [facility]     ANESTHESIA CARDIOVERSION N/A 1/16/2020    Procedure: ANESTHESIA, FOR CARDIOVERSION;  Surgeon: GENERIC ANESTHESIA PROVIDER;  Location: [facility]     ARTHROPLASTY HIP  1/15/2014    Procedure: ARTHROPLASTY HIP;  Left Total Hip Arthroplasty;  Surgeon: Nelson Gaspar MD;  Location: [facility]     ARTHROPLASTY HIP Right 6/5/2019    Procedure: Right Total Hip Arthroplasty;  Surgeon: Nelson Gaspar MD;  Location: [facility] OR     CARDIAC SURGERY       COLONOSCOPY N/A 5/18/2018    Procedure: COMBINED COLONOSCOPY, SINGLE OR  MULTIPLE BIOPSY/POLYPECTOMY BY BIOPSY;  COLONOSCOPY (PT HAS DEFIBRILLATOR) ;  Surgeon: Mike Nickerson MD;  Location:  GI     CV RIGHT HEART CATH MEASUREMENTS RECORDED N/A 2019    Procedure: CV RIGHT HEART CATH;  Surgeon: Cisco Rausch MD;  Location:  HEART CARDIAC CATH LAB     CV RIGHT HEART CATH MEASUREMENTS RECORDED N/A 2019    Procedure: Right Heart Cath;  Surgeon: Ciaran Burton MD;  Location:  HEART CARDIAC CATH LAB     EP ABLATION FOCAL AFIB N/A 2020    Procedure: EP ABLATION FOCAL AFIB;  Surgeon: Erik Cooper MD;  Location:  HEART CARDIAC CATH LAB     H ABLATION FOCAL AFIB  11    Left atrial ablation to eliminate atrial fibrillation     IMPLANT AUTOMATIC IMPLANTABLE CARDIOVERTER DEFIBRILLATOR  12    AICD implantation     TONSILLECTOMY  1964       ALLERGIES:   No Known Allergies    FAMILY HISTORY:  Family History   Problem Relation Age of Onset     Heart Disease Mother         unknown     Obesity Mother      Gastrointestinal Disease Mother         diverticulitis     Diabetes Maternal Grandmother      Diabetes Paternal Grandmother      Cerebrovascular Disease Paternal Grandmother 94     Diabetes Paternal Grandfather      Cerebrovascular Disease Paternal Grandfather 78     Diabetes Son      C.A.D. Father         CABG age 78     Heart Disease Father         CABG x5     Diabetes Maternal Grandfather      LUNG DISEASE No family hx of      Deep Vein Thrombosis (DVT) No family hx of      Anesthesia Reaction No family hx of      Melanoma No family hx of      Skin Cancer No family hx of        SOCIAL HISTORY:  Social History     Tobacco Use     Smoking status: Former Smoker     Packs/day: 1.00     Years: 40.00     Pack years: 40.00     Types: Cigarettes     Start date: 1975     Quit date: 2015     Years since quittin.3     Smokeless tobacco: Never Used   Substance Use Topics     Alcohol use: Yes     Alcohol/week: 0.0 standard drinks     Comment: very minimal      "Drug use: No       ROS:   A comprehensive 14 point review of systems is negative other than as mentioned in HPI.    Exam:  /84   Pulse 67   Ht 1.803 m (5' 11\")   Wt 96.2 kg (212 lb)   SpO2 95%   BMI 29.57 kg/m     manual blood pressure checked in clinic today 122/78  GENERAL APPEARANCE: healthy, alert and no distress  EYES: no icterus, no xanthelasmas  ENT: normal palate, mucosa moist, no central cyanosis  NECK: supple, 2+ carotids  RESPIRATORY: lungs clear to auscultation bilaterally  CARDIOVASCULAR: regular rhythm, normal S1 and S2, no S3 or S4 and no murmur, click or rub.  JVD 8-9cm  GI: soft, non tender, bowel sounds normal,no abdominal bruits  EXTREMITIES: no edema,   NEURO: alert and oriented to person/place/time, normal speech, gait and affect  VASC: Radial, dorsalis pedis pulses 2+ bilaterally.  PSYCH: cooperative, affect appropriate.     Labs:  Orders Only on 04/09/2021   Component Date Value Ref Range Status     Sodium 04/09/2021 140  133 - 144 mmol/L Final     Potassium 04/09/2021 4.4  3.4 - 5.3 mmol/L Final     Chloride 04/09/2021 109  94 - 109 mmol/L Final     Carbon Dioxide 04/09/2021 24  20 - 32 mmol/L Final     Anion Gap 04/09/2021 6  3 - 14 mmol/L Final     Glucose 04/09/2021 116* 70 - 99 mg/dL Final     Urea Nitrogen 04/09/2021 17  7 - 30 mg/dL Final     Creatinine 04/09/2021 1.02  0.66 - 1.25 mg/dL Final     GFR Estimate 04/09/2021 76  >60 mL/min/[1.73_m2] Final    Comment: Non  GFR Calc  Starting 12/18/2018, serum creatinine based estimated GFR (eGFR) will be   calculated using the Chronic Kidney Disease Epidemiology Collaboration   (CKD-EPI) equation.       GFR Estimate If Black 04/09/2021 88  >60 mL/min/[1.73_m2] Final    Comment:  GFR Calc  Starting 12/18/2018, serum creatinine based estimated GFR (eGFR) will be   calculated using the Chronic Kidney Disease Epidemiology Collaboration   (CKD-EPI) equation.       Calcium 04/09/2021 9.1  8.5 - 10.1 mg/dL " Final     Bilirubin Total 04/09/2021 0.9  0.2 - 1.3 mg/dL Final     Albumin 04/09/2021 3.5  3.4 - 5.0 g/dL Final     Protein Total 04/09/2021 7.2  6.8 - 8.8 g/dL Final     Alkaline Phosphatase 04/09/2021 96  40 - 150 U/L Final     ALT 04/09/2021 29  0 - 70 U/L Final     AST 04/09/2021 20  0 - 45 U/L Final     Cholesterol 04/09/2021 151  <200 mg/dL Final     Triglycerides 04/09/2021 100  <150 mg/dL Final     HDL Cholesterol 04/09/2021 51  >39 mg/dL Final     LDL Cholesterol Calculated 04/09/2021 80  <100 mg/dL Final    Desirable:       <100 mg/dl     Non HDL Cholesterol 04/09/2021 100  <130 mg/dL Final     Hemoglobin A1C 04/09/2021 6.3* 0 - 5.6 % Final    Comment: Normal <5.7% Prediabetes 5.7-6.4%  Diabetes 6.5% or higher - adopted from ADA   consensus guidelines.           CPX Echo 6/1/2018:  Interpretation Summary  Submaximal treadmill stress test in a patient with a diagnosis of HCM.  The Ramirez treadmill score is 8 (low risk).  Exercise was stopped due to fatigue.  Normal blood pressure response to exercise.  No ECG evidence of ischemia.  No supraventricular or ventricular arrhythmias. Frequent PVCs noted throughout the study.  Normal biventricular function with mild asymmetrical septal hypertrophy (12mm).  No dynamic outflow tract obstruction is present at rest or with exercise.  Normal segmental wall motion at rest and with exercise.  Minimal augmentation in LV function with exercise.  Average functional capacity for age.           CPX 2/15/2019:          CTA coronary angiogram 5/28/19  IMPRESSION:  1.  No evidence of coronary artery atherosclerosis or stenosis.  2.  Myocardial bridging involving the mid LAD.  3.  Total Agatston score 0 placing the patient in the lowest percentile when compared to age and gender matched control group.     Right heart cath 8/21/19    Time Systolic Diastolic Mean A Wave V Wave EDP Max dp/dt HR   RA Pressures  3:38 PM     12 mmHg    16 mmHg    14 mmHg        61 bpm      RV Pressures   3:38 PM 60 mmHg            16 mmHg      106 bpm      PA Pressures  3:41 PM 60 mmHg    22 mmHg    38 mmHg            69 bpm      PCW Pressures  3:39 PM     30 mmHg    28 mmHg    38 mmHg        74 bpm           Time Hb SAT(%) PO2 Content PA Sat   PA  3:28 PM   63.6 %        63.6 %      Art  3:28 PM   99 %      18.04 mL/dL        Cardiac Output Phase: Baseline        Time TDCO TDCI Tj C.O. Tj C.I. Tj HR   Cardiac Output Results  3:28 PM 3.8 L/min    1.79 L/min/m2    4.41 L/min    2.08 L/min/m2              Echo 12/20/2019  Global and regional left ventricular function is normal with an EF of 55-60%.  Global right ventricular function is normal.  IVC diameter <2.1 cm collapsing >50% with sniff suggests a normal RA pressure  of 3 mmHg.  No pericardial effusion is present.  Mild pulmonary hypertension is present.  No change from prior.     Echo 5/2020  Left ventricular systolic function is mildly reduced.  The visual ejection fraction is estimated at 45-50%.  Right ventricular systolic pressure is elevated, consistent with moderate  pulmonary hypertension.  The right ventricular systolic function is normal.  The right ventricle is normal size.  Compared to recent ALEJANDRA, EF again mildly reduced. RVSP increased compared to  prior TTE from 12/19.    Device Interrogation Dec '20  Normal ICD function. 17 NSVT episodes recorded since last remote transmission on 10/17/2020 - 3 - 14 sec, 140-176 bpm. 3 AT/AF episodes recorded since 10/17/2020 - 3-5 seconds. Intrinsic rhythm = NSR with 1st degree AVB @ 76 bpm. AP = 30%.  = <1%. Estimated battery longevity to CARMEN = 3.5 years. Lead trends appear stable.      Assessment and Plan:   65 year old male with history of HCM without obstruction (dx 2006, EF 20% improved to 60%), VT s/p ICD 2012, nonobstructive CAD (cath 2013), AF s/p PVI X 3 (2011, 2016, 2018) and multiple DCCV and recurrent ablations presents for ongoing evaluation and management.     # Hypertrophic cardiomyopathy  "with no evidence of LVOT obstruction.-- previously burnt out with EF 20% ('13) recovered (EF 60%) but last echo after recent ablation revealed reduction in LVEF 45-50%.   - NYHA class 3, HF stage C  - Compensated and euvolemic on exam, thus continue eplerenone 50mg daily and lasix to 40mg qam and 20mg qpm prn.  - continue metoprolol XL 50mg daily   - ACEI/ARB/ARNI: not indicated given LVEF > 40%  - pt aware of exercise restrictions and family screening recommendations  - he will establish care with cardiology in [place]    #Non-sustained VT:  - Likely related to HCM. Patient denies any symptoms. Longest run was 14 seconds. Continue metoprolol xl  - followed by EP, device check today    # Nonobstructive CAD -- 10-30% stenoses on cath '13; unremarkable coronary CT \"19, no angina  - coronary CTA confirmed no significant disease  - continue atorvastatin and metoprolol XL     # HTN -- controlled (manual blood pressure checked in clinic today 122/78)  - continue metoprolol XL and eplerenone      # Atrial arrhythmias s/p recent ablation   - continue Xarelto  - continue dofetilide 250mcg  BID   - continue Metoprolol 50 XL daily   - followed by EP    # DMII  -SGLT2 inhibitor would be reasonable to add to current diabetes regimen as this may help with volume retention, weight loss and blood pressure control to some extent.  Will defer this to clinical team managing diabetes.      Follow-up: He will establish care with cardiology/EP in [place].  Alternatively he could see congenital cardiology at [facility] every other year if he desires.    Staffed with Dr. Ware. I appreciate the chance to help with Mr. Meredith's care. Please let me know if you have any questions or concerns.    Pravin Garnica MD  PGY-5, Cardiology Fellow       I have reviewed today's vital signs, notes, medications, labs and imaging. I have also seen and examined the patient and agree with the findings and plan as outlined above.    Mona Ware, " MD  Section Head - Advanced Heart Failure, Transplantation and Mechanical Circulatory Support  Director - Adult Congenital and Cardiovascular Genetics Center  Associate Professor of Medicine, [facility]    I spent 30 minutes in care of the patient today including reviewing recent today's labs, examination and discussion of testing results and care recommendations with patient and documentation      Please do not hesitate to contact me if you have any questions/concerns.     Sincerely,     Mona Ware MD

## 2021-04-09 NOTE — NURSING NOTE
Chief Complaint   Patient presents with     Physical     physical - diabetic foot exam - Dr. Ware wants new diabetic medication        Daniela Choi MA, at 12:50 PM on 4/9/2021.

## 2021-04-10 RX ORDER — METFORMIN HCL 500 MG
1000 TABLET, EXTENDED RELEASE 24 HR ORAL
Qty: 180 TABLET | Refills: 3 | Status: SHIPPED | OUTPATIENT
Start: 2021-04-10

## 2021-04-11 LAB — INTERPRETATION ECG - MUSE: NORMAL

## 2021-04-14 LAB
MDC_IDC_LEAD_IMPLANT_DT: NORMAL
MDC_IDC_LEAD_IMPLANT_DT: NORMAL
MDC_IDC_LEAD_LOCATION: NORMAL
MDC_IDC_LEAD_LOCATION: NORMAL
MDC_IDC_LEAD_MFG: NORMAL
MDC_IDC_LEAD_MFG: NORMAL
MDC_IDC_LEAD_MODEL: NORMAL
MDC_IDC_LEAD_MODEL: NORMAL
MDC_IDC_LEAD_POLARITY_TYPE: NORMAL
MDC_IDC_LEAD_POLARITY_TYPE: NORMAL
MDC_IDC_LEAD_SERIAL: NORMAL
MDC_IDC_LEAD_SERIAL: NORMAL
MDC_IDC_MSMT_BATTERY_DTM: NORMAL
MDC_IDC_MSMT_BATTERY_REMAINING_LONGEVITY: 36 MO
MDC_IDC_MSMT_BATTERY_STATUS: NORMAL
MDC_IDC_MSMT_CAP_CHARGE_DTM: NORMAL
MDC_IDC_MSMT_CAP_CHARGE_ENERGY: 11 J
MDC_IDC_MSMT_CAP_CHARGE_TIME: 1.91 S
MDC_IDC_MSMT_CAP_CHARGE_TIME: 10.93
MDC_IDC_MSMT_CAP_CHARGE_TYPE: NORMAL
MDC_IDC_MSMT_CAP_CHARGE_TYPE: NORMAL
MDC_IDC_MSMT_LEADCHNL_RA_IMPEDANCE_VALUE: 716 OHM
MDC_IDC_MSMT_LEADCHNL_RA_PACING_THRESHOLD_AMPLITUDE: 0.5 V
MDC_IDC_MSMT_LEADCHNL_RA_PACING_THRESHOLD_PULSEWIDTH: 0.5 MS
MDC_IDC_MSMT_LEADCHNL_RA_SENSING_INTR_AMPL: 10.6 MV
MDC_IDC_MSMT_LEADCHNL_RV_IMPEDANCE_VALUE: 486 OHM
MDC_IDC_MSMT_LEADCHNL_RV_PACING_THRESHOLD_AMPLITUDE: 0.9 V
MDC_IDC_MSMT_LEADCHNL_RV_PACING_THRESHOLD_PULSEWIDTH: 0.5 MS
MDC_IDC_MSMT_LEADCHNL_RV_SENSING_INTR_AMPL: 25 MV
MDC_IDC_PG_IMPLANT_DTM: NORMAL
MDC_IDC_PG_MFG: NORMAL
MDC_IDC_PG_MODEL: NORMAL
MDC_IDC_PG_SERIAL: NORMAL
MDC_IDC_PG_TYPE: NORMAL
MDC_IDC_SESS_CLINIC_NAME: NORMAL
MDC_IDC_SESS_DTM: NORMAL
MDC_IDC_SESS_TYPE: NORMAL
MDC_IDC_SET_BRADY_AT_MODE_SWITCH_MODE: NORMAL
MDC_IDC_SET_BRADY_AT_MODE_SWITCH_RATE: 170 {BEATS}/MIN
MDC_IDC_SET_BRADY_LOWRATE: 60 {BEATS}/MIN
MDC_IDC_SET_BRADY_MAX_SENSOR_RATE: 120 {BEATS}/MIN
MDC_IDC_SET_BRADY_MAX_TRACKING_RATE: 120 {BEATS}/MIN
MDC_IDC_SET_BRADY_MODE: NORMAL
MDC_IDC_SET_BRADY_PAV_DELAY_HIGH: 260 MS
MDC_IDC_SET_BRADY_PAV_DELAY_LOW: 390 MS
MDC_IDC_SET_BRADY_SAV_DELAY_HIGH: 260 MS
MDC_IDC_SET_BRADY_SAV_DELAY_LOW: 390 MS
MDC_IDC_SET_LEADCHNL_RA_PACING_AMPLITUDE: 2.4 V
MDC_IDC_SET_LEADCHNL_RA_PACING_CAPTURE_MODE: NORMAL
MDC_IDC_SET_LEADCHNL_RA_PACING_POLARITY: NORMAL
MDC_IDC_SET_LEADCHNL_RA_PACING_PULSEWIDTH: 0.5 MS
MDC_IDC_SET_LEADCHNL_RA_SENSING_ADAPTATION_MODE: NORMAL
MDC_IDC_SET_LEADCHNL_RA_SENSING_POLARITY: NORMAL
MDC_IDC_SET_LEADCHNL_RA_SENSING_SENSITIVITY: 0.25 MV
MDC_IDC_SET_LEADCHNL_RV_PACING_AMPLITUDE: 2.4 V
MDC_IDC_SET_LEADCHNL_RV_PACING_CAPTURE_MODE: NORMAL
MDC_IDC_SET_LEADCHNL_RV_PACING_POLARITY: NORMAL
MDC_IDC_SET_LEADCHNL_RV_PACING_PULSEWIDTH: 0.5 MS
MDC_IDC_SET_LEADCHNL_RV_SENSING_ADAPTATION_MODE: NORMAL
MDC_IDC_SET_LEADCHNL_RV_SENSING_POLARITY: NORMAL
MDC_IDC_SET_LEADCHNL_RV_SENSING_SENSITIVITY: 0.6 MV
MDC_IDC_SET_ZONE_DETECTION_INTERVAL: 273 MS
MDC_IDC_SET_ZONE_DETECTION_INTERVAL: 333 MS
MDC_IDC_SET_ZONE_DETECTION_INTERVAL: 375 MS
MDC_IDC_SET_ZONE_TYPE: NORMAL
MDC_IDC_SET_ZONE_VENDOR_TYPE: NORMAL
MDC_IDC_STAT_BRADY_DTM_END: NORMAL
MDC_IDC_STAT_BRADY_DTM_START: NORMAL
MDC_IDC_STAT_BRADY_RA_PERCENT_PACED: 49 %
MDC_IDC_STAT_BRADY_RV_PERCENT_PACED: 1 %
MDC_IDC_STAT_EPISODE_RECENT_COUNT: 0
MDC_IDC_STAT_EPISODE_RECENT_COUNT: 0
MDC_IDC_STAT_EPISODE_RECENT_COUNT: 14
MDC_IDC_STAT_EPISODE_RECENT_COUNT_DTM_END: NORMAL
MDC_IDC_STAT_EPISODE_RECENT_COUNT_DTM_START: NORMAL
MDC_IDC_STAT_EPISODE_TOTAL_COUNT: 211
MDC_IDC_STAT_EPISODE_TOTAL_COUNT: 215
MDC_IDC_STAT_EPISODE_TOTAL_COUNT: 6706
MDC_IDC_STAT_EPISODE_TOTAL_COUNT_DTM_END: NORMAL
MDC_IDC_STAT_EPISODE_TYPE: NORMAL
MDC_IDC_STAT_EPISODE_VENDOR_TYPE: NORMAL
MDC_IDC_STAT_TACHYTHERAPY_ATP_DELIVERED_RECENT: 0
MDC_IDC_STAT_TACHYTHERAPY_ATP_DELIVERED_TOTAL: 3
MDC_IDC_STAT_TACHYTHERAPY_RECENT_DTM_END: NORMAL
MDC_IDC_STAT_TACHYTHERAPY_RECENT_DTM_START: NORMAL
MDC_IDC_STAT_TACHYTHERAPY_SHOCKS_ABORTED_RECENT: 0
MDC_IDC_STAT_TACHYTHERAPY_SHOCKS_ABORTED_TOTAL: 1
MDC_IDC_STAT_TACHYTHERAPY_SHOCKS_DELIVERED_RECENT: 0
MDC_IDC_STAT_TACHYTHERAPY_SHOCKS_DELIVERED_TOTAL: 1
MDC_IDC_STAT_TACHYTHERAPY_TOTAL_DTM_END: NORMAL

## 2021-06-04 ENCOUNTER — MYC MEDICAL ADVICE (OUTPATIENT)
Dept: INTERNAL MEDICINE | Facility: CLINIC | Age: 66
End: 2021-06-04

## 2021-06-04 DIAGNOSIS — F51.04 CHRONIC INSOMNIA: ICD-10-CM

## 2021-06-08 RX ORDER — ZOLPIDEM TARTRATE 10 MG/1
TABLET ORAL
Qty: 45 TABLET | Refills: 0 | Status: SHIPPED | OUTPATIENT
Start: 2021-06-08 | End: 2021-09-03

## 2021-06-08 NOTE — TELEPHONE ENCOUNTER
He has previously been advised to see Sleep Medicine. I will cover a 90-day supply. [place] and should establish with a new PCP for further refills.  KARSON Hdz CNP

## 2021-06-08 NOTE — TELEPHONE ENCOUNTER
Patient Requested  zolpidem (AMBIEN) 10 MG tablet  Last Filled  12/16/2020  Last Office Visit  04/09/2021    Next Office Visit  Nothing scheduled   Checked  06/08/2021    DX: Chronic Zolpidem use for insomnia     Pharmacy: [facility] - [place], [place] - [address] at 7:22 AM on 6/8/2021.

## 2021-06-11 ENCOUNTER — OFFICE VISIT (OUTPATIENT)
Dept: FAMILY MEDICINE | Facility: CLINIC | Age: 66
End: 2021-06-11
Payer: COMMERCIAL

## 2021-06-11 ENCOUNTER — ANCILLARY PROCEDURE (OUTPATIENT)
Dept: GENERAL RADIOLOGY | Facility: CLINIC | Age: 66
End: 2021-06-11
Attending: NURSE PRACTITIONER
Payer: COMMERCIAL

## 2021-06-11 ENCOUNTER — TELEPHONE (OUTPATIENT)
Dept: PULMONOLOGY | Facility: CLINIC | Age: 66
End: 2021-06-11

## 2021-06-11 VITALS
TEMPERATURE: 97.8 F | WEIGHT: 212 LBS | OXYGEN SATURATION: 96 % | RESPIRATION RATE: 16 BRPM | HEART RATE: 61 BPM | BODY MASS INDEX: 29.68 KG/M2 | HEIGHT: 71 IN | DIASTOLIC BLOOD PRESSURE: 81 MMHG | SYSTOLIC BLOOD PRESSURE: 128 MMHG

## 2021-06-11 DIAGNOSIS — R06.02 SHORTNESS OF BREATH: ICD-10-CM

## 2021-06-11 DIAGNOSIS — E11.9 TYPE 2 DIABETES MELLITUS WITHOUT COMPLICATION, WITHOUT LONG-TERM CURRENT USE OF INSULIN (H): ICD-10-CM

## 2021-06-11 DIAGNOSIS — R05.3 PERSISTENT COUGH: ICD-10-CM

## 2021-06-11 DIAGNOSIS — R05.3 PERSISTENT COUGH: Primary | ICD-10-CM

## 2021-06-11 LAB
ALBUMIN SERPL-MCNC: 3.8 G/DL (ref 3.4–5)
ALP SERPL-CCNC: 101 U/L (ref 40–150)
ALT SERPL W P-5'-P-CCNC: 24 U/L (ref 0–70)
ANION GAP SERPL CALCULATED.3IONS-SCNC: 7 MMOL/L (ref 3–14)
AST SERPL W P-5'-P-CCNC: 22 U/L (ref 0–45)
BASOPHILS # BLD AUTO: 0.1 10E9/L (ref 0–0.2)
BASOPHILS NFR BLD AUTO: 1.2 %
BILIRUB SERPL-MCNC: 1.1 MG/DL (ref 0.2–1.3)
BUN SERPL-MCNC: 23 MG/DL (ref 7–30)
CALCIUM SERPL-MCNC: 9.3 MG/DL (ref 8.5–10.1)
CHLORIDE SERPL-SCNC: 106 MMOL/L (ref 94–109)
CO2 SERPL-SCNC: 29 MMOL/L (ref 20–32)
CREAT SERPL-MCNC: 1.29 MG/DL (ref 0.66–1.25)
DIFFERENTIAL METHOD BLD: NORMAL
EOSINOPHIL # BLD AUTO: 0.3 10E9/L (ref 0–0.7)
EOSINOPHIL NFR BLD AUTO: 4.3 %
ERYTHROCYTE [DISTWIDTH] IN BLOOD BY AUTOMATED COUNT: 14 % (ref 10–15)
GFR SERPL CREATININE-BSD FRML MDRD: 57 ML/MIN/{1.73_M2}
GLUCOSE SERPL-MCNC: 116 MG/DL (ref 70–99)
HCT VFR BLD AUTO: 48.3 % (ref 40–53)
HGB BLD-MCNC: 15.6 G/DL (ref 13.3–17.7)
IMM GRANULOCYTES # BLD: 0 10E9/L (ref 0–0.4)
IMM GRANULOCYTES NFR BLD: 0.4 %
LYMPHOCYTES # BLD AUTO: 1.1 10E9/L (ref 0.8–5.3)
LYMPHOCYTES NFR BLD AUTO: 17 %
MCH RBC QN AUTO: 29.8 PG (ref 26.5–33)
MCHC RBC AUTO-ENTMCNC: 32.3 G/DL (ref 31.5–36.5)
MCV RBC AUTO: 92 FL (ref 78–100)
MONOCYTES # BLD AUTO: 0.9 10E9/L (ref 0–1.3)
MONOCYTES NFR BLD AUTO: 12.7 %
NEUTROPHILS # BLD AUTO: 4.3 10E9/L (ref 1.6–8.3)
NEUTROPHILS NFR BLD AUTO: 64.4 %
NRBC # BLD AUTO: 0 10*3/UL
NRBC BLD AUTO-RTO: 0 /100
PLATELET # BLD AUTO: 220 10E9/L (ref 150–450)
POTASSIUM SERPL-SCNC: 5 MMOL/L (ref 3.4–5.3)
PROT SERPL-MCNC: 7.6 G/DL (ref 6.8–8.8)
RBC # BLD AUTO: 5.23 10E12/L (ref 4.4–5.9)
SODIUM SERPL-SCNC: 141 MMOL/L (ref 133–144)
WBC # BLD AUTO: 6.7 10E9/L (ref 4–11)

## 2021-06-11 PROCEDURE — 85025 COMPLETE CBC W/AUTO DIFF WBC: CPT | Performed by: PATHOLOGY

## 2021-06-11 PROCEDURE — 71046 X-RAY EXAM CHEST 2 VIEWS: CPT | Mod: GC | Performed by: RADIOLOGY

## 2021-06-11 PROCEDURE — 36415 COLL VENOUS BLD VENIPUNCTURE: CPT | Performed by: PATHOLOGY

## 2021-06-11 PROCEDURE — 99213 OFFICE O/P EST LOW 20 MIN: CPT | Performed by: NURSE PRACTITIONER

## 2021-06-11 PROCEDURE — 83036 HEMOGLOBIN GLYCOSYLATED A1C: CPT | Performed by: PATHOLOGY

## 2021-06-11 PROCEDURE — 80053 COMPREHEN METABOLIC PANEL: CPT | Performed by: PATHOLOGY

## 2021-06-11 RX ORDER — BENZONATATE 100 MG/1
100 CAPSULE ORAL 3 TIMES DAILY PRN
Qty: 30 CAPSULE | Refills: 1 | Status: SHIPPED | OUTPATIENT
Start: 2021-06-11

## 2021-06-11 ASSESSMENT — PAIN SCALES - GENERAL: PAINLEVEL: NO PAIN (0)

## 2021-06-11 ASSESSMENT — MIFFLIN-ST. JEOR: SCORE: 1763.76

## 2021-06-11 NOTE — CONFIDENTIAL NOTE
Spoke with patient and stated that Dr Julien will overbook patient on 6/29/21 at 11:30 am. Explained this clinic is on 3 rd floor by PFT lab. Patient confirmed understanding.  
none

## 2021-06-11 NOTE — PROGRESS NOTES
Mr. Meredith is a 65 year old male with history of hypertensive cardiomyopathy, VT s/p ICD (on defetilide), a fib s/p cardioversion and multiple ablations (on Xarelto, non obstructive CAD, Hypertension (on metoprolol and eplerenone), DM (on metformin), chronic insomnia who presents for evaluation of persistent cough.     History of Present Illness:    Cough has been present for a year. Comes and goes, productive of white phlegm. The cough got worse on Monday and Tuesday. Coughed so much he was having headache and had trouble taking a deep breath.  Worse during daytime hours and after exertion (yardwork, house painting, home repairs).  Has fluticasone-salmeterol inhaler for chronic breathing difficulties - unsure if asthma or other lung disease.  He stopped using the inhaler about 1 week ago as he did not feel it was helping, after which the cough worsened. Last pulmonology visit July 2020. Has a history of bronchitis, last episode 1.5 yrs ago.     Sees [facility] 29th for cardiology follow-up.     Denies GERD concerns of heartburn, indigestion, or acid brash.  Reduced alcohol use for past 6mos, now limited to 3-4 drinks per month.    Lung nodules.  Last CT Dec 2020  IMPRESSION:   1. Unchanged pulmonary nodules compared to prior exam. This would  correlate to a lung rads category 2. Recommend return to annual  screening CT.  2. Mild diffuse hepatic steatosis.      Past Medical History:  Past Medical History:   Diagnosis Date     Atrial fibrillation (H) 12/09/2011    failed medication, multiple DC cardioveresions; s/p Left atrial ablation to eliminate atrial fibrillation 12/9/11     Chest pain      CHF (congestive heart failure) (H) 07/30/2016     Coronary artery disease      DJD (degenerative joint disease)      Fatigue 07/15/2019     Hip arthritis 01/15/2014     Hypertension      Hypertrophic cardiomyopathy (H) 10/2009     Other abnormal heart sounds      Pacemaker     ICD     Palpitations       Pneumonia, organism unspecified(486)      Status post implantation of automatic cardioverter/defibrillator (AICD)      Type 2 diabetes mellitus without complication, without long-term current use of insulin (H) 07/13/2020     Ventricular tachycardia (H)        Active Meds:  Current Outpatient Medications   Medication     acetaminophen (TYLENOL) 325 MG tablet     amoxicillin (AMOXIL) 500 MG tablet     atorvastatin (LIPITOR) 20 MG tablet     cyanocobalamin (VITAMIN B-12) 100 MCG tablet     dofetilide (TIKOSYN) 250 MCG capsule     empagliflozin (JARDIANCE) 10 MG TABS tablet     eplerenone (INSPRA) 50 MG tablet     fluticasone-salmeterol (AIRDUO RESPICLICK) 113-14 MCG/ACT inhaler     furosemide (LASIX) 20 MG tablet     furosemide (LASIX) 40 MG tablet     gabapentin (NEURONTIN) 300 MG capsule     metFORMIN (GLUCOPHAGE-XR) 500 MG 24 hr tablet     metoprolol succinate ER (TOPROL-XL) 50 MG 24 hr tablet     multivitamin, therapeutic (THERA-VIT) TABS     sildenafil (VIAGRA) 100 MG tablet     XARELTO ANTICOAGULANT 20 MG TABS tablet     zolpidem (AMBIEN) 10 MG tablet     No current facility-administered medications for this visit.         Allergies:  Reviewed, refer to EMR    Relevant Social History:  Tobacco/Vaping: smoked many years ago  Exercise/Activity: none, very active. Moving in 28 days      Review Of Systems  Skin: dermatology working with areas of concern for skin cancer  Eyes: negative  Ears/Nose/Throat: negative for sore throat, positive for cough and sinus fullness when he coughs, which he clears with blowing nose  Respiratory: shortness of breath, worse on exertion. Sleeps with one pillow  Cardiovascular: right lung pain  Gastrointestinal: negative  Genitourinary: negative, one night had to get up 4 times to urinate  Musculoskeletal: negative  Neurologic: headache from coughing  Psychiatric: negative  Hematologic/Lymphatic/Immunologic: negative  Endocrine: negative      Physical Exam:  Vitals: /81 (BP  "Location: Right arm, Patient Position: Sitting, Cuff Size: Adult Regular)   Pulse 61   Temp 97.8  F (36.6  C) (Oral)   Resp 16   Ht 1.803 m (5' 11\")   Wt 96.2 kg (212 lb)   SpO2 96%   BMI 29.57 kg/m      Constitutional: Alert, oriented, pleasant, no acute distress  Head: Normocephalic, atraumatic  Eyes: Extra-ocular movements intact, pupils equally round and reactive bilaterally, no scleral icterus  ENT: Oropharynx clear, moist mucus membranes, good dentition  Neck: Supple, no lymphadenopathy, no thyromegaly, no JVD  Cardiovascular: Regular rate and rhythm, no murmurs, rubs or gallops, peripheral pulses full/symmetric  Respiratory: Good air movement bilaterally, lungs clear, no wheezes/rales/rhonchi  GI: Abdomen soft, bowel sounds present, nondistended, nontender, no organomegaly or masses, no rebound/guarding  Musculoskeletal: No edema, normal muscle tone, normal gait  Neurologic: Alert and oriented, cranial nerves 2-12 grossly intact, strength 5/5 throughout, sensation to light touch intact.  Skin:  No lesions, rashes  Psychiatric: normal mentation, affect and mood      Assessment and Plan:    (R05) Persistent cough  (primary encounter diagnosis)  (R06.02) Shortness of breath  Comment:   Likely reactive airway or obstructive lung disease.  Unable to use albuterol due to cardiac arrythmias.  Plan: benzonatate (TESSALON) 100 MG capsule, General         PFT Lab (Please always keep checked), Pulmonary        Function Test, CBC with platelets differential,        Comprehensive metabolic panel, XR Chest 2         Views, PULMONARY MEDICINE REFERRAL  Advised to restart his flucatisone-salmeterol inhaler.        #Routine Health Maintenence:  Immunizations (zoster, pneumovax, flu, Tdap, Hep A/B):   Most Recent Immunizations   Administered Date(s) Administered     COVID-19,PF,Moderna 04/06/2021     Influenza (IIV3) PF 11/24/2009     Influenza Vaccine IM > 6 months Valent IIV4 03/07/2019     Pneumococcal 23 valent " 03/07/2019     TD (ADULT, 7+) 05/01/2008     TDAP Vaccine (Boostrix) 03/07/2019     Zoster vaccine recombinant adjuvanted (SHINGRIX) 07/09/2020     Lipids:   Recent Labs   Lab Test 04/09/21  0920 07/13/20  1108 08/05/14  0719 08/05/14  0719 10/25/13  0525   CHOL 151 150   < > 152 214*   HDL 51 51   < > 54 48   LDL 80 79   < > 75 146*   TRIG 100 99   < > 114 99   CHOLHDLRATIO  --   --   --  2.8 4.4    < > = values in this interval not displayed.     PSA (50-75 yrs):   PSA   Date Value Ref Range Status   09/27/2019 4.06 (H) 0 - 4 ug/L Final     Comment:     Assay Method:  Chemiluminescence using Siemens Vista analyzer          Return to clinic:  As needed.  Will follow diagnostic testing through [facility].    Options for treatment and follow-up care were reviewed with the patient. He engaged in the decision making process and verbalized understanding of the options discussed and agreed with the final plan.    KARSON Ramirez student  [employer]      I was present with the NPP student who participated in the service and in the documentation of the services provided.  I have verified the history and personally performed the physical exam and medical decision making, as documented by the student and edited by me.      Maribell Camacho, ANP, [facility]  Nurse Practitioner

## 2021-06-11 NOTE — NURSING NOTE
Chief Complaint   Patient presents with     Cough     Patient complains of a cough present for the past year.          Epi Fischre MA on 6/11/2021 at 7:37 AM

## 2021-06-11 NOTE — TELEPHONE ENCOUNTER
[facility] Call Center    Phone Message    May a detailed message be left on voicemail: no     Reason for Call: Other: Haroon is scheduled for a PFT on 6/14/2021 and would like to request an appointment with Andreia prior to July 1st as Pt is moving to [place]. Please reach out to Haroon at [phone].     Action Taken: Message routed to:  [facility] Pulmonology    Travel Screening: Not Applicable

## 2021-06-12 LAB — HBA1C MFR BLD: 6.2 % (ref 0–5.6)

## 2021-06-12 NOTE — RESULT ENCOUNTER NOTE
Dear @firstlastname@    Your recent lab results showed:         Thank you for choosing [facility].  Please contact us with any questions that you may have.      [facility]: [phone]       Sincerely,     Maribell Camacho NP on 6/12/2021 at 5:52 AM    Dear Haroon,  Your chest xray from yesterday was without evidence of pneumonia or other acute cardiopulmonary problems.  The metabolic panel however shows an elevated glucose (diabetes concern), elevated creatinine, and decreased GFR (kidney disease concerns).  I've added an A1C to your labs to check your diabetes status.  I encourage you to meet with your primary care physician to discuss the renal labs and for any follow-up needed.    Sincerely,  Maribell Camacho NP

## 2021-06-14 DIAGNOSIS — R05.3 PERSISTENT COUGH: ICD-10-CM

## 2021-06-14 DIAGNOSIS — R06.02 SHORTNESS OF BREATH: ICD-10-CM

## 2021-06-14 PROCEDURE — 94375 RESPIRATORY FLOW VOLUME LOOP: CPT | Performed by: INTERNAL MEDICINE

## 2021-06-14 PROCEDURE — 94729 DIFFUSING CAPACITY: CPT | Performed by: INTERNAL MEDICINE

## 2021-06-14 PROCEDURE — 94726 PLETHYSMOGRAPHY LUNG VOLUMES: CPT | Performed by: INTERNAL MEDICINE

## 2021-06-16 ENCOUNTER — OFFICE VISIT (OUTPATIENT)
Dept: DERMATOLOGY | Facility: CLINIC | Age: 66
End: 2021-06-16
Payer: COMMERCIAL

## 2021-06-16 DIAGNOSIS — Z85.828 HISTORY OF NONMELANOMA SKIN CANCER: ICD-10-CM

## 2021-06-16 DIAGNOSIS — Q87.89 LEOPARD SYNDROME: ICD-10-CM

## 2021-06-16 DIAGNOSIS — L72.0 MILIA: ICD-10-CM

## 2021-06-16 DIAGNOSIS — L81.4 LEOPARD SYNDROME: ICD-10-CM

## 2021-06-16 DIAGNOSIS — D48.9 NEOPLASM OF UNCERTAIN BEHAVIOR: Primary | ICD-10-CM

## 2021-06-16 DIAGNOSIS — L57.0 ACTINIC KERATOSIS: ICD-10-CM

## 2021-06-16 LAB
DLCOCOR-%PRED-PRE: 95 %
DLCOCOR-PRE: 26.05 ML/MIN/MMHG
DLCOUNC-%PRED-PRE: 97 %
DLCOUNC-PRE: 26.76 ML/MIN/MMHG
DLCOUNC-PRED: 27.42 ML/MIN/MMHG
ERV-%PRED-PRE: 41 %
ERV-PRE: 0.43 L
ERV-PRED: 1.03 L
EXPTIME-PRE: 7.81 SEC
FEF2575-%PRED-PRE: 66 %
FEF2575-PRE: 1.72 L/SEC
FEF2575-PRED: 2.58 L/SEC
FEFMAX-%PRED-PRE: 67 %
FEFMAX-PRE: 6.06 L/SEC
FEFMAX-PRED: 8.96 L/SEC
FEV1-%PRED-PRE: 79 %
FEV1-PRE: 2.58 L
FEV1FEV6-PRE: 71 %
FEV1FEV6-PRED: 78 %
FEV1FVC-PRE: 71 %
FEV1FVC-PRED: 77 %
FEV1SVC-PRE: 69 %
FEV1SVC-PRED: 64 %
FIFMAX-PRE: 3.74 L/SEC
FRCPLETH-%PRED-PRE: 97 %
FRCPLETH-PRE: 3.63 L
FRCPLETH-PRED: 3.72 L
FVC-%PRED-PRE: 86 %
FVC-PRE: 3.65 L
FVC-PRED: 4.2 L
IC-%PRED-PRE: 83 %
IC-PRE: 3.32 L
IC-PRED: 3.99 L
RVPLETH-%PRED-PRE: 123 %
RVPLETH-PRE: 3.2 L
RVPLETH-PRED: 2.58 L
TLCPLETH-%PRED-PRE: 94 %
TLCPLETH-PRE: 6.95 L
TLCPLETH-PRED: 7.33 L
VA-%PRED-PRE: 84 %
VA-PRE: 5.64 L
VC-%PRED-PRE: 74 %
VC-PRE: 3.75 L
VC-PRED: 5.03 L

## 2021-06-16 PROCEDURE — 11102 TANGNTL BX SKIN SINGLE LES: CPT | Performed by: DERMATOLOGY

## 2021-06-16 PROCEDURE — 17003 DESTRUCT PREMALG LES 2-14: CPT | Mod: XS | Performed by: DERMATOLOGY

## 2021-06-16 PROCEDURE — 17000 DESTRUCT PREMALG LESION: CPT | Mod: XS | Performed by: DERMATOLOGY

## 2021-06-16 PROCEDURE — 11103 TANGNTL BX SKIN EA SEP/ADDL: CPT | Performed by: DERMATOLOGY

## 2021-06-16 PROCEDURE — 88305 TISSUE EXAM BY PATHOLOGIST: CPT | Performed by: DERMATOLOGY

## 2021-06-16 PROCEDURE — 99213 OFFICE O/P EST LOW 20 MIN: CPT | Mod: 25 | Performed by: DERMATOLOGY

## 2021-06-16 ASSESSMENT — PAIN SCALES - GENERAL: PAINLEVEL: NO PAIN (0)

## 2021-06-16 NOTE — PROGRESS NOTES
[facility] Dermatology Note  Encounter Date: Jun 16, 2021  Office Visit     Dermatology Problem List:  # Pertinent PMH: LEOPARD syndrome.  - has oral and lower cutaneous lip solar lentigines   1. Hx NMSC  - sBCC, R upper chest, s/p shave bx 6/16/21, pending Aldara (pt moving to [place], so will be following up there)  - SCC R, forearm, bx 12/21/20 s/p excision 1/6/21  - BCC left lateral neck, bx MMS 10/20/20  - BCC of Left central chest s/p excision 07/17/19  2. Centrofacial rosacea w/telangectasias  3. Prurigo nodule vs lichen simplex chronicus left lateral thenar eminence   - LN2 12/20/19, resolved 1/23/2020, repeat 10/12/20, 12/21/20, 4/5/21  4. AK, LN2   5. Bengin bx:  - Lentigo/SK, L jawline, s/p shave bx 6/16/21  - DF, R lower medial back, s/p shave bx 6/16/21    ____________________________________________    Assessment & Plan:    # NUBs x3, s/p shave bx x3  - L jawline --> this returned as lentigo/SK overlap, no treatment required   - R upper chest --> this returned as sBCC, plan for Aldara (pt moving to [place], so will be following up there)  - R lower medial back --> this returned as dermatofibroma    # AKs.  - see cryo note    # Milia, R cheek.  - extracted today, see procedure note    # History of NMSC. No evidence of recurrent disease.  - Continue photoprotection  - Continue yearly skin exams  - Advised to monitor for changing, non-healing, bleeding, painful, changing, or otherwise symptomatic lesions    # LEOPARD syndrome. Has many mucosal lentigenes.     Procedures Performed:   See below    Follow-up: prn with us, he is moving to [place], advised that he establish derm care at his earliest convenience.    Staff and Medical Student:     Tiera Cuba MS3    Staff Physician:  I was present with the medical student who participated in the service and in the documentation of the note. I have verified the history and personally performed the physical exam and medical decision  making. I agree with the assessment and plan of care as documented in the note.     Medical Student participated in this procedure. I was present for the procedure and personally supervised med student as she performed cryo to several lesions. The remainder of the procedures were performed by me.    - Shave biopsy procedure note, locations: see above. After discussion of benefits and risks including but not limited to bleeding, infection, scar, incomplete removal, recurrence, and non-diagnostic biopsy, written consent and photographs were obtained. The areas were cleaned with isopropyl alcohol. 0.5mL of 1% lidocaine with epinephrine was injected to obtain adequate anesthesia of lesions. Shave biopsy at sites performed. Hemostasis was achieved with aluminium chloride. Petrolatum ointment and a sterile dressing were applied. The patient tolerated the procedure and no complications were noted. The patient was provided with verbal and written post care instructions.   - Procedure(s) performed by faculty.      - Cryotherapy procedure note, location(s): face and forearms. After verbal consent and discussion of risks and benefits including, but not limited to, dyspigmentation/scar, blister, and pain, 6 lesion(s) was(were) treated with 1-2 mm freeze border for 1-2 cycles with liquid nitrogen. Post cryotherapy instructions were provided.  - Procedure(s) performed by faculty with medical student participation.     - Milia extraction note, R cheek.  After verbal consent obtained, skin cleansed with alcohol prep pad and lesion nicked with #11 blade. Contents expressed. Patient tolerated procedure well.    Provider Time: Established: (17826) 20-29 minutes in total spent on day of encounter in chart review, patient visit including counseling, review of tests, documentation and/or discussion with other providers about the issues documented above, excluding any procedures performed.     Carmen Deras MD  Assistant  Professor  Department of Dermatology  [facility]: Phone: [phone], Fax: [phone]  6/24/2021    ____________________________________________    CC: Skin Check (silvia is coming in today for a skin check, has hx of skin cancer, states that he has a spot of concern on the face and a few on the chest)    HPI:  Mr. Stu Meredith is a(n) 65 year old male who presents today as a return patient for FBSE. Patient was last seen 4/5/21 for a few spots that were treated with cryotherapy (prurigo nodule vs lichen simplex chronicicus L lateral thenar eminence, verruca vulgaris mid-back, AK R lateral forehead).     Current areas of concern include chest, L jawline, R cheek, and R forearm. Noticed red/white spot on R cheek a couple weeks ago. 2 red spots on chest have been there for some time and have been looked at before (one spot bleeds, the other seems to have faded). Also has rough spot on R dorsal forearm near elbow, this one has been there for some time. Also noticed small rougher area on L jawline a couple weeks ago.     Patient is moving to [place] in a couple weeks and would like to get as much taken care of today as possible so he has time to get settled down in [place].      Patient is otherwise feeling well, without additional skin concerns.    Labs:  None reviewed.    Physical Exam:  Vitals: There were no vitals taken for this visit.  GEN: well-developed, well-nourished male in no acute distress, in pleasant mood  SKIN: Total skin excluding the undergarment areas was performed. The exam included the head/face, neck, both arms, chest, back, abdomen, both legs, digits and/or nails.   - Brock Skin Type II  - 2mm white papule on R cheek  - slightly rough 5 mm light brown papule along L jawline  - 1.2 cm red plaque on R chest   - 0.5 cm pinkish papule on mid-lower back   - gritty pink papules scattered on face and forearms  - scattered  brown macules on trunk and extremities  - oral mucosa and lower lip many uniform brown macules  - No other lesions of concern on areas examined.     Medications:  Current Outpatient Medications   Medication     acetaminophen (TYLENOL) 325 MG tablet     amoxicillin (AMOXIL) 500 MG tablet     atorvastatin (LIPITOR) 20 MG tablet     benzonatate (TESSALON) 100 MG capsule     cyanocobalamin (VITAMIN B-12) 100 MCG tablet     dofetilide (TIKOSYN) 250 MCG capsule     empagliflozin (JARDIANCE) 10 MG TABS tablet     eplerenone (INSPRA) 50 MG tablet     fluticasone-salmeterol (AIRDUO RESPICLICK) 113-14 MCG/ACT inhaler     furosemide (LASIX) 20 MG tablet     furosemide (LASIX) 40 MG tablet     gabapentin (NEURONTIN) 300 MG capsule     metFORMIN (GLUCOPHAGE-XR) 500 MG 24 hr tablet     metoprolol succinate ER (TOPROL-XL) 50 MG 24 hr tablet     multivitamin, therapeutic (THERA-VIT) TABS     sildenafil (VIAGRA) 100 MG tablet     XARELTO ANTICOAGULANT 20 MG TABS tablet     zolpidem (AMBIEN) 10 MG tablet     No current facility-administered medications for this visit.       Past Medical History:   Patient Active Problem List   Diagnosis     Hypertrophic cardiomyopathy (H)     Advanced directives, counseling/discussion     Ventricular tachycardia (H)     Automatic implantable cardioverter-defibrillator in situ- [employer], dual chamber- NOT dependent     Prediabetes     S/P ablation of atrial flutter     CHF (congestive heart failure) (H)     Chronic Zolpidem use for insomnia     S/P ablation of atrial fibrillation     Lung nodules     Atrial tachycardia (H)     Encounter for monitoring dofetilide therapy     HTN (hypertension)     Hypertrophic cardiomegaly     SOB (shortness of breath)     Paroxysmal atrial fibrillation (H)     Dyspnea     Type 2 diabetes mellitus without complication, without long-term current use of insulin (H)     Chronic insomnia     Past Medical History:   Diagnosis Date     Atrial fibrillation (H)  12/09/2011    failed medication, multiple DC cardioveresions; s/p Left atrial ablation to eliminate atrial fibrillation 12/9/11     Chest pain      CHF (congestive heart failure) (H) 07/30/2016     Coronary artery disease      DJD (degenerative joint disease)      Fatigue 07/15/2019     Hip arthritis 01/15/2014     Hypertension      Hypertrophic cardiomyopathy (H) 10/2009     Other abnormal heart sounds      Pacemaker     ICD     Palpitations      Pneumonia, organism unspecified(486)      Status post implantation of automatic cardioverter/defibrillator (AICD)      Type 2 diabetes mellitus without complication, without long-term current use of insulin (H) 07/13/2020     Ventricular tachycardia (H)         CC Referred Self, MD  No address on file on close of this encounter.

## 2021-06-16 NOTE — NURSING NOTE
Lidocaine-epinephrine 1-1:131221 % injection   3mL once for one use, starting 6/16/2021 ending 6/16/2021,  2mL disp, R-0, injection  Injected by Kyara Knox CMA

## 2021-06-16 NOTE — LETTER
6/16/2021       RE: Stu Meredith  [address] [facility] [address]     Dear Colleague,    Thank you for referring your patient, Stu Meredith, to the [facility] DERMATOLOG[facility] [place] at [facility]. Please see a copy of my visit note below.    [facility] Dermatology Note  Encounter Date: Jun 16, 2021  Office Visit     Dermatology Problem List:  # Pertinent PMH: LEOPARD syndrome.  - has oral and lower cutaneous lip solar lentigines   1. Hx NMSC  - sBCC, R upper chest, s/p shave bx 6/16/21, pending Ashley (pt moving to [place], so will be following up there)  - SCC R, forearm, bx 12/21/20 s/p excision 1/6/21  - BCC left lateral neck, bx MMS 10/20/20  - BCC of Left central chest s/p excision 07/17/19  2. Centrofacial rosacea w/telangectasias  3. Prurigo nodule vs lichen simplex chronicus left lateral thenar eminence   - LN2 12/20/19, resolved 1/23/2020, repeat 10/12/20, 12/21/20, 4/5/21  4. AK, LN2   5. Bengin bx:  - Lentigo/SK, L jawline, s/p shave bx 6/16/21  - DF, R lower medial back, s/p shave bx 6/16/21    ____________________________________________    Assessment & Plan:    # NUBs x3, s/p shave bx x3  - L jawline --> this returned as lentigo/SK overlap, no treatment required   - R upper chest --> this returned as sBCC, plan for Aldara (pt moving to [place], so will be following up there)  - R lower medial back --> this returned as dermatofibroma    # AKs.  - see cryo note    # Milia, R cheek.  - extracted today, see procedure note    # History of NMSC. No evidence of recurrent disease.  - Continue photoprotection  - Continue yearly skin exams  - Advised to monitor for changing, non-healing, bleeding, painful, changing, or otherwise symptomatic lesions    # LEOPARD syndrome. Has many mucosal lentigenes.     Procedures Performed:   See below    Follow-up: prn with us, he is moving to [place], advised that he establish  derm care at his earliest convenience.    Staff and Medical Student:     Tiera Cuba MS3    Staff Physician:  I was present with the medical student who participated in the service and in the documentation of the note. I have verified the history and personally performed the physical exam and medical decision making. I agree with the assessment and plan of care as documented in the note.     Medical Student participated in this procedure. I was present for the procedure and personally supervised med student as she performed cryo to several lesions. The remainder of the procedures were performed by me.    - Shave biopsy procedure note, locations: see above. After discussion of benefits and risks including but not limited to bleeding, infection, scar, incomplete removal, recurrence, and non-diagnostic biopsy, written consent and photographs were obtained. The areas were cleaned with isopropyl alcohol. 0.5mL of 1% lidocaine with epinephrine was injected to obtain adequate anesthesia of lesions. Shave biopsy at sites performed. Hemostasis was achieved with aluminium chloride. Petrolatum ointment and a sterile dressing were applied. The patient tolerated the procedure and no complications were noted. The patient was provided with verbal and written post care instructions.   - Procedure(s) performed by faculty.      - Cryotherapy procedure note, location(s): face and forearms. After verbal consent and discussion of risks and benefits including, but not limited to, dyspigmentation/scar, blister, and pain, 6 lesion(s) was(were) treated with 1-2 mm freeze border for 1-2 cycles with liquid nitrogen. Post cryotherapy instructions were provided.  - Procedure(s) performed by faculty with medical student participation.     - Milia extraction note, R cheek.  After verbal consent obtained, skin cleansed with alcohol prep pad and lesion nicked with #11 blade. Contents expressed. Patient tolerated procedure well.    Provider  Time: Established: (99175) 20-29 minutes in total spent on day of encounter in chart review, patient visit including counseling, review of tests, documentation and/or discussion with other providers about the issues documented above, excluding any procedures performed.     Carmen Deras MD    Department of Dermatology  [facility]: Phone: [phone], Fax: [phone]  6/24/2021    ____________________________________________    CC: Skin Check (silvia is coming in today for a skin check, has hx of skin cancer, states that he has a spot of concern on the face and a few on the chest)    HPI:  Mr. Stu Meredith is a(n) 65 year old male who presents today as a return patient for FBSE. Patient was last seen 4/5/21 for a few spots that were treated with cryotherapy (prurigo nodule vs lichen simplex chronicicus L lateral thenar eminence, verruca vulgaris mid-back, AK R lateral forehead).     Current areas of concern include chest, L jawline, R cheek, and R forearm. Noticed red/white spot on R cheek a couple weeks ago. 2 red spots on chest have been there for some time and have been looked at before (one spot bleeds, the other seems to have faded). Also has rough spot on R dorsal forearm near elbow, this one has been there for some time. Also noticed small rougher area on L jawline a couple weeks ago.     Patient is moving to [place] in a couple weeks and would like to get as much taken care of today as possible so he has time to get settled down in [place].      Patient is otherwise feeling well, without additional skin concerns.    Labs:  None reviewed.    Physical Exam:  Vitals: There were no vitals taken for this visit.  GEN: well-developed, well-nourished male in no acute distress, in pleasant mood  SKIN: Total skin excluding the undergarment areas was performed. The exam included the head/face, neck, both arms, chest,  back, abdomen, both legs, digits and/or nails.   - Brock Skin Type II  - 2mm white papule on R cheek  - slightly rough 5 mm light brown papule along L jawline  - 1.2 cm red plaque on R chest   - 0.5 cm pinkish papule on mid-lower back   - gritty pink papules scattered on face and forearms  - scattered brown macules on trunk and extremities  - oral mucosa and lower lip many uniform brown macules  - No other lesions of concern on areas examined.     Medications:  Current Outpatient Medications   Medication     acetaminophen (TYLENOL) 325 MG tablet     amoxicillin (AMOXIL) 500 MG tablet     atorvastatin (LIPITOR) 20 MG tablet     benzonatate (TESSALON) 100 MG capsule     cyanocobalamin (VITAMIN B-12) 100 MCG tablet     dofetilide (TIKOSYN) 250 MCG capsule     empagliflozin (JARDIANCE) 10 MG TABS tablet     eplerenone (INSPRA) 50 MG tablet     fluticasone-salmeterol (AIRDUO RESPICLICK) 113-14 MCG/ACT inhaler     furosemide (LASIX) 20 MG tablet     furosemide (LASIX) 40 MG tablet     gabapentin (NEURONTIN) 300 MG capsule     metFORMIN (GLUCOPHAGE-XR) 500 MG 24 hr tablet     metoprolol succinate ER (TOPROL-XL) 50 MG 24 hr tablet     multivitamin, therapeutic (THERA-VIT) TABS     sildenafil (VIAGRA) 100 MG tablet     XARELTO ANTICOAGULANT 20 MG TABS tablet     zolpidem (AMBIEN) 10 MG tablet     No current facility-administered medications for this visit.       Past Medical History:   Patient Active Problem List   Diagnosis     Hypertrophic cardiomyopathy (H)     Advanced directives, counseling/discussion     Ventricular tachycardia (H)     Automatic implantable cardioverter-defibrillator in situ- [employer], dual chamber- NOT dependent     Prediabetes     S/P ablation of atrial flutter     CHF (congestive heart failure) (H)     Chronic Zolpidem use for insomnia     S/P ablation of atrial fibrillation     Lung nodules     Atrial tachycardia (H)     Encounter for monitoring dofetilide therapy     HTN  (hypertension)     Hypertrophic cardiomegaly     SOB (shortness of breath)     Paroxysmal atrial fibrillation (H)     Dyspnea     Type 2 diabetes mellitus without complication, without long-term current use of insulin (H)     Chronic insomnia     Past Medical History:   Diagnosis Date     Atrial fibrillation (H) 12/09/2011    failed medication, multiple DC cardioveresions; s/p Left atrial ablation to eliminate atrial fibrillation 12/9/11     Chest pain      CHF (congestive heart failure) (H) 07/30/2016     Coronary artery disease      DJD (degenerative joint disease)      Fatigue 07/15/2019     Hip arthritis 01/15/2014     Hypertension      Hypertrophic cardiomyopathy (H) 10/2009     Other abnormal heart sounds      Pacemaker     ICD     Palpitations      Pneumonia, organism unspecified(486)      Status post implantation of automatic cardioverter/defibrillator (AICD)      Type 2 diabetes mellitus without complication, without long-term current use of insulin (H) 07/13/2020     Ventricular tachycardia (H)         CC Referred Self, MD  No address on file on close of this encounter.

## 2021-06-16 NOTE — NURSING NOTE
Dermatology Rooming Note    Stu Meredith's goals for this visit include:   Chief Complaint   Patient presents with     Skin Check     silvia is coming in today for a skin check, has hx of skin cancer, states that he has a spot of concern on the face and a few on the chest     Kyara Knox CMA on 6/16/2021 at 8:13 AM

## 2021-06-16 NOTE — PATIENT INSTRUCTIONS
"- we will contact you with results from biopsies today. May take a couple weeks to get results. May need to follow up in [place] if any need further treatment.   - once in [place], find dermatologist that can follow you regularly    Post Biopsy Site Care for Shave Biopsy    Keep the bandaid on until tomorrow and keep the area clean and dry.    Wash with mild soap and water every day until wound appears well-healed. Rinse well and pat dry.    Apply Vaseline over site with a Q-tip and Re-apply a bandaid until wound appears well-healed.  A well-healed wound is \"pinked over\" and has a shiny appearance.      Call the clinic right away if you notice any signs or symptoms of infection, such as: warmth or redness at the site, fever over 100 degrees F, yellow/creamy or foul-smelling drainage, or pain at the site.     Biopsy results typically come back in about 1 week. If your results are urgent you will be notified by phone. Otherwise, the results will be mailed to you. If you have not heard from us within 2 weeks call the clinic.    Please do not hesitate to call the clinic with any questions or concerns. We are here Mon-Fri from 8am-5pm and can be reached at [phone].  Patient Education     Checking for Skin Cancer  You can find cancer early by checking your skin each month. There are 3 kinds of skin cancer. They are melanoma, basal cell carcinoma, and squamous cell carcinoma. Doing monthly skin checks is the best way to find new marks or skin changes. Follow the instructions below for checking your skin.  The ABCDEs of checking moles for melanoma  Check your moles or growths for signs of melanoma using ABCDE:    Asymmetry: the sides of the mole or growth don t match    Border: the edges are ragged, notched, or blurred    Color: the color within the mole or growth varies    Diameter: the mole or growth is larger than 6 mm (size of a pencil eraser)    Evolving: the size, shape, or color of the mole or growth is " changing    Checking for other types of skin cancer  Basal cell carcinoma or squamous cell carcinoma have symptoms such as:    A spot or mole that looks different from all other marks on your skin    Changes in how an area feels, such as itching, tenderness, or pain    Changes in the skin's surface, such as oozing, bleeding, or scaliness    A sore that does not heal    New swelling or redness beyond the border of a mole  Who s at risk?  Anyone can get skin cancer. But you are at greater risk if you have:    Fair skin, light-colored hair, or light-colored eyes    Many moles or abnormal moles on your skin    A history of sunburns from sunlight or tanning beds    A family history of skin cancer    A history of exposure to radiation or chemicals    A weakened immune system  If you have had skin cancer in the past, you are at risk for recurring skin cancer.  How to check your skin  Do your monthly skin checkups in front of a full-length mirror. Check all parts of your body, including your:    Head (ears, face, neck, and scalp)    Torso (front, back, and sides)    Arms (tops, undersides, upper, and lower armpits)    Hands (palms, backs, and fingers, including under the nails)    Buttocks and genitals    Legs (front, back, and sides)    Feet (tops, soles, toes, including under the nails, and between toes)  If you have a lot of moles, take digital photos of them each month. Make sure to take photos both up close and from a distance. These can help you see if any moles change over time.  Most skin changes are not cancer. But if you see any changes in your skin, call your doctor right away. Only he or she can diagnose a problem. If you have skin cancer, seeing your doctor can be the first step toward getting the treatment that could save your life.  [employer] last reviewed this educational content on 4/1/2019 2000-2021 [employer]. All rights reserved. This information is not intended as a substitute for  professional medical care. Always follow your healthcare professional's instructions.               Wound Care After a Biopsy    What is a skin biopsy?  A skin biopsy allows the doctor to examine a very small piece of tissue under the microscope to determine the diagnosis and the best treatment for the skin condition. A local anesthetic (numbing medicine)  is injected with a very small needle into the skin area to be tested. A small piece of skin is taken from the area. Sometimes a suture (stitch) is used.     What are the risks of a skin biopsy?  I will experience scar, bleeding, swelling, pain, crusting and redness. I may experience incomplete removal or recurrence. Risks of this procedure are excessive bleeding, bruising, infection, nerve damage, numbness, thick (hypertrophic or keloidal) scar and non-diagnostic biopsy.    How should I care for my wound for the first 24 hours?    Keep the wound dry and covered for 24 hours    If it bleeds, hold direct pressure on the area for 15 minutes. If bleeding does not stop then go to the emergency room    Avoid strenuous exercise the first 1-2 days or as your doctor instructs you    How should I care for the wound after 24 hours?    After 24 hours, remove the bandage    You may bathe or shower as normal    If you had a scalp biopsy, you can shampoo as usual and can use shower water to clean the biopsy site daily    Clean the wound twice a day with gentle soap and water    Do not scrub, be gentle    Apply white petroleum/Vaseline after cleaning the wound with a cotton swab or a clean finger, and keep the site covered with a Bandaid /bandage. Bandages are not necessary with a scalp biopsy    If you are unable to cover the site with a Bandaid /bandage, re-apply ointment 2-3 times a day to keep the site moist. Moisture will help with healing    Avoid strenuous activity for first 1-2 days    Avoid lakes, rivers, pools, and oceans until the stitches are removed or the site is  healed    How do I clean my wound?    Wash hands thoroughly with soap or use hand  before all wound care    Clean the wound with gentle soap and water    Apply white petroleum/Vaseline  to wound after it is clean    Replace the Bandaid /bandage to keep the wound covered for the first few days or as instructed by your doctor    If you had a scalp biopsy, warm shower water to the area on a daily basis should suffice    What should I use to clean my wound?     Cotton-tipped applicators (Qtips )    White petroleum jelly (Vaseline ). Use a clean new container and use Q-tips to apply.    Bandaids   as needed    Gentle soap     How should I care for my wound long term?    Do not get your wound dirty    Keep up with wound care for one week or until the area is healed.    A small scab will form and fall off by itself when the area is completely healed. The area will be red and will become pink in color as it heals. Sun protection is very important for how your scar will turn out. Sunscreen with an SPF 30 or greater is recommended once the area is healed.    If you have stitches, stitches need to be removed in 14 days. You may return to our clinic for this or you may have it done locally at your doctor s office.    You should have some soreness but it should be mild and slowly go away over several days. Talk to your doctor about using tylenol for pain,    When should I call my doctor?  If you have increased:     Pain or swelling    Pus or drainage (clear or slightly yellow drainage is ok)    Temperature over 100F    Spreading redness or warmth around wound    When will I hear about my results?  The biopsy results can take 2-3 weeks to come back. The clinic will call you with the results, send you a [employer] message, or have you schedule a follow-up clinic or phone time to discuss the results. Contact our clinics if you do not hear from us in 3 weeks.     Who should I call with questions?    [facility]  [facility]: [phone]     [facility]: [phone]    For urgent needs outside of business hours call the [facility] at [phone] and ask for the dermatology resident on call    Cryotherapy    What is it?    Use of a very cold liquid, such as liquid nitrogen, to freeze and destroy abnormal skin cells that need to be removed    What should I expect?    Tenderness and redness    A small blister that might grow and fill with dark purple blood. There may be crusting.    More than one treatment may be needed if the lesions do not go away.    How do I care for the treated area?    Gently wash the area with your hands when bathing.    Use a thin layer of Vaseline to help with healing. You may use a Band-Aid.     The area should heal within 7-10 days and may leave behind a pink or lighter color.     Do not use an antibiotic or Neosporin ointment.     You may take acetaminophen (Tylenol) for pain.     Call your Doctor if you have:    Severe pain    Signs of infection (warmth, redness, cloudy yellow drainage, and or a bad smell)    Questions or concerns    Who should I call with questions?       [facility]: [phone]       [facility]: [phone]       For urgent needs outside of business hours call the [facility] at [phone]        and ask for the dermatology resident on call

## 2021-06-18 LAB — COPATH REPORT: NORMAL

## 2021-06-19 NOTE — RESULT ENCOUNTER NOTE
Dear Haroon,    Your pulmonary function test results are normal, indicating low likelihood of asthma or COPD as the reason for your cough and shortness of breath.  If your symptoms fail to improve over the next week or so I recommend follow-up for additional evaluation.    Sincerely,  KARSON Webb, CNP

## 2021-06-23 ENCOUNTER — MYC MEDICAL ADVICE (OUTPATIENT)
Dept: INTERNAL MEDICINE | Facility: CLINIC | Age: 66
End: 2021-06-23

## 2021-06-23 ASSESSMENT — ENCOUNTER SYMPTOMS
TROUBLE SWALLOWING: 0
SMELL DISTURBANCE: 0
POSTURAL DYSPNEA: 0
NAIL CHANGES: 0
POOR WOUND HEALING: 0
NECK MASS: 0
HOARSE VOICE: 0
HEMOPTYSIS: 0
TASTE DISTURBANCE: 0
COUGH: 1
SORE THROAT: 1
SNORES LOUDLY: 1
SHORTNESS OF BREATH: 1
SKIN CHANGES: 0
SINUS PAIN: 0
SINUS CONGESTION: 1
DYSPNEA ON EXERTION: 1
COUGH DISTURBING SLEEP: 1
WHEEZING: 1
SPUTUM PRODUCTION: 1

## 2021-06-24 DIAGNOSIS — C44.519 BASAL CELL CARCINOMA OF ANTERIOR CHEST: ICD-10-CM

## 2021-06-28 RX ORDER — IMIQUIMOD 12.5 MG/.25G
CREAM TOPICAL
Qty: 24 EACH | Refills: 0 | Status: SHIPPED | OUTPATIENT
Start: 2021-06-28

## 2021-06-28 NOTE — TELEPHONE ENCOUNTER
Received medication refill request for imiquimod for sBCC. Refill request approved. Patient last seen last week and did not  initial medication.  
imiquimod (ALDARA) 5 % external cream  Last Written Prescription Date:  6/21/2021  Last Fill Quantity: 24,   # refills: 0  Last Office Visit : 6/16/2021  Future Office visit:  None    Routing refill request to provider for review/approval because:  In the order it mentions using only for 6 weeks. Refer to Provider for review and refills per Providers orders.       Pam Urias RN  Central Triage Red Flags/Med Refills    
Implemented All Universal Safety Interventions:  [place] to call system. Call bell, personal items and telephone within reach. Instruct patient to call for assistance. Room bathroom lighting operational. Non-slip footwear when patient is off stretcher. Physically safe environment: no spills, clutter or unnecessary equipment. Stretcher in lowest position, wheels locked, appropriate side rails in place.

## 2021-06-29 ENCOUNTER — OFFICE VISIT (OUTPATIENT)
Dept: PULMONOLOGY | Facility: CLINIC | Age: 66
End: 2021-06-29
Attending: INTERNAL MEDICINE
Payer: COMMERCIAL

## 2021-06-29 VITALS
BODY MASS INDEX: 30.4 KG/M2 | SYSTOLIC BLOOD PRESSURE: 147 MMHG | WEIGHT: 218 LBS | OXYGEN SATURATION: 97 % | HEART RATE: 62 BPM | DIASTOLIC BLOOD PRESSURE: 82 MMHG

## 2021-06-29 DIAGNOSIS — R06.02 SHORTNESS OF BREATH: ICD-10-CM

## 2021-06-29 DIAGNOSIS — J45.20 MILD INTERMITTENT ASTHMA WITHOUT COMPLICATION: ICD-10-CM

## 2021-06-29 DIAGNOSIS — R05.3 PERSISTENT COUGH: ICD-10-CM

## 2021-06-29 DIAGNOSIS — J20.9 ACUTE BRONCHITIS, UNSPECIFIED ORGANISM: Primary | ICD-10-CM

## 2021-06-29 PROCEDURE — G0463 HOSPITAL OUTPT CLINIC VISIT: HCPCS

## 2021-06-29 PROCEDURE — 99214 OFFICE O/P EST MOD 30 MIN: CPT | Performed by: INTERNAL MEDICINE

## 2021-06-29 RX ORDER — AMOXICILLIN 500 MG/1
1000 TABLET, FILM COATED ORAL 3 TIMES DAILY
Qty: 42 TABLET | Refills: 0 | Status: SHIPPED | OUTPATIENT
Start: 2021-06-29 | End: 2021-07-06

## 2021-06-29 RX ORDER — MONTELUKAST SODIUM 10 MG/1
10 TABLET ORAL AT BEDTIME
Qty: 30 TABLET | Refills: 11 | Status: SHIPPED | OUTPATIENT
Start: 2021-06-29

## 2021-06-29 NOTE — PATIENT INSTRUCTIONS
Amoxicillin x 7 days now to keep on hand if this gets worse while traveling  Montelukast daily for allergies/intermittent asthma

## 2021-06-29 NOTE — NURSING NOTE
Chief Complaint   Patient presents with     General Visit     follow up     Vitals were taken and medications were reconciled.     CELIA Yo

## 2021-06-29 NOTE — PROGRESS NOTES
[facility] Follow Up Visit    Reason for Visit  Stu Meredith is a 65 year old male who is followed for abnormal lung cancer screening exam, cough  Pulmonary HPI    He is moving to [place] next week, Here today to evaluate cough, white phlegm. He had some yellow snot, he thought it might be due to allergies. Resolved spontaneously. What he is currently experiencing is nothing like his typical episodes of bronchitis where he typically gets quite ill. Cough is usually not productive. No difficulty breathing now, but some difficulty a few months ago. No change in weight, activity. He has occasional clear nasal drainage. He has had at least annual episodes of bronchitis, although none during COVID.     Other active medical problems include:   - atrial fibrillation s/p ablations    - hypertrophic cardiomyopathy    Exposure history: Denies asbestos or radon exposure   TB risk factors: No  Prior Imaging:Yes  Constitutional Symptoms: No  Personal history of cancer:No  Up to date on age-appropriate cancer screening:Yes    ROS Pulmonary  Dyspnea: No, Cough: Yes, Chest pain: No, Wheezing: No, Sputum Production: No, Hemoptysis: No  A complete ROS was otherwise negative except as noted in the HPI.  The patient was seen and examined by Andreia Julien MD   Current Outpatient Medications   Medication     acetaminophen (TYLENOL) 325 MG tablet     albuterol (PROAIR HFA/PROVENTIL HFA/VENTOLIN HFA) 108 (90 Base) MCG/ACT inhaler     atorvastatin (LIPITOR) 20 MG tablet     metFORMIN (GLUCOPHAGE-XR) 500 MG 24 hr tablet     metoprolol succinate (TOPROL-XL) 50 MG 24 hr tablet     multivitamin, therapeutic (THERA-VIT) TABS     sotalol (BETAPACE) 120 MG tablet     spironolactone (ALDACTONE) 50 MG tablet     XARELTO 20 MG TABS tablet     zolpidem (AMBIEN) 10 MG tablet       No Known Allergies  Social History     Socioeconomic History     Marital status:      Spouse name: Not on file      Number of children: Not on file     Years of education: Not on file     Highest education level: Not on file   Occupational History     Occupation:      Employer: [employer]   Social Needs     Financial resource strain: Not on file     Food insecurity     Worry: Not on file     Inability: Not on file     Transportation needs     Medical: Not on file     Non-medical: Not on file   Tobacco Use     Smoking status: Former Smoker     Packs/day: 1.00     Years: 40.00     Pack years: 40.00     Types: Cigarettes     Start date: 1975     Quit date: 2015     Years since quittin.5     Smokeless tobacco: Never Used   Substance and Sexual Activity     Alcohol use: Yes     Alcohol/week: 0.0 standard drinks     Comment: very minimal     Drug use: No     Sexual activity: Yes     Partners: Female   Lifestyle     Physical activity     Days per week: Not on file     Minutes per session: Not on file     Stress: Not on file   Relationships     Social connections     Talks on phone: Not on file     Gets together: Not on file     Attends Faith service: Not on file     Active member of club or organization: Not on file     Attends meetings of clubs or organizations: Not on file     Relationship status: Not on file     Intimate partner violence     Fear of current or ex partner: Not on file     Emotionally abused: Not on file     Physically abused: Not on file     Forced sexual activity: Not on file   Other Topics Concern      Service Not Asked     Blood Transfusions Not Asked     Caffeine Concern Not Asked     Occupational Exposure Not Asked     Hobby Hazards Not Asked     Sleep Concern Not Asked     Stress Concern Not Asked     Weight Concern Not Asked     Special Diet Not Asked     Back Care Not Asked     Exercise No     Bike Helmet Not Asked     Seat Belt Yes     Self-Exams Not Asked     Parent/sibling w/ CABG, MI or angioplasty before 65F 55M? No   Social History Narrative     Not on file      Past Medical History:   Diagnosis Date     Atrial fibrillation (H) 12/09/2011    failed medication, multiple DC cardioveresions; s/p Left atrial ablation to eliminate atrial fibrillation 12/9/11     Chest pain      CHF (congestive heart failure) (H) 07/30/2016     Coronary artery disease      DJD (degenerative joint disease)      Fatigue 07/15/2019     Hip arthritis 01/15/2014     Hypertension      Hypertrophic cardiomyopathy (H) 10/2009     Other abnormal heart sounds      Pacemaker     ICD     Palpitations      Pneumonia, organism unspecified(486)      Status post implantation of automatic cardioverter/defibrillator (AICD)      Type 2 diabetes mellitus without complication, without long-term current use of insulin (H) 07/13/2020     Ventricular tachycardia (H)      Past Surgical History:   Procedure Laterality Date     ANESTHESIA CARDIOVERSION  4/24/2014    Procedure: ANESTHESIA CARDIOVERSION;  Surgeon: Generic Anesthesia Provider;  [facility]     ANESTHESIA CARDIOVERSION N/A 5/12/2016    Procedure: ANESTHESIA CARDIOVERSION;  Surgeon: GENERIC ANESTHESIA PROVIDER;  [facility]     ANESTHESIA CARDIOVERSION N/A 8/7/2017    Procedure: ANESTHESIA CARDIOVERSION;  Anesthesia Offsite Coverage Cardioversion @1100;  Surgeon: GENERIC ANESTHESIA PROVIDER;  [facility]     ANESTHESIA CARDIOVERSION N/A 1/3/2018    Procedure: ANESTHESIA CARDIOVERSION;  Anesthesia Cardioverion;  Surgeon: GENERIC ANESTHESIA PROVIDER;  [facility]     ANESTHESIA CARDIOVERSION N/A 5/4/2018    Procedure: ANESTHESIA CARDIOVERSION;  Anesthesia Coverage Cardioversion @1400;  Surgeon: GENERIC ANESTHESIA PROVIDER;  [facility]     ANESTHESIA CARDIOVERSION N/A 9/27/2018    Procedure: ANESTHESIA CARDIOVERSION;  Anesthesia Cardioversion @0930;  Surgeon: GENERIC ANESTHESIA PROVIDER;  [facility]     ANESTHESIA CARDIOVERSION N/A 12/20/2018    Procedure: Anesthesia Coverage Cardioversion @0830;  Surgeon: GENERIC ANESTHESIA PROVIDER;   [facility]     ANESTHESIA CARDIOVERSION N/A 8/21/2019    Procedure: Anesthesia Coverage Cardioversion @0800;  Surgeon: GENERIC ANESTHESIA PROVIDER;  [facility]     ANESTHESIA CARDIOVERSION N/A 1/16/2020    Procedure: ANESTHESIA, FOR CARDIOVERSION;  Surgeon: GENERIC ANESTHESIA PROVIDER;  [facility]     ARTHROPLASTY HIP  1/15/2014    Procedure: ARTHROPLASTY HIP;  Left Total Hip Arthroplasty;  Surgeon: Nelson Gaspar MD;  [facility]     ARTHROPLASTY HIP Right 6/5/2019    Procedure: Right Total Hip Arthroplasty;  Surgeon: Nelson Gaspar MD;  [facility]     CARDIAC SURGERY       COLONOSCOPY N/A 5/18/2018    Procedure: COMBINED COLONOSCOPY, SINGLE OR MULTIPLE BIOPSY/POLYPECTOMY BY BIOPSY;  COLONOSCOPY (PT HAS DEFIBRILLATOR) ;  Surgeon: Mike Nickerson MD;  Location:  GI     CV RIGHT HEART CATH MEASUREMENTS RECORDED N/A 8/19/2019    Procedure: CV RIGHT HEART CATH;  Surgeon: Cisco Rausch MD;  Location:  HEART CARDIAC CATH LAB     CV RIGHT HEART CATH MEASUREMENTS RECORDED N/A 8/21/2019    Procedure: Right Heart Cath;  Surgeon: Ciaran Burton MD;  Location:  HEART CARDIAC CATH LAB     EP ABLATION FOCAL AFIB N/A 5/14/2020    Procedure: EP ABLATION FOCAL AFIB;  Surgeon: Erik Cooper MD;  Location:  HEART CARDIAC CATH LAB     H ABLATION FOCAL AFIB  12/9/11    Left atrial ablation to eliminate atrial fibrillation     IMPLANT AUTOMATIC IMPLANTABLE CARDIOVERTER DEFIBRILLATOR  7/27/12    AICD implantation     TONSILLECTOMY  1964     Family History   Problem Relation Age of Onset     Heart Disease Mother         unknown     Obesity Mother      Gastrointestinal Disease Mother         diverticulitis     Diabetes Maternal Grandmother      Diabetes Paternal Grandmother      Cerebrovascular Disease Paternal Grandmother 94     Diabetes Paternal Grandfather      Cerebrovascular Disease Paternal Grandfather 78     Diabetes Son      C.A.D. Father         CABG age 78     Heart Disease Father          CABG x5     Diabetes Maternal Grandfather      LUNG DISEASE No family hx of      Deep Vein Thrombosis (DVT) No family hx of      Anesthesia Reaction No family hx of      Melanoma No family hx of      Skin Cancer No family hx of      Exam:   BP (!) 147/82   Pulse 62   Wt 98.9 kg (218 lb)   SpO2 97%   BMI 30.40 kg/m    Results:  - My interpretation of the images relevant for this visit includes: CT chest today images reviewed and compared to priors in July, May, March 2019.  The right upper lobe nodule appears approximately stable, compared to the most recent CT there is a 2 mm increase in the short axis, but compared to early 2019 CTs, it is pretty similar in size and appearance.  - My interpretation of the PFT's relevant for this visit includes: Normal in 2013    Pulmonary nodules described from series 4:  1.2 x 0.7 cm solid nodule in the posterior right upper lobe on image  117, previously 1.2 x 0.8 cm on 7/22/2020 and 1.1 x 0.7 cm on  5/28/2019.  6 mm nodule along the right major fissure on image 146 is unchanged.  6 mm groundglass nodule in the subpleural left lower lobe on image  353, previously 6 mm.    Assessment and plan: Haroon is a 64-year-old male being evaluated for abnormal lung cancer screening CT. Now that I have a longer perspective on his symptoms, I wonder if he has mild intermittent asthma with annual episodes of bronchitis.   Lung nodule - LungRADS 2 in December on annual lung cancer screening     Mild intermittent asthma - manifested by cough with periodic episodes of bronchitis.    Amoxicillin x 7 days now to keep on hand if this gets worse while he is traveling   Montelukast daily for allergies/intermittent asthma    He is planning to re-establish care in [place]

## 2021-06-29 NOTE — LETTER
6/29/2021         RE: Stu Meredith  [address]  [facility] [address]        Dear Colleague,    Thank you for referring your patient, Stu Meredith, to the [facility] AND [facility] [place]. Please see a copy of my visit note below.      [facility] Follow Up Visit    Reason for Visit  Stu Meredith is a 65 year old male who is followed for abnormal lung cancer screening exam, cough  Pulmonary HPI    He is moving to [place] next week, Here today to evaluate cough, white phlegm. He had some yellow snot, he thought it might be due to allergies. Resolved spontaneously. What he is currently experiencing is nothing like his typical episodes of bronchitis where he typically gets quite ill. Cough is usually not productive. No difficulty breathing now, but some difficulty a few months ago. No change in weight, activity. He has occasional clear nasal drainage. He has had at least annual episodes of bronchitis, although none during COVID.     Other active medical problems include:   - atrial fibrillation s/p ablations    - hypertrophic cardiomyopathy    Exposure history: Denies asbestos or radon exposure   TB risk factors: No  Prior Imaging:Yes  Constitutional Symptoms: No  Personal history of cancer:No  Up to date on age-appropriate cancer screening:Yes    ROS Pulmonary  Dyspnea: No, Cough: Yes, Chest pain: No, Wheezing: No, Sputum Production: No, Hemoptysis: No  A complete ROS was otherwise negative except as noted in the HPI.  The patient was seen and examined by Andreia Julien MD   Current Outpatient Medications   Medication     acetaminophen (TYLENOL) 325 MG tablet     albuterol (PROAIR HFA/PROVENTIL HFA/VENTOLIN HFA) 108 (90 Base) MCG/ACT inhaler     atorvastatin (LIPITOR) 20 MG tablet     metFORMIN (GLUCOPHAGE-XR) 500 MG 24 hr tablet     metoprolol succinate (TOPROL-XL) 50 MG 24 hr tablet     multivitamin,  therapeutic (THERA-VIT) TABS     sotalol (BETAPACE) 120 MG tablet     spironolactone (ALDACTONE) 50 MG tablet     XARELTO 20 MG TABS tablet     zolpidem (AMBIEN) 10 MG tablet       No Known Allergies  Social History     Socioeconomic History     Marital status:      Spouse name: Not on file     Number of children: Not on file     Years of education: Not on file     Highest education level: Not on file   Occupational History     Occupation:      Employer: [employer]   Social Needs     Financial resource strain: Not on file     Food insecurity     Worry: Not on file     Inability: Not on file     Transportation needs     Medical: Not on file     Non-medical: Not on file   Tobacco Use     Smoking status: Former Smoker     Packs/day: 1.00     Years: 40.00     Pack years: 40.00     Types: Cigarettes     Start date: 1975     Quit date: 2015     Years since quittin.5     Smokeless tobacco: Never Used   Substance and Sexual Activity     Alcohol use: Yes     Alcohol/week: 0.0 standard drinks     Comment: very minimal     Drug use: No     Sexual activity: Yes     Partners: Female   Lifestyle     Physical activity     Days per week: Not on file     Minutes per session: Not on file     Stress: Not on file   Relationships     Social connections     Talks on phone: Not on file     Gets together: Not on file     Attends Taoism service: Not on file     Active member of club or organization: Not on file     Attends meetings of clubs or organizations: Not on file     Relationship status: Not on file     Intimate partner violence     Fear of current or ex partner: Not on file     Emotionally abused: Not on file     Physically abused: Not on file     Forced sexual activity: Not on file   Other Topics Concern      Service Not Asked     Blood Transfusions Not Asked     Caffeine Concern Not Asked     Occupational Exposure Not Asked     Hobby Hazards Not Asked     Sleep Concern Not  Asked     Stress Concern Not Asked     Weight Concern Not Asked     Special Diet Not Asked     Back Care Not Asked     Exercise No     Bike Helmet Not Asked     Seat Belt Yes     Self-Exams Not Asked     Parent/sibling w/ CABG, MI or angioplasty before 65F 55M? No   Social History Narrative     Not on file     Past Medical History:   Diagnosis Date     Atrial fibrillation (H) 12/09/2011    failed medication, multiple DC cardioveresions; s/p Left atrial ablation to eliminate atrial fibrillation 12/9/11     Chest pain      CHF (congestive heart failure) (H) 07/30/2016     Coronary artery disease      DJD (degenerative joint disease)      Fatigue 07/15/2019     Hip arthritis 01/15/2014     Hypertension      Hypertrophic cardiomyopathy (H) 10/2009     Other abnormal heart sounds      Pacemaker     ICD     Palpitations      Pneumonia, organism unspecified(486)      Status post implantation of automatic cardioverter/defibrillator (AICD)      Type 2 diabetes mellitus without complication, without long-term current use of insulin (H) 07/13/2020     Ventricular tachycardia (H)      Past Surgical History:   Procedure Laterality Date     ANESTHESIA CARDIOVERSION  4/24/2014    Procedure: ANESTHESIA CARDIOVERSION;  Surgeon: Generic Anesthesia Provider;  [facility]     ANESTHESIA CARDIOVERSION N/A 5/12/2016    Procedure: ANESTHESIA CARDIOVERSION;  Surgeon: GENERIC ANESTHESIA PROVIDER;  [facility]     ANESTHESIA CARDIOVERSION N/A 8/7/2017    Procedure: ANESTHESIA CARDIOVERSION;  Anesthesia Offsite Coverage Cardioversion @1100;  Surgeon: GENERIC ANESTHESIA PROVIDER;  [facility]     ANESTHESIA CARDIOVERSION N/A 1/3/2018    Procedure: ANESTHESIA CARDIOVERSION;  Anesthesia Cardioverion;  Surgeon: GENERIC ANESTHESIA PROVIDER;  [facility]     ANESTHESIA CARDIOVERSION N/A 5/4/2018    Procedure: ANESTHESIA CARDIOVERSION;  Anesthesia Coverage Cardioversion @1400;  Surgeon: GENERIC ANESTHESIA PROVIDER;  Location:  OR      ANESTHESIA CARDIOVERSION N/A 9/27/2018    Procedure: ANESTHESIA CARDIOVERSION;  Anesthesia Cardioversion @0930;  Surgeon: GENERIC ANESTHESIA PROVIDER;  [facility]     ANESTHESIA CARDIOVERSION N/A 12/20/2018    Procedure: Anesthesia Coverage Cardioversion @0830;  Surgeon: GENERIC ANESTHESIA PROVIDER;  [facility]     ANESTHESIA CARDIOVERSION N/A 8/21/2019    Procedure: Anesthesia Coverage Cardioversion @0800;  Surgeon: GENERIC ANESTHESIA PROVIDER;  [facility]     ANESTHESIA CARDIOVERSION N/A 1/16/2020    Procedure: ANESTHESIA, FOR CARDIOVERSION;  Surgeon: GENERIC ANESTHESIA PROVIDER;  [facility]     ARTHROPLASTY HIP  1/15/2014    Procedure: ARTHROPLASTY HIP;  Left Total Hip Arthroplasty;  Surgeon: Nelson Gaspar MD;  [facility]     ARTHROPLASTY HIP Right 6/5/2019    Procedure: Right Total Hip Arthroplasty;  Surgeon: Nelson Gaspar MD;  [facility]     CARDIAC SURGERY       COLONOSCOPY N/A 5/18/2018    Procedure: COMBINED COLONOSCOPY, SINGLE OR MULTIPLE BIOPSY/POLYPECTOMY BY BIOPSY;  COLONOSCOPY (PT HAS DEFIBRILLATOR) ;  Surgeon: Mike Nickerson MD;  Location:  GI     CV RIGHT HEART CATH MEASUREMENTS RECORDED N/A 8/19/2019    Procedure: CV RIGHT HEART CATH;  Surgeon: Cisco Rausch MD;  Location:  HEART CARDIAC CATH LAB     CV RIGHT HEART CATH MEASUREMENTS RECORDED N/A 8/21/2019    Procedure: Right Heart Cath;  Surgeon: Ciaran Burton MD;  Location:  HEART CARDIAC CATH LAB     EP ABLATION FOCAL AFIB N/A 5/14/2020    Procedure: EP ABLATION FOCAL AFIB;  Surgeon: Erik Cooper MD;  Location:  HEART CARDIAC CATH LAB     H ABLATION FOCAL AFIB  12/9/11    Left atrial ablation to eliminate atrial fibrillation     IMPLANT AUTOMATIC IMPLANTABLE CARDIOVERTER DEFIBRILLATOR  7/27/12    AICD implantation     TONSILLECTOMY  1964     Family History   Problem Relation Age of Onset     Heart Disease Mother         unknown     Obesity Mother      Gastrointestinal Disease Mother          diverticulitis     Diabetes Maternal Grandmother      Diabetes Paternal Grandmother      Cerebrovascular Disease Paternal Grandmother 94     Diabetes Paternal Grandfather      Cerebrovascular Disease Paternal Grandfather 78     Diabetes Son      C.A.D. Father         CABG age 78     Heart Disease Father         CABG x5     Diabetes Maternal Grandfather      LUNG DISEASE No family hx of      Deep Vein Thrombosis (DVT) No family hx of      Anesthesia Reaction No family hx of      Melanoma No family hx of      Skin Cancer No family hx of      Exam:   BP (!) 147/82   Pulse 62   Wt 98.9 kg (218 lb)   SpO2 97%   BMI 30.40 kg/m    Results:  - My interpretation of the images relevant for this visit includes: CT chest today images reviewed and compared to priors in July, May, March 2019.  The right upper lobe nodule appears approximately stable, compared to the most recent CT there is a 2 mm increase in the short axis, but compared to early 2019 CTs, it is pretty similar in size and appearance.  - My interpretation of the PFT's relevant for this visit includes: Normal in 2013    Pulmonary nodules described from series 4:  1.2 x 0.7 cm solid nodule in the posterior right upper lobe on image  117, previously 1.2 x 0.8 cm on 7/22/2020 and 1.1 x 0.7 cm on  5/28/2019.  6 mm nodule along the right major fissure on image 146 is unchanged.  6 mm groundglass nodule in the subpleural left lower lobe on image  353, previously 6 mm.    Assessment and plan: Haroon is a 64-year-old male being evaluated for abnormal lung cancer screening CT. Now that I have a longer perspective on his symptoms, I wonder if he has mild intermittent asthma with annual episodes of bronchitis.   Lung nodule - LungRADS 2 in December on annual lung cancer screening     Mild intermittent asthma - manifested by cough with periodic episodes of bronchitis.    Amoxicillin x 7 days now to keep on hand if this gets worse while he is traveling   Montelukast daily  for allergies/intermittent asthma    He is planning to re-establish care in [place]         Again, thank you for allowing me to participate in the care of your patient.        Sincerely,        Andreia Julien MD

## 2021-07-28 ENCOUNTER — ANCILLARY PROCEDURE (OUTPATIENT)
Dept: CARDIOLOGY | Facility: CLINIC | Age: 66
End: 2021-07-28
Attending: INTERNAL MEDICINE
Payer: COMMERCIAL

## 2021-07-28 DIAGNOSIS — I47.20 VENTRICULAR TACHYCARDIA (H): ICD-10-CM

## 2021-07-28 PROCEDURE — 93296 REM INTERROG EVL PM/IDS: CPT

## 2021-07-28 PROCEDURE — 93295 DEV INTERROG REMOTE 1/2/MLT: CPT | Performed by: INTERNAL MEDICINE

## 2021-08-23 DIAGNOSIS — I42.2 HYPERTROPHIC CARDIOMYOPATHY (H): ICD-10-CM

## 2021-08-30 DIAGNOSIS — I42.2 HYPERTROPHIC CARDIOMYOPATHY (H): ICD-10-CM

## 2021-08-30 RX ORDER — FUROSEMIDE 40 MG
40 TABLET ORAL DAILY
Qty: 90 TABLET | Refills: 0 | Status: SHIPPED | OUTPATIENT
Start: 2021-08-30 | End: 2021-09-07

## 2021-08-30 RX ORDER — FUROSEMIDE 20 MG
20 TABLET ORAL DAILY
Qty: 90 TABLET | Refills: 0 | Status: SHIPPED | OUTPATIENT
Start: 2021-08-30 | End: 2021-09-07

## 2021-08-30 NOTE — TELEPHONE ENCOUNTER
90 day supply provided.  It looks like pt was moving to [place] and needs to get local provider.  Message sent to Dimple to follow up on [place] Referral.  Za Elizondo RN on 8/30/2021 at 3:34 PM

## 2021-08-30 NOTE — TELEPHONE ENCOUNTER
[facility] Call Center    Phone Message    May a detailed message be left on voicemail: yes     Reason for Call: Medication Refill Request    Has the patient contacted the pharmacy for the refill? Yes   Name of medication being requested: Furosemide 20mg and Furosemide 40mg  Provider who prescribed the medication:   Pharmacy: [facility] - [place] [place] [facility] [address] [facility]  Date medication is needed: asap  Comments: Pharmacy calling hoping to get medication refilled. Reports that pt is 2 months overdue for refill, hasn't been filled since march.      Action Taken: Message routed to:  [facility] cardio    Travel Screening: Not Applicable

## 2021-08-31 RX ORDER — FUROSEMIDE 20 MG
TABLET ORAL
Qty: 90 TABLET | Refills: 3 | OUTPATIENT
Start: 2021-08-31

## 2021-08-31 RX ORDER — FUROSEMIDE 40 MG
TABLET ORAL
Qty: 90 TABLET | Refills: 3 | OUTPATIENT
Start: 2021-08-31

## 2021-09-01 DIAGNOSIS — F51.04 CHRONIC INSOMNIA: ICD-10-CM

## 2021-09-02 NOTE — TELEPHONE ENCOUNTER
Patient Requested  zolpidem (AMBIEN) 10 MG tablet  Last Filled  06/08/2021  Last Office Visit  04/09/2021    Next Office Visit  Nothing scheduled   Checked  09/02/2021    DX: Chronic Zolpidem use for insomnia     Pharmacy: [facility] - [place], [place] - [address] RN at 7:27 AM on 9/2/2021.

## 2021-09-03 RX ORDER — ZOLPIDEM TARTRATE 10 MG/1
TABLET ORAL
Qty: 45 TABLET | Refills: 0 | Status: SHIPPED | OUTPATIENT
Start: 2021-09-06 | End: 2021-11-20

## 2021-09-05 ENCOUNTER — MYC MEDICAL ADVICE (OUTPATIENT)
Dept: UROLOGY | Facility: CLINIC | Age: 66
End: 2021-09-05

## 2021-09-05 DIAGNOSIS — F51.04 CHRONIC INSOMNIA: ICD-10-CM

## 2021-09-21 ENCOUNTER — TELEPHONE (OUTPATIENT)
Dept: DERMATOLOGY | Facility: CLINIC | Age: 66
End: 2021-09-21

## 2021-09-21 NOTE — TELEPHONE ENCOUNTER
Talked with patient, he has moved to [place] & no longer will be having care done at this location.    Melisa Adair  Dermatology Clinic Coordinator  9/21/21

## 2021-09-25 ENCOUNTER — HEALTH MAINTENANCE LETTER (OUTPATIENT)
Age: 66
End: 2021-09-25

## 2021-11-09 ENCOUNTER — ANCILLARY PROCEDURE (OUTPATIENT)
Dept: CARDIOLOGY | Facility: CLINIC | Age: 66
End: 2021-11-09
Attending: INTERNAL MEDICINE
Payer: COMMERCIAL

## 2021-11-09 PROCEDURE — 93296 REM INTERROG EVL PM/IDS: CPT

## 2021-11-09 PROCEDURE — 93295 DEV INTERROG REMOTE 1/2/MLT: CPT | Performed by: INTERNAL MEDICINE

## 2021-11-14 DIAGNOSIS — F51.04 CHRONIC INSOMNIA: ICD-10-CM

## 2021-11-15 NOTE — TELEPHONE ENCOUNTER
Patient Requested  zolpidem (AMBIEN) 10 MG tablet  Last Filled  09/06/2021  Last Office Visit  04/09/2021    Next Office Visit  Nothing scheduled   Checked  11/15/2021    DX: Chronic Zolpidem use for insomnia     Pharmacy: [facility] - [place], [place] - [address] RN at 7:38 AM on 11/15/2021.

## 2021-11-18 RX ORDER — ZOLPIDEM TARTRATE 10 MG/1
TABLET ORAL
Qty: 45 TABLET | Refills: 0 | OUTPATIENT
Start: 2021-11-18

## 2021-11-20 RX ORDER — ZOLPIDEM TARTRATE 10 MG/1
5 TABLET ORAL
Qty: 15 TABLET | Refills: 0 | Status: SHIPPED | OUTPATIENT
Start: 2021-12-03

## 2021-11-20 NOTE — TELEPHONE ENCOUNTER
Patient relocated to [place] several months ago. Last 90 day supply was filled 9/6/21. Will provide future 30-d refill until he is able to discuss with his new PCP, but I will not sign any further refills. Appears he has already been referred to Sleep Medicine by his new provider, which is a good plan.   KARSON Hdz CNP

## 2022-01-15 ENCOUNTER — HEALTH MAINTENANCE LETTER (OUTPATIENT)
Age: 67
End: 2022-01-15

## 2022-02-10 LAB
MDC_IDC_EPISODE_DTM: NORMAL
MDC_IDC_EPISODE_DURATION: 10 S
MDC_IDC_EPISODE_DURATION: 14 S
MDC_IDC_EPISODE_DURATION: 18 S
MDC_IDC_EPISODE_DURATION: 2 S
MDC_IDC_EPISODE_DURATION: 241 S
MDC_IDC_EPISODE_DURATION: 4 S
MDC_IDC_EPISODE_DURATION: 49 S
MDC_IDC_EPISODE_DURATION: 6 S
MDC_IDC_EPISODE_DURATION: 8 S
MDC_IDC_EPISODE_ID: NORMAL
MDC_IDC_EPISODE_TYPE: NORMAL
MDC_IDC_LEAD_IMPLANT_DT: NORMAL
MDC_IDC_LEAD_IMPLANT_DT: NORMAL
MDC_IDC_LEAD_LOCATION: NORMAL
MDC_IDC_LEAD_LOCATION: NORMAL
MDC_IDC_LEAD_MFG: NORMAL
MDC_IDC_LEAD_MFG: NORMAL
MDC_IDC_LEAD_MODEL: NORMAL
MDC_IDC_LEAD_MODEL: NORMAL
MDC_IDC_LEAD_POLARITY_TYPE: NORMAL
MDC_IDC_LEAD_POLARITY_TYPE: NORMAL
MDC_IDC_LEAD_SERIAL: NORMAL
MDC_IDC_LEAD_SERIAL: NORMAL
MDC_IDC_MSMT_BATTERY_DTM: NORMAL
MDC_IDC_MSMT_BATTERY_REMAINING_LONGEVITY: 30 MO
MDC_IDC_MSMT_BATTERY_REMAINING_PERCENTAGE: 38 %
MDC_IDC_MSMT_BATTERY_STATUS: NORMAL
MDC_IDC_MSMT_CAP_CHARGE_DTM: NORMAL
MDC_IDC_MSMT_CAP_CHARGE_DTM: NORMAL
MDC_IDC_MSMT_CAP_CHARGE_ENERGY: 11 J
MDC_IDC_MSMT_CAP_CHARGE_TIME: 1.9 S
MDC_IDC_MSMT_CAP_CHARGE_TIME: 11.1 S
MDC_IDC_MSMT_CAP_CHARGE_TYPE: NORMAL
MDC_IDC_MSMT_CAP_CHARGE_TYPE: NORMAL
MDC_IDC_MSMT_LEADCHNL_RA_IMPEDANCE_VALUE: 676 OHM
MDC_IDC_MSMT_LEADCHNL_RV_IMPEDANCE_VALUE: 481 OHM
MDC_IDC_PG_IMPLANT_DTM: NORMAL
MDC_IDC_PG_MFG: NORMAL
MDC_IDC_PG_MODEL: NORMAL
MDC_IDC_PG_SERIAL: NORMAL
MDC_IDC_PG_TYPE: NORMAL
MDC_IDC_SESS_CLINIC_NAME: NORMAL
MDC_IDC_SESS_DTM: NORMAL
MDC_IDC_SESS_TYPE: NORMAL
MDC_IDC_SET_BRADY_AT_MODE_SWITCH_MODE: NORMAL
MDC_IDC_SET_BRADY_AT_MODE_SWITCH_RATE: 170 {BEATS}/MIN
MDC_IDC_SET_BRADY_LOWRATE: 60 {BEATS}/MIN
MDC_IDC_SET_BRADY_MAX_SENSOR_RATE: 120 {BEATS}/MIN
MDC_IDC_SET_BRADY_MAX_TRACKING_RATE: 120 {BEATS}/MIN
MDC_IDC_SET_BRADY_MODE: NORMAL
MDC_IDC_SET_BRADY_PAV_DELAY_HIGH: 260 MS
MDC_IDC_SET_BRADY_PAV_DELAY_LOW: 390 MS
MDC_IDC_SET_BRADY_SAV_DELAY_HIGH: 260 MS
MDC_IDC_SET_BRADY_SAV_DELAY_LOW: 390 MS
MDC_IDC_SET_LEADCHNL_RA_PACING_AMPLITUDE: 2.4 V
MDC_IDC_SET_LEADCHNL_RA_PACING_POLARITY: NORMAL
MDC_IDC_SET_LEADCHNL_RA_PACING_PULSEWIDTH: 0.5 MS
MDC_IDC_SET_LEADCHNL_RA_SENSING_ADAPTATION_MODE: NORMAL
MDC_IDC_SET_LEADCHNL_RA_SENSING_POLARITY: NORMAL
MDC_IDC_SET_LEADCHNL_RA_SENSING_SENSITIVITY: 0.25 MV
MDC_IDC_SET_LEADCHNL_RV_PACING_AMPLITUDE: 2.4 V
MDC_IDC_SET_LEADCHNL_RV_PACING_POLARITY: NORMAL
MDC_IDC_SET_LEADCHNL_RV_PACING_PULSEWIDTH: 0.5 MS
MDC_IDC_SET_LEADCHNL_RV_SENSING_ADAPTATION_MODE: NORMAL
MDC_IDC_SET_LEADCHNL_RV_SENSING_POLARITY: NORMAL
MDC_IDC_SET_LEADCHNL_RV_SENSING_SENSITIVITY: 0.6 MV
MDC_IDC_SET_ZONE_DETECTION_INTERVAL: 273 MS
MDC_IDC_SET_ZONE_DETECTION_INTERVAL: 333 MS
MDC_IDC_SET_ZONE_DETECTION_INTERVAL: 375 MS
MDC_IDC_SET_ZONE_TYPE: NORMAL
MDC_IDC_SET_ZONE_VENDOR_TYPE: NORMAL
MDC_IDC_STAT_AT_BURDEN_PERCENT: 1 %
MDC_IDC_STAT_BRADY_DTM_END: NORMAL
MDC_IDC_STAT_BRADY_DTM_START: NORMAL
MDC_IDC_STAT_BRADY_RA_PERCENT_PACED: 53 %
MDC_IDC_STAT_BRADY_RV_PERCENT_PACED: 1 %
MDC_IDC_STAT_EPISODE_RECENT_COUNT: 0
MDC_IDC_STAT_EPISODE_RECENT_COUNT: 7
MDC_IDC_STAT_EPISODE_RECENT_COUNT: 8
MDC_IDC_STAT_EPISODE_RECENT_COUNT_DTM_END: NORMAL
MDC_IDC_STAT_EPISODE_RECENT_COUNT_DTM_START: NORMAL
MDC_IDC_STAT_EPISODE_TYPE: NORMAL
MDC_IDC_STAT_EPISODE_VENDOR_TYPE: NORMAL
MDC_IDC_STAT_TACHYTHERAPY_ATP_DELIVERED_RECENT: 0
MDC_IDC_STAT_TACHYTHERAPY_ATP_DELIVERED_TOTAL: 3
MDC_IDC_STAT_TACHYTHERAPY_RECENT_DTM_END: NORMAL
MDC_IDC_STAT_TACHYTHERAPY_RECENT_DTM_START: NORMAL
MDC_IDC_STAT_TACHYTHERAPY_SHOCKS_ABORTED_RECENT: 0
MDC_IDC_STAT_TACHYTHERAPY_SHOCKS_ABORTED_TOTAL: 1
MDC_IDC_STAT_TACHYTHERAPY_SHOCKS_DELIVERED_RECENT: 0
MDC_IDC_STAT_TACHYTHERAPY_SHOCKS_DELIVERED_TOTAL: 1
MDC_IDC_STAT_TACHYTHERAPY_TOTAL_DTM_END: NORMAL
MDC_IDC_STAT_TACHYTHERAPY_TOTAL_DTM_START: NORMAL

## 2022-02-17 PROBLEM — Z71.89 ADVANCED DIRECTIVES, COUNSELING/DISCUSSION: Status: ACTIVE | Noted: 2017-12-26

## 2022-04-20 DIAGNOSIS — I42.2 HYPERTROPHIC CARDIOMYOPATHY (H): ICD-10-CM

## 2022-04-20 DIAGNOSIS — Z13.6 CARDIOVASCULAR SCREENING; LDL GOAL LESS THAN 130: ICD-10-CM

## 2022-04-20 DIAGNOSIS — J98.4 RESTRICTIVE AIRWAY DISEASE: ICD-10-CM

## 2022-04-20 DIAGNOSIS — I47.20 VENTRICULAR TACHYCARDIA (H): ICD-10-CM

## 2022-04-20 DIAGNOSIS — I48.91 ATRIAL FIBRILLATION, UNSPECIFIED TYPE (H): ICD-10-CM

## 2022-04-22 RX ORDER — ATORVASTATIN CALCIUM 20 MG/1
20 TABLET, FILM COATED ORAL DAILY
Qty: 90 TABLET | Refills: 0 | Status: SHIPPED | OUTPATIENT
Start: 2022-04-22 | End: 2022-07-06

## 2022-04-22 NOTE — TELEPHONE ENCOUNTER
fluticasone-salmeterol (AIRDUO RESPICLICK) 113-14 MCG/ACT inhaler   INHALE ONE PUFF INTO THE LUNGS TWICE A DAY     Last Written Prescription Date:  3/3/21  Last Fill Quantity: 2 ea,   # refills: 3  Last Office Visit : 4/9/21  Future Office visit:  none    Routing refill request to provider for review/approval because:  Med not on Cardiology  refill protocol

## 2022-04-22 NOTE — TELEPHONE ENCOUNTER
atorvastatin (LIPITOR) 20 MG tablet     Take 1 tablet (20 mg) by mouth daily -   Last Written Prescription Date:  4/9/21  Last Fill Quantity: 90,   # refills: 3  Last Office Visit : 4/9/21  Future Office visit:  none    Routing because:  Overdue visit. Per protocol, 90 day RF. Scheduling has been notified to contact the pt for appointment.        3/21/22 : RECENT OUTSIDE LABS AVAILABLE IN THE LAB TAB OR CARE EVERYWHERE.

## 2022-04-22 NOTE — TELEPHONE ENCOUNTER
Talked with the pt about scheduling overdue clinic visit for med refills - pt indicated he has moved to [place], [place] and no longer is being seen in the pcc and has moved prescriptions to his new physician.

## 2022-04-27 RX ORDER — FLUTICASONE PROPIONATE AND SALMETEROL 113; 14 UG/1; UG/1
1 POWDER, METERED RESPIRATORY (INHALATION) 2 TIMES DAILY
Qty: 2 EACH | Refills: 3 | Status: SHIPPED | OUTPATIENT
Start: 2022-04-27

## 2022-07-02 ENCOUNTER — HEALTH MAINTENANCE LETTER (OUTPATIENT)
Age: 67
End: 2022-07-02

## 2022-07-06 DIAGNOSIS — Z13.6 CARDIOVASCULAR SCREENING; LDL GOAL LESS THAN 130: ICD-10-CM

## 2022-07-06 RX ORDER — ATORVASTATIN CALCIUM 20 MG/1
20 TABLET, FILM COATED ORAL DAILY
Qty: 30 TABLET | Refills: 0 | Status: SHIPPED | OUTPATIENT
Start: 2022-07-06

## 2022-07-06 NOTE — TELEPHONE ENCOUNTER
Last Clinic Visit: 4/9/2021  [facility] [place]  Future Visit: none  *patient moved to [place]    Refill sent 30 day supply and request to follow up locally.

## 2022-07-25 ENCOUNTER — RX ONLY (OUTPATIENT)
Age: 67
Setting detail: RX ONLY
End: 2022-07-25

## 2022-07-25 RX ORDER — TRIAMCINOLONE ACETONIDE 1 MG/G
CREAM TOPICAL BID
Qty: 454 | Refills: 0 | Status: ERX | COMMUNITY
Start: 2022-07-25

## 2022-08-23 ENCOUNTER — APPOINTMENT (RX ONLY)
Dept: URBAN - METROPOLITAN AREA CLINIC 121 | Facility: CLINIC | Age: 67
Setting detail: DERMATOLOGY
End: 2022-08-23

## 2022-08-23 DIAGNOSIS — L29.89 OTHER PRURITUS: ICD-10-CM

## 2022-08-23 DIAGNOSIS — L57.0 ACTINIC KERATOSIS: ICD-10-CM

## 2022-08-23 DIAGNOSIS — L81.4 OTHER MELANIN HYPERPIGMENTATION: ICD-10-CM

## 2022-08-23 DIAGNOSIS — L82.1 OTHER SEBORRHEIC KERATOSIS: ICD-10-CM

## 2022-08-23 DIAGNOSIS — Z71.89 OTHER SPECIFIED COUNSELING: ICD-10-CM

## 2022-08-23 DIAGNOSIS — L28.0 LICHEN SIMPLEX CHRONICUS: ICD-10-CM

## 2022-08-23 DIAGNOSIS — D18.0 HEMANGIOMA: ICD-10-CM

## 2022-08-23 PROBLEM — D18.01 HEMANGIOMA OF SKIN AND SUBCUTANEOUS TISSUE: Status: ACTIVE | Noted: 2022-08-23

## 2022-08-23 PROBLEM — L29.8 OTHER PRURITUS: Status: ACTIVE | Noted: 2022-08-23

## 2022-08-23 PROCEDURE — 17003 DESTRUCT PREMALG LES 2-14: CPT

## 2022-08-23 PROCEDURE — 99203 OFFICE O/P NEW LOW 30 MIN: CPT | Mod: 25

## 2022-08-23 PROCEDURE — 17000 DESTRUCT PREMALG LESION: CPT

## 2022-08-23 PROCEDURE — ? PRESCRIPTION MEDICATION MANAGEMENT

## 2022-08-23 PROCEDURE — ? DEFER

## 2022-08-23 PROCEDURE — ? LIQUID NITROGEN

## 2022-08-23 PROCEDURE — ? PRESCRIPTION

## 2022-08-23 PROCEDURE — ? SUNSCREEN RECOMMENDATIONS

## 2022-08-23 RX ORDER — UREA 400 MG/G
CREAM TOPICAL QD
Qty: 85 | Refills: 1 | Status: ERX | COMMUNITY
Start: 2022-08-23

## 2022-08-23 RX ADMIN — UREA: 400 CREAM TOPICAL at 00:00

## 2022-08-23 ASSESSMENT — LOCATION DETAILED DESCRIPTION DERM
LOCATION DETAILED: LEFT SUPERIOR MEDIAL UPPER BACK
LOCATION DETAILED: LEFT VENTRAL DISTAL FOREARM
LOCATION DETAILED: EPIGASTRIC SKIN
LOCATION DETAILED: RIGHT ACHILLES SKIN
LOCATION DETAILED: RIGHT PROXIMAL POSTERIOR UPPER ARM
LOCATION DETAILED: UPPER STERNUM
LOCATION DETAILED: LEFT ACHILLES SKIN
LOCATION DETAILED: SUPERIOR THORACIC SPINE
LOCATION DETAILED: LEFT PROXIMAL POSTERIOR UPPER ARM
LOCATION DETAILED: LEFT POSTERIOR LATERAL MALLEOLUS
LOCATION DETAILED: LEFT MEDIAL TRAPEZIAL NECK
LOCATION DETAILED: RIGHT SUPERIOR FOREHEAD

## 2022-08-23 ASSESSMENT — LOCATION SIMPLE DESCRIPTION DERM
LOCATION SIMPLE: ABDOMEN
LOCATION SIMPLE: LEFT FOREARM
LOCATION SIMPLE: LEFT UPPER ARM
LOCATION SIMPLE: LEFT ACHILLES SKIN
LOCATION SIMPLE: RIGHT FOREHEAD
LOCATION SIMPLE: UPPER BACK
LOCATION SIMPLE: LEFT ANKLE
LOCATION SIMPLE: POSTERIOR NECK
LOCATION SIMPLE: RIGHT ACHILLES SKIN
LOCATION SIMPLE: LEFT UPPER BACK
LOCATION SIMPLE: RIGHT UPPER ARM
LOCATION SIMPLE: CHEST

## 2022-08-23 ASSESSMENT — LOCATION ZONE DERM
LOCATION ZONE: FACE
LOCATION ZONE: NECK
LOCATION ZONE: TRUNK
LOCATION ZONE: ARM
LOCATION ZONE: LEG

## 2022-08-23 NOTE — HPI: SKIN LESION
What Type Of Note Output Would You Prefer (Optional)?: Standard Output
How Severe Is Your Skin Lesion?: mild
Has Your Skin Lesion Been Treated?: been treated
Is This A New Presentation, Or A Follow-Up?: Skin Lesion
When Was It Treated?: 2021
Has Your Skin Lesion Been Treated?: not been treated
Is This A New Presentation, Or A Follow-Up?: Skin Lesions

## 2022-08-23 NOTE — PROCEDURE: DEFER
Reason To Defer Override: Patient advised to see Neurologist. Patient given [facility] information
Detail Level: Zone
X Size Of Lesion In Cm (Optional): 0
Introduction Text (Please End With A Colon): Tima Feliz )

## 2022-08-23 NOTE — PROCEDURE: LIQUID NITROGEN
Consent: The patient's consent was obtained including but not limited to risks of crusting, scabbing, blistering, scarring, darker or lighter pigmentary change, recurrence, incomplete removal and infection.
Show Aperture Variable?: Yes
Detail Level: Simple
Render Note In Bullet Format When Appropriate: No
Duration Of Freeze Thaw-Cycle (Seconds): 10
Post-Care Instructions: I reviewed with the patient in detail post-care instructions. Patient is to wear sunprotection, and avoid picking at any of the treated lesions. Pt may apply Vaseline to crusted or scabbing areas.
Number Of Freeze-Thaw Cycles: 2 freeze-thaw cycles

## 2022-09-20 ENCOUNTER — APPOINTMENT (RX ONLY)
Dept: URBAN - METROPOLITAN AREA CLINIC 333 | Facility: CLINIC | Age: 67
Setting detail: DERMATOLOGY
End: 2022-09-20

## 2022-09-20 DIAGNOSIS — L57.8 OTHER SKIN CHANGES DUE TO CHRONIC EXPOSURE TO NONIONIZING RADIATION: ICD-10-CM

## 2022-09-20 DIAGNOSIS — L28.1 PRURIGO NODULARIS: ICD-10-CM | Status: INADEQUATELY CONTROLLED

## 2022-09-20 DIAGNOSIS — L82.1 OTHER SEBORRHEIC KERATOSIS: ICD-10-CM

## 2022-09-20 DIAGNOSIS — Z85.828 PERSONAL HISTORY OF OTHER MALIGNANT NEOPLASM OF SKIN: ICD-10-CM

## 2022-09-20 DIAGNOSIS — L29.89 OTHER PRURITUS: ICD-10-CM | Status: INADEQUATELY CONTROLLED

## 2022-09-20 DIAGNOSIS — L81.4 OTHER MELANIN HYPERPIGMENTATION: ICD-10-CM

## 2022-09-20 DIAGNOSIS — L29.8 OTHER PRURITUS: ICD-10-CM | Status: INADEQUATELY CONTROLLED

## 2022-09-20 DIAGNOSIS — D22 MELANOCYTIC NEVI: ICD-10-CM

## 2022-09-20 PROBLEM — D22.5 MELANOCYTIC NEVI OF TRUNK: Status: ACTIVE | Noted: 2022-09-20

## 2022-09-20 PROCEDURE — ? TREATMENT REGIMEN

## 2022-09-20 PROCEDURE — ? FULL BODY SKIN EXAM

## 2022-09-20 PROCEDURE — ? COUNSELING

## 2022-09-20 PROCEDURE — 99203 OFFICE O/P NEW LOW 30 MIN: CPT | Mod: 25

## 2022-09-20 PROCEDURE — ? LIQUID NITROGEN

## 2022-09-20 PROCEDURE — 17110 DESTRUCTION B9 LES UP TO 14: CPT

## 2022-09-20 ASSESSMENT — LOCATION SIMPLE DESCRIPTION DERM
LOCATION SIMPLE: RIGHT HAND
LOCATION SIMPLE: RIGHT UPPER BACK
LOCATION SIMPLE: LEFT HAND
LOCATION SIMPLE: LEFT UPPER ARM
LOCATION SIMPLE: RIGHT UPPER ARM
LOCATION SIMPLE: LEFT FOREARM
LOCATION SIMPLE: RIGHT FOREARM
LOCATION SIMPLE: UPPER BACK

## 2022-09-20 ASSESSMENT — LOCATION DETAILED DESCRIPTION DERM
LOCATION DETAILED: RIGHT SUPERIOR MEDIAL UPPER BACK
LOCATION DETAILED: RIGHT DISTAL POSTERIOR UPPER ARM
LOCATION DETAILED: LEFT DISTAL POSTERIOR UPPER ARM
LOCATION DETAILED: LEFT DISTAL ULNAR DORSAL FOREARM
LOCATION DETAILED: INFERIOR THORACIC SPINE
LOCATION DETAILED: RIGHT PROXIMAL DORSAL FOREARM
LOCATION DETAILED: RIGHT ULNAR DORSAL HAND
LOCATION DETAILED: RIGHT ANTERIOR DISTAL UPPER ARM
LOCATION DETAILED: LEFT ULNAR DORSAL HAND
LOCATION DETAILED: LEFT RADIAL DORSAL HAND

## 2022-09-20 ASSESSMENT — LOCATION ZONE DERM
LOCATION ZONE: HAND
LOCATION ZONE: ARM
LOCATION ZONE: TRUNK

## 2022-09-20 NOTE — PROCEDURE: LIQUID NITROGEN
Show Aperture Variable?: Yes
Render Note In Bullet Format When Appropriate: No
Medical Necessity Information: It is in your best interest to select a reason for this procedure from the list below. All of these items fulfill various CMS LCD requirements except the new and changing color options.
Medical Necessity Clause: This procedure was medically necessary because the lesions that were treated were:
Detail Level: Detailed
Post-Care Instructions: Aftercare Cryosurgery\\nYou have just had cryotherapy for the treatment of benign (non-cancerous) skin lesions. You can expect the pain to rapidly decrease over the next 45 minutes. The area treated will initially turn red and over the next 72 hours turn brown to black. A scab will form that will peel off by itself within 5 to 7 days. A blister or reddish-purple blood blister may form where the area has been treated.
Duration Of Freeze Thaw-Cycle (Seconds): 5-10
Spray Paint Text: The liquid nitrogen was applied to the skin utilizing a spray paint frosting technique.
Consent: The patient's consent was obtained including but not limited to risks of crusting, scabbing, blistering, scarring, darker or lighter pigmentary change, recurrence, incomplete removal and infection.

## 2022-09-20 NOTE — HPI: EVALUATION OF SKIN LESION(S)
What Type Of Note Output Would You Prefer (Optional)?: Bullet Format
Hpi Title: Evaluation of Skin Lesions
Additional History: Please check possible AK on left hand.\\n\\nPlease check possible Rash on both arms. Treatment triamcinolone: no improvement.

## 2023-01-07 ENCOUNTER — HEALTH MAINTENANCE LETTER (OUTPATIENT)
Age: 68
End: 2023-01-07

## 2023-03-30 NOTE — NURSING NOTE
Addended by: Alfonso Gamino on: 3/30/2023 09:04 AM     Modules accepted: Orders Chief Complaint   Patient presents with     Follow Up For     f/u post DCCV and to discuss poss ablation        Med Reconcile: Reviewed and verified all current medications with the patient. The updated medication list was printed and given to the patient.  Return Appointment: Patient given instructions regarding scheduling next clinic visit. Patient demonstrated understanding of this information and agreed to call with further questions or concerns.  EP Procedure: Patient given instructions regarding  ablation Discussed purpose, preparation, and procedure with the patient. Patient demonstrated understanding of this information and agreed to call with further questions or concerns.  Patient stated he understood all health information given and agreed to call with further questions or concerns.     Conchita Schulte RN, BSN  Cardiology Care Coordinator  [facility]  [email or web address]  [phone]

## 2023-06-02 ENCOUNTER — HEALTH MAINTENANCE LETTER (OUTPATIENT)
Age: 68
End: 2023-06-02

## 2023-07-15 ENCOUNTER — HEALTH MAINTENANCE LETTER (OUTPATIENT)
Age: 68
End: 2023-07-15

## 2023-08-07 ENCOUNTER — APPOINTMENT (RX ONLY)
Dept: URBAN - METROPOLITAN AREA CLINIC 121 | Facility: CLINIC | Age: 68
Setting detail: DERMATOLOGY
End: 2023-08-07

## 2023-08-07 DIAGNOSIS — Z71.89 OTHER SPECIFIED COUNSELING: ICD-10-CM

## 2023-08-07 DIAGNOSIS — L28.0 LICHEN SIMPLEX CHRONICUS: ICD-10-CM

## 2023-08-07 DIAGNOSIS — L82.1 OTHER SEBORRHEIC KERATOSIS: ICD-10-CM

## 2023-08-07 DIAGNOSIS — L82.0 INFLAMED SEBORRHEIC KERATOSIS: ICD-10-CM

## 2023-08-07 DIAGNOSIS — L81.4 OTHER MELANIN HYPERPIGMENTATION: ICD-10-CM

## 2023-08-07 DIAGNOSIS — Z85.828 PERSONAL HISTORY OF OTHER MALIGNANT NEOPLASM OF SKIN: ICD-10-CM

## 2023-08-07 DIAGNOSIS — D18.0 HEMANGIOMA: ICD-10-CM

## 2023-08-07 DIAGNOSIS — L81.8 OTHER SPECIFIED DISORDERS OF PIGMENTATION: ICD-10-CM

## 2023-08-07 DIAGNOSIS — L57.0 ACTINIC KERATOSIS: ICD-10-CM

## 2023-08-07 DIAGNOSIS — D22 MELANOCYTIC NEVI: ICD-10-CM

## 2023-08-07 DIAGNOSIS — L30.9 DERMATITIS, UNSPECIFIED: ICD-10-CM

## 2023-08-07 PROBLEM — D18.01 HEMANGIOMA OF SKIN AND SUBCUTANEOUS TISSUE: Status: ACTIVE | Noted: 2023-08-07

## 2023-08-07 PROBLEM — Q85.8 OTHER PHAKOMATOSES, NOT ELSEWHERE CLASSIFIED: Status: ACTIVE | Noted: 2023-08-07

## 2023-08-07 PROBLEM — D22.5 MELANOCYTIC NEVI OF TRUNK: Status: ACTIVE | Noted: 2023-08-07

## 2023-08-07 PROCEDURE — ? OBSERVATION

## 2023-08-07 PROCEDURE — 17003 DESTRUCT PREMALG LES 2-14: CPT | Mod: 59

## 2023-08-07 PROCEDURE — 99213 OFFICE O/P EST LOW 20 MIN: CPT | Mod: 25

## 2023-08-07 PROCEDURE — ? ADDITIONAL NOTES

## 2023-08-07 PROCEDURE — ? LIQUID NITROGEN

## 2023-08-07 PROCEDURE — ? PRESCRIPTION

## 2023-08-07 PROCEDURE — 17110 DESTRUCTION B9 LES UP TO 14: CPT

## 2023-08-07 PROCEDURE — ? COUNSELING

## 2023-08-07 PROCEDURE — 17000 DESTRUCT PREMALG LESION: CPT | Mod: 59

## 2023-08-07 RX ORDER — TRIAMCINOLONE ACETONIDE 1 MG/G
CREAM TOPICAL
Qty: 454 | Refills: 1 | Status: ERX | COMMUNITY
Start: 2023-08-07

## 2023-08-07 RX ADMIN — TRIAMCINOLONE ACETONIDE 1: 1 CREAM TOPICAL at 00:00

## 2023-08-07 ASSESSMENT — LOCATION DETAILED DESCRIPTION DERM
LOCATION DETAILED: LEFT PARAMEDIAN SOFT PALATE
LOCATION DETAILED: LEFT UPPER LIP DRY VERMILLION
LOCATION DETAILED: LEFT SUPERIOR OCCIPITAL SCALP
LOCATION DETAILED: LEFT DORSAL THUMB METACARPOPHALANGEAL JOINT
LOCATION DETAILED: LEFT CLAVICULAR NECK
LOCATION DETAILED: RIGHT PARAMEDIAN SOFT PALATE
LOCATION DETAILED: RIGHT SUPERIOR LATERAL MIDBACK
LOCATION DETAILED: LEFT LATERAL FOREHEAD
LOCATION DETAILED: RIGHT ANTERIOR DISTAL UPPER ARM
LOCATION DETAILED: LEFT SUPERIOR MEDIAL MIDBACK
LOCATION DETAILED: RIGHT ANTERIOR PROXIMAL UPPER ARM
LOCATION DETAILED: RIGHT LATERAL FOREHEAD
LOCATION DETAILED: LEFT SUPERIOR LATERAL UPPER BACK
LOCATION DETAILED: RIGHT MID-UPPER BACK
LOCATION DETAILED: RIGHT INFERIOR UPPER BACK
LOCATION DETAILED: LEFT BUTTOCK
LOCATION DETAILED: RIGHT LOWER LIP WET VERMILLION

## 2023-08-07 ASSESSMENT — LOCATION ZONE DERM
LOCATION ZONE: NECK
LOCATION ZONE: OROPHARYNX
LOCATION ZONE: FACE
LOCATION ZONE: ARM
LOCATION ZONE: LIP
LOCATION ZONE: TRUNK
LOCATION ZONE: HAND
LOCATION ZONE: SCALP

## 2023-08-07 ASSESSMENT — LOCATION SIMPLE DESCRIPTION DERM
LOCATION SIMPLE: LEFT ANTERIOR NECK
LOCATION SIMPLE: RIGHT LOWER BACK
LOCATION SIMPLE: LEFT UPPER LIP
LOCATION SIMPLE: RIGHT UPPER BACK
LOCATION SIMPLE: LEFT SOFT PALATE
LOCATION SIMPLE: LEFT OCCIPITAL SCALP
LOCATION SIMPLE: RIGHT LOWER LIP
LOCATION SIMPLE: LEFT FOREHEAD
LOCATION SIMPLE: LEFT HAND
LOCATION SIMPLE: LEFT UPPER BACK
LOCATION SIMPLE: RIGHT FOREHEAD
LOCATION SIMPLE: RIGHT SOFT PALATE
LOCATION SIMPLE: LEFT BUTTOCK
LOCATION SIMPLE: LEFT LOWER BACK
LOCATION SIMPLE: RIGHT UPPER ARM

## 2023-08-07 NOTE — PROCEDURE: ADDITIONAL NOTES
Render Risk Assessment In Note?: no
Detail Level: Simple
Additional Notes: Patient denies blisters. He states its been there for a couple of weeks and is itchy. Mild erythema noted.

## 2023-08-07 NOTE — PROCEDURE: LIQUID NITROGEN
Consent: The patient's consent was obtained including but not limited to risks of crusting, scabbing, blistering, scarring, darker or lighter pigmentary change, recurrence, incomplete removal and infection.
Spray Paint Text: The liquid nitrogen was applied to the skin utilizing a spray paint frosting technique.
Add 52 Modifier (Optional): no
Medical Necessity Information: It is in your best interest to select a reason for this procedure from the list below. All of these items fulfill various CMS LCD requirements except the new and changing color options.
Show Topical Anesthesia Variable?: Yes
Medical Necessity Clause: This procedure was medically necessary because the lesions that were treated were:
Detail Level: Detailed
Post-Care Instructions: I reviewed with the patient in detail post-care instructions. Patient is to wear sunprotection, and avoid picking at any of the treated lesions. Pt may apply Vaseline to crusted or scabbing areas.
Duration Of Freeze Thaw-Cycle (Seconds): 0

## 2024-02-10 ENCOUNTER — HEALTH MAINTENANCE LETTER (OUTPATIENT)
Age: 69
End: 2024-02-10

## 2024-02-12 ENCOUNTER — APPOINTMENT (RX ONLY)
Dept: URBAN - METROPOLITAN AREA CLINIC 121 | Facility: CLINIC | Age: 69
Setting detail: DERMATOLOGY
End: 2024-02-12

## 2024-02-12 DIAGNOSIS — L57.0 ACTINIC KERATOSIS: ICD-10-CM

## 2024-02-12 DIAGNOSIS — L28.0 LICHEN SIMPLEX CHRONICUS: ICD-10-CM

## 2024-02-12 DIAGNOSIS — D18.0 HEMANGIOMA: ICD-10-CM

## 2024-02-12 DIAGNOSIS — L81.4 OTHER MELANIN HYPERPIGMENTATION: ICD-10-CM

## 2024-02-12 DIAGNOSIS — L81.8 OTHER SPECIFIED DISORDERS OF PIGMENTATION: ICD-10-CM

## 2024-02-12 DIAGNOSIS — D22 MELANOCYTIC NEVI: ICD-10-CM

## 2024-02-12 DIAGNOSIS — Z71.89 OTHER SPECIFIED COUNSELING: ICD-10-CM

## 2024-02-12 DIAGNOSIS — L82.1 OTHER SEBORRHEIC KERATOSIS: ICD-10-CM

## 2024-02-12 DIAGNOSIS — Z85.828 PERSONAL HISTORY OF OTHER MALIGNANT NEOPLASM OF SKIN: ICD-10-CM

## 2024-02-12 PROBLEM — D22.5 MELANOCYTIC NEVI OF TRUNK: Status: ACTIVE | Noted: 2024-02-12

## 2024-02-12 PROBLEM — D18.01 HEMANGIOMA OF SKIN AND SUBCUTANEOUS TISSUE: Status: ACTIVE | Noted: 2024-02-12

## 2024-02-12 PROCEDURE — ? COUNSELING

## 2024-02-12 PROCEDURE — 99214 OFFICE O/P EST MOD 30 MIN: CPT | Mod: 25

## 2024-02-12 PROCEDURE — ? OBSERVATION

## 2024-02-12 PROCEDURE — ? RECOMMENDATIONS

## 2024-02-12 PROCEDURE — 17000 DESTRUCT PREMALG LESION: CPT

## 2024-02-12 PROCEDURE — ? PRESCRIPTION

## 2024-02-12 PROCEDURE — 17003 DESTRUCT PREMALG LES 2-14: CPT

## 2024-02-12 PROCEDURE — ? PRESCRIPTION MEDICATION MANAGEMENT

## 2024-02-12 PROCEDURE — ? LIQUID NITROGEN

## 2024-02-12 RX ORDER — CLOBETASOL PROPIONATE 0.5 MG/G
OINTMENT TOPICAL
Qty: 60 | Refills: 1 | Status: ERX | COMMUNITY
Start: 2024-02-12

## 2024-02-12 RX ADMIN — CLOBETASOL PROPIONATE: 0.5 OINTMENT TOPICAL at 00:00

## 2024-02-12 ASSESSMENT — LOCATION DETAILED DESCRIPTION DERM
LOCATION DETAILED: LEFT ANTERIOR SHOULDER
LOCATION DETAILED: MID-OCCIPITAL SCALP
LOCATION DETAILED: RIGHT DISTAL POSTERIOR UPPER ARM
LOCATION DETAILED: LEFT CLAVICULAR NECK
LOCATION DETAILED: LEFT PROXIMAL POSTERIOR UPPER ARM
LOCATION DETAILED: LEFT SUPERIOR OCCIPITAL SCALP
LOCATION DETAILED: LEFT POSTERIOR SHOULDER
LOCATION DETAILED: SUPERIOR LUMBAR SPINE
LOCATION DETAILED: RIGHT MID-UPPER BACK
LOCATION DETAILED: RIGHT POSTERIOR SHOULDER
LOCATION DETAILED: LEFT UPPER LIP DRY VERMILLION
LOCATION DETAILED: PERIUMBILICAL SKIN
LOCATION DETAILED: LEFT DISTAL DORSAL FOREARM
LOCATION DETAILED: LEFT THENAR EMINENCE
LOCATION DETAILED: RIGHT ANTERIOR DISTAL UPPER ARM
LOCATION DETAILED: RIGHT INFERIOR CENTRAL MALAR CHEEK
LOCATION DETAILED: RIGHT SUPERIOR UPPER BACK
LOCATION DETAILED: RIGHT LOWER LIP WET VERMILLION
LOCATION DETAILED: RIGHT SUPERIOR OCCIPITAL SCALP

## 2024-02-12 ASSESSMENT — LOCATION ZONE DERM
LOCATION ZONE: FACE
LOCATION ZONE: HAND
LOCATION ZONE: TRUNK
LOCATION ZONE: SCALP
LOCATION ZONE: ARM
LOCATION ZONE: LIP
LOCATION ZONE: NECK

## 2024-02-12 ASSESSMENT — LOCATION SIMPLE DESCRIPTION DERM
LOCATION SIMPLE: RIGHT UPPER ARM
LOCATION SIMPLE: LOWER BACK
LOCATION SIMPLE: LEFT SHOULDER
LOCATION SIMPLE: POSTERIOR SCALP
LOCATION SIMPLE: ABDOMEN
LOCATION SIMPLE: LEFT ANTERIOR NECK
LOCATION SIMPLE: LEFT UPPER LIP
LOCATION SIMPLE: LEFT HAND
LOCATION SIMPLE: RIGHT CHEEK
LOCATION SIMPLE: RIGHT LOWER LIP
LOCATION SIMPLE: RIGHT UPPER BACK
LOCATION SIMPLE: LEFT FOREARM
LOCATION SIMPLE: LEFT OCCIPITAL SCALP
LOCATION SIMPLE: LEFT UPPER ARM
LOCATION SIMPLE: RIGHT SHOULDER

## 2024-02-12 NOTE — PROCEDURE: RECOMMENDATIONS
Render Risk Assessment In Note?: no
Recommendation Preamble: The following recommendations were made during the visit: LN2 or laser
Detail Level: Zone

## 2024-06-17 PROBLEM — Z71.89 ADVANCED DIRECTIVES, COUNSELING/DISCUSSION: Status: RESOLVED | Noted: 2017-12-26 | Resolved: 2024-06-17

## 2024-06-19 ENCOUNTER — APPOINTMENT (RX ONLY)
Dept: URBAN - METROPOLITAN AREA CLINIC 121 | Facility: CLINIC | Age: 69
Setting detail: DERMATOLOGY
End: 2024-06-19

## 2024-06-19 DIAGNOSIS — Z71.89 OTHER SPECIFIED COUNSELING: ICD-10-CM

## 2024-06-19 DIAGNOSIS — L57.0 ACTINIC KERATOSIS: ICD-10-CM

## 2024-06-19 DIAGNOSIS — D49.2 NEOPLASM OF UNSPECIFIED BEHAVIOR OF BONE, SOFT TISSUE, AND SKIN: ICD-10-CM

## 2024-06-19 PROCEDURE — 99213 OFFICE O/P EST LOW 20 MIN: CPT | Mod: 25

## 2024-06-19 PROCEDURE — ? BIOPSY BY SHAVE METHOD

## 2024-06-19 PROCEDURE — ? COUNSELING

## 2024-06-19 PROCEDURE — ? PHOTO-DOCUMENTATION

## 2024-06-19 PROCEDURE — ? PHOTODYNAMIC THERAPY COUNSELING

## 2024-06-19 PROCEDURE — ? TREATMENT REGIMEN

## 2024-06-19 PROCEDURE — 11102 TANGNTL BX SKIN SINGLE LES: CPT

## 2024-06-19 PROCEDURE — 11103 TANGNTL BX SKIN EA SEP/ADDL: CPT

## 2024-06-19 PROCEDURE — ? ORDER FOR PHOTODYNAMIC THERAPY

## 2024-06-19 ASSESSMENT — LOCATION ZONE DERM
LOCATION ZONE: NECK
LOCATION ZONE: LEG
LOCATION ZONE: ARM
LOCATION ZONE: FACE

## 2024-06-19 ASSESSMENT — LOCATION DETAILED DESCRIPTION DERM
LOCATION DETAILED: MID POSTERIOR NECK
LOCATION DETAILED: RIGHT VENTRAL DISTAL FOREARM
LOCATION DETAILED: RIGHT LATERAL ANKLE
LOCATION DETAILED: SUPERIOR MID FOREHEAD

## 2024-06-19 ASSESSMENT — LOCATION SIMPLE DESCRIPTION DERM
LOCATION SIMPLE: RIGHT LOWER LEG
LOCATION SIMPLE: RIGHT FOREARM
LOCATION SIMPLE: POSTERIOR NECK
LOCATION SIMPLE: SUPERIOR FOREHEAD

## 2024-06-19 NOTE — HPI: SKIN LESIONS
Have Your Skin Lesions Been Treated?: not been treated
Is This A New Presentation, Or A Follow-Up?: Skin Lesions
Additional History: Patient has areas of concern

## 2024-06-19 NOTE — PROCEDURE: BIOPSY BY SHAVE METHOD
Detail Level: Detailed
Depth Of Biopsy: dermis
Was A Bandage Applied: Yes
Size Of Lesion In Cm: 0.3
X Size Of Lesion In Cm: 0
Biopsy Type: H and E
Biopsy Method: Personna blade
Anesthesia Type: 1% lidocaine with epinephrine
Anesthesia Volume In Cc: 0.5
Hemostasis: Aluminum Chloride
Wound Care: Petrolatum
Dressing: bandage
Destruction After The Procedure: No
Type Of Destruction Used: Curettage
Curettage Text: The wound bed was treated with curettage after the biopsy was performed.
Cryotherapy Text: The wound bed was treated with cryotherapy after the biopsy was performed.
Electrodesiccation Text: The wound bed was treated with electrodesiccation after the biopsy was performed.
Electrodesiccation And Curettage Text: The wound bed was treated with electrodesiccation and curettage after the biopsy was performed.
Silver Nitrate Text: The wound bed was treated with silver nitrate after the biopsy was performed.
Lab: [id number]
Lab Facility: [id number]
Consent: The provider's intent is to obtain a tissue sample solely for diagnostic purposes. Written consent to obtain tissue sample was obtained and risks were reviewed including but not limited to scarring, infection, bleeding, scabbing, incomplete removal, nerve damage and allergy to anesthesia.
Post-Care Instructions: I reviewed with the patient in detail post-care instructions. Patient is to keep the biopsy site dry overnight, and then apply bacitracin twice daily until healed. Patient may apply hydrogen peroxide soaks to remove any crusting.
Notification Instructions: Patient will be notified of biopsy results. However, patient instructed to call the office if not contacted within 2 weeks.
Billing Type: Third-Party Bill
Information: Selecting Yes will display possible errors in your note based on the variables you have selected. This validation is only offered as a suggestion for you. PLEASE NOTE THAT THE VALIDATION TEXT WILL BE REMOVED WHEN YOU FINALIZE YOUR NOTE. IF YOU WANT TO FAX A PRELIMINARY NOTE YOU WILL NEED TO TOGGLE THIS TO 'NO' IF YOU DO NOT WANT IT IN YOUR FAXED NOTE.
Size Of Lesion In Cm: 1

## 2024-06-19 NOTE — PROCEDURE: ORDER FOR PHOTODYNAMIC THERAPY
Pdt Type: MATHEUS-U
Legs Incubation Time: 2 Hours
Location: Override
Face And Neck Incubation Time: 1 Hour
History Of Hsv (Optional): No
Location Override: forehead
Consent: The procedure and risks were reviewed with the patient including but not limited to: burning, pigmentary changes, pain, blistering, scabbing, redness, and the possibility of needing numerous treatments. Strict photoprotection after the procedure was also discussed.
Chest Incubation Time: 90 min
Incubation Override: 60
Face And Scalp Incubation Time: 1 Hour for the face and 2 Hours for the scalp
Photosensitizer: Levulan
Detail Level: Detailed
Frequency Of Pdt: Single Treatment

## 2024-06-19 NOTE — PROCEDURE: TREATMENT REGIMEN
Detail Level: Detailed
Plan: Discussed treatment options PDT, 5FU risk and benefits of each. Patient elected PDT treatment.

## 2024-06-21 ENCOUNTER — APPOINTMENT (RX ONLY)
Dept: URBAN - METROPOLITAN AREA CLINIC 121 | Facility: CLINIC | Age: 69
Setting detail: DERMATOLOGY
End: 2024-06-21

## 2024-06-21 DIAGNOSIS — Z01.818 ENCOUNTER FOR OTHER PREPROCEDURAL EXAMINATION: ICD-10-CM

## 2024-06-21 PROCEDURE — ? COUNSELING

## 2024-07-02 ENCOUNTER — APPOINTMENT (RX ONLY)
Dept: URBAN - METROPOLITAN AREA CLINIC 121 | Facility: CLINIC | Age: 69
Setting detail: DERMATOLOGY
End: 2024-07-02

## 2024-07-02 DIAGNOSIS — Z48.817 ENCOUNTER FOR SURGICAL AFTERCARE FOLLOWING SURGERY ON THE SKIN AND SUBCUTANEOUS TISSUE: ICD-10-CM

## 2024-07-02 PROCEDURE — ? PRESCRIPTION

## 2024-07-02 PROCEDURE — ? POST-OP WOUND CHECK

## 2024-07-02 PROCEDURE — 99024 POSTOP FOLLOW-UP VISIT: CPT

## 2024-07-02 RX ORDER — MUPIROCIN 20 MG/G
OINTMENT TOPICAL
Qty: 22 | Refills: 0 | Status: ERX | COMMUNITY
Start: 2024-07-02

## 2024-07-02 RX ADMIN — MUPIROCIN: 20 OINTMENT TOPICAL at 00:00

## 2024-07-02 ASSESSMENT — LOCATION ZONE DERM
LOCATION ZONE: LEG
LOCATION ZONE: ARM

## 2024-07-02 ASSESSMENT — LOCATION DETAILED DESCRIPTION DERM
LOCATION DETAILED: LEFT ANKLE
LOCATION DETAILED: LEFT ANTERIOR SHOULDER

## 2024-07-02 ASSESSMENT — LOCATION SIMPLE DESCRIPTION DERM
LOCATION SIMPLE: LEFT ANKLE
LOCATION SIMPLE: LEFT SHOULDER

## 2024-07-02 NOTE — PROCEDURE: POST-OP WOUND CHECK
Detail Level: Detailed
Add 14185 Cpt? (Important Note: In 2017 The Use Of 49315 Is Being Tracked By Cms To Determine Future Global Period Reimbursement For Global Periods): yes
Quality 357: Surgical Site Infection (Ssi): No surgical site infection

## 2024-08-26 ENCOUNTER — APPOINTMENT (RX ONLY)
Dept: URBAN - METROPOLITAN AREA CLINIC 121 | Facility: CLINIC | Age: 69
Setting detail: DERMATOLOGY
End: 2024-08-26

## 2024-08-26 DIAGNOSIS — D49.2 NEOPLASM OF UNSPECIFIED BEHAVIOR OF BONE, SOFT TISSUE, AND SKIN: ICD-10-CM

## 2024-08-26 DIAGNOSIS — D16 BENIGN NEOPLASM OF BONE AND ARTICULAR CARTILAGE: ICD-10-CM

## 2024-08-26 PROBLEM — C44.722 SQUAMOUS CELL CARCINOMA OF SKIN OF RIGHT LOWER LIMB, INCLUDING HIP: Status: ACTIVE | Noted: 2024-08-26

## 2024-08-26 PROBLEM — D16.9 BENIGN NEOPLASM OF BONE AND ARTICULAR CARTILAGE, UNSPECIFIED: Status: ACTIVE | Noted: 2024-08-26

## 2024-08-26 PROCEDURE — ? COUNSELING

## 2024-08-26 PROCEDURE — 99212 OFFICE O/P EST SF 10 MIN: CPT | Mod: 25

## 2024-08-26 PROCEDURE — ? DEFER

## 2024-08-26 PROCEDURE — ? MOHS SURGERY

## 2024-08-26 PROCEDURE — 17313 MOHS 1 STAGE T/A/L: CPT

## 2024-08-26 ASSESSMENT — LOCATION SIMPLE DESCRIPTION DERM
LOCATION SIMPLE: LEFT ELBOW
LOCATION SIMPLE: RIGHT FOREARM

## 2024-08-26 ASSESSMENT — LOCATION ZONE DERM: LOCATION ZONE: ARM

## 2024-08-26 ASSESSMENT — LOCATION DETAILED DESCRIPTION DERM
LOCATION DETAILED: LEFT ELBOW
LOCATION DETAILED: RIGHT VENTRAL DISTAL FOREARM

## 2024-08-26 NOTE — PROCEDURE: DEFER
Detail Level: Detailed
Introduction Text (Please End With A Colon): .
Size Of Lesion In Cm (Optional): 0

## 2024-08-26 NOTE — PROCEDURE: MOHS SURGERY
Body Location Override (Optional - Billing Will Still Be Based On Selected Body Map Location If Applicable): right lateral ankle
Mohs Case Number: [id number]
Date Of Previous Biopsy (Optional): 06/19/2024
Previous Accession (Optional): [id number]
Biopsy Photograph Reviewed: Yes
Referring Physician (Optional): Rashmi Cruz PA-C
Consent Type: Consent 1 (Standard)
Eye Shield Used: No
Surgeon Performing Repair (Optional): Manish Duke MD
Initial Size Of Lesion: 1.1
X Size Of Lesion In Cm (Optional): 0
Number Of Stages: 1
Primary Defect Length In Cm (Final Defect Size - Required For Flaps/Grafts): 1.2
Repair Type: No repair - secondary intention
Which Instrument Did You Use For Dermabrasion?: Wire Brush
Which Eyelid Repair Cpt Are You Using?: 98854
Oculoplastic Surgeon Procedure Text (A): After obtaining clear surgical margins the patient was sent to oculoplastics for surgical repair.  The patient understands they will receive post-surgical care and follow-up from the referring physician's office.
Otolaryngologist Procedure Text (A): After obtaining clear surgical margins the patient was sent to otolaryngology for surgical repair.  The patient understands they will receive post-surgical care and follow-up from the referring physician's office.
Plastic Surgeon (A): Negro Apodaca PA-C
Plastic Surgeon (B): Dr. Sandhu
Plastic Surgeon (C): Manish Parsons MD
Plastic Surgeon (D): Dr. Reginald Sandhu
Plastic Surgeon (E): 
Plastic Surgeon Procedure Text (A): After obtaining clear surgical margins the patient was sent to plastics for surgical repair.  The patient understands they will receive post-surgical care and follow-up from the referring physician's office.
Mid-Level (D): Keena Egan PA-C
Mid-Level Procedure Text (A): After obtaining clear surgical margins the patient was sent to a mid-level provider for surgical repair.  The patient understands they will receive post-surgical care and follow-up from the mid-level provider.
Provider (A): Dr. Frank Pina for wound care
Provider (B): Negro Apodaca
Provider (F): Dr. Manish Parsons
Provider Procedure Text (A): After obtaining clear surgical margins the defect was repaired by another provider.
Asc Procedure Text (A): After obtaining clear surgical margins the patient was sent to an ASC for surgical repair.  The patient understands they will receive post-surgical care and follow-up from the ASC physician.
Suturegard Retention Suture: 2-0 Nylon
Retention Suture Bite Size: 3 mm
Length To Time In Minutes Device Was In Place: 10
Undermining Type: Entire Wound
Debridement Text: The wound edges were debrided prior to proceeding with the closure to facilitate wound healing.
Helical Rim Text: The closure involved the helical rim.
Vermilion Border Text: The closure involved the vermilion border.
Nostril Rim Text: The closure involved the nostril rim.
Retention Suture Text: Retention sutures were placed to support the closure and prevent dehiscence.
Location Indication Override (Is Already Calculated Based On Selected Body Location): Area H
Area H Indication Text: Tumors in this location are included in Area H (eyelids, eyebrows, nose, lips, chin, ear, pre-auricular, post-auricular, temple, genitalia, hands, feet, ankles and areola).  Tissue conservation is critical in these anatomic locations.
Area M Indication Text: Tumors in this location are included in Area M (cheek, forehead, scalp, neck, jawline and pretibial skin).  Mohs surgery is indicated for tumors in these anatomic locations.
Area L Indication Text: Tumors in this location are included in Area L (trunk and extremities).  Mohs surgery is indicated for larger tumors, or tumors with aggressive histologic features, in these anatomic locations.
Tumor Debulked?: not debulked
Depth Of Tumor Invasion (For Histology): tumor not visualized (deep and peripheral margins are clear of tumor)
Perineural Invasion (For Histology - Be Specific If Possible): absent
Special Stains Stage 1 - Results: Base On Clearance Noted Above
Stage 2: Additional Anesthesia Type: 1% lidocaine with epinephrine
Staging Info: By selecting yes to the question above you will include information on AJCC 8 tumor staging in your Mohs note. Information on tumor staging will be automatically added for SCCs on the head and neck. AJCC 8 includes tumor size, tumor depth, perineural involvement and bone invasion.
Tumor Depth: Less than 6mm from granular layer and no invasion beyond the subcutaneous fat
Why Was The Change Made?: Please Select the Appropriate Response
Medical Necessity Statement: Based on my medical judgement; after reviewing the pertinent pathology report, physical exam findings and the various treatment options for skin cancer treatment; standard excision and/or destruction technique methods are not clinically sufficient treatment options. To prevent the risk of compromising surgical cure and reconstruction, prompt microscopic examination of the surgical margins is necessary. Based on the given indication(s), maximum conservation of healthy tissue, and high cure rate, Mohs surgery is the most appropriate treatment for this cancer.
Alternatives Discussed Intro (Do Not Add Period): I discussed alternative treatments to Mohs surgery and specifically discussed the risks and benefits of
Consent 1/Introductory Paragraph: Various treatment options for skin cancer treatment; including but not limited to no treatment, cryosurgery or cryotherapy, excision, radiation therapy, electrodessication and curettage, topical therapeutic agents and light therapy were discussed in depth with the patient. The rationale for Mohs was explained to the patient and consent was obtained. The risks, benefits and alternatives to therapy were discussed in detail. Specifically, the risks of infection, scarring, bleeding, prolonged wound healing, incomplete removal, allergy to anesthesia, nerve injury and recurrence were addressed. Prior to the procedure, the treatment site was clearly identified and confirmed by the patient and the patient agreed that Mohs surgery is the best treatment option for their lesion. No guarantees were made or implied; close follow-up was stressed out to the patient. All components of Universal Protocol/PAUSE Rule completed. Manish Duke MD operated in two distinct and integrated capacities as the surgeon and pathologist.
Consent 2/Introductory Paragraph: Mohs surgery was explained to the patient and consent was obtained. The risks, benefits and alternatives to therapy were discussed in detail. Specifically, the risks of infection, scarring, bleeding, prolonged wound healing, incomplete removal, allergy to anesthesia, nerve injury and recurrence were addressed. Prior to the procedure, the treatment site was clearly identified and confirmed by the patient. All components of Universal Protocol/PAUSE Rule completed.
Consent 3/Introductory Paragraph: I gave the patient a chance to ask questions they had about the procedure.  Following this I explained the Mohs procedure and consent was obtained. The risks, benefits and alternatives to therapy were discussed in detail. Specifically, the risks of infection, scarring, bleeding, prolonged wound healing, incomplete removal, allergy to anesthesia, nerve injury and recurrence were addressed. Prior to the procedure, the treatment site was clearly identified and confirmed by the patient. All components of Universal Protocol/PAUSE Rule completed.
Consent (Temporal Branch)/Introductory Paragraph: The rationale for Mohs was explained to the patient and consent was obtained. The risks, benefits and alternatives to therapy were discussed in detail. Specifically, the risks of damage to the temporal branch of the facial nerve, infection, scarring, bleeding, prolonged wound healing, incomplete removal, allergy to anesthesia, and recurrence were addressed. Prior to the procedure, the treatment site was clearly identified and confirmed by the patient. All components of Universal Protocol/PAUSE Rule completed.
Consent (Marginal Mandibular)/Introductory Paragraph: The rationale for Mohs was explained to the patient and consent was obtained. The risks, benefits and alternatives to therapy were discussed in detail. Specifically, the risks of damage to the marginal mandibular branch of the facial nerve, infection, scarring, bleeding, prolonged wound healing, incomplete removal, allergy to anesthesia, and recurrence were addressed. Prior to the procedure, the treatment site was clearly identified and confirmed by the patient. All components of Universal Protocol/PAUSE Rule completed.
Consent (Spinal Accessory)/Introductory Paragraph: The rationale for Mohs was explained to the patient and consent was obtained. The risks, benefits and alternatives to therapy were discussed in detail. Specifically, the risks of damage to the spinal accessory nerve, infection, scarring, bleeding, prolonged wound healing, incomplete removal, allergy to anesthesia, and recurrence were addressed. Prior to the procedure, the treatment site was clearly identified and confirmed by the patient. All components of Universal Protocol/PAUSE Rule completed.
Consent (Near Eyelid Margin)/Introductory Paragraph: The rationale for Mohs was explained to the patient and consent was obtained. The risks, benefits and alternatives to therapy were discussed in detail. Specifically, the risks of ectropion or eyelid deformity, infection, scarring, bleeding, prolonged wound healing, incomplete removal, allergy to anesthesia, nerve injury and recurrence were addressed. Prior to the procedure, the treatment site was clearly identified and confirmed by the patient. All components of Universal Protocol/PAUSE Rule completed.
Consent (Ear)/Introductory Paragraph: The rationale for Mohs was explained to the patient and consent was obtained. The risks, benefits and alternatives to therapy were discussed in detail. Specifically, the risks of ear deformity, infection, scarring, bleeding, prolonged wound healing, incomplete removal, allergy to anesthesia, nerve injury and recurrence were addressed. Prior to the procedure, the treatment site was clearly identified and confirmed by the patient. All components of Universal Protocol/PAUSE Rule completed.
Consent (Nose)/Introductory Paragraph: The rationale for Mohs was explained to the patient and consent was obtained. The risks, benefits and alternatives to therapy were discussed in detail. Specifically, the risks of nasal deformity, changes in the flow of air through the nose, infection, scarring, bleeding, prolonged wound healing, incomplete removal, allergy to anesthesia, nerve injury and recurrence were addressed. Prior to the procedure, the treatment site was clearly identified and confirmed by the patient. All components of Universal Protocol/PAUSE Rule completed.
Consent (Lip)/Introductory Paragraph: The rationale for Mohs was explained to the patient and consent was obtained. The risks, benefits and alternatives to therapy were discussed in detail. Specifically, the risks of lip deformity, changes in the oral aperture, infection, scarring, bleeding, prolonged wound healing, incomplete removal, allergy to anesthesia, nerve injury and recurrence were addressed. Prior to the procedure, the treatment site was clearly identified and confirmed by the patient. All components of Universal Protocol/PAUSE Rule completed.
Consent (Scalp)/Introductory Paragraph: The rationale for Mohs was explained to the patient and consent was obtained. The risks, benefits and alternatives to therapy were discussed in detail. Specifically, the risks of changes in hair growth pattern secondary to repair, infection, scarring, bleeding, prolonged wound healing, incomplete removal, allergy to anesthesia, nerve injury and recurrence were addressed. Prior to the procedure, the treatment site was clearly identified and confirmed by the patient. All components of Universal Protocol/PAUSE Rule completed.
Detail Level: Detailed
Postop Diagnosis: same
Anesthesia Volume In Cc: 3
Hemostasis: Electrocautery
Estimated Blood Loss (Cc): minimal
Brow Lift Text: A midfrontal incision was made medially to the defect to allow access to the tissues just superior to the left eyebrow. Following careful dissection inferiorly in a supraperiosteal plane to the level of the left eyebrow, several 3-0 monocryl sutures were used to resuspend the eyebrow orbicularis oculi muscular unit to the superior frontal bone periosteum. This resulted in an appropriate reapproximation of static eyebrow symmetry and correction of the left brow ptosis.
Epidermal Sutures: none-secondary intention
Suturegard Intro: Intraoperative tissue expansion was performed, utilizing the SUTUREGARD device, in order to reduce wound tension.
Suturegard Body: The suture ends were repeatedly re-tightened and re-clamped to achieve the desired tissue expansion.
Hemigard Intro: Due to skin fragility and wound tension, it was decided to use HEMIGARD adhesive retention suture devices to permit a linear closure. The skin was cleaned and dried for a 6cm distance away from the wound. Excessive hair, if present, was removed to allow for adhesion.
Hemigard Postcare Instructions: The HEMIGARD strips are to remain completely dry for at least 5-7 days.
Donor Site Anesthesia Type: same as repair anesthesia
Closure 2 Information: This tab is for additional flaps and grafts, including complex repair and grafts and complex repair and flaps. You can also specify a different location for the additional defect, if the location is the same you do not need to select a new one. We will insert the automated text for the repair you select below just as we do for solitary flaps and grafts. Please note that at this time if you select a location with a different insurance zone you will need to override the ICD10 and CPT if appropriate.
Closure 3 Information: This tab is for additional flaps and grafts above and beyond our usual structured repairs.  Please note if you enter information here it will not currently bill and you will need to add the billing information manually.
Wound Care: Vaseline
Dressing: pressure dressing with telfa
Wound Care (No Sutures): Petrolatum
Dressing (No Sutures): dry sterile dressing
Unna Boot Text: An Unna boot was placed to help immobilize the limb and facilitate more rapid healing.
Home Suture Removal Text: Patient was provided instructions on removing sutures and will remove their sutures at home.  If they have any questions or difficulties they will call the office.
Post-Care Instructions: WOUND CARE INSTRUCTIONS\\n\\nI reviewed the post-care instructions with the patient in detail.\\n\\nKeep our initial bandage on and dry for 48-72 hours as directed. After 48-72 hours,\\n\\n1. Cleanse the wound with peroxide gently. If there is a lot of crusting/scabbing, soak the site with peroxide to soften.\\n2. Apply a generous amount of Vaseline to the surgical site. DO NOT use Neosporin.\\n3. Cover the wound with a dressing. You can use a non-stick pad with paper tape or regular bandages.\\n4. Repeat twice daily, once in the morning, once in the evening.\\n\\n\\nPAIN CONTROL\\n\\n1. It is common to experience swelling, bruising, and drainage after surgery. To decrease pain, use extra strength Tylenol as directed on the bottle. If necessary, it is OK to take Tylenol AND Ibuprofen. Please follow the instructions as directed on the bottles. If these over-the-counter medicines do not help with your pain, please call our office.\\n\\n2. To decrease pain, patients can also try using an ice pack, a bag of frozen green peas, or a bag of frozen marshmellows. Place the ice pack on top of the bandage for 20-30 minutes, then take a break for 20-30 minutes (place the ice pack back in the freezer to get it cold again). Repeat as many times as necessary.\\n\\n\\nBLEEDING CONTROL\\n\\nIf bleeding should occur, apply firm direct pressure to the site for 30 minutes without easing up. If bleeding continues after 30 minutes, please call the office at any time. In rare instances, it may be necessary to go to the nearest emergency room.\\n\\n\\nMISCELLANEOUS INFORMATION\\n\\nThings to avoid while healing:\\n - NO heavy lifting, exercise, or swimming for the next 14 days.\\n - NO exposure to tap water for the next 48-72 hours.\\n - NO exposure to hot tub, swimming pool, or ocean water for the next 14 days.\\n\\n\\nCONTACT INFORMATION\\n\\nShould you have any questions or concerns, please call:\\n\\n1. [place] Location = [phone]\\n\\n2. [facility] [place] Location = [phone]
Pain Refusal Text: I offered to prescribe pain medication but the patient refused to take this medication.
Mauc Instructions: By selecting yes to the question below the MAUC number will be added into the note.  This will be calculated automatically based on the diagnosis chosen, the size entered, the body zone selected (H,M,L) and the specific indications you chose. You will also have the option to override the Mohs AUC if you disagree with the automatically calculated number and this option is found in the Case Summary tab.
Where Do You Want The Question To Include Opioid Counseling Located?: Case Summary Tab
Eye Protection Verbiage: Before proceeding with the stage, a plastic scleral shield was inserted. The globe was anesthetized with a few drops of 1% lidocaine with 1:100,000 epinephrine. Then, an appropriate sized scleral shield was chosen and coated with lacrilube ointment. The shield was gently inserted and left in place for the duration of each stage. After the stage was completed, the shield was gently removed.
Mohs Method Verbiage: An incision at a 45 degree angle following the standard Mohs approach was done and the specimen was harvested as a microscopic controlled layer.
Surgeon/Pathologist Verbiage (Will Incorporate Name Of Surgeon From Intro If Not Blank): operated in two distinct and integrated capacities as the surgeon and pathologist. A Mohs Map was completed at this time and should be considered part of the medical record. Refer to attached Mohs Map for additional details including but not limited to: address where microscope slide was read, patient name, patient medical record number, date of service, patient insurance, referring provider, biopsy date, tumor type, tumor site, initial pre-operative size, Mohs accession #, Mohs indications, details of Mohs procedure including stages, debulk status, tumor type / pattern / morphology, depth of invasion, inflammation, perineural invasion, scar tissue, margins status, tissue blocks, and final measurement size.
Mohs Histo Method Verbiage: Each section was then chromacoded and processed in the Mohs lab using the Mohs protocol and submitted for frozen section.
Subsequent Stages Histo Method Verbiage: Using a similar technique to that described above, a thin layer of tissue was removed from all areas where tumor was visible on the previous stage.  The tissue was again oriented, mapped, dyed, and processed as above.
Mohs Rapid Report Verbiage: The area of clinically evident tumor was marked with skin marking ink and appropriately hatched.  The initial incision was made following the Mohs approach through the skin.  The specimen was taken to the lab, divided into the necessary number of pieces, chromacoded and processed according to the Mohs protocol.  This was repeated in successive stages until a tumor free defect was achieved.
Complex Repair Preamble Text (Leave Blank If You Do Not Want): Extensive wide undermining was performed.
Intermediate Repair Preamble Text (Leave Blank If You Do Not Want): Undermining was performed with blunt dissection.
Graft Cartilage Fenestration Text: The cartilage was fenestrated with a 2mm punch biopsy to help facilitate graft survival and healing.
Non-Graft Cartilage Fenestration Text: The cartilage was fenestrated with a 2mm punch biopsy to help facilitate healing.
Secondary Intention Text (Leave Blank If You Do Not Want): The defect will heal with secondary intention.
No Repair - Repaired With Adjacent Surgical Defect Text (Leave Blank If You Do Not Want): After obtaining clear surgical margins the defect was repaired concurrently with another surgical defect which was in close approximation.
Adjacent Tissue Transfer Text: The defect edges were debeveled with a #15 scalpel blade.  Given the location of the defect and the proximity to free margins an adjacent tissue transfer was deemed most appropriate.  Using a sterile surgical marker, an appropriate flap was drawn incorporating the defect and placing the expected incisions within the relaxed skin tension lines where possible.    The area thus outlined was incised deep to adipose tissue with a #15 scalpel blade.  The skin margins were undermined to an appropriate distance in all directions utilizing iris scissors.
Advancement Flap (Single) Text: The defect edges were debeveled with a #15 scalpel blade. Given the location of the defect and the proximity to free margins an advancement flap was deemed most appropriate. Using a sterile surgical marker, an appropriate advancement flap was drawn incorporating the defect and placing the expected incisions within the relaxed skin tension lines where possible. The area thus outlined was incised deep to adipose tissue with a #15 scalpel blade. The skin margins were undermined 1cm in all directions utilizing iris scissors to facilitate a smooth advancement movement of the flap. Please see above for flap location, total flap area, primary defect dimensions and area, and secondary defect dimensions and area.
Advancement Flap (Double) Text: The defect edges were debeveled with a #15 scalpel blade. Given the location of the defect and the proximity to free margins a double advancement flap was deemed most appropriate. Using a sterile surgical marker, an appropriate double advancement flap was drawn incorporating the defect and placing the expected incisions within the relaxed skin tension lines where possible. The area thus outlined was incised deep to adipose tissue with a #15 scalpel blade. The skin margins were undermined 1cm in all directions utilizing iris scissors to facilitate a smooth double advancement movement of the flap. Please see above for flap location, total flap area, primary defect dimensions and area, and secondary defect dimensions and area.
Advancement-Rotation Flap Text: The defect edges were debeveled with a #15 scalpel blade.  Given the location of the defect, shape of the defect and the proximity to free margins an advancement-rotation flap was deemed most appropriate.  Using a sterile surgical marker, an appropriate flap was drawn incorporating the defect and placing the expected incisions within the relaxed skin tension lines where possible. The area thus outlined was incised deep to adipose tissue with a #15 scalpel blade.  The skin margins were undermined to an appropriate distance in all directions utilizing iris scissors.
Alar Island Pedicle Flap Text: The defect edges were debeveled with a #15 scalpel blade.  Given the location of the defect, shape of the defect and the proximity to the alar rim an island pedicle advancement flap was deemed most appropriate.  Using a sterile surgical marker, an appropriate advancement flap was drawn incorporating the defect, outlining the appropriate donor tissue and placing the expected incisions within the nasal ala running parallel to the alar rim. The area thus outlined was incised with a #15 scalpel blade.  The skin margins were undermined minimally to an appropriate distance in all directions around the primary defect and laterally outward around the island pedicle utilizing iris scissors.  There was minimal undermining beneath the pedicle flap.
A-T Advancement Flap Text: The defect edges were debeveled with a #15 scalpel blade.  Given the location of the defect, shape of the defect and the proximity to free margins an A-T advancement flap was deemed most appropriate.  Using a sterile surgical marker, an appropriate advancement flap was drawn incorporating the defect and placing the expected incisions within the relaxed skin tension lines where possible.    The area thus outlined was incised deep to adipose tissue with a #15 scalpel blade.  The skin margins were undermined to an appropriate distance in all directions utilizing iris scissors.
Banner Transposition Flap Text: The defect edges were debeveled with a #15 scalpel blade.  Given the location of the defect and the proximity to free margins a Banner transposition flap was deemed most appropriate.  Using a sterile surgical marker, an appropriate flap drawn around the defect. The area thus outlined was incised deep to adipose tissue with a #15 scalpel blade.  The skin margins were undermined to an appropriate distance in all directions utilizing iris scissors.
Bilateral Helical Rim Advancement Flap Text: The defect edges were debeveled with a #15 blade scalpel.  Given the location of the defect and the proximity to free margins (helical rim) a bilateral helical rim advancement flap was deemed most appropriate.  Using a sterile surgical marker, the appropriate advancement flaps were drawn incorporating the defect and placing the expected incisions between the helical rim and antihelix where possible.  The area thus outlined was incised through and through with a #15 scalpel blade.  With a skin hook and iris scissors, the flaps were gently and sharply undermined and freed up.
Bilateral Rotation Flap Text: The defect edges were debeveled with a #15 scalpel blade. Given the location of the defect, shape of the defect and the proximity to free margins a bilateral rotation flap was deemed most appropriate. Using a sterile surgical marker, an appropriate rotation flap was drawn incorporating the defect and placing the expected incisions within the relaxed skin tension lines where possible. The area thus outlined was incised deep to adipose tissue with a #15 scalpel blade. The skin margins were undermined to an appropriate distance in all directions utilizing iris scissors. Following this, the designed flap was carried over into the primary defect and sutured into place.
Bilobed Flap Text: The defect edges were debeveled with a #15 scalpel blade.  Given the location of the defect and the proximity to free margins a bilobe flap was deemed most appropriate.  Using a sterile surgical marker, an appropriate bilobe flap drawn around the defect.    The area thus outlined was incised deep to adipose tissue with a #15 scalpel blade.  The skin margins were undermined to an appropriate distance in all directions utilizing iris scissors.
Bilobed Transposition Flap Text: The defect edges were debeveled with a #15 scalpel blade.  Given the location of the defect and the proximity to free margins a bilobed transposition flap was deemed most appropriate.  Using a sterile surgical marker, an appropriate bilobe flap drawn around the defect.    The area thus outlined was incised deep to adipose tissue with a #15 scalpel blade.  The skin margins were undermined to an appropriate distance in all directions utilizing iris scissors.
Bi-Rhombic Flap Text: The defect edges were debeveled with a #15 scalpel blade.  Given the location of the defect and the proximity to free margins a bi-rhombic flap was deemed most appropriate.  Using a sterile surgical marker, an appropriate rhombic flap was drawn incorporating the defect. The area thus outlined was incised deep to adipose tissue with a #15 scalpel blade.  The skin margins were undermined to an appropriate distance in all directions utilizing iris scissors.
Burow's Advancement Flap Text: The defect edges were debeveled with a #15 scalpel blade.  Given the location of the defect and the proximity to free margins a Burow's advancement flap was deemed most appropriate.  Using a sterile surgical marker, the appropriate advancement flap was drawn incorporating the defect and placing the expected incisions within the relaxed skin tension lines where possible.    The area thus outlined was incised deep to adipose tissue with a #15 scalpel blade.  The skin margins were undermined to an appropriate distance in all directions utilizing iris scissors.
Chonodrocutaneous Helical Advancement Flap Text: The defect edges were debeveled with a #15 scalpel blade.  Given the location of the defect and the proximity to free margins a chondrocutaneous helical advancement flap was deemed most appropriate.  Using a sterile surgical marker, the appropriate advancement flap was drawn incorporating the defect and placing the expected incisions within the relaxed skin tension lines where possible.    The area thus outlined was incised deep to adipose tissue with a #15 scalpel blade.  The skin margins were undermined to an appropriate distance in all directions utilizing iris scissors.
Crescentic Advancement Flap Text: The defect edges were debeveled with a #15 scalpel blade. Given the location of the defect and the proximity to free margins a crescentic advancement flap was deemed most appropriate. Using a sterile surgical marker, an appropriate crescentic advancement flap was drawn incorporating the defect and placing the expected incisions within the relaxed skin tension lines where possible. The area thus outlined was incised deep to adipose tissue with a #15 scalpel blade. The skin margins were undermined 1cm in all directions utilizing iris scissors to facilitate a smooth crescentic advancement movement of the flap. Please see above for flap location, total flap area, primary defect dimensions and area, and secondary defect dimensions and area.
Dorsal Nasal Flap Text: The defect edges were debeveled with a #15 scalpel blade.  Given the location of the defect and the proximity to free margins a dorsal nasal flap was deemed most appropriate.  Using a sterile surgical marker, an appropriate dorsal nasal flap was drawn around the defect.    The area thus outlined was incised deep to adipose tissue with a #15 scalpel blade.  The skin margins were undermined to an appropriate distance in all directions utilizing iris scissors.
Double Island Pedicle Flap Text: The defect edges were debeveled with a #15 scalpel blade.  Given the location of the defect, shape of the defect and the proximity to free margins a double island pedicle advancement flap was deemed most appropriate.  Using a sterile surgical marker, an appropriate advancement flap was drawn incorporating the defect, outlining the appropriate donor tissue and placing the expected incisions within the relaxed skin tension lines where possible.    The area thus outlined was incised deep to adipose tissue with a #15 scalpel blade.  The skin margins were undermined to an appropriate distance in all directions around the primary defect and laterally outward around the island pedicle utilizing iris scissors.  There was minimal undermining beneath the pedicle flap.
Double O-Z Flap Text: The defect edges were debeveled with a #15 scalpel blade.  Given the location of the defect, shape of the defect and the proximity to free margins a Double O-Z flap was deemed most appropriate.  Using a sterile surgical marker, an appropriate transposition flap was drawn incorporating the defect and placing the expected incisions within the relaxed skin tension lines where possible. The area thus outlined was incised deep to adipose tissue with a #15 scalpel blade.  The skin margins were undermined to an appropriate distance in all directions utilizing iris scissors.
Double O-Z Plasty Text: The defect edges were debeveled with a #15 scalpel blade.  Given the location of the defect, shape of the defect and the proximity to free margins a Double O-Z plasty (double transposition flap) was deemed most appropriate.  Using a sterile surgical marker, the appropriate transposition flaps were drawn incorporating the defect and placing the expected incisions within the relaxed skin tension lines where possible. The area thus outlined was incised deep to adipose tissue with a #15 scalpel blade.  The skin margins were undermined to an appropriate distance in all directions utilizing iris scissors.  Hemostasis was achieved with electrocautery.  The flaps were then transposed into place, one clockwise and the other counterclockwise, and anchored with interrupted buried subcutaneous sutures.
Double Z Plasty Text: The lesion was extirpated to the level of the fat with a #15 scalpel blade. Given the location of the defect, shape of the defect and the proximity to free margins a double Z-plasty was deemed most appropriate for repair. Using a sterile surgical marker, the appropriate transposition arms of the double Z-plasty were drawn incorporating the defect and placing the expected incisions within the relaxed skin tension lines where possible. The area thus outlined was incised deep to adipose tissue with a #15 scalpel blade. The skin margins were undermined to an appropriate distance in all directions utilizing iris scissors. The opposing transposition arms were then transposed and carried over into place in opposite direction and anchored with interrupted buried subcutaneous sutures.
Ear Star Wedge Flap Text: The defect edges were debeveled with a #15 blade scalpel.  Given the location of the defect and the proximity to free margins (helical rim) an ear star wedge flap was deemed most appropriate.  Using a sterile surgical marker, the appropriate flap was drawn incorporating the defect and placing the expected incisions between the helical rim and antihelix where possible.  The area thus outlined was incised through and through with a #15 scalpel blade.
Flip-Flop Flap Text: The defect edges were debeveled with a #15 blade scalpel.  Given the location of the defect and the proximity to free margins a flip-flop flap was deemed most appropriate. Using a sterile surgical marker, the appropriate flap was drawn incorporating the defect and placing the expected incisions between the helical rim and antihelix where possible.  The area thus outlined was incised through and through with a #15 scalpel blade. Following this, the designed flap was carried over into the primary defect and sutured into place.
Hatchet Flap Text: The defect edges were debeveled with a #15 scalpel blade.  Given the location of the defect, shape of the defect and the proximity to free margins a hatchet flap was deemed most appropriate.  Using a sterile surgical marker, an appropriate hatchet flap was drawn incorporating the defect and placing the expected incisions within the relaxed skin tension lines where possible.    The area thus outlined was incised deep to adipose tissue with a #15 scalpel blade.  The skin margins were undermined to an appropriate distance in all directions utilizing iris scissors.
Helical Rim Advancement Flap Text: The defect edges were debeveled with a #15 blade scalpel.  Given the location of the defect and the proximity to free margins (helical rim) a double helical rim advancement flap was deemed most appropriate.  Using a sterile surgical marker, the appropriate advancement flaps were drawn incorporating the defect and placing the expected incisions between the helical rim and antihelix where possible.  The area thus outlined was incised through and through with a #15 scalpel blade.  With a skin hook and iris scissors, the flaps were gently and sharply undermined and freed up.
H Plasty Text: Given the location of the defect, shape of the defect and the proximity to free margins a H-plasty was deemed most appropriate for repair.  Using a sterile surgical marker, the appropriate advancement arms of the H-plasty were drawn incorporating the defect and placing the expected incisions within the relaxed skin tension lines where possible. The area thus outlined was incised deep to adipose tissue with a #15 scalpel blade. The skin margins were undermined to an appropriate distance in all directions utilizing iris scissors.  The opposing advancement arms were then advanced into place in opposite direction and anchored with interrupted buried subcutaneous sutures.
Island Pedicle Flap Text: The defect edges were debeveled with a #15 scalpel blade.  Given the location of the defect, shape of the defect and the proximity to free margins an island pedicle advancement flap was deemed most appropriate.  Using a sterile surgical marker, an appropriate advancement flap was drawn incorporating the defect, outlining the appropriate donor tissue and placing the expected incisions within the relaxed skin tension lines where possible.    The area thus outlined was incised deep to adipose tissue with a #15 scalpel blade.  The skin margins were undermined to an appropriate distance in all directions around the primary defect and laterally outward around the island pedicle utilizing iris scissors.  There was minimal undermining beneath the pedicle flap.
Island Pedicle Flap With Canthal Suspension Text: The defect edges were debeveled with a #15 scalpel blade.  Given the location of the defect, shape of the defect and the proximity to free margins an island pedicle advancement flap was deemed most appropriate.  Using a sterile surgical marker, an appropriate advancement flap was drawn incorporating the defect, outlining the appropriate donor tissue and placing the expected incisions within the relaxed skin tension lines where possible. The area thus outlined was incised deep to adipose tissue with a #15 scalpel blade.  The skin margins were undermined to an appropriate distance in all directions around the primary defect and laterally outward around the island pedicle utilizing iris scissors.  There was minimal undermining beneath the pedicle flap. A suspension suture was placed in the canthal tendon to prevent tension and prevent ectropion.
Island Pedicle Flap-Requiring Vessel Identification Text: The defect edges were debeveled with a #15 scalpel blade.  Given the location of the defect, shape of the defect and the proximity to free margins an island pedicle advancement flap was deemed most appropriate.  Using a sterile surgical marker, an appropriate advancement flap was drawn, based on the axial vessel mentioned above, incorporating the defect, outlining the appropriate donor tissue and placing the expected incisions within the relaxed skin tension lines where possible.    The area thus outlined was incised deep to adipose tissue with a #15 scalpel blade.  The skin margins were undermined to an appropriate distance in all directions around the primary defect and laterally outward around the island pedicle utilizing iris scissors.  There was minimal undermining beneath the pedicle flap.
Keystone Flap Text: The defect edges were debeveled with a #15 scalpel blade.  Given the location of the defect, shape of the defect a keystone flap was deemed most appropriate.  Using a sterile surgical marker, an appropriate keystone flap was drawn incorporating the defect, outlining the appropriate donor tissue and placing the expected incisions within the relaxed skin tension lines where possible. The area thus outlined was incised deep to adipose tissue with a #15 scalpel blade.  The skin margins were undermined to an appropriate distance in all directions around the primary defect and laterally outward around the flap utilizing iris scissors.
Melolabial Transposition Flap Text: The defect edges were debeveled with a #15 scalpel blade.  Given the location of the defect and the proximity to free margins a melolabial flap was deemed most appropriate.  Using a sterile surgical marker, an appropriate melolabial transposition flap was drawn incorporating the defect.    The area thus outlined was incised deep to adipose tissue with a #15 scalpel blade.  The skin margins were undermined to an appropriate distance in all directions utilizing iris scissors.
Mercedes Flap Text: The defect edges were debeveled with a #15 scalpel blade.  Given the location of the defect, shape of the defect and the proximity to free margins a Mercedes flap was deemed most appropriate.  Using a sterile surgical marker, an appropriate advancement flap was drawn incorporating the defect and placing the expected incisions within the relaxed skin tension lines where possible. The area thus outlined was incised deep to adipose tissue with a #15 scalpel blade.  The skin margins were undermined to an appropriate distance in all directions utilizing iris scissors.
Modified Advancement Flap Text: The defect edges were debeveled with a #15 scalpel blade.  Given the location of the defect, shape of the defect and the proximity to free margins a modified advancement flap was deemed most appropriate.  Using a sterile surgical marker, an appropriate advancement flap was drawn incorporating the defect and placing the expected incisions within the relaxed skin tension lines where possible.    The area thus outlined was incised deep to adipose tissue with a #15 scalpel blade.  The skin margins were undermined to an appropriate distance in all directions utilizing iris scissors.
Mucosal Advancement Flap Text: Given the location of the defect, shape of the defect and the proximity to free margins a mucosal advancement flap was deemed most appropriate. Incisions were made with a 15 blade scalpel in the appropriate fashion along the cutaneous vermillion border and the mucosal lip. The remaining actinically damaged mucosal tissue was excised.  The mucosal advancement flap was then elevated to the gingival sulcus with care taken to preserve the neurovascular structures and advanced into the primary defect. Care was taken to ensure that precise realignment of the vermillion border was achieved.
Muscle Hinge Flap Text: The defect edges were debeveled with a #15 scalpel blade.  Given the size, depth and location of the defect and the proximity to free margins a muscle hinge flap was deemed most appropriate.  Using a sterile surgical marker, an appropriate hinge flap was drawn incorporating the defect. The area thus outlined was incised with a #15 scalpel blade.  The skin margins were undermined to an appropriate distance in all directions utilizing iris scissors.
Mustarde Flap Text: The defect edges were debeveled with a #15 scalpel blade.  Given the size, depth and location of the defect and the proximity to free margins a Mustarde flap was deemed most appropriate.  Using a sterile surgical marker, an appropriate flap was drawn incorporating the defect. The area thus outlined was incised with a #15 scalpel blade.  The skin margins were undermined to an appropriate distance in all directions utilizing iris scissors.
Nasal Turnover Hinge Flap Text: The defect edges were debeveled with a #15 scalpel blade.  Given the size, depth, location of the defect and the defect being full thickness a nasal turnover hinge flap was deemed most appropriate.  Using a sterile surgical marker, an appropriate hinge flap was drawn incorporating the defect. The area thus outlined was incised with a #15 scalpel blade. The flap was designed to recreate the nasal mucosal lining and the alar rim. The skin margins were undermined to an appropriate distance in all directions utilizing iris scissors.
Nasalis-Muscle-Based Myocutaneous Island Pedicle Flap Text: Using a #15 blade, an incision was made around the donor flap to the level of the nasalis muscle. Wide lateral undermining was then performed in both the subcutaneous plane above the nasalis muscle, and in a submuscular plane just above periosteum. This allowed the formation of a free nasalis muscle axial pedicle (based on the angular artery) which was still attached to the actual cutaneous flap, increasing its mobility and vascular viability. Hemostasis was obtained with pinpoint electrocoagulation. The flap was mobilized into position and the pivotal anchor points positioned and stabilized with buried interrupted sutures. Subcutaneous and dermal tissues were closed in a multilayered fashion with sutures. Tissue redundancies were excised, and the epidermal edges were apposed without significant tension and sutured with sutures.
Nasalis Myocutaneous Flap Text: Using a #15 blade, an incision was made around the donor flap to the level of the nasalis muscle. Wide lateral undermining was then performed in both the subcutaneous plane above the nasalis muscle, and in a submuscular plane just above periosteum. This allowed the formation of a free nasalis muscle axial pedicle which was still attached to the actual cutaneous flap, increasing its mobility and vascular viability. Hemostasis was obtained with pinpoint electrocoagulation. The flap was mobilized into position and the pivotal anchor points positioned and stabilized with buried interrupted sutures. Subcutaneous and dermal tissues were closed in a multilayered fashion with sutures. Tissue redundancies were excised, and the epidermal edges were apposed without significant tension and sutured with sutures.
Nasolabial Transposition Flap Text: The defect edges were debeveled with a #15 scalpel blade.  Given the size, depth and location of the defect and the proximity to free margins a nasolabial transposition flap was deemed most appropriate. Using a sterile surgical marker, an appropriate flap was drawn incorporating the defect. The area thus outlined was incised with a #15 scalpel blade. The skin margins were undermined to an appropriate distance in all directions utilizing iris scissors. Following this, the designed flap was carried into the primary defect and sutured into place.
Orbicularis Oris Muscle Flap Text: The defect edges were debeveled with a #15 scalpel blade.  Given that the defect affected the competency of the oral sphincter an obicularis oris muscle flap was deemed most appropriate to restore this competency and normal muscle function.  Using a sterile surgical marker, an appropriate flap was drawn incorporating the defect. The area thus outlined was incised with a #15 scalpel blade.
O-T Advancement Flap Text: The defect edges were debeveled with a #15 scalpel blade.  Given the location of the defect, shape of the defect and the proximity to free margins an O-T advancement flap was deemed most appropriate.  Using a sterile surgical marker, an appropriate advancement flap was drawn incorporating the defect and placing the expected incisions within the relaxed skin tension lines where possible.    The area thus outlined was incised deep to adipose tissue with a #15 scalpel blade.  The skin margins were undermined to an appropriate distance in all directions utilizing iris scissors.
O-T Plasty Text: The defect edges were debeveled with a #15 scalpel blade.  Given the location of the defect, shape of the defect and the proximity to free margins an O-T plasty was deemed most appropriate.  Using a sterile surgical marker, an appropriate O-T plasty was drawn incorporating the defect and placing the expected incisions within the relaxed skin tension lines where possible.    The area thus outlined was incised deep to adipose tissue with a #15 scalpel blade.  The skin margins were undermined to an appropriate distance in all directions utilizing iris scissors.
O-L Flap Text: The defect edges were debeveled with a #15 scalpel blade.  Given the location of the defect, shape of the defect and the proximity to free margins an O-L flap was deemed most appropriate.  Using a sterile surgical marker, an appropriate advancement flap was drawn incorporating the defect and placing the expected incisions within the relaxed skin tension lines where possible.    The area thus outlined was incised deep to adipose tissue with a #15 scalpel blade.  The skin margins were undermined to an appropriate distance in all directions utilizing iris scissors.
O-Z Flap Text: The defect edges were debeveled with a #15 scalpel blade.  Given the location of the defect, shape of the defect and the proximity to free margins an O-Z flap was deemed most appropriate.  Using a sterile surgical marker, an appropriate transposition flap was drawn incorporating the defect and placing the expected incisions within the relaxed skin tension lines where possible. The area thus outlined was incised deep to adipose tissue with a #15 scalpel blade.  The skin margins were undermined to an appropriate distance in all directions utilizing iris scissors.
O-Z Plasty Text: The defect edges were debeveled with a #15 scalpel blade.  Given the location of the defect, shape of the defect and the proximity to free margins an O-Z plasty (double transposition flap) was deemed most appropriate.  Using a sterile surgical marker, the appropriate transposition flaps were drawn incorporating the defect and placing the expected incisions within the relaxed skin tension lines where possible.    The area thus outlined was incised deep to adipose tissue with a #15 scalpel blade.  The skin margins were undermined to an appropriate distance in all directions utilizing iris scissors.  Hemostasis was achieved with electrocautery.  The flaps were then transposed into place, one clockwise and the other counterclockwise, and anchored with interrupted buried subcutaneous sutures.
Peng Advancement Flap Text: The defect edges were debeveled with a #15 scalpel blade.  Given the location of the defect, shape of the defect and the proximity to free margins a Peng advancement flap was deemed most appropriate.  Using a sterile surgical marker, an appropriate advancement flap was drawn incorporating the defect and placing the expected incisions within the relaxed skin tension lines where possible. The area thus outlined was incised deep to adipose tissue with a #15 scalpel blade.  The skin margins were undermined to an appropriate distance in all directions utilizing iris scissors.
Rectangular Flap Text: The defect edges were debeveled with a #15 scalpel blade. Given the location of the defect and the proximity to free margins a rectangular flap was deemed most appropriate. Using a sterile surgical marker, an appropriate rectangular flap was drawn incorporating the defect. The area thus outlined was incised deep to adipose tissue with a #15 scalpel blade. The skin margins were undermined to an appropriate distance in all directions utilizing iris scissors. Following this, the designed flap was carried over into the primary defect and sutured into place.
Rhombic Flap Text: The defect edges were debeveled with a #15 scalpel blade.? Given the location of the defect and the proximity to free margins a rhombic flap was deemed most appropriate.? Using a sterile surgical marker, an appropriate rhombic flap was drawn incorporating the defect and placing the expected incisions within the relaxed skin tension lines where possible.??? The area thus outlined was incised deep to adipose tissue with a #15 scalpel blade.? The skin margins were undermined 1cm in all directions utilizing iris scissors to facilitate a smooth transposition movement of the flap.\\n?\\nPlease see above for flap location, total flap area, primary defect dimensions and area, and secondary defect dimensions and area.
Rhomboid Transposition Flap Text: The defect edges were debeveled with a #15 scalpel blade.  Given the location of the defect and the proximity to free margins a rhomboid transposition flap was deemed most appropriate.  Using a sterile surgical marker, an appropriate rhomboid flap was drawn incorporating the defect.    The area thus outlined was incised deep to adipose tissue with a #15 scalpel blade.  The skin margins were undermined to an appropriate distance in all directions utilizing iris scissors.
Rotation Flap Text: The defect edges were debeveled with a #15 scalpel blade.  Given the location of the defect and the proximity to free margins a rotation flap was deemed most appropriate.  Using a sterile surgical marker, an appropriate rotation flap was drawn incorporating the defect and placing the expected incisions within the relaxed skin tension lines where possible.    The area thus outlined was incised deep to adipose tissue with a #15 scalpel blade.  The skin margins were undermined 1cm in all directions utilizing iris scissors to facilitate a smooth rotation movement of the flap.\\n\\n \\n\\nPlease see above for flap location, total flap area, primary defect dimensions and area, and secondary defect dimensions and area.
Spiral Flap Text: The defect edges were debeveled with a #15 scalpel blade.  Given the location of the defect, shape of the defect and the proximity to free margins a spiral flap was deemed most appropriate.  Using a sterile surgical marker, an appropriate rotation flap was drawn incorporating the defect and placing the expected incisions within the relaxed skin tension lines where possible. The area thus outlined was incised deep to adipose tissue with a #15 scalpel blade.  The skin margins were undermined to an appropriate distance in all directions utilizing iris scissors.
Staged Advancement Flap Text: The defect edges were debeveled with a #15 scalpel blade.  Given the location of the defect, shape of the defect and the proximity to free margins a staged advancement flap was deemed most appropriate.  Using a sterile surgical marker, an appropriate advancement flap was drawn incorporating the defect and placing the expected incisions within the relaxed skin tension lines where possible. The area thus outlined was incised deep to adipose tissue with a #15 scalpel blade.  The skin margins were undermined to an appropriate distance in all directions utilizing iris scissors.
Star Wedge Flap Text: The defect edges were debeveled with a #15 scalpel blade.  Given the location of the defect, shape of the defect and the proximity to free margins a star wedge flap was deemed most appropriate.  Using a sterile surgical marker, an appropriate rotation flap was drawn incorporating the defect and placing the expected incisions within the relaxed skin tension lines where possible. The area thus outlined was incised deep to adipose tissue with a #15 scalpel blade.  The skin margins were undermined to an appropriate distance in all directions utilizing iris scissors.
Transposition Flap Text: After a lengthy discussion with the patient regarding the wound treatment reconstruction options available including but not limited to simple repair, intermediate repair, complex repair, adjacent tissue transfer, tissue graft as well as allowing the lesion to repair by secondary intention. It was determined that due to the defect location, complexity, and given indications; an adjacent tissue transfer (transposition flap) would be the most appropriate means for repair of the surgical defect as the defect extended through the skin into the subcutaneous tissues, and required rearrangement or transfer of adjacent tissue to repair the surgical wound.\\n\\n\\n\\n\\nThe defect edges were debeveled with a #15 scalpel blade.  Given the location of the defect and the proximity to free margins a transposition flap was deemed most appropriate.  Using a sterile surgical marker, an appropriate transposition flap was drawn incorporating the defect.    The area thus outlined was incised deep to adipose tissue with a #15 scalpel blade.  The skin margins were undermined to an appropriate distance in all directions utilizing iris scissors.
Trilobed Flap Text: The defect edges were debeveled with a #15 scalpel blade.  Given the location of the defect and the proximity to free margins a trilobed flap was deemed most appropriate.  Using a sterile surgical marker, an appropriate trilobed flap drawn around the defect.    The area thus outlined was incised deep to adipose tissue with a #15 scalpel blade.  The skin margins were undermined to an appropriate distance in all directions utilizing iris scissors.
V-Y Flap Text: The defect edges were debeveled with a #15 scalpel blade. Given the location of the defect and the proximity to free margins a V to Y Flap was deemed most appropriate. Using a sterile surgical marker, an appropriate V to Y Flap was drawn incorporating the defect and placing the expected incisions within the relaxed skin tension lines where possible. The area thus outlined was incised deep to adipose tissue with a #15 scalpel blade. The skin margins were undermined 1cm in all directions utilizing iris scissors to facilitate a smooth advancement movement of the flap. Please see above for flap location, total flap area, primary defect dimensions and area, and secondary defect dimensions and area.
V-Y Plasty Text: The defect edges were debeveled with a #15 scalpel blade.  Given the location of the defect, shape of the defect and the proximity to free margins an V-Y advancement flap was deemed most appropriate.  Using a sterile surgical marker, an appropriate advancement flap was drawn incorporating the defect and placing the expected incisions within the relaxed skin tension lines where possible.    The area thus outlined was incised deep to adipose tissue with a #15 scalpel blade.  The skin margins were undermined to an appropriate distance in all directions utilizing iris scissors.
W Plasty Text: The defect edges were debeveled with a #15 scalpel blade.  Given the location of the defect and the proximity to free margins a W - plasty was deemed most appropriate.  Using a sterile surgical marker, an appropriate W - plasty was drawn incorporating the defect and placing the expected incisions within the relaxed skin tension lines where possible.    The area thus outlined was incised deep to adipose tissue with a #15 scalpel blade.  The skin margins were undermined 1cm in all directions utilizing iris scissors to facilitate a W-plasty or jagged transposition movement of the flap.\\n\\n \\n\\nPlease see above for flap location, total flap area, primary defect dimensions and area, and secondary defect dimensions and area.
Z Plasty Text: The lesion was extirpated to the level of the fat with a #15 scalpel blade.  Given the location of the defect, shape of the defect and the proximity to free margins a Z-plasty was deemed most appropriate for repair.  Using a sterile surgical marker, the appropriate transposition arms of the Z-plasty were drawn incorporating the defect and placing the expected incisions within the relaxed skin tension lines where possible.    The area thus outlined was incised deep to adipose tissue with a #15 scalpel blade.  The skin margins were undermined to an appropriate distance in all directions utilizing iris scissors.  The opposing transposition arms were then transposed into place in opposite direction and anchored with interrupted buried subcutaneous sutures.
Zygomaticofacial Flap Text: Given the location of the defect, shape of the defect and the proximity to free margins a zygomaticofacial flap was deemed most appropriate for repair.  Using a sterile surgical marker, the appropriate flap was drawn incorporating the defect and placing the expected incisions within the relaxed skin tension lines where possible. The area thus outlined was incised deep to adipose tissue with a #15 scalpel blade with preservation of a vascular pedicle.  The skin margins were undermined to an appropriate distance in all directions utilizing iris scissors.  The flap was then placed into the defect and anchored with interrupted buried subcutaneous sutures.
Abbe Flap (Lower To Upper Lip) Text: The defect of the upper lip was assessed and measured.  Given the location and size of the defect, an Abbe flap was deemed most appropriate.  Using a sterile surgical marker, an appropriate Abbe flap was measured and drawn on the lower lip. Local anesthesia was then infiltrated. A scalpel was then used to incise the upper lip through and through the skin, vermilion, muscle and mucosa, leaving the flap pedicled on the opposite side.  The flap was then rotated and transferred to the lower lip defect.  The flap was then sutured into place with a three layer technique, closing the orbicularis oris muscle layer with subcutaneous buried sutures, followed by a mucosal layer and an epidermal layer.
Abbe Flap (Upper To Lower Lip) Text: The defect of the lower lip was assessed and measured.  Given the location and size of the defect, an Abbe flap was deemed most appropriate.  Using a sterile surgical marker, an appropriate Abbe flap was measured and drawn on the upper lip. Local anesthesia was then infiltrated.  A scalpel was then used to incise the upper lip through and through the skin, vermilion, muscle and mucosa, leaving the flap pedicled on the opposite side.  The flap was then rotated and transferred to the lower lip defect.  The flap was then sutured into place with a three layer technique, closing the orbicularis oris muscle layer with subcutaneous buried sutures, followed by a mucosal layer and an epidermal layer.
Cheek Interpolation Flap Text: A decision was made to reconstruct the defect utilizing an interpolation axial flap and a staged reconstruction.  A telfa template was made of the defect.  This telfa template was then used to outline the Cheek Interpolation flap.  The donor area for the pedicle flap was then injected with anesthesia.  The flap was excised through the skin and subcutaneous tissue down to the layer of the underlying musculature.  The interpolation flap was carefully excised within this deep plane to maintain its blood supply.  The edges of the donor site were undermined.   The donor site was closed in a primary fashion.  The pedicle was then rotated into position and sutured.  Once the tube was sutured into place, adequate blood supply was confirmed with blanching and refill.  The pedicle was then wrapped with xeroform gauze and dressed appropriately with a telfa and gauze bandage to ensure continued blood supply and protect the attached pedicle.
Cheek-To-Nose Interpolation Flap Text: A decision was made to reconstruct the defect utilizing an interpolation axial flap and a staged reconstruction.  A telfa template was made of the defect.  This telfa template was then used to outline the Cheek-To-Nose Interpolation flap.  The donor area for the pedicle flap was then injected with anesthesia.  The flap was excised through the skin and subcutaneous tissue down to the layer of the underlying musculature.  The interpolation flap was carefully excised within this deep plane to maintain its blood supply.  The edges of the donor site were undermined.   The donor site was closed in a primary fashion.  The pedicle was then rotated into position and sutured.  Once the tube was sutured into place, adequate blood supply was confirmed with blanching and refill.  The pedicle was then wrapped with xeroform gauze and dressed appropriately with a telfa and gauze bandage to ensure continued blood supply and protect the attached pedicle.
Estlander Flap (Lower To Upper Lip) Text: The defect of the lower lip was assessed and measured.  Given the location and size of the defect, an Estlander flap was deemed most appropriate.  Using a sterile surgical marker, an appropriate Estlander flap was measured and drawn on the upper lip. Local anesthesia was then infiltrated. A scalpel was then used to incise the lateral aspect of the flap, through skin, muscle and mucosa, leaving the flap pedicled medially.  The flap was then rotated and positioned to fill the lower lip defect.  The flap was then sutured into place with a three layer technique, closing the orbicularis oris muscle layer with subcutaneous buried sutures, followed by a mucosal layer and an epidermal layer.
Interpolation Flap Text: A decision was made to reconstruct the defect utilizing an interpolation axial flap and a staged reconstruction.  A telfa template was made of the defect.  This telfa template was then used to outline the interpolation flap.  The donor area for the pedicle flap was then injected with anesthesia.  The flap was excised through the skin and subcutaneous tissue down to the layer of the underlying musculature.  The interpolation flap was carefully excised within this deep plane to maintain its blood supply.  The edges of the donor site were undermined.   The donor site was closed in a primary fashion.  The pedicle was then rotated into position and sutured.  Once the tube was sutured into place, adequate blood supply was confirmed with blanching and refill.  The pedicle was then wrapped with xeroform gauze and dressed appropriately with a telfa and gauze bandage to ensure continued blood supply and protect the attached pedicle.
Melolabial Interpolation Flap Text: A decision was made to reconstruct the defect utilizing an interpolation axial flap and a staged reconstruction.  A telfa template was made of the defect.  This telfa template was then used to outline the melolabial interpolation flap.  The donor area for the pedicle flap was then injected with anesthesia.  The flap was excised through the skin and subcutaneous tissue down to the layer of the underlying musculature.  The pedicle flap was carefully excised within this deep plane to maintain its blood supply.  The edges of the donor site were undermined.   The donor site was closed in a primary fashion.  The pedicle was then rotated into position and sutured.  Once the tube was sutured into place, adequate blood supply was confirmed with blanching and refill.  The pedicle was then wrapped with xeroform gauze and dressed appropriately with a telfa and gauze bandage to ensure continued blood supply and protect the attached pedicle.
Mastoid Interpolation Flap Text: A decision was made to reconstruct the defect utilizing an interpolation axial flap and a staged reconstruction.  A telfa template was made of the defect.  This telfa template was then used to outline the mastoid interpolation flap.  The donor area for the pedicle flap was then injected with anesthesia.  The flap was excised through the skin and subcutaneous tissue down to the layer of the underlying musculature.  The pedicle flap was carefully excised within this deep plane to maintain its blood supply.  The edges of the donor site were undermined.   The donor site was closed in a primary fashion.  The pedicle was then rotated into position and sutured.  Once the tube was sutured into place, adequate blood supply was confirmed with blanching and refill.  The pedicle was then wrapped with xeroform gauze and dressed appropriately with a telfa and gauze bandage to ensure continued blood supply and protect the attached pedicle.
Paramedian Forehead Flap Text: A decision was made to reconstruct the defect utilizing an interpolation axial flap and a staged reconstruction.  A telfa template was made of the defect.  This telfa template was then used to outline the paramedian forehead pedicle flap.  The donor area for the pedicle flap was then injected with anesthesia.  The flap was excised through the skin and subcutaneous tissue down to the layer of the underlying musculature.  The pedicle flap was carefully excised within this deep plane to maintain its blood supply.  The edges of the donor site were undermined.   The donor site was closed in a primary fashion.  The pedicle was then rotated into position and sutured.  Once the tube was sutured into place, adequate blood supply was confirmed with blanching and refill.  The pedicle was then wrapped with xeroform gauze and dressed appropriately with a telfa and gauze bandage to ensure continued blood supply and protect the attached pedicle.
Posterior Auricular Interpolation Flap Text: A decision was made to reconstruct the defect utilizing an interpolation axial flap and a staged reconstruction.  A telfa template was made of the defect.  This telfa template was then used to outline the posterior auricular interpolation flap.  The donor area for the pedicle flap was then injected with anesthesia.  The flap was excised through the skin and subcutaneous tissue down to the layer of the underlying musculature.  The pedicle flap was carefully excised within this deep plane to maintain its blood supply.  The edges of the donor site were undermined.   The donor site was closed in a primary fashion.  The pedicle was then rotated into position and sutured.  Once the tube was sutured into place, adequate blood supply was confirmed with blanching and refill.  The pedicle was then wrapped with xeroform gauze and dressed appropriately with a telfa and gauze bandage to ensure continued blood supply and protect the attached pedicle.
Cheiloplasty (Complex) Text: A decision was made to reconstruct the defect with a  cheiloplasty.  The defect was undermined extensively.  Additional obicularis oris muscle was excised with a 15 blade scalpel.  The defect was converted into a full thickness wedge to facilite a better cosmetic result.  Small vessels were then tied off with 5-0 monocyrl. The obicularis oris, superficial fascia, adipose and dermis were then reapproximated.  After the deeper layers were approximated the epidermis was reapproximated with particular care given to realign the vermillion border.
Cheiloplasty (Less Than 50%) Text: A decision was made to reconstruct the defect with a  cheiloplasty.  The defect was undermined extensively.  Additional obicularis oris muscle was excised with a 15 blade scalpel.  The defect was converted into a full thickness wedge, of less than 50% of the vertical height of the lip, to facilite a better cosmetic result.  Small vessels were then tied off with 5-0 monocyrl. The obicularis oris, superficial fascia, adipose and dermis were then reapproximated.  After the deeper layers were approximated the epidermis was reapproximated with particular care given to realign the vermillion border.
Ear Wedge Repair Text: A wedge excision was completed by carrying down an excision through the full thickness of the ear and cartilage with an inward facing Burow's triangle. The wound was then closed in a layered fashion.
Full Thickness Lip Wedge Repair (Flap) Text: Given the location of the defect and the proximity to free margins a full thickness wedge repair was deemed most appropriate.  Using a sterile surgical marker, the appropriate repair was drawn incorporating the defect and placing the expected incisions perpendicular to the vermillion border.  The vermillion border was also meticulously outlined to ensure appropriate reapproximation during the repair.  The area thus outlined was incised through and through with a #15 scalpel blade.  The muscularis and dermis were reaproximated with deep sutures following hemostasis. Care was taken to realign the vermillion border before proceeding with the superficial closure.  Once the vermillion was realigned the superfical and mucosal closure was finished.
Burow's Graft Text: The defect edges were debeveled with a #15 scalpel blade.  Given the location of the defect, shape of the defect, the proximity to free margins and the presence of a standing cone deformity a Burow's skin graft was deemed most appropriate. The standing cone was removed and this tissue was then trimmed to the shape of the primary defect. The adipose tissue was also removed until only dermis and epidermis were left.  The skin margins of the secondary defect were undermined to an appropriate distance in all directions utilizing iris scissors.  The secondary defect was closed with interrupted buried subcutaneous sutures.  The skin edges were then re-apposed with running  sutures.  The skin graft was then placed in the primary defect and oriented appropriately.
Cartilage Graft Text: The defect edges were debeveled with a #15 scalpel blade.  Given the location of the defect, shape of the defect, the fact the defect involved a full thickness cartilage defect a cartilage graft was deemed most appropriate.  An appropriate donor site was identified, cleansed, and anesthetized. The cartilage graft was then harvested and transferred to the recipient site, oriented appropriately and then sutured into place.  The secondary defect was then repaired using a primary closure.
Composite Graft Text: The defect edges were debeveled with a #15 scalpel blade.  Given the location of the defect, shape of the defect, the proximity to free margins and the fact the defect was full thickness a composite graft was deemed most appropriate.  The defect was outline and then transferred to the donor site.  A full thickness graft was then excised from the donor site. The graft was then placed in the primary defect, oriented appropriately and then sutured into place.  The secondary defect was then repaired using a primary closure.
Epidermal Autograft Text: The defect edges were debeveled with a #15 scalpel blade.  Given the location of the defect, shape of the defect and the proximity to free margins an epidermal autograft was deemed most appropriate.  Using a sterile surgical marker, the primary defect shape was transferred to the donor site. The epidermal graft was then harvested.  The skin graft was then placed in the primary defect and oriented appropriately.
Dermal Autograft Text: The defect edges were debeveled with a #15 scalpel blade.  Given the location of the defect, shape of the defect and the proximity to free margins a dermal autograft was deemed most appropriate.  Using a sterile surgical marker, the primary defect shape was transferred to the donor site. The area thus outlined was incised deep to adipose tissue with a #15 scalpel blade.  The harvested graft was then trimmed of adipose and epidermal tissue until only dermis was left.  The skin graft was then placed in the primary defect and oriented appropriately.
Ftsg Text: The defect edges were debeveled with a #15 scalpel blade.  Given the location of the defect, shape of the defect and the proximity to free margins a full thickness skin graft was deemed most appropriate.  Using a sterile surgical marker, the primary defect shape was transferred to the donor site. The area thus outlined was incised deep to adipose tissue with a #15 scalpel blade.  The harvested graft was then trimmed of adipose tissue until only dermis and epidermis was left.  The skin margins of the secondary defect were undermined to an appropriate distance in all directions utilizing iris scissors.  The secondary defect was closed with interrupted buried subcutaneous sutures.  The skin edges were then re-apposed with running  sutures.  The skin graft was then placed in the primary defect and oriented appropriately.
Pinch Graft Text: The defect edges were debeveled with a #15 scalpel blade. Given the location of the defect, shape of the defect and the proximity to free margins a pinch graft was deemed most appropriate. Using a sterile surgical marker, the primary defect shape was transferred to the donor site. The area thus outlined was incised deep to adipose tissue with a #15 scalpel blade.  The harvested graft was then trimmed of adipose tissue until only dermis and epidermis was left. The skin margins of the secondary defect were undermined to an appropriate distance in all directions utilizing iris scissors.  The secondary defect was closed with interrupted buried subcutaneous sutures.  The skin edges were then re-apposed with running  sutures.  The skin graft was then placed in the primary defect and oriented appropriately.
Skin Substitute Text: The defect edges were debeveled with a #15 scalpel blade.  Given the location of the defect, shape of the defect and the proximity to free margins a skin substitute graft was deemed most appropriate.  The graft material was trimmed to fit the size of the defect. The graft was then placed in the primary defect and oriented appropriately.
Split-Thickness Skin Graft Text: The defect edges were debeveled with a #15 scalpel blade.  Given the location of the defect, shape of the defect and the proximity to free margins a split thickness skin graft was deemed most appropriate.  Using a sterile surgical marker, the primary defect shape was transferred to the donor site. The split thickness graft was then harvested.  The skin graft was then placed in the primary defect and oriented appropriately.
Tissue Cultured Epidermal Autograft Text: The defect edges were debeveled with a #15 scalpel blade.  Given the location of the defect, shape of the defect and the proximity to free margins a tissue cultured epidermal autograft was deemed most appropriate.  The graft was then trimmed to fit the size of the defect.  The graft was then placed in the primary defect and oriented appropriately.
Xenograft Text: The defect edges were debeveled with a #15 scalpel blade.  Given the location of the defect, shape of the defect and the proximity to free margins a xenograft was deemed most appropriate.  The graft was then trimmed to fit the size of the defect.  The graft was then placed in the primary defect and oriented appropriately.
Complex Repair And Flap Additional Text (Will Appearing After The Standard Complex Repair Text): The complex repair was not sufficient to completely close the primary defect. The remaining additional defect was repaired with the flap mentioned below.
Complex Repair And Graft Additional Text (Will Appearing After The Standard Complex Repair Text): The complex repair was not sufficient to completely close the primary defect. The remaining additional defect was repaired with the graft mentioned below.
Eyelid Full Thickness Repair - 01095: The eyelid defect was full thickness which required a wedge repair of the eyelid. Special care was taken to ensure that the eyelid margin was realligned when placing sutures.
Eyelid Partial Thickness Repair - 81958: The eyelid defect was partial thickness which required a wedge repair of the eyelid. Special care was taken to ensure that the eyelid margin was realligned when placing sutures.
Intermediate Repair And Flap Additional Text (Will Appearing After The Standard Complex Repair Text): The intermediate repair was not sufficient to completely close the primary defect. The remaining additional defect was repaired with the flap mentioned below.
Intermediate Repair And Graft Additional Text (Will Appearing After The Standard Complex Repair Text): The intermediate repair was not sufficient to completely close the primary defect. The remaining additional defect was repaired with the graft mentioned below.
Localized Dermabrasion With 15 Blade Text: The patient was draped in routine manner.  Localized dermabrasion using a 15 blade was performed in routine manner to papillary dermis. This spot dermabrasion is being performed to complete skin cancer reconstruction. It also will eliminate the other sun damaged precancerous cells that are known to be part of the regional effect of a lifetime's worth of sun exposure. This localized dermabrasion is therapeutic and should not be considered cosmetic in any regard.
Localized Dermabrasion With Sand Papertext: The patient was draped in routine manner.  Localized dermabrasion using sterile sand paper was performed in routine manner to papillary dermis. This spot dermabrasion is being performed to complete skin cancer reconstruction. It also will eliminate the other sun damaged precancerous cells that are known to be part of the regional effect of a lifetime's worth of sun exposure. This localized dermabrasion is therapeutic and should not be considered cosmetic in any regard.
Localized Dermabrasion With Wire Brush Text: The patient was draped in routine manner.  Localized dermabrasion using 3 x 17 mm wire brush was performed in routine manner to papillary dermis. This spot dermabrasion is being performed to complete skin cancer reconstruction. It also will eliminate the other sun damaged precancerous cells that are known to be part of the regional effect of a lifetime's worth of sun exposure. This localized dermabrasion is therapeutic and should not be considered cosmetic in any regard.
Purse String (Simple) Text: Given the location of the defect and the characteristics of the surrounding skin a pursestring closure was deemed most appropriate.  Undermining was performed circumfirentially around the surgical defect.  A purstring suture was then placed and tightened.
Purse String (Intermediate) Text: Given the location of the defect and the characteristics of the surrounding skin a pursestring intermediate closure was deemed most appropriate.  Undermining was performed circumfirentially around the surgical defect.  A purstring suture was then placed and tightened.
Partial Purse String (Simple) Text: Given the location of the defect and the characteristics of the surrounding skin a simple purse string closure was deemed most appropriate.  Undermining was performed circumfirentially around the surgical defect.  A purse string suture was then placed and tightened. Wound tension only allowed a partial closure of the circular defect.
Partial Purse String (Intermediate) Text: Given the location of the defect and the characteristics of the surrounding skin an intermediate purse string closure was deemed most appropriate.  Undermining was performed circumfirentially around the surgical defect.  A purse string suture was then placed and tightened. Wound tension only allowed a partial closure of the circular defect.
Tarsorrhaphy Text: A tarsorrhaphy was performed using Frost sutures.
Manual Repair Warning Statement: We plan on removing the manually selected variable below in favor of our much easier automatic structured text blocks found in the previous tab. We decided to do this to help make the flow better and give you the full power of structured data. Manual selection is never going to be ideal in our platform and I would encourage you to avoid using manual selection from this point on, especially since I will be sunsetting this feature. It is important that you do one of two things with the customized text below. First, you can save all of the text in a word file so you can have it for future reference. Second, transfer the text to the appropriate area in the Library tab. Lastly, if there is a flap or graft type which we do not have you need to let us know right away so I can add it in before the variable is hidden. No need to panic, we plan to give you roughly 6 months to make the change.
Same Histology In Subsequent Stages Text: The pattern and morphology of the tumor is as described in the first stage.
No Residual Tumor Seen Histology Text: There were no malignant cells seen in the sections examined.
Inflammation Suggestive Of Cancer Camouflage Histology Text: There was a dense lymphocytic infiltrate which prevented adequate histologic evaluation of adjacent structures.
Bcc Histology Text: Beneath an irregular epidermis, there are small nodular masses of basaloid neoplastic cells with nuclear pleomorphism attached to the basal layer and surrounded by a loose fibrous stroma and mild inflammation in the dermis. The findings are typical for a basal cell carcinoma.
Bcc Infiltrative Histology Text: There were numerous aggregates of basaloid cells demonstrating an infiltrative pattern.
Bcc Infundibulocystic Histology Text: Beneath an irregular epidermis, there are small nodular masses  of basaloid neoplastic cells aggregating in a cystic formation with nuclear pleomorphism attached to the basal layer and surrounded by a loose fibrous stroma and mild inflammation in the dermis. The findings are typical for a basal cell carcinoma.
Bcc Micronodular Histology Text: Beneath an irregular epidermis, there are extremely small but infiltrative nodular masses of basaloid neoplastic cells with nuclear pleomorphism attached to the basal layer and surrounded by a loose fibrous stroma and mild inflammation in the dermis. The findings are typical for a basal cell carcinoma.
Bcc  Morpheaform/Sclerosing Histology Text: Beneath an irregular epidermis, there are bizarrely shaped masses of basaloid neoplastic cells with nuclear pleomorphism attached to the basal layer and surrounded by a rapidly forming scar and mild inflammation in the dermis. The findings are typical for a basal cell carcinoma.
Bcc  Nodular Histology Text: Nodular aggregates of basaloid cells with large, hyperchromatic, oval nuclei and little cytoplasm aligning densely in a palisade pattern at the periphery of the nest
Bcc  Nodulocystic Histology Text: Beneath an irregular epidermis, there are small nodular masses of basaloid neoplastic cells aggregating in a cystic formation with nuclear pleomorphism attached to the basal layer and surrounded by a loose fibrous stroma and mild inflammation in the dermis. The findings are typical for a basal cell carcinoma.
Bcc Pigmented Histology Text: Functional melanocytes are scattered through the basal cell carcinoma tumor islands and there are numerous melanophages in the stroma
Bcc Superficial Histology Text: On the basal layer of an irregular epidermis, there are small nodular masses of basaloid neoplastic cells with nuclear pleomorphism attached to the basal layer and surrounded by a loose fibrous stroma and mild inflammation in the dermis. The findings are typical for a basal cell carcinoma.
Mixed Superficial And Nodular Bcc Histology Text: On the basal layer of and beneath an irregular epidermis, there are small nodular masses of basaloid neoplastic cells with nuclear pleomorphism attached to the basal layer and surrounded by a loose fibrous stroma and mild inflammation in the dermis. The findings are typical for a basal cell carcinoma.
Mixed Nodular And Infiltrative Bcc Histology Text: There are narrow strands of spiky, irregular basal cell carcinoma cells infiltrating between collagen bundles with mucin-rich stroma
Mixed Nodular And Micronodular Bcc Histology Text: Beneath an irregular epidermis, there are varying sizes of nodular masses of basaloid neoplastic cells with nuclear pleomorphism attached to the basal layer and surrounded by a loose fibrous stroma and mild inflammation in the dermis. The findings are typical for a basal cell carcinoma.
Scc Histology Text: There are nests of squamous epithelial cells arising from the epidermis and extending into the dermis. The malignant cells are large with abundant eosinophilic cytoplasm and a large vesicular nucleus.
Scc Moderately Differentiated Histology Text: There are nests of squamous epithelial cells arising from the epidermis and extending into the dermis. The malignant cells are large with abundant eosinophilic cytoplasm and a large nucleus. Keratin pearls are also present.
Scc Poorly Differentiated Histology Text: There are nests of squamous epithelial cells arising from the epidermis and extending into the dermis. The malignant cells are poorly differentiated.
Scc Acantholytic Histology Text: There are nests of squamous epithelial cells arising from the epidermis and extending into the dermis. The malignant cells are demonstratic acantholysis
Scc Ka Subtype Histology Text: There is well-differentiated squamous epithelium with pleomorphism and masses of keratin.
Scc In Situ Histology Text: Atypia of the keratinocytes across the full thickness of the epidermis with loss of the granular layer and overlying zones of parakeratosis
Melanoma In Situ Histology Text: Histologically, the lesion is asymmetrical, poorly circumscribed with architectural disturbance and marked cytological atypia
Afx Histology Text: Bizarre multinucleated tumor cells in hypercellular, spindly stroma with frequent mitotic figures. There are also smaller fibroblastic cells with pleomorphism and angulated nuclei confined to the dermis.
Basosquamous Cell Carcinoma Histology Text: + areas of BCC, with large and rounded basaloid cells and + areas of SCC, with polygonal squamoid cells with abundant eosinophilic cytoplasm, large nuclei, and prominent nucleoli.
Hidradenocarcinoma Histology Text: On histologic exam, there are nodular, epithelioid, basaloid tumor cells with irregular infiltration of the surrounding dermis and foci of duct formation.
Mart-1 - Positive Histology Text: MART-1 staining demonstrates areas of higher density and clustering of melanocytes with Pagetoid spread upwards within the epidermis. The surgical margins are positive for tumor cells.
Mart-1 - Negative Histology Text: MART-1 staining demonstrates a normal density and pattern of melanocytes along the dermal-epidermal junction. The surgical margins are negative for tumor cells.
Information: Selecting Yes will display possible errors in your note based on the variables you have selected. This validation is only offered as a suggestion for you. PLEASE NOTE THAT THE VALIDATION TEXT WILL BE REMOVED WHEN YOU FINALIZE YOUR NOTE. IF YOU WANT TO FAX A PRELIMINARY NOTE YOU WILL NEED TO TOGGLE THIS TO 'NO' IF YOU DO NOT WANT IT IN YOUR FAXED NOTE.
Bill 59 Modifier?: No - Continue to Bill 79 Modifier

## 2024-08-27 ENCOUNTER — APPOINTMENT (RX ONLY)
Dept: URBAN - METROPOLITAN AREA CLINIC 121 | Facility: CLINIC | Age: 69
Setting detail: DERMATOLOGY
End: 2024-08-27

## 2024-08-27 DIAGNOSIS — Z85.828 PERSONAL HISTORY OF OTHER MALIGNANT NEOPLASM OF SKIN: ICD-10-CM

## 2024-08-27 DIAGNOSIS — D49.2 NEOPLASM OF UNSPECIFIED BEHAVIOR OF BONE, SOFT TISSUE, AND SKIN: ICD-10-CM

## 2024-08-27 PROCEDURE — 11102 TANGNTL BX SKIN SINGLE LES: CPT

## 2024-08-27 PROCEDURE — ? BIOPSY BY SHAVE METHOD

## 2024-08-27 PROCEDURE — 11103 TANGNTL BX SKIN EA SEP/ADDL: CPT

## 2024-08-27 PROCEDURE — 99212 OFFICE O/P EST SF 10 MIN: CPT | Mod: 25

## 2024-08-27 PROCEDURE — ? COUNSELING

## 2024-08-27 ASSESSMENT — LOCATION ZONE DERM
LOCATION ZONE: ARM
LOCATION ZONE: LEG

## 2024-08-27 ASSESSMENT — LOCATION SIMPLE DESCRIPTION DERM
LOCATION SIMPLE: RIGHT FOREARM
LOCATION SIMPLE: LEFT FOREARM
LOCATION SIMPLE: RIGHT PRETIBIAL REGION

## 2024-08-27 ASSESSMENT — LOCATION DETAILED DESCRIPTION DERM
LOCATION DETAILED: RIGHT LATERAL DISTAL PRETIBIAL REGION
LOCATION DETAILED: LEFT PROXIMAL DORSAL FOREARM
LOCATION DETAILED: RIGHT VENTRAL DISTAL FOREARM

## 2024-08-27 NOTE — PROCEDURE: BIOPSY BY SHAVE METHOD
Detail Level: Detailed
Depth Of Biopsy: dermis
Was A Bandage Applied: Yes
Size Of Lesion In Cm: 0.7
X Size Of Lesion In Cm: 0
Biopsy Type: H and E
Biopsy Method: Personna blade
Anesthesia Type: 1% lidocaine with epinephrine
Anesthesia Volume In Cc: 0.5
Hemostasis: Aluminum Chloride
Wound Care: Petrolatum
Dressing: bandage
Destruction After The Procedure: No
Type Of Destruction Used: Curettage
Curettage Text: The wound bed was treated with curettage after the biopsy was performed.
Cryotherapy Text: The wound bed was treated with cryotherapy after the biopsy was performed.
Electrodesiccation Text: The wound bed was treated with electrodesiccation after the biopsy was performed.
Electrodesiccation And Curettage Text: The wound bed was treated with electrodesiccation and curettage after the biopsy was performed.
Silver Nitrate Text: The wound bed was treated with silver nitrate after the biopsy was performed.
Lab: [id number]
Lab Facility: [id number]
Consent: The provider's intent is to obtain a tissue sample solely for diagnostic purposes. Written consent to obtain tissue sample was obtained and risks were reviewed including but not limited to scarring, infection, bleeding, scabbing, incomplete removal, nerve damage and allergy to anesthesia.
Post-Care Instructions: I reviewed with the patient in detail post-care instructions. Patient is to keep the biopsy site dry overnight, and then apply bacitracin twice daily until healed. Patient may apply hydrogen peroxide soaks to remove any crusting.
Notification Instructions: Patient will be notified of biopsy results. However, patient instructed to call the office if not contacted within 2 weeks.
Billing Type: Third-Party Bill
Information: Selecting Yes will display possible errors in your note based on the variables you have selected. This validation is only offered as a suggestion for you. PLEASE NOTE THAT THE VALIDATION TEXT WILL BE REMOVED WHEN YOU FINALIZE YOUR NOTE. IF YOU WANT TO FAX A PRELIMINARY NOTE YOU WILL NEED TO TOGGLE THIS TO 'NO' IF YOU DO NOT WANT IT IN YOUR FAXED NOTE.
Size Of Lesion In Cm: 3.2

## 2024-08-27 NOTE — HPI: SKIN LESION
What Type Of Note Output Would You Prefer (Optional)?: Standard Output
How Severe Is Your Skin Lesion?: mild
Has Your Skin Lesion Been Treated?: not been treated
Is This A New Presentation, Or A Follow-Up?: Skin Lesions
Additional History: Left forearm growth evaluation by orthopedic was referred for dermatology evaluation

## 2024-08-30 ENCOUNTER — APPOINTMENT (RX ONLY)
Dept: URBAN - METROPOLITAN AREA CLINIC 116 | Facility: CLINIC | Age: 69
Setting detail: DERMATOLOGY
End: 2024-08-30

## 2024-08-30 DIAGNOSIS — Z01.818 ENCOUNTER FOR OTHER PREPROCEDURAL EXAMINATION: ICD-10-CM

## 2024-08-30 PROCEDURE — ? COUNSELING

## 2024-09-07 ENCOUNTER — HEALTH MAINTENANCE LETTER (OUTPATIENT)
Age: 69
End: 2024-09-07

## 2024-10-03 ENCOUNTER — RX ONLY (OUTPATIENT)
Age: 69
Setting detail: RX ONLY
End: 2024-10-03

## 2024-10-03 RX ORDER — CLOBETASOL PROPIONATE 0.5 MG/G
OINTMENT TOPICAL
Qty: 60 | Refills: 2 | Status: ERX

## 2024-10-03 RX ORDER — CLOBETASOL PROPIONATE 0.5 MG/G
OINTMENT TOPICAL
Qty: 60 | Refills: 2 | Status: CANCELLED

## 2024-11-21 ENCOUNTER — APPOINTMENT (RX ONLY)
Dept: URBAN - METROPOLITAN AREA CLINIC 121 | Facility: CLINIC | Age: 69
Setting detail: DERMATOLOGY
End: 2024-11-21

## 2024-11-21 DIAGNOSIS — L57.0 ACTINIC KERATOSIS: ICD-10-CM

## 2024-11-21 PROCEDURE — ? COUNSELING

## 2024-11-21 PROCEDURE — 17000 DESTRUCT PREMALG LESION: CPT

## 2024-11-21 PROCEDURE — ? LIQUID NITROGEN

## 2024-11-21 PROCEDURE — 17003 DESTRUCT PREMALG LES 2-14: CPT

## 2024-11-21 ASSESSMENT — LOCATION SIMPLE DESCRIPTION DERM: LOCATION SIMPLE: RIGHT FOREARM

## 2024-11-21 ASSESSMENT — LOCATION ZONE DERM: LOCATION ZONE: ARM

## 2024-11-21 ASSESSMENT — LOCATION DETAILED DESCRIPTION DERM
LOCATION DETAILED: RIGHT PROXIMAL DORSAL FOREARM
LOCATION DETAILED: RIGHT DISTAL DORSAL FOREARM
LOCATION DETAILED: RIGHT PROXIMAL ULNAR DORSAL FOREARM

## 2024-11-21 NOTE — PROCEDURE: LIQUID NITROGEN
Detail Level: Detailed
Post-Care Instructions: I reviewed with the patient in detail post-care instructions. Patient is to wear sunprotection, and avoid picking at any of the treated lesions. Pt may apply Vaseline to crusted or scabbing areas. Patient has also received a handout with instructions on caring for the wound and office contact information.
Consent: The patient's consent was obtained including but not limited to risks of crusting, scabbing, blistering, scarring, darker or lighter pigmentary change, recurrence, incomplete removal and infection.
Duration Of Freeze Thaw-Cycle (Seconds): 3
Number Of Freeze-Thaw Cycles: 3 freeze-thaw cycles
Show Applicator Variable?: Yes

## (undated) DEVICE — INTRODUCER SHEATH FAST-CATH SWARTZ 8.5FRX63CM SL1 CVD 406849

## (undated) DEVICE — SUCTION IRR SYSTEM W/O TIP INTERPULSE HANDPIECE 0210-100-000

## (undated) DEVICE — CATH EP LIVEWIRE 2MMX95CM 2-10-2 SUPER LG CURVE 401904

## (undated) DEVICE — POSITIONER ABDUCTION PILLOW FOAM MED FP-ABDUCTM

## (undated) DEVICE — DRSG AQUACEL AG 3.5X9.75" HYDROFIBER 412011

## (undated) DEVICE — CATH SOUNDSTAR 8FRX90CM 10439011

## (undated) DEVICE — CATH NAV PENTARAY F CURVE

## (undated) DEVICE — INTRO CATH 12CM 8.5FR FST-CATH

## (undated) DEVICE — SU MONOCRYL 3-0 PS-1 27" Y936H

## (undated) DEVICE — BONE CLEANING TIP INTERPULSE  0210-010-000

## (undated) DEVICE — ADH SKIN CLOSURE PREMIERPRO EXOFIN 1.0ML 3470

## (undated) DEVICE — LINEN MAYO STAND COVER OVERSIZE PACK 5458

## (undated) DEVICE — DRSG STERI STRIP 1/2X4" R1547

## (undated) DEVICE — INTRODUCER SHEATH FAST-CATH 9FRX12CM 406116

## (undated) DEVICE — STRAP KNEE/BODY 31143004

## (undated) DEVICE — DRAPE STERI TOWEL LG 1010

## (undated) DEVICE — BLADE SAW RECIP STRK 70X6X0.025MM 0277-096-250S5

## (undated) DEVICE — TUBE SET SMARKABLATE IRRIGATION

## (undated) DEVICE — DRILL BIT FLEXIBLE REFLECTION 35MM 71362935

## (undated) DEVICE — HOOD FLYTE W/PEELAWAY 408-800-100

## (undated) DEVICE — GOWN XLG DISP 9545

## (undated) DEVICE — INTRO SHEATH 7FRX10CM PINNACLE RSS702

## (undated) DEVICE — SPONGE LAP 18X18" X8435

## (undated) DEVICE — SOL WATER IRRIG 1000ML BOTTLE 2F7114

## (undated) DEVICE — BLADE KNIFE SURG 20 371120

## (undated) DEVICE — SOL NACL 0.9% INJ 1000ML BAG 2B1324X

## (undated) DEVICE — CATH THERMOCOOL SMARTTOUCH SF DF CURVE

## (undated) DEVICE — DRAPE IOBAN INCISE 36X23" 6651EZ

## (undated) DEVICE — KIT RIGHT HEART CATH H965601307191

## (undated) DEVICE — Device

## (undated) DEVICE — PATCH CARTO 3 EXTERNAL REFERENCE 3D MAPPING CREFP6

## (undated) DEVICE — LINEN ORTHO PACK 5446

## (undated) DEVICE — PACK HEART LEFT CUSTOM

## (undated) DEVICE — SU ETHIBOND 5 V-37 4X30" MB66G

## (undated) DEVICE — DRSG KERLIX 4 1/2"X4YDS ROLL 6730

## (undated) DEVICE — NDL TRANSSEPTAL BRK XS 18GA 71CM BRK-1 CVD G407209

## (undated) DEVICE — INTRO SHEATH 4FRX10CM PINNACLE RSS402

## (undated) DEVICE — PACK HEART RIGHT CUSTOM SAN32RHF18

## (undated) DEVICE — LINEN TOWEL PACK X5 5464

## (undated) DEVICE — KIT VENOUS FLUSH 60210177

## (undated) DEVICE — GOWN IMPERVIOUS SPECIALTY XLG/XLONG 32474

## (undated) DEVICE — DEVICE RETRIEVER HEWSON 71111579

## (undated) DEVICE — SU VICRYL 0 CT 36" J358H

## (undated) DEVICE — PAD DEFIBRILLATOR ELECTRODE 4.25INX6IN ADULT 2001M-C

## (undated) DEVICE — GLOVE PROTEXIS BLUE W/NEU-THERA 8.0  2D73EB80

## (undated) DEVICE — GLOVE PROTEXIS POWDER FREE 7.5 ORTHOPEDIC 2D73ET75

## (undated) DEVICE — INTRODUCER SHEATH FAST-CATH CATH-LOCK 7FRX12CM 406702

## (undated) DEVICE — ESU GROUND PAD ADULT W/CORD E7507

## (undated) DEVICE — SOL NACL 0.9% IRRIG 1000ML BOTTLE 2F7124

## (undated) DEVICE — SU VICRYL 2-0 CT-1 27" UND J259H

## (undated) DEVICE — STRAP STIRRUP W/SLIP 30187-030

## (undated) DEVICE — PACK TOTAL HIP W/POUCH RIVERSIDE LATEX FREE

## (undated) DEVICE — GLOVE PROTEXIS W/NEU-THERA 8.0  2D73TE80

## (undated) DEVICE — SUCTION MANIFOLD DORNOCH ULTRA CART UL-CL500

## (undated) DEVICE — PREP CHLORAPREP 26ML TINTED ORANGE  260815

## (undated) RX ORDER — PROPOFOL 10 MG/ML
INJECTION, EMULSION INTRAVENOUS
Status: DISPENSED
Start: 2019-06-05

## (undated) RX ORDER — FENTANYL CITRATE 50 UG/ML
INJECTION, SOLUTION INTRAMUSCULAR; INTRAVENOUS
Status: DISPENSED
Start: 2018-08-16

## (undated) RX ORDER — ACETAMINOPHEN 325 MG/1
TABLET ORAL
Status: DISPENSED
Start: 2019-06-05

## (undated) RX ORDER — SODIUM CHLORIDE 9 MG/ML
INJECTION, SOLUTION INTRAVENOUS
Status: DISPENSED
Start: 2017-08-07

## (undated) RX ORDER — BUPIVACAINE HYDROCHLORIDE 5 MG/ML
INJECTION, SOLUTION EPIDURAL; INTRACAUDAL
Status: DISPENSED
Start: 2019-03-29

## (undated) RX ORDER — DEXAMETHASONE SODIUM PHOSPHATE 4 MG/ML
INJECTION, SOLUTION INTRA-ARTICULAR; INTRALESIONAL; INTRAMUSCULAR; INTRAVENOUS; SOFT TISSUE
Status: DISPENSED
Start: 2020-05-14

## (undated) RX ORDER — CEFAZOLIN SODIUM 2 G/100ML
INJECTION, SOLUTION INTRAVENOUS
Status: DISPENSED
Start: 2019-06-05

## (undated) RX ORDER — TRIAMCINOLONE ACETONIDE 40 MG/ML
INJECTION, SUSPENSION INTRA-ARTICULAR; INTRAMUSCULAR
Status: DISPENSED
Start: 2018-07-19

## (undated) RX ORDER — BUPIVACAINE HYDROCHLORIDE 5 MG/ML
INJECTION, SOLUTION EPIDURAL; INTRACAUDAL
Status: DISPENSED
Start: 2018-11-08

## (undated) RX ORDER — FENTANYL CITRATE 50 UG/ML
INJECTION, SOLUTION INTRAMUSCULAR; INTRAVENOUS
Status: DISPENSED
Start: 2019-06-05

## (undated) RX ORDER — METHYLPREDNISOLONE SODIUM SUCCINATE 125 MG/2ML
INJECTION, POWDER, LYOPHILIZED, FOR SOLUTION INTRAMUSCULAR; INTRAVENOUS
Status: DISPENSED
Start: 2020-05-14

## (undated) RX ORDER — LIDOCAINE 40 MG/G
CREAM TOPICAL
Status: DISPENSED
Start: 2019-08-19

## (undated) RX ORDER — HYDROMORPHONE HYDROCHLORIDE 1 MG/ML
INJECTION, SOLUTION INTRAMUSCULAR; INTRAVENOUS; SUBCUTANEOUS
Status: DISPENSED
Start: 2019-06-05

## (undated) RX ORDER — LIDOCAINE HYDROCHLORIDE 10 MG/ML
INJECTION, SOLUTION EPIDURAL; INFILTRATION; INTRACAUDAL; PERINEURAL
Status: DISPENSED
Start: 2018-11-08

## (undated) RX ORDER — LIDOCAINE HYDROCHLORIDE 10 MG/ML
INJECTION, SOLUTION EPIDURAL; INFILTRATION; INTRACAUDAL; PERINEURAL
Status: DISPENSED
Start: 2019-03-29

## (undated) RX ORDER — FENTANYL CITRATE 50 UG/ML
INJECTION, SOLUTION INTRAMUSCULAR; INTRAVENOUS
Status: DISPENSED
Start: 2018-05-18

## (undated) RX ORDER — TRIAMCINOLONE ACETONIDE 40 MG/ML
INJECTION, SUSPENSION INTRA-ARTICULAR; INTRAMUSCULAR
Status: DISPENSED
Start: 2018-11-08

## (undated) RX ORDER — HEPARIN SODIUM 1000 [USP'U]/ML
INJECTION, SOLUTION INTRAVENOUS; SUBCUTANEOUS
Status: DISPENSED
Start: 2020-05-14

## (undated) RX ORDER — PROPOFOL 10 MG/ML
INJECTION, EMULSION INTRAVENOUS
Status: DISPENSED
Start: 2018-08-16

## (undated) RX ORDER — HEPARIN SODIUM 1000 [USP'U]/ML
INJECTION, SOLUTION INTRAVENOUS; SUBCUTANEOUS
Status: DISPENSED
Start: 2018-08-16

## (undated) RX ORDER — CEFAZOLIN SODIUM 1 G/3ML
INJECTION, POWDER, FOR SOLUTION INTRAMUSCULAR; INTRAVENOUS
Status: DISPENSED
Start: 2018-08-16

## (undated) RX ORDER — FENTANYL CITRATE 50 UG/ML
INJECTION, SOLUTION INTRAMUSCULAR; INTRAVENOUS
Status: DISPENSED
Start: 2020-05-14

## (undated) RX ORDER — ONDANSETRON 2 MG/ML
INJECTION INTRAMUSCULAR; INTRAVENOUS
Status: DISPENSED
Start: 2019-06-05

## (undated) RX ORDER — LIDOCAINE HYDROCHLORIDE 10 MG/ML
INJECTION, SOLUTION EPIDURAL; INFILTRATION; INTRACAUDAL; PERINEURAL
Status: DISPENSED
Start: 2019-08-19

## (undated) RX ORDER — LIDOCAINE HYDROCHLORIDE 10 MG/ML
INJECTION, SOLUTION EPIDURAL; INFILTRATION; INTRACAUDAL; PERINEURAL
Status: DISPENSED
Start: 2018-07-19

## (undated) RX ORDER — PHENYLEPHRINE HCL IN 0.9% NACL 1 MG/10 ML
SYRINGE (ML) INTRAVENOUS
Status: DISPENSED
Start: 2020-05-14

## (undated) RX ORDER — PROPOFOL 10 MG/ML
INJECTION, EMULSION INTRAVENOUS
Status: DISPENSED
Start: 2020-05-14

## (undated) RX ORDER — PROTAMINE SULFATE 10 MG/ML
INJECTION, SOLUTION INTRAVENOUS
Status: DISPENSED
Start: 2020-05-14

## (undated) RX ORDER — OXYCODONE AND ACETAMINOPHEN 5; 325 MG/1; MG/1
TABLET ORAL
Status: DISPENSED
Start: 2018-08-16

## (undated) RX ORDER — ONDANSETRON 2 MG/ML
INJECTION INTRAMUSCULAR; INTRAVENOUS
Status: DISPENSED
Start: 2018-08-16

## (undated) RX ORDER — LIDOCAINE 40 MG/G
CREAM TOPICAL
Status: DISPENSED
Start: 2019-08-21

## (undated) RX ORDER — PHENYLEPHRINE HCL IN 0.9% NACL 1 MG/10 ML
SYRINGE (ML) INTRAVENOUS
Status: DISPENSED
Start: 2019-06-05

## (undated) RX ORDER — PHENYLEPHRINE HCL IN 0.9% NACL 1 MG/10 ML
SYRINGE (ML) INTRAVENOUS
Status: DISPENSED
Start: 2018-08-16

## (undated) RX ORDER — DEXAMETHASONE SODIUM PHOSPHATE 4 MG/ML
INJECTION, SOLUTION INTRA-ARTICULAR; INTRALESIONAL; INTRAMUSCULAR; INTRAVENOUS; SOFT TISSUE
Status: DISPENSED
Start: 2019-06-05

## (undated) RX ORDER — CELECOXIB 200 MG/1
CAPSULE ORAL
Status: DISPENSED
Start: 2019-06-05

## (undated) RX ORDER — CEFAZOLIN SODIUM 1 G/3ML
INJECTION, POWDER, FOR SOLUTION INTRAMUSCULAR; INTRAVENOUS
Status: DISPENSED
Start: 2020-05-14

## (undated) RX ORDER — ACETAMINOPHEN 325 MG/1
TABLET ORAL
Status: DISPENSED
Start: 2020-05-14

## (undated) RX ORDER — FUROSEMIDE 10 MG/ML
INJECTION INTRAMUSCULAR; INTRAVENOUS
Status: DISPENSED
Start: 2018-08-16

## (undated) RX ORDER — CEPHALEXIN 500 MG/1
CAPSULE ORAL
Status: DISPENSED
Start: 2019-07-17

## (undated) RX ORDER — PROTAMINE SULFATE 10 MG/ML
INJECTION, SOLUTION INTRAVENOUS
Status: DISPENSED
Start: 2018-08-16

## (undated) RX ORDER — TRIAMCINOLONE ACETONIDE 40 MG/ML
INJECTION, SUSPENSION INTRA-ARTICULAR; INTRAMUSCULAR
Status: DISPENSED
Start: 2019-03-29

## (undated) RX ORDER — GLYCOPYRROLATE 0.2 MG/ML
INJECTION INTRAMUSCULAR; INTRAVENOUS
Status: DISPENSED
Start: 2019-06-05

## (undated) RX ORDER — METOPROLOL TARTRATE 100 MG
TABLET ORAL
Status: DISPENSED
Start: 2019-05-28

## (undated) RX ORDER — GABAPENTIN 300 MG/1
CAPSULE ORAL
Status: DISPENSED
Start: 2019-06-05

## (undated) RX ORDER — CALCIUM CHLORIDE 100 MG/ML
INJECTION INTRAVENOUS; INTRAVENTRICULAR
Status: DISPENSED
Start: 2018-08-16

## (undated) RX ORDER — METOPROLOL TARTRATE 1 MG/ML
INJECTION, SOLUTION INTRAVENOUS
Status: DISPENSED
Start: 2019-05-28

## (undated) RX ORDER — NITROGLYCERIN 0.4 MG/1
TABLET SUBLINGUAL
Status: DISPENSED
Start: 2019-05-28

## (undated) RX ORDER — SODIUM CHLORIDE 9 MG/ML
INJECTION, SOLUTION INTRAVENOUS
Status: DISPENSED
Start: 2018-05-04

## (undated) RX ORDER — OXYCODONE HYDROCHLORIDE 5 MG/1
TABLET ORAL
Status: DISPENSED
Start: 2019-06-05

## (undated) RX ORDER — LIDOCAINE HYDROCHLORIDE 20 MG/ML
INJECTION, SOLUTION EPIDURAL; INFILTRATION; INTRACAUDAL; PERINEURAL
Status: DISPENSED
Start: 2019-06-05